# Patient Record
Sex: MALE | Race: WHITE | NOT HISPANIC OR LATINO | Employment: OTHER | URBAN - METROPOLITAN AREA
[De-identification: names, ages, dates, MRNs, and addresses within clinical notes are randomized per-mention and may not be internally consistent; named-entity substitution may affect disease eponyms.]

---

## 2020-04-17 ENCOUNTER — ANESTHESIA (OUTPATIENT)
Dept: SURGERY | Facility: MEDICAL CENTER | Age: 75
DRG: 026 | End: 2020-04-17
Payer: MEDICARE

## 2020-04-17 ENCOUNTER — HOSPITAL ENCOUNTER (OUTPATIENT)
Dept: RADIOLOGY | Facility: MEDICAL CENTER | Age: 75
End: 2020-04-17
Payer: MEDICARE

## 2020-04-17 ENCOUNTER — HOSPITAL ENCOUNTER (INPATIENT)
Facility: MEDICAL CENTER | Age: 75
LOS: 8 days | DRG: 026 | End: 2020-04-25
Attending: EMERGENCY MEDICINE | Admitting: SURGERY
Payer: MEDICARE

## 2020-04-17 ENCOUNTER — ANESTHESIA EVENT (OUTPATIENT)
Dept: SURGERY | Facility: MEDICAL CENTER | Age: 75
DRG: 026 | End: 2020-04-17
Payer: MEDICARE

## 2020-04-17 ENCOUNTER — APPOINTMENT (OUTPATIENT)
Dept: RADIOLOGY | Facility: MEDICAL CENTER | Age: 75
DRG: 026 | End: 2020-04-17
Attending: EMERGENCY MEDICINE
Payer: MEDICARE

## 2020-04-17 DIAGNOSIS — Z11.9 SCREENING EXAMINATION FOR INFECTIOUS DISEASE: ICD-10-CM

## 2020-04-17 DIAGNOSIS — R27.0 ATAXIA: ICD-10-CM

## 2020-04-17 DIAGNOSIS — R33.9 URINARY RETENTION: ICD-10-CM

## 2020-04-17 DIAGNOSIS — S06.5XAA SUBDURAL HEMATOMA (HCC): ICD-10-CM

## 2020-04-17 DIAGNOSIS — R56.1 SEIZURES, POST-TRAUMATIC (HCC): ICD-10-CM

## 2020-04-17 DIAGNOSIS — I63.9 CEREBELLAR STROKE (HCC): ICD-10-CM

## 2020-04-17 DIAGNOSIS — R13.12 OROPHARYNGEAL DYSPHAGIA: ICD-10-CM

## 2020-04-17 DIAGNOSIS — D69.1 PLATELET DYSFUNCTION DUE TO ASPIRIN (HCC): ICD-10-CM

## 2020-04-17 DIAGNOSIS — Z78.9 IMPAIRED MOBILITY AND ADLS: ICD-10-CM

## 2020-04-17 DIAGNOSIS — Z74.09 IMPAIRED MOBILITY AND ADLS: ICD-10-CM

## 2020-04-17 DIAGNOSIS — E78.00 HYPERCHOLESTEROLEMIA: ICD-10-CM

## 2020-04-17 DIAGNOSIS — H54.8 LEGALLY BLIND: ICD-10-CM

## 2020-04-17 DIAGNOSIS — T14.90XA TRAUMA: ICD-10-CM

## 2020-04-17 DIAGNOSIS — I10 ESSENTIAL HYPERTENSION: ICD-10-CM

## 2020-04-17 DIAGNOSIS — T39.015A PLATELET DYSFUNCTION DUE TO ASPIRIN (HCC): ICD-10-CM

## 2020-04-17 DIAGNOSIS — I62.01 ACUTE ON CHRONIC INTRACRANIAL SUBDURAL HEMATOMA (HCC): ICD-10-CM

## 2020-04-17 DIAGNOSIS — I10 HYPERTENSION, UNSPECIFIED TYPE: ICD-10-CM

## 2020-04-17 DIAGNOSIS — S06.5X9A TRAUMATIC SUBDURAL HEMATOMA WITH LOSS OF CONSCIOUSNESS, INITIAL ENCOUNTER (HCC): ICD-10-CM

## 2020-04-17 DIAGNOSIS — Z53.09 CONTRAINDICATION TO DEEP VEIN THROMBOSIS (DVT) PROPHYLAXIS: ICD-10-CM

## 2020-04-17 DIAGNOSIS — I62.03 ACUTE ON CHRONIC INTRACRANIAL SUBDURAL HEMATOMA (HCC): ICD-10-CM

## 2020-04-17 DIAGNOSIS — I69.393 ATAXIA DUE TO RECENT CEREBROVASCULAR ACCIDENT (CVA): ICD-10-CM

## 2020-04-17 LAB
CFT BLD TEG: 5.2 MIN (ref 5–10)
CLOT ANGLE BLD TEG: 62.2 DEGREES (ref 53–72)
CLOT LYSIS 30M P MA LENFR BLD TEG: 0 % (ref 0–8)
CT.EXTRINSIC BLD ROTEM: 2.1 MIN (ref 1–3)
EKG IMPRESSION: NORMAL
MCF BLD TEG: 62.7 MM (ref 50–70)
PA AA BLD-ACNC: 53.4 %
PA ADP BLD-ACNC: 6.9 %
TEG ALGORITHM TGALG: NORMAL

## 2020-04-17 PROCEDURE — 160048 HCHG OR STATISTICAL LEVEL 1-5: Performed by: NEUROLOGICAL SURGERY

## 2020-04-17 PROCEDURE — 96374 THER/PROPH/DIAG INJ IV PUSH: CPT

## 2020-04-17 PROCEDURE — 500889 HCHG PACK, NEURO: Performed by: NEUROLOGICAL SURGERY

## 2020-04-17 PROCEDURE — 700101 HCHG RX REV CODE 250: Performed by: ANESTHESIOLOGY

## 2020-04-17 PROCEDURE — A6222 GAUZE <=16 IN NO W/SAL W/O B: HCPCS | Performed by: NEUROLOGICAL SURGERY

## 2020-04-17 PROCEDURE — 36415 COLL VENOUS BLD VENIPUNCTURE: CPT

## 2020-04-17 PROCEDURE — 00D10ZZ EXTRACTION OF CEREBRAL MENINGES, OPEN APPROACH: ICD-10-PCS | Performed by: NEUROLOGICAL SURGERY

## 2020-04-17 PROCEDURE — 501445 HCHG STAPLER, SKIN DISP: Performed by: NEUROLOGICAL SURGERY

## 2020-04-17 PROCEDURE — 700105 HCHG RX REV CODE 258: Performed by: NURSE PRACTITIONER

## 2020-04-17 PROCEDURE — A6404 STERILE GAUZE > 48 SQ IN: HCPCS | Performed by: NEUROLOGICAL SURGERY

## 2020-04-17 PROCEDURE — 160009 HCHG ANES TIME/MIN: Performed by: NEUROLOGICAL SURGERY

## 2020-04-17 PROCEDURE — 500075 HCHG BLADE, CLIPPER NEURO: Performed by: NEUROLOGICAL SURGERY

## 2020-04-17 PROCEDURE — 00U20KZ SUPPLEMENT DURA MATER WITH NONAUTOLOGOUS TISSUE SUBSTITUTE, OPEN APPROACH: ICD-10-PCS | Performed by: NEUROLOGICAL SURGERY

## 2020-04-17 PROCEDURE — 700111 HCHG RX REV CODE 636 W/ 250 OVERRIDE (IP): Performed by: NEUROLOGICAL SURGERY

## 2020-04-17 PROCEDURE — 500389 HCHG DRAIN, RESERVOIR SUCT JP 100CC: Performed by: NEUROLOGICAL SURGERY

## 2020-04-17 PROCEDURE — 700111 HCHG RX REV CODE 636 W/ 250 OVERRIDE (IP): Performed by: ANESTHESIOLOGY

## 2020-04-17 PROCEDURE — 85347 COAGULATION TIME ACTIVATED: CPT

## 2020-04-17 PROCEDURE — 770022 HCHG ROOM/CARE - ICU (200)

## 2020-04-17 PROCEDURE — 94760 N-INVAS EAR/PLS OXIMETRY 1: CPT

## 2020-04-17 PROCEDURE — 99291 CRITICAL CARE FIRST HOUR: CPT

## 2020-04-17 PROCEDURE — 160041 HCHG SURGERY MINUTES - EA ADDL 1 MIN LEVEL 4: Performed by: NEUROLOGICAL SURGERY

## 2020-04-17 PROCEDURE — 85384 FIBRINOGEN ACTIVITY: CPT

## 2020-04-17 PROCEDURE — 500331 HCHG COTTONOID, SURG PATTIE: Performed by: NEUROLOGICAL SURGERY

## 2020-04-17 PROCEDURE — C1713 ANCHOR/SCREW BN/BN,TIS/BN: HCPCS | Performed by: NEUROLOGICAL SURGERY

## 2020-04-17 PROCEDURE — 160029 HCHG SURGERY MINUTES - 1ST 30 MINS LEVEL 4: Performed by: NEUROLOGICAL SURGERY

## 2020-04-17 PROCEDURE — 160036 HCHG PACU - EA ADDL 30 MINS PHASE I: Performed by: NEUROLOGICAL SURGERY

## 2020-04-17 PROCEDURE — 700101 HCHG RX REV CODE 250: Performed by: NEUROLOGICAL SURGERY

## 2020-04-17 PROCEDURE — 700105 HCHG RX REV CODE 258: Performed by: ANESTHESIOLOGY

## 2020-04-17 PROCEDURE — 501838 HCHG SUTURE GENERAL: Performed by: NEUROLOGICAL SURGERY

## 2020-04-17 PROCEDURE — 500363 HCHG DISSECT TOOL, ANSPACH S: Performed by: NEUROLOGICAL SURGERY

## 2020-04-17 PROCEDURE — 700111 HCHG RX REV CODE 636 W/ 250 OVERRIDE (IP): Performed by: EMERGENCY MEDICINE

## 2020-04-17 PROCEDURE — 160002 HCHG RECOVERY MINUTES (STAT): Performed by: NEUROLOGICAL SURGERY

## 2020-04-17 PROCEDURE — 110454 HCHG SHELL REV 250: Performed by: NEUROLOGICAL SURGERY

## 2020-04-17 PROCEDURE — 85576 BLOOD PLATELET AGGREGATION: CPT

## 2020-04-17 PROCEDURE — 93005 ELECTROCARDIOGRAM TRACING: CPT | Performed by: EMERGENCY MEDICINE

## 2020-04-17 PROCEDURE — 160035 HCHG PACU - 1ST 60 MINS PHASE I: Performed by: NEUROLOGICAL SURGERY

## 2020-04-17 PROCEDURE — 00940ZZ DRAINAGE OF INTRACRANIAL SUBDURAL SPACE, OPEN APPROACH: ICD-10-PCS | Performed by: NEUROLOGICAL SURGERY

## 2020-04-17 DEVICE — SCREW STRYK NC 1.5X5MM (6NCX40=240) (80EA/PK) CONSIGNED QTY 240 PRE-LOAD: Type: IMPLANTABLE DEVICE | Status: FUNCTIONAL

## 2020-04-17 DEVICE — GRAFT DURAMATRIX ONLAY PLUS 3IN X 3IN OR 7.5CM X 7.5CM: Type: IMPLANTABLE DEVICE | Status: FUNCTIONAL

## 2020-04-17 DEVICE — PLATE SHUNT LARGE GAP 14MM WITH TAB (6NCX2=12): Type: IMPLANTABLE DEVICE | Status: FUNCTIONAL

## 2020-04-17 DEVICE — PLATE NC BURR HOLE COVER 10MM (6NCX6=36): Type: IMPLANTABLE DEVICE | Status: FUNCTIONAL

## 2020-04-17 RX ORDER — MIDAZOLAM HYDROCHLORIDE 1 MG/ML
INJECTION INTRAMUSCULAR; INTRAVENOUS
Status: COMPLETED
Start: 2020-04-17 | End: 2020-04-18

## 2020-04-17 RX ORDER — CEFAZOLIN SODIUM 2 G/100ML
2 INJECTION, SOLUTION INTRAVENOUS EVERY 8 HOURS
Status: COMPLETED | OUTPATIENT
Start: 2020-04-17 | End: 2020-04-18

## 2020-04-17 RX ORDER — OXYCODONE HCL 5 MG/5 ML
5 SOLUTION, ORAL ORAL
Status: DISCONTINUED | OUTPATIENT
Start: 2020-04-17 | End: 2020-04-18 | Stop reason: HOSPADM

## 2020-04-17 RX ORDER — HYDRALAZINE HYDROCHLORIDE 20 MG/ML
10 INJECTION INTRAMUSCULAR; INTRAVENOUS ONCE
Status: COMPLETED | OUTPATIENT
Start: 2020-04-17 | End: 2020-04-17

## 2020-04-17 RX ORDER — HYDROMORPHONE HYDROCHLORIDE 1 MG/ML
0.4 INJECTION, SOLUTION INTRAMUSCULAR; INTRAVENOUS; SUBCUTANEOUS
Status: DISCONTINUED | OUTPATIENT
Start: 2020-04-17 | End: 2020-04-18 | Stop reason: HOSPADM

## 2020-04-17 RX ORDER — POLYETHYLENE GLYCOL 3350 17 G/17G
1 POWDER, FOR SOLUTION ORAL 2 TIMES DAILY
Status: DISCONTINUED | OUTPATIENT
Start: 2020-04-17 | End: 2020-04-21

## 2020-04-17 RX ORDER — OXYCODONE HYDROCHLORIDE 5 MG/1
5 TABLET ORAL
Status: DISCONTINUED | OUTPATIENT
Start: 2020-04-17 | End: 2020-04-21

## 2020-04-17 RX ORDER — SODIUM CHLORIDE 9 MG/ML
INJECTION, SOLUTION INTRAVENOUS CONTINUOUS
Status: DISCONTINUED | OUTPATIENT
Start: 2020-04-17 | End: 2020-04-19

## 2020-04-17 RX ORDER — DOCUSATE SODIUM 100 MG/1
100 CAPSULE, LIQUID FILLED ORAL 2 TIMES DAILY
Status: DISCONTINUED | OUTPATIENT
Start: 2020-04-17 | End: 2020-04-21

## 2020-04-17 RX ORDER — AMOXICILLIN 250 MG
1 CAPSULE ORAL NIGHTLY
Status: DISCONTINUED | OUTPATIENT
Start: 2020-04-17 | End: 2020-04-21

## 2020-04-17 RX ORDER — AMOXICILLIN 250 MG
1 CAPSULE ORAL
Status: DISCONTINUED | OUTPATIENT
Start: 2020-04-17 | End: 2020-04-21

## 2020-04-17 RX ORDER — BUPIVACAINE HYDROCHLORIDE AND EPINEPHRINE 5; 5 MG/ML; UG/ML
INJECTION, SOLUTION EPIDURAL; INTRACAUDAL; PERINEURAL
Status: DISCONTINUED | OUTPATIENT
Start: 2020-04-17 | End: 2020-04-17 | Stop reason: HOSPADM

## 2020-04-17 RX ORDER — PHENYLEPHRINE HYDROCHLORIDE 10 MG/ML
INJECTION, SOLUTION INTRAMUSCULAR; INTRAVENOUS; SUBCUTANEOUS PRN
Status: DISCONTINUED | OUTPATIENT
Start: 2020-04-17 | End: 2020-04-17 | Stop reason: SURG

## 2020-04-17 RX ORDER — HYDROMORPHONE HYDROCHLORIDE 1 MG/ML
0.2 INJECTION, SOLUTION INTRAMUSCULAR; INTRAVENOUS; SUBCUTANEOUS
Status: DISCONTINUED | OUTPATIENT
Start: 2020-04-17 | End: 2020-04-18 | Stop reason: HOSPADM

## 2020-04-17 RX ORDER — OXYCODONE HCL 5 MG/5 ML
10 SOLUTION, ORAL ORAL
Status: DISCONTINUED | OUTPATIENT
Start: 2020-04-17 | End: 2020-04-18 | Stop reason: HOSPADM

## 2020-04-17 RX ORDER — HALOPERIDOL 5 MG/ML
1 INJECTION INTRAMUSCULAR
Status: DISCONTINUED | OUTPATIENT
Start: 2020-04-17 | End: 2020-04-18 | Stop reason: HOSPADM

## 2020-04-17 RX ORDER — HYDRALAZINE HYDROCHLORIDE 20 MG/ML
20 INJECTION INTRAMUSCULAR; INTRAVENOUS EVERY 6 HOURS PRN
Status: DISCONTINUED | OUTPATIENT
Start: 2020-04-17 | End: 2020-04-25

## 2020-04-17 RX ORDER — AMLODIPINE BESYLATE 5 MG/1
5 TABLET ORAL DAILY
Status: ON HOLD | COMMUNITY
End: 2020-06-19

## 2020-04-17 RX ORDER — LABETALOL HYDROCHLORIDE 5 MG/ML
10 INJECTION, SOLUTION INTRAVENOUS EVERY 4 HOURS PRN
Status: DISCONTINUED | OUTPATIENT
Start: 2020-04-17 | End: 2020-04-24

## 2020-04-17 RX ORDER — MORPHINE SULFATE 4 MG/ML
1-4 INJECTION, SOLUTION INTRAMUSCULAR; INTRAVENOUS
Status: DISCONTINUED | OUTPATIENT
Start: 2020-04-17 | End: 2020-04-25

## 2020-04-17 RX ORDER — SODIUM CHLORIDE 9 MG/ML
INJECTION, SOLUTION INTRAVENOUS CONTINUOUS
Status: DISCONTINUED | OUTPATIENT
Start: 2020-04-17 | End: 2020-04-18 | Stop reason: HOSPADM

## 2020-04-17 RX ORDER — MIDAZOLAM HYDROCHLORIDE 1 MG/ML
1 INJECTION INTRAMUSCULAR; INTRAVENOUS
Status: COMPLETED | OUTPATIENT
Start: 2020-04-17 | End: 2020-04-18

## 2020-04-17 RX ORDER — METOPROLOL TARTRATE 1 MG/ML
1 INJECTION, SOLUTION INTRAVENOUS
Status: DISCONTINUED | OUTPATIENT
Start: 2020-04-17 | End: 2020-04-18 | Stop reason: HOSPADM

## 2020-04-17 RX ORDER — BISACODYL 10 MG
10 SUPPOSITORY, RECTAL RECTAL
Status: DISCONTINUED | OUTPATIENT
Start: 2020-04-17 | End: 2020-04-25 | Stop reason: HOSPADM

## 2020-04-17 RX ORDER — DEXAMETHASONE SODIUM PHOSPHATE 4 MG/ML
INJECTION, SOLUTION INTRA-ARTICULAR; INTRALESIONAL; INTRAMUSCULAR; INTRAVENOUS; SOFT TISSUE PRN
Status: DISCONTINUED | OUTPATIENT
Start: 2020-04-17 | End: 2020-04-17 | Stop reason: SURG

## 2020-04-17 RX ORDER — HYDROMORPHONE HYDROCHLORIDE 1 MG/ML
0.1 INJECTION, SOLUTION INTRAMUSCULAR; INTRAVENOUS; SUBCUTANEOUS
Status: DISCONTINUED | OUTPATIENT
Start: 2020-04-17 | End: 2020-04-18 | Stop reason: HOSPADM

## 2020-04-17 RX ORDER — OXYCODONE HYDROCHLORIDE 10 MG/1
10 TABLET ORAL
Status: DISCONTINUED | OUTPATIENT
Start: 2020-04-17 | End: 2020-04-21

## 2020-04-17 RX ORDER — ENEMA 19; 7 G/133ML; G/133ML
1 ENEMA RECTAL
Status: DISCONTINUED | OUTPATIENT
Start: 2020-04-17 | End: 2020-04-25 | Stop reason: HOSPADM

## 2020-04-17 RX ORDER — BACITRACIN 50000 [IU]/1
INJECTION, POWDER, FOR SOLUTION INTRAMUSCULAR
Status: DISCONTINUED | OUTPATIENT
Start: 2020-04-17 | End: 2020-04-17 | Stop reason: HOSPADM

## 2020-04-17 RX ORDER — LABETALOL HYDROCHLORIDE 5 MG/ML
5 INJECTION, SOLUTION INTRAVENOUS
Status: DISCONTINUED | OUTPATIENT
Start: 2020-04-17 | End: 2020-04-18 | Stop reason: HOSPADM

## 2020-04-17 RX ORDER — ONDANSETRON 2 MG/ML
4 INJECTION INTRAMUSCULAR; INTRAVENOUS
Status: DISCONTINUED | OUTPATIENT
Start: 2020-04-17 | End: 2020-04-18 | Stop reason: HOSPADM

## 2020-04-17 RX ORDER — ROCURONIUM BROMIDE 10 MG/ML
INJECTION, SOLUTION INTRAVENOUS PRN
Status: DISCONTINUED | OUTPATIENT
Start: 2020-04-17 | End: 2020-04-17 | Stop reason: SURG

## 2020-04-17 RX ORDER — HYDRALAZINE HYDROCHLORIDE 20 MG/ML
5 INJECTION INTRAMUSCULAR; INTRAVENOUS
Status: DISCONTINUED | OUTPATIENT
Start: 2020-04-17 | End: 2020-04-18 | Stop reason: HOSPADM

## 2020-04-17 RX ORDER — DIPHENHYDRAMINE HYDROCHLORIDE 50 MG/ML
12.5 INJECTION INTRAMUSCULAR; INTRAVENOUS
Status: DISCONTINUED | OUTPATIENT
Start: 2020-04-17 | End: 2020-04-18 | Stop reason: HOSPADM

## 2020-04-17 RX ORDER — MEPERIDINE HYDROCHLORIDE 25 MG/ML
12.5 INJECTION INTRAMUSCULAR; INTRAVENOUS; SUBCUTANEOUS
Status: DISCONTINUED | OUTPATIENT
Start: 2020-04-17 | End: 2020-04-18 | Stop reason: HOSPADM

## 2020-04-17 RX ORDER — ONDANSETRON 2 MG/ML
4 INJECTION INTRAMUSCULAR; INTRAVENOUS EVERY 4 HOURS PRN
Status: DISCONTINUED | OUTPATIENT
Start: 2020-04-17 | End: 2020-04-25 | Stop reason: HOSPADM

## 2020-04-17 RX ORDER — ONDANSETRON 2 MG/ML
INJECTION INTRAMUSCULAR; INTRAVENOUS PRN
Status: DISCONTINUED | OUTPATIENT
Start: 2020-04-17 | End: 2020-04-17 | Stop reason: SURG

## 2020-04-17 RX ORDER — CEFAZOLIN SODIUM 1 G/3ML
INJECTION, POWDER, FOR SOLUTION INTRAMUSCULAR; INTRAVENOUS PRN
Status: DISCONTINUED | OUTPATIENT
Start: 2020-04-17 | End: 2020-04-17 | Stop reason: SURG

## 2020-04-17 RX ADMIN — SUGAMMADEX 100 MG: 100 INJECTION, SOLUTION INTRAVENOUS at 22:21

## 2020-04-17 RX ADMIN — ROCURONIUM BROMIDE 50 MG: 10 INJECTION, SOLUTION INTRAVENOUS at 20:44

## 2020-04-17 RX ADMIN — FENTANYL CITRATE 50 MCG: 50 INJECTION, SOLUTION INTRAMUSCULAR; INTRAVENOUS at 21:08

## 2020-04-17 RX ADMIN — CEFAZOLIN 2 G: 330 INJECTION, POWDER, FOR SOLUTION INTRAMUSCULAR; INTRAVENOUS at 20:57

## 2020-04-17 RX ADMIN — MEPERIDINE HYDROCHLORIDE 12.5 MG: 25 INJECTION INTRAMUSCULAR; INTRAVENOUS; SUBCUTANEOUS at 23:10

## 2020-04-17 RX ADMIN — PROPOFOL 200 MG: 10 INJECTION, EMULSION INTRAVENOUS at 20:44

## 2020-04-17 RX ADMIN — FENTANYL CITRATE 25 MCG: 50 INJECTION, SOLUTION INTRAMUSCULAR; INTRAVENOUS at 22:25

## 2020-04-17 RX ADMIN — SODIUM CHLORIDE: 9 INJECTION, SOLUTION INTRAVENOUS at 23:31

## 2020-04-17 RX ADMIN — PHENYLEPHRINE HYDROCHLORIDE 100 MCG: 10 INJECTION INTRAVENOUS at 21:28

## 2020-04-17 RX ADMIN — CEFAZOLIN SODIUM 2 G: 2 INJECTION, SOLUTION INTRAVENOUS at 23:29

## 2020-04-17 RX ADMIN — HYDRALAZINE HYDROCHLORIDE 10 MG: 20 INJECTION INTRAMUSCULAR; INTRAVENOUS at 19:54

## 2020-04-17 RX ADMIN — LIDOCAINE HYDROCHLORIDE 100 MG: 20 INJECTION, SOLUTION INTRAVENOUS at 20:44

## 2020-04-17 RX ADMIN — PHENYLEPHRINE HYDROCHLORIDE 100 MCG: 10 INJECTION INTRAVENOUS at 21:58

## 2020-04-17 RX ADMIN — PHENYLEPHRINE HYDROCHLORIDE 50 MCG: 10 INJECTION INTRAVENOUS at 20:49

## 2020-04-17 RX ADMIN — SODIUM CHLORIDE: 9 INJECTION, SOLUTION INTRAVENOUS at 20:39

## 2020-04-17 RX ADMIN — ONDANSETRON 4 MG: 2 INJECTION INTRAMUSCULAR; INTRAVENOUS at 21:53

## 2020-04-17 RX ADMIN — MEPERIDINE HYDROCHLORIDE 12.5 MG: 25 INJECTION INTRAMUSCULAR; INTRAVENOUS; SUBCUTANEOUS at 23:04

## 2020-04-17 RX ADMIN — FENTANYL CITRATE 50 MCG: 50 INJECTION, SOLUTION INTRAMUSCULAR; INTRAVENOUS at 20:41

## 2020-04-17 RX ADMIN — PHENYLEPHRINE HYDROCHLORIDE 50 MCG: 10 INJECTION INTRAVENOUS at 21:24

## 2020-04-17 RX ADMIN — DEXAMETHASONE SODIUM PHOSPHATE 4 MG: 4 INJECTION, SOLUTION INTRA-ARTICULAR; INTRALESIONAL; INTRAMUSCULAR; INTRAVENOUS; SOFT TISSUE at 20:53

## 2020-04-17 RX ADMIN — MIDAZOLAM HYDROCHLORIDE 1 MG: 1 INJECTION, SOLUTION INTRAMUSCULAR; INTRAVENOUS at 23:59

## 2020-04-17 ASSESSMENT — FIBROSIS 4 INDEX: FIB4 SCORE: 2.83

## 2020-04-17 ASSESSMENT — PAIN SCALES - GENERAL: PAIN_LEVEL: 0

## 2020-04-18 ENCOUNTER — APPOINTMENT (OUTPATIENT)
Dept: RADIOLOGY | Facility: MEDICAL CENTER | Age: 75
DRG: 026 | End: 2020-04-18
Attending: NURSE PRACTITIONER
Payer: MEDICARE

## 2020-04-18 PROBLEM — E78.00 HYPERCHOLESTEROLEMIA: Status: ACTIVE | Noted: 2020-04-18

## 2020-04-18 LAB
ALBUMIN SERPL BCP-MCNC: 3.5 G/DL (ref 3.2–4.9)
ALBUMIN/GLOB SERPL: 1.3 G/DL
ALP SERPL-CCNC: 55 U/L (ref 30–99)
ALT SERPL-CCNC: 14 U/L (ref 2–50)
ANION GAP SERPL CALC-SCNC: 11 MMOL/L (ref 7–16)
AST SERPL-CCNC: 10 U/L (ref 12–45)
BASOPHILS # BLD AUTO: 0.1 % (ref 0–1.8)
BASOPHILS # BLD: 0.01 K/UL (ref 0–0.12)
BILIRUB SERPL-MCNC: 0.6 MG/DL (ref 0.1–1.5)
BUN SERPL-MCNC: 15 MG/DL (ref 8–22)
CALCIUM SERPL-MCNC: 8.4 MG/DL (ref 8.5–10.5)
CHLORIDE SERPL-SCNC: 105 MMOL/L (ref 96–112)
CO2 SERPL-SCNC: 21 MMOL/L (ref 20–33)
CREAT SERPL-MCNC: 0.82 MG/DL (ref 0.5–1.4)
EKG IMPRESSION: NORMAL
EOSINOPHIL # BLD AUTO: 0 K/UL (ref 0–0.51)
EOSINOPHIL NFR BLD: 0 % (ref 0–6.9)
ERYTHROCYTE [DISTWIDTH] IN BLOOD BY AUTOMATED COUNT: 39.4 FL (ref 35.9–50)
GLOBULIN SER CALC-MCNC: 2.7 G/DL (ref 1.9–3.5)
GLUCOSE SERPL-MCNC: 155 MG/DL (ref 65–99)
HCT VFR BLD AUTO: 39.4 % (ref 42–52)
HGB BLD-MCNC: 13.8 G/DL (ref 14–18)
IMM GRANULOCYTES # BLD AUTO: 0.01 K/UL (ref 0–0.11)
IMM GRANULOCYTES NFR BLD AUTO: 0.1 % (ref 0–0.9)
LYMPHOCYTES # BLD AUTO: 0.33 K/UL (ref 1–4.8)
LYMPHOCYTES NFR BLD: 4.9 % (ref 22–41)
MCH RBC QN AUTO: 30.1 PG (ref 27–33)
MCHC RBC AUTO-ENTMCNC: 35 G/DL (ref 33.7–35.3)
MCV RBC AUTO: 85.8 FL (ref 81.4–97.8)
MONOCYTES # BLD AUTO: 0.26 K/UL (ref 0–0.85)
MONOCYTES NFR BLD AUTO: 3.9 % (ref 0–13.4)
NEUTROPHILS # BLD AUTO: 6.1 K/UL (ref 1.82–7.42)
NEUTROPHILS NFR BLD: 91 % (ref 44–72)
NRBC # BLD AUTO: 0 K/UL
NRBC BLD-RTO: 0 /100 WBC
PLATELET # BLD AUTO: 164 K/UL (ref 164–446)
PMV BLD AUTO: 9.7 FL (ref 9–12.9)
POTASSIUM SERPL-SCNC: 4.1 MMOL/L (ref 3.6–5.5)
PROT SERPL-MCNC: 6.2 G/DL (ref 6–8.2)
RBC # BLD AUTO: 4.59 M/UL (ref 4.7–6.1)
SODIUM SERPL-SCNC: 137 MMOL/L (ref 135–145)
WBC # BLD AUTO: 6.7 K/UL (ref 4.8–10.8)

## 2020-04-18 PROCEDURE — 700111 HCHG RX REV CODE 636 W/ 250 OVERRIDE (IP): Performed by: NEUROLOGICAL SURGERY

## 2020-04-18 PROCEDURE — A9270 NON-COVERED ITEM OR SERVICE: HCPCS | Performed by: NURSE PRACTITIONER

## 2020-04-18 PROCEDURE — 700102 HCHG RX REV CODE 250 W/ 637 OVERRIDE(OP): Performed by: SURGERY

## 2020-04-18 PROCEDURE — 700102 HCHG RX REV CODE 250 W/ 637 OVERRIDE(OP): Performed by: NURSE PRACTITIONER

## 2020-04-18 PROCEDURE — 700111 HCHG RX REV CODE 636 W/ 250 OVERRIDE (IP): Performed by: NURSE PRACTITIONER

## 2020-04-18 PROCEDURE — 80053 COMPREHEN METABOLIC PANEL: CPT

## 2020-04-18 PROCEDURE — 71045 X-RAY EXAM CHEST 1 VIEW: CPT

## 2020-04-18 PROCEDURE — 70450 CT HEAD/BRAIN W/O DYE: CPT

## 2020-04-18 PROCEDURE — 700112 HCHG RX REV CODE 229: Performed by: NURSE PRACTITIONER

## 2020-04-18 PROCEDURE — 93005 ELECTROCARDIOGRAM TRACING: CPT | Performed by: SURGERY

## 2020-04-18 PROCEDURE — 97162 PT EVAL MOD COMPLEX 30 MIN: CPT

## 2020-04-18 PROCEDURE — 770022 HCHG ROOM/CARE - ICU (200)

## 2020-04-18 PROCEDURE — 700105 HCHG RX REV CODE 258: Performed by: NURSE PRACTITIONER

## 2020-04-18 PROCEDURE — 93010 ELECTROCARDIOGRAM REPORT: CPT | Performed by: INTERNAL MEDICINE

## 2020-04-18 PROCEDURE — 99233 SBSQ HOSP IP/OBS HIGH 50: CPT | Performed by: SURGERY

## 2020-04-18 PROCEDURE — 85025 COMPLETE CBC W/AUTO DIFF WBC: CPT

## 2020-04-18 PROCEDURE — 700111 HCHG RX REV CODE 636 W/ 250 OVERRIDE (IP)

## 2020-04-18 PROCEDURE — A9270 NON-COVERED ITEM OR SERVICE: HCPCS | Performed by: SURGERY

## 2020-04-18 RX ORDER — AMLODIPINE BESYLATE 10 MG/1
5 TABLET ORAL DAILY
Status: DISCONTINUED | OUTPATIENT
Start: 2020-04-18 | End: 2020-04-21

## 2020-04-18 RX ORDER — ROSUVASTATIN CALCIUM 10 MG/1
10 TABLET, COATED ORAL
Status: DISCONTINUED | OUTPATIENT
Start: 2020-04-18 | End: 2020-04-21

## 2020-04-18 RX ORDER — ACETAMINOPHEN 325 MG/1
650 TABLET ORAL EVERY 6 HOURS PRN
Status: DISCONTINUED | OUTPATIENT
Start: 2020-04-18 | End: 2020-04-21

## 2020-04-18 RX ADMIN — DOCUSATE SODIUM 100 MG: 100 CAPSULE, LIQUID FILLED ORAL at 05:54

## 2020-04-18 RX ADMIN — POLYETHYLENE GLYCOL 3350 1 PACKET: 17 POWDER, FOR SOLUTION ORAL at 18:16

## 2020-04-18 RX ADMIN — CEFAZOLIN SODIUM 2 G: 2 INJECTION, SOLUTION INTRAVENOUS at 09:41

## 2020-04-18 RX ADMIN — MORPHINE SULFATE 2 MG: 4 INJECTION INTRAVENOUS at 05:00

## 2020-04-18 RX ADMIN — ROSUVASTATIN CALCIUM 10 MG: 10 TABLET, FILM COATED ORAL at 22:11

## 2020-04-18 RX ADMIN — SODIUM CHLORIDE: 9 INJECTION, SOLUTION INTRAVENOUS at 16:00

## 2020-04-18 RX ADMIN — SENNOSIDES AND DOCUSATE SODIUM 1 TABLET: 8.6; 5 TABLET ORAL at 22:11

## 2020-04-18 RX ADMIN — DOCUSATE SODIUM 100 MG: 100 CAPSULE, LIQUID FILLED ORAL at 18:16

## 2020-04-18 RX ADMIN — LEVETIRACETAM 500 MG: 100 INJECTION, SOLUTION INTRAVENOUS at 18:17

## 2020-04-18 RX ADMIN — MIDAZOLAM HYDROCHLORIDE 1 MG: 1 INJECTION, SOLUTION INTRAMUSCULAR; INTRAVENOUS at 00:09

## 2020-04-18 RX ADMIN — MORPHINE SULFATE 2 MG: 4 INJECTION INTRAVENOUS at 04:00

## 2020-04-18 RX ADMIN — AMLODIPINE BESYLATE 5 MG: 10 TABLET ORAL at 15:14

## 2020-04-18 RX ADMIN — LEVETIRACETAM 500 MG: 100 INJECTION, SOLUTION INTRAVENOUS at 05:48

## 2020-04-18 ASSESSMENT — FIBROSIS 4 INDEX: FIB4 SCORE: 2.92

## 2020-04-18 ASSESSMENT — GAIT ASSESSMENTS
DEVIATION: ATAXIC;BRADYKINETIC;SHUFFLED GAIT
ASSISTIVE DEVICE: HAND HELD ASSIST
GAIT LEVEL OF ASSIST: MINIMAL ASSIST
DISTANCE (FEET): 5

## 2020-04-18 ASSESSMENT — ENCOUNTER SYMPTOMS
RESPIRATORY NEGATIVE: 1
HEADACHES: 1
DIZZINESS: 0
HEMATOLOGIC/LYMPHATIC NEGATIVE: 1
ENDOCRINE NEGATIVE: 1
ALLERGIC/IMMUNOLOGIC NEGATIVE: 1
EYES NEGATIVE: 1
CONSTITUTIONAL NEGATIVE: 1
NUMBNESS: 0
PSYCHIATRIC NEGATIVE: 1
WEAKNESS: 0
CARDIOVASCULAR NEGATIVE: 1
GASTROINTESTINAL NEGATIVE: 1
MUSCULOSKELETAL NEGATIVE: 1
SPEECH DIFFICULTY: 0

## 2020-04-18 ASSESSMENT — COGNITIVE AND FUNCTIONAL STATUS - GENERAL
CLIMB 3 TO 5 STEPS WITH RAILING: A LOT
MOVING FROM LYING ON BACK TO SITTING ON SIDE OF FLAT BED: UNABLE
SUGGESTED CMS G CODE MODIFIER MOBILITY: CL
MOVING TO AND FROM BED TO CHAIR: A LITTLE
MOBILITY SCORE: 14
STANDING UP FROM CHAIR USING ARMS: A LITTLE
WALKING IN HOSPITAL ROOM: A LOT
TURNING FROM BACK TO SIDE WHILE IN FLAT BAD: A LITTLE

## 2020-04-18 ASSESSMENT — LIFESTYLE VARIABLES: EVER_SMOKED: UNABLE TO EVALUATE AT THIS TIME - NEEDS ASSESSMENT PRIOR TO DISCHARGE

## 2020-04-18 ASSESSMENT — COPD QUESTIONNAIRES
DO YOU EVER COUGH UP ANY MUCUS OR PHLEGM?: NO/ONLY WITH OCCASIONAL COLDS OR INFECTIONS
DURING THE PAST 4 WEEKS HOW MUCH DID YOU FEEL SHORT OF BREATH: NONE/LITTLE OF THE TIME
HAVE YOU SMOKED AT LEAST 100 CIGARETTES IN YOUR ENTIRE LIFE: NO/DON'T KNOW
COPD SCREENING SCORE: 2

## 2020-04-18 NOTE — ASSESSMENT & PLAN NOTE
Fall in bathtub 8 days prior to admission.  T-5000 Activation transfer from [facility].  James Felipe MD. Trauma Surgery.

## 2020-04-18 NOTE — ANESTHESIA PROCEDURE NOTES
Airway  Date/Time: 4/17/2020 8:45 PM  Performed by: Jeff Gonzalez M.D.  Authorized by: Jeff Gonzalez M.D.     Location:  OR  Urgency:  Elective  Indications for Airway Management:  Anesthesia      Spontaneous Ventilation: absent    Sedation Level:  Deep  Preoxygenated: Yes    Patient Position:  Sniffing  Final Airway Type:  Endotracheal airway  Final Endotracheal Airway:  ETT  Cuffed: Yes    Technique Used for Successful ETT Placement:  Video laryngoscopy  Insertion Site:  Oral  Blade Type:  Heladio  Laryngoscope Blade/Videolaryngoscope Blade Size:  3  ETT Size (mm):  8.0  Measured from:  Lips  ETT to Lips (cm):  22  Placement Verified by: auscultation and capnometry    Cormack-Lehane Classification:  Grade I - full view of glottis  Number of Attempts at Approach:  1

## 2020-04-18 NOTE — OR NURSING
Pt AOx3 disoriented to time but able to reorient. Neurologically intact.  PERRLA. Pt has no complaints of pain. Pt has staples and, xeroform, telfa, medapore tape to head with some shadowing from OR, no new drainage on dressing. ALONDRA drain in place on head with 185 ml of serosanguinous output.  Pt tachypnic after sx, updateDr Carlos. Dr Chaudhry to help at bedside. Pt states that he was anxious and they he could not slow his breathing. Dr Chaudhry ordered 1 mg versed x2; pt does take benzodiazepine at home for anxiety. Pt states that he feels better and breathing has slowed sats in the high 90s to 100. Dr Norris to beside to assess pt.

## 2020-04-18 NOTE — ASSESSMENT & PLAN NOTE
Systemic anticoagulation contraindicated secondary to elevated bleeding risk and until subdural drain removed.  RAP score 6  4/19 Trauma surveillance venous duplex with no evidence of deep venous thrombosis bilaterally down to the popliteal veins.

## 2020-04-18 NOTE — ANESTHESIA POSTPROCEDURE EVALUATION
Patient: Rafael Mccoy    Procedure Summary     Date:  04/17/20 Room / Location:  [facility] 05 / SURGERY [facility]    Anesthesia Start:  2039 Anesthesia Stop:  2243    Procedure:  CRANIOTOMY - SUBDURAL (Right Head) Diagnosis:  (RIGHT SIDE SUBDURAL HEMATOMA)    Surgeon:  Anthony Mendiola M.D. Responsible Provider:  Jeff Gonzalez M.D.    Anesthesia Type:  general ASA Status:  3 - Emergent          Final Anesthesia Type: general  Last vitals  BP   Blood Pressure : 138/97, NIBP: (!) 177/72, Arterial BP: 147/58    Temp   36.1 °C (97 °F)    Pulse   Pulse: 82   Resp   15    SpO2   99 %      Anesthesia Post Evaluation    Patient location during evaluation: PACU  Patient participation: complete - patient participated  Level of consciousness: awake and alert  Pain score: 0    Airway patency: patent  Anesthetic complications: no  Cardiovascular status: hemodynamically stable  Respiratory status: acceptable  Hydration status: euvolemic    PONV: none           Nurse Pain Score: 0 (NPRS)

## 2020-04-18 NOTE — ED NOTES
Unable to obtain med rec at this time. Pt unavailable for interview. No pharmacy on record.     Med rec complete per patients pharmacy. Unable to verify last doses or allergies. No ABX in last 14 days per pharmacy.

## 2020-04-18 NOTE — ED PROVIDER NOTES
"CHIEF COMPLAINT  Chief Complaint   Patient presents with   • Fall   • T-5000       HPI  Rafael Mccoy is a 74 y.o. male who presents to the emergency department by ambulance from outside hospital for acute on chronic subdural hematoma, now with midline shift and neurologic findings.  Patient states he had a mechanical slip and fall 10 days ago where he struck the back of his head on the side of his bathtub.  Denies loss of consciousness or ever developing a cephalohematoma.  States he was ambulatory thereafter.  Does state that he has had some \"balance issues\" since this fall, and persistent and slightly more noticeable this week, prompting evaluation for the first time today.  Denies headache.  Legally blind.  Otherwise denies focal weakness or change in behavior.  Patient takes aspirin daily but denies other anticoagulation.    REVIEW OF SYSTEMS  See HPI for further details. All other systems are negative.     PAST MEDICAL HISTORY   has a past medical history of Blind, Hypertension, and Stroke (HCC).    SOCIAL HISTORY  Social History     Tobacco Use   • Smoking status: Never Smoker   • Smokeless tobacco: Never Used   Substance and Sexual Activity   • Alcohol use: No   • Drug use: No   • Sexual activity: Not on file       SURGICAL HISTORY   has a past surgical history that includes other orthopedic surgery; rotator cuff repair (Right); and tonsillectomy and adenoidectomy.    CURRENT MEDICATIONS  Home Medications     Reviewed by Zoltan Shabazz (Pharmacy Tech) on 04/17/20 at 2009  Med List Status: Complete   Medication Last Dose Status   amLODIPine (NORVASC) 5 MG Tab UNK Active   aspirin EC 81 MG EC tablet UNK Active   Diclofenac Sodium 1 % Gel UNK Active   metoprolol (LOPRESSOR) 25 MG Tab UNK Active   mirtazapine (REMERON) 45 MG tablet UNK Active   rosuvastatin (CRESTOR) 10 MG Tab UNK Active                ALLERGIES  No Known Allergies    PHYSICAL EXAM  VITAL SIGNS: /70   Pulse 91   Temp 36.8 °C " "(98.2 °F) (Temporal)   Resp 18   Ht 1.753 m (5' 9\")   Wt 78 kg (172 lb)   SpO2 99%   BMI 25.40 kg/m²   Pulse ox interpretation: I interpret this pulse ox as normal.  Constitutional: Alert in no apparent distress.  HENT: Normocephalic, atraumatic, no cephalohematoma. Bilateral external ears normal, Nose normal. Moist mucous membranes.  No oral trauma  Eyes: Pupils are equal and reactive, 5-3 bilaterally.  Conjunctiva normal.   Neck: No midline tenderness to palpation.  No step-offs.  Full range of motion without pain or resistance.  Cardiovascular: Regular rate and rhythm, no murmurs. Distal pulses intact.   Thorax & Lungs: Normal breath sounds.  No wheezing/rales/ronchi. No increased work of breathing  Skin: Warm, Dry, No erythema, No rash.   Musculoskeletal: Good range of motion in all major joints  Neurologic: Alert and oriented x4.  Speech is clear and cohesive.  5 out of 5 strength in 4 extremities with proximal distal muscle groups.  Straight leg raise intact bilaterally.  Patient has truncal ataxia with pronator drift testing, although no drift.  Truncal ataxia with finger-to-nose testing although no ataxia of the finger/upper extremities.  Psychiatric: Affect normal, Judgment normal, Mood normal.       DIAGNOSTIC STUDIES / PROCEDURES  See results from outside facility    COURSE & MEDICAL DECISION MAKING  Nursing notes and vital signs were reviewed. (See chart for details)  The patients records were reviewed, history was obtained from the patient;     Medical record review: CT head with right-sided subdural hematoma with both acute and chronic components.  Midline shift to left of approximately 4 to 5 mm with mass-effect on the right lateral ventricular system.    Patient transferred to this facility for higher level of care, neurosurgical evaluation and consultation after CT findings for acute on chronic subdural hematoma with midline shift.  Patient on my exam has truncal ataxia, otherwise " neurologically intact and conversive.  Labs available side facility.    Dr. Mendiola is aware of the patient prior to arrival.  He has added the tag study.  Request chest x-ray, EKG, urine.  Will plan the OR.    Dr. Felipe is aware of the patient agreeable to admission following OR.    Patient is slightly hypertensive, systolic 150s to 171, single low-dose hydralazine given in the emergency department.    FINAL IMPRESSION  (S06.5X9A) Traumatic subdural hematoma with loss of consciousness, initial encounter (HCC)  (I62.01,  I62.03) Acute on chronic intracranial subdural hematoma (HCC)  (R27.0) Ataxia  (I10) Hypertension, unspecified type      Electronically signed by: Shelly Willis D.O., 4/17/2020 10:29 PM      This dictation was created using voice recognition software. The accuracy of the dictation is limited to the abilities of the software. I expect there may be some errors of grammar and possibly content. The nursing notes were reviewed and certain aspects of this information were incorporated into this note.

## 2020-04-18 NOTE — OP REPORT
DATE OF SERVICE:  04/17/2020    PREOPERATIVE DIAGNOSIS:  Large right-sided acute on chronic subdural hematoma.    POSTOPERATIVE DIAGNOSIS:  Large right-sided acute on chronic subdural   hematoma.    PROCEDURES:  1.  Right-sided craniotomy greater than 5 cm for evacuation of subdural   hematoma.  2.  Use of modifier 22 for extensive difficulty with stripping and removing   membranes.    SURGEON:  Anthony Mendiola MD    ASSISTANT:  None.    ANESTHESIA:  General.    COMPLICATIONS:  None.    ESTIMATED BLOOD LOSS:  100 mL.    DESCRIPTION OF PROCEDURE:  The patient was brought to the operating room,   identified in the usual fashion.  General endotracheal anesthesia was induced   by the anesthesia team.  The patient was then placed supine on the operating   table.  All pressure points were meticulously padded.  Head was turned toward   the left exposing the right frontotemporal area.  Bump was placed under his   right shoulder.  We then used surgical clippers to clip the patient's hair.    Midline was marked as well as a linear incision perpendicular to midline   starting just above the ear.  The patient was then prepped and draped in usual   sterile fashion.  Local anesthesia was infiltrated in subcutaneous tissue.  A   10 blade was used to incise the skin.  Dissection was carried down to bone   using Bovie electrocautery.  We then undermined temporalis fascia and muscle   with Bovie electrocautery.  We then turned a standard craniotomy flap using 4   ama holes.  Penfield 1 and 3 were used to separate the dura from the   overlying bone.  We then turned a standard craniotomy flap.  Flap was placed   in antibiotic irrigation for further use.  Copious amounts of antibiotic   irrigation were used to wash out the wound.  FloSeal with gentle tamponade was   used for hemostasis where the dura met the cut edge of the bone.  We then   opened the dura in a cruciate fashion.  Dural leaflets were tacked back with   4-0 Nurolon.   Immediately underneath was a thick membrane.  As soon as we got   through, we were able to remove chronic subdural hematoma fluid under   pressure.  We then meticulously went around circumferentially and washed out   the subdural space using copious amounts of antibiotic irrigation and then   used bipolar electrocautery to cauterize all the membranes that we were able   to see.  We had meticulous hemostasis, which was achieved with bipolar   electrocautery.  Patties were used to protect the underlying brain.  The   membranes were not stuck at all to the brain in any location.  This was quite   straightforward in terms of  the membranes in the brain, but very   difficult in terms of removing the superficial portion of the membranes   attached to the dura, which was very thick and complex and vascular.  At the   end, we could clearly see that we had meticulous hemostasis throughout.  We   then left a 7 flat ALONDRA in the subdural space, brought out through a separate   stab incision and secured to the skin using nylon suture.  We then   reapproximated the dura.  We put Duragen over the dural defect.  We then   placed the bone flap back with titanium plates and screws and then temporalis   fascia was closed with 0 Vicryl.  Galea was closed with 0 Vicryl inverted.    Skin was closed with staples.  There were no complications.  The patient   tolerated the procedure well.       ____________________________________     RHONDA ARECHIGA MD    CPD / NTS    DD:  04/17/2020 22:31:21  DT:  04/17/2020 23:10:26    D#:  [id number]  Job#:  [id number]

## 2020-04-18 NOTE — ASSESSMENT & PLAN NOTE
Chronic condition treated with metoprolol and amlodipine.  4/18 Resumed amlodipine.    Currently holding metoprolol.

## 2020-04-18 NOTE — PROGRESS NOTES
Dr. Short at bedside to assess patient.  Change neuro checks to q2 hours.    [Normocephalic] : normocephalic [Moves All Extremities x 4] : moves all extremities x4 [Negative Ortalani/Wolf] : negative Ortalani/Wolf [NL] : warm [de-identified] : There is mild limitation noted to extension of infant's neck to the left side. Patient tends to hold head tilted towards the right side. There is no swelling noted there is mild thickness noted to the muscle posteriorly. There are no lesions present nor any erythema. There is no history of any recent trauma. [de-identified] : Mild limitation noted with extension to the neck to the left shoulder. Patient appears uncomfortable with this motion.

## 2020-04-18 NOTE — ASSESSMENT & PLAN NOTE
4/17 COVID-19 Screening completed.  No fever, pulmonary symptoms, contact with infected individual, and travel to/from a high risk region.

## 2020-04-18 NOTE — ANESTHESIA TIME REPORT
Anesthesia Start and Stop Event Times     Date Time Event    4/17/2020 2026 Ready for Procedure     2039 Anesthesia Start     2243 Anesthesia Stop        Responsible Staff  04/17/20    Name Role Begin End    Jeff Gonzalez M.D. Anesth 2039 2243        Preop Diagnosis (Free Text):  Pre-op Diagnosis     RIGHT SIDE SUBDURAL HEMATOMA        Preop Diagnosis (Codes):    Post op Diagnosis  Subdural hematoma (HCC)      Premium Reason  A. 3PM - 7AM    Comments:

## 2020-04-18 NOTE — THERAPY
"Physical Therapy Evaluation completed.   Bed Mobility:  Supine to Sit: Supervised  Transfers: Sit to Stand: Minimal Assist  Gait: Level Of Assist: Minimal Assist with hand held assist       Plan of Care: Will benefit from Physical Therapy 5 times per week  Discharge Recommendations: Equipment: Will Continue to Assess for Equipment Needs. Post-acute therapy Recommend home health transitional care for continued physical therapy services.     Mr. Mccoy is a 75 y/o male who presents to acute secondary to subdural hematoma s/p craniotomy and evacuation. He presents with lower extremity strength that is equal bilaterally and WFL. No new sensation deficits. Mild dynamic balanc eimpairments and gross motor coordination deficits that negatively impact his ability to perform gait, transfers, and stairs at his prior level of function. Gait distance was limited by lines but pt did have significant lateral sway when taking steps along EOB. Was easily distracted by lines, anticipate mobility will improve as he has less lines. Anticipate with increased out of bed time with nursing staff and additional acute PT sessions pt will achieve a mobility level adeqaute for home health management. Acute PT to continue to follow while in house.     See \"Rehab Therapy-Acute\" Patient Summary Report for complete documentation.     "

## 2020-04-18 NOTE — PROGRESS NOTES
Neurosurgery Progress Note    Subjective:  Patient states he is feeling improved since yesterday is less confused and doing better    Exam:  He is alert and oriented x3 is able to express appropriate no signs of dysarthria for  HEENT dressings clean dry and intact subdural drain in place  Cranial nerves grossly intact pupils are equal round react light  No pronator drift bilateral upper extremities are symmetric strength    Subdural drain output over 300 in the last 12 hours    BP  Min: 126/57  Max: 177/72  Pulse  Av.1  Min: 61  Max: 109  Resp  Av  Min: 13  Max: 52  Temp  Av.4 °C (97.6 °F)  Min: 36.1 °C (97 °F)  Max: 36.8 °C (98.2 °F)  Monitored Temp 2  Av.4 °C (97.6 °F)  Min: 36.3 °C (97.3 °F)  Max: 36.6 °C (97.9 °F)  SpO2  Av.4 %  Min: 95 %  Max: 100 %    No data recorded    Recent Labs     20  16120  0640   WBC 5.2 6.7   RBC 5.69 4.59*   HEMOGLOBIN 16.6 13.8*   HEMATOCRIT 49.5 39.4*   MCV 87.0 85.8   MCH 29.2 30.1   MCHC 33.5 35.0   RDW 13.1 39.4   PLATELETCT 169 164   MPV 10.0 9.7     Recent Labs     20  1615 20  0640   SODIUM 140 137   POTASSIUM 3.8 4.1   CHLORIDE 104 105   CO2 26 21   GLUCOSE 93 155*   BUN 21* 15   CREATININE 1.1 0.82   CALCIUM 10.0 8.4*     Recent Labs     20  1615   APTT 27.3   INR 0.95     Recent Labs     20  1855   REACTMIN 5.2   CLOTKINET 2.1   CLOTANGL 62.2   MAXCLOTS 62.7   ZIW60IDS 0.0   PRCINADP 6.9   PRCINAA 53.4       Intake/Output       20 0700 - 20 0659 20 07 - 20 0659      8004-3723 9426-1250 Total 6208-2752 0369-5684 Total       Intake    I.V.  --  1528.8 1528.8  --  -- --    Volume (mL) (NS infusion) -- 1528.8 1528.8 -- -- --    IV Piggyback  --  100 100  --  -- --    Volume (mL) (levETIRAcetam (KEPPRA) 500 mg in  mL IVPB) -- 100 100 -- -- --    Total Intake -- 1628.8 1628.8 -- -- --       Output    Urine  --  1925 1925  --  -- --    Urine -- 550 550 -- -- --    Output (mL) (Urethral  Catheter Temperature probe 16 Fr.) -- 1375 1375 -- -- --    Drains  --  345 345  --  -- --    Output (mL) (Closed/Suction Drain 1 Right Other (Comment) Jonathan Niño 7 Fr.) -- 345 345 -- -- --    Blood  --  100 100  --  -- --    Est. Blood Loss -- 100 100 -- -- --    Total Output -- 2370 2370 -- -- --       Net I/O     -- -741.2 -741.2 -- -- --            Intake/Output Summary (Last 24 hours) at 4/18/2020 0859  Last data filed at 4/18/2020 0600  Gross per 24 hour   Intake 1628.83 ml   Output 2370 ml   Net -741.17 ml            • Respiratory Therapy Consult   Continuous RT   • Pharmacy Consult Request  1 Each PHARMACY TO DOSE   • docusate sodium  100 mg BID   • senna-docusate  1 Tab Nightly   • senna-docusate  1 Tab Q24HRS PRN   • polyethylene glycol/lytes  1 Packet BID   • magnesium hydroxide  30 mL DAILY   • bisacodyl  10 mg Q24HRS PRN   • fleet  1 Each Once PRN   • NS   Continuous   • oxyCODONE immediate-release  5 mg Q3HRS PRN   • oxyCODONE immediate release  10 mg Q3HRS PRN   • morphine injection  1-4 mg Q3HRS PRN   • levETIRAcetam (KEPPRA) IV  500 mg BID   • labetalol  10 mg Q4HRS PRN   • hydrALAZINE  20 mg Q6HRS PRN   • ceFAZolin  2 g Q8HR   • ondansetron  4 mg Q4HRS PRN   • niCARdipine infusion  0-15 mg/hr Continuous   • Pharmacy Consult Request  1 Each PHARMACY TO DOSE       Assessment and Plan:  Hospital day #1  POD #1  Prophylactic anticoagulation: no         Start date/time: tbd    Continue subdural drain in place output was high 300  Keppra 500 twice daily  Every 2 an hour neurochecks are okay starting this morning  CT stable at 445 last night shows decreased midline shift no need for repeat head CT at this time unless patient has a decline in mental status  Hold DVT prophylaxis until drain is removed  Up out of bed ad wagner.    Rush Whalen

## 2020-04-18 NOTE — PROGRESS NOTES
"Trauma Progress Note     Briefly, this is a 74 y.o. male with a acute on chronic subdural hematoma following a GLF.    /62   Pulse 88   Temp 36.1 °C (97 °F) (Temporal)   Resp (!) 34   Ht 1.753 m (5' 9\")   Wt 78 kg (172 lb)   SpO2 99%   BMI 25.40 kg/m²       Intake/Output Summary (Last 24 hours) at 4/18/2020 0639  Last data filed at 4/18/2020 0400  Gross per 24 hour   Intake 1362.83 ml   Output 1945 ml   Net -582.17 ml       Lab Results   Component Value Date/Time    WBC 5.2 04/17/2020 04:15 PM    RBC 5.69 04/17/2020 04:15 PM    HEMOGLOBIN 16.6 04/17/2020 04:15 PM    HEMATOCRIT 49.5 04/17/2020 04:15 PM    MCV 87.0 04/17/2020 04:15 PM    MCH 29.2 04/17/2020 04:15 PM    MCHC 33.5 04/17/2020 04:15 PM    MPV 10.0 04/17/2020 04:15 PM        Lab Results   Component Value Date/Time    SODIUM 140 04/17/2020 04:15 PM    POTASSIUM 3.8 04/17/2020 04:15 PM    CHLORIDE 104 04/17/2020 04:15 PM    CO2 26 04/17/2020 04:15 PM    GLUCOSE 93 04/17/2020 04:15 PM    BUN 21 (H) 04/17/2020 04:15 PM    CREATININE 1.1 04/17/2020 04:15 PM        Lab Results   Component Value Date/Time    PROTHROMBTM 10.3 04/17/2020 04:15 PM    INR 0.95 04/17/2020 04:15 PM        Recent Labs     04/17/20  1855   REACTMIN 5.2   CLOTKINET 2.1   CLOTANGL 62.2   MAXCLOTS 62.7   [id number] 0.0   PRCINADP 6.9   PRCINAA 53.4       Assessment:  Status post craniotomy  AOx4, GCS 15, THOMAS, PEERL  Adequate pain control  BP maintained >160 without PRN medications  Repeat head CT and CXR pending    Additional Plans:  Continue current plan of care  PT/OT/SLP      "

## 2020-04-18 NOTE — PROGRESS NOTES
Full note dictated    OR now right craniotomy for SDH  All questions answered    Got plt for asa  teg pending  Normal coags and plt

## 2020-04-18 NOTE — H&P
TRAUMA HISTORY AND PHYSICAL    CHIEF COMPLAINT:  Subdural hematoma     HISTORY OF PRESENT ILLNESS: The patient is a 74 year old  Male who fell in the bathtub several days ago.  He presented to [facility] with progressive ataxia.  CT there showed a large subdural hematoma with midline shift.  He received Keppra and platelets at [place].   He was subsequently transferred to [facility] for definitive care.            The patient was triaged as a non activation in accordance with established pre hospital protocols.  Upon arrival,   primary and secondary surveys with required adjuncts were performed. GCS remained 15.        PAST MEDICAL HISTORY:  has a past medical history of Blind, Hypertension, and Stroke (HCC).     PAST SURGICAL HISTORY:  has a past surgical history that includes other orthopedic surgery; rotator cuff repair (Right); and tonsillectomy and adenoidectomy.     ALLERGIES: No Known Allergies     CURRENT MEDICATIONS:   Home Medications     Reviewed by Zoltan Shabazz (Pharmacy Tech) on 04/17/20 at 1917  Med List Status: <None>   Medication Last Dose Status   aspirin EC 81 MG EC tablet  Active   atorvastatin (LIPITOR) 20 MG Tab  Active   Cyanocobalamin (B-12) 1000 MCG Cap  Active   Glucosamine-Chondroit-Vit C-Mn (GLUCOSAMINE CHONDR 1500 COMPLX) Cap  Active   ibuprofen (MOTRIN) 200 MG Tab  Active   metoprolol (LOPRESSOR) 25 MG Tab  Active   mirtazapine (REMERON) 45 MG tablet  Active   naproxen (ANAPROX) 220 MG tablet  Active   rosuvastatin (CRESTOR) 10 MG Tab  Active                FAMILY HISTORY:   Family History   Problem Relation Age of Onset   • Other Mother         eye problems   • Cancer Neg Hx    • Heart Disease Neg Hx    • Stroke Neg Hx         SOCIAL HISTORY:   Social History     Tobacco Use   • Smoking status: Never Smoker   • Smokeless tobacco: Never Used   Substance and Sexual Activity   • Alcohol use: No   • Drug use: No   • Sexual activity: Not on file       REVIEW OF SYSTEMS:  "Comprehensive review of systems is negative with the   exception of the aforementioned HPI, PMH, and PSH elements in accordance with CMS guidelines.     PHYSICAL EXAMINATION:     GENERAL: No distress  VITAL SIGNS: BP (!) 165/74   Pulse 70   Temp 36.4 °C (97.5 °F)   Resp 20   Ht 1.753 m (5' 9\")   Wt 78 kg (172 lb)   SpO2 97%   HEAD AND NECK: Pupils:  Equal and Reactive  Dentition: Occlusion is adequate  Facial:  Symmetrical.    NECK: No JVD. Trachea midline. Cervical tenderness is  absent   CHEST: Breath sounds are equal. No sternal tenderness.  No  lateral rib tenderness.  CARDIOVASCULAR: Regular rhythm  ABDOMEN: Soft, no tenderness guarding or peritoneal findings.   PELVIS: Stable.  EXTREMITIES: Examination of the upper and lower extremities : No gross long bone or joint deformity.    BACK: No midline stepoffs.  No Tenderness  NEUROLOGIC: Medina Coma Score is 15  .  No gross motor or sensory deficit. Ataxic sitting up    LABORATORY VALUES:   Recent Labs     04/17/20  1615   WBC 5.2   RBC 5.69   HEMOGLOBIN 16.6   HEMATOCRIT 49.5   MCV 87.0   MCH 29.2   MCHC 33.5   RDW 13.1   PLATELETCT 169   MPV 10.0     Recent Labs     04/17/20  1615   SODIUM 140   POTASSIUM 3.8   CHLORIDE 104   CO2 26   GLUCOSE 93   BUN 21*   CREATININE 1.1   CALCIUM 10.0     Recent Labs     04/17/20  1615   ASTSGOT 33   ALTSGPT 26   TBILIRUBIN 0.8   ALKPHOSPHAT 61   INR 0.95     Recent Labs     04/17/20  1615   APTT 27.3   INR 0.95        IMAGING:   OUTSIDE IMAGES-DX CHEST   Final Result      OUTSIDE IMAGES-CT HEAD   Final Result      DX-CHEST-PORTABLE (1 VIEW)    (Results Pending)   DX-CHEST-PORTABLE (1 VIEW)    (Results Pending)   CT-HEAD W/O    (Results Pending)       IMPRESSION AND PLAN:  Subdural hematoma (HCC)  Right subdural hematoma with both acute and chronic components. Midline shift to the left approximately 4-5mm with mass effect on the right lateral ventricle.  4/17 OR for craniotomy  Post traumatic pharmacologic seizure " prophylaxis for 1 week.  Speech Language Pathology cognitive evaluation.  Anthony Mendiola MD. Neurosurgeon. [facility].    Platelet dysfunction due to aspirin (HCC)  Takes daily ASA  TEG with platelet mapping pending  Transfuse 1u platelets if noted to have significant AA inhibition    Trauma  Fall in bathtub 8 days prior to admission.  T-5000 Activation transfer from [facility].  James Felipe MD. Trauma Surgery.    Screening examination for infectious disease  4/17 COVID-19 Screening completed.  No fever, pulmonary symptoms, contact with infected individual, and travel to/from a high risk region.    Hypertension  Chronic condition treated with Metoprolol and Crestor.  Definitive medication reconciliation pending.    Contraindication to deep vein thrombosis (DVT) prophylaxis  Systemic anticoagulation contraindicated secondary to elevated bleeding risk.  Consider surveillance venous duplex scanning if unable to initiate chemical DVT prophylaxis within 48 hrs of admission.    Critical Care 35 minutes .  Intracranial hemorrhage requiring surgery.  Admit to SICU.  Review of imaging , bedside , review of imaging and consultation with other physicians.      ____________________________________   James Felipe MD, FACS      DD: 4/17/2020   DT: 7:59 PM

## 2020-04-18 NOTE — ANESTHESIA PROCEDURE NOTES
Arterial Line  Performed by: Jeff Gonzalez M.D.  Authorized by: Jeff Gonzalez M.D.     Start Time:  4/17/2020 8:50 PM  End Time:  4/17/2020 8:52 PM  Localization: surface landmarks    Patient Location:  OR  Indication: continuous blood pressure monitoring        Catheter Size:  20 G  Seldinger Technique?: Yes    Laterality:  Left  Site:  Radial artery  Line Secured:  Antimicrobial disc, tape and transparent dressing  Events: patient tolerated procedure well with no complications

## 2020-04-18 NOTE — ANESTHESIA PREPROCEDURE EVALUATION
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)   (+) Blindness, legal   (+) Platelet dysfunction due to aspirin (HCC)   (+) Subdural hematoma (HCC)   (+) Trauma       Physical Exam    Airway   Mallampati: II  TM distance: >3 FB  Neck ROM: full       Cardiovascular - normal exam  Rhythm: regular  Rate: normal  (-) murmur     Dental - normal exam         Pulmonary - normal exam  Breath sounds clear to auscultation     Abdominal    Neurological - normal exam                 Anesthesia Plan    ASA 3- EMERGENT   ASA physical status 3 criteria: CVA or TIA - history (> 3 months)ASA physical status emergent criteria: neurologic compromise requiring immediate intervention    Plan - general       Airway plan will be ETT        Induction: intravenous    Postoperative Plan: Postoperative administration of opioids is intended.    Pertinent diagnostic labs and testing reviewed    Informed Consent:    Anesthetic plan and risks discussed with patient.    Use of blood products discussed with: patient whom consented to blood products.

## 2020-04-18 NOTE — ED TRIAGE NOTES
Pt arrives to ED a a trauma transfer from [place], s/p fall on 4/8. Has been having worsening ataxia ever since. At [facility] was found to have SDH with 5mm shift. Pt does take 81 mg ASA daily, did receive platelets at [facility].

## 2020-04-18 NOTE — PROGRESS NOTES
Trauma / Surgical Daily Progress Note    Date of Service  4/18/2020    Chief Complaint  74 y.o. male admitted 4/17/2020 with Trauma    Interval Events  Right-sided craniotomy for acute on chronic subdural hematoma.  Platelet transfusion for chronic aspirin administration.  Maintenance antihypertensives and statin resumed.    Review of Systems  Review of Systems   Constitutional: Negative.    HENT: Negative.    Eyes: Negative.    Respiratory: Negative.    Cardiovascular: Negative.    Gastrointestinal: Negative.    Endocrine: Negative.    Genitourinary: Negative.    Musculoskeletal: Negative.    Skin: Negative.    Allergic/Immunologic: Negative.    Neurological: Positive for headaches. Negative for dizziness, speech difficulty, weakness and numbness.   Hematological: Negative.    Psychiatric/Behavioral: Negative.         Vital Signs for last 24 hours  Temp:  [36.1 °C (97 °F)-36.8 °C (98.2 °F)] 36.1 °C (97 °F)  Pulse:  [] 90  Resp:  [13-52] 24  BP: (124-177)/(56-97) 128/61  SpO2:  [92 %-100 %] 93 %    Hemodynamic parameters for last 24 hours       Respiratory Data     Respiration: (!) 24, Pulse Oximetry: 93 %     Work Of Breathing / Effort: Mild  RUL Breath Sounds: Clear, RML Breath Sounds: Clear, RLL Breath Sounds: Clear, CARLENE Breath Sounds: Clear, LLL Breath Sounds: Clear    Physical Exam  Physical Exam  Vitals signs and nursing note reviewed.   Constitutional:       Appearance: Normal appearance. He is not ill-appearing.      Interventions: Nasal cannula in place.   HENT:      Head: Normocephalic.      Comments: Craniotomy dressing is clean and dry.  Moderate subdural drain output.     Nose: Nose normal.      Mouth/Throat:      Mouth: Mucous membranes are moist.      Pharynx: Oropharynx is clear.   Eyes:      Extraocular Movements: Extraocular movements intact.      Conjunctiva/sclera: Conjunctivae normal.      Pupils: Pupils are equal, round, and reactive to light.   Neck:      Musculoskeletal: Normal range of  motion and neck supple. No muscular tenderness.   Cardiovascular:      Rate and Rhythm: Normal rate and regular rhythm.      Pulses: Normal pulses.   Pulmonary:      Effort: Pulmonary effort is normal. No respiratory distress.      Breath sounds: Normal breath sounds. No wheezing.   Chest:      Chest wall: No tenderness.   Abdominal:      General: There is no distension.      Palpations: Abdomen is soft.      Tenderness: There is no abdominal tenderness. There is no guarding.   Musculoskeletal: Normal range of motion.         General: No swelling, tenderness or deformity.   Skin:     General: Skin is warm and dry.      Capillary Refill: Capillary refill takes less than 2 seconds.   Neurological:      General: No focal deficit present.      Mental Status: He is alert and oriented to person, place, and time.      Cranial Nerves: No cranial nerve deficit.      Sensory: No sensory deficit.      Motor: No weakness.      Coordination: Coordination normal.      Gait: Gait normal.      Deep Tendon Reflexes: Reflexes normal.   Psychiatric:         Mood and Affect: Mood normal.         Behavior: Behavior normal. Behavior is cooperative.         Thought Content: Thought content normal.         Judgment: Judgment normal.         Laboratory  Recent Results (from the past 24 hour(s))   Comp Metabolic Panel    Collection Time: 04/17/20  4:15 PM   Result Value Ref Range    Sodium 140 137 - 145 mmol/L    Potassium 3.8 3.5 - 5.1 mmol/L    Chloride 104 98 - 107 mmol/L    Co2 26 22 - 30 mmol/L    Anion Gap 10 4 - 12 mmol/L    Glucose 93 70 - 100 mg/dL    Bun 21 (H) 9 - 20 mg/dL    Creatinine 1.1 0.7 - 1.3 mg/dL    Calcium 10.0 8.7 - 10.5 mg/dL    AST(SGOT) 33 15 - 46 U/L    ALT(SGPT) 26 13 - 69 U/L    Alkaline Phosphatase 61 38 - 126 U/L    Total Bilirubin 0.8 0.2 - 1.3 mg/dL    Albumin 5.1 (H) 3.5 - 5.0 g/dL    Total Protein 8.5 (H) 6.3 - 8.2 g/dL    A-G Ratio 1.5    CBC WITH DIFFERENTIAL    Collection Time: 04/17/20  4:15 PM   Result  Value Ref Range    WBC 5.2 4.8 - 10.8 K/uL    RBC 5.69 4.70 - 6.10 M/uL    Hemoglobin 16.6 14.0 - 18.0 g/dL    Hematocrit 49.5 42.0 - 52.0 %    MCV 87.0 80.0 - 94.0 fL    MCH 29.2 28.7 - 33.1 pg    MCHC 33.5 33.0 - 37.0 g/dL    RDW 13.1 11.5 - 14.5 %    Platelet Count 169 130 - 400 K/uL    MPV 10.0 7.4 - 10.4 fL    Neutrophils Automated 64.3 39.0 - 70.0 %    Lymphocytes Automated 21.8 21.0 - 50.0 %    Monocytes Automated 9.8 1.7 - 10.0 %    Eosinophils Automated 3.3 0.0 - 5.0 %    Basophils Automated 0.8 0.0 - 3.0 %    Abs Neutrophils Automated 3.3 1.8 - 7.7 K/uL    Abs Lymph Automated 1.1 (L) 1.2 - 4.8 K/uL    Monos (Absolute) 0.5 0.2 - 0.9 K/uL   LIPASE    Collection Time: 04/17/20  4:15 PM   Result Value Ref Range    Lipase 59 23 - 300 U/L   Prothrombin Time    Collection Time: 04/17/20  4:15 PM   Result Value Ref Range    PT 10.3 9.3 - 12.4 sec    INR 0.95    APTT    Collection Time: 04/17/20  4:15 PM   Result Value Ref Range    APTT 27.3 21.8 - 33.8 sec   ESTIMATED GFR    Collection Time: 04/17/20  4:15 PM   Result Value Ref Range    GFR If African American >60 >60 mL/min/1.73 m 2    GFR If Non African American >60 >60 mL/min/1.73 m 2   BLOOD BANK ISSUE PLATELET PHERESIS    Collection Time: 04/17/20  4:58 PM   Result Value Ref Range    Component P =[id number]    PLATELET MAPPING WITH BASIC TEG    Collection Time: 04/17/20  6:55 PM   Result Value Ref Range    Reaction Time Initial-R 5.2 5.0 - 10.0 min    Clot Kinetics-K 2.1 1.0 - 3.0 min    Clot Angle-Angle 62.2 53.0 - 72.0 degrees    Maximum Clot Strength-MA 62.7 50.0 - 70.0 mm    Lysis 30 minutes-LY30 0.0 0.0 - 8.0 %    % Inhibition ADP 6.9 %    % Inhibition AA 53.4 %    TEG Algorithm Link Algorithm    EKG (NOW)    Collection Time: 04/17/20  7:51 PM   Result Value Ref Range    Report       [facility] Emergency Dept[facility]    Test Date:  2020-04-17  Pt Name:    STUART HAYS                 Department: ER  MRN:        [id number]                       Room:       [facility]  Gender:     Male                         Technician: [id number]  :        1945                   Requested By:DIRK DENNEY  Order #:    [id number]                    Reading MD:    Measurements  Intervals                                Axis  Rate:       64                           P:          63  IL:         176                          QRS:        -32  QRSD:       100                          T:          68  QT:         452  QTc:        467    Interpretive Statements  SINUS RHYTHM  PROBABLE LEFT ATRIAL ABNORMALITY  LEFT AXIS DEVIATION  No previous ECG available for comparison     CBC with Differential: Tomorrow AM    Collection Time: 20  6:40 AM   Result Value Ref Range    WBC 6.7 4.8 - 10.8 K/uL    RBC 4.59 (L) 4.70 - 6.10 M/uL    Hemoglobin 13.8 (L) 14.0 - 18.0 g/dL    Hematocrit 39.4 (L) 42.0 - 52.0 %    MCV 85.8 81.4 - 97.8 fL    MCH 30.1 27.0 - 33.0 pg    MCHC 35.0 33.7 - 35.3 g/dL    RDW 39.4 35.9 - 50.0 fL    Platelet Count 164 164 - 446 K/uL    MPV 9.7 9.0 - 12.9 fL    Neutrophils-Polys 91.00 (H) 44.00 - 72.00 %    Lymphocytes 4.90 (L) 22.00 - 41.00 %    Monocytes 3.90 0.00 - 13.40 %    Eosinophils 0.00 0.00 - 6.90 %    Basophils 0.10 0.00 - 1.80 %    Immature Granulocytes 0.10 0.00 - 0.90 %    Nucleated RBC 0.00 /100 WBC    Neutrophils (Absolute) 6.10 1.82 - 7.42 K/uL    Lymphs (Absolute) 0.33 (L) 1.00 - 4.80 K/uL    Monos (Absolute) 0.26 0.00 - 0.85 K/uL    Eos (Absolute) 0.00 0.00 - 0.51 K/uL    Baso (Absolute) 0.01 0.00 - 0.12 K/uL    Immature Granulocytes (abs) 0.01 0.00 - 0.11 K/uL    NRBC (Absolute) 0.00 K/uL   Comp Metabolic Panel (CMP): Tomorrow AM    Collection Time: 20  6:40 AM   Result Value Ref Range    Sodium 137 135 - 145 mmol/L    Potassium 4.1 3.6 - 5.5 mmol/L    Chloride 105 96 - 112 mmol/L    Co2 21 20 - 33 mmol/L    Anion Gap 11.0 7.0 - 16.0    Glucose 155 (H) 65 - 99 mg/dL    Bun 15 8 - 22 mg/dL    Creatinine 0.82 0.50 - 1.40 mg/dL    Calcium  8.4 (L) 8.5 - 10.5 mg/dL    AST(SGOT) 10 (L) 12 - 45 U/L    ALT(SGPT) 14 2 - 50 U/L    Alkaline Phosphatase 55 30 - 99 U/L    Total Bilirubin 0.6 0.1 - 1.5 mg/dL    Albumin 3.5 3.2 - 4.9 g/dL    Total Protein 6.2 6.0 - 8.2 g/dL    Globulin 2.7 1.9 - 3.5 g/dL    A-G Ratio 1.3 g/dL   ESTIMATED GFR    Collection Time: 04/18/20  6:40 AM   Result Value Ref Range    GFR If African American >60 >60 mL/min/1.73 m 2    GFR If Non African American >60 >60 mL/min/1.73 m 2       Fluids    Intake/Output Summary (Last 24 hours) at 4/18/2020 1228  Last data filed at 4/18/2020 1200  Gross per 24 hour   Intake 2160.83 ml   Output 2775 ml   Net -614.17 ml       Core Measures & Quality Metrics  Labs reviewed, Medications reviewed and Radiology images reviewed  Graham catheter: No Graham      DVT Prophylaxis: Contraindicated - High bleeding risk  DVT prophylaxis - mechanical: SCDs  Ulcer prophylaxis: Not indicated        FADI Score  ETOH Screening    Assessment/Plan  Subdural hematoma (HCC)- (present on admission)  Assessment & Plan  Large right-sided acute on chronic subdural hematoma.  4/17 Right-sided craniotomy for evacuation of subdural hematoma.  Post traumatic pharmacologic seizure prophylaxis.  Speech Language Pathology cognitive evaluation.  Anthony Mendiola MD. Neurosurgeon. [facility].    Hypercholesterolemia  Assessment & Plan  Chronic condition treated with rosuvastatin.  4/18 Resumed maintenance medication.    Contraindication to deep vein thrombosis (DVT) prophylaxis- (present on admission)  Assessment & Plan  Systemic anticoagulation contraindicated secondary to elevated bleeding risk and until subdural drain removed.  Consider surveillance venous duplex scanning if unable to initiate chemical DVT prophylaxis within 48 hrs of admission.    Platelet dysfunction due to aspirin (HCC)- (present on admission)  Assessment & Plan  Takes daily aspirin.  Transfused 1 unit of platelets at the referring  facility.  Admission TEG with platelet mapping demonstrated, 53.4% AA inhibition.  No additional platelet transfusion indicated.    Essential hypertension- (present on admission)  Assessment & Plan  Chronic condition treated with metoprolol and amlodipine.  4/18 Resumed maintenance medication.    Screening examination for infectious disease- (present on admission)  Assessment & Plan  4/17 COVID-19 Screening completed.  No fever, pulmonary symptoms, contact with infected individual, and travel to/from a high risk region.    Trauma- (present on admission)  Assessment & Plan  Fall in bathtub 8 days prior to admission.  T-5000 Activation transfer from [facility].  James Felipe MD. Trauma Surgery.      Discussed patient condition with RN, RT, Pharmacy and neurosurgery.  CRITICAL CARE TIME EXCLUDING PROCEDURES: 35 minutes

## 2020-04-18 NOTE — ASSESSMENT & PLAN NOTE
Chronic condition treated with Metoprolol and Crestor.  Holding maintenance medication during acute traumatic illness.

## 2020-04-18 NOTE — ASSESSMENT & PLAN NOTE
Takes daily aspirin.  Transfused 1 unit of platelets at the referring facility.  Admission TEG with platelet mapping demonstrated, 53.4% AA inhibition.  No additional platelet transfusion indicated.

## 2020-04-18 NOTE — CARE PLAN
Problem: Craniotomy surgery  Goal: Post op care of the craniotomy patient  Outcome: PROGRESSING AS EXPECTED   Surgical dressing in place, CDI.  ALONDRA drain in place.      Problem: Neuro Status  Goal: Monitor neuro status and rapid identification of neuro changes  Outcome: PROGRESSING AS EXPECTED   Q 2 hour neuro checks.  Patient A/O x4.  No deficits noted.

## 2020-04-18 NOTE — ASSESSMENT & PLAN NOTE
Large right-sided acute on chronic subdural hematoma.  4/17 Right-sided craniotomy for evacuation of subdural hematoma.  4/18 Follow up CT head with interval decrease in size of right frontoparietal subdural hematoma. reactive hemorrhage still remains and indicates some acute hemorrhage.  Slight decrease in mass effect.  4/19 Follow up CT head with slightly smaller right subdural hematoma. No new hemorrhage  4/20 Subdural drain removed.  4/21 Follow up CT head with unchanged right hemispheric subdural hematoma  Speech Language Pathology cognitive evaluation.  Anthony Mendiola MD. Neurosurgeon. [facility].

## 2020-04-18 NOTE — PROGRESS NOTES
nsgy postop check      A&O x3, GCS 15  PERRL, EOMI  Face symm, tongue midline  THOMAS with FS  C/d/i, drain somewhat bloody

## 2020-04-18 NOTE — ED NOTES
Pt updated on poc and admit plans. PIV remains CDI and patent.   Room checked and all pt belongings transported with pt. Pt requested to have wrist watch, ring, and cards locked up, registration at bedside to collect belongings. Pt to Pre-OP 9 now with transport via gurney.

## 2020-04-18 NOTE — CONSULTS
DATE OF SERVICE:  04/17/2020    NEUROSURGICAL CONSULTATION    HISTORY OF PRESENT ILLNESS:  The patient is a very pleasant 74-year-old   retired male who was having worsening clumsiness and difficulty with walking   for the past day or two.  By report, he fell in the bathtub a little more than   a month ago and he hit his head at this time.  He has been on 81 mg of   aspirin and he has had progressive difficulty with walking.  He has had no   headaches; no nausea or vomiting; no weakness, numbness or tingling except for   some balance difficulties.  Shyam diagnosed him with a right-sided subdural   and he was given platelets and Keppra and sent to [facility] for further care.    PAST MEDICAL HISTORY:  Hypertension and legally blind.    PAST SURGICAL HISTORY:  Right-sided clavicle surgery, ankle surgery as well.    MEDICATIONS:  As an outpatient listed are ibuprofen, naproxen, Crestor,   aspirin, atorvastatin, B12, glucosamine, Lopressor, Remeron.    ALLERGIES:  None.    PHYSICAL EXAMINATION:  GENERAL:  A and O x3.  GCS of 15.  HEENT:  Pupils equal, round, reactive to light.  Extraocular muscles intact.    Tongue midline.  Face symmetric.  NEUROLOGIC:  Motor is 5/5 strength in all muscle groups in the upper and lower   extremities.  Sensory grossly intact to light touch.  He does have a slight   left-sided upper extremity drift.    LABORATORY VALUES:  CBC within normal limits.  Basic metabolic panel within   normal limits except for BUN of 21.  INR 0.95, PTT 27.3.  TEG is pending.    Urinalysis not done.    IMAGING:  CT scan of the head, noncontrast, shows a large 2.5 cm right-sided   acute on chronic subdural, primarily chronic with about 7-8 mm right to left   midline shift.  There is a significant amount of atrophy on the left side.  He   may have early trapping of the left-sided ventricular system.    PLAN:  The patient has been consented verbally for right-sided craniotomy for   subdural hematoma evacuation.  We  specifically discussed the risks and   benefits of surgery including bleeding, infection, death, seizure, left-sided   weakness that could be temporary or permanent, possible need for more surgery,   failure to improve.  He understands the risks and agrees to proceed.  He has   already gotten platelets for the ibuprofen and for the aspirin.  A TEG is   pending to confirm that he has gotten enough blood products.  I have discussed   with the OR.  We will take him up very shortly.       ____________________________________     RHONDA ARECHIGA MD    CPD / NTS    DD:  04/17/2020 19:47:11  DT:  04/17/2020 20:05:03    D#:  [id number]  Job#:  [id number]

## 2020-04-18 NOTE — PROGRESS NOTES
"TRAUMA TERTIARY SURVEY     Mental status adequate for full examination?: Yes    Spine cleared (radiologically and/or clinically): Yes    PHYSICAL EXAMINATION:  Vitals: /66   Pulse 85   Temp 36.1 °C (97 °F) (Temporal)   Resp 15   Ht 1.753 m (5' 9\")   Wt 81.1 kg (178 lb 12.7 oz)   SpO2 98%   BMI 26.40 kg/m²   Constitutional:     General Appearance: appears stated age, is in no apparent distress.  HEENT:    Drain from craniotomy in place- dressing over surgical site clean. The pupils are equal, round, and reactive to light bilaterally. The extraocular muscles are intact bilaterally.. The nares and oropharynx are clear. The midface and jaw are stable. No malocclusion is evident.  Neck:    No posterior midline cervical-spine tenderness, no evidence of intoxication, normal level of alertness ([place] Coma Scale 15), no focal neurologic deficit, and no painful distracting injuries.  Respiratory:   Inspection: Unlabored respirations, no intercostal retractions, paradoxical motion, or accessory muscle use.   Palpation:  The chest is nontender. The clavicles are non deformed bilaterally..   Auscultation: normal, clear to auscultation.  Cardiovascular:   Auscultation: normal.   Peripheral Pulses: Normal.   Abdomen:   Abdomen is soft, nontender, without organomegaly or masses.  Genitourinary:   (MALE): normal male external genitalia.  Musculoskeletal:   The pelvis is stable.  No significant angulation, deformity, or soft tissue injury involving the upper and lower extremities. Normal range of motion.   Back:   The thoracolumbar spine was examined. Examination is remarkable for no significant tenderness, swelling, or deformity in the thoracolumbar region.  Skin:   The skin is warm and dry.  Neurologic:    [place] Coma Scale (GCS) 15 E4V5M6. Neurologic examination revealed alert and oriented X 3.  Psychiatric:   The patient does not appear depressed or anxious.    IMAGING:  DX-CHEST-PORTABLE (1 VIEW)   Final Result "      New mild left basilar atelectasis      CT-HEAD W/O   Final Result      1.  Interval right frontoparietal craniotomy with drain placement      2.  Interval decrease in size of right frontoparietal subdural hematoma. Considerable reactive hemorrhage are however still remains and there is some increased density relative to comparison which indicates some acute hemorrhage. Attention in follow-up    is recommended.      3.  Slight decrease in mass effect with 3 mm leftward midline shift and moderate right sulcus effacement as before      4.  No evidence of cortical infarction      5.  Stable atrophy with moderate white matter disease      OUTSIDE IMAGES-DX CHEST   Final Result      OUTSIDE IMAGES-CT HEAD   Final Result        All current laboratory studies/radiology exams reviewed: Yes    Completed Consultations:  Neurosurgical     Pending Consultations:  none    Newly Identified Diagnoses and Injuries:  None    TOTAL RAP SCORE:  RAP Score Total: 6      ETOH Screening     Assessment complete date: 4/18/2020

## 2020-04-18 NOTE — PROGRESS NOTES
RN called Dr. King to discuss frequent 1-2 second pauses in heart rhythm.  EKG ordered.   MD reviewed EKG.  Plan to hold Metoprolol at this time.

## 2020-04-19 ENCOUNTER — APPOINTMENT (OUTPATIENT)
Dept: RADIOLOGY | Facility: MEDICAL CENTER | Age: 75
DRG: 026 | End: 2020-04-19
Attending: SURGERY
Payer: MEDICARE

## 2020-04-19 ENCOUNTER — APPOINTMENT (OUTPATIENT)
Dept: RADIOLOGY | Facility: MEDICAL CENTER | Age: 75
DRG: 026 | End: 2020-04-19
Attending: NURSE PRACTITIONER
Payer: MEDICARE

## 2020-04-19 ENCOUNTER — APPOINTMENT (OUTPATIENT)
Dept: RADIOLOGY | Facility: MEDICAL CENTER | Age: 75
DRG: 026 | End: 2020-04-19
Attending: NEUROLOGICAL SURGERY
Payer: MEDICARE

## 2020-04-19 PROCEDURE — 770022 HCHG ROOM/CARE - ICU (200)

## 2020-04-19 PROCEDURE — 71045 X-RAY EXAM CHEST 1 VIEW: CPT

## 2020-04-19 PROCEDURE — 93970 EXTREMITY STUDY: CPT

## 2020-04-19 PROCEDURE — 97116 GAIT TRAINING THERAPY: CPT

## 2020-04-19 PROCEDURE — 700112 HCHG RX REV CODE 229: Performed by: NURSE PRACTITIONER

## 2020-04-19 PROCEDURE — 99233 SBSQ HOSP IP/OBS HIGH 50: CPT | Performed by: SURGERY

## 2020-04-19 PROCEDURE — A9270 NON-COVERED ITEM OR SERVICE: HCPCS | Performed by: NURSE PRACTITIONER

## 2020-04-19 PROCEDURE — 70450 CT HEAD/BRAIN W/O DYE: CPT

## 2020-04-19 PROCEDURE — A9270 NON-COVERED ITEM OR SERVICE: HCPCS | Performed by: SURGERY

## 2020-04-19 PROCEDURE — 700102 HCHG RX REV CODE 250 W/ 637 OVERRIDE(OP): Performed by: NURSE PRACTITIONER

## 2020-04-19 PROCEDURE — 97166 OT EVAL MOD COMPLEX 45 MIN: CPT

## 2020-04-19 PROCEDURE — 700102 HCHG RX REV CODE 250 W/ 637 OVERRIDE(OP): Performed by: SURGERY

## 2020-04-19 PROCEDURE — A9270 NON-COVERED ITEM OR SERVICE: HCPCS | Performed by: NEUROLOGICAL SURGERY

## 2020-04-19 PROCEDURE — 700105 HCHG RX REV CODE 258: Performed by: NURSE PRACTITIONER

## 2020-04-19 PROCEDURE — 700111 HCHG RX REV CODE 636 W/ 250 OVERRIDE (IP): Performed by: NURSE PRACTITIONER

## 2020-04-19 PROCEDURE — 51798 US URINE CAPACITY MEASURE: CPT

## 2020-04-19 PROCEDURE — 700102 HCHG RX REV CODE 250 W/ 637 OVERRIDE(OP): Performed by: NEUROLOGICAL SURGERY

## 2020-04-19 RX ORDER — LEVETIRACETAM 500 MG/1
500 TABLET ORAL 2 TIMES DAILY
Status: DISCONTINUED | OUTPATIENT
Start: 2020-04-19 | End: 2020-04-19

## 2020-04-19 RX ORDER — LEVETIRACETAM 500 MG/1
500 TABLET ORAL ONCE
Status: COMPLETED | OUTPATIENT
Start: 2020-04-19 | End: 2020-04-19

## 2020-04-19 RX ORDER — LEVETIRACETAM 500 MG/1
1000 TABLET ORAL 2 TIMES DAILY
Status: DISCONTINUED | OUTPATIENT
Start: 2020-04-20 | End: 2020-04-20

## 2020-04-19 RX ADMIN — ACETAMINOPHEN 650 MG: 325 TABLET, FILM COATED ORAL at 00:01

## 2020-04-19 RX ADMIN — ACETAMINOPHEN 650 MG: 325 TABLET, FILM COATED ORAL at 23:32

## 2020-04-19 RX ADMIN — ACETAMINOPHEN 650 MG: 325 TABLET, FILM COATED ORAL at 17:19

## 2020-04-19 RX ADMIN — DOCUSATE SODIUM 100 MG: 100 CAPSULE, LIQUID FILLED ORAL at 05:51

## 2020-04-19 RX ADMIN — LEVETIRACETAM 500 MG: 500 TABLET ORAL at 23:32

## 2020-04-19 RX ADMIN — ROSUVASTATIN CALCIUM 10 MG: 10 TABLET, FILM COATED ORAL at 20:41

## 2020-04-19 RX ADMIN — AMLODIPINE BESYLATE 5 MG: 10 TABLET ORAL at 05:51

## 2020-04-19 RX ADMIN — DOCUSATE SODIUM 100 MG: 100 CAPSULE, LIQUID FILLED ORAL at 17:19

## 2020-04-19 RX ADMIN — HYDRALAZINE HYDROCHLORIDE 20 MG: 20 INJECTION INTRAMUSCULAR; INTRAVENOUS at 20:41

## 2020-04-19 RX ADMIN — LEVETIRACETAM 500 MG: 100 INJECTION, SOLUTION INTRAVENOUS at 05:50

## 2020-04-19 RX ADMIN — LEVETIRACETAM 500 MG: 500 TABLET ORAL at 17:19

## 2020-04-19 RX ADMIN — POLYETHYLENE GLYCOL 3350 1 PACKET: 17 POWDER, FOR SOLUTION ORAL at 17:19

## 2020-04-19 RX ADMIN — SENNOSIDES AND DOCUSATE SODIUM 1 TABLET: 8.6; 5 TABLET ORAL at 20:40

## 2020-04-19 ASSESSMENT — ACTIVITIES OF DAILY LIVING (ADL): TOILETING: INDEPENDENT

## 2020-04-19 ASSESSMENT — FIBROSIS 4 INDEX
FIB4 SCORE: 1.21
FIB4 SCORE: 1.21

## 2020-04-19 ASSESSMENT — COGNITIVE AND FUNCTIONAL STATUS - GENERAL
MOVING FROM LYING ON BACK TO SITTING ON SIDE OF FLAT BED: A LOT
TOILETING: A LITTLE
TURNING FROM BACK TO SIDE WHILE IN FLAT BAD: A LITTLE
WALKING IN HOSPITAL ROOM: A LITTLE
DAILY ACTIVITIY SCORE: 22
HELP NEEDED FOR BATHING: A LITTLE
MOBILITY SCORE: 17
MOVING TO AND FROM BED TO CHAIR: A LITTLE
STANDING UP FROM CHAIR USING ARMS: A LITTLE
SUGGESTED CMS G CODE MODIFIER DAILY ACTIVITY: CJ
CLIMB 3 TO 5 STEPS WITH RAILING: A LITTLE
SUGGESTED CMS G CODE MODIFIER MOBILITY: CK

## 2020-04-19 ASSESSMENT — ENCOUNTER SYMPTOMS
HEMATOLOGIC/LYMPHATIC NEGATIVE: 1
CARDIOVASCULAR NEGATIVE: 1
HEADACHES: 1
GASTROINTESTINAL NEGATIVE: 1
SPEECH DIFFICULTY: 0
ENDOCRINE NEGATIVE: 1
DIZZINESS: 0
CONSTITUTIONAL NEGATIVE: 1
WEAKNESS: 0
PSYCHIATRIC NEGATIVE: 1
MUSCULOSKELETAL NEGATIVE: 1
ALLERGIC/IMMUNOLOGIC NEGATIVE: 1
EYES NEGATIVE: 1
NUMBNESS: 0
RESPIRATORY NEGATIVE: 1

## 2020-04-19 ASSESSMENT — GAIT ASSESSMENTS
DISTANCE (FEET): 500
GAIT LEVEL OF ASSIST: SUPERVISED
ASSISTIVE DEVICE: OTHER (COMMENTS)

## 2020-04-19 NOTE — THERAPY
"Physical Therapy Treatment completed.   Bed Mobility:  Supine to Sit: NT, up in chair pre/post  Transfers: Sit to Stand: Minimal Assist  Gait: Level Of Assist: Supervised with holding shoulder to guide       Plan of Care: Will benefit from Physical Therapy 4 times per week  Discharge Recommendations: Equipment: Will Continue to Assess for Equipment Needs. See below.     See \"Rehab Therapy-Acute\" Patient Summary Report for complete documentation.     Pt seen for PT treatment session. Pt received and returned to bedside chair. Pt demonstrates improved dynamic balance today and was able to ambulate around the unit with SPV. Pt holding onto another therapist shoulder to guide around unit d/t blindness. This therapist guarding patient for safety, no overt LOB. Pt reports he feels back to his baseline and does not have difficulty navigating his apartment because \"I have lived there more than 10 years and know my way around.\" Will contoue to work with pt in acute setting, anticipate can return home with home health therapy to assist in return to PLOF; however will continue to work with pt in acute setting and will need to assess stair negotiation. Will follow.   "

## 2020-04-19 NOTE — CARE PLAN
Problem: Skin Integrity  Goal: Risk for impaired skin integrity will decrease  Outcome: PROGRESSING AS EXPECTED   Turn as needed.  Patient frequently repositions self.     Problem: Neuro Status  Goal: Monitor neuro status and rapid identification of neuro changes  Outcome: PROGRESSING AS EXPECTED  Note: Neuro checks q 2 hours.  Patient resting at this time.

## 2020-04-19 NOTE — PROGRESS NOTES
Neurosurgery Progress Note    Subjective:  Doing well, no events    Exam:    A&O x3, GCS 15  PERRL, EOMI  Face symm, tongue midline  THOMAS with FS, no drift  C/d/i    sd drain 95cc over 24h      BP  Min: 112/35  Max: 150/67  Pulse  Av.9  Min: 71  Max: 105  Resp  Av.9  Min: 3  Max: 58  Temp  Av.2 °C (99 °F)  Min: 36.6 °C (97.8 °F)  Max: 37.6 °C (99.7 °F)  Monitored Temp 2  Av.2 °C (98.9 °F)  Min: 36.7 °C (98.1 °F)  Max: 37.9 °C (100.2 °F)  SpO2  Av.5 %  Min: 85 %  Max: 99 %    No data recorded    Recent Labs     20  1615 20  0640   WBC 5.2 6.7   RBC 5.69 4.59*   HEMOGLOBIN 16.6 13.8*   HEMATOCRIT 49.5 39.4*   MCV 87.0 85.8   MCH 29.2 30.1   MCHC 33.5 35.0   RDW 13.1 39.4   PLATELETCT 169 164   MPV 10.0 9.7     Recent Labs     20  1615 20  0640   SODIUM 140 137   POTASSIUM 3.8 4.1   CHLORIDE 104 105   CO2 26 21   GLUCOSE 93 155*   BUN 21* 15   CREATININE 1.1 0.82   CALCIUM 10.0 8.4*     Recent Labs     20  1615   APTT 27.3   INR 0.95     Recent Labs     20  1855   REACTMIN 5.2   CLOTKINET 2.1   CLOTANGL 62.2   MAXCLOTS 62.7   GOG32EBE 0.0   PRCINADP 6.9   PRCINAA 53.4       Intake/Output       20 07 - 20 0659 20 - 20 0659       Total  Total       Intake    P.O.  240  -- 240  --  -- --    P.O. 240 -- 240 -- -- --    I.V.  996  996   166  -- 166    Volume (mL) (NS infusion)  166 -- 166    IV Piggyback  320  143.3 463.3  --  -- --    Volume (mL) (levETIRAcetam (KEPPRA) 500 mg in  mL IVPB) 120 143.3 263.3 -- -- --    Volume (mL) (ceFAZolin in dextrose (ANCEF) IVPB premix 2 g) 200 -- 200 -- -- --    Total Intake 1556 1139.3 2695.3 166 -- 166       Output    Urine  5  1350   --  -- --    Output (mL) (Urethral Catheter Temperature probe 16 Fr.) 705 1350  -- -- --    Drains  35  60 95  --  -- --    Output (mL) (Closed/Suction Drain 1 Right Other (Comment) Jonathan Niño  7 Fr.) 35 60 95 -- -- --    Total Output 740 1410 2150 -- -- --       Net I/O     816 -270.7 545.3 166 -- 166            Intake/Output Summary (Last 24 hours) at 4/19/2020 0933  Last data filed at 4/19/2020 0800  Gross per 24 hour   Intake 2695.33 ml   Output 2015 ml   Net 680.33 ml            • amLODIPine  5 mg DAILY   • metoprolol  25 mg BID   • rosuvastatin  10 mg QHS   • acetaminophen  650 mg Q6HRS PRN   • Respiratory Therapy Consult   Continuous RT   • docusate sodium  100 mg BID   • senna-docusate  1 Tab Nightly   • senna-docusate  1 Tab Q24HRS PRN   • polyethylene glycol/lytes  1 Packet BID   • magnesium hydroxide  30 mL DAILY   • bisacodyl  10 mg Q24HRS PRN   • fleet  1 Each Once PRN   • NS   Continuous   • oxyCODONE immediate-release  5 mg Q3HRS PRN   • oxyCODONE immediate release  10 mg Q3HRS PRN   • morphine injection  1-4 mg Q3HRS PRN   • levETIRAcetam (KEPPRA) IV  500 mg BID   • labetalol  10 mg Q4HRS PRN   • hydrALAZINE  20 mg Q6HRS PRN   • ondansetron  4 mg Q4HRS PRN   • niCARdipine infusion  0-15 mg/hr Continuous   • Pharmacy Consult Request  1 Each PHARMACY TO DOSE       Assessment and Plan:  Hospital day #2  POD #2 right crani for sdh  Prophylactic anticoagulation: no         Start date/time: tbd  Continue subdural drain  Keppra 500 bid  q2h neuro checks  CT stable yest  Oob, pt,ot  D/c a line and olivarez

## 2020-04-19 NOTE — PROGRESS NOTES
Trauma / Surgical Daily Progress Note    Date of Service  4/19/2020    Chief Complaint  74 y.o. male admitted 4/17/2020 with Trauma    Interval Events  Mobilizing about the ICU.  Subdural drain output precludes removal.  Requires ICU admission and monitoring.  Graham catheter and arterial line removed.  Physical therapy and Occupational Therapy evaluations requested.    Review of Systems  Review of Systems   Constitutional: Negative.    HENT: Negative.    Eyes: Negative.    Respiratory: Negative.    Cardiovascular: Negative.    Gastrointestinal: Negative.    Endocrine: Negative.    Genitourinary: Negative.    Musculoskeletal: Negative.    Skin: Negative.    Allergic/Immunologic: Negative.    Neurological: Positive for headaches. Negative for dizziness, speech difficulty, weakness and numbness.   Hematological: Negative.    Psychiatric/Behavioral: Negative.         Vital Signs for last 24 hours  Temp:  [36.6 °C (97.8 °F)-37.6 °C (99.7 °F)] 36.6 °C (97.8 °F)  Pulse:  [] 91  Resp:  [3-58] 25  BP: (112-150)/(35-78) 143/63  SpO2:  [85 %-99 %] 93 %    Hemodynamic parameters for last 24 hours       Respiratory Data     Respiration: (!) 25, Pulse Oximetry: 93 %        RUL Breath Sounds: Clear, RML Breath Sounds: Clear, RLL Breath Sounds: Diminished, CARLENE Breath Sounds: Clear, LLL Breath Sounds: Diminished    Physical Exam  Physical Exam  Vitals signs and nursing note reviewed.   Constitutional:       Appearance: Normal appearance. He is not ill-appearing.      Interventions: Nasal cannula in place.   HENT:      Head: Normocephalic.      Comments: Craniotomy dressing is clean and dry.  Moderate subdural drain output.     Nose: Nose normal.      Mouth/Throat:      Mouth: Mucous membranes are moist.      Pharynx: Oropharynx is clear.   Eyes:      Extraocular Movements: Extraocular movements intact.      Conjunctiva/sclera: Conjunctivae normal.      Pupils: Pupils are equal, round, and reactive to light.   Neck:       Musculoskeletal: Normal range of motion and neck supple. No muscular tenderness.   Cardiovascular:      Rate and Rhythm: Normal rate and regular rhythm.      Pulses: Normal pulses.   Pulmonary:      Effort: Pulmonary effort is normal. No respiratory distress.      Breath sounds: Normal breath sounds. No wheezing.   Chest:      Chest wall: No tenderness.   Abdominal:      General: There is no distension.      Palpations: Abdomen is soft.      Tenderness: There is no abdominal tenderness. There is no guarding.   Musculoskeletal: Normal range of motion.         General: No swelling, tenderness or deformity.   Skin:     General: Skin is warm and dry.      Capillary Refill: Capillary refill takes less than 2 seconds.   Neurological:      General: No focal deficit present.      Mental Status: He is alert and oriented to person, place, and time.      Cranial Nerves: No cranial nerve deficit.      Sensory: No sensory deficit.      Motor: No weakness.      Coordination: Coordination normal.      Gait: Gait normal.      Deep Tendon Reflexes: Reflexes normal.   Psychiatric:         Mood and Affect: Mood normal.         Behavior: Behavior normal. Behavior is cooperative.         Thought Content: Thought content normal.         Judgment: Judgment normal.         Laboratory  Recent Results (from the past 24 hour(s))   EKG    Collection Time: 20  3:42 PM   Result Value Ref Range    Report       [facility]    Test Date:  2020  Pt Name:    STUART HAYS                 Department: 19  MRN:        [id number]                      Room:       S115  Gender:     Male                         Technician: ONI  :        1945                   Requested By:AGUSTINA ORTIZ  Order #:    [id number]                    Reading MD: Varun Mcghee MD    Measurements  Intervals                                Axis  Rate:       74                           P:          56  WI:         168                          QRS:         -24  QRSD:       102                          T:          38  QT:         444  QTc:        493    Interpretive Statements  SINUS RHYTHM PAUSES AND JUNCTIONAL ESCAPE  BORDERLINE LEFT AXIS DEVIATION  BORDERLINE PROLONGED QT INTERVAL  Compared to ECG 04/17/2020 19:51:01  PAUSES WITH JUNCTIONAL ESCAPE  Electronically Signed On 4- 18:38:38 PDT by Varun Mcghee MD         Fluids    Intake/Output Summary (Last 24 hours) at 4/19/2020 1108  Last data filed at 4/19/2020 1000  Gross per 24 hour   Intake 2495.33 ml   Output 2560 ml   Net -64.67 ml       Core Measures & Quality Metrics  Core Measures & Quality Metrics  FADI Score  ETOH Screening    Assessment/Plan  Subdural hematoma (HCC)- (present on admission)  Assessment & Plan  Large right-sided acute on chronic subdural hematoma.  4/17 Right-sided craniotomy for evacuation of subdural hematoma.  Post traumatic pharmacologic seizure prophylaxis.  Speech Language Pathology cognitive evaluation.  Anthony Mendiola MD. Neurosurgeon. [facility].    Hypercholesterolemia  Assessment & Plan  Chronic condition treated with rosuvastatin.  4/18 Resumed maintenance medication.    Contraindication to deep vein thrombosis (DVT) prophylaxis- (present on admission)  Assessment & Plan  Systemic anticoagulation contraindicated secondary to elevated bleeding risk and until subdural drain removed.  4/19 Trauma surveillance venous duplex ordered.    Platelet dysfunction due to aspirin (HCC)- (present on admission)  Assessment & Plan  Takes daily aspirin.  Transfused 1 unit of platelets at the referring facility.  Admission TEG with platelet mapping demonstrated, 53.4% AA inhibition.  No additional platelet transfusion indicated.    Essential hypertension- (present on admission)  Assessment & Plan  Chronic condition treated with metoprolol and amlodipine.  4/18 Resumed amlodipine.   Intermittent sinus arrhythmia.  Currently holding metoprolol.    Screening examination for  infectious disease- (present on admission)  Assessment & Plan  4/17 COVID-19 Screening completed.  No fever, pulmonary symptoms, contact with infected individual, and travel to/from a high risk region.    Trauma- (present on admission)  Assessment & Plan  Fall in bathtub 8 days prior to admission.  T-5000 Activation transfer from [facility].  James Felipe MD. Trauma Surgery.        Discussed patient condition with RN, RT, Pharmacy and neurosurgery.  CRITICAL CARE TIME EXCLUDING PROCEDURES: 35 minutes

## 2020-04-19 NOTE — PROGRESS NOTES
Patient restless.  's, 's.  Bladder scan ordered.  438cc resulted.  Straight cath completed with 400 cc clear yellow urine returned.     Neuro exam completed.  Patient fidgety.  When asked to squeeze my hands, patient slow to respond to command.  L.  (3/5) strength weaker than R.  (5/5).  Change from prior exam.  PERRLA.  chana LE equal in strength.   RN called Dr. Mendiola and notified of neuro exam and drainage from surgical dressing.  Order given to get follow up head CT in am.

## 2020-04-19 NOTE — CARE PLAN
Problem: Skin Integrity  Goal: Risk for impaired skin integrity will decrease  Outcome: PROGRESSING AS EXPECTED  Note: Problem: Skin Integrity  Goal: Skin Integrity is maintained or improved  Intervention: Lawrence Skin Risk Assessment  Lawrence assessed q shift-18, repositioned q 2hrs  Intervention: TURN EVERY 2 HOURS WHILE ON BEDREST  Pillows for positioning, mattress pressure 22, SCDs in place     Problem: Neuro Status  Goal: Monitor neuro status and rapid identification of neuro changes  Outcome: PROGRESSING AS EXPECTED  Note: Patient with periods of confusion- reoriented; Patient confusion less on 0400 & 0600 assessments

## 2020-04-19 NOTE — PROGRESS NOTES
Patient stood and transferred from bed to chair.    Full bed bath and CHG bath given.  Washed and braided patient's hair.    2 RN skin check with Renee RN:  -bruise to L. Upper arm  -R. Crani site covered with island dressing, clean dry and intact (old shadowing/drainage noted on under layer of surgical dressing)  -ALONDRA drain in place, unable to assess insertion site (under surgical dressing).  -arterial line in place.  Skin intact.  -No areas of concern noted.

## 2020-04-19 NOTE — PROGRESS NOTES
2 RN skin check with Renee RN:  -bruise to L. Upper arm  -R. Crani site covered with island dressing.  Surgical site re-enforced with 4x4's and covered with a stockinette.   -ALONDRA drain in place, unable to assess insertion site (under surgical dressing).  -No areas of concern noted.

## 2020-04-20 ENCOUNTER — APPOINTMENT (OUTPATIENT)
Dept: RADIOLOGY | Facility: MEDICAL CENTER | Age: 75
DRG: 026 | End: 2020-04-20
Attending: NURSE PRACTITIONER
Payer: MEDICARE

## 2020-04-20 PROBLEM — R33.9 URINARY RETENTION: Status: ACTIVE | Noted: 2020-04-20

## 2020-04-20 LAB
ANION GAP SERPL CALC-SCNC: 14 MMOL/L (ref 7–16)
APPEARANCE UR: CLEAR
BACTERIA #/AREA URNS HPF: NEGATIVE /HPF
BASOPHILS # BLD AUTO: 0.4 % (ref 0–1.8)
BASOPHILS # BLD: 0.03 K/UL (ref 0–0.12)
BILIRUB UR QL STRIP.AUTO: NEGATIVE
BUN SERPL-MCNC: 14 MG/DL (ref 8–22)
CALCIUM SERPL-MCNC: 9.1 MG/DL (ref 8.5–10.5)
CHLORIDE SERPL-SCNC: 100 MMOL/L (ref 96–112)
CO2 SERPL-SCNC: 21 MMOL/L (ref 20–33)
COLOR UR: ABNORMAL
CREAT SERPL-MCNC: 0.88 MG/DL (ref 0.5–1.4)
EOSINOPHIL # BLD AUTO: 0.1 K/UL (ref 0–0.51)
EOSINOPHIL NFR BLD: 1.4 % (ref 0–6.9)
EPI CELLS #/AREA URNS HPF: NEGATIVE /HPF
ERYTHROCYTE [DISTWIDTH] IN BLOOD BY AUTOMATED COUNT: 40.2 FL (ref 35.9–50)
GLUCOSE SERPL-MCNC: 109 MG/DL (ref 65–99)
GLUCOSE UR STRIP.AUTO-MCNC: NEGATIVE MG/DL
HCT VFR BLD AUTO: 41.4 % (ref 42–52)
HGB BLD-MCNC: 14 G/DL (ref 14–18)
HYALINE CASTS #/AREA URNS LPF: ABNORMAL /LPF
IMM GRANULOCYTES # BLD AUTO: 0.02 K/UL (ref 0–0.11)
IMM GRANULOCYTES NFR BLD AUTO: 0.3 % (ref 0–0.9)
KETONES UR STRIP.AUTO-MCNC: 40 MG/DL
LEUKOCYTE ESTERASE UR QL STRIP.AUTO: ABNORMAL
LYMPHOCYTES # BLD AUTO: 1.18 K/UL (ref 1–4.8)
LYMPHOCYTES NFR BLD: 16.4 % (ref 22–41)
MAGNESIUM SERPL-MCNC: 1.7 MG/DL (ref 1.5–2.5)
MCH RBC QN AUTO: 29.1 PG (ref 27–33)
MCHC RBC AUTO-ENTMCNC: 33.8 G/DL (ref 33.7–35.3)
MCV RBC AUTO: 86.1 FL (ref 81.4–97.8)
MICRO URNS: ABNORMAL
MONOCYTES # BLD AUTO: 0.93 K/UL (ref 0–0.85)
MONOCYTES NFR BLD AUTO: 12.9 % (ref 0–13.4)
NEUTROPHILS # BLD AUTO: 4.95 K/UL (ref 1.82–7.42)
NEUTROPHILS NFR BLD: 68.6 % (ref 44–72)
NITRITE UR QL STRIP.AUTO: NEGATIVE
NRBC # BLD AUTO: 0 K/UL
NRBC BLD-RTO: 0 /100 WBC
PH UR STRIP.AUTO: 5.5 [PH] (ref 5–8)
PHOSPHATE SERPL-MCNC: 3.1 MG/DL (ref 2.5–4.5)
PLATELET # BLD AUTO: 172 K/UL (ref 164–446)
PMV BLD AUTO: 9.6 FL (ref 9–12.9)
POTASSIUM SERPL-SCNC: 3.9 MMOL/L (ref 3.6–5.5)
PROT UR QL STRIP: 30 MG/DL
RBC # BLD AUTO: 4.81 M/UL (ref 4.7–6.1)
RBC # URNS HPF: ABNORMAL /HPF
RBC UR QL AUTO: ABNORMAL
SODIUM SERPL-SCNC: 135 MMOL/L (ref 135–145)
SP GR UR STRIP.AUTO: 1.02
UROBILINOGEN UR STRIP.AUTO-MCNC: 1 MG/DL
WBC # BLD AUTO: 7.2 K/UL (ref 4.8–10.8)
WBC #/AREA URNS HPF: ABNORMAL /HPF

## 2020-04-20 PROCEDURE — 80048 BASIC METABOLIC PNL TOTAL CA: CPT

## 2020-04-20 PROCEDURE — 700105 HCHG RX REV CODE 258: Performed by: SURGERY

## 2020-04-20 PROCEDURE — A9270 NON-COVERED ITEM OR SERVICE: HCPCS | Performed by: NURSE PRACTITIONER

## 2020-04-20 PROCEDURE — 84100 ASSAY OF PHOSPHORUS: CPT

## 2020-04-20 PROCEDURE — 81001 URINALYSIS AUTO W/SCOPE: CPT

## 2020-04-20 PROCEDURE — 770022 HCHG ROOM/CARE - ICU (200)

## 2020-04-20 PROCEDURE — 700112 HCHG RX REV CODE 229: Performed by: NURSE PRACTITIONER

## 2020-04-20 PROCEDURE — 700102 HCHG RX REV CODE 250 W/ 637 OVERRIDE(OP): Performed by: NURSE PRACTITIONER

## 2020-04-20 PROCEDURE — A9270 NON-COVERED ITEM OR SERVICE: HCPCS | Performed by: SURGERY

## 2020-04-20 PROCEDURE — 700111 HCHG RX REV CODE 636 W/ 250 OVERRIDE (IP): Performed by: NEUROLOGICAL SURGERY

## 2020-04-20 PROCEDURE — A9270 NON-COVERED ITEM OR SERVICE: HCPCS | Performed by: NEUROLOGICAL SURGERY

## 2020-04-20 PROCEDURE — 83735 ASSAY OF MAGNESIUM: CPT

## 2020-04-20 PROCEDURE — 97530 THERAPEUTIC ACTIVITIES: CPT

## 2020-04-20 PROCEDURE — 99233 SBSQ HOSP IP/OBS HIGH 50: CPT | Performed by: SURGERY

## 2020-04-20 PROCEDURE — 700102 HCHG RX REV CODE 250 W/ 637 OVERRIDE(OP): Performed by: SURGERY

## 2020-04-20 PROCEDURE — 51798 US URINE CAPACITY MEASURE: CPT

## 2020-04-20 PROCEDURE — 85025 COMPLETE CBC W/AUTO DIFF WBC: CPT

## 2020-04-20 PROCEDURE — 71045 X-RAY EXAM CHEST 1 VIEW: CPT

## 2020-04-20 PROCEDURE — 97116 GAIT TRAINING THERAPY: CPT

## 2020-04-20 PROCEDURE — 700102 HCHG RX REV CODE 250 W/ 637 OVERRIDE(OP): Performed by: NEUROLOGICAL SURGERY

## 2020-04-20 RX ORDER — SODIUM CHLORIDE 9 MG/ML
INJECTION, SOLUTION INTRAVENOUS CONTINUOUS
Status: DISCONTINUED | OUTPATIENT
Start: 2020-04-20 | End: 2020-04-24

## 2020-04-20 RX ORDER — DEXAMETHASONE SODIUM PHOSPHATE 4 MG/ML
4 INJECTION, SOLUTION INTRA-ARTICULAR; INTRALESIONAL; INTRAMUSCULAR; INTRAVENOUS; SOFT TISSUE 3 TIMES DAILY
Status: DISCONTINUED | OUTPATIENT
Start: 2020-04-20 | End: 2020-04-23

## 2020-04-20 RX ADMIN — DOCUSATE SODIUM 100 MG: 100 CAPSULE, LIQUID FILLED ORAL at 06:07

## 2020-04-20 RX ADMIN — SODIUM CHLORIDE: 9 INJECTION, SOLUTION INTRAVENOUS at 20:28

## 2020-04-20 RX ADMIN — ACETAMINOPHEN 650 MG: 325 TABLET, FILM COATED ORAL at 06:07

## 2020-04-20 RX ADMIN — LEVETIRACETAM 1000 MG: 500 TABLET ORAL at 17:22

## 2020-04-20 RX ADMIN — AMLODIPINE BESYLATE 5 MG: 10 TABLET ORAL at 06:07

## 2020-04-20 RX ADMIN — DOCUSATE SODIUM 100 MG: 100 CAPSULE, LIQUID FILLED ORAL at 17:22

## 2020-04-20 RX ADMIN — LEVETIRACETAM 1000 MG: 500 TABLET ORAL at 06:07

## 2020-04-20 RX ADMIN — POLYETHYLENE GLYCOL 3350 1 PACKET: 17 POWDER, FOR SOLUTION ORAL at 17:22

## 2020-04-20 RX ADMIN — DEXAMETHASONE SODIUM PHOSPHATE 4 MG: 4 INJECTION, SOLUTION INTRA-ARTICULAR; INTRALESIONAL; INTRAMUSCULAR; INTRAVENOUS; SOFT TISSUE at 10:29

## 2020-04-20 RX ADMIN — DEXAMETHASONE SODIUM PHOSPHATE 4 MG: 4 INJECTION, SOLUTION INTRA-ARTICULAR; INTRALESIONAL; INTRAMUSCULAR; INTRAVENOUS; SOFT TISSUE at 17:22

## 2020-04-20 RX ADMIN — MAGNESIUM HYDROXIDE 30 ML: 400 SUSPENSION ORAL at 06:08

## 2020-04-20 RX ADMIN — POLYETHYLENE GLYCOL 3350 1 PACKET: 17 POWDER, FOR SOLUTION ORAL at 06:08

## 2020-04-20 RX ADMIN — ACETAMINOPHEN 650 MG: 325 TABLET, FILM COATED ORAL at 17:23

## 2020-04-20 ASSESSMENT — ENCOUNTER SYMPTOMS
ENDOCRINE NEGATIVE: 1
PSYCHIATRIC NEGATIVE: 1
SPEECH DIFFICULTY: 0
HEMATOLOGIC/LYMPHATIC NEGATIVE: 1
ALLERGIC/IMMUNOLOGIC NEGATIVE: 1
HEADACHES: 1
WEAKNESS: 0
EYES NEGATIVE: 1
MUSCULOSKELETAL NEGATIVE: 1
CONSTITUTIONAL NEGATIVE: 1
NUMBNESS: 0
DIZZINESS: 0
RESPIRATORY NEGATIVE: 1
CARDIOVASCULAR NEGATIVE: 1
GASTROINTESTINAL NEGATIVE: 1

## 2020-04-20 ASSESSMENT — PATIENT HEALTH QUESTIONNAIRE - PHQ9
SUM OF ALL RESPONSES TO PHQ9 QUESTIONS 1 AND 2: 0
2. FEELING DOWN, DEPRESSED, IRRITABLE, OR HOPELESS: NOT AT ALL
1. LITTLE INTEREST OR PLEASURE IN DOING THINGS: NOT AT ALL

## 2020-04-20 ASSESSMENT — COGNITIVE AND FUNCTIONAL STATUS - GENERAL
MOVING FROM LYING ON BACK TO SITTING ON SIDE OF FLAT BED: UNABLE
TURNING FROM BACK TO SIDE WHILE IN FLAT BAD: UNABLE
WALKING IN HOSPITAL ROOM: A LOT
CLIMB 3 TO 5 STEPS WITH RAILING: TOTAL
SUGGESTED CMS G CODE MODIFIER MOBILITY: CM
MOBILITY SCORE: 8
STANDING UP FROM CHAIR USING ARMS: A LOT
MOVING TO AND FROM BED TO CHAIR: UNABLE

## 2020-04-20 ASSESSMENT — GAIT ASSESSMENTS
GAIT LEVEL OF ASSIST: MODERATE ASSIST
DISTANCE (FEET): 5
ASSISTIVE DEVICE: HAND HELD ASSIST
DEVIATION: ATAXIC

## 2020-04-20 ASSESSMENT — FIBROSIS 4 INDEX: FIB4 SCORE: 1.21

## 2020-04-20 NOTE — PROGRESS NOTES
Neurosurgery Progress Note    Subjective:  Worse left ue and le strength compared to yest    Exam:    A&O x3  PERRL, EOMI  Face symm, tongue midline  Right 5/5, left 3/5  C/d/i    sd drain 18cc last shift      BP  Min: 129/61  Max: 165/79  Pulse  Av.9  Min: 78  Max: 124  Resp  Av.1  Min: 16  Max: 47  Temp  Av.5 °C (99.5 °F)  Min: 36.4 °C (97.6 °F)  Max: 38.3 °C (100.9 °F)  Monitored Temp 2  Av.3 °C (101 °F)  Min: 38.1 °C (100.6 °F)  Max: 38.5 °C (101.3 °F)  SpO2  Av.6 %  Min: 91 %  Max: 97 %    No data recorded    Recent Labs     20  1615 20  0640 20  0425   WBC 5.2 6.7 7.2   RBC 5.69 4.59* 4.81   HEMOGLOBIN 16.6 13.8* 14.0   HEMATOCRIT 49.5 39.4* 41.4*   MCV 87.0 85.8 86.1   MCH 29.2 30.1 29.1   MCHC 33.5 35.0 33.8   RDW 13.1 39.4 40.2   PLATELETCT 169 164 172   MPV 10.0 9.7 9.6     Recent Labs     20  1615 20  0640 20  0425   SODIUM 140 137 135   POTASSIUM 3.8 4.1 3.9   CHLORIDE 104 105 100   CO2 26 21 21   GLUCOSE 93 155* 109*   BUN 21* 15 14   CREATININE 1.1 0.82 0.88   CALCIUM 10.0 8.4* 9.1     Recent Labs     20  1615   APTT 27.3   INR 0.95     Recent Labs     20  1855   REACTMIN 5.2   CLOTKINET 2.1   CLOTANGL 62.2   MAXCLOTS 62.7   [id number] 0.0   PRCINADP 6.9   PRCINAA 53.4       Intake/Output       20 0700 - 20 0659 20 07 - 20 0659      7566-9891 1209-2295 Total 7732-6476 9131-4195 Total       Intake    P.O.  540  420 960  --  -- --    P.O. 540 420 960 -- -- --    I.V.  352.8  -- 352.8  --  -- --    Volume (mL) (NS infusion) 352.8 -- 352.8 -- -- --    Total Intake 892.8 420 1312.8 -- -- --       Output    Urine  1130  700 1830  --  -- --    Straight Catheter 400 -- 400 -- -- --    Urine Void (mL) 50 -- 50 -- -- --    Output (mL) ([REMOVED] Urethral Catheter Temperature probe 16 Fr.) 680 -- 680 -- -- --    Output (mL) (Urethral Catheter Temperature probe 16 Fr.) -- 700 700 -- -- --    Drains  35  18 53  --  --  --    Output (mL) (Closed/Suction Drain 1 Right Other (Comment) Jonathan Niño 7 Fr.) 35 18 53 -- -- --    Total Output 1625 535 3890 -- -- --       Net I/O     -272.3 -298 -570.3 -- -- --            Intake/Output Summary (Last 24 hours) at 4/20/2020 0859  Last data filed at 4/20/2020 0600  Gross per 24 hour   Intake 1086.75 ml   Output 1543 ml   Net -456.25 ml       $ Bladder Scan Results (mL): 437    • levETIRAcetam  1,000 mg BID   • amLODIPine  5 mg DAILY   • rosuvastatin  10 mg QHS   • acetaminophen  650 mg Q6HRS PRN   • Respiratory Therapy Consult   Continuous RT   • docusate sodium  100 mg BID   • senna-docusate  1 Tab Nightly   • senna-docusate  1 Tab Q24HRS PRN   • polyethylene glycol/lytes  1 Packet BID   • magnesium hydroxide  30 mL DAILY   • bisacodyl  10 mg Q24HRS PRN   • fleet  1 Each Once PRN   • oxyCODONE immediate-release  5 mg Q3HRS PRN   • oxyCODONE immediate release  10 mg Q3HRS PRN   • morphine injection  1-4 mg Q3HRS PRN   • labetalol  10 mg Q4HRS PRN   • hydrALAZINE  20 mg Q6HRS PRN   • ondansetron  4 mg Q4HRS PRN   • Pharmacy Consult Request  1 Each PHARMACY TO DOSE       Assessment and Plan:  Hospital day #3, POD #3 right crani for sdh  Prophylactic anticoagulation: no         Start date/time: tbd  D/c subdural drain  Increased keppra overnight  to 1000bid  q2h neuro checks  CT sl improved early this am  Oob, pt,ot  Start decadron 4 tid

## 2020-04-20 NOTE — PROGRESS NOTES
Decreased level of consciousness after getting up to zane, Patient lethargic following commands right side 5/5 strength, left arm 2/5, Left leg 3/5. Dr. Mendiola notified.

## 2020-04-20 NOTE — CARE PLAN
Problem: Respiratory:  Goal: Respiratory status will improve  Outcome: PROGRESSING SLOWER THAN EXPECTED  Note: Patient placed on 2 liters oxymask while sleeping     Problem: Communication  Goal: The ability to communicate needs accurately and effectively will improve  Outcome: PROGRESSING AS EXPECTED  Note: Patient slow to communicate needs     Problem: Safety  Goal: Will remain free from injury  Outcome: PROGRESSING AS EXPECTED  Note: Bed alarm on     Problem: Neuro Status  Goal: Monitor neuro status and rapid identification of neuro changes  Outcome: PROGRESSING SLOWER THAN EXPECTED  Note: Patient began to neglect left side and could no feel left side, discussed with MD, CT completed, Keppra increased.      Problem: Urinary Elimination:  Goal: Ability to reestablish a normal urinary elimination pattern will improve  Outcome: PROGRESSING SLOWER THAN EXPECTED  Note: Bladder scan completed patient retaining over 400 olivarez reinserted.

## 2020-04-20 NOTE — PROGRESS NOTES
2 RN skin check with Martine RN:  -bruise to L. Upper arm  -R. Crani site covered with island dressing.  Surgical site re-enforced with 4x4's and covered with a stockinette.   -ALONDRA drain in place, unable to assess insertion site (under surgical dressing).  No other skin issue noted on  assessment.

## 2020-04-20 NOTE — THERAPY
"Physical Therapy Treatment completed.   Bed Mobility:  Supine to Sit: Moderate Assist  Transfers: Sit to Stand: Moderate Assist  Gait: Level Of Assist: Moderate Assist       Plan of Care: Will benefit from Physical Therapy 5 times per week  Discharge Recommendations: Equipment: Will Continue to Assess for Equipment Needs. Post-acute therapy Discharge to a transitional care facility for continued skilled therapy services.    Pt appears to be less mobile than yesterday's notes indicated. He required mod A to exit bed, especially for L LE management. Once upright, pt not attending to L-side of body and was only able to localize forceful pinch to skin to L UE and LE; unable to localize fine touch. During standing and gait initiation, pt required mod A and was largely ataxic to L LE. During transfer, pt became less responsive ultimately requiring max A to complete task. BP may have been culprit as he sustained a drop from 130/78mmHg to 98/58mmHg with return to 132/67mmHg once LE elevated in recliner. PT will follow while pt remains here in house to address ataxia and poor activity tolerance. Recommend placement.      See \"Rehab Therapy-Acute\" Patient Summary Report for complete documentation.       "

## 2020-04-20 NOTE — PROGRESS NOTES
Patient tachycardic, SBP>160 treated with hydralazine, Tmax 101.2 treated with ice and tylenol, Bladder scanned >400 olivarez inserted. Neuro exam showed increased left sided neglect on upper and lower with numbness, loss of  stregthen in MISSY ARCHER. Dr Mendiola notified, CT completed. Received orders to increase Keppra to 1000 mg twice a day with a 500 mg dose given at 2332. Drainage from surgical dressing reinforced.

## 2020-04-20 NOTE — PROGRESS NOTES
12 hour Chart Check  Neuro check completed with Lynette CARCAMO and Mikki Amos RN.  Patient is delayed in responding on his L. UE.   weaker on L. UE than R. UE.    Patient is impulsive and wants to stand up to void.   Assisted in standing, patient unable to void. Chair alarm in place.

## 2020-04-20 NOTE — THERAPY
"Occupational Therapy Evaluation completed.   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 Post-acute therapy Recommend home health for continued occupational therapy services.     See \"Rehab Therapy-Acute\" Patient Summary Report for complete documentation.    Pt is 75yo male admitted following GLF several days/weeks ago, sustained subdural hematoma, now s/p right sided craniotomy for evacuation. Pt presents to OT [facility] pleasant and cooperative, demonstrated seated ADLs with SPV, ADL transfers with Neeta for safety 2/2 baseline blindness. LUE slightly weaker than RUE, impaired attention to/motor control of LUE throughout functional activities. Pt reported he is successful living alone because he is familiar with navigating his home environment, employs compensatory strategies, and has friends from [employer] who assist him with IADLs including grocery shopping. Pt repeated that he has lived in his home for over 10 years multiple times, unclear if mild cog/memory impairment or simply very emphatic about this fact. Pt likely close to baseline, would benefit from additional acute OT to ensure safety/independence with functional toilet and tub transfers as was unable to assess at al in ICU setting. Anticipate pt will progress while admitted to facilitate safe DC home with home health OT. Acute OT to continue to assess.   "

## 2020-04-21 ENCOUNTER — APPOINTMENT (OUTPATIENT)
Dept: RADIOLOGY | Facility: MEDICAL CENTER | Age: 75
DRG: 026 | End: 2020-04-21
Attending: NEUROLOGICAL SURGERY
Payer: MEDICARE

## 2020-04-21 ENCOUNTER — APPOINTMENT (OUTPATIENT)
Dept: RADIOLOGY | Facility: MEDICAL CENTER | Age: 75
DRG: 026 | End: 2020-04-21
Attending: SURGERY
Payer: MEDICARE

## 2020-04-21 ENCOUNTER — APPOINTMENT (OUTPATIENT)
Dept: RADIOLOGY | Facility: MEDICAL CENTER | Age: 75
DRG: 026 | End: 2020-04-21
Attending: NURSE PRACTITIONER
Payer: MEDICARE

## 2020-04-21 PROBLEM — R13.12 OROPHARYNGEAL DYSPHAGIA: Status: ACTIVE | Noted: 2020-04-21

## 2020-04-21 PROBLEM — R56.1 SEIZURES, POST-TRAUMATIC (HCC): Status: ACTIVE | Noted: 2020-04-21

## 2020-04-21 LAB
ALBUMIN SERPL BCP-MCNC: 3.7 G/DL (ref 3.2–4.9)
ALBUMIN/GLOB SERPL: 1.1 G/DL
ALP SERPL-CCNC: 61 U/L (ref 30–99)
ALT SERPL-CCNC: 16 U/L (ref 2–50)
ANION GAP SERPL CALC-SCNC: 14 MMOL/L (ref 7–16)
AST SERPL-CCNC: 20 U/L (ref 12–45)
BASOPHILS # BLD AUTO: 0 % (ref 0–1.8)
BASOPHILS # BLD: 0 K/UL (ref 0–0.12)
BILIRUB SERPL-MCNC: 1 MG/DL (ref 0.1–1.5)
BUN SERPL-MCNC: 28 MG/DL (ref 8–22)
CALCIUM SERPL-MCNC: 9.3 MG/DL (ref 8.5–10.5)
CHLORIDE SERPL-SCNC: 100 MMOL/L (ref 96–112)
CO2 SERPL-SCNC: 20 MMOL/L (ref 20–33)
CREAT SERPL-MCNC: 0.95 MG/DL (ref 0.5–1.4)
EOSINOPHIL # BLD AUTO: 0 K/UL (ref 0–0.51)
EOSINOPHIL NFR BLD: 0 % (ref 0–6.9)
ERYTHROCYTE [DISTWIDTH] IN BLOOD BY AUTOMATED COUNT: 39.8 FL (ref 35.9–50)
GLOBULIN SER CALC-MCNC: 3.5 G/DL (ref 1.9–3.5)
GLUCOSE BLD-MCNC: 126 MG/DL (ref 65–99)
GLUCOSE SERPL-MCNC: 126 MG/DL (ref 65–99)
HCT VFR BLD AUTO: 42.7 % (ref 42–52)
HGB BLD-MCNC: 14.4 G/DL (ref 14–18)
IMM GRANULOCYTES # BLD AUTO: 0.01 K/UL (ref 0–0.11)
IMM GRANULOCYTES NFR BLD AUTO: 0.1 % (ref 0–0.9)
LYMPHOCYTES # BLD AUTO: 0.86 K/UL (ref 1–4.8)
LYMPHOCYTES NFR BLD: 11.4 % (ref 22–41)
MCH RBC QN AUTO: 29.1 PG (ref 27–33)
MCHC RBC AUTO-ENTMCNC: 33.7 G/DL (ref 33.7–35.3)
MCV RBC AUTO: 86.3 FL (ref 81.4–97.8)
MONOCYTES # BLD AUTO: 0.56 K/UL (ref 0–0.85)
MONOCYTES NFR BLD AUTO: 7.4 % (ref 0–13.4)
NEUTROPHILS # BLD AUTO: 6.14 K/UL (ref 1.82–7.42)
NEUTROPHILS NFR BLD: 81.1 % (ref 44–72)
NRBC # BLD AUTO: 0 K/UL
NRBC BLD-RTO: 0 /100 WBC
PLATELET # BLD AUTO: 208 K/UL (ref 164–446)
PMV BLD AUTO: 9.7 FL (ref 9–12.9)
POTASSIUM SERPL-SCNC: 4.4 MMOL/L (ref 3.6–5.5)
PROT SERPL-MCNC: 7.2 G/DL (ref 6–8.2)
RBC # BLD AUTO: 4.95 M/UL (ref 4.7–6.1)
SODIUM SERPL-SCNC: 134 MMOL/L (ref 135–145)
WBC # BLD AUTO: 7.6 K/UL (ref 4.8–10.8)

## 2020-04-21 PROCEDURE — 97530 THERAPEUTIC ACTIVITIES: CPT

## 2020-04-21 PROCEDURE — 700102 HCHG RX REV CODE 250 W/ 637 OVERRIDE(OP): Performed by: SURGERY

## 2020-04-21 PROCEDURE — 700111 HCHG RX REV CODE 636 W/ 250 OVERRIDE (IP): Performed by: NEUROLOGICAL SURGERY

## 2020-04-21 PROCEDURE — 82962 GLUCOSE BLOOD TEST: CPT

## 2020-04-21 PROCEDURE — 95816 EEG AWAKE AND DROWSY: CPT | Performed by: PSYCHIATRY & NEUROLOGY

## 2020-04-21 PROCEDURE — 85025 COMPLETE CBC W/AUTO DIFF WBC: CPT

## 2020-04-21 PROCEDURE — 80053 COMPREHEN METABOLIC PANEL: CPT

## 2020-04-21 PROCEDURE — 700111 HCHG RX REV CODE 636 W/ 250 OVERRIDE (IP): Performed by: SURGERY

## 2020-04-21 PROCEDURE — 70450 CT HEAD/BRAIN W/O DYE: CPT

## 2020-04-21 PROCEDURE — 95816 EEG AWAKE AND DROWSY: CPT | Mod: 26 | Performed by: PSYCHIATRY & NEUROLOGY

## 2020-04-21 PROCEDURE — A9270 NON-COVERED ITEM OR SERVICE: HCPCS | Performed by: SURGERY

## 2020-04-21 PROCEDURE — 71045 X-RAY EXAM CHEST 1 VIEW: CPT

## 2020-04-21 PROCEDURE — 99233 SBSQ HOSP IP/OBS HIGH 50: CPT | Performed by: SURGERY

## 2020-04-21 PROCEDURE — 770022 HCHG ROOM/CARE - ICU (200)

## 2020-04-21 PROCEDURE — 700111 HCHG RX REV CODE 636 W/ 250 OVERRIDE (IP): Performed by: INTERNAL MEDICINE

## 2020-04-21 PROCEDURE — 99223 1ST HOSP IP/OBS HIGH 75: CPT | Performed by: PSYCHIATRY & NEUROLOGY

## 2020-04-21 PROCEDURE — 700111 HCHG RX REV CODE 636 W/ 250 OVERRIDE (IP): Performed by: PSYCHIATRY & NEUROLOGY

## 2020-04-21 PROCEDURE — C9254 INJECTION, LACOSAMIDE: HCPCS | Performed by: PSYCHIATRY & NEUROLOGY

## 2020-04-21 PROCEDURE — 700105 HCHG RX REV CODE 258: Performed by: PSYCHIATRY & NEUROLOGY

## 2020-04-21 PROCEDURE — 4A10X4Z MONITORING OF CENTRAL NERVOUS ELECTRICAL ACTIVITY, EXTERNAL APPROACH: ICD-10-PCS | Performed by: PSYCHIATRY & NEUROLOGY

## 2020-04-21 PROCEDURE — 700112 HCHG RX REV CODE 229: Performed by: SURGERY

## 2020-04-21 PROCEDURE — 92610 EVALUATE SWALLOWING FUNCTION: CPT

## 2020-04-21 PROCEDURE — 700105 HCHG RX REV CODE 258: Performed by: INTERNAL MEDICINE

## 2020-04-21 RX ORDER — LORAZEPAM 2 MG/ML
INJECTION INTRAMUSCULAR
Status: ACTIVE
Start: 2020-04-21 | End: 2020-04-22

## 2020-04-21 RX ORDER — LORAZEPAM 2 MG/ML
1 INJECTION INTRAMUSCULAR ONCE
Status: COMPLETED | OUTPATIENT
Start: 2020-04-21 | End: 2020-04-21

## 2020-04-21 RX ORDER — POLYETHYLENE GLYCOL 3350 17 G/17G
1 POWDER, FOR SOLUTION ORAL 2 TIMES DAILY
Status: DISCONTINUED | OUTPATIENT
Start: 2020-04-21 | End: 2020-04-25

## 2020-04-21 RX ORDER — ROSUVASTATIN CALCIUM 5 MG/1
10 TABLET, COATED ORAL
Status: DISCONTINUED | OUTPATIENT
Start: 2020-04-21 | End: 2020-04-25

## 2020-04-21 RX ORDER — AMOXICILLIN 250 MG
1 CAPSULE ORAL NIGHTLY
Status: DISCONTINUED | OUTPATIENT
Start: 2020-04-21 | End: 2020-04-24

## 2020-04-21 RX ORDER — OXYCODONE HYDROCHLORIDE 5 MG/1
5 TABLET ORAL
Status: DISCONTINUED | OUTPATIENT
Start: 2020-04-21 | End: 2020-04-25

## 2020-04-21 RX ORDER — DOCUSATE SODIUM 50 MG/5ML
100 LIQUID ORAL 2 TIMES DAILY
Status: DISCONTINUED | OUTPATIENT
Start: 2020-04-21 | End: 2020-04-25

## 2020-04-21 RX ORDER — OXYCODONE HYDROCHLORIDE 10 MG/1
10 TABLET ORAL
Status: DISCONTINUED | OUTPATIENT
Start: 2020-04-21 | End: 2020-04-25

## 2020-04-21 RX ORDER — ACETAMINOPHEN 325 MG/1
650 TABLET ORAL EVERY 6 HOURS PRN
Status: DISCONTINUED | OUTPATIENT
Start: 2020-04-21 | End: 2020-04-25

## 2020-04-21 RX ORDER — AMLODIPINE BESYLATE 5 MG/1
5 TABLET ORAL DAILY
Status: DISCONTINUED | OUTPATIENT
Start: 2020-04-22 | End: 2020-04-25

## 2020-04-21 RX ORDER — AMOXICILLIN 250 MG
1 CAPSULE ORAL
Status: DISCONTINUED | OUTPATIENT
Start: 2020-04-21 | End: 2020-04-25

## 2020-04-21 RX ADMIN — POLYETHYLENE GLYCOL 3350 1 PACKET: 17 POWDER, FOR SOLUTION ORAL at 17:53

## 2020-04-21 RX ADMIN — SENNOSIDES AND DOCUSATE SODIUM 1 TABLET: 8.6; 5 TABLET ORAL at 21:30

## 2020-04-21 RX ADMIN — DEXAMETHASONE SODIUM PHOSPHATE 4 MG: 4 INJECTION, SOLUTION INTRA-ARTICULAR; INTRALESIONAL; INTRAMUSCULAR; INTRAVENOUS; SOFT TISSUE at 13:19

## 2020-04-21 RX ADMIN — DOCUSATE SODIUM 100 MG: 50 LIQUID ORAL at 17:53

## 2020-04-21 RX ADMIN — ACETAMINOPHEN 650 MG: 325 TABLET, FILM COATED ORAL at 17:53

## 2020-04-21 RX ADMIN — LEVETIRACETAM 1000 MG: 100 INJECTION, SOLUTION, CONCENTRATE INTRAVENOUS at 05:26

## 2020-04-21 RX ADMIN — ROSUVASTATIN CALCIUM 10 MG: 10 TABLET, FILM COATED ORAL at 21:30

## 2020-04-21 RX ADMIN — SODIUM CHLORIDE 200 MG: 9 INJECTION, SOLUTION INTRAVENOUS at 17:54

## 2020-04-21 RX ADMIN — LORAZEPAM 1 MG: 2 INJECTION INTRAMUSCULAR; INTRAVENOUS at 14:00

## 2020-04-21 RX ADMIN — DEXAMETHASONE SODIUM PHOSPHATE 4 MG: 4 INJECTION, SOLUTION INTRA-ARTICULAR; INTRALESIONAL; INTRAMUSCULAR; INTRAVENOUS; SOFT TISSUE at 17:53

## 2020-04-21 RX ADMIN — LEVETIRACETAM 1000 MG: 100 INJECTION, SOLUTION, CONCENTRATE INTRAVENOUS at 18:53

## 2020-04-21 RX ADMIN — DEXAMETHASONE SODIUM PHOSPHATE 4 MG: 4 INJECTION, SOLUTION INTRA-ARTICULAR; INTRALESIONAL; INTRAMUSCULAR; INTRAVENOUS; SOFT TISSUE at 05:27

## 2020-04-21 ASSESSMENT — COGNITIVE AND FUNCTIONAL STATUS - GENERAL
DAILY ACTIVITIY SCORE: 20
MOBILITY SCORE: 12
DRESSING REGULAR LOWER BODY CLOTHING: A LITTLE
HELP NEEDED FOR BATHING: A LITTLE
STANDING UP FROM CHAIR USING ARMS: A LITTLE
SUGGESTED CMS G CODE MODIFIER MOBILITY: CL
MOVING TO AND FROM BED TO CHAIR: UNABLE
SUGGESTED CMS G CODE MODIFIER DAILY ACTIVITY: CJ
TURNING FROM BACK TO SIDE WHILE IN FLAT BAD: UNABLE
DRESSING REGULAR UPPER BODY CLOTHING: A LITTLE
MOVING FROM LYING ON BACK TO SITTING ON SIDE OF FLAT BED: A LOT
TOILETING: A LITTLE
WALKING IN HOSPITAL ROOM: A LITTLE
CLIMB 3 TO 5 STEPS WITH RAILING: A LOT

## 2020-04-21 ASSESSMENT — ENCOUNTER SYMPTOMS
ALLERGIC/IMMUNOLOGIC NEGATIVE: 1
NUMBNESS: 0
CARDIOVASCULAR NEGATIVE: 1
CONSTITUTIONAL NEGATIVE: 1
PSYCHIATRIC NEGATIVE: 1
ENDOCRINE NEGATIVE: 1
HEADACHES: 1
WEAKNESS: 0
GASTROINTESTINAL NEGATIVE: 1
DIZZINESS: 0
SPEECH DIFFICULTY: 0
MUSCULOSKELETAL NEGATIVE: 1
EYES NEGATIVE: 1
HEMATOLOGIC/LYMPHATIC NEGATIVE: 1
RESPIRATORY NEGATIVE: 1

## 2020-04-21 ASSESSMENT — GAIT ASSESSMENTS
DISTANCE (FEET): 240
ASSISTIVE DEVICE: HAND HELD ASSIST
GAIT LEVEL OF ASSIST: MINIMAL ASSIST

## 2020-04-21 ASSESSMENT — FIBROSIS 4 INDEX: FIB4 SCORE: 1.15

## 2020-04-21 NOTE — DISCHARGE PLANNING
"Care Transition Team Assessment      This LSW met with pt at bedside and obtained the following information used in this assessment. Per RN, pt is AOx4 just having difficulty with word finding. Pt was AOx4 for LSW as well. Pt verified accuracy of facesheet. Pt reports he has an AD on file with the [facility]. After reviewing chart PC RN Sherri was only able to obtain a POLST which has been uploaded. Marital status legally  and pt reports having 3 adult children. Lexie \"Sobia\" who lives in [place] P:[phone], Cleo who lives in [facility] P:[phone] and a son, Benny who lives local however pt has no contact information with him as they haven't \"spoken in years.\" Pt stated his daughters are aware of his hospitalization and he's spoken with them. Due to pt being forgetful, LSW did a NOK search and confirmed names & phone numbers pt provided are correct.     Pt lives in a single level house alone. Pt states he's lived in that house for ~10 years. Prior to current hospitalization, pt was completely independent in ADLS/IADLS. Pt stated he doesn't drive and has local friends \"help me if I need anything. Around the house or to the store.\" Both contacts on face sheet are neighbors. Pt reports he has a \"walking pole\" at home. No other DME. Pt denies any hx of substance use and denies any hx of mh. [facility]     LSW is unable to determine if pt is service connected at this time due to [facility] ([phone]) being closed at this time due to the pandemic. LSW did call and LM. LSW to follow up when they reopen.     This LSW to continue to follow for any continued needs.     Care Transition Team Assessment    Information Source  Orientation : Oriented x 4  Information Given By: Patient  Who is responsible for making decisions for patient? : Patient    Readmission Evaluation  Is this a readmission?: No    Elopement Risk  Legal Hold: " No  Ambulatory or Self Mobile in Wheelchair: No-Not an Elopement Risk  Elopement Risk: Not at Risk for Elopement    Interdisciplinary Discharge Planning  Primary Care Physician: Rosemary Somers  Lives with - Patient's Self Care Capacity: Alone and Able to Care For Self  Support Systems: Friends / Neighbors  Housing / Facility: [address] [facility]  Prior Services: Home-Independent    Discharge Preparedness  What is your plan after discharge?: Uncertain - pending medical team collaboration, Skilled nursing facility, Other (comment)(LTC)  What are your discharge supports?: Child, Other (comment)(Neighbors)  Prior Functional Level: Ambulatory, Independent with Activities of Daily Living, Needs Assist with Medication Management, Uses Cane    Functional Assesment  Prior Functional Level: Ambulatory, Independent with Activities of Daily Living, Needs Assist with Medication Management, Uses Cane    Finances  Financial Barriers to Discharge: No  Prescription Coverage: Yes(Medicare & [facility])    Vision / Hearing Impairment  Right Eye Vision: Blind  Left Eye Vision: Blind    Advance Directive  Advance Directive?: POLST & Advanced Directive (Pt states he has an AD [facility] the [facility])    Domestic Abuse  Have you ever been the victim of abuse or violence?: No    Psychological Assessment  History of Substance Abuse: None  History of Psychiatric Problems: No  Non-compliant with Treatment: No  Newly Diagnosed Illness: No    Discharge Risks or Barriers  Discharge risks or barriers?: Lives alone, no community support, Complex medical needs  Patient risk factors: Complex medical needs, Lack of outside supports, Vulnerable adult    Anticipated Discharge Information  Anticipated discharge disposition: Assisted living, Discharge needs currently unknown, SNF

## 2020-04-21 NOTE — PROGRESS NOTES
Cortrak Placement    Tube Team verified patient name and medical record number prior to tube placement.  Cortrak tube (43 inches, 10 Kosovan) placed at  cm in right nare.  Per Cortrak picture, tube appears to be in the small bowel.  Nursing Instructions: Awaiting KUB to confirm placement before use for medications or feeding. Once placement confirmed, flush tube with 30 ml of water, and then remove and save stylet, in patient medication drawer.

## 2020-04-21 NOTE — THERAPY
"  Speech Language Therapy Clinical Swallow Evaluation completed.  Functional Status: Pt sitting up in a ros chair. Moderate amount of drooling to the left lateral sulcus. Moderate amount of thin and clear secretions on pt's chest. Pt with moderate dysarthria with slow rate of speech and imprecise articulation. Pt with intermittent crying, that is easily redirected, with possible lability. Some difficulty following simple directives for oral motor assessment with possible oral motor apraxia. Pt with left side facial weakness. Pt assessed with single ice chips and 1/3 tsp amounts of NTL juice and applesauce. Boluses placed on pt's stronger right side of oral cavity. Pt denies decreased sensation on the left. Pt triggering swallows in approx 2-5 seconds. He does follow directives for a 2nd swallow. No coughing post swallow with slow intake. Of concern, is the amount of drooling and pt's variable neuro status. Following the eval, when SLP returned to pt's room, per nurs pt s/p seizure. SLP collaborated with nurs regarding current recommendations for NPO and consideration for NG tube. Pt at the level for prefeeding tx if medical status is stable.   Recommendations - Diet:  NPO and NG.                          Strategies: prefeeding with SLP 1-1 and dble swallow                          Medication Administration:  NPO and NG  Plan of Care: Will benefit from Speech Therapy 5 times per week  Post-Acute Therapy: Discharge to a transitional care facility for continued skilled therapy services. Thanks, Cipriano     See \"Rehab Therapy-Acute\" Patient Summary Report for complete documentation.   "

## 2020-04-21 NOTE — PROGRESS NOTES
Neurosurgery Progress Note    Subjective:  Worsening neuro exam in the noc- increased left sided weakness and confusion, head CT done at 0200 -stable  Denies HA or nausea    Exam:  Alert, oriented x 3  EOM's intact to right, difficulty performing otherwise  Will not smile to check sym, tongue deviated to left  Right UE and LE 5/5, left arm raise briefly off bed, no   Left leg 5/5  C/d/i          BP  Min: 99/54  Max: 147/65  Pulse  Av.1  Min: 75  Max: 116  Resp  Av.6  Min: 15  Max: 39  Monitored Temp 2  Av.2 °C (100.8 °F)  Min: 37.7 °C (99.9 °F)  Max: 38.6 °C (101.5 °F)  SpO2  Av.6 %  Min: 93 %  Max: 96 %    No data recorded    Recent Labs     20  04220  0349   WBC 7.2 7.6   RBC 4.81 4.95   HEMOGLOBIN 14.0 14.4   HEMATOCRIT 41.4* 42.7   MCV 86.1 86.3   MCH 29.1 29.1   MCHC 33.8 33.7   RDW 40.2 39.8   PLATELETCT 172 208   MPV 9.6 9.7     Recent Labs     20  0425 20  0349   SODIUM 135 134*   POTASSIUM 3.9 4.4   CHLORIDE 100 100   CO2 21 20   GLUCOSE 109* 126*   BUN 14 28*   CREATININE 0.88 0.95   CALCIUM 9.1 9.3               Intake/Output       20 - 20 0659 20 - 20 0659       Total  Total       Intake    P.O.  120  0 120  --  -- --    P.O. 120 0 120 -- -- --    I.V.  --  0 0  576.7  -- 576.7    Volume (mL) (NS infusion) -- 0 0 576.7 -- 576.7    IV Piggyback  --  100 100  --  -- --    Volume (mL) (levETIRAcetam (KEPPRA) 1,000 mg in  mL IVPB) -- 100 100 -- -- --    Total Intake 120 100 220 576.7 -- 576.7       Output    Urine  505  480 985  --  -- --    Output (mL) (Urethral Catheter Temperature probe 16 Fr.) 505 480 985 -- -- --    Drains  0  -- 0  --  -- --    Output (mL) ([REMOVED] Closed/Suction Drain 1 Right Other (Comment) Jonathan Niño 7 Fr.) 0 -- 0 -- -- --    Stool  --  -- --  --  -- --    Number of Times Stooled -- 1 x 1 x -- -- --    Total Output 505 480 985 -- -- --       Net I/O      -385 -380 -765 576.7 -- 576.7            Intake/Output Summary (Last 24 hours) at 4/21/2020 0820  Last data filed at 4/21/2020 0800  Gross per 24 hour   Intake 796.67 ml   Output 885 ml   Net -88.33 ml            • dexamethasone  4 mg TID   • NS   Continuous   • levETIRAcetam (KEPPRA) IV  1,000 mg Q12HRS   • amLODIPine  5 mg DAILY   • rosuvastatin  10 mg QHS   • acetaminophen  650 mg Q6HRS PRN   • Respiratory Therapy Consult   Continuous RT   • docusate sodium  100 mg BID   • senna-docusate  1 Tab Nightly   • senna-docusate  1 Tab Q24HRS PRN   • polyethylene glycol/lytes  1 Packet BID   • magnesium hydroxide  30 mL DAILY   • bisacodyl  10 mg Q24HRS PRN   • fleet  1 Each Once PRN   • oxyCODONE immediate-release  5 mg Q3HRS PRN   • oxyCODONE immediate release  10 mg Q3HRS PRN   • morphine injection  1-4 mg Q3HRS PRN   • labetalol  10 mg Q4HRS PRN   • hydrALAZINE  20 mg Q6HRS PRN   • ondansetron  4 mg Q4HRS PRN   • Pharmacy Consult Request  1 Each PHARMACY TO DOSE       Assessment and Plan:  Hospital day #4, POD #4 right crani for sdh  Prophylactic anticoagulation: no         Start date/time: tbd    Neuro changes as above- waxing and waning  keppra 1000bid-- increased 2 nocs ago  q2h neuro checks  CT stable  Will order stat EEG  Oob, pt,ot   decadron 4 tid    ATTENDING ADDENDUM:  Patient seen independently and agree with above note

## 2020-04-21 NOTE — THERAPY
"Occupational Therapy Treatment completed with focus on ADLs and ADL transfers.  Functional Status:Currently min/mod A with ADLs and ADL transfers. Pt currently limited by decreased functional mobility, activity tolerance, AROM, strength, balance, and pain which are affecting pt's ability to complete ADLs/IADLs at baseline. Pt would benefit from OT services in the acute care setting to maximize functional recovery.   Plan of Care: Will benefit from Occupational Therapy 4 times per week  Discharge Recommendations: Recommend post-acute placement for additional occupational therapy services prior to discharge home.  See \"Rehab Therapy-Acute\" Patient Summary Report for complete documentation.   "

## 2020-04-21 NOTE — PALLIATIVE CARE
Palliative Care follow-up  Consult received and EMR reviewed noting [facility]. Request to the [facility] for any [facility]/POLST documents. Formal consult to follow.    1145: Reply from the [facility] with a copy of the patient's POLST which notes to attempt CPR if he is pulseless and not breathing. He desires IV antibiotics as needed and only short-term feeding tube. Scanned into EMR (hover cursor over pt's code status to see all linked documents).      Plan: formal consult to follow    Thank you for allowing Palliative Care to support this patient and family. Contact [phone] for additional assistance, change in patient status, or with any questions/concerns.

## 2020-04-21 NOTE — PROGRESS NOTES
Seizure like activity noted from 7231-1402, rigidity of extremities, leftward gaze preference with head turned towards left shoulder, rhythmic movement of jaw, pt unresponsive to verbal and physical stimuli. Pt responsive with resolution of activity. Dr Contreras notified of above, Dr. Mendiola paged.

## 2020-04-21 NOTE — PROGRESS NOTES
Two RN Skin check with this RN and Ayla RN.     Devices in Place: EKG Leads, BP Cuff, Pulse Oximetry device, Nasal Cannula, SCD Stockings    Skin aberrances: Some scattered bruising to BUE. Right craniotomy site is as documented. EPI. ALONDRA site clean and with sutures.     Areas of concern: Intermittent left neglect concerning for protection of extremity. Poor ability to reposition.     Interventions: Pressure redistribution as policy and protocol dictate.

## 2020-04-21 NOTE — CONSULTS
Hospital Neurology Consult:    Referring Physician: James Felipe M.D.    Reason for consultation: Seizure    HPI: Rafael Mccoy is a 74 y.o. male with history of HTN, blindness, and hx of stroke presenting to the hospital for fall/SDH and consulted for seizure. Patient admitted on 4/17/20 after being transferred for SDH. He had a fall in the bathtub and several days later presented w/ progressive ataxia, and large SDH w/ midline shift. He was transferred here for further care and craniotomy was performed w/ SDH evacuation. Subsequently he had some episodes of AMS and an episode of possible seizure. EEG was obtained which was abnormal but no seizure activity was captured. Subsequently to EEG the patient had a seizure which was seen by nursing w/ head deviation to the R w/ rhythmic jaw movements and stiffness of the bilateral upper extremities. Repeat CT Head was stable. Unable to obtain ROS due to severe aphasia.    ROS:     Unable to obtain due to AMS/aphasia.     Past Medical History:    has a past medical history of Blind, Hypertension, and Stroke (HCC).    FHx:  family history includes Other in his mother.    SHx:   reports that he has never smoked. He has never used smokeless tobacco. He reports that he does not drink alcohol or use drugs.    Allergies:  No Known Allergies    Medications:    Current Facility-Administered Medications:   •  LORAZEPAM 2 MG/ML INJ SOLN, , , ,   •  Pharmacy Consult: Enteral tube insertion - review meds/change route/product selection, 1 Each, Other, PHARMACY TO DOSE, Rodolfo Contreras M.D.  •  [START ON 4/22/2020] amLODIPine (NORVASC) tablet 5 mg, 5 mg, Enteral Tube, DAILY, James Felipe M.D.  •  docusate sodium 100mg/10mL (COLACE) solution 100 mg, 100 mg, Enteral Tube, BID, James Felipe M.D.  •  [START ON 4/22/2020] magnesium hydroxide (MILK OF MAGNESIA) suspension 30 mL, 30 mL, Enteral Tube, DAILY, James Felipe M.D.  •  polyethylene glycol/lytes (MIRALAX) PACKET 1  Packet, 1 Packet, Enteral Tube, BID, James Felipe M.D.  •  rosuvastatin (CRESTOR) tablet 10 mg, 10 mg, Enteral Tube, QHS, James Felipe M.D.  •  senna-docusate (PERICOLACE or SENOKOT S) 8.6-50 MG per tablet 1 Tab, 1 Tab, Enteral Tube, Nightly, James Felipe M.D.  •  acetaminophen (TYLENOL) tablet 650 mg, 650 mg, Enteral Tube, Q6HRS PRN, James Felipe M.D.  •  oxyCODONE immediate release (ROXICODONE) tablet 10 mg, 10 mg, Enteral Tube, Q3HRS PRN, James Felipe M.D.  •  oxyCODONE immediate-release (ROXICODONE) tablet 5 mg, 5 mg, Enteral Tube, Q3HRS PRN, James Felipe M.D.  •  senna-docusate (PERICOLACE or SENOKOT S) 8.6-50 MG per tablet 1 Tab, 1 Tab, Enteral Tube, Q24HRS PRN, James Felipe M.D.  •  dexamethasone (DECADRON) injection 4 mg, 4 mg, Intravenous, TID, Anthony Mendiola M.D., 4 mg at 04/21/20 1319  •  NS infusion, , Intravenous, Continuous, Rodolfo Contreras M.D., Last Rate: 50 mL/hr at 04/20/20 2028  •  levETIRAcetam (KEPPRA) 1,000 mg in  mL IVPB, 1,000 mg, Intravenous, Q12HRS, Rodolfo Contreras M.D., Stopped at 04/21/20 0541  •  Respiratory Therapy Consult, , Nebulization, Continuous RT, Madhavi Graham, A.P.R.N.  •  bisacodyl (DULCOLAX) suppository 10 mg, 10 mg, Rectal, Q24HRS PRN, Madhavi Vaughney, A.P.R.N.  •  fleet enema 133 mL, 1 Each, Rectal, Once PRN, Madhavi Vaughney, A.P.R.N.  •  morphine (pf) 4 MG/ML injection 1-4 mg, 1-4 mg, Intravenous, Q3HRS PRN, Madhavi Vaughney, A.P.R.N., 2 mg at 04/18/20 0500  •  labetalol (NORMODYNE/TRANDATE) injection 10 mg, 10 mg, Intravenous, Q4HRS PRN, Madhavi Vaughney, A.P.R.N.  •  hydrALAZINE (APRESOLINE) injection 20 mg, 20 mg, Intravenous, Q6HRS PRN, Madhavi Vaughney, A.P.R.N., 20 mg at 04/19/20 2041  •  ondansetron (ZOFRAN) syringe/vial injection 4 mg, 4 mg, Intravenous, Q4HRS PRN, Anthony Mendiola M.D.  •  Pharmacy Consult Request ...Pain Management Review 1 Each, 1 Each, Other, PHARMACY TO DOSE, Anthony Mendiola M.D.    Vitals:   Vitals:    04/21/20 0900 04/21/20  1000 04/21/20 1100 04/21/20 1200   BP: 123/58 143/64 152/60 126/65   Pulse: 69 71 99 (!) 110   Resp: 14 (!) 26 (!) 37 (!) 24   Temp:       TempSrc:       SpO2: 96% 95% 94% 94%   Weight:       Height:           Labs:  Lab Results   Component Value Date/Time    PROTHROMBTM 10.3 04/17/2020 04:15 PM    INR 0.95 04/17/2020 04:15 PM      Lab Results   Component Value Date/Time    WBC 7.6 04/21/2020 03:49 AM    RBC 4.95 04/21/2020 03:49 AM    HEMOGLOBIN 14.4 04/21/2020 03:49 AM    HEMATOCRIT 42.7 04/21/2020 03:49 AM    MCV 86.3 04/21/2020 03:49 AM    MCH 29.1 04/21/2020 03:49 AM    MCHC 33.7 04/21/2020 03:49 AM    MPV 9.7 04/21/2020 03:49 AM    NEUTSPOLYS 81.10 (H) 04/21/2020 03:49 AM    LYMPHOCYTES 11.40 (L) 04/21/2020 03:49 AM    MONOCYTES 7.40 04/21/2020 03:49 AM    EOSINOPHILS 0.00 04/21/2020 03:49 AM    BASOPHILS 0.00 04/21/2020 03:49 AM      Lab Results   Component Value Date/Time    SODIUM 134 (L) 04/21/2020 03:49 AM    POTASSIUM 4.4 04/21/2020 03:49 AM    CHLORIDE 100 04/21/2020 03:49 AM    CO2 20 04/21/2020 03:49 AM    GLUCOSE 126 (H) 04/21/2020 03:49 AM    BUN 28 (H) 04/21/2020 03:49 AM    CREATININE 0.95 04/21/2020 03:49 AM      Lab Results   Component Value Date/Time    CHOLSTRLTOT 150 05/17/2016 05:20 AM    LDL 91 05/17/2016 05:20 AM    HDL 36 (L) 05/17/2016 05:20 AM    TRIGLYCERIDE 129 05/17/2016 05:20 AM       Lab Results   Component Value Date/Time    ALKPHOSPHAT 61 04/21/2020 03:49 AM    ASTSGOT 20 04/21/2020 03:49 AM    ALTSGPT 16 04/21/2020 03:49 AM    TBILIRUBIN 1.0 04/21/2020 03:49 AM      Imaging/Testing:  CT Head on 4/21/20 personally reviewed w/ evidence of R SDH w/ some pneumocephalus.     CT Head on 4/19/20 reviewed in chart.     Physical Exam:     General: 73 y/o male somnolent lying in bed.   Cardio: Normal S1/S2. No peripheral edema.   Pulm: CTAX2. No respiratory distress.   Skin: Warm, dry, no rashes or lesions   Psychiatric: Appropriate affect. No active psychosis.  HEENT: craniotomy incision  on R frontoparietal region, normal sclera and conjunctiva, moist oral mucosa. No lid lag.  Abdomen: Soft, non tender. No masses or hepatosplenomegaly.    Neurologic:  Mental Status: Somnolent, but arousable. Able to follow commands. Speech is near unintelligible due to dysarthria. Unable to assess language functions. No neglect.   Cranial Nerves: Pupils equally round. EOMi. Face symmetric.   Motor: Normal muscle tone/bulk. Moves the R extremities to command, wiggles L toes, does not move LUE.   Reflexes: Deferred  Coordination: Unable to assess  Sensation: Diminished to nox stim on the L.   Gait/Station: Deferred    Assessment/Plan:    Rafael Mccoy is a 74 y.o. male with history of HTN, blindness, and hx of stroke presenting to the hospital for fall/SDH and consulted for seizure. EEG did not capture seizure activity but was abnormal with slowing and sharp discharges on the R which coincides w/ craniotomy. In this regard it is clear the patient has a substrate for seizures, and would not be surprising if further seizures have occurred. At this time will add second seizure medication. If further seizure activity occurs then will consider prolonged video EEG monitoring.    Plan:  -Continue Keppra 1g BID.  -Initiate Vimpat 200mg IV load, 100mg BID maintenance.  -Will follow.  -Plan discussed with consulting physician and patient's nurse.       Manjit Christy M.D., Diplomat of the American Board of Psychiatry and Neurology  Diplomat of [facility]   Assistant Clinical Professor, [facility] Neurology Consultant

## 2020-04-21 NOTE — THERAPY
"Physical Therapy Treatment completed.   Bed Mobility:  Supine to Sit: Moderate Assist  Transfers: Sit to Stand: Minimal Assist  Gait: Level Of Assist: Minimal Assist with HHA       Plan of Care: Will benefit from Physical Therapy 5 times per week  Discharge Recommendations: Equipment: Will Continue to Assess for Equipment Needs. Post-acute therapy: Intensive Multidisciplinary Skilled Therapy    Pt with improved gait pattern today and was able to complete x240' with min A, HHA for stability and wayfinding. BP stable throughout ranging from 150-160/60's mmHg. Pt does demonstrate some delay with L LE, but he was certainly not as ataxic as yesterday. L UE was noted to be hypertonic to flexors which was not present yesterday. While here, PT will follow to address ataxia and instability. Recommend placement.      See \"Rehab Therapy-Acute\" Patient Summary Report for complete documentation.       "

## 2020-04-21 NOTE — PROCEDURES
ROUTINE ELECTROENCEPHALOGRAM REPORT      Referring provider: RYLAND Stroud    DOS: 4/21/2020 (total recording of 25  minutes)    INDICATION:  Rafael Mccoy 74 y.o. male presenting with spells, falls, altered mental status, right SDH.     CURRENT ANTIEPILEPTIC REGIMEN: Levetiracetam.     TECHNIQUE: 30 channel routine electroencephalogram (EEG) was performed in accordance with the international 10-20 system. The study was reviewed in bipolar and referential montages. The recording examined the patient during wakeful and drowsy state(s).     DESCRIPTION OF THE RECORD:  During the wakefulness, the background showed a symmetrical 5-6 Hz theta activity posteriorly with amplitude of 70 mV.  There was reactivity to eye closure/opening.  A normal anterior-posterior gradient was noted with faster beta frequencies seen anteriorly.  During drowsiness, theta/delta frequencies were seen.    ACTIVATION PROCEDURES:   Intermittent Photic stimulation was performed in a stepwise fashion from 1 to 30 Hz, but failed to produce any background changes.     ICTAL AND/OR INTERICTAL FINDINGS:   There is slowing in the right frontotemporal region. There are frequent frontal sharps, most pronounced on the right. There are frequent runs of triphasics and frontal sharps exhibiting a brief rhythmic pattern, however no clear seizures captured during the study.     EKG: sampling of the EKG recording demonstrated sinus rhythm.       INTERPRETATION:  This is an abnormal routine EEG recording in the awake and drowsy state(s). There is slowing in the right frontotemporal region. There are frequent frontal sharps and triphasic waves, appearing most pronounced on the right. There are frequent runs of triphasics and frontal sharps exhibiting a brief generalized rhythmic pattern. The findings significantly increase risk for seizures and suggest underlying cortical irritability and structural abnormality. However no clear seizures captured during the  study.  Clinical and radiological correlation is recommended.    Michele An MD   Epilepsy and Neurodiagnostics.   Clinical  of Neurology [facility].   Diplomate in Neurology, Epilepsy, and Electrodiagnostic Medicine.   Office: [phone]  Fax: [phone]

## 2020-04-21 NOTE — CARE PLAN
Problem: Respiratory:  Goal: Respiratory status will improve  Outcome: PROGRESSING AS EXPECTED  Note: No supplemental oxygen at this time     Problem: Communication  Goal: The ability to communicate needs accurately and effectively will improve  Outcome: PROGRESSING SLOWER THAN EXPECTED  Note: Poor levels of alertness, but with prompted, can convey wants and needs      Problem: Skin Integrity  Goal: Risk for impaired skin integrity will decrease  Outcome: PROGRESSING SLOWER THAN EXPECTED  Note: Lawrence as charted. Poor ability to turn self demonstrated so far

## 2020-04-21 NOTE — PROGRESS NOTES
Trauma / Surgical Daily Progress Note    Date of Service  4/20/2020    Chief Complaint  74 y.o. male admitted 4/17/2020 with Trauma    Interval Events    Developed urinary retention last night and Graham catheter placed  Subdural drain removed by neurosurgery  Remains every 2 hours neurochecks per neurosurgery  Decadron initiated by neurosurgery - follow glucose    Review of Systems  Review of Systems   Constitutional: Negative.    HENT: Negative.    Eyes: Negative.    Respiratory: Negative.    Cardiovascular: Negative.    Gastrointestinal: Negative.    Endocrine: Negative.    Genitourinary: Negative.    Musculoskeletal: Negative.    Skin: Negative.    Allergic/Immunologic: Negative.    Neurological: Positive for headaches. Negative for dizziness, speech difficulty, weakness and numbness.   Hematological: Negative.    Psychiatric/Behavioral: Negative.         Vital Signs for last 24 hours  Temp:  [37.7 °C (99.9 °F)-38.3 °C (100.9 °F)] 37.7 °C (99.9 °F)  Pulse:  [] 84  Resp:  [16-47] 17  BP: (117-165)/(58-90) 117/58  SpO2:  [91 %-97 %] 94 %    Hemodynamic parameters for last 24 hours       Respiratory Data     Respiration: 17, Pulse Oximetry: 94 %     Work Of Breathing / Effort: Mild  RUL Breath Sounds: Clear, RML Breath Sounds: Clear, RLL Breath Sounds: Diminished, CARLENE Breath Sounds: Clear, LLL Breath Sounds: Diminished    Physical Exam  Physical Exam  Vitals signs and nursing note reviewed.   Constitutional:       Appearance: Normal appearance. He is not ill-appearing.      Interventions: Nasal cannula in place.   HENT:      Head: Normocephalic.      Comments: Craniotomy dressing is clean and dry.     Nose: Nose normal.      Mouth/Throat:      Mouth: Mucous membranes are moist.      Pharynx: Oropharynx is clear.   Eyes:      General: No scleral icterus.     Extraocular Movements: Extraocular movements intact.      Conjunctiva/sclera: Conjunctivae normal.      Pupils: Pupils are equal, round, and reactive to  light.   Neck:      Musculoskeletal: Normal range of motion and neck supple. No muscular tenderness.   Cardiovascular:      Rate and Rhythm: Normal rate and regular rhythm.      Pulses: Normal pulses.   Pulmonary:      Effort: Pulmonary effort is normal. No respiratory distress.      Breath sounds: Normal breath sounds. No wheezing.   Chest:      Chest wall: No tenderness.   Abdominal:      General: There is no distension.      Palpations: Abdomen is soft.      Tenderness: There is no abdominal tenderness. There is no guarding.   Musculoskeletal: Normal range of motion.         General: No swelling, tenderness or deformity.   Skin:     General: Skin is warm and dry.      Capillary Refill: Capillary refill takes less than 2 seconds.   Neurological:      General: No focal deficit present.      Mental Status: He is alert and oriented to person, place, and time.      Cranial Nerves: No cranial nerve deficit.      Sensory: No sensory deficit.      Motor: No weakness.      Coordination: Coordination normal.      Gait: Gait normal.      Deep Tendon Reflexes: Reflexes normal.   Psychiatric:         Mood and Affect: Mood normal.         Behavior: Behavior normal. Behavior is cooperative.         Thought Content: Thought content normal.         Judgment: Judgment normal.         Laboratory  Recent Results (from the past 24 hour(s))   Basic Metabolic Panel    Collection Time: 04/20/20  4:25 AM   Result Value Ref Range    Sodium 135 135 - 145 mmol/L    Potassium 3.9 3.6 - 5.5 mmol/L    Chloride 100 96 - 112 mmol/L    Co2 21 20 - 33 mmol/L    Glucose 109 (H) 65 - 99 mg/dL    Bun 14 8 - 22 mg/dL    Creatinine 0.88 0.50 - 1.40 mg/dL    Calcium 9.1 8.5 - 10.5 mg/dL    Anion Gap 14.0 7.0 - 16.0   CBC WITH DIFFERENTIAL    Collection Time: 04/20/20  4:25 AM   Result Value Ref Range    WBC 7.2 4.8 - 10.8 K/uL    RBC 4.81 4.70 - 6.10 M/uL    Hemoglobin 14.0 14.0 - 18.0 g/dL    Hematocrit 41.4 (L) 42.0 - 52.0 %    MCV 86.1 81.4 - 97.8 fL     MCH 29.1 27.0 - 33.0 pg    MCHC 33.8 33.7 - 35.3 g/dL    RDW 40.2 35.9 - 50.0 fL    Platelet Count 172 164 - 446 K/uL    MPV 9.6 9.0 - 12.9 fL    Neutrophils-Polys 68.60 44.00 - 72.00 %    Lymphocytes 16.40 (L) 22.00 - 41.00 %    Monocytes 12.90 0.00 - 13.40 %    Eosinophils 1.40 0.00 - 6.90 %    Basophils 0.40 0.00 - 1.80 %    Immature Granulocytes 0.30 0.00 - 0.90 %    Nucleated RBC 0.00 /100 WBC    Neutrophils (Absolute) 4.95 1.82 - 7.42 K/uL    Lymphs (Absolute) 1.18 1.00 - 4.80 K/uL    Monos (Absolute) 0.93 (H) 0.00 - 0.85 K/uL    Eos (Absolute) 0.10 0.00 - 0.51 K/uL    Baso (Absolute) 0.03 0.00 - 0.12 K/uL    Immature Granulocytes (abs) 0.02 0.00 - 0.11 K/uL    NRBC (Absolute) 0.00 K/uL   MAGNESIUM    Collection Time: 04/20/20  4:25 AM   Result Value Ref Range    Magnesium 1.7 1.5 - 2.5 mg/dL   PHOSPHORUS    Collection Time: 04/20/20  4:25 AM   Result Value Ref Range    Phosphorus 3.1 2.5 - 4.5 mg/dL   ESTIMATED GFR    Collection Time: 04/20/20  4:25 AM   Result Value Ref Range    GFR If African American >60 >60 mL/min/1.73 m 2    GFR If Non African American >60 >60 mL/min/1.73 m 2   URINALYSIS    Collection Time: 04/20/20 10:30 AM   Result Value Ref Range    Color DK Yellow     Character Clear     Specific Gravity 1.024 <1.035    Ph 5.5 5.0 - 8.0    Glucose Negative Negative mg/dL    Ketones 40 (A) Negative mg/dL    Protein 30 (A) Negative mg/dL    Bilirubin Negative Negative    Urobilinogen, Urine 1.0 Negative    Nitrite Negative Negative    Leukocyte Esterase Trace (A) Negative    Occult Blood Moderate (A) Negative    Micro Urine Req Microscopic    URINE MICROSCOPIC (W/UA)    Collection Time: 04/20/20 10:30 AM   Result Value Ref Range    WBC 5-10 (A) /hpf    RBC  (A) /hpf    Bacteria Negative None /hpf    Epithelial Cells Negative /hpf    Hyaline Cast 6-10 (A) /lpf       Fluids    Intake/Output Summary (Last 24 hours) at 4/20/2020 1813  Last data filed at 4/20/2020 1400  Gross per 24 hour   Intake  540 ml   Output 1048 ml   Net -508 ml       Core Measures & Quality Metrics  Labs reviewed, Medications reviewed and Radiology images reviewed  Graham catheter: Urinary Tract Retention or Urinary Tract Obstruction      DVT Prophylaxis: Contraindicated - High bleeding risk  DVT prophylaxis - mechanical: SCDs  Ulcer prophylaxis: Not indicated        FADI Score  ETOH Screening    Assessment/Plan  Subdural hematoma (HCC)- (present on admission)  Assessment & Plan  Large right-sided acute on chronic subdural hematoma.  4/17 Right-sided craniotomy for evacuation of subdural hematoma.  4/20 subdural drain removed  Prophylactic Keppra x 7 days  Speech Language Pathology cognitive evaluation.  Anthony Mendiola MD. Neurosurgeon. [facility].    Urinary retention  Assessment & Plan  4/19 BLadder scan with > 400 cc and unable to void  Graham to gravity.    Hypercholesterolemia- (present on admission)  Assessment & Plan  Chronic condition treated with rosuvastatin.  4/18 Resumed maintenance medication.    Contraindication to deep vein thrombosis (DVT) prophylaxis- (present on admission)  Assessment & Plan  Systemic anticoagulation contraindicated secondary to elevated bleeding risk and until subdural drain removed.  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  Transfused 1 unit of platelets at the referring facility.  Admission TEG with platelet mapping demonstrated, 53.4% AA inhibition.  No additional platelet transfusion indicated.    Essential hypertension- (present on admission)  Assessment & Plan  Chronic condition treated with metoprolol and amlodipine.  4/18 Resumed amlodipine.   Intermittent sinus arrhythmia.  Currently holding metoprolol.    Screening examination for infectious disease- (present on admission)  Assessment & Plan  4/17 COVID-19 Screening completed.  No fever, pulmonary symptoms,  contact with infected individual, and travel to/from a high risk region.    Trauma- (present on admission)  Assessment & Plan  Fall in bathtub 8 days prior to admission.  T-5000 Activation transfer from [facility].  James Felipe MD. Trauma Surgery.    Discussed patient condition with RN, RT, Pharmacy and Patient.  CRITICAL CARE TIME EXCLUDING PROCEDURES: 37  minutes

## 2020-04-22 ENCOUNTER — APPOINTMENT (OUTPATIENT)
Dept: RADIOLOGY | Facility: MEDICAL CENTER | Age: 75
DRG: 026 | End: 2020-04-22
Attending: NURSE PRACTITIONER
Payer: MEDICARE

## 2020-04-22 DIAGNOSIS — R56.9 SEIZURE (HCC): Primary | ICD-10-CM

## 2020-04-22 LAB
ALBUMIN SERPL BCP-MCNC: 3.3 G/DL (ref 3.2–4.9)
ALBUMIN/GLOB SERPL: 1.3 G/DL
ALP SERPL-CCNC: 51 U/L (ref 30–99)
ALT SERPL-CCNC: 13 U/L (ref 2–50)
ANION GAP SERPL CALC-SCNC: 11 MMOL/L (ref 7–16)
AST SERPL-CCNC: 12 U/L (ref 12–45)
BASOPHILS # BLD AUTO: 0.1 % (ref 0–1.8)
BASOPHILS # BLD: 0.01 K/UL (ref 0–0.12)
BILIRUB SERPL-MCNC: 0.6 MG/DL (ref 0.1–1.5)
BUN SERPL-MCNC: 32 MG/DL (ref 8–22)
CALCIUM SERPL-MCNC: 8.6 MG/DL (ref 8.5–10.5)
CHLORIDE SERPL-SCNC: 104 MMOL/L (ref 96–112)
CO2 SERPL-SCNC: 21 MMOL/L (ref 20–33)
CREAT SERPL-MCNC: 0.92 MG/DL (ref 0.5–1.4)
CRP SERPL HS-MCNC: 1.62 MG/DL (ref 0–0.75)
EOSINOPHIL # BLD AUTO: 0 K/UL (ref 0–0.51)
EOSINOPHIL NFR BLD: 0 % (ref 0–6.9)
ERYTHROCYTE [DISTWIDTH] IN BLOOD BY AUTOMATED COUNT: 38.8 FL (ref 35.9–50)
GLOBULIN SER CALC-MCNC: 2.6 G/DL (ref 1.9–3.5)
GLUCOSE SERPL-MCNC: 159 MG/DL (ref 65–99)
HCT VFR BLD AUTO: 36.9 % (ref 42–52)
HGB BLD-MCNC: 12.8 G/DL (ref 14–18)
IMM GRANULOCYTES # BLD AUTO: 0.03 K/UL (ref 0–0.11)
IMM GRANULOCYTES NFR BLD AUTO: 0.4 % (ref 0–0.9)
LYMPHOCYTES # BLD AUTO: 0.95 K/UL (ref 1–4.8)
LYMPHOCYTES NFR BLD: 13 % (ref 22–41)
MCH RBC QN AUTO: 29.6 PG (ref 27–33)
MCHC RBC AUTO-ENTMCNC: 34.7 G/DL (ref 33.7–35.3)
MCV RBC AUTO: 85.4 FL (ref 81.4–97.8)
MONOCYTES # BLD AUTO: 0.56 K/UL (ref 0–0.85)
MONOCYTES NFR BLD AUTO: 7.7 % (ref 0–13.4)
NEUTROPHILS # BLD AUTO: 5.77 K/UL (ref 1.82–7.42)
NEUTROPHILS NFR BLD: 78.8 % (ref 44–72)
NRBC # BLD AUTO: 0 K/UL
NRBC BLD-RTO: 0 /100 WBC
PLATELET # BLD AUTO: 189 K/UL (ref 164–446)
PMV BLD AUTO: 9.6 FL (ref 9–12.9)
POTASSIUM SERPL-SCNC: 4.4 MMOL/L (ref 3.6–5.5)
PREALB SERPL-MCNC: 13.2 MG/DL (ref 18–38)
PROT SERPL-MCNC: 5.9 G/DL (ref 6–8.2)
RBC # BLD AUTO: 4.32 M/UL (ref 4.7–6.1)
SODIUM SERPL-SCNC: 136 MMOL/L (ref 135–145)
WBC # BLD AUTO: 7.3 K/UL (ref 4.8–10.8)

## 2020-04-22 PROCEDURE — 700111 HCHG RX REV CODE 636 W/ 250 OVERRIDE (IP): Performed by: INTERNAL MEDICINE

## 2020-04-22 PROCEDURE — 99231 SBSQ HOSP IP/OBS SF/LOW 25: CPT | Performed by: PSYCHIATRY & NEUROLOGY

## 2020-04-22 PROCEDURE — 84134 ASSAY OF PREALBUMIN: CPT

## 2020-04-22 PROCEDURE — A9270 NON-COVERED ITEM OR SERVICE: HCPCS | Performed by: SURGERY

## 2020-04-22 PROCEDURE — 700111 HCHG RX REV CODE 636 W/ 250 OVERRIDE (IP): Performed by: NEUROLOGICAL SURGERY

## 2020-04-22 PROCEDURE — 700111 HCHG RX REV CODE 636 W/ 250 OVERRIDE (IP): Performed by: PSYCHIATRY & NEUROLOGY

## 2020-04-22 PROCEDURE — 770022 HCHG ROOM/CARE - ICU (200)

## 2020-04-22 PROCEDURE — 700105 HCHG RX REV CODE 258: Performed by: PSYCHIATRY & NEUROLOGY

## 2020-04-22 PROCEDURE — 700105 HCHG RX REV CODE 258: Performed by: INTERNAL MEDICINE

## 2020-04-22 PROCEDURE — 86140 C-REACTIVE PROTEIN: CPT

## 2020-04-22 PROCEDURE — 700102 HCHG RX REV CODE 250 W/ 637 OVERRIDE(OP): Performed by: SURGERY

## 2020-04-22 PROCEDURE — C9254 INJECTION, LACOSAMIDE: HCPCS | Performed by: PSYCHIATRY & NEUROLOGY

## 2020-04-22 PROCEDURE — 99223 1ST HOSP IP/OBS HIGH 75: CPT | Performed by: PHYSICAL MEDICINE & REHABILITATION

## 2020-04-22 PROCEDURE — 71045 X-RAY EXAM CHEST 1 VIEW: CPT

## 2020-04-22 PROCEDURE — 85025 COMPLETE CBC W/AUTO DIFF WBC: CPT

## 2020-04-22 PROCEDURE — 700112 HCHG RX REV CODE 229: Performed by: SURGERY

## 2020-04-22 PROCEDURE — 99233 SBSQ HOSP IP/OBS HIGH 50: CPT | Performed by: SURGERY

## 2020-04-22 PROCEDURE — 80053 COMPREHEN METABOLIC PANEL: CPT

## 2020-04-22 RX ADMIN — LEVETIRACETAM 1000 MG: 100 INJECTION, SOLUTION, CONCENTRATE INTRAVENOUS at 05:52

## 2020-04-22 RX ADMIN — DOCUSATE SODIUM 100 MG: 50 LIQUID ORAL at 05:52

## 2020-04-22 RX ADMIN — LEVETIRACETAM 1000 MG: 100 INJECTION, SOLUTION, CONCENTRATE INTRAVENOUS at 17:17

## 2020-04-22 RX ADMIN — AMLODIPINE BESYLATE 5 MG: 10 TABLET ORAL at 05:53

## 2020-04-22 RX ADMIN — ROSUVASTATIN CALCIUM 10 MG: 10 TABLET, FILM COATED ORAL at 21:51

## 2020-04-22 RX ADMIN — DEXAMETHASONE SODIUM PHOSPHATE 4 MG: 4 INJECTION, SOLUTION INTRA-ARTICULAR; INTRALESIONAL; INTRAMUSCULAR; INTRAVENOUS; SOFT TISSUE at 17:44

## 2020-04-22 RX ADMIN — DEXAMETHASONE SODIUM PHOSPHATE 4 MG: 4 INJECTION, SOLUTION INTRA-ARTICULAR; INTRALESIONAL; INTRAMUSCULAR; INTRAVENOUS; SOFT TISSUE at 05:57

## 2020-04-22 RX ADMIN — SODIUM CHLORIDE 100 MG: 9 INJECTION, SOLUTION INTRAVENOUS at 04:50

## 2020-04-22 RX ADMIN — POLYETHYLENE GLYCOL 3350 1 PACKET: 17 POWDER, FOR SOLUTION ORAL at 05:52

## 2020-04-22 RX ADMIN — DEXAMETHASONE SODIUM PHOSPHATE 4 MG: 4 INJECTION, SOLUTION INTRA-ARTICULAR; INTRALESIONAL; INTRAMUSCULAR; INTRAVENOUS; SOFT TISSUE at 11:18

## 2020-04-22 RX ADMIN — SODIUM CHLORIDE 100 MG: 9 INJECTION, SOLUTION INTRAVENOUS at 17:55

## 2020-04-22 RX ADMIN — MAGNESIUM HYDROXIDE 30 ML: 400 SUSPENSION ORAL at 05:52

## 2020-04-22 ASSESSMENT — PAIN SCALES - WONG BAKER
WONGBAKER_NUMERICALRESPONSE: DOESN'T HURT AT ALL

## 2020-04-22 ASSESSMENT — ENCOUNTER SYMPTOMS
NUMBNESS: 0
MUSCULOSKELETAL NEGATIVE: 1
RESPIRATORY NEGATIVE: 1
ALLERGIC/IMMUNOLOGIC NEGATIVE: 1
DIZZINESS: 0
HEMATOLOGIC/LYMPHATIC NEGATIVE: 1
ENDOCRINE NEGATIVE: 1
EYES NEGATIVE: 1
WEAKNESS: 0
PSYCHIATRIC NEGATIVE: 1
CARDIOVASCULAR NEGATIVE: 1
CONSTITUTIONAL NEGATIVE: 1
HEADACHES: 1
SPEECH DIFFICULTY: 0
GASTROINTESTINAL NEGATIVE: 1

## 2020-04-22 ASSESSMENT — FIBROSIS 4 INDEX: FIB4 SCORE: 1.3

## 2020-04-22 NOTE — PROGRESS NOTES
Trauma / Surgical Daily Progress Note    Date of Service  4/21/2020    Chief Complaint  74 y.o. male admitted 4/17/2020 with Trauma - GLF    Interval Events    Confusion and facial asymmetry noted this morning -repeat head CT stable and symptoms resolved.  EEG showed no evidence of seizure activity  Patient had seizures x2 while out of bed to a chair this afternoon with similar symptoms as this morning  Neurology consult obtained  Add Vimpat IV in addition to the Keppra  If continued seizures will require EEG tomorrow  Swallow eval completed with significant evidence of a dysphasia -recommend n.p.o. and core track placement.    Review of Systems  Review of Systems   Constitutional: Negative.    HENT: Negative.    Eyes: Negative.    Respiratory: Negative.    Cardiovascular: Negative.    Gastrointestinal: Negative.    Endocrine: Negative.    Genitourinary: Negative.    Musculoskeletal: Negative.    Skin: Negative.    Allergic/Immunologic: Negative.    Neurological: Positive for headaches. Negative for dizziness, speech difficulty, weakness and numbness.   Hematological: Negative.    Psychiatric/Behavioral: Negative.         Vital Signs for last 24 hours  Pulse:  [] 63  Resp:  [14-37] 32  BP: ()/(54-75) 135/65  SpO2:  [93 %-99 %] 95 %    Hemodynamic parameters for last 24 hours       Respiratory Data     Respiration: (!) 32, Pulse Oximetry: 95 %     Work Of Breathing / Effort: Mild  RUL Breath Sounds: Diminished, RML Breath Sounds: Diminished, RLL Breath Sounds: Diminished, CARLENE Breath Sounds: Diminished, LLL Breath Sounds: Diminished    Physical Exam  Physical Exam  Vitals signs and nursing note reviewed.   Constitutional:       Appearance: Normal appearance. He is not ill-appearing.      Interventions: Nasal cannula in place.   HENT:      Head: Normocephalic.      Comments: Craniotomy dressing is clean and dry.     Nose: Nose normal.      Mouth/Throat:      Mouth: Mucous membranes are moist.      Pharynx:  Oropharynx is clear.   Eyes:      General: No scleral icterus.     Extraocular Movements: Extraocular movements intact.      Conjunctiva/sclera: Conjunctivae normal.      Pupils: Pupils are equal, round, and reactive to light.   Neck:      Musculoskeletal: Normal range of motion and neck supple. No muscular tenderness.   Cardiovascular:      Rate and Rhythm: Normal rate and regular rhythm.      Pulses: Normal pulses.   Pulmonary:      Effort: Pulmonary effort is normal. No respiratory distress.      Breath sounds: Normal breath sounds. No wheezing.   Chest:      Chest wall: No tenderness.   Abdominal:      General: There is no distension.      Palpations: Abdomen is soft.      Tenderness: There is no abdominal tenderness. There is no guarding.   Musculoskeletal: Normal range of motion.         General: No swelling, tenderness or deformity.   Skin:     General: Skin is warm and dry.      Capillary Refill: Capillary refill takes less than 2 seconds.   Neurological:      General: No focal deficit present.      Mental Status: He is alert and oriented to person, place, and time.      Cranial Nerves: No cranial nerve deficit.      Sensory: No sensory deficit.      Motor: No weakness.      Coordination: Coordination normal.      Gait: Gait normal.      Deep Tendon Reflexes: Reflexes normal.   Psychiatric:         Mood and Affect: Mood normal.         Behavior: Behavior normal. Behavior is cooperative.         Thought Content: Thought content normal.         Judgment: Judgment normal.         Laboratory  Recent Results (from the past 24 hour(s))   ACCU-CHEK GLUCOSE    Collection Time: 04/21/20  2:36 AM   Result Value Ref Range    Glucose - Accu-Ck 126 (H) 65 - 99 mg/dL   CBC WITH DIFFERENTIAL    Collection Time: 04/21/20  3:49 AM   Result Value Ref Range    WBC 7.6 4.8 - 10.8 K/uL    RBC 4.95 4.70 - 6.10 M/uL    Hemoglobin 14.4 14.0 - 18.0 g/dL    Hematocrit 42.7 42.0 - 52.0 %    MCV 86.3 81.4 - 97.8 fL    MCH 29.1 27.0 -  33.0 pg    MCHC 33.7 33.7 - 35.3 g/dL    RDW 39.8 35.9 - 50.0 fL    Platelet Count 208 164 - 446 K/uL    MPV 9.7 9.0 - 12.9 fL    Neutrophils-Polys 81.10 (H) 44.00 - 72.00 %    Lymphocytes 11.40 (L) 22.00 - 41.00 %    Monocytes 7.40 0.00 - 13.40 %    Eosinophils 0.00 0.00 - 6.90 %    Basophils 0.00 0.00 - 1.80 %    Immature Granulocytes 0.10 0.00 - 0.90 %    Nucleated RBC 0.00 /100 WBC    Neutrophils (Absolute) 6.14 1.82 - 7.42 K/uL    Lymphs (Absolute) 0.86 (L) 1.00 - 4.80 K/uL    Monos (Absolute) 0.56 0.00 - 0.85 K/uL    Eos (Absolute) 0.00 0.00 - 0.51 K/uL    Baso (Absolute) 0.00 0.00 - 0.12 K/uL    Immature Granulocytes (abs) 0.01 0.00 - 0.11 K/uL    NRBC (Absolute) 0.00 K/uL   Comp Metabolic Panel    Collection Time: 04/21/20  3:49 AM   Result Value Ref Range    Sodium 134 (L) 135 - 145 mmol/L    Potassium 4.4 3.6 - 5.5 mmol/L    Chloride 100 96 - 112 mmol/L    Co2 20 20 - 33 mmol/L    Anion Gap 14.0 7.0 - 16.0    Glucose 126 (H) 65 - 99 mg/dL    Bun 28 (H) 8 - 22 mg/dL    Creatinine 0.95 0.50 - 1.40 mg/dL    Calcium 9.3 8.5 - 10.5 mg/dL    AST(SGOT) 20 12 - 45 U/L    ALT(SGPT) 16 2 - 50 U/L    Alkaline Phosphatase 61 30 - 99 U/L    Total Bilirubin 1.0 0.1 - 1.5 mg/dL    Albumin 3.7 3.2 - 4.9 g/dL    Total Protein 7.2 6.0 - 8.2 g/dL    Globulin 3.5 1.9 - 3.5 g/dL    A-G Ratio 1.1 g/dL   ESTIMATED GFR    Collection Time: 04/21/20  3:49 AM   Result Value Ref Range    GFR If African American >60 >60 mL/min/1.73 m 2    GFR If Non African American >60 >60 mL/min/1.73 m 2       Fluids    Intake/Output Summary (Last 24 hours) at 4/21/2020 1740  Last data filed at 4/21/2020 1600  Gross per 24 hour   Intake 1076.67 ml   Output 1080 ml   Net -3.33 ml       Core Measures & Quality Metrics  Labs reviewed, Medications reviewed and Radiology images reviewed  Graham catheter: Urinary Tract Retention or Urinary Tract Obstruction      DVT Prophylaxis: Contraindicated - High bleeding risk  DVT prophylaxis - mechanical:  SCDs  Ulcer prophylaxis: Not indicated        FADI Score  ETOH Screening    Assessment/Plan  Oropharyngeal dysphagia  Assessment & Plan  4/21 Failed swallow evaluation with SLP  NPO  Place cortrack tube    Seizures, post-traumatic (HCC)  Assessment & Plan  Fall with SDH 8 days prior to admission  On Keppra 1000 mg q 12 h  4/21 Seizure while OOB to chair x 2 with post icthal  EEG x 25 min  Add Vimapt  Neurology consult - Manjit Christy MD    Subdural hematoma (HCC)- (present on admission)  Assessment & Plan  Large right-sided acute on chronic subdural hematoma.  4/17 Right-sided craniotomy for evacuation of subdural hematoma.  4/20 subdural drain removed  4/21 Head CT stable  Speech Language Pathology cognitive evaluation.  Anthony Mendiola MD. Neurosurgeon. [facility].    Urinary retention  Assessment & Plan  4/19 BLadder scan with > 400 cc and unable to void  Graham to gravity.    Hypercholesterolemia- (present on admission)  Assessment & Plan  Chronic condition treated with rosuvastatin.  4/18 Resumed maintenance medication.    Contraindication to deep vein thrombosis (DVT) prophylaxis- (present on admission)  Assessment & Plan  Systemic anticoagulation contraindicated secondary to elevated bleeding risk and until subdural drain removed.  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  Transfused 1 unit of platelets at the referring facility.  Admission TEG with platelet mapping demonstrated, 53.4% AA inhibition.  No additional platelet transfusion indicated.    Essential hypertension- (present on admission)  Assessment & Plan  Chronic condition treated with metoprolol and amlodipine.  4/18 Resumed amlodipine.   Intermittent sinus arrhythmia.  Currently holding metoprolol.    Screening examination for infectious disease- (present on admission)  Assessment & Plan  4/17 COVID-19 Screening  completed.  No fever, pulmonary symptoms, contact with infected individual, and travel to/from a high risk region.    Trauma- (present on admission)  Assessment & Plan  Fall in bathtub 8 days prior to admission.  T-5000 Activation transfer from [facility].  James Felipe MD. Trauma Surgery.        Discussed patient condition with RN, RT, Pharmacy, Patient and neurology and neurosurgery.  CRITICAL CARE TIME EXCLUDING PROCEDURES: 39  minutes

## 2020-04-22 NOTE — PROGRESS NOTES
Pt assessment findings and plan of care discussed with Dr. Contreras in interdisciplinary rounds.

## 2020-04-22 NOTE — DIETARY
"Nutrition Support Assessment     Nutrition services:   Day 5 of admit.  75 yo mald with admitting diagnosis: GLF    Current problem list:  1. Dysphagia  2. Seizures  3. SDH  4. Platelet dysfunction  5. History of HTN    Assessment:  Estimated Nutritional Needs: based on: height 5'9\", weight 81.1 kg, IBW 72.576 kg, BMI 26.4    Calculation/Equation MSJ x 1.2 = 1851 kcals  Calories/day: 1850 - 2050 kcals (23 - 25 kcals/kg)  Protein/day: 122 - 162 g (1.5 - 2.0 g/kg)    Evaluation:   1. Failed SLP evaluation yesterday  2. cortrak placed for enteral access - confirmation in distal stomach/pylorus  3. Craniotomy for SDH on 4/17  4. Increased nutritional needs with SDH and surgery to be met with TF at goal.     Malnutrition Risk: na    Recommendations/Plan:  1. Start and advance TF per protocol  2. Impact 1.5 at full goal 50 m/hr will provide 1800 kcals, 113 g protein, 926 ml H20/day  3. Po diet when safe/appropriate and pt can adequately consume.     "

## 2020-04-22 NOTE — DISCHARGE PLANNING
Current documentation reveals a potential for acute inpatient rehabilitation, pending D/C resources/support.  Please consider a PMR referral to assist with discharge planning.

## 2020-04-22 NOTE — PROGRESS NOTES
Neurosurgery Progress Note    Subjective:  Pt awakens to voice  Speech sl slurred but able to ans all questions adequately  EEG abnormal yest - slowing rt F/T region, no specific sz captured      Exam:  Alert, oriented x 3  Perrl 4 mm  Tongue ML  Right UE and LE 5/5, left UE  4, bi 4, delt 4, tri 5 - improved  Left leg 5/5  C/d/i staples /suture           BP  Min: 98/55  Max: 152/60  Pulse  Av.3  Min: 58  Max: 124  Resp  Av  Min: 10  Max: 37  Monitored Temp 2  Av.4 °C (99.3 °F)  Min: 36.5 °C (97.7 °F)  Max: 38.3 °C (100.9 °F)  SpO2  Av.6 %  Min: 93 %  Max: 99 %    No data recorded    Recent Labs     20  0425 20  0349 20  0325   WBC 7.2 7.6 7.3   RBC 4.81 4.95 4.32*   HEMOGLOBIN 14.0 14.4 12.8*   HEMATOCRIT 41.4* 42.7 36.9*   MCV 86.1 86.3 85.4   MCH 29.1 29.1 29.6   MCHC 33.8 33.7 34.7   RDW 40.2 39.8 38.8   PLATELETCT 172 208 189   MPV 9.6 9.7 9.6     Recent Labs     20  0425 20  0349 20  0325   SODIUM 135 134* 136   POTASSIUM 3.9 4.4 4.4   CHLORIDE 100 100 104   CO2 21 20 21   GLUCOSE 109* 126* 159*   BUN 14 28* 32*   CREATININE 0.88 0.95 0.92   CALCIUM 9.1 9.3 8.6               Intake/Output       20 - 20 0659 20 - 20 0659      [id number] Total 4129-8623 3813-5959 Total       Intake    I.V.  1076.7  600 1676.7  --  -- --    Volume (mL) (NS infusion) 1076.7 600 1676.7 -- -- --    Other  120  240 360  --  -- --    Medications (PO/Enteral Liquids) 120 240 360 -- -- --    NG/GT  0  300 300  --  -- --    Intake (mL) (Enteral Tube 20 Cortrak - Gastric 10 Fr. Right nare) 0 300 300 -- -- --    IV Piggyback  220  383.3 603.3  --  -- --    Volume (mL) (lacosamide (VIMPAT) 200 mg in  mL ivpb) 110 120 230 -- -- --    Volume (mL) (lacosamide (VIMPAT) 100 mg in  mL ivpb) -- 110 110 -- -- --    Volume (mL) (levETIRAcetam (KEPPRA) 1,000 mg in  mL IVPB) 110 153.3 263.3 -- -- --    Total Intake 1416.7  1523.3 2940 -- -- --       Output    Urine  650  600 1250  --  -- --    Output (mL) (Urethral Catheter Temperature probe 16 Fr.)  -- -- --    Total Output  -- -- --       Net I/O     766.7 923.3 1690 -- -- --            Intake/Output Summary (Last 24 hours) at 4/22/2020 0813  Last data filed at 4/22/2020 0600  Gross per 24 hour   Intake 2363.33 ml   Output 1110 ml   Net 1253.33 ml            • Pharmacy  1 Each PHARMACY TO DOSE   • amLODIPine  5 mg DAILY   • docusate sodium 100mg/10mL  100 mg BID   • magnesium hydroxide  30 mL DAILY   • polyethylene glycol/lytes  1 Packet BID   • rosuvastatin  10 mg QHS   • senna-docusate  1 Tab Nightly   • acetaminophen  650 mg Q6HRS PRN   • oxyCODONE immediate release  10 mg Q3HRS PRN   • oxyCODONE immediate-release  5 mg Q3HRS PRN   • senna-docusate  1 Tab Q24HRS PRN   • lacosamide (VIMPAT) ivpb  100 mg Q12HRS   • dexamethasone  4 mg TID   • NS   Continuous   • levETIRAcetam (KEPPRA) IV  1,000 mg Q12HRS   • Respiratory Therapy Consult   Continuous RT   • bisacodyl  10 mg Q24HRS PRN   • fleet  1 Each Once PRN   • morphine injection  1-4 mg Q3HRS PRN   • labetalol  10 mg Q4HRS PRN   • hydrALAZINE  20 mg Q6HRS PRN   • ondansetron  4 mg Q4HRS PRN   • Pharmacy Consult Request  1 Each PHARMACY TO DOSE       Assessment and Plan:  Hospital day #5, POD #5 right crani for sdh  Prophylactic anticoagulation: no         Start date/time: tbd  Neuro exam improved   keppra 1000bid/vimpat  q2h neuro checks  CT stable 4/21  Appreciate neurology's input  Oob, pt,ot   decadron 4 tid    ATTENDING ADDENDUM:  Patient seen independently and agree with above note

## 2020-04-22 NOTE — CARE PLAN
Problem: Respiratory:  Goal: Respiratory status will improve  Outcome: PROGRESSING AS EXPECTED     Problem: Communication  Goal: The ability to communicate needs accurately and effectively will improve  Outcome: PROGRESSING SLOWER THAN EXPECTED     Problem: Infection  Goal: Will remain free from infection  Outcome: PROGRESSING AS EXPECTED

## 2020-04-22 NOTE — DISCHARGE PLANNING
Pt was discussed in morning rounds. PMR consult placed to assist with dc planning.     LSW spoke with PC RN, Muriel, about consult. Muriel to contact [facility] for a visit per pt's request. Per Muriel, pt is agreeable for acute rehab or SNF placement for short term rehab. See PC note for further details of conversation.     LSW will continue to assist with social/dc needs.

## 2020-04-22 NOTE — CARE PLAN
Problem: Safety  Goal: Will remain free from injury  Outcome: MET     PT IN BED WHICH IS LOCKED AND IN LOW POSITION. APPROPRIATE SIDE RAILS UP. CALL LIGHT WITHIN REACH. PT IS CLEAN AND DRY AT THIS TIME, PAIN MEDS OFFERED AND PT DECLINED. PERSONAL BELONGINGS WITHIN REACH AND NON SKID SOCKS ON. NO ACUTE NEEDS. WILL CONTINUE TO MONITOR FOR SAFETY NEEDS AND PAIN.      Problem: Urinary Elimination:  Goal: Ability to reestablish a normal urinary elimination pattern will improve  Outcome: MET    PT HAS MORGAN CATHETER WITH ADEQUATE OUTPUT. MORGAN CARE WITH WARM WATER AND SOAP. NO S/S OF DISTRESS NOTED. MORGAN DRAINAGE BAG BELOW THE LEVEL OF THE BLADDER AND NOT ON THE FLOOR. WILL CONTINUE TO MONITOR FOR NEEDS.

## 2020-04-22 NOTE — DISCHARGE PLANNING
PC RN informed LSW that pt requested assistance with locating his son, Benny Mccoy. LSW was able to located Benny & lindaian his contact information. LSW attempted to met with pt and assist him with calling his son however pt was resting.     Per notes, due to pt having limited dc support [facility] is recommending SNF placement.     Plan: Help pt call son & discuss dc planning

## 2020-04-22 NOTE — DISCHARGE PLANNING
[facility] Transitional Care Coordination   Referral from:  RYLAND Andrea  Facesheet indicates: Medicare / [employer]  Potential Rehab Diagnosis: Chronic subdural hematoma; fell 10 days ago and struck back of his head on side of bathtub; legally blind.    S/p right sided craniotomy on 4/17/2020.     Chart review indicates patient does have on going medical management and does have therapy needs to possibly meet inpatient rehab facility criteria with the goal of returning to community.    D/C support: Pt lives in [place] alone; will confirm support from his friend, Tamanna      Physiatry consultation Forwarded per protocol.      Thank you for the referral.

## 2020-04-22 NOTE — PROGRESS NOTES
Hospital Neurology Progress Note:     Interval History: No acute events overnight. No new complaints today. Still somnolent.     Objective:   Vitals:    04/22/20 0600 04/22/20 0700 04/22/20 0800 04/22/20 0900   BP: 138/61 115/59 (!) 99/57 109/58   Pulse: 67 60 (!) 57 (!) 56   Resp: 16 14 14 15   Temp:       TempSrc: Bladder  Bladder    SpO2: 94% 93% 94% 93%   Weight:       Height:           Labs:     Lab Results   Component Value Date/Time    PROTHROMBTM 10.3 04/17/2020 04:15 PM    INR 0.95 04/17/2020 04:15 PM      Lab Results   Component Value Date/Time    WBC 7.3 04/22/2020 03:25 AM    RBC 4.32 (L) 04/22/2020 03:25 AM    HEMOGLOBIN 12.8 (L) 04/22/2020 03:25 AM    HEMATOCRIT 36.9 (L) 04/22/2020 03:25 AM    MCV 85.4 04/22/2020 03:25 AM    MCH 29.6 04/22/2020 03:25 AM    MCHC 34.7 04/22/2020 03:25 AM    MPV 9.6 04/22/2020 03:25 AM    NEUTSPOLYS 78.80 (H) 04/22/2020 03:25 AM    LYMPHOCYTES 13.00 (L) 04/22/2020 03:25 AM    MONOCYTES 7.70 04/22/2020 03:25 AM    EOSINOPHILS 0.00 04/22/2020 03:25 AM    BASOPHILS 0.10 04/22/2020 03:25 AM      Lab Results   Component Value Date/Time    SODIUM 136 04/22/2020 03:25 AM    POTASSIUM 4.4 04/22/2020 03:25 AM    CHLORIDE 104 04/22/2020 03:25 AM    CO2 21 04/22/2020 03:25 AM    GLUCOSE 159 (H) 04/22/2020 03:25 AM    BUN 32 (H) 04/22/2020 03:25 AM    CREATININE 0.92 04/22/2020 03:25 AM      Lab Results   Component Value Date/Time    CHOLSTRLTOT 150 05/17/2016 05:20 AM    LDL 91 05/17/2016 05:20 AM    HDL 36 (L) 05/17/2016 05:20 AM    TRIGLYCERIDE 129 05/17/2016 05:20 AM       Lab Results   Component Value Date/Time    ALKPHOSPHAT 51 04/22/2020 03:25 AM    ASTSGOT 12 04/22/2020 03:25 AM    ALTSGPT 13 04/22/2020 03:25 AM    TBILIRUBIN 0.6 04/22/2020 03:25 AM        Imaging/Testing:   No new neuroimaging to review    EEG report on 4/21/20 reviewed in chart.     Physical Exam:      General: 73 y/o male somnolent lying in bed.   Cardio: Normal S1/S2. No peripheral edema.   Pulm: CTAX2.  No respiratory distress.   Skin: Warm, dry, no rashes or lesions   Psychiatric: Appropriate affect. No active psychosis.  HEENT: craniotomy incision on R frontoparietal region, normal sclera and conjunctiva, moist oral mucosa. No lid lag.  Abdomen: Soft, non tender. No masses or hepatosplenomegaly.     Neurologic:  Mental Status: Somnolent, but arousable. Able to follow commands.Dysarthria improved from 4/21/20/ Language functions appear intact. No neglect.   Cranial Nerves: Pupils equally round. EOMi. Face symmetric.   Motor: Normal muscle tone/bulk. Moves all 4 extremities symmetrically.   Reflexes: Deferred  Coordination: Finger-nose w/o ataxia  Sensation: Diminished to nox stim on the L.   Gait/Station: Deferred    Assessment/Plan:    Rafael Mccoy is a 74 y.o. male with history of HTN, blindness, and hx of stroke presenting to the hospital for fall/SDH and consulted for seizure. EEG did not capture seizure activity but was abnormal with slowing and sharp discharges on the R which coincides w/ craniotomy. In this regard it is clear the patient has a substrate for seizures, and would not be surprising if further seizures have occurred. Patient's L sided weakness is improved, as is his dysarthria and mental status. It appears this may have been an effect of Ativan given the prior day, as well as a Keegan's paralysis which appears to be resolving.    Plan:  -Continue Keppra 1g BID.  -Continue Vimpat 100mg BID.  -If further seizure like activity occurs, obtain 24 hour video monitored EEG and call Neurology again.  -Call neurology with further questions/concerns.  -Referral for epilepsy clinic placed.  -Plan discussed with consulting physician and patient's nurse.     Manjit Christy M.D., Diplomat of the American Board of Psychiatry and Neurology  Diplomat of [facility]   Assistant Clinical Professor, [facility] Neurology Consultant

## 2020-04-22 NOTE — ASSESSMENT & PLAN NOTE
Cortrak / Tube feeds  4/21 Failed swallow evaluation with SLP / NPO.  4/23 Oral diet: MM5/MT2 with 1:1 assist.  4/24 Cont MM5/MT2 texture with assist and super as needed.  Speech language pathology following

## 2020-04-22 NOTE — DISCHARGE PLANNING
Acute Rehab Hospital/ Transitional Care Coordination  Tc to Tamanna Parish @ [phone] and Tc to Arley Mijares @ [phone] not available.   Left detailed messages with both re: dc disposition/assistance @ time of dc.     Plan:  Confirm if there support @ home.

## 2020-04-22 NOTE — ASSESSMENT & PLAN NOTE
Seizure prophylaxis on admission.   4/21 Seizure. Keppra increased to 1000 mg BID.  - EEG with cortical irritability and structural abnormality. However no clear seizures captured.   - Neurology consult: Initiate Vimpat 200mg IV load, 100mg BID maintenance.  Continue Keppra 1000 mg BID and Vimpat.  Neurology consult - Manjit Christy MD.

## 2020-04-22 NOTE — DISCHARGE PLANNING
Acute Rehab Hospital/ Transitional Care Coordination  DX:  Subdural hematoma; s/p R craniotomy; legally blind  Tc back from  Arley Mijares @ [phone] who is the  @ pt's [employer].    He confirms pt lives alone in an apartment in [place].   1st floor apartment w/ 3 steps to enter.  +tub / shower combo.   Pt has been indep w/ gait, adl's, and iadl's.   Walks to the grocery store to purchase groceries.   Friends from the [employer] take pt to his appointments.   Arley pays his bills/ writes checks as needed.   PT has 2 children; one in [place] and one in [place]; no contact.     Religious members are able to stop by if pt calls and needs something, but no one will be able to stay with him at time of dc.   Since pt has no in home support, it is my recommendation pt transition into a SNF before hopefully returning home.     Arley reports if pt needs SNF, he would probably prefer a facility in [place] due to proximity to [place].       Thank you for referral.

## 2020-04-22 NOTE — PALLIATIVE CARE
Spiritual Care Note        Patient's Name: Rafael Mccoy   MRN: [id number]    YOB: 1945   Age and Gender: 74 y.o. male   Unit: SICU   Room (and Bed): [place] [address]   Ethnicity or Nationality: white   Primary Language: English   Hoahaoism/Spiritual Preference: other fidelina/world Buddhism   Place of Residence: [place], [place]   Medical Diagnosis/Procedure: large right-sided acute on chronic subdural hematoma  right-sided craniotomy for evacuation of subdural  hematoma   Code Status: Full Code    Date of Admission: 4/17/2020   Length of Stay: 7 days        Spiritual Screen Results:  Is your spiritual health or inner well-being important to you as you cope with your medical condition?: Yes  Would you like to receive a visit from our Spiritual Care team or your own Baptist or spiritual leader?: No      Visited With: Patient    Nature of the Visit: Initial, On shift    General Visit: Yes    Referral From/Origin of Request: Epic nursing    Observations/Symptoms: Patient sitting up in chair, alert, pleasant, very soft voice.    Interaction/Conversation: Patient talked about the significant changes he has experienced and his struggle to cope.  He indicated that he would like prayer.    Assessment: Need--Seeking Spiritual Assistance and Support    Hoahaoism Needs: Prayer    Interventions: compassionate presence, reflective listening, emotional support, cognitive reframing, prayer    Outcomes: Coping, Emotional Release, Progress with Trust, Spiritual Comfort, Value/Dignity/Respect    Total time: 20 minutes    Plan: continue to visit      Spiritual Care Provider Information:  Chaplain RICHMOND Strickland  [phone]   [email or web address]

## 2020-04-22 NOTE — CONSULTS
Reason for PC Consult: Advance Care Planning    Consulted by: Dr Contreras    Assessment:  General: 75 yo male admitted on 4/17/2020 as Trauma pt. PMH: Blind, HTN, Stroke.  Pt had a GLF at home where he fell back wards and his the back of the skull on the edge of the bathtub, approx 4/8. Pt began to have more ataxic gait and presented to [facility]. Shown to have Acute on chronic subdural hematoma with midline shift, transferred to [facility]. Pt  To go to OR for right craniotomy.     Dyspnea: No- pt denies, resp rate 22, 93% on room air.   Last BM: 04/21/20-    Pain: No- currently denies  Depression: Mood appropriate for situation-    Dementia: No;       Spiritual:  Is Confucianist or spirituality important for coping with this illness? Yes-    Has a  or spiritual provider visit been requested? No    Palliative Performance Scale: 50%    Advance Directive: None on file.    DPOA: No, Cleo [phone] daughter  POLST: Yes    Code Status: Full-      Outcome:  Spoke with Sr Contreras for update on pt.  Met with the pt at the bedside, introduced self and the role of Palliative Care. Spoke about pts admission, surgery, and resulting seizures and failed SLP. Spoke about complications from bleed and the ramifications on this. Discussed possible need for new anti seizure medications long term. Spoke about TFs and SLP therapies, discussed recovery vs inability to regain swallow function.  Spoke about PEG vs diet for pleasure if he was unable to regain SLP function.   These are complications that he has never considered before and feels unsure what his thoughts about the situation are.     Spoke about pts family support. Pt states that at this time he does not wish to call his daughters, Sobia or Cleo, as one lives in [facility] and the other in [place] and he does not wish to upset them.   Pt stated that he has a son who lives here in [place], but he hasn't had any contact with him for some time and feels unsure  "what his thoughts would be.      Spoke about the pts Spiritual beliefs and support system. Pt stated that Arley Mijares, one of hs emergency contacts, is his  and he would like to talk to him but stated \"I know he is really busy\". Asked if he would like for me to call Arley and provide and update and the contact number for the unit so that he could call when able, Rafael stated that he would like that very much. David then became tearful and spoke further about his son and the \"bad feelings\" between them. Pt stated that he has not seem him for several years and they had a \"bad time then trying to connect\". Pt son is named Malcolm but the pt does not know his contact number or where in [place] he lives, just that he bought a house here. Asked if he would like for the Unit  to see if they could do a search for him, pt stated that he would.  Asked the pt if he would like to see the [facility]  for some support, pt felt that he would.     Sat and spoke about his home life, pt is independent and has lived in his current apartment for approx 210 years and knows it \"inside and out\". [place] and how much he loves to swim.   Asked the pt if he would be interested in going to a Rehab center in order to try to regain his SLP if able, and to regain some strength as able. Pt tearful, stating that he would like to try to be able to return to home and continue living there as he enjoys the peacefulness.   Let pt know I will speak with the [facility]  and have them see him today. Pt felt he would enjoy that. Let David know that I will check back on him when I return to work on Sunday and if he needed anything to have the BS RN call the Palliative Care department.     Updated: Dr Contreras, BS RN, Unit SW Pam.     Plan: TBD as pt progresses, possible referal to rehab.     Thank you for allowing Palliative Care to participate in this patient's care. Please feel free to " call [phone] with any questions or concerns.

## 2020-04-22 NOTE — CONSULTS
"    Physical Medicine and Rehabilitation Consultation         Initial Consult      Date of Consultation: 4/22/2020  Consulting provider: ISA Pa   Reason for consultation: assess for acute inpatient rehab appropriateness  LOS: 5 Day(s)      Chief complaint: Subdural hematoma    HPI: The patient is a 74 y.o. right hand dominant male with a past medical history of left pontine stroke 2016, hypertension, central vision blindness;  who presented on 4/17/2020  6:48 PM as a transfer from outside hospital.  Patient fell in the bathtub several days prior to admission, and presented to [facility] with progressive ataxia, CT there showed large subdural hematoma with midline shift, he received Keppra and platelets and was transferred to [facility] for definitive care.  Patient was seen by Dr. Mendiola of neurosurgery and was taken to the OR for right-sided craniotomy for evacuation of subdural hematoma.  Patient had a subsequent EEG which showed slowing in the right frontotemporal region, and multiple signs suggestive of underlying cortical instability, and structural abnormality increasing the risk for seizures.  Patient is currently in the ICU being treated for seizures with Keppra 1 g twice daily, Vimpat 100 mg twice daily.     The patient currently reports Headaches, slurred speech, left facial droop. He reports that he lives alone and is \"very \" from his ex-wife. He has a son in [place] who he doesn't see much. We discussed the need for supervision on discharge.     CT head 4/21/2020  1.  Unchanged right hemispheric subdural hematoma  2.  Associated mass effect with unchanged right to left shift measuring 7 mm   3.  Interval removal of septal drainage catheter      ROS:  Pertinent positives are listed in HPI, all other systems reviewed and are negative      Social Hx:  Pre-morbidly, this patient lived in a single level home with Four steps to enter, alone and able to care for self. "   Employment: retired from [employer]   Tobacco: denies   Alcohol: denies   Drugs: denies    Current level of function:  The patient was evaluated by acute care Physical Therapy, Occupational Therapy and Speech Language Pathology; currently requiring moderate assistance for mobility and moderate assistance for ADLs, also with ongoing cognitive, speech and swallowing deficits.    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;  Service: Neurosurgery   • OTHER ORTHOPEDIC SURGERY      R foot fracture   • ROTATOR CUFF REPAIR Right    • TONSILLECTOMY AND ADENOIDECTOMY         FHX:  Family History   Problem Relation Age of Onset   • Other Mother         eye problems   • Cancer Neg Hx    • Heart Disease Neg Hx    • Stroke Neg Hx        Medications:  Current Facility-Administered Medications   Medication Dose   • Pharmacy Consult: Enteral tube insertion - review meds/change route/product selection  1 Each   • amLODIPine (NORVASC) tablet 5 mg  5 mg   • docusate sodium 100mg/10mL (COLACE) solution 100 mg  100 mg   • magnesium hydroxide (MILK OF MAGNESIA) suspension 30 mL  30 mL   • polyethylene glycol/lytes (MIRALAX) PACKET 1 Packet  1 Packet   • rosuvastatin (CRESTOR) tablet 10 mg  10 mg   • senna-docusate (PERICOLACE or SENOKOT S) 8.6-50 MG per tablet 1 Tab  1 Tab   • acetaminophen (TYLENOL) tablet 650 mg  650 mg   • oxyCODONE immediate release (ROXICODONE) tablet 10 mg  10 mg   • oxyCODONE immediate-release (ROXICODONE) tablet 5 mg  5 mg   • senna-docusate (PERICOLACE or SENOKOT S) 8.6-50 MG per tablet 1 Tab  1 Tab   • lacosamide (VIMPAT) 100 mg in  mL ivpb  100 mg   • dexamethasone  "(DECADRON) injection 4 mg  4 mg   • NS infusion     • levETIRAcetam (KEPPRA) 1,000 mg in  mL IVPB  1,000 mg   • Respiratory Therapy Consult     • bisacodyl (DULCOLAX) suppository 10 mg  10 mg   • fleet enema 133 mL  1 Each   • morphine (pf) 4 MG/ML injection 1-4 mg  1-4 mg   • labetalol (NORMODYNE/TRANDATE) injection 10 mg  10 mg   • hydrALAZINE (APRESOLINE) injection 20 mg  20 mg   • ondansetron (ZOFRAN) syringe/vial injection 4 mg  4 mg   • Pharmacy Consult Request ...Pain Management Review 1 Each  1 Each       Allergies:  No Known Allergies    Physical Exam:  Vitals: /57   Pulse 84   Temp 37.7 °C (99.9 °F) (Temporal)   Resp (!) 45   Ht 1.753 m (5' 9\")   Wt 78.4 kg (172 lb 13.5 oz)   SpO2 94%   Gen: NAD  Head: NC/AT  Eyes/ Nose/ Mouth: PERRLA, moist mucous membranes  Cardio: RRR, no mumurs  Pulm: CTAB, with normal respiratory effort  Abd: Soft NTND, active bowel sounds,   Ext: No peripheral edema. No calf tenderness. No clubbing/cyanosis.     Mental status: answers questions appropriately follows commands  Speech: fluent, no aphasia or dysarthria    CRANIAL NERVES:  2,3: visual acuity grossly intact, PERRL  3,4,6: EOMI bilaterally, no nystagmus or diplopia  5: sensation intact to light touch bilaterally and symmetric  7: Left facial droop  8: hearing grossly intact  9,10: symmetric palate elevation  11: SCM/Trapezius strength 5/5 bilaterally  12: tongue protrudes midline    Motor:      Upper Extremity  Myotome R L   Shoulder flexion C5 5 5   Elbow flexion C5 5 4/5   Wrist extension C6 5 5   Elbow extension C7 5 4/5   Finger flexion C8 5 4/5   Finger abduction T1 5 5     Lower Extremity Myotome R L   Hip flexion L2 5 4/5   Knee extension L3 5 4/5   Ankle dorsiflexion L4 5 4/5   Toe extension L5 5 5   Ankle plantarflexion S1 5 5       Sensory:   intact to light touch through out    DTRs: 2+ in bilateral biceps, triceps, brachioradialis, 2+ in bilateral patellar and achilles tendons  No clonus at " bilateral ankles  Negative babinski b/l  Negative Correia b/l     Tone: no spasticity noted, no cogwheeling noted    Labs: Reviewed and significant for   Recent Labs     04/20/20  0425 04/21/20  0349 04/22/20  0325   RBC 4.81 4.95 4.32*   HEMOGLOBIN 14.0 14.4 12.8*   HEMATOCRIT 41.4* 42.7 36.9*   PLATELETCT 172 208 189     Recent Labs     04/20/20  0425 04/21/20  0349 04/22/20  0325   SODIUM 135 134* 136   POTASSIUM 3.9 4.4 4.4   CHLORIDE 100 100 104   CO2 21 20 21   GLUCOSE 109* 126* 159*   BUN 14 28* 32*   CREATININE 0.88 0.95 0.92   CALCIUM 9.1 9.3 8.6     Recent Results (from the past 24 hour(s))   Prealbumin    Collection Time: 04/22/20  3:25 AM   Result Value Ref Range    Pre-Albumin 13.2 (L) 18.0 - 38.0 mg/dL   CBC WITH DIFFERENTIAL    Collection Time: 04/22/20  3:25 AM   Result Value Ref Range    WBC 7.3 4.8 - 10.8 K/uL    RBC 4.32 (L) 4.70 - 6.10 M/uL    Hemoglobin 12.8 (L) 14.0 - 18.0 g/dL    Hematocrit 36.9 (L) 42.0 - 52.0 %    MCV 85.4 81.4 - 97.8 fL    MCH 29.6 27.0 - 33.0 pg    MCHC 34.7 33.7 - 35.3 g/dL    RDW 38.8 35.9 - 50.0 fL    Platelet Count 189 164 - 446 K/uL    MPV 9.6 9.0 - 12.9 fL    Neutrophils-Polys 78.80 (H) 44.00 - 72.00 %    Lymphocytes 13.00 (L) 22.00 - 41.00 %    Monocytes 7.70 0.00 - 13.40 %    Eosinophils 0.00 0.00 - 6.90 %    Basophils 0.10 0.00 - 1.80 %    Immature Granulocytes 0.40 0.00 - 0.90 %    Nucleated RBC 0.00 /100 WBC    Neutrophils (Absolute) 5.77 1.82 - 7.42 K/uL    Lymphs (Absolute) 0.95 (L) 1.00 - 4.80 K/uL    Monos (Absolute) 0.56 0.00 - 0.85 K/uL    Eos (Absolute) 0.00 0.00 - 0.51 K/uL    Baso (Absolute) 0.01 0.00 - 0.12 K/uL    Immature Granulocytes (abs) 0.03 0.00 - 0.11 K/uL    NRBC (Absolute) 0.00 K/uL   Comp Metabolic Panel    Collection Time: 04/22/20  3:25 AM   Result Value Ref Range    Sodium 136 135 - 145 mmol/L    Potassium 4.4 3.6 - 5.5 mmol/L    Chloride 104 96 - 112 mmol/L    Co2 21 20 - 33 mmol/L    Anion Gap 11.0 7.0 - 16.0    Glucose 159 (H) 65 - 99  mg/dL    Bun 32 (H) 8 - 22 mg/dL    Creatinine 0.92 0.50 - 1.40 mg/dL    Calcium 8.6 8.5 - 10.5 mg/dL    AST(SGOT) 12 12 - 45 U/L    ALT(SGPT) 13 2 - 50 U/L    Alkaline Phosphatase 51 30 - 99 U/L    Total Bilirubin 0.6 0.1 - 1.5 mg/dL    Albumin 3.3 3.2 - 4.9 g/dL    Total Protein 5.9 (L) 6.0 - 8.2 g/dL    Globulin 2.6 1.9 - 3.5 g/dL    A-G Ratio 1.3 g/dL   CRP QUANTITIVE (NON-CARDIAC)    Collection Time: 04/22/20  3:25 AM   Result Value Ref Range    Stat C-Reactive Protein 1.62 (H) 0.00 - 0.75 mg/dL   ESTIMATED GFR    Collection Time: 04/22/20  3:25 AM   Result Value Ref Range    GFR If African American >60 >60 mL/min/1.73 m 2    GFR If Non African American >60 >60 mL/min/1.73 m 2       Imaging:   Ct-head W/o    Result Date: 4/21/2020 4/21/2020 2:54 AM HISTORY/REASON FOR EXAM:  Altered mental status. TECHNIQUE/EXAM DESCRIPTION AND NUMBER OF VIEWS: CT of the head without contrast. The study was performed on a helical multidetector CT scanner. Contiguous 5 mm axial sections were obtained from the skull base through the vertex. Up to date radiation dose reduction adjustments have been utilized to meet ALARA standards for radiation dose reduction. COMPARISON:  CT head 4/19/2020 FINDINGS: CALVARIA:  There is a right frontal craniotomy. BRAIN: There are white matter changes and volume loss. There is mass effect upon the right cerebral hemisphere. Subdural catheter has been removed. Right convexity subdural hematoma appears unchanged in size with maximum thickness of approximately 15 mm. There is mass effect with right to left shift of 7 mm, unchanged. Subdural hemorrhage layers over the tentorium and extends into the interhemispheric fissure. VENTRICULAR SYSTEM: Ventricular system is normal in size and position. There is no hemorrhage or evidence for acute infarct. There are no extra-axial fluid collection. Sinuses:  The paranasal sinuses are clear. The mastoid air cells and middle ear are clear. The orbital contents  are normal in appearance.     1.  Unchanged right hemispheric subdural hematoma 2.  Associated mass effect with unchanged right to left shift measuring 7 mm 3.  Interval removal of septal drainage catheter     Ct-head W/o    Result Date: 4/19/2020 4/19/2020 10:34 PM HISTORY/REASON FOR EXAM:  Cerebral hemorrhage suspected Left-sided weakness. Postcraniotomy TECHNIQUE/EXAM DESCRIPTION AND NUMBER OF VIEWS: CT of the head without contrast. The study was performed on a helical multidetector CT scanner. Contiguous 2.5 mm axial sections were obtained from the skull base through the vertex. Up to date radiation dose reduction adjustments have been utilized to meet ALARA standards for radiation dose reduction. COMPARISON:  4/18/2020 FINDINGS: Postsurgical change from right frontal parietal craniotomy and right subdural drain. Redemonstration of right subdural hematoma with blood and gas. The hematoma is slightly smaller, measuring 11 mm, previously 15 mm. Small pneumocephalus, slightly less than prior. Unchanged mild mass effect on the right cerebral hemisphere with minimal right to left midline shift. Gray-white matter differentiation is grossly preserved. There is hypoattenuation of the periventricular and subcortical white matter, in keeping with chronic microangiopathic ischemic changes. No depressed or widely  calvarial fracture is seen. The visualized paranasal sinuses and temporal bone structures are aerated. ___________________________________     Slightly smaller right subdural hematoma. Unchanged mild mass effect on the right cerebral hemisphere with minimal right to left midline shift. No new hemorrhage.    Ct-head W/o    Result Date: 4/18/2020 4/18/2020 4:24 AM HISTORY/REASON FOR EXAM:  follow up, post-craniotomy. TECHNIQUE/EXAM DESCRIPTION AND NUMBER OF VIEWS:    CT of the head without contrast. Contiguous 5 mm axial sections were obtained from the skull base through the vertex. Up to date radiation  dose reduction adjustments have been utilized to meet ALARA standards for radiation dose reduction. COMPARISON: Yesterday from [facility]. FINDINGS: There has been an interval right frontoparietal craniotomy with extra-axial drain in place. Right frontoparietal subdural hematoma has diminished in size now measuring 17 from 23 mm. Mass effect has mildly diminished with leftward midline shift now measuring 3 from 4 mm. There is still effacement of sulci on the right. The previously demonstrated subdural collection was intermediate in density. The new collection is high density which could indicate some acute hemorrhage. This is noted best adjacent to the frontal lobe where there is also some pneumocephalus as expected. Minimal subdural hemorrhage is seen in the right occipital and parafalcine regions posteriorly and this is slightly increased There is cerebral volume loss. The ventricular system is stable in size with the right lateral ventricular effacement. There are stable moderate nonspecific white matter hypodensities. Visualized paranasal sinuses are clear.     1.  Interval right frontoparietal craniotomy with drain placement 2.  Interval decrease in size of right frontoparietal subdural hematoma. Considerable reactive hemorrhage are however still remains and there is some increased density relative to comparison which indicates some acute hemorrhage. Attention in follow-up is recommended. 3.  Slight decrease in mass effect with 3 mm leftward midline shift and moderate right sulcus effacement as before 4.  No evidence of cortical infarction 5.  Stable atrophy with moderate white matter disease    Ct-head W/o    Result Date: 4/17/2020  HISTORY/REASON FOR EXAM:  c; delayed head injury, imbalance, looking fro SDH. delayed head injury, imbalance, looking fro SDH TECHNIQUE/EXAM DESCRIPTION: CT scan of the brain without contrast. 4/17/2020 3:47 PM. CT scan of the brain was carried out without contrast in the  normal manner. Both automated exposure control and Automated adjustment of tube current based on patient size are utilized. Total DLP: 913 mGy*cm COMPARISON: 5/16/2016 FINDINGS: Large mixed attenuation extra-axial fluid collection compatible with an acute on chronic subdural hemorrhage. More acute component is present at the anterior aspect of the fluid collection Layering blood products at the posterior aspect of the fluid collection Maximal thickness is approximately 2.2 cm. Midline shift of approximately 4 to 5 mm. Mild effacement of the right lateral ventricle Diffuse lucencies within the periventricular and subcortical white matter There is no fracture or other acute bony abnormality seen. Visualized paranasal sinuses: Clear.     Right-sided subdural hematoma with both acute and chronic components Midline shift to left of approximately 4 to 5 mm with mass effect on the right lateral ventricular system Findings conveyed to Dr. Trent at approximately 1605 DLP Reporting Thresholds for Incorrect/Repeated Exams - DLP in mGy*cm Head/Neck:  0-year-old 3840, 1-year-old 5880, 5-year-old 8770, 10-year-old 07167 and adult 37342 Head:  0-year-old 4540, 1-year-old 7460, 5-year-old 46621, 10-year-old 23954 and adult 95985 Neck:  0-year-old 2940, 1-year-old 4160, 5-year-old 4550, 10-year-old 6320 and adult 8470 Chest:  0-year-old 550, 1-year-old 830, 5-year-old 1200, 10-year-old 3840 and adult 3570 Abd/pelvis:  0-year-old 440, 1-year-old 720, 5-year-old 1080, 10-year-old 3330 and adult 3330 Trunk(C/A/P):  0-year-old 490, 1-year-old 770, 5-year-old 1140, 10-year-old 3570 and adult 3330    Dx-chest-limited (1 View)    Result Date: 4/17/2020  HISTORY/REASON FOR EXAM:  Trauma patient, fall, primary survey. Trauma patient, fall, primary survey TECHNIQUE/EXAM DESCRIPTION AND NUMBER OF VIEWS: Chest x-ray (one view), 4/17/2020 5:09 PM. COMPARISON: None. FINDINGS: The lungs are clear.  The cardiomediastinal silhouette is midline and  unremarkable.  The costophrenic angles are sharp. Postsurgical changes of the right humeral head No displaced rib fractures. No pneumothorax.     No acute cardiopulmonary disease. Adalid Snell MD 4/17/2020 5:35 PM     Dx-chest-portable (1 View)    Result Date: 4/22/2020 4/22/2020 4:43 AM HISTORY/REASON FOR EXAM:  FALL; T-5000. TECHNIQUE/EXAM DESCRIPTION AND NUMBER OF VIEWS: Single portable view of the chest. COMPARISON: 4/21/2020 FINDINGS: The cardiomediastinal silhouette is stably enlarged. There has been interval removal of the enteric feeding tube.     Stable enlargement of the cardiomediastinal silhouette, mild diffuse interstitial prominence and bilateral basilar atelectasis and/or consolidation.    Dx-chest-portable (1 View)    Result Date: 4/21/2020 4/21/2020 4:34 AM HISTORY/REASON FOR EXAM:  FALL; T-5000 TECHNIQUE/EXAM DESCRIPTION AND NUMBER OF VIEWS: Single portable view of the chest. COMPARISON: 4/20/2020 FINDINGS: Low lung volume. Diffuse interstitial prominence. Patchy opacity in the lung bases. No pleural effusion. No pneumothorax. Stable cardiopericardial silhouette.     1. Low lung volume with hypoventilatory change and bibasilar atelectasis.     Dx-chest-portable (1 View)    Result Date: 4/20/2020 4/20/2020 4:24 AM HISTORY/REASON FOR EXAM:  FALL; T-5000 TECHNIQUE/EXAM DESCRIPTION AND NUMBER OF VIEWS: Single portable view of the chest. COMPARISON: 4/19/2020 FINDINGS: Patchy opacity in the left lung base. No pleural effusion. No pneumothorax. Stable cardiopericardial silhouette.     1. No significant interval change.    Dx-chest-portable (1 View)    Result Date: 4/19/2020 4/19/2020 5:14 AM HISTORY/REASON FOR EXAM:  FALL; T-5000 TECHNIQUE/EXAM DESCRIPTION AND NUMBER OF VIEWS: Single portable view of the chest. COMPARISON: 4/18/2020 FINDINGS: Patchy opacity in the left lung base. No pleural effusion. No pneumothorax. Stable cardiopericardial silhouette.     1. No significant interval  change.    Dx-chest-portable (1 View)    Result Date: 2020 5:28 AM HISTORY/REASON FOR EXAM: FALL; T-5000. TECHNIQUE/EXAM DESCRIPTION AND NUMBER OF VIEWS: Single AP view of the chest. COMPARISON: Yesterday from an outside facility FINDINGS: Lungs: New mild linear left basilar opacity most likely representing subsegmental atelectasis. Pleura:  No pleural space process is seen. Heart and mediastinum: There is stable cardiac silhouette enlargement.     New mild left basilar atelectasis    Us-trauma Vein Screen Lower Bilat Extremity    Result Date: 2020   Vascular Laboratory  CONCLUSIONS  No acute thrombosis is identified.  STUART HAYS  Exam Date:     2020 12:15  Room #:     Inpatient  Priority:     Routine  Ht (in):     69      Wt (lb):     175  Ordering Physician:        AGUSTINA ORTIZ  Referring Physician:       DIANA Palacio  Sonographer:               Pankaj Rapp RDCS, VARGHESET  Study Type:                Limited Bilateral  Technical Quality:         Adequate  Age:    74    Gender:     M  MRN:    [id number]  :    1945      BSA:    1.95  Indications:     Pain in Limb  CPT Codes:       39966  ICD Codes:       729.5  History:         Trauma  Limitations:  PROCEDURES:  Bilateral lower extremity venous duplex imaging.  The following venous structures were evaluated: common femoral, profunda  femoral,  femoral, popliteal l veins.  Serial compression, augmentation maneuvers,  color and spectral Doppler  flow evaluations were performed.  FINDINGS:  Bilateral lower extremities -.  No evidence of   deep venous thrombosis bilaterally down to the popliteal  veins only according to the screening protocol..  Jagjit Allen MD  (Electronically Signed)  Final Date:      2020                   13:32    Dx-abdomen For Tube Placement    Result Date: 2020 4:32 PM HISTORY/REASON FOR EXAM:  Coretrack tube placement. TECHNIQUE/EXAM DESCRIPTION AND  NUMBER OF VIEWS:  1 view(s) of the abdomen. COMPARISON:  None. FINDINGS: Enteric tube has been placed. The tip projects over the distal stomach/pylorus. Mildly increased small bowel and colonic gas noted.     Feeding tube tip at the distal stomach/pylorus.        ASSESSMENT:  Patient is a 74 y.o. male admitted with right SDH now s/p evacuation with left sided weakness and facial droop    Rehabilitation: Impaired ADLs and mobility  Patient is a good candidate for inpatient rehab based on needs for PT, OT, and speech therapy.  Patient has a questionable discharge situation which will need to be further investigated. Patient is blind and will need some support on DC to make sure he can get back into his routine.     Barriers to transfer include: Insurance authorization, TCCs to verify disposition, medical clearance and bed availability     [facility] Code / Diagnosis to Support: 02.22 Traumatic, Closed Injury      Subdural hematoma status post evacuation by Dr. Mendiola on 4/17/2020  -Continue PT OT while in-house  -Patient will need 24-hour supervision on discharge  -Suspect patient will benefit from IPR services  -Decadron 4 mg 3 times daily IV    Seizures  -Vimpat 100 mg twice daily IV  -Keppra 1000 mg twice daily IV    Pain  -Tylenol 650 mg every 6 hours as needed  -Roxicodone 5 to 10 mg every 3 hours as needed  -Morphine 1 to 4 mg injection every 3 hours IV    Hypertension  -Amlodipine 5 mg daily  -Hydralazine 20 mg injection PRN  -Labetalol 10 mg injection PRN    Hypercholesterolemia  -Rosuvastatin 10 mg nightly    Bowel program  - Senokot 1 tab nightly  - MiraLAX 1 packet twice daily PRN  - Milk of magnesia 30mL daily if no bowel movement in greater than 24 hours  - Dulcolax suppository 10 mg if patient goes more than 48 hours without a bowel movement  - Fleet enema if patient goes more than 72 hours without a bowel movement    DVT Prophylaxis:   -SCDs      Discussed with pt, summarized hospitalization and care, options  for next step of care  Labs reviewed   Imaging personally reviewed    Discussed with team about recommendations     Thank you for allowing us to participate in the care of this patient.     Patient was seen for 110 minutes on unit/floor of which > 50% of time was spent on counseling and coordination of care regarding the above, including prognosis, risk reduction, benefits of treatment, and options for next stage of care.      Bradley Valerio, DO   Physical Medicine and Rehabilitation

## 2020-04-23 ENCOUNTER — APPOINTMENT (OUTPATIENT)
Dept: RADIOLOGY | Facility: MEDICAL CENTER | Age: 75
DRG: 026 | End: 2020-04-23
Attending: NURSE PRACTITIONER
Payer: MEDICARE

## 2020-04-23 PROBLEM — Z78.9 IMPAIRED MOBILITY AND ADLS: Status: ACTIVE | Noted: 2020-04-23

## 2020-04-23 PROBLEM — Z74.09 IMPAIRED MOBILITY AND ADLS: Status: ACTIVE | Noted: 2020-04-23

## 2020-04-23 LAB
ALBUMIN SERPL BCP-MCNC: 3.4 G/DL (ref 3.2–4.9)
ALBUMIN/GLOB SERPL: 1.1 G/DL
ALP SERPL-CCNC: 57 U/L (ref 30–99)
ALT SERPL-CCNC: 21 U/L (ref 2–50)
ANION GAP SERPL CALC-SCNC: 10 MMOL/L (ref 7–16)
AST SERPL-CCNC: 19 U/L (ref 12–45)
BASOPHILS # BLD AUTO: 0.2 % (ref 0–1.8)
BASOPHILS # BLD: 0.02 K/UL (ref 0–0.12)
BILIRUB SERPL-MCNC: 0.4 MG/DL (ref 0.1–1.5)
BUN SERPL-MCNC: 37 MG/DL (ref 8–22)
CALCIUM SERPL-MCNC: 9 MG/DL (ref 8.5–10.5)
CHLORIDE SERPL-SCNC: 104 MMOL/L (ref 96–112)
CO2 SERPL-SCNC: 24 MMOL/L (ref 20–33)
CREAT SERPL-MCNC: 0.9 MG/DL (ref 0.5–1.4)
EOSINOPHIL # BLD AUTO: 0 K/UL (ref 0–0.51)
EOSINOPHIL NFR BLD: 0 % (ref 0–6.9)
ERYTHROCYTE [DISTWIDTH] IN BLOOD BY AUTOMATED COUNT: 40 FL (ref 35.9–50)
GLOBULIN SER CALC-MCNC: 3 G/DL (ref 1.9–3.5)
GLUCOSE SERPL-MCNC: 147 MG/DL (ref 65–99)
HCT VFR BLD AUTO: 40.5 % (ref 42–52)
HGB BLD-MCNC: 13.9 G/DL (ref 14–18)
IMM GRANULOCYTES # BLD AUTO: 0.09 K/UL (ref 0–0.11)
IMM GRANULOCYTES NFR BLD AUTO: 0.8 % (ref 0–0.9)
LYMPHOCYTES # BLD AUTO: 1 K/UL (ref 1–4.8)
LYMPHOCYTES NFR BLD: 8.8 % (ref 22–41)
MAGNESIUM SERPL-MCNC: 2.4 MG/DL (ref 1.5–2.5)
MCH RBC QN AUTO: 29.7 PG (ref 27–33)
MCHC RBC AUTO-ENTMCNC: 34.3 G/DL (ref 33.7–35.3)
MCV RBC AUTO: 86.5 FL (ref 81.4–97.8)
MONOCYTES # BLD AUTO: 1.21 K/UL (ref 0–0.85)
MONOCYTES NFR BLD AUTO: 10.7 % (ref 0–13.4)
NEUTROPHILS # BLD AUTO: 9.02 K/UL (ref 1.82–7.42)
NEUTROPHILS NFR BLD: 79.5 % (ref 44–72)
NRBC # BLD AUTO: 0 K/UL
NRBC BLD-RTO: 0 /100 WBC
PHOSPHATE SERPL-MCNC: 2.4 MG/DL (ref 2.5–4.5)
PLATELET # BLD AUTO: 252 K/UL (ref 164–446)
PMV BLD AUTO: 9.8 FL (ref 9–12.9)
POTASSIUM SERPL-SCNC: 3.7 MMOL/L (ref 3.6–5.5)
PROT SERPL-MCNC: 6.4 G/DL (ref 6–8.2)
RBC # BLD AUTO: 4.68 M/UL (ref 4.7–6.1)
SODIUM SERPL-SCNC: 138 MMOL/L (ref 135–145)
WBC # BLD AUTO: 11.3 K/UL (ref 4.8–10.8)

## 2020-04-23 PROCEDURE — 80053 COMPREHEN METABOLIC PANEL: CPT

## 2020-04-23 PROCEDURE — 700111 HCHG RX REV CODE 636 W/ 250 OVERRIDE (IP): Performed by: INTERNAL MEDICINE

## 2020-04-23 PROCEDURE — 770022 HCHG ROOM/CARE - ICU (200)

## 2020-04-23 PROCEDURE — 84100 ASSAY OF PHOSPHORUS: CPT

## 2020-04-23 PROCEDURE — 51798 US URINE CAPACITY MEASURE: CPT

## 2020-04-23 PROCEDURE — 700111 HCHG RX REV CODE 636 W/ 250 OVERRIDE (IP): Performed by: NEUROLOGICAL SURGERY

## 2020-04-23 PROCEDURE — 97535 SELF CARE MNGMENT TRAINING: CPT

## 2020-04-23 PROCEDURE — C9254 INJECTION, LACOSAMIDE: HCPCS | Performed by: PSYCHIATRY & NEUROLOGY

## 2020-04-23 PROCEDURE — 97112 NEUROMUSCULAR REEDUCATION: CPT

## 2020-04-23 PROCEDURE — 700105 HCHG RX REV CODE 258: Performed by: INTERNAL MEDICINE

## 2020-04-23 PROCEDURE — 700102 HCHG RX REV CODE 250 W/ 637 OVERRIDE(OP): Performed by: SURGERY

## 2020-04-23 PROCEDURE — 700111 HCHG RX REV CODE 636 W/ 250 OVERRIDE (IP): Performed by: PSYCHIATRY & NEUROLOGY

## 2020-04-23 PROCEDURE — 85025 COMPLETE CBC W/AUTO DIFF WBC: CPT

## 2020-04-23 PROCEDURE — 700105 HCHG RX REV CODE 258: Performed by: PSYCHIATRY & NEUROLOGY

## 2020-04-23 PROCEDURE — A9270 NON-COVERED ITEM OR SERVICE: HCPCS | Performed by: SURGERY

## 2020-04-23 PROCEDURE — 99233 SBSQ HOSP IP/OBS HIGH 50: CPT | Performed by: SURGERY

## 2020-04-23 PROCEDURE — 71045 X-RAY EXAM CHEST 1 VIEW: CPT

## 2020-04-23 PROCEDURE — 83735 ASSAY OF MAGNESIUM: CPT

## 2020-04-23 PROCEDURE — 92526 ORAL FUNCTION THERAPY: CPT

## 2020-04-23 RX ORDER — DEXAMETHASONE SODIUM PHOSPHATE 4 MG/ML
4 INJECTION, SOLUTION INTRA-ARTICULAR; INTRALESIONAL; INTRAMUSCULAR; INTRAVENOUS; SOFT TISSUE EVERY 12 HOURS
Status: DISCONTINUED | OUTPATIENT
Start: 2020-04-24 | End: 2020-04-25

## 2020-04-23 RX ADMIN — AMLODIPINE BESYLATE 5 MG: 10 TABLET ORAL at 05:00

## 2020-04-23 RX ADMIN — SODIUM CHLORIDE 100 MG: 9 INJECTION, SOLUTION INTRAVENOUS at 18:06

## 2020-04-23 RX ADMIN — SODIUM CHLORIDE 100 MG: 9 INJECTION, SOLUTION INTRAVENOUS at 06:05

## 2020-04-23 RX ADMIN — DIBASIC SODIUM PHOSPHATE, MONOBASIC POTASSIUM PHOSPHATE AND MONOBASIC SODIUM PHOSPHATE 2 TABLET: 852; 155; 130 TABLET ORAL at 12:02

## 2020-04-23 RX ADMIN — ROSUVASTATIN CALCIUM 10 MG: 10 TABLET, FILM COATED ORAL at 21:15

## 2020-04-23 RX ADMIN — DEXAMETHASONE SODIUM PHOSPHATE 4 MG: 4 INJECTION, SOLUTION INTRA-ARTICULAR; INTRALESIONAL; INTRAMUSCULAR; INTRAVENOUS; SOFT TISSUE at 05:00

## 2020-04-23 RX ADMIN — DIBASIC SODIUM PHOSPHATE, MONOBASIC POTASSIUM PHOSPHATE AND MONOBASIC SODIUM PHOSPHATE 2 TABLET: 852; 155; 130 TABLET ORAL at 18:06

## 2020-04-23 RX ADMIN — SENNOSIDES AND DOCUSATE SODIUM 1 TABLET: 8.6; 5 TABLET ORAL at 21:15

## 2020-04-23 RX ADMIN — LEVETIRACETAM 1000 MG: 100 INJECTION, SOLUTION, CONCENTRATE INTRAVENOUS at 17:36

## 2020-04-23 RX ADMIN — LEVETIRACETAM 1000 MG: 100 INJECTION, SOLUTION, CONCENTRATE INTRAVENOUS at 05:00

## 2020-04-23 RX ADMIN — DEXAMETHASONE SODIUM PHOSPHATE 4 MG: 4 INJECTION, SOLUTION INTRA-ARTICULAR; INTRALESIONAL; INTRAMUSCULAR; INTRAVENOUS; SOFT TISSUE at 12:02

## 2020-04-23 ASSESSMENT — COGNITIVE AND FUNCTIONAL STATUS - GENERAL
DAILY ACTIVITIY SCORE: 16
DRESSING REGULAR UPPER BODY CLOTHING: A LITTLE
HELP NEEDED FOR BATHING: A LOT
EATING MEALS: A LITTLE
PERSONAL GROOMING: A LITTLE
DRESSING REGULAR LOWER BODY CLOTHING: A LOT
SUGGESTED CMS G CODE MODIFIER DAILY ACTIVITY: CK
TOILETING: A LITTLE

## 2020-04-23 ASSESSMENT — ENCOUNTER SYMPTOMS
CONSTITUTIONAL NEGATIVE: 1
DIZZINESS: 0
GASTROINTESTINAL NEGATIVE: 1
HEMATOLOGIC/LYMPHATIC NEGATIVE: 1
PSYCHIATRIC NEGATIVE: 1
HEADACHES: 1
MUSCULOSKELETAL NEGATIVE: 1
ENDOCRINE NEGATIVE: 1
RESPIRATORY NEGATIVE: 1
EYES NEGATIVE: 1
WEAKNESS: 0
SPEECH DIFFICULTY: 0
NUMBNESS: 0
ALLERGIC/IMMUNOLOGIC NEGATIVE: 1
CARDIOVASCULAR NEGATIVE: 1

## 2020-04-23 ASSESSMENT — PATIENT HEALTH QUESTIONNAIRE - PHQ9
1. LITTLE INTEREST OR PLEASURE IN DOING THINGS: NOT AT ALL
SUM OF ALL RESPONSES TO PHQ9 QUESTIONS 1 AND 2: 0
2. FEELING DOWN, DEPRESSED, IRRITABLE, OR HOPELESS: NOT AT ALL

## 2020-04-23 ASSESSMENT — FIBROSIS 4 INDEX: FIB4 SCORE: 1.22

## 2020-04-23 NOTE — CARE PLAN
Problem: Mobility  Goal: Risk for activity intolerance will decrease  Outcome: PROGRESSING AS EXPECTED  Intervention: Assess and monitor signs of activity intolerance  Note: Tolerates up to chair

## 2020-04-23 NOTE — CARE PLAN
Problem: Mobility  Goal: Risk for activity intolerance will decrease  Outcome: MET   PT AMBULATED IN HALLWAY. REQUIRES FREQUENT REDIRECTION AND OFTEN IMPULSIVE. PT VOCALIZED BEING TIRED AND NOT GRASPING THE WALKER WITH LEFT HAND. MUCH PHYSICAL AND VERBAL ENCOURAGEMENT FROM NURSE AND CNA. SITTING UP IN CHAIR NOW WITH CALL LIGHT WITHIN REACH. WILL CONTINUE TO MONITOR FOR SAFETY AND NEEDS.

## 2020-04-23 NOTE — PROGRESS NOTES
Neurosurgery Progress Note    Subjective:  Pt ambulating in hallways, per RN does not follow commands well with ambulating  Sitting up in chair  No further sz activity noted  Per RN more impulsive today         Exam:  Alert, oriented x 3  Perrl 4 mm, hx of blindness  Tongue ML  Right UE and LE 5/5, Left  4/5, bi/tri/det 4+  C/d/i staples /suture           BP  Min: 109/58  Max: 158/66  Pulse  Av  Min: 56  Max: 99  Resp  Av.5  Min: 15  Max: 37  Monitored Temp 2  Av.1 °C (98.8 °F)  Min: 36.6 °C (97.9 °F)  Max: 37.7 °C (99.9 °F)  SpO2  Av.5 %  Min: 91 %  Max: 95 %    No data recorded    Recent Labs     20  03420  0325 20  0455   WBC 7.6 7.3 11.3*   RBC 4.95 4.32* 4.68*   HEMOGLOBIN 14.4 12.8* 13.9*   HEMATOCRIT 42.7 36.9* 40.5*   MCV 86.3 85.4 86.5   MCH 29.1 29.6 29.7   MCHC 33.7 34.7 34.3   RDW 39.8 38.8 40.0   PLATELETCT 208 189 252   MPV 9.7 9.6 9.8     Recent Labs     20  03420  0325 20  0455   SODIUM 134* 136 138   POTASSIUM 4.4 4.4 3.7   CHLORIDE 100 104 104   CO2 20 21 24   GLUCOSE 126* 159* 147*   BUN 28* 32* 37*   CREATININE 0.95 0.92 0.90   CALCIUM 9.3 8.6 9.0               Intake/Output       20 07 - 20 0659 20 07 - 20 0659      [id number] Total  Total       Intake    I.V.  540  360 900  --  -- --    Volume (mL) (NS infusion) 540 360 900 -- -- --    NG/GT  450  500 950  --  -- --    Intake (mL) ([REMOVED] Enteral Tube 20 Cortrak - Gastric 10 Fr. Right nare) 450 500 950 -- -- --    IV Piggyback  200  -- 200  100  -- 100    Volume (mL) (lacosamide (VIMPAT) 100 mg in  mL ivpb) 100 -- 100 100 -- 100    Volume (mL) (levETIRAcetam (KEPPRA) 1,000 mg in  mL IVPB) 100 -- 100 -- -- --    Enteral  90  90 180  --  -- --    Free Water / Tube Flush 90 90 180 -- -- --    Total Intake 2788 503 8801 100 -- 100       Output    Urine  700  1025 1725  200  -- 200    Output (mL) (Urethral  Catheter Temperature probe 16 Fr.) 700 1025 1725 200 -- 200    Drains  0  -- 0  --  -- --    Residual Amount (ml) (Discarded) 0 -- 0 -- -- --    Residual Amount (mL) (Discarded) ([REMOVED] Enteral Tube 04/21/20 Cortrak - Gastric 10 Fr. Right nare) 0 -- 0 -- -- --    Stool  --  -- --  --  -- --    Number of Times Stooled -- 2 x 2 x -- -- --    Total Output 700 1025 1725 200 -- 200       Net I/O     580 -75 505 -100 -- -100            Intake/Output Summary (Last 24 hours) at 4/23/2020 0833  Last data filed at 4/23/2020 0800  Gross per 24 hour   Intake 2150 ml   Output 1775 ml   Net 375 ml            • Pharmacy  1 Each PHARMACY TO DOSE   • amLODIPine  5 mg DAILY   • docusate sodium 100mg/10mL  100 mg BID   • magnesium hydroxide  30 mL DAILY   • polyethylene glycol/lytes  1 Packet BID   • rosuvastatin  10 mg QHS   • senna-docusate  1 Tab Nightly   • acetaminophen  650 mg Q6HRS PRN   • oxyCODONE immediate release  10 mg Q3HRS PRN   • oxyCODONE immediate-release  5 mg Q3HRS PRN   • senna-docusate  1 Tab Q24HRS PRN   • lacosamide (VIMPAT) ivpb  100 mg Q12HRS   • dexamethasone  4 mg TID   • NS   Continuous   • levETIRAcetam (KEPPRA) IV  1,000 mg Q12HRS   • Respiratory Therapy Consult   Continuous RT   • bisacodyl  10 mg Q24HRS PRN   • fleet  1 Each Once PRN   • morphine injection  1-4 mg Q3HRS PRN   • labetalol  10 mg Q4HRS PRN   • hydrALAZINE  20 mg Q6HRS PRN   • ondansetron  4 mg Q4HRS PRN   • Pharmacy Consult Request  1 Each PHARMACY TO DOSE       Assessment and Plan:  Hospital day #6, POD #6 right crani for sdh  Prophylactic anticoagulation: no         Start date/time: tbd  Neuro exam improved   keppra 1000bid/vimpat  q2h neuro checks  CT stable 4/21  Appreciate neurology's input  Oob, pt,ot   decadron 4 tid     ATTENDING ADDENDUM:  Patient seen independently and agree with above note  Dropped dex to 4 bid

## 2020-04-23 NOTE — PROGRESS NOTES
Trauma / Surgical Daily Progress Note    Date of Service  4/22/2020    Chief Complaint  74 y.o. male admitted 4/17/2020 with Trauma    Interval Events    No further seizure activity noted  Patient awake alert and cooperative  Physiatry consult  Palliative care consult  Bridled cortrak tube in place and tube feeds at goal    Review of Systems  Review of Systems   Constitutional: Negative.    HENT: Negative.    Eyes: Negative.    Respiratory: Negative.    Cardiovascular: Negative.    Gastrointestinal: Negative.    Endocrine: Negative.    Genitourinary: Negative.    Musculoskeletal: Negative.    Skin: Negative.    Allergic/Immunologic: Negative.    Neurological: Positive for headaches (mild headache). Negative for dizziness, speech difficulty, weakness and numbness.   Hematological: Negative.    Psychiatric/Behavioral: Negative.         Vital Signs for last 24 hours  Pulse:  [56-94] 68  Resp:  [10-31] 17  BP: ()/(51-80) 150/60  SpO2:  [93 %-98 %] 94 %    Hemodynamic parameters for last 24 hours       Respiratory Data     Respiration: 17, Pulse Oximetry: 94 %     Work Of Breathing / Effort: Mild  RUL Breath Sounds: Clear, RML Breath Sounds: Clear;Diminished, RLL Breath Sounds: Diminished, CARLENE Breath Sounds: Clear, LLL Breath Sounds: Diminished    Physical Exam  Physical Exam  Vitals signs and nursing note reviewed.   Constitutional:       Appearance: Normal appearance. He is not ill-appearing.      Interventions: Nasal cannula in place.   HENT:      Head: Normocephalic.      Comments: Right craniotomy incision is clean and dry.     Nose: Nose normal.      Mouth/Throat:      Mouth: Mucous membranes are moist.      Pharynx: Oropharynx is clear.   Eyes:      General: No scleral icterus.     Extraocular Movements: Extraocular movements intact.      Conjunctiva/sclera: Conjunctivae normal.      Pupils: Pupils are equal, round, and reactive to light.   Neck:      Musculoskeletal: Normal range of motion and neck supple.  No muscular tenderness.   Cardiovascular:      Rate and Rhythm: Normal rate and regular rhythm.      Pulses: Normal pulses.   Pulmonary:      Effort: Pulmonary effort is normal. No respiratory distress.      Breath sounds: Normal breath sounds. No wheezing.   Chest:      Chest wall: No tenderness.   Abdominal:      General: There is no distension.      Palpations: Abdomen is soft.      Tenderness: There is no abdominal tenderness. There is no guarding.   Musculoskeletal: Normal range of motion.         General: No swelling, tenderness or deformity.   Skin:     General: Skin is warm and dry.      Capillary Refill: Capillary refill takes less than 2 seconds.   Neurological:      General: No focal deficit present.      Mental Status: He is alert and oriented to person, place, and time.      Cranial Nerves: No cranial nerve deficit.      Sensory: No sensory deficit.      Motor: No weakness.      Coordination: Coordination normal.      Gait: Gait normal.      Deep Tendon Reflexes: Reflexes normal.   Psychiatric:         Mood and Affect: Mood normal.         Behavior: Behavior normal. Behavior is cooperative.         Thought Content: Thought content normal.         Judgment: Judgment normal.         Laboratory  Recent Results (from the past 24 hour(s))   Prealbumin    Collection Time: 04/22/20  3:25 AM   Result Value Ref Range    Pre-Albumin 13.2 (L) 18.0 - 38.0 mg/dL   CBC WITH DIFFERENTIAL    Collection Time: 04/22/20  3:25 AM   Result Value Ref Range    WBC 7.3 4.8 - 10.8 K/uL    RBC 4.32 (L) 4.70 - 6.10 M/uL    Hemoglobin 12.8 (L) 14.0 - 18.0 g/dL    Hematocrit 36.9 (L) 42.0 - 52.0 %    MCV 85.4 81.4 - 97.8 fL    MCH 29.6 27.0 - 33.0 pg    MCHC 34.7 33.7 - 35.3 g/dL    RDW 38.8 35.9 - 50.0 fL    Platelet Count 189 164 - 446 K/uL    MPV 9.6 9.0 - 12.9 fL    Neutrophils-Polys 78.80 (H) 44.00 - 72.00 %    Lymphocytes 13.00 (L) 22.00 - 41.00 %    Monocytes 7.70 0.00 - 13.40 %    Eosinophils 0.00 0.00 - 6.90 %    Basophils  0.10 0.00 - 1.80 %    Immature Granulocytes 0.40 0.00 - 0.90 %    Nucleated RBC 0.00 /100 WBC    Neutrophils (Absolute) 5.77 1.82 - 7.42 K/uL    Lymphs (Absolute) 0.95 (L) 1.00 - 4.80 K/uL    Monos (Absolute) 0.56 0.00 - 0.85 K/uL    Eos (Absolute) 0.00 0.00 - 0.51 K/uL    Baso (Absolute) 0.01 0.00 - 0.12 K/uL    Immature Granulocytes (abs) 0.03 0.00 - 0.11 K/uL    NRBC (Absolute) 0.00 K/uL   Comp Metabolic Panel    Collection Time: 04/22/20  3:25 AM   Result Value Ref Range    Sodium 136 135 - 145 mmol/L    Potassium 4.4 3.6 - 5.5 mmol/L    Chloride 104 96 - 112 mmol/L    Co2 21 20 - 33 mmol/L    Anion Gap 11.0 7.0 - 16.0    Glucose 159 (H) 65 - 99 mg/dL    Bun 32 (H) 8 - 22 mg/dL    Creatinine 0.92 0.50 - 1.40 mg/dL    Calcium 8.6 8.5 - 10.5 mg/dL    AST(SGOT) 12 12 - 45 U/L    ALT(SGPT) 13 2 - 50 U/L    Alkaline Phosphatase 51 30 - 99 U/L    Total Bilirubin 0.6 0.1 - 1.5 mg/dL    Albumin 3.3 3.2 - 4.9 g/dL    Total Protein 5.9 (L) 6.0 - 8.2 g/dL    Globulin 2.6 1.9 - 3.5 g/dL    A-G Ratio 1.3 g/dL   CRP QUANTITIVE (NON-CARDIAC)    Collection Time: 04/22/20  3:25 AM   Result Value Ref Range    Stat C-Reactive Protein 1.62 (H) 0.00 - 0.75 mg/dL   ESTIMATED GFR    Collection Time: 04/22/20  3:25 AM   Result Value Ref Range    GFR If African American >60 >60 mL/min/1.73 m 2    GFR If Non African American >60 >60 mL/min/1.73 m 2       Fluids    Intake/Output Summary (Last 24 hours) at 4/22/2020 1847  Last data filed at 4/22/2020 1800  Gross per 24 hour   Intake 2813.33 ml   Output 1300 ml   Net 1513.33 ml       Core Measures & Quality Metrics  Labs reviewed, Medications reviewed and Radiology images reviewed  Graham catheter: Urinary Tract Retention or Urinary Tract Obstruction      DVT Prophylaxis: Contraindicated - High bleeding risk  DVT prophylaxis - mechanical: SCDs  Ulcer prophylaxis: Not indicated        FADI Score  ETOH Screening    Assessment/Plan  Seizures, post-traumatic (HCC)  Assessment & Plan  Fall with  SDH - 8 days prior to admission  On Keppra 1000 mg q 12 h  4/21 Seizure while OOB to chair x 2 with post icthal  EEG x 25 min  Add Vimapt  4/22 no seizure activity  Neurology consult - Manjit Christy MD    Subdural hematoma (HCC)- (present on admission)  Assessment & Plan  Large right-sided acute on chronic subdural hematoma.  4/17 Right-sided craniotomy for evacuation of subdural hematoma.  4/20 subdural drain removed  4/21 Head CT stable  PT / OT evaluations  Speech Language Pathology cognitive evaluation.  4/22 Physiatry consult  Anthony Mendiola MD. Neurosurgeon. [facility].    Oropharyngeal dysphagia  Assessment & Plan  4/21 Failed swallow evaluation with SLP / NPO  Place cortrack tube  4/22 tube feeds at goal    Urinary retention  Assessment & Plan  4/19 BLadder scan with > 400 cc and unable to void  Graham to gravity.    Contraindication to deep vein thrombosis (DVT) prophylaxis- (present on admission)  Assessment & Plan  Systemic anticoagulation contraindicated secondary to elevated bleeding risk and until subdural drain removed.  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  Transfused 1 unit of platelets at the referring facility.  Admission TEG with platelet mapping demonstrated, 53.4% AA inhibition.  No additional platelet transfusion indicated.    Essential hypertension- (present on admission)  Assessment & Plan  Chronic condition treated with metoprolol and amlodipine.  4/18 Resumed amlodipine.   Intermittent sinus arrhythmia.  Currently holding metoprolol.    Hypercholesterolemia- (present on admission)  Assessment & Plan  Chronic condition treated with rosuvastatin.  4/18 Resumed maintenance medication.    Screening examination for infectious disease- (present on admission)  Assessment & Plan  4/17 COVID-19 Screening completed.  No fever, pulmonary symptoms, contact with  infected individual, and travel to/from a high risk region.    Trauma- (present on admission)  Assessment & Plan  Fall in bathtub 8 days prior to admission.  T-5000 Activation transfer from [facility].  James Felipe MD. Trauma Surgery.      Discussed patient condition with RN, RT, Therapies, Pharmacy, , Patient and physiatry and trauma surgery.  CRITICAL CARE TIME EXCLUDING PROCEDURES: 37  minutes

## 2020-04-23 NOTE — THERAPY
"Speech Language Therapy dysphagia treatment completed.   Functional Status:  Pt post R crani, with baseline blindness, AAO x 3, seen for a swallow evaluation and tolerated sm amounts of ice, 2 oz mildly thick liquid via spoon and cup, puree (thick/thin), mixed solid, transitional solid and dry solid without s/s of aspiration, with minimal oral holding of dry solid on mid tongue blade.  Thins tolerated via tsp, but pt with s/s of penetration/aspiration post swallow of cup sip of thins.  Modified diet rec.  Cognitive linguistic evaluation pending.   Recommendations: MM5/MT2 with 1:1 assist initially and until self-feeding established.    Plan of Care: Will benefit from Speech Therapy 5 times per week  Post-Acute Therapy: Recommend inpatient transitional care services for continued speech therapy services.        See \"Rehab Therapy-Acute\" Patient Summary Report for complete documentation.     "

## 2020-04-23 NOTE — CARE PLAN
Problem: Safety  Goal: Will remain free from falls  4/23/2020 0841 by Mara Nicolas R.N.  Outcome: PROGRESSING AS EXPECTED  4/23/2020 0840 by Mara Nicolas R.N.  Reactivated   PT WITH CALL LIGHT AT SIDE AND USES CALL LIGHT APPROPRIATELY. NON SKID SOCKS ON. PERSONAL BELONGINGS WITHIN REACH. WILL CONTINUE TO MONITOR FOR SAFETY AND NEEDS. NO ACUTE NEEDS AT THIS TIME. PT IS CLEAN AND DRY WITH TEETH BRUSHED.

## 2020-04-23 NOTE — PROGRESS NOTES
2 RN skin check complete with CARLOS ALBERTO Stroud.  Devices in place cortrak, cardiac monitor, BP cuff, pulse ox, olivarez, SCDs.   Skin assessed under the following devices all of the above.   Preventative measures in place including turn q2h, reposition devices, pillows to elevate arms and heels.  Following areas of concern:   · Surgical incision on head     Wound consult placedYES/NO: N\A    Wound reported YES/NO: yes and N\A  Appropriate LDAs opened YES/NO: yes

## 2020-04-23 NOTE — THERAPY
"Occupational Therapy Treatment completed with focus on ADLs, ADL transfers, patient education and upper extremity function.  Functional Status:  Supine > EOB CGA, transfers with FWW min A, LB dressing mod A, seated grooming min A  Plan of Care: Will benefit from Occupational Therapy 4 times per week  Discharge Recommendations:  Equipment Will Continue to Assess for Equipment Needs. Post-acute therapy: Recommend post-acute placement for additional occupational therapy services prior to discharge home.    See \"Rehab Therapy-Acute\" Patient Summary Report for complete documentation.     Pt seen for OT tx to include: bed mobility, transfer training, LB dressing, grooming. Pt very pleasant and cooperative; speech clear and comprehensible but word-finding difficulty. Oriented to all but day of month. Pt moving LUE in all planes with slight weakness but significant apraxia. Unable to achieve functional pinch during LB dressing. Difficulty maintaining grasp on FWW handle during transfers. Engaged pt in facilitatory activities to include PNF patterns and target reaching (body parts of pt). Pt demos sequencing deficits, repeatedly taking steps forward when instructed to step posteriorly or to the R. Pt is limited by: cognitive deficits (primarily motor planning), LUE apraxia, balance impairment, baseline visual impairment. Will continue to benefit from acute OT. Will require post-acute transitional care.   "

## 2020-04-24 ENCOUNTER — APPOINTMENT (OUTPATIENT)
Dept: RADIOLOGY | Facility: MEDICAL CENTER | Age: 75
DRG: 026 | End: 2020-04-24
Attending: NURSE PRACTITIONER
Payer: MEDICARE

## 2020-04-24 LAB
ALBUMIN SERPL BCP-MCNC: 3.4 G/DL (ref 3.2–4.9)
ALBUMIN/GLOB SERPL: 1.2 G/DL
ALP SERPL-CCNC: 55 U/L (ref 30–99)
ALT SERPL-CCNC: 33 U/L (ref 2–50)
ANION GAP SERPL CALC-SCNC: 12 MMOL/L (ref 7–16)
AST SERPL-CCNC: 24 U/L (ref 12–45)
BASOPHILS # BLD AUTO: 0.2 % (ref 0–1.8)
BASOPHILS # BLD: 0.02 K/UL (ref 0–0.12)
BILIRUB SERPL-MCNC: 0.6 MG/DL (ref 0.1–1.5)
BUN SERPL-MCNC: 27 MG/DL (ref 8–22)
CALCIUM SERPL-MCNC: 8.4 MG/DL (ref 8.5–10.5)
CHLORIDE SERPL-SCNC: 106 MMOL/L (ref 96–112)
CO2 SERPL-SCNC: 22 MMOL/L (ref 20–33)
CREAT SERPL-MCNC: 0.77 MG/DL (ref 0.5–1.4)
EOSINOPHIL # BLD AUTO: 0.06 K/UL (ref 0–0.51)
EOSINOPHIL NFR BLD: 0.7 % (ref 0–6.9)
ERYTHROCYTE [DISTWIDTH] IN BLOOD BY AUTOMATED COUNT: 40.6 FL (ref 35.9–50)
GLOBULIN SER CALC-MCNC: 2.8 G/DL (ref 1.9–3.5)
GLUCOSE SERPL-MCNC: 102 MG/DL (ref 65–99)
HCT VFR BLD AUTO: 39.8 % (ref 42–52)
HGB BLD-MCNC: 13.8 G/DL (ref 14–18)
IMM GRANULOCYTES # BLD AUTO: 0.1 K/UL (ref 0–0.11)
IMM GRANULOCYTES NFR BLD AUTO: 1.1 % (ref 0–0.9)
LYMPHOCYTES # BLD AUTO: 1.85 K/UL (ref 1–4.8)
LYMPHOCYTES NFR BLD: 21.2 % (ref 22–41)
MCH RBC QN AUTO: 29.8 PG (ref 27–33)
MCHC RBC AUTO-ENTMCNC: 34.7 G/DL (ref 33.7–35.3)
MCV RBC AUTO: 86 FL (ref 81.4–97.8)
MONOCYTES # BLD AUTO: 0.91 K/UL (ref 0–0.85)
MONOCYTES NFR BLD AUTO: 10.4 % (ref 0–13.4)
NEUTROPHILS # BLD AUTO: 5.78 K/UL (ref 1.82–7.42)
NEUTROPHILS NFR BLD: 66.4 % (ref 44–72)
NRBC # BLD AUTO: 0 K/UL
NRBC BLD-RTO: 0 /100 WBC
PLATELET # BLD AUTO: 237 K/UL (ref 164–446)
PMV BLD AUTO: 9.6 FL (ref 9–12.9)
POTASSIUM SERPL-SCNC: 3.3 MMOL/L (ref 3.6–5.5)
PROT SERPL-MCNC: 6.2 G/DL (ref 6–8.2)
RBC # BLD AUTO: 4.63 M/UL (ref 4.7–6.1)
SODIUM SERPL-SCNC: 140 MMOL/L (ref 135–145)
WBC # BLD AUTO: 8.7 K/UL (ref 4.8–10.8)

## 2020-04-24 PROCEDURE — 85025 COMPLETE CBC W/AUTO DIFF WBC: CPT

## 2020-04-24 PROCEDURE — C9254 INJECTION, LACOSAMIDE: HCPCS | Performed by: PSYCHIATRY & NEUROLOGY

## 2020-04-24 PROCEDURE — 700105 HCHG RX REV CODE 258: Performed by: PSYCHIATRY & NEUROLOGY

## 2020-04-24 PROCEDURE — 700111 HCHG RX REV CODE 636 W/ 250 OVERRIDE (IP): Performed by: INTERNAL MEDICINE

## 2020-04-24 PROCEDURE — 700111 HCHG RX REV CODE 636 W/ 250 OVERRIDE (IP): Performed by: PSYCHIATRY & NEUROLOGY

## 2020-04-24 PROCEDURE — 700102 HCHG RX REV CODE 250 W/ 637 OVERRIDE(OP): Performed by: SURGERY

## 2020-04-24 PROCEDURE — 99232 SBSQ HOSP IP/OBS MODERATE 35: CPT | Performed by: SURGERY

## 2020-04-24 PROCEDURE — A9270 NON-COVERED ITEM OR SERVICE: HCPCS | Performed by: SURGERY

## 2020-04-24 PROCEDURE — 71045 X-RAY EXAM CHEST 1 VIEW: CPT

## 2020-04-24 PROCEDURE — 700112 HCHG RX REV CODE 229: Performed by: SURGERY

## 2020-04-24 PROCEDURE — 92526 ORAL FUNCTION THERAPY: CPT

## 2020-04-24 PROCEDURE — 700111 HCHG RX REV CODE 636 W/ 250 OVERRIDE (IP): Performed by: NEUROLOGICAL SURGERY

## 2020-04-24 PROCEDURE — 80053 COMPREHEN METABOLIC PANEL: CPT

## 2020-04-24 PROCEDURE — 97112 NEUROMUSCULAR REEDUCATION: CPT

## 2020-04-24 PROCEDURE — 97110 THERAPEUTIC EXERCISES: CPT

## 2020-04-24 PROCEDURE — 700105 HCHG RX REV CODE 258: Performed by: INTERNAL MEDICINE

## 2020-04-24 PROCEDURE — 770006 HCHG ROOM/CARE - MED/SURG/GYN SEMI*

## 2020-04-24 RX ORDER — POTASSIUM CHLORIDE 20 MEQ/1
40 TABLET, EXTENDED RELEASE ORAL ONCE
Status: COMPLETED | OUTPATIENT
Start: 2020-04-24 | End: 2020-04-24

## 2020-04-24 RX ADMIN — DEXAMETHASONE SODIUM PHOSPHATE 4 MG: 4 INJECTION, SOLUTION INTRA-ARTICULAR; INTRALESIONAL; INTRAMUSCULAR; INTRAVENOUS; SOFT TISSUE at 06:28

## 2020-04-24 RX ADMIN — LEVETIRACETAM 1000 MG: 100 INJECTION, SOLUTION, CONCENTRATE INTRAVENOUS at 06:18

## 2020-04-24 RX ADMIN — POLYETHYLENE GLYCOL 3350 1 PACKET: 17 POWDER, FOR SOLUTION ORAL at 18:33

## 2020-04-24 RX ADMIN — ROSUVASTATIN CALCIUM 10 MG: 10 TABLET, FILM COATED ORAL at 20:19

## 2020-04-24 RX ADMIN — SODIUM CHLORIDE 100 MG: 9 INJECTION, SOLUTION INTRAVENOUS at 06:38

## 2020-04-24 RX ADMIN — POTASSIUM CHLORIDE 40 MEQ: 1500 TABLET, EXTENDED RELEASE ORAL at 11:20

## 2020-04-24 RX ADMIN — DEXAMETHASONE SODIUM PHOSPHATE 4 MG: 4 INJECTION, SOLUTION INTRA-ARTICULAR; INTRALESIONAL; INTRAMUSCULAR; INTRAVENOUS; SOFT TISSUE at 18:46

## 2020-04-24 RX ADMIN — SODIUM CHLORIDE 100 MG: 9 INJECTION, SOLUTION INTRAVENOUS at 21:37

## 2020-04-24 RX ADMIN — AMLODIPINE BESYLATE 5 MG: 10 TABLET ORAL at 06:28

## 2020-04-24 RX ADMIN — SENNOSIDES AND DOCUSATE SODIUM 1 TABLET: 8.6; 5 TABLET ORAL at 20:19

## 2020-04-24 RX ADMIN — DOCUSATE SODIUM 100 MG: 50 LIQUID ORAL at 18:33

## 2020-04-24 RX ADMIN — LEVETIRACETAM 1000 MG: 100 INJECTION, SOLUTION, CONCENTRATE INTRAVENOUS at 18:33

## 2020-04-24 ASSESSMENT — LIFESTYLE VARIABLES
EVER HAD A DRINK FIRST THING IN THE MORNING TO STEADY YOUR NERVES TO GET RID OF A HANGOVER: NO
ALCOHOL_USE: NO
HAVE YOU EVER FELT YOU SHOULD CUT DOWN ON YOUR DRINKING: NO
TOTAL SCORE: 0
EVER FELT BAD OR GUILTY ABOUT YOUR DRINKING: NO
CONSUMPTION TOTAL: NEGATIVE
HOW MANY TIMES IN THE PAST YEAR HAVE YOU HAD 5 OR MORE DRINKS IN A DAY: 0
AVERAGE NUMBER OF DAYS PER WEEK YOU HAVE A DRINK CONTAINING ALCOHOL: 0
TOTAL SCORE: 0
TOTAL SCORE: 0
ON A TYPICAL DAY WHEN YOU DRINK ALCOHOL HOW MANY DRINKS DO YOU HAVE: 0
HAVE PEOPLE ANNOYED YOU BY CRITICIZING YOUR DRINKING: NO
EVER_SMOKED: NEVER

## 2020-04-24 ASSESSMENT — COGNITIVE AND FUNCTIONAL STATUS - GENERAL
STANDING UP FROM CHAIR USING ARMS: A LITTLE
WALKING IN HOSPITAL ROOM: A LITTLE
CLIMB 3 TO 5 STEPS WITH RAILING: A LOT
MOBILITY SCORE: 14
MOVING TO AND FROM BED TO CHAIR: UNABLE
SUGGESTED CMS G CODE MODIFIER MOBILITY: CL
MOVING FROM LYING ON BACK TO SITTING ON SIDE OF FLAT BED: UNABLE

## 2020-04-24 ASSESSMENT — GAIT ASSESSMENTS
GAIT LEVEL OF ASSIST: MINIMAL ASSIST
DEVIATION: ATAXIC;DECREASED BASE OF SUPPORT
ASSISTIVE DEVICE: FRONT WHEEL WALKER
DISTANCE (FEET): 200

## 2020-04-24 ASSESSMENT — ENCOUNTER SYMPTOMS: FEVER: 0

## 2020-04-24 NOTE — DISCHARGE PLANNING
"Anticipated Discharge Disposition: Placement    Action: LSW followed up with pt's request about contacting his son. At this time, pt is not ready to reach out to son. LSW will assist pt if he requests. LSW then discussed dc planning with pt. LSW explained acute rehab v SNF placement for therapy. [facility]. LSW informed pt about the potential barriers to [facility] (limited support at home) and pt stated, \"well then, I'll get more support.\" Pt is very determined to return back home and start his rehab journey.     LSW briefly discussed case with Dr. Valerio who is going to review pt's situation with his team.     Barriers to Discharge: Medical clearance    Plan: Follow up with team re placement for pt. (IRF v SNF)     Addendum: Per Dr. Baca, pt is cleared for rehab   "

## 2020-04-24 NOTE — PROGRESS NOTES
Neurosurgery Progress Note    Subjective:  No events    Exam:  Alert, oriented x 3  Perrl 4 mm, hx of blindness  Tongue ML  Right UE and LE 5/5, Left  4+/5, bi/tri/det 4+  C/d/i staples /suture           BP  Min: 103/52  Max: 158/63  Pulse  Av.1  Min: 58  Max: 104  Resp  Av.5  Min: 13  Max: 70  Temp  Av.8 °C (98.2 °F)  Min: 36.7 °C (98 °F)  Max: 36.9 °C (98.4 °F)  Monitored Temp 2  Av.4 °C (95.8 °F)  Min: 33.2 °C (91.8 °F)  Max: 37.1 °C (98.8 °F)  SpO2  Av.7 %  Min: 92 %  Max: 98 %    No data recorded    Recent Labs     20  0325 20  0455 20  0530   WBC 7.3 11.3* 8.7   RBC 4.32* 4.68* 4.63*   HEMOGLOBIN 12.8* 13.9* 13.8*   HEMATOCRIT 36.9* 40.5* 39.8*   MCV 85.4 86.5 86.0   MCH 29.6 29.7 29.8   MCHC 34.7 34.3 34.7   RDW 38.8 40.0 40.6   PLATELETCT 189 252 237   MPV 9.6 9.8 9.6     Recent Labs     20  0325 20  0455 20  0530   SODIUM 136 138 140   POTASSIUM 4.4 3.7 3.3*   CHLORIDE 104 104 106   CO2  24 22   GLUCOSE 159* 147* 102*   BUN 32* 37* 27*   CREATININE 0.92 0.90 0.77   CALCIUM 8.6 9.0 8.4*               Intake/Output       20 - 2059 20 - 20 0659       Total  Total       Intake    I.V.  330  360 690  60  -- 60    Volume (mL) (NS infusion) 330 360 690 60 -- 60    Other  240  -- 240  --  -- --    Medications (PO/Enteral Liquids) 240 -- 240 -- -- --    IV Piggyback  300  -- 300  220  -- 220    Volume (mL) (lacosamide (VIMPAT) 100 mg in  mL ivpb) 200 -- 200 220 -- 220    Volume (mL) (levETIRAcetam (KEPPRA) 1,000 mg in  mL IVPB) 100 -- 100 -- -- --    Total Intake  280 -- 280       Output    Urine  800  250 1050  --  -- --    Number of Times Voided -- 1 x 1 x -- -- --    Number of Times Incontinent of Urine -- 2 x 2 x -- -- --    Urine Void (mL) -- 250 250 -- -- --    Output (mL) ([REMOVED] Urethral Catheter Temperature probe 16 Fr.) 800 -- 800 -- -- --     Total Output  -- -- --       Net I/O     70 110 180 280 -- 280            Intake/Output Summary (Last 24 hours) at 4/24/2020 1147  Last data filed at 4/24/2020 0900  Gross per 24 hour   Intake 1320 ml   Output 750 ml   Net 570 ml       $ Bladder Scan Results (mL): 405    • dexamethasone  4 mg Q12HRS   • Pharmacy  1 Each PHARMACY TO DOSE   • amLODIPine  5 mg DAILY   • docusate sodium 100mg/10mL  100 mg BID   • magnesium hydroxide  30 mL DAILY   • polyethylene glycol/lytes  1 Packet BID   • rosuvastatin  10 mg QHS   • senna-docusate  1 Tab Nightly   • acetaminophen  650 mg Q6HRS PRN   • oxyCODONE immediate release  10 mg Q3HRS PRN   • oxyCODONE immediate-release  5 mg Q3HRS PRN   • senna-docusate  1 Tab Q24HRS PRN   • lacosamide (VIMPAT) ivpb  100 mg Q12HRS   • NS   Continuous   • levETIRAcetam (KEPPRA) IV  1,000 mg Q12HRS   • Respiratory Therapy Consult   Continuous RT   • bisacodyl  10 mg Q24HRS PRN   • fleet  1 Each Once PRN   • morphine injection  1-4 mg Q3HRS PRN   • labetalol  10 mg Q4HRS PRN   • hydrALAZINE  20 mg Q6HRS PRN   • ondansetron  4 mg Q4HRS PRN   • Pharmacy Consult Request  1 Each PHARMACY TO DOSE       Assessment and Plan:  Hospital day #7, POD #7 right crani for sdh  Prophylactic anticoagulation: no         Start date/time: tbd  Neuro exam improved   keppra 1000bid/vimpat  q4h neuro checks  CT stable 4/21  Appreciate neurology's input  Oob, pt,ot   decadron 4 bid continue until office f/u in 1 week for new ct and staple removal  Ok to transfer to floor and to rehab

## 2020-04-24 NOTE — PROGRESS NOTES
Trauma / Surgical Daily Progress Note    Date of Service  4/24/2020    Chief Complaint  74 y.o. male admitted 4/17/2020 with Trauma  Fall in bathtub 8 days prior to admission  SDH  Interval Events  Medically cleared for transfer to Neuroscience  Neurosurgery cleared pt for q4 hour vitals  Hospital day #7 POD #7 right crani    4/21 Neurosurgery consult completed  [id number] BID/vimpat  Q 4hours neuro cks  Therapies.         Review of Systems  Review of Systems   Constitutional: Negative for fever.        Vital Signs  Temp:  [36.7 °C (98 °F)-36.9 °C (98.4 °F)] 36.8 °C (98.2 °F)  Pulse:  [] 76  Resp:  [13-70] 41  BP: (103-158)/(52-91) 120/65  SpO2:  [92 %-98 %] 94 %    Physical Exam  Physical Exam  Vitals signs and nursing note reviewed.   Constitutional:       Appearance: Normal appearance. He is not ill-appearing.      Interventions: Nasal cannula in place.   HENT:      Head: Normocephalic.      Comments: Right craniotomy incision is clean and dry.     Nose: Nose normal.      Mouth/Throat:      Mouth: Mucous membranes are moist.      Pharynx: Oropharynx is clear.   Eyes:      Extraocular Movements: Extraocular movements intact.      Conjunctiva/sclera: Conjunctivae normal.      Pupils: Pupils are equal, round, and reactive to light.   Neck:      Musculoskeletal: Normal range of motion and neck supple. No muscular tenderness.   Cardiovascular:      Rate and Rhythm: Normal rate and regular rhythm.      Pulses: Normal pulses.   Pulmonary:      Effort: Pulmonary effort is normal. No respiratory distress.      Breath sounds: Normal breath sounds. No wheezing.   Chest:      Chest wall: No tenderness.   Abdominal:      General: There is no distension.      Palpations: Abdomen is soft.      Tenderness: There is no guarding.   Genitourinary:     Comments: Graham to gravity.  Musculoskeletal: Normal range of motion.         General: No swelling, tenderness or deformity.   Skin:     General: Skin is warm and dry.       Capillary Refill: Capillary refill takes less than 2 seconds.   Neurological:      General: No focal deficit present.      Mental Status: He is alert and oriented to person, place, and time.      Cranial Nerves: No cranial nerve deficit.      Sensory: No sensory deficit.      Motor: Weakness (LUE) present.      Coordination: Coordination abnormal (LUE).      Gait: Gait normal.      Deep Tendon Reflexes: Reflexes normal.   Psychiatric:         Mood and Affect: Mood normal.         Behavior: Behavior normal. Behavior is cooperative.         Thought Content: Thought content normal.         Judgment: Judgment normal.         Laboratory  Recent Results (from the past 24 hour(s))   CBC WITH DIFFERENTIAL    Collection Time: 04/24/20  5:30 AM   Result Value Ref Range    WBC 8.7 4.8 - 10.8 K/uL    RBC 4.63 (L) 4.70 - 6.10 M/uL    Hemoglobin 13.8 (L) 14.0 - 18.0 g/dL    Hematocrit 39.8 (L) 42.0 - 52.0 %    MCV 86.0 81.4 - 97.8 fL    MCH 29.8 27.0 - 33.0 pg    MCHC 34.7 33.7 - 35.3 g/dL    RDW 40.6 35.9 - 50.0 fL    Platelet Count 237 164 - 446 K/uL    MPV 9.6 9.0 - 12.9 fL    Neutrophils-Polys 66.40 44.00 - 72.00 %    Lymphocytes 21.20 (L) 22.00 - 41.00 %    Monocytes 10.40 0.00 - 13.40 %    Eosinophils 0.70 0.00 - 6.90 %    Basophils 0.20 0.00 - 1.80 %    Immature Granulocytes 1.10 (H) 0.00 - 0.90 %    Nucleated RBC 0.00 /100 WBC    Neutrophils (Absolute) 5.78 1.82 - 7.42 K/uL    Lymphs (Absolute) 1.85 1.00 - 4.80 K/uL    Monos (Absolute) 0.91 (H) 0.00 - 0.85 K/uL    Eos (Absolute) 0.06 0.00 - 0.51 K/uL    Baso (Absolute) 0.02 0.00 - 0.12 K/uL    Immature Granulocytes (abs) 0.10 0.00 - 0.11 K/uL    NRBC (Absolute) 0.00 K/uL   Comp Metabolic Panel    Collection Time: 04/24/20  5:30 AM   Result Value Ref Range    Sodium 140 135 - 145 mmol/L    Potassium 3.3 (L) 3.6 - 5.5 mmol/L    Chloride 106 96 - 112 mmol/L    Co2 22 20 - 33 mmol/L    Anion Gap 12.0 7.0 - 16.0    Glucose 102 (H) 65 - 99 mg/dL    Bun 27 (H) 8 - 22 mg/dL     Creatinine 0.77 0.50 - 1.40 mg/dL    Calcium 8.4 (L) 8.5 - 10.5 mg/dL    AST(SGOT) 24 12 - 45 U/L    ALT(SGPT) 33 2 - 50 U/L    Alkaline Phosphatase 55 30 - 99 U/L    Total Bilirubin 0.6 0.1 - 1.5 mg/dL    Albumin 3.4 3.2 - 4.9 g/dL    Total Protein 6.2 6.0 - 8.2 g/dL    Globulin 2.8 1.9 - 3.5 g/dL    A-G Ratio 1.2 g/dL   ESTIMATED GFR    Collection Time: 04/24/20  5:30 AM   Result Value Ref Range    GFR If African American >60 >60 mL/min/1.73 m 2    GFR If Non African American >60 >60 mL/min/1.73 m 2       Fluids    Intake/Output Summary (Last 24 hours) at 4/24/2020 1220  Last data filed at 4/24/2020 0900  Gross per 24 hour   Intake 1260 ml   Output 600 ml   Net 660 ml       Core Measures & Quality Metrics  Labs reviewed, Medications reviewed and Radiology images reviewed  Graham catheter: Urinary Tract Retention or Urinary Tract Obstruction      DVT Prophylaxis: Contraindicated - High bleeding risk  DVT prophylaxis - mechanical: SCDs  Ulcer prophylaxis: Not indicated    Assessed for rehab: Patient returned to prior level of function, rehabilitation not indicated at this time    RAP Score Total: 6    ETOH Screening  CAGE Score: 0  Assessment complete date: 4/18/2020        Assessment/Plan  Impaired mobility and ADLs- (present on admission)  Assessment & Plan  Patient is a good candidate for inpatient rehab based on needs for PT, OT, and speech therapy.     Patient is blind and will need some support on DC to make sure he can get back into his routine    Oropharyngeal dysphagia  Assessment & Plan  4/21 Failed swallow evaluation with SLP / NPO  Place cortrack tube  4/22 tube feeds at goal.    Seizures, post-traumatic (HCC)  Assessment & Plan  Fall with SDH - 8 days prior to admission  On Keppra 1000 mg q 12 h  4/21 Seizure while OOB to chair x 2 with post icthal  EEG x 25 min  Add Vimapt  4/23 no seizure activity > 48 hr  Neurology consult - Manjit Christy MD    Subdural hematoma (HCC)- (present on  admission)  Assessment & Plan  Large right-sided acute on chronic subdural hematoma.  4/17 Right-sided craniotomy for evacuation of subdural hematoma.  4/20 subdural drain removed  4/21 Head CT stable  PT / OT evaluations  Speech Language Pathology cognitive evaluation.  4/22 Physiatry consult.  Anthony Mendiola MD. Neurosurgeon. [facility].    Urinary retention  Assessment & Plan  4/19 BLadder scan with > 400 cc and unable to void  Graham to gravity.    Contraindication to deep vein thrombosis (DVT) prophylaxis- (present on admission)  Assessment & Plan  Systemic anticoagulation contraindicated secondary to elevated bleeding risk and until subdural drain removed.  4/19 Trauma surveillance venous duplex - no DVT    Essential hypertension- (present on admission)  Assessment & Plan  Chronic condition treated with metoprolol and amlodipine.  4/18 Resumed amlodipine.   Intermittent sinus arrhythmia.  Currently holding metoprolol.    Hypercholesterolemia- (present on admission)  Assessment & Plan  Chronic condition treated with rosuvastatin.  4/18 Resumed maintenance medication.    Screening examination for infectious disease- (present on admission)  Assessment & Plan  4/17 COVID-19 Screening completed.  No fever, pulmonary symptoms, contact with infected individual, and travel to/from a high risk region.    Trauma- (present on admission)  Assessment & Plan  Fall in bathtub 8 days prior to admission.  T-5000 Activation transfer from [facility].  James Felipe MD. Trauma Surgery.    Platelet dysfunction due to aspirin (HCC)- (present on admission)  Assessment & Plan  Takes daily aspirin.  Transfused 1 unit of platelets at the referring facility.  Admission TEG with platelet mapping demonstrated, 53.4% AA inhibition.  No additional platelet transfusion indicated.        Discussed patient condition with RN, Patient and trauma surgery. Dr. Phuong SOLIS saw and evaluated the patient and discussed his  management with the trauma APRN, Britton James. I reviewed the APRNs note and agree with the documented findings and plan of care. On exam he is neurologically intact and appropriate for floor transfer.    Kirill Baca MD

## 2020-04-24 NOTE — PROGRESS NOTES
Trauma / Surgical Daily Progress Note    Date of Service  4/23/2020    Chief Complaint  74 y.o. male admitted 4/17/2020 with Trauma - [facility]    Interval Events    Patient awake alert and cooperative  No further seizure activity -no seizures in greater than 48 hours  Cortrack feeding tube remains in place with tube feeds at goal  Patient motivated and working with therapies    Review of Systems  Review of Systems   Constitutional: Negative.    HENT: Negative.    Eyes: Negative.    Respiratory: Negative.    Cardiovascular: Negative.    Gastrointestinal: Negative.    Endocrine: Negative.    Genitourinary: Negative.    Musculoskeletal: Negative.    Skin: Negative.    Allergic/Immunologic: Negative.    Neurological: Positive for headaches (mild headache). Negative for dizziness, speech difficulty, weakness and numbness.   Hematological: Negative.    Psychiatric/Behavioral: Negative.         Vital Signs for last 24 hours  Pulse:  [62-99] 64  Resp:  [14-70] 14  BP: (115-156)/(55-91) 145/67  SpO2:  [91 %-98 %] 97 %    Hemodynamic parameters for last 24 hours       Respiratory Data     Respiration: 14, Pulse Oximetry: 97 %        RUL Breath Sounds: Clear, RML Breath Sounds: Clear, RLL Breath Sounds: Diminished, CARLENE Breath Sounds: Clear, LLL Breath Sounds: Diminished    Physical Exam  Physical Exam  Vitals signs and nursing note reviewed.   Constitutional:       Appearance: Normal appearance. He is not ill-appearing.      Interventions: Nasal cannula in place.   HENT:      Head: Normocephalic.      Comments: Right craniotomy incision is clean and dry.     Nose: Nose normal.      Mouth/Throat:      Mouth: Mucous membranes are moist.      Pharynx: Oropharynx is clear.   Eyes:      General: No scleral icterus.     Extraocular Movements: Extraocular movements intact.      Conjunctiva/sclera: Conjunctivae normal.      Pupils: Pupils are equal, round, and reactive to light.   Neck:      Musculoskeletal: Normal range of motion and neck  supple. No muscular tenderness.   Cardiovascular:      Rate and Rhythm: Normal rate and regular rhythm.      Pulses: Normal pulses.   Pulmonary:      Effort: Pulmonary effort is normal. No respiratory distress.      Breath sounds: Normal breath sounds. No wheezing.   Chest:      Chest wall: No tenderness.   Abdominal:      General: There is no distension.      Palpations: Abdomen is soft.      Tenderness: There is no abdominal tenderness. There is no guarding.   Genitourinary:     Comments: Graham to gravity.  Musculoskeletal: Normal range of motion.         General: No swelling, tenderness or deformity.   Skin:     General: Skin is warm and dry.      Capillary Refill: Capillary refill takes less than 2 seconds.   Neurological:      General: No focal deficit present.      Mental Status: He is alert and oriented to person, place, and time.      Cranial Nerves: No cranial nerve deficit.      Sensory: No sensory deficit.      Motor: Weakness (LUE) present.      Coordination: Coordination abnormal (LUE).      Gait: Gait normal.      Deep Tendon Reflexes: Reflexes normal.   Psychiatric:         Mood and Affect: Mood normal.         Behavior: Behavior normal. Behavior is cooperative.         Thought Content: Thought content normal.         Judgment: Judgment normal.         Laboratory  Recent Results (from the past 24 hour(s))   CBC WITH DIFFERENTIAL    Collection Time: 04/23/20  4:55 AM   Result Value Ref Range    WBC 11.3 (H) 4.8 - 10.8 K/uL    RBC 4.68 (L) 4.70 - 6.10 M/uL    Hemoglobin 13.9 (L) 14.0 - 18.0 g/dL    Hematocrit 40.5 (L) 42.0 - 52.0 %    MCV 86.5 81.4 - 97.8 fL    MCH 29.7 27.0 - 33.0 pg    MCHC 34.3 33.7 - 35.3 g/dL    RDW 40.0 35.9 - 50.0 fL    Platelet Count 252 164 - 446 K/uL    MPV 9.8 9.0 - 12.9 fL    Neutrophils-Polys 79.50 (H) 44.00 - 72.00 %    Lymphocytes 8.80 (L) 22.00 - 41.00 %    Monocytes 10.70 0.00 - 13.40 %    Eosinophils 0.00 0.00 - 6.90 %    Basophils 0.20 0.00 - 1.80 %    Immature  Granulocytes 0.80 0.00 - 0.90 %    Nucleated RBC 0.00 /100 WBC    Neutrophils (Absolute) 9.02 (H) 1.82 - 7.42 K/uL    Lymphs (Absolute) 1.00 1.00 - 4.80 K/uL    Monos (Absolute) 1.21 (H) 0.00 - 0.85 K/uL    Eos (Absolute) 0.00 0.00 - 0.51 K/uL    Baso (Absolute) 0.02 0.00 - 0.12 K/uL    Immature Granulocytes (abs) 0.09 0.00 - 0.11 K/uL    NRBC (Absolute) 0.00 K/uL   Comp Metabolic Panel    Collection Time: 04/23/20  4:55 AM   Result Value Ref Range    Sodium 138 135 - 145 mmol/L    Potassium 3.7 3.6 - 5.5 mmol/L    Chloride 104 96 - 112 mmol/L    Co2 24 20 - 33 mmol/L    Anion Gap 10.0 7.0 - 16.0    Glucose 147 (H) 65 - 99 mg/dL    Bun 37 (H) 8 - 22 mg/dL    Creatinine 0.90 0.50 - 1.40 mg/dL    Calcium 9.0 8.5 - 10.5 mg/dL    AST(SGOT) 19 12 - 45 U/L    ALT(SGPT) 21 2 - 50 U/L    Alkaline Phosphatase 57 30 - 99 U/L    Total Bilirubin 0.4 0.1 - 1.5 mg/dL    Albumin 3.4 3.2 - 4.9 g/dL    Total Protein 6.4 6.0 - 8.2 g/dL    Globulin 3.0 1.9 - 3.5 g/dL    A-G Ratio 1.1 g/dL   MAGNESIUM    Collection Time: 04/23/20  4:55 AM   Result Value Ref Range    Magnesium 2.4 1.5 - 2.5 mg/dL   PHOSPHORUS    Collection Time: 04/23/20  4:55 AM   Result Value Ref Range    Phosphorus 2.4 (L) 2.5 - 4.5 mg/dL   ESTIMATED GFR    Collection Time: 04/23/20  4:55 AM   Result Value Ref Range    GFR If African American >60 >60 mL/min/1.73 m 2    GFR If Non African American >60 >60 mL/min/1.73 m 2       Fluids    Intake/Output Summary (Last 24 hours) at 4/23/2020 1840  Last data filed at 4/23/2020 1806  Gross per 24 hour   Intake 1920 ml   Output 1825 ml   Net 95 ml       Core Measures & Quality Metrics  Labs reviewed, Medications reviewed and Radiology images reviewed  Graham catheter: Urinary Tract Retention or Urinary Tract Obstruction      DVT Prophylaxis: Contraindicated - High bleeding risk  DVT prophylaxis - mechanical: SCDs  Ulcer prophylaxis: Not indicated        FADI Score  ETOH Screening    Assessment/Plan  Impaired mobility and ADLs-  (present on admission)  Assessment & Plan  Patient is a good candidate for inpatient rehab based on needs for PT, OT, and speech therapy.     Patient is blind and will need some support on DC to make sure he can get back into his routine.     Oropharyngeal dysphagia  Assessment & Plan  4/21 Failed swallow evaluation with SLP / NPO  Place cortrack tube  4/22 tube feeds at goal    Seizures, post-traumatic (HCC)  Assessment & Plan  Fall with SDH - 8 days prior to admission  On Keppra 1000 mg q 12 h  4/21 Seizure while OOB to chair x 2 with post icthal  EEG x 25 min  Add Vimapt  4/23 no seizure activity > 48 hr  Neurology consult - Manjit Christy MD    Subdural hematoma (HCC)- (present on admission)  Assessment & Plan  Large right-sided acute on chronic subdural hematoma.  4/17 Right-sided craniotomy for evacuation of subdural hematoma.  4/20 subdural drain removed  4/21 Head CT stable  PT / OT evaluations  Speech Language Pathology cognitive evaluation.  4/22 Physiatry consult  Anthony Mendiola MD. Neurosurgeon. [facility].    Urinary retention  Assessment & Plan  4/19 BLadder scan with > 400 cc and unable to void  Graham to gravity.    Contraindication to deep vein thrombosis (DVT) prophylaxis- (present on admission)  Assessment & Plan  Systemic anticoagulation contraindicated secondary to elevated bleeding risk and until subdural drain removed.  4/19 Trauma surveillance venous duplex - no DVT    Essential hypertension- (present on admission)  Assessment & Plan  Chronic condition treated with metoprolol and amlodipine.  4/18 Resumed amlodipine.   Intermittent sinus arrhythmia.  Currently holding metoprolol.    Hypercholesterolemia- (present on admission)  Assessment & Plan  Chronic condition treated with rosuvastatin.  4/18 Resumed maintenance medication.    Screening examination for infectious disease- (present on admission)  Assessment & Plan  4/17 COVID-19 Screening completed.  No fever, pulmonary  symptoms, contact with infected individual, and travel to/from a high risk region.    Trauma- (present on admission)  Assessment & Plan  Fall in bathtub 8 days prior to admission.  T-5000 Activation transfer from [facility].  James Felipe MD. Trauma Surgery.    Platelet dysfunction due to aspirin (HCC)- (present on admission)  Assessment & Plan  Takes daily aspirin.  Transfused 1 unit of platelets at the referring facility.  Admission TEG with platelet mapping demonstrated, 53.4% AA inhibition.  No additional platelet transfusion indicated.      Discussed patient condition with RN, RT, Therapies, Pharmacy, Patient and neurosurgery.  CRITICAL CARE TIME EXCLUDING PROCEDURES: 36  minutes

## 2020-04-24 NOTE — CARE PLAN
Problem: Mobility:  Goal: Mobility will improve  Outcome: PROGRESSING AS EXPECTED  PT/OT involved with patient, working towards goal of rehab at discharge, social work following closely for discharge planning/assistance at home.     Problem: Self-Care:  Goal: Verbalization of feelings and concerns over difficulty with self-care will improve  Outcome: PROGRESSING AS EXPECTED  Social work working with patient to address pts concerns of having assistance at home after discharge.

## 2020-04-24 NOTE — THERAPY
"Physical Therapy Treatment completed.   Bed Mobility:  Supine to Sit: (NT, in chair pre/post)  Transfers: Sit to Stand: Minimal Assist(with FWW x 5 reps with progression to SBA )  Gait: Level Of Assist: Minimal Assist with Front-Wheel Walker x200ft, cues for attention to task and left hand use throughout      Plan of Care: Will benefit from Physical Therapy 5 times per week  Discharge Recommendations: Equipment: Will Continue to Assess for Equipment Needs    Pt with improving upright abilities; able to perform 2 step commands with focus of using left UE functionally during walk; highly distracteable causing imbalance with turns and does not allows attend to cues when upright; very pleasant and motivated; excellent rehab candidate, pt reports he has a friend that is a former home health aide that he will reach out to ASAP to see if increased discharge support can be obtained; otherwise, recommend SNF prior to returning home. Will follow.     See \"Rehab Therapy-Acute\" Patient Summary Report for complete documentation.       "

## 2020-04-24 NOTE — THERAPY
"Speech Language Therapy dysphagia treatment completed.   Functional Status:  Pt sitting up in ros chair with tx completed during bfast meal. Pt requires moderate verbal cues to focus attention on his meal. Probable verbosity noted with right brain cognitive deficits-cog eval to be completed. Pt triggering 1-2 swallows per approx 1 tsp bolus. Slight lingual residue. Pt with two episodes of muffled sounding voice. Pt educ regarding throat clearing and dry swallow with improved vocal quality noted. SLP collaborated with nurs regarding pt's concerns for his discharge options. Cont MM5/MT2 texture with assist and super as needed.  Recommendations: see above.   Plan of Care: Will benefit from Speech Therapy 5 times per week  Post-Acute Therapy: Discharge to a transitional care facility for continued skilled therapy services.Thanks, Cipriano    See \"Rehab Therapy-Acute\" Patient Summary Report for complete documentation.     "

## 2020-04-24 NOTE — ASSESSMENT & PLAN NOTE
4/22 Physiatry consult completed. Patient is a good candidate for inpatient rehab.  Bradley Valerio DO. Physical Medicine and Rehabilitation.

## 2020-04-24 NOTE — CARE PLAN
Problem: Nutritional:  Goal: Nutrition support tolerated and meeting greater than 85% of estimated needs  Outcome: MET     Tube feedings was at goal and now discontinued. Diet started.

## 2020-04-24 NOTE — DIETARY
Nutrition services: pt seen by SLP yesterday. Tube feeding discontinued and minced and moist mildly thick liquid diet started.  Pt to be seen daily by nutrition representative for meal preferences.

## 2020-04-25 ENCOUNTER — HOSPITAL ENCOUNTER (INPATIENT)
Facility: REHABILITATION | Age: 75
LOS: 16 days | DRG: 949 | End: 2020-05-11
Attending: PHYSICAL MEDICINE & REHABILITATION | Admitting: PHYSICAL MEDICINE & REHABILITATION
Payer: MEDICARE

## 2020-04-25 VITALS
SYSTOLIC BLOOD PRESSURE: 137 MMHG | BODY MASS INDEX: 25.24 KG/M2 | TEMPERATURE: 97.5 F | WEIGHT: 170.42 LBS | RESPIRATION RATE: 19 BRPM | HEIGHT: 69 IN | OXYGEN SATURATION: 98 % | DIASTOLIC BLOOD PRESSURE: 66 MMHG | HEART RATE: 80 BPM

## 2020-04-25 DIAGNOSIS — I62.03 ACUTE ON CHRONIC INTRACRANIAL SUBDURAL HEMATOMA (HCC): ICD-10-CM

## 2020-04-25 DIAGNOSIS — Z78.9 IMPAIRED MOBILITY AND ADLS: ICD-10-CM

## 2020-04-25 DIAGNOSIS — S01.90XD SUBDURAL HEMORRHAGE FOLLOWING INJURY, WITH OPEN INTRACRANIAL WOUND, WITH NO LOSS OF CONSCIOUSNESS, SUBSEQUENT ENCOUNTER: ICD-10-CM

## 2020-04-25 DIAGNOSIS — T39.015A PLATELET DYSFUNCTION DUE TO ASPIRIN (HCC): ICD-10-CM

## 2020-04-25 DIAGNOSIS — S06.5XAA SUBDURAL HEMATOMA (HCC): ICD-10-CM

## 2020-04-25 DIAGNOSIS — R13.12 OROPHARYNGEAL DYSPHAGIA: ICD-10-CM

## 2020-04-25 DIAGNOSIS — Z11.9 SCREENING EXAMINATION FOR INFECTIOUS DISEASE: ICD-10-CM

## 2020-04-25 DIAGNOSIS — S06.5X9A TRAUMATIC SUBDURAL HEMATOMA WITH LOSS OF CONSCIOUSNESS, INITIAL ENCOUNTER (HCC): ICD-10-CM

## 2020-04-25 DIAGNOSIS — I69.393 ATAXIA DUE TO RECENT CEREBROVASCULAR ACCIDENT (CVA): ICD-10-CM

## 2020-04-25 DIAGNOSIS — I10 ESSENTIAL HYPERTENSION: ICD-10-CM

## 2020-04-25 DIAGNOSIS — I62.01 ACUTE ON CHRONIC INTRACRANIAL SUBDURAL HEMATOMA (HCC): ICD-10-CM

## 2020-04-25 DIAGNOSIS — T14.90XA TRAUMA: ICD-10-CM

## 2020-04-25 DIAGNOSIS — R27.0 ATAXIA: ICD-10-CM

## 2020-04-25 DIAGNOSIS — R56.1 SEIZURES, POST-TRAUMATIC (HCC): ICD-10-CM

## 2020-04-25 DIAGNOSIS — S06.5X0D SUBDURAL HEMORRHAGE FOLLOWING INJURY, WITH OPEN INTRACRANIAL WOUND, WITH NO LOSS OF CONSCIOUSNESS, SUBSEQUENT ENCOUNTER: ICD-10-CM

## 2020-04-25 DIAGNOSIS — Z53.09 CONTRAINDICATION TO DEEP VEIN THROMBOSIS (DVT) PROPHYLAXIS: ICD-10-CM

## 2020-04-25 DIAGNOSIS — I63.9 CEREBELLAR STROKE (HCC): ICD-10-CM

## 2020-04-25 DIAGNOSIS — Z74.09 IMPAIRED MOBILITY AND ADLS: ICD-10-CM

## 2020-04-25 DIAGNOSIS — I10 HYPERTENSION, UNSPECIFIED TYPE: ICD-10-CM

## 2020-04-25 DIAGNOSIS — H54.8 LEGALLY BLIND: ICD-10-CM

## 2020-04-25 DIAGNOSIS — D69.1 PLATELET DYSFUNCTION DUE TO ASPIRIN (HCC): ICD-10-CM

## 2020-04-25 DIAGNOSIS — R33.9 URINARY RETENTION: ICD-10-CM

## 2020-04-25 DIAGNOSIS — E78.00 HYPERCHOLESTEROLEMIA: ICD-10-CM

## 2020-04-25 LAB
ALBUMIN SERPL BCP-MCNC: 3.6 G/DL (ref 3.2–4.9)
ALBUMIN/GLOB SERPL: 1.4 G/DL
ALP SERPL-CCNC: 58 U/L (ref 30–99)
ALT SERPL-CCNC: 36 U/L (ref 2–50)
ANION GAP SERPL CALC-SCNC: 12 MMOL/L (ref 7–16)
AST SERPL-CCNC: 24 U/L (ref 12–45)
BASOPHILS # BLD AUTO: 0.2 % (ref 0–1.8)
BASOPHILS # BLD: 0.02 K/UL (ref 0–0.12)
BILIRUB SERPL-MCNC: 0.8 MG/DL (ref 0.1–1.5)
BUN SERPL-MCNC: 26 MG/DL (ref 8–22)
CALCIUM SERPL-MCNC: 9.1 MG/DL (ref 8.5–10.5)
CHLORIDE SERPL-SCNC: 101 MMOL/L (ref 96–112)
CO2 SERPL-SCNC: 23 MMOL/L (ref 20–33)
CREAT SERPL-MCNC: 0.77 MG/DL (ref 0.5–1.4)
EKG IMPRESSION: NORMAL
EOSINOPHIL # BLD AUTO: 0.02 K/UL (ref 0–0.51)
EOSINOPHIL NFR BLD: 0.2 % (ref 0–6.9)
ERYTHROCYTE [DISTWIDTH] IN BLOOD BY AUTOMATED COUNT: 39.8 FL (ref 35.9–50)
GLOBULIN SER CALC-MCNC: 2.6 G/DL (ref 1.9–3.5)
GLUCOSE SERPL-MCNC: 123 MG/DL (ref 65–99)
HCT VFR BLD AUTO: 42.2 % (ref 42–52)
HGB BLD-MCNC: 14.3 G/DL (ref 14–18)
IMM GRANULOCYTES # BLD AUTO: 0.2 K/UL (ref 0–0.11)
IMM GRANULOCYTES NFR BLD AUTO: 2.2 % (ref 0–0.9)
LYMPHOCYTES # BLD AUTO: 1.41 K/UL (ref 1–4.8)
LYMPHOCYTES NFR BLD: 15.3 % (ref 22–41)
MCH RBC QN AUTO: 29.6 PG (ref 27–33)
MCHC RBC AUTO-ENTMCNC: 33.9 G/DL (ref 33.7–35.3)
MCV RBC AUTO: 87.4 FL (ref 81.4–97.8)
MONOCYTES # BLD AUTO: 0.78 K/UL (ref 0–0.85)
MONOCYTES NFR BLD AUTO: 8.5 % (ref 0–13.4)
NEUTROPHILS # BLD AUTO: 6.79 K/UL (ref 1.82–7.42)
NEUTROPHILS NFR BLD: 73.6 % (ref 44–72)
NRBC # BLD AUTO: 0 K/UL
NRBC BLD-RTO: 0 /100 WBC
PLATELET # BLD AUTO: 243 K/UL (ref 164–446)
PMV BLD AUTO: 9.7 FL (ref 9–12.9)
POTASSIUM SERPL-SCNC: 4.2 MMOL/L (ref 3.6–5.5)
PROT SERPL-MCNC: 6.2 G/DL (ref 6–8.2)
RBC # BLD AUTO: 4.83 M/UL (ref 4.7–6.1)
SODIUM SERPL-SCNC: 136 MMOL/L (ref 135–145)
TROPONIN T SERPL-MCNC: 10 NG/L (ref 6–19)
WBC # BLD AUTO: 9.2 K/UL (ref 4.8–10.8)

## 2020-04-25 PROCEDURE — 93005 ELECTROCARDIOGRAM TRACING: CPT | Performed by: NURSE PRACTITIONER

## 2020-04-25 PROCEDURE — 80053 COMPREHEN METABOLIC PANEL: CPT

## 2020-04-25 PROCEDURE — 700111 HCHG RX REV CODE 636 W/ 250 OVERRIDE (IP): Performed by: NEUROLOGICAL SURGERY

## 2020-04-25 PROCEDURE — 94760 N-INVAS EAR/PLS OXIMETRY 1: CPT

## 2020-04-25 PROCEDURE — 93010 ELECTROCARDIOGRAM REPORT: CPT | Performed by: INTERNAL MEDICINE

## 2020-04-25 PROCEDURE — C9254 INJECTION, LACOSAMIDE: HCPCS | Performed by: PSYCHIATRY & NEUROLOGY

## 2020-04-25 PROCEDURE — A9270 NON-COVERED ITEM OR SERVICE: HCPCS | Performed by: SURGERY

## 2020-04-25 PROCEDURE — 700105 HCHG RX REV CODE 258: Performed by: INTERNAL MEDICINE

## 2020-04-25 PROCEDURE — 84484 ASSAY OF TROPONIN QUANT: CPT

## 2020-04-25 PROCEDURE — 700102 HCHG RX REV CODE 250 W/ 637 OVERRIDE(OP): Performed by: PHYSICAL MEDICINE & REHABILITATION

## 2020-04-25 PROCEDURE — 99223 1ST HOSP IP/OBS HIGH 75: CPT | Mod: AI | Performed by: PHYSICAL MEDICINE & REHABILITATION

## 2020-04-25 PROCEDURE — 700111 HCHG RX REV CODE 636 W/ 250 OVERRIDE (IP): Performed by: NURSE PRACTITIONER

## 2020-04-25 PROCEDURE — 700111 HCHG RX REV CODE 636 W/ 250 OVERRIDE (IP): Performed by: INTERNAL MEDICINE

## 2020-04-25 PROCEDURE — 85025 COMPLETE CBC W/AUTO DIFF WBC: CPT

## 2020-04-25 PROCEDURE — 700105 HCHG RX REV CODE 258: Performed by: PSYCHIATRY & NEUROLOGY

## 2020-04-25 PROCEDURE — 36415 COLL VENOUS BLD VENIPUNCTURE: CPT

## 2020-04-25 PROCEDURE — A9270 NON-COVERED ITEM OR SERVICE: HCPCS | Performed by: PHYSICAL MEDICINE & REHABILITATION

## 2020-04-25 PROCEDURE — 770010 HCHG ROOM/CARE - REHAB SEMI PRIVAT*

## 2020-04-25 PROCEDURE — 700102 HCHG RX REV CODE 250 W/ 637 OVERRIDE(OP): Performed by: SURGERY

## 2020-04-25 PROCEDURE — 700111 HCHG RX REV CODE 636 W/ 250 OVERRIDE (IP): Performed by: PSYCHIATRY & NEUROLOGY

## 2020-04-25 RX ORDER — ROSUVASTATIN CALCIUM 10 MG/1
10 TABLET, COATED ORAL
Status: DISCONTINUED | OUTPATIENT
Start: 2020-04-25 | End: 2020-05-11 | Stop reason: HOSPADM

## 2020-04-25 RX ORDER — LEVETIRACETAM 500 MG/1
1000 TABLET ORAL 2 TIMES DAILY
Status: DISCONTINUED | OUTPATIENT
Start: 2020-04-25 | End: 2020-05-11 | Stop reason: HOSPADM

## 2020-04-25 RX ORDER — LACOSAMIDE 100 MG/1
100 TABLET ORAL 2 TIMES DAILY
Status: DISCONTINUED | OUTPATIENT
Start: 2020-04-25 | End: 2020-05-11 | Stop reason: HOSPADM

## 2020-04-25 RX ORDER — ALUMINA, MAGNESIA, AND SIMETHICONE 2400; 2400; 240 MG/30ML; MG/30ML; MG/30ML
20 SUSPENSION ORAL
Status: DISCONTINUED | OUTPATIENT
Start: 2020-04-25 | End: 2020-05-11 | Stop reason: HOSPADM

## 2020-04-25 RX ORDER — LEVETIRACETAM 500 MG/1
1000 TABLET ORAL 2 TIMES DAILY
Status: DISCONTINUED | OUTPATIENT
Start: 2020-04-25 | End: 2020-04-25 | Stop reason: HOSPADM

## 2020-04-25 RX ORDER — BISACODYL 10 MG
10 SUPPOSITORY, RECTAL RECTAL
Status: DISCONTINUED | OUTPATIENT
Start: 2020-04-25 | End: 2020-05-01

## 2020-04-25 RX ORDER — OXYCODONE HYDROCHLORIDE 5 MG/1
5 TABLET ORAL
Status: DISCONTINUED | OUTPATIENT
Start: 2020-04-25 | End: 2020-04-25 | Stop reason: HOSPADM

## 2020-04-25 RX ORDER — ROSUVASTATIN CALCIUM 5 MG/1
10 TABLET, COATED ORAL
Status: CANCELLED | OUTPATIENT
Start: 2020-04-25

## 2020-04-25 RX ORDER — ROSUVASTATIN CALCIUM 5 MG/1
10 TABLET, COATED ORAL
Status: DISCONTINUED | OUTPATIENT
Start: 2020-04-25 | End: 2020-04-25 | Stop reason: HOSPADM

## 2020-04-25 RX ORDER — DEXAMETHASONE 4 MG/1
4 TABLET ORAL 2 TIMES DAILY
Status: CANCELLED | OUTPATIENT
Start: 2020-04-25

## 2020-04-25 RX ORDER — AMLODIPINE BESYLATE 5 MG/1
5 TABLET ORAL DAILY
Status: DISCONTINUED | OUTPATIENT
Start: 2020-04-26 | End: 2020-04-25 | Stop reason: HOSPADM

## 2020-04-25 RX ORDER — BISACODYL 10 MG
10 SUPPOSITORY, RECTAL RECTAL
Refills: 0 | Status: ON HOLD
Start: 2020-04-25 | End: 2020-05-11

## 2020-04-25 RX ORDER — ONDANSETRON 2 MG/ML
4 INJECTION INTRAMUSCULAR; INTRAVENOUS EVERY 4 HOURS PRN
Status: ON HOLD
Start: 2020-04-25 | End: 2020-05-11

## 2020-04-25 RX ORDER — DOCUSATE SODIUM 50 MG/5ML
100 LIQUID ORAL 2 TIMES DAILY
Status: ON HOLD
Start: 2020-04-25 | End: 2020-05-11

## 2020-04-25 RX ORDER — DOCUSATE SODIUM 50 MG/5ML
100 LIQUID ORAL 2 TIMES DAILY
Status: DISCONTINUED | OUTPATIENT
Start: 2020-04-25 | End: 2020-04-25 | Stop reason: HOSPADM

## 2020-04-25 RX ORDER — TRAMADOL HYDROCHLORIDE 50 MG/1
50 TABLET ORAL EVERY 4 HOURS PRN
Status: DISCONTINUED | OUTPATIENT
Start: 2020-04-25 | End: 2020-05-11 | Stop reason: HOSPADM

## 2020-04-25 RX ORDER — ACETAMINOPHEN 325 MG/1
650 TABLET ORAL EVERY 4 HOURS PRN
Status: DISCONTINUED | OUTPATIENT
Start: 2020-04-25 | End: 2020-05-11 | Stop reason: HOSPADM

## 2020-04-25 RX ORDER — HYDRALAZINE HYDROCHLORIDE 25 MG/1
25 TABLET, FILM COATED ORAL EVERY 8 HOURS PRN
Status: DISCONTINUED | OUTPATIENT
Start: 2020-04-25 | End: 2020-05-11 | Stop reason: HOSPADM

## 2020-04-25 RX ORDER — DEXAMETHASONE 4 MG/1
4 TABLET ORAL 2 TIMES DAILY
Status: DISCONTINUED | OUTPATIENT
Start: 2020-04-25 | End: 2020-04-25 | Stop reason: HOSPADM

## 2020-04-25 RX ORDER — LACOSAMIDE 100 MG/1
100 TABLET ORAL 2 TIMES DAILY
Status: ON HOLD
Start: 2020-04-25 | End: 2020-05-11

## 2020-04-25 RX ORDER — OXYCODONE HYDROCHLORIDE 5 MG/1
5 TABLET ORAL
Status: DISCONTINUED | OUTPATIENT
Start: 2020-04-25 | End: 2020-05-11 | Stop reason: HOSPADM

## 2020-04-25 RX ORDER — ACETAMINOPHEN 325 MG/1
650 TABLET ORAL EVERY 6 HOURS PRN
Qty: 30 TAB | Refills: 0
Start: 2020-04-25

## 2020-04-25 RX ORDER — ACETAMINOPHEN 325 MG/1
650 TABLET ORAL EVERY 6 HOURS PRN
Status: DISCONTINUED | OUTPATIENT
Start: 2020-04-25 | End: 2020-04-25 | Stop reason: HOSPADM

## 2020-04-25 RX ORDER — LEVETIRACETAM 1000 MG/1
1000 TABLET ORAL 2 TIMES DAILY
Start: 2020-04-25 | End: 2021-01-12

## 2020-04-25 RX ORDER — POLYETHYLENE GLYCOL 3350 17 G/17G
1 POWDER, FOR SOLUTION ORAL 2 TIMES DAILY
Status: DISCONTINUED | OUTPATIENT
Start: 2020-04-25 | End: 2020-04-25 | Stop reason: HOSPADM

## 2020-04-25 RX ORDER — AMLODIPINE BESYLATE 5 MG/1
5 TABLET ORAL DAILY
Status: DISCONTINUED | OUTPATIENT
Start: 2020-04-26 | End: 2020-05-11 | Stop reason: HOSPADM

## 2020-04-25 RX ORDER — ONDANSETRON 4 MG/1
4 TABLET, ORALLY DISINTEGRATING ORAL 4 TIMES DAILY PRN
Status: DISCONTINUED | OUTPATIENT
Start: 2020-04-25 | End: 2020-05-11 | Stop reason: HOSPADM

## 2020-04-25 RX ORDER — DEXAMETHASONE 4 MG/1
4 TABLET ORAL 2 TIMES DAILY
Qty: 10 TAB | Refills: 0 | Status: ON HOLD
Start: 2020-04-25 | End: 2020-05-11

## 2020-04-25 RX ORDER — MORPHINE SULFATE 4 MG/ML
1-2 INJECTION, SOLUTION INTRAMUSCULAR; INTRAVENOUS
Status: DISCONTINUED | OUTPATIENT
Start: 2020-04-25 | End: 2020-04-25 | Stop reason: HOSPADM

## 2020-04-25 RX ORDER — ONDANSETRON 2 MG/ML
4 INJECTION INTRAMUSCULAR; INTRAVENOUS 4 TIMES DAILY PRN
Status: DISCONTINUED | OUTPATIENT
Start: 2020-04-25 | End: 2020-05-11 | Stop reason: HOSPADM

## 2020-04-25 RX ORDER — TRAZODONE HYDROCHLORIDE 50 MG/1
50 TABLET ORAL
Status: DISCONTINUED | OUTPATIENT
Start: 2020-04-25 | End: 2020-05-11 | Stop reason: HOSPADM

## 2020-04-25 RX ORDER — LACOSAMIDE 100 MG/1
100 TABLET ORAL 2 TIMES DAILY
Status: CANCELLED | OUTPATIENT
Start: 2020-04-25

## 2020-04-25 RX ORDER — POLYETHYLENE GLYCOL 3350 17 G/17G
1 POWDER, FOR SOLUTION ORAL
Status: DISCONTINUED | OUTPATIENT
Start: 2020-04-25 | End: 2020-05-01

## 2020-04-25 RX ORDER — ECHINACEA PURPUREA EXTRACT 125 MG
2 TABLET ORAL PRN
Status: DISCONTINUED | OUTPATIENT
Start: 2020-04-25 | End: 2020-05-11 | Stop reason: HOSPADM

## 2020-04-25 RX ORDER — LACOSAMIDE 100 MG/1
100 TABLET ORAL 2 TIMES DAILY
Status: DISCONTINUED | OUTPATIENT
Start: 2020-04-25 | End: 2020-04-25 | Stop reason: HOSPADM

## 2020-04-25 RX ORDER — LEVETIRACETAM 500 MG/1
1000 TABLET ORAL 2 TIMES DAILY
Status: CANCELLED | OUTPATIENT
Start: 2020-04-25

## 2020-04-25 RX ORDER — OXYCODONE HYDROCHLORIDE 10 MG/1
10 TABLET ORAL
Status: DISCONTINUED | OUTPATIENT
Start: 2020-04-25 | End: 2020-05-11 | Stop reason: HOSPADM

## 2020-04-25 RX ORDER — POLYVINYL ALCOHOL 14 MG/ML
1 SOLUTION/ DROPS OPHTHALMIC PRN
Status: DISCONTINUED | OUTPATIENT
Start: 2020-04-25 | End: 2020-05-11 | Stop reason: HOSPADM

## 2020-04-25 RX ORDER — AMLODIPINE BESYLATE 5 MG/1
5 TABLET ORAL DAILY
Status: CANCELLED | OUTPATIENT
Start: 2020-04-26

## 2020-04-25 RX ORDER — AMOXICILLIN 250 MG
2 CAPSULE ORAL 2 TIMES DAILY
Status: DISCONTINUED | OUTPATIENT
Start: 2020-04-25 | End: 2020-05-01

## 2020-04-25 RX ORDER — DEXAMETHASONE 4 MG/1
4 TABLET ORAL 2 TIMES DAILY
Status: DISCONTINUED | OUTPATIENT
Start: 2020-04-25 | End: 2020-05-06

## 2020-04-25 RX ORDER — METOPROLOL SUCCINATE 25 MG/1
TABLET, EXTENDED RELEASE ORAL 3 TIMES DAILY
Status: ON HOLD | COMMUNITY
Start: 2020-03-18 | End: 2020-05-11

## 2020-04-25 RX ORDER — AMOXICILLIN 250 MG
1 CAPSULE ORAL
Status: DISCONTINUED | OUTPATIENT
Start: 2020-04-25 | End: 2020-04-25 | Stop reason: HOSPADM

## 2020-04-25 RX ADMIN — ROSUVASTATIN CALCIUM 10 MG: 10 TABLET, FILM COATED ORAL at 20:27

## 2020-04-25 RX ADMIN — DOCUSATE SODIUM 50 MG AND SENNOSIDES 8.6 MG 2 TABLET: 8.6; 5 TABLET, FILM COATED ORAL at 20:27

## 2020-04-25 RX ADMIN — LACOSAMIDE 100 MG: 100 TABLET, FILM COATED ORAL at 20:27

## 2020-04-25 RX ADMIN — LEVETIRACETAM 1000 MG: 500 TABLET ORAL at 20:27

## 2020-04-25 RX ADMIN — LEVETIRACETAM 1000 MG: 100 INJECTION, SOLUTION, CONCENTRATE INTRAVENOUS at 05:34

## 2020-04-25 RX ADMIN — DEXAMETHASONE SODIUM PHOSPHATE 4 MG: 4 INJECTION, SOLUTION INTRA-ARTICULAR; INTRALESIONAL; INTRAMUSCULAR; INTRAVENOUS; SOFT TISSUE at 05:34

## 2020-04-25 RX ADMIN — ALUMINUM HYDROXIDE, MAGNESIUM HYDROXIDE, AND DIMETHICONE 20 ML: 400; 400; 40 SUSPENSION ORAL at 20:45

## 2020-04-25 RX ADMIN — SODIUM CHLORIDE 100 MG: 9 INJECTION, SOLUTION INTRAVENOUS at 06:05

## 2020-04-25 RX ADMIN — AMLODIPINE BESYLATE 5 MG: 10 TABLET ORAL at 05:32

## 2020-04-25 RX ADMIN — MORPHINE SULFATE 2 MG: 4 INJECTION INTRAVENOUS at 07:00

## 2020-04-25 RX ADMIN — MORPHINE SULFATE 1 MG: 4 INJECTION INTRAVENOUS at 06:35

## 2020-04-25 RX ADMIN — DEXAMETHASONE 4 MG: 4 TABLET ORAL at 20:27

## 2020-04-25 ASSESSMENT — PATIENT HEALTH QUESTIONNAIRE - PHQ9
2. FEELING DOWN, DEPRESSED, IRRITABLE, OR HOPELESS: NOT AT ALL
SUM OF ALL RESPONSES TO PHQ9 QUESTIONS 1 AND 2: 0
2. FEELING DOWN, DEPRESSED, IRRITABLE, OR HOPELESS: NOT AT ALL
1. LITTLE INTEREST OR PLEASURE IN DOING THINGS: NOT AT ALL
SUM OF ALL RESPONSES TO PHQ9 QUESTIONS 1 AND 2: 0
1. LITTLE INTEREST OR PLEASURE IN DOING THINGS: NOT AT ALL

## 2020-04-25 ASSESSMENT — LIFESTYLE VARIABLES
HOW MANY TIMES IN THE PAST YEAR HAVE YOU HAD 5 OR MORE DRINKS IN A DAY: 0
ALCOHOL_USE: NO
TOTAL SCORE: 0
HAVE PEOPLE ANNOYED YOU BY CRITICIZING YOUR DRINKING: NO
CONSUMPTION TOTAL: NEGATIVE
HAVE YOU EVER FELT YOU SHOULD CUT DOWN ON YOUR DRINKING: NO
EVER FELT BAD OR GUILTY ABOUT YOUR DRINKING: NO
TOTAL SCORE: 0
AVERAGE NUMBER OF DAYS PER WEEK YOU HAVE A DRINK CONTAINING ALCOHOL: 0
EVER HAD A DRINK FIRST THING IN THE MORNING TO STEADY YOUR NERVES TO GET RID OF A HANGOVER: NO
TOTAL SCORE: 0
EVER_SMOKED: NEVER
ON A TYPICAL DAY WHEN YOU DRINK ALCOHOL HOW MANY DRINKS DO YOU HAVE: 0

## 2020-04-25 ASSESSMENT — ENCOUNTER SYMPTOMS
SHORTNESS OF BREATH: 0
CONSTIPATION: 0
CHILLS: 0
NAUSEA: 0
BACK PAIN: 0
ABDOMINAL PAIN: 0
HEADACHES: 0
VOMITING: 0
FEVER: 0
NECK PAIN: 0

## 2020-04-25 ASSESSMENT — COPD QUESTIONNAIRES
DURING THE PAST 4 WEEKS HOW MUCH DID YOU FEEL SHORT OF BREATH: NONE/LITTLE OF THE TIME
HAVE YOU SMOKED AT LEAST 100 CIGARETTES IN YOUR ENTIRE LIFE: NO/DON'T KNOW
DO YOU EVER COUGH UP ANY MUCUS OR PHLEGM?: NO/ONLY WITH OCCASIONAL COLDS OR INFECTIONS
COPD SCREENING SCORE: 2

## 2020-04-25 ASSESSMENT — FIBROSIS 4 INDEX: FIB4 SCORE: 1.22

## 2020-04-25 NOTE — PROGRESS NOTES
Pt AAOx4, THOMAS. Pt is mostly blind at baseline, no central vision and can only see shapes and shadows in peripheral vision. TANESHA. Mild left side weakness noted. Denies n/t, pain. Right sided crani site CDI, staples and sutures in place. Incontinent of urine at times, condom catheter placed. Seizure precautions in place. Call light in reach, bed alarm on.

## 2020-04-25 NOTE — H&P
REHABILITATION HISTORY AND PHYSICAL/POST ADMISSION PHYSICAL EVALUATION    Date of Admission: 4/25/2020  Rafael Mccoy  [id number]/01    [facility] Code / Diagnosis to Support: 02.22 Traumatic, Closed Injury  Etiologic Diagnosis: Subdural hematoma (HCC)    CC: Decreased mobility, weakness    HPI:  Adapted from Dr. Valerio's PM&R Consult:  The patient is a 74 y.o. right hand dominant male with a past medical history of left pontine stroke 2016, hypertension, central vision blindness;  who presented on 4/17/2020  6:48 PM as a transfer from outside hospital.  Patient fell in the bathtub several days prior to admission, and presented to [facility] with progressive ataxia, CT there showed large subdural hematoma with midline shift, he received Keppra and platelets and was transferred to [facility] for definitive care.  Patient was seen by Dr. Mendiola of neurosurgery and was taken to the OR for right-sided craniotomy for evacuation of subdural hematoma.  Patient had a subsequent EEG which showed slowing in the right frontotemporal region, and multiple signs suggestive of underlying cortical instability, and structural abnormality increasing the risk for seizures.  Patient was being treated for seizures with Keppra 1 g twice daily, Vimpat 100 mg twice daily. He has since been transitioned to PO AEDs. Patient had follow-up CT head which were stable. He initially was NPO but has since been upgraded to dysphagia diet. Patient had chest pain but troponin and EKG were normal. Patient had a olivarez which has been removed and replaced with condom catheter.      Patient tolerated transfer over. He reports he is doing well. He reports is legally blind, can make out shapes and silhouettes but not faces.  He reports he has many friends who will help him when he gets home. He reports no pain but irritation from previous olivarez. Reports he has been voiding. Denies NVD. Denies SOB, currently on oxygen. He reports he was not on  oxygen prior to this hospitalization.     REVIEW OF SYSTEMS:     Comprehensive 14 point ROS was reviewed and all were negative except as noted elsewhere in this document.     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, s/p bilateral laser surgeries   • Hypertension    • Stroke (HCC)        PSH:  Past Surgical History:   Procedure Laterality Date   • CRANIOTOMY Right 4/17/2020    Procedure: CRANIOTOMY - SUBDURAL;  Surgeon: Anthony Mendiola M.D.;  Location: SURGERY [facility];  Service: Neurosurgery   • OTHER ORTHOPEDIC SURGERY      R foot fracture   • ROTATOR CUFF REPAIR Right    • TONSILLECTOMY AND ADENOIDECTOMY         FAMILY HISTORY:  Family History   Problem Relation Age of Onset   • Other Mother         eye problems   • Cancer Neg Hx    • Heart Disease Neg Hx    • Stroke Neg Hx          MEDICATIONS:  Current Facility-Administered Medications   Medication Dose   • Respiratory Therapy Consult     • Pharmacy Consult Request ...Pain Management Review 1 Each  1 Each   • oxyCODONE immediate-release (ROXICODONE) tablet 5 mg  5 mg   • oxyCODONE immediate release (ROXICODONE) tablet 10 mg  10 mg   • tramadol (ULTRAM) 50 MG tablet 50 mg  50 mg   • hydrALAZINE (APRESOLINE) tablet 25 mg  25 mg   • acetaminophen (TYLENOL) tablet 650 mg  650 mg   • senna-docusate (PERICOLACE or SENOKOT S) 8.6-50 MG per tablet 2 Tab  2 Tab    And   • polyethylene glycol/lytes (MIRALAX) PACKET 1 Packet  1 Packet    And   • magnesium hydroxide (MILK OF MAGNESIA) suspension 30 mL  30 mL    And   • bisacodyl (DULCOLAX) suppository 10 mg  10 mg   • artificial tears ophthalmic solution 1 Drop  1 Drop   • benzocaine-menthol (CEPACOL) lozenge 1 Lozenge  1 Lozenge   • mag hydrox-al hydrox-simeth (MAALOX PLUS ES or MYLANTA DS) suspension 20 mL  20 mL   • ondansetron (ZOFRAN ODT) dispertab 4 mg  4 mg    Or   • ondansetron (ZOFRAN) syringe/vial injection 4 mg  4 mg   •  traZODone (DESYREL) tablet 50 mg  50 mg   • sodium chloride (OCEAN) 0.65 % nasal spray 2 Spray  2 Spray   • [START ON 4/26/2020] amLODIPine (NORVASC) tablet 5 mg  5 mg   • dexamethasone (DECADRON) tablet 4 mg  4 mg   • lacosamide (VIMPAT) tablet 100 mg  100 mg   • levETIRAcetam (KEPPRA) tablet 1,000 mg  1,000 mg   • rosuvastatin (CRESTOR) tablet 10 mg  10 mg       ALLERGIES:  Patient has no known allergies.    PSYCHOSOCIAL HISTORY:  Housing / Facility: 1 Story House  Steps Into Home: 5  Steps In Home: 12(down to laundry area)  Lives with - Patient's Self Care Capacity: Alone and Able to Care For Self  Equipment Owned: None     Prior Level of Function / Living Situation:   Physical Therapy: Prior Services: Home-Independent  Housing / Facility: 1 Story House  Steps Into Home: 5  Steps In Home: 12(down to laundry area)  Rail: Both Rail (Steps into Home)  Bathroom Set up: Bathtub / Shower Combination  Equipment Owned: None  Lives with - Patient's Self Care Capacity: Alone and Able to Care For Self  Bed Mobility: Independent  Transfer Status: Independent  Ambulation: Independent  Distance Ambulation (Feet): (community ambulator)  Assistive Devices Used: (white cane due to blindness)  Stairs: Independent  Current Level of Function:   Level Of Assist: Minimal Assist  Assistive Device: Front Wheel Walker  Distance (Feet): 200  Deviation: Ataxic, Decreased Base Of Support  # of Stairs Climbed: 0  Weight Bearing Status: FWB  Skilled Intervention: Sequencing, Postural Facilitation, Tactile Cuing, Verbal Cuing  Comments: poor turning abiltiies, very distracted in moving environment   Supine to Sit: (NT, in chair pre/post)  Sit to Supine: (up in chair)  Scooting: Supervised(seated)  Skilled Intervention: Tactile Cuing, Sequencing, Verbal Cuing  Comments: assist mostly for L LE  Sit to Stand: Minimal Assist(with FWW x 5 reps with progression to SBA )  Bed, Chair, Wheelchair Transfer: Minimal Assist  Toilet Transfers: Unable to  Participate  Tub / Shower Transfers: Unable to Participate  Transfer Method: Stand Pivot(FWW)  Skilled Intervention: Compensatory Strategies, Postural Facilitation, Tactile Cuing, Verbal Cuing, Sequencing  Comments: focus on sequencing of UE placement with use of FWW in prep for standing;   Sitting in Chair: > 5 mins pre/post  Sitting Edge of Bed: Nt   Standing: 10 mins   Occupational Therapy:   Self Feeding: Independent  Grooming / Hygiene: Independent  Bathing: Independent  Dressing: Independent  Toileting: Independent  Medication Management: Independent  Laundry: Independent  Kitchen Mobility: Independent  Finances: Independent  Home Management: Independent  Shopping: Requires Assist  Prior Level Of Mobility: Independent With Device in Community, Independent Without Device in Home(low vision stick is device in community)  Driving / Transportation: Relatives / Others Provide Transportation  Prior Services: Home-Independent  Housing / Facility: [address]  Occupation (Pre-Hospital Vocational): Retired Due To Age  Current Level of Function:   Eating: Not Tested  Bathing: Not Tested  Upper Body Dressing: Minimal Assist  Lower Body Dressing: Moderate Assist(sock management )  Toileting: Not Tested(Graham, no BM )  Skilled Intervention: Facilitation, Compensatory Strategies, Tactile Cuing, Verbal Cuing  Comments: Significant difficulty coordinating L hand during dressing task   Speech Language Pathology:   Assessment : Pt admitted following a fall approx 1 week earlier. Per EMR, pt fell in a BR. Pt with progressive ataxia and transferred to [facility]. No prior SLP per EMR.Pt with worsening neuro s/s with EEG this AM. 4/21 CT demonstrating unchanged SDH with mass effect. Unchanged right to left shift at 7mm. 4/17 right crani. Chest Low lung volumes with hypoventilation and bibasilar atelectasis.   Problem List: Dysphagia, Dysarthia  Diet / Liquid Recommendation: Minced & Moist (5) - (Dysphagia II), Mildly Thick (2) -  "(Nectar Thick)  Comments: Pt sitting up in ros chair with tx completed during bfast meal. Pt requires moderate verbal cues to focus attention on his meal. Probable verbosity noted with right brain cognitive deficits-cog eval to be completed. Pt triggering 1-2 swallows per approx 1 tsp bolus. Slight lingual residue. Pt with two episodes of muffled sounding voice. Pt educ regarding throat clearing and dry swallow with improved vocal quality noted. SLP collaborated with nurs regarding pt's concerns for his discharge options. Cont MM5/MT2 texture with assist and super as needed.  Rehabilitation Prognosis/Potential: Good  Estimated Length of Stay: 14 days    CURRENT LEVEL OF FUNCTION:   Same as level of function prior to admission to [facility]    PHYSICAL EXAM:     VITAL SIGNS:   height is 1.753 m (5' 9\") and weight is 75.5 kg (166 lb 7.2 oz). His oral temperature is 36.8 °C (98.2 °F). His blood pressure is 144/70 and his pulse is 85. His respiration is 20.     GENERAL: No apparent distress  HEENT: EOMI and PERRL; right sided incision with staples intact; 2 sutures midline  CARDIAC: Regular rate and rhythm, normal S1, S2   LUNGS: Clear to auscultation   ABDOMINAL: bowel sounds present, soft and nontender    EXTREMITIES: no contractures, spasticity, or edema.  No calf tenderness bilaterally  NEURO:  Mental status:  A&Ox4 (person, place, date, situation) answers questions appropriately  Speech: fluent, no aphasia or dysarthria  CRANIAL NERVES: CN 2-12 intact except poor vision bilaterally  Motor:  5/5 RUE 4+/5 LUE  5/5 RLE 4+/5 LLE except 4-/5 L HF  Sensory: intact to light touch through out  DTRs: 2+ in bilateral patellar tendons    RADIOLOGY:  CT head 4/17/20  IMPRESSION:     Right-sided subdural hematoma with both acute and chronic components     Midline shift to left of approximately 4 to 5 mm with mass effect on the right lateral ventricular system           CT Head 4/21/20  IMPRESSION:     1.  " Unchanged right hemispheric subdural hematoma     2.  Associated mass effect with unchanged right to left shift measuring 7 mm     3.  Interval removal of septal drainage catheter        LABS:  Recent Labs     04/23/20 0455 04/24/20  0530 04/25/20  0339   SODIUM 138 140 136   POTASSIUM 3.7 3.3* 4.2   CHLORIDE 104 106 101   CO2 24 22 23   GLUCOSE 147* 102* 123*   BUN 37* 27* 26*   CREATININE 0.90 0.77 0.77   CALCIUM 9.0 8.4* 9.1     Recent Labs     04/23/20 0455 04/24/20  0530 04/25/20  0339   WBC 11.3* 8.7 9.2   RBC 4.68* 4.63* 4.83   HEMOGLOBIN 13.9* 13.8* 14.3   HEMATOCRIT 40.5* 39.8* 42.2   MCV 86.5 86.0 87.4   MCH 29.7 29.8 29.6   MCHC 34.3 34.7 33.9   RDW 40.0 40.6 39.8   PLATELETCT 252 237 243   MPV 9.8 9.6 9.7             PRIMARY REHAB DIAGNOSIS:    This patient is a 74 y.o. male admitted for acute inpatient rehabilitation with Subdural hematoma (HCC).    IMPAIRMENTS:   Cognitive  ADLs/IADLs  Mobility  Swallow    SECONDARY DIAGNOSIS/MEDICAL CO-MORBIDITIES AFFECTING FUNCTION:  Seizure Disorder  HLD  HTN  Hyperglycemia  Elevated BUN    RELEVANT CHANGES SINCE PREADMISSION EVALUATION:    Status unchanged    The patient's rehabilitation potential is Good  The patient's medical prognosis is good    PLAN:   Discussion and Recommendations:   1. The patient requires an acute inpatient rehabilitation program with a coordinated program of care at an intensity and frequency not available at a lower level of care. This recommendation is substantiated by the patient's medical physicians who recommend that the patient's intervention and assessment of medical issues needs to be done at an acute level of care for patient's safety and maximum outcome.   2. A coordinated program of care will be supplied by an interdisciplinary team of physical therapy, occupational therapy, rehab physician, rehab nursing, and, if needed, speech therapy and rehab psychology. Rehab team presents a patient-specific rehabilitation and education  program concentrating on prevention of future problems related to accessibility, mobility, skin, bowel, bladder, sexuality, and psychosocial and medical/surgical problems.   3. Need for Rehabilitation Physician: The rehab physician will be evaluating the patient on a multi-weekly basis to help coordinate the program of care. The rehab physician communicates between medical physicians, therapists, and nurses to maximize the patient's potential outcome. Specific areas in which the rehab physician will be providing daily assessment include the following:   A. Assessing the patient's heart rate and blood pressure response (vitals monitoring) to activity and making adjustments in medications or conservative measures as needed.   B. The rehab physician will be assessing the frequency at which the program can be increased to allow the patient to reach optimal functional outcome.   C. The rehab physician will also provide assessments in daily skin care, especially in light of patient's impairments in mobility.   D. The rehab physician will provide special expertise in understanding how to work with functional impairment and recommend appropriate interventions, compensatory techniques, and education that will facilitate the patient's outcome.   4. Rehab R.N.   The rehab RN will be working with patient to carry over in room mobility and activities of daily living when the patient is not in 3 hours of skilled therapy. Rehab nursing will be working in conjunction with rehab physician to address all the medical issues above and continue to assess laboratory work and discuss abnormalities with the treating physicians, assess vitals, and response to activity, and discuss and report abnormalities with the rehab physician. Rehab RN will also continue daily skin care, supervise bladder/bowel program, instruct in medication administration, and ensure patient safety.   5. Rehab Therapy: Therapies to treat at intensity and frequency of  (may change after completion of evaluation by all therapeutic disciplines):       PT:  Physical therapy to address mobility, transfer, gait training and evaluation for adaptive equipment needs 1 hour/day at least 5 days/week for the duration of the ELOS (see below)       OT:  Occupational therapy to address ADLs, self-care, home management training, functional mobility/transfers and assistive device evaluation, and community re-integration 1  hour/day at least 5 days/week for the duration of the ELOS (see below).        ST/Dysphagia:  Speech therapy to address speech, language, and cognitive deficits as well as swallowing difficulties with retraining/dysphagia management and community re-integration with comprehension, expression, cognitive training 1  hour/day at least 5 days/week for the duration of the ELOS (see below).     Medical management / Rehabilitation Issues/ Adverse Potential as part of rehabilitation plan     REHABILITATION ISSUES/ADVERSE POTENTIAL:  1. TBI (Traumatic Brain Injury): Fall with SDH s/p R sided evacuation with Dr. Mendiola on 4/17/20.  Patient demonstrates functional deficits in cognition, behavior, strength, balance, coordination, and ADL's. The patient requires therapy to correct these deficits prior to discharge. Patient is admitted to [facility] for comprehensive rehabilitation therapy, including physical, occupational and speech therapy.     Rehabilitation nursing monitors bowel and bladder control, educates on medication administration, co-morbidities and monitors patient safety.    2.  Neurostimulants: None at this time but continue to assess daily for need to initiate should status change.    3.  DVT prophylaxis:  Patient is high risk for bleed upon transfer. Encourage OOB. Monitor daily for signs and symptoms of DVT including but not limited to swelling and pain to prevent the development of DVT that may interfere with therapies.    4.  GI prophylaxis:  On  prilosec to prevent gastritis/dyspepsia which may interfere with therapies.    5.  Pain: No issues with pain currently / Controlled with APAP/Tramadol    6.  Nutrition/Dysphagia: Dietician monitors nutrient intake, recommend supplements prn and provide nutrition education to pt/family to promote optimal nutrition for wound healing/recovery.     7.  Bladder/bowel:  Start bowel and bladder program as described below, to prevent constipation, urinary retention (which may lead to UTI), and urinary incontinence (which will impact upon pt's functional independence).   - Post void bladder scans, I&O cath for PVRs >400  - up to commode after meal     8.  Skin/dermal ulcer prophylaxis: Monitor for new skin conditions with q.2 h. turns as required to prevent the development of skin breakdown.     9.  Cognition/Behavior:  Psychologist Dr. Negrete provides adjustment counseling to illness and psychosocial barriers that may be potential barriers to rehabilitation.     10. Respiratory therapy: RT performs O2 management prn, breathing retraining, pulmonary hygiene and bronchospasm management prn to optimize participation in therapies.     MEDICAL CO-MORBIDITIES/ADVERSE POTENTIAL AFFECTING FUNCTION:  TBI (Traumatic Brain Injury): Fall with SDH s/p R sided evacuation with Dr. Mendiola on 4/17/20. Patient started on Steroids. Had post-operative seizure like activity, EEG with cortical irritation  -PT and OT for mobility and ADLs  -SLP for cognition  -NSG follow-up. Per most recent NSG recommendations continue steroids for a week. Recommend CT head in 7 days  -On Decadron 4 mg BID    Dysphagia - On dysphagia diet on transfer. SLP for swallow evaluation    Seizure disorder - Patient with previous CVA with seizures. Increased AEDs at [facility] to Keppra 1000 mg BID and Vimpat 100 mg BID  -Follow-up Neurology seizure clinic    HTN - Patient on 5 mg Amlodipine. Previously on metoprolol as well. Continue to monitor    Legally blind - High risk  for falls.     HLD - Patient on Rosuvastatin 10 mg on transfer.    DVT Ppx - Patient high risk for bleed. Ambulating > 100 feet.     I personally performed a complete drug regimen review and no potential clinically significant medication issues were identified.     Pt was seen today for 75 min, and entire time spent in face-to-face contact was >50% in counseling and coordination of care as detailed in A/P above.        GOALS/EXPECTED LEVEL OF FUNCTION BASED ON CURRENT MEDICAL AND FUNCTIONAL STATUS (may change based on patient's medical status and rate of impairment recovery):  Transfers:   Modified Independent  Mobility/Gait:   Modified Independent  ADL's:   Modified Independent  Cognition:  ind  Swallowing:  Regular with thins    DISPOSITION: Discharge to pre-morbid independent living setting with the supportive care of patient's community resources.    ELOS: 10-17 days

## 2020-04-25 NOTE — DISCHARGE PLANNING
Acute Rehab Hospital/ Transitional Care Coordination  PT is medically cleared for transfer.  [facility] is able to accept pt today.   Dr. Saeed Sadler is accepting MD.  Transport time set up for 1200pm w/ 2 liters of 02.  Liz SALDIVAR / YRIS informed of above.    Tc to pts friend Arley Mijares ;  informing of transfer to acute rehab/location where pt will be transferred.  He is aware pt 's are not able to have visitors.      Plan:  Dc to IR[facility] @ 12 noon today.

## 2020-04-25 NOTE — PROGRESS NOTES
Trauma / Surgical Daily Progress Note    Date of Service  4/25/2020    Chief Complaint  74 y.o. male admitted 4/17/2020 with Trauma    Interval Events    Transferred to [facility]   GCS 15   Right chest pressure    - EKG and troponin negative  - PT/OT/Speech   - Disposition: Physiatry following.  Medically cleared for post acute services.  - Counseled     Review of Systems  Review of Systems   Constitutional: Negative for chills and fever.   Eyes:        Legally blind   Respiratory: Negative for shortness of breath.    Cardiovascular: Positive for chest pain (Right chest pressure).   Gastrointestinal: Negative for abdominal pain, constipation (4/23 (+) BM), nausea and vomiting.   Genitourinary: Negative for dysuria.   Musculoskeletal: Negative for back pain, joint pain and neck pain.   Neurological: Negative for headaches.        Vital Signs  Temp:  [36.2 °C (97.1 °F)-37.1 °C (98.8 °F)] 36.4 °C (97.5 °F)  Pulse:  [] 80  Resp:  [16-57] 19  BP: (125-168)/(60-82) 137/66  SpO2:  [92 %-98 %] 98 %    Physical Exam  Physical Exam  Vitals signs and nursing note reviewed.   Constitutional:       Appearance: He is not ill-appearing, toxic-appearing or diaphoretic.   HENT:      Head:      Comments: Right craniotomy incision approximated      Nose: Nose normal.      Mouth/Throat:      Mouth: Mucous membranes are moist.      Pharynx: Oropharynx is clear.   Eyes:      Pupils: Pupils are equal, round, and reactive to light.   Neck:      Musculoskeletal: Neck supple. No muscular tenderness.   Cardiovascular:      Rate and Rhythm: Normal rate and regular rhythm.      Pulses: Normal pulses.   Pulmonary:      Effort: Pulmonary effort is normal. No respiratory distress.      Breath sounds: Normal breath sounds.   Chest:      Chest wall: No tenderness.   Abdominal:      General: There is no distension.      Palpations: Abdomen is soft.      Tenderness: There is no abdominal tenderness. There is no guarding.   Skin:     General: Skin is  warm and dry.      Capillary Refill: Capillary refill takes 2 to 3 seconds.   Neurological:      Mental Status: He is alert.      GCS: GCS eye subscore is 4. GCS verbal subscore is 5. GCS motor subscore is 6.      Cranial Nerves: No cranial nerve deficit.      Sensory: No sensory deficit.      Motor: Weakness (left upper extremity ) present.      Coordination: Coordination abnormal (LUE).      Gait: Gait normal.      Deep Tendon Reflexes: Reflexes normal.   Psychiatric:         Behavior: Behavior is cooperative.         Laboratory  Recent Results (from the past 24 hour(s))   CBC WITH DIFFERENTIAL    Collection Time: 04/25/20  3:39 AM   Result Value Ref Range    WBC 9.2 4.8 - 10.8 K/uL    RBC 4.83 4.70 - 6.10 M/uL    Hemoglobin 14.3 14.0 - 18.0 g/dL    Hematocrit 42.2 42.0 - 52.0 %    MCV 87.4 81.4 - 97.8 fL    MCH 29.6 27.0 - 33.0 pg    MCHC 33.9 33.7 - 35.3 g/dL    RDW 39.8 35.9 - 50.0 fL    Platelet Count 243 164 - 446 K/uL    MPV 9.7 9.0 - 12.9 fL    Neutrophils-Polys 73.60 (H) 44.00 - 72.00 %    Lymphocytes 15.30 (L) 22.00 - 41.00 %    Monocytes 8.50 0.00 - 13.40 %    Eosinophils 0.20 0.00 - 6.90 %    Basophils 0.20 0.00 - 1.80 %    Immature Granulocytes 2.20 (H) 0.00 - 0.90 %    Nucleated RBC 0.00 /100 WBC    Neutrophils (Absolute) 6.79 1.82 - 7.42 K/uL    Lymphs (Absolute) 1.41 1.00 - 4.80 K/uL    Monos (Absolute) 0.78 0.00 - 0.85 K/uL    Eos (Absolute) 0.02 0.00 - 0.51 K/uL    Baso (Absolute) 0.02 0.00 - 0.12 K/uL    Immature Granulocytes (abs) 0.20 (H) 0.00 - 0.11 K/uL    NRBC (Absolute) 0.00 K/uL   Comp Metabolic Panel    Collection Time: 04/25/20  3:39 AM   Result Value Ref Range    Sodium 136 135 - 145 mmol/L    Potassium 4.2 3.6 - 5.5 mmol/L    Chloride 101 96 - 112 mmol/L    Co2 23 20 - 33 mmol/L    Anion Gap 12.0 7.0 - 16.0    Glucose 123 (H) 65 - 99 mg/dL    Bun 26 (H) 8 - 22 mg/dL    Creatinine 0.77 0.50 - 1.40 mg/dL    Calcium 9.1 8.5 - 10.5 mg/dL    AST(SGOT) 24 12 - 45 U/L    ALT(SGPT) 36 2 - 50  U/L    Alkaline Phosphatase 58 30 - 99 U/L    Total Bilirubin 0.8 0.1 - 1.5 mg/dL    Albumin 3.6 3.2 - 4.9 g/dL    Total Protein 6.2 6.0 - 8.2 g/dL    Globulin 2.6 1.9 - 3.5 g/dL    A-G Ratio 1.4 g/dL   ESTIMATED GFR    Collection Time: 20  3:39 AM   Result Value Ref Range    GFR If African American >60 >60 mL/min/1.73 m 2    GFR If Non African American >60 >60 mL/min/1.73 m 2   EKG    Collection Time: 20  6:49 AM   Result Value Ref Range    Report       [facility]    Test Date:  2020  Pt Name:    STUART HAYS                 Department: [facility]  MRN:        [id number]                      Room:       [facility]  Gender:     Male                         Technician: PHILOMENA  :        1945                   Requested By:MARY BETH GARCIA  Order #:    [id number]                    Reading MD:    Measurements  Intervals                                Axis  Rate:       88                           P:          48  WV:         144                          QRS:        -38  QRSD:       106                          T:          80  QT:         384  QTc:        465    Interpretive Statements  SINUS RHYTHM  LEFT ATRIAL ABNORMALITY  BORDERLINE IVCD WITH LAD  LATE PRECORDIAL R/S TRANSITION  Compared to ECG 2020 15:42:25  Atrial abnormality now present     TROPONIN    Collection Time: 20  7:20 AM   Result Value Ref Range    Troponin T 10 6 - 19 ng/L       Fluids    Intake/Output Summary (Last 24 hours) at 2020 0905  Last data filed at 2020 0705  Gross per 24 hour   Intake 884.5 ml   Output 800 ml   Net 84.5 ml       Core Measures & Quality Metrics  Labs reviewed, Medications reviewed and Radiology images reviewed  Graham catheter: No Graham      DVT Prophylaxis: Contraindicated - High bleeding risk  DVT prophylaxis - mechanical: SCDs  Ulcer prophylaxis: Not indicated    Assessed for rehab: Patient was assess for and/or received rehabilitation services during this hospitalization    RAP Score Total:  6    ETOH Screening  CAGE Score: 0  Assessment complete date: 4/18/2020        Assessment/Plan  Impaired mobility and ADLs- (present on admission)  Assessment & Plan  4/22 Physiatry consult completed. Patient is a good candidate for inpatient rehab.  Bradley Valerio DO. Physical Medicine and Rehabilitation.     Seizures, post-traumatic (HCC)  Assessment & Plan  Seizure prophylaxis on admission.   4/21 Seizure. Keppra increased to 1000 mg BID.  - EEG with cortical irritability and structural abnormality. However no clear seizures captured.   - Neurology consult: Initiate Vimpat 200mg IV load, 100mg BID maintenance.  Continue Keppra 1000 mg BID and Vimpat.  Neurology consult - Manjit Christy MD.    Subdural hematoma (HCC)- (present on admission)  Assessment & Plan  Large right-sided acute on chronic subdural hematoma.  4/17 Right-sided craniotomy for evacuation of subdural hematoma.  4/18 Follow up CT head with interval decrease in size of right frontoparietal subdural hematoma. reactive hemorrhage still remains and indicates some acute hemorrhage.  Slight decrease in mass effect.  4/19 Follow up CT head with slightly smaller right subdural hematoma. No new hemorrhage  4/20 Subdural drain removed.  4/21 Follow up CT head with unchanged right hemispheric subdural hematoma  Speech Language Pathology cognitive evaluation.  Anthony Mendiola MD. Neurosurgeon. [facility].    Oropharyngeal dysphagia  Assessment & Plan  Cortrak / Tube feeds  4/21 Failed swallow evaluation with SLP / NPO.  4/23 Oral diet: MM5/MT2 with 1:1 assist.  4/24 Cont MM5/MT2 texture with assist and super as needed.  Speech language pathology following    Contraindication to deep vein thrombosis (DVT) prophylaxis- (present on admission)  Assessment & Plan  Systemic anticoagulation contraindicated secondary to elevated bleeding risk and until subdural drain removed.  RAP score 6  4/19 Trauma surveillance venous duplex with no evidence of  deep venous thrombosis bilaterally down to the popliteal veins.     Essential hypertension- (present on admission)  Assessment & Plan  Chronic condition treated with metoprolol and amlodipine.  4/18 Resumed amlodipine.    Currently holding metoprolol.    Hypercholesterolemia- (present on admission)  Assessment & Plan  Chronic condition treated with rosuvastatin.  4/18 Resumed maintenance medication.     Screening examination for infectious disease- (present on admission)  Assessment & Plan  4/17 COVID-19 Screening completed.  No fever, pulmonary symptoms, contact with infected individual, and travel to/from a high risk region.    Trauma- (present on admission)  Assessment & Plan  Fall in bathtub 8 days prior to admission.  T-5000 Activation transfer from [facility].  James Felpie MD. Trauma Surgery.       Discussed patient condition with Patient and trauma surgery, Dr. James Felipe.

## 2020-04-25 NOTE — DISCHARGE SUMMARY
Trauma Discharge Summary    DATE OF ADMISSION: 4/17/2020    DATE OF DISCHARGE: 4/25/2020    LENGTH OF STAY: 8 days    ATTENDING PHYSICIAN: James Felipe M.D.    CONSULTING PHYSICIAN:   1. Anthony Mendiola M.D. Neurosurgery.  2. Manjit Christy M.D. Neurosurgery.  3. Bradley Valerio D.O. Physical Medicine and Rehabilitation  4. Muriel Castellanos RN, Palliative Care    DISCHARGE DIAGNOSIS:  1. Trauma, fall in bath-tub.  2. Subdural hematoma.   3. Seizure, post-traumatic.  4. Impaired mobility and ADLs.   5. Oropharyngeal dysphagia.  6. History of essential hypertension.  7. Urinary retention.   8. Platelet dysfunction due to aspirin.  9. History of hypercholesterolemia.   10. Legally blind.  11. Screening examination for infectious disease.  12. Contraindication to deep vein thrombosis prophylaxis    PROCEDURES:  1. Procedure completed by Dr.Christopher Mendiola, neurosurgery on 4/17/2020.  - Right-sided craniotomy greater than 5 cm for evacuation of subdural hematoma.    HISTORY OF PRESENT ILLNESS: The patient is a 74 y.o. male who sustained a mechanical ground level fall in the bath-tub several days prior to admission. He presented initially to [facility] with progressive ataxia.  CT there showed a large subdural hematoma with midline shift. He received Keppra and was transfused platelets ( daily aspirin use).  He was subsequently transferred to [facility] in [facility] for definitive trauma care. He was triaged as trauma but not activated.     HOSPITAL COURSE: On arrival, the patient received expeditious primary and secondary surveys with appropriate adjuncts and underwent extensive radiographic and laboratory studies. He was admitted to the critical care team under the direction and supervision of Dr. James Felipe. He sustained the listed injuries and incurred the listed diagnosis during his stay.    He was transferred from the emergency department to the Trauma Intensive Care Unit  for ongoing evaluation, treatment and completion of a tertiary exam. Dr. Anthony Mendiola, Neurosurgery, was consulted for a large right-sided acute on chronic subdural hematoma. A TEG with platelet mapping  on admission demonstrated 53.4% AA inhibition. He proceeded to the operating theater on the day of admission and underwent a right-sided craniotomy with subdural evacuation.  The patient was followed with serial neurological examinations, seizure prophylaxis (Keppra), and radiologic evaluations.  The subdural was drained was removed on 4/20/2020 and the patients most recent CT  head on 4/21/2020 demonstrated an unchanged right hemispheric subdural hematoma when compared to his previous scan on 4/19/2020.    Seizure activity was noted on 4/21/2020 despite being on seizure prophylaxis. Dr. Manjit Christy, neurology was consulted. EEG testing showed cortical irritability and structural abnormality with no clear seizures captured. Vimpat was initiated and his Keppra was increased to 1000 mg BID.        Speech Language Pathology was consulted for oropharyngeal dysphagia. A cortrak was placed and tube feeds were initiated. On 4/23/2020 the patient passed a swallow evaluation and was started on a oral diet.  Current dietary recommendations are MM5/MT2 texture with assist.    On 4/25/2020 the patient developed right sided chest pressure.  His troponin T was 10 and his EKG showed a sinus rhythm with a left atrial abnormality.    A olivarez was placed on 4/19/2020 for urinary retention.  It has subsequently been discontinued and the patient is voiding.    Pharmacological deep vein thrombosis prophylaxis was contraindicated due the patients traumatic brain injury.  Trauma surveillance duplex screening on 4/19/2020 showed no evidence of deep venous thrombosis bilaterally down to the popliteal veins.     COVID-19 Screening was completed on 4/17/2020. There was no fever, pulmonary symptoms, contact with infected individual, and  travel to/from a high risk region.    Past medical history was significant for essential hypertension, hypercholesteremia, and legally blind.  Currently, he on amlodipine and his metopropolol is being held. His rosuvastatin was been resumed.    He was medically stable for transfer to the [facility] on 4/24/2020. On the day of discharge the patient was a javy coma of 15 with left upper extremity weakness and abnormal coordination.  His cranial incision was well approximated with staples and there was no overt signs and symptoms of infection. He was afebrile, nontoxic in appearance and had a WBC of 9.2.  He was tolerating his MM5/MT2 texture with assist and his last bowel movement was on 4/23/2020.  DVT prophylaxis was contraindicated due to intracranial hemorrhage    DISCHARGE PHYSICAL EXAM: See epic physical exam dated 4/25/2020    DISCHARGE MEDICATIONS:  I reviewed the patients controlled substance history and obtained a controlled substance use informed consent (if applicable) provided by [facility] and the patient has been prescribed.       Medication List      START taking these medications      Instructions   acetaminophen 325 MG Tabs  Commonly known as:  TYLENOL   Take 2 Tabs by mouth every 6 hours as needed.  Dose:  650 mg     bisacodyl 10 MG Supp  Commonly known as:  DULCOLAX   Insert 1 Suppository in rectum every 24 hours as needed (if magnesium hydroxide ineffective).  Dose:  10 mg     dexamethasone 4 MG Tabs  Commonly known as:  DECADRON   Take 1 Tab by mouth 2 Times a Day.  Dose:  4 mg     docusate sodium 100mg/10mL 150 MG/15ML Liqd  Commonly known as:  COLACE   Take 10 mL by mouth 2 Times a Day.  Dose:  100 mg     lacosamide 100 MG Tabs tablet  Commonly known as:  VIMPAT   Take 1 Tab by mouth 2 Times a Day for 7 days.  Dose:  100 mg     levetiracetam 1000 MG tablet  Commonly known as:  KEPPRA   Take 1 Tab by mouth 2 Times a Day.  Dose:  1,000 mg     ondansetron 4 MG/2ML  Soln injection  Commonly known as:  ZOFRAN   2 mL by Intravenous route every four hours as needed for Nausea (Nausea).  Dose:  4 mg        CONTINUE taking these medications      Instructions   amLODIPine 5 MG Tabs  Commonly known as:  NORVASC   Take 5 mg by mouth every day.  Dose:  5 mg     rosuvastatin 10 MG Tabs  Commonly known as:  CRESTOR   Take 10 mg by mouth every bedtime.  Dose:  10 mg        STOP taking these medications    aspirin 81 MG EC tablet     Diclofenac Sodium 1 % Gel     metoprolol 25 MG Tabs  Commonly known as:  LOPRESSOR     mirtazapine 45 MG tablet  Commonly known as:  REMERON            DISPOSITION: The patient will be discharged to [facility] in stable condition on 4/25/2020. Dr. Anthony Mendiola, neurosurgery is recommending continuation of decadron 4 bid until office follow up in 1 week for new CT and staple removal.  He will need to follow up with Dr. Manjit Christy, neurology within one weeks time, and as needed or if symptoms worsen.  He will need to follow up with his primary care provider  Dr. Rosemary Somers within one weeks time upon discharge from post acute services.    The patient has extensively counseled and all questions have been answered. Special attention was paid to neurological decompensation, and to seek immediate medical attention if these develop. The patient demonstrates understanding and gives verbal compliance with discharge instructions.    TIME SPENT ON DISCHARGE: 55 minutes    ISA Orta MD, FACS

## 2020-04-25 NOTE — PROGRESS NOTES
Report given to Sirena at [facility]. All questions answered at this time.  Transport here, belongings given to patient from security.  Waiting on discharge order.

## 2020-04-25 NOTE — PROGRESS NOTES
1810: Report called to RN on [facility], all questions answered.   1815: Pt removed from tele and transferred to wheelchair, all belongings gathered and taken with patient to [facility]-2

## 2020-04-25 NOTE — CARE PLAN
Problem: Safety  Goal: Will remain free from falls  Outcome: PROGRESSING AS EXPECTED   Bed alarm on, fall precautions in place  Problem: Self-Care:  Goal: Verbalization of feelings and concerns over difficulty with self-care will improve  Outcome: PROGRESSING AS EXPECTED   Therapeutic listening provided for patient's concerns on completing ADLs

## 2020-04-25 NOTE — DISCHARGE INSTRUCTIONS
Discharge Instructions    Discharged to other by medical transportation with escort. Discharged via wheelchair, hospital escort: Yes.  Special equipment needed: Not Applicable    Be sure to schedule a follow-up appointment with your primary care doctor or any specialists as instructed.     Discharge Plan:   Diet Plan: Discussed  Activity Level: Discussed  Confirmed Follow up Appointment: Patient to Call and Schedule Appointment  Confirmed Symptoms Management: Discussed  Influenza Vaccine Indication: Not indicated: Previously immunized this influenza season and > 8 years of age    I understand that a diet low in cholesterol, fat, and sodium is recommended for good health. Unless I have been given specific instructions below for another diet, I accept this instruction as my diet prescription.   Other diet:     Special Instructions: None    · Is patient discharged on Warfarin / Coumadin?   No     Depression / Suicide Risk    As you are discharged from this [facility] facility, it is important to learn how to keep safe from harming yourself.    Recognize the warning signs:  · Abrupt changes in personality, positive or negative- including increase in energy   · Giving away possessions  · Change in eating patterns- significant weight changes-  positive or negative  · Change in sleeping patterns- unable to sleep or sleeping all the time   · Unwillingness or inability to communicate  · Depression  · Unusual sadness, discouragement and loneliness  · Talk of wanting to die  · Neglect of personal appearance   · Rebelliousness- reckless behavior  · Withdrawal from people/activities they love  · Confusion- inability to concentrate     If you or a loved one observes any of these behaviors or has concerns about self-harm, here's what you can do:  · Talk about it- your feelings and reasons for harming yourself  · Remove any means that you might use to hurt yourself (examples: pills, rope, extension cords, firearm)  · Get  professional help from the community (Mental Health, Substance Abuse, psychological counseling)  · Do not be alone:Call your Safe Contact- someone whom you trust who will be there for you.  · Call your local CRISIS HOTLINE 469-8152 or 911-297-9266  · Call your local Children's Mobile Crisis Response Team [place] (085) 444-0943 or www.[employer]  · Call the toll free National Suicide Prevention Hotlines   · National Suicide Prevention Lifeline 946-387-IQJZ (6404)  · National Hope Line Network 800-SUICIDE (716-3557)

## 2020-04-25 NOTE — PROGRESS NOTES
Pt began complaining of chest pain at 0630. Pt describes pain as 8/10, non radiating pressure. VSS, slightly hypertensive. On call trauma APRN notified. Received order for stat EKG, and troponins. 1mg Morphine given at 0635 without much relief. 2mg morphine given at 0700.  Vitals:    04/25/20 0700   BP: 151/71   Pulse: 91   Resp: 20   Temp: 36.6 °C (97.9 °F)   SpO2: 96%

## 2020-04-25 NOTE — FLOWSHEET NOTE
04/25/20 1550   Events/Summary/Plan   Events/Summary/Plan Received from [facility]   Skin Inspection Respiratory Device Intact   Vital Signs   Pulse (!) 57   Respiration 14   Pulse Oximetry 96 %   $ Pulse Oximetry (Spot Check) Yes   Respiratory Assessment   Level of Consciousness Alert   Breath Sounds   RUL Breath Sounds Clear  (RT BS slightly coarse compared to left)   RML Breath Sounds Clear   RLL Breath Sounds Clear   CARLENE Breath Sounds Clear   LLL Breath Sounds Clear   Secretions   Cough Dry;Non Productive   Oxygen   O2 (LPM) 2   O2 Delivery Device Nasal Cannula   Room Air Challenge Pass   $ 6 Minute Walk   (93%RA, will recheck in 30 min)   Non-Invasive Ventilation IVA Group   Nocturnal CPAP or BIPAP   (none)   Smoking History   Have you ever smoked Never

## 2020-04-25 NOTE — CARE PLAN
Problem: Safety  Goal: Will remain free from falls  Outcome: PROGRESSING AS EXPECTED     Problem: Mobility:  Goal: Capacity to carry out activities will improve  Outcome: PROGRESSING AS EXPECTED  Goal: Mobility will improve  Outcome: PROGRESSING AS EXPECTED  Goal: Range of joint motion will improve  Outcome: PROGRESSING AS EXPECTED

## 2020-04-25 NOTE — PROGRESS NOTES
Patient discharged with transport with all belongings. PIV remain in place per Sirena RN, rehab nurse.

## 2020-04-25 NOTE — FLOWSHEET NOTE
04/25/20 1550   Patient History   Pulmonary Diagnosis none   Home O2 No   Nocturnal CPAP No   Home Treatments/Frequency No   COPD Risk Screening   Do you have a history of COPD? No   COPD Population Screener   During the past 4 weeks, how much did you feel short of breath? 0   Do you ever cough up any mucus or phlegm? 0   In the past 12 months, you do less than you used to because of your breathing problems 0   Have you smoked at least 100 cigarettes in your entire life? 0   How old are you? 2   COPD Screening Score 2

## 2020-04-26 LAB
25(OH)D3 SERPL-MCNC: 34 NG/ML (ref 30–100)
ALBUMIN SERPL BCP-MCNC: 3.3 G/DL (ref 3.2–4.9)
ALBUMIN/GLOB SERPL: 1.4 G/DL
ALP SERPL-CCNC: 55 U/L (ref 30–99)
ALT SERPL-CCNC: 34 U/L (ref 2–50)
ANION GAP SERPL CALC-SCNC: 11 MMOL/L (ref 7–16)
AST SERPL-CCNC: 21 U/L (ref 12–45)
BASOPHILS # BLD AUTO: 0.2 % (ref 0–1.8)
BASOPHILS # BLD: 0.02 K/UL (ref 0–0.12)
BILIRUB SERPL-MCNC: 0.7 MG/DL (ref 0.1–1.5)
BUN SERPL-MCNC: 28 MG/DL (ref 8–22)
CALCIUM SERPL-MCNC: 8.6 MG/DL (ref 8.5–10.5)
CHLORIDE SERPL-SCNC: 98 MMOL/L (ref 96–112)
CO2 SERPL-SCNC: 25 MMOL/L (ref 20–33)
CREAT SERPL-MCNC: 0.91 MG/DL (ref 0.5–1.4)
EOSINOPHIL # BLD AUTO: 0.03 K/UL (ref 0–0.51)
EOSINOPHIL NFR BLD: 0.3 % (ref 0–6.9)
ERYTHROCYTE [DISTWIDTH] IN BLOOD BY AUTOMATED COUNT: 39.9 FL (ref 35.9–50)
EST. AVERAGE GLUCOSE BLD GHB EST-MCNC: 117 MG/DL
GLOBULIN SER CALC-MCNC: 2.4 G/DL (ref 1.9–3.5)
GLUCOSE SERPL-MCNC: 116 MG/DL (ref 65–99)
HBA1C MFR BLD: 5.7 % (ref 0–5.6)
HCT VFR BLD AUTO: 37.9 % (ref 42–52)
HGB BLD-MCNC: 13.1 G/DL (ref 14–18)
IMM GRANULOCYTES # BLD AUTO: 0.14 K/UL (ref 0–0.11)
IMM GRANULOCYTES NFR BLD AUTO: 1.5 % (ref 0–0.9)
LYMPHOCYTES # BLD AUTO: 1.09 K/UL (ref 1–4.8)
LYMPHOCYTES NFR BLD: 11.5 % (ref 22–41)
MCH RBC QN AUTO: 30.3 PG (ref 27–33)
MCHC RBC AUTO-ENTMCNC: 34.6 G/DL (ref 33.7–35.3)
MCV RBC AUTO: 87.7 FL (ref 81.4–97.8)
MONOCYTES # BLD AUTO: 0.76 K/UL (ref 0–0.85)
MONOCYTES NFR BLD AUTO: 8 % (ref 0–13.4)
NEUTROPHILS # BLD AUTO: 7.46 K/UL (ref 1.82–7.42)
NEUTROPHILS NFR BLD: 78.5 % (ref 44–72)
NRBC # BLD AUTO: 0 K/UL
NRBC BLD-RTO: 0 /100 WBC
PLATELET # BLD AUTO: 228 K/UL (ref 164–446)
PMV BLD AUTO: 9.9 FL (ref 9–12.9)
POTASSIUM SERPL-SCNC: 4 MMOL/L (ref 3.6–5.5)
PROT SERPL-MCNC: 5.7 G/DL (ref 6–8.2)
RBC # BLD AUTO: 4.32 M/UL (ref 4.7–6.1)
SODIUM SERPL-SCNC: 134 MMOL/L (ref 135–145)
TSH SERPL DL<=0.005 MIU/L-ACNC: 1.09 UIU/ML (ref 0.38–5.33)
WBC # BLD AUTO: 9.5 K/UL (ref 4.8–10.8)

## 2020-04-26 PROCEDURE — 84443 ASSAY THYROID STIM HORMONE: CPT

## 2020-04-26 PROCEDURE — 83036 HEMOGLOBIN GLYCOSYLATED A1C: CPT

## 2020-04-26 PROCEDURE — 92523 SPEECH SOUND LANG COMPREHEN: CPT | Performed by: SPEECH-LANGUAGE PATHOLOGIST

## 2020-04-26 PROCEDURE — 97162 PT EVAL MOD COMPLEX 30 MIN: CPT

## 2020-04-26 PROCEDURE — 97166 OT EVAL MOD COMPLEX 45 MIN: CPT

## 2020-04-26 PROCEDURE — 770010 HCHG ROOM/CARE - REHAB SEMI PRIVAT*

## 2020-04-26 PROCEDURE — 80053 COMPREHEN METABOLIC PANEL: CPT

## 2020-04-26 PROCEDURE — 82306 VITAMIN D 25 HYDROXY: CPT

## 2020-04-26 PROCEDURE — 85025 COMPLETE CBC W/AUTO DIFF WBC: CPT

## 2020-04-26 PROCEDURE — A9270 NON-COVERED ITEM OR SERVICE: HCPCS | Performed by: PHYSICAL MEDICINE & REHABILITATION

## 2020-04-26 PROCEDURE — 94760 N-INVAS EAR/PLS OXIMETRY 1: CPT

## 2020-04-26 PROCEDURE — 700102 HCHG RX REV CODE 250 W/ 637 OVERRIDE(OP): Performed by: PHYSICAL MEDICINE & REHABILITATION

## 2020-04-26 PROCEDURE — 36415 COLL VENOUS BLD VENIPUNCTURE: CPT

## 2020-04-26 PROCEDURE — 97535 SELF CARE MNGMENT TRAINING: CPT

## 2020-04-26 PROCEDURE — 92610 EVALUATE SWALLOWING FUNCTION: CPT | Performed by: SPEECH-LANGUAGE PATHOLOGIST

## 2020-04-26 PROCEDURE — 97530 THERAPEUTIC ACTIVITIES: CPT

## 2020-04-26 PROCEDURE — 99233 SBSQ HOSP IP/OBS HIGH 50: CPT | Performed by: PHYSICAL MEDICINE & REHABILITATION

## 2020-04-26 RX ADMIN — AMLODIPINE BESYLATE 5 MG: 5 TABLET ORAL at 09:46

## 2020-04-26 RX ADMIN — DOCUSATE SODIUM 50 MG AND SENNOSIDES 8.6 MG 2 TABLET: 8.6; 5 TABLET, FILM COATED ORAL at 09:46

## 2020-04-26 RX ADMIN — DEXAMETHASONE 4 MG: 4 TABLET ORAL at 09:46

## 2020-04-26 RX ADMIN — LEVETIRACETAM 1000 MG: 500 TABLET ORAL at 22:34

## 2020-04-26 RX ADMIN — DEXAMETHASONE 4 MG: 4 TABLET ORAL at 22:34

## 2020-04-26 RX ADMIN — LACOSAMIDE 100 MG: 100 TABLET, FILM COATED ORAL at 22:34

## 2020-04-26 RX ADMIN — LACOSAMIDE 100 MG: 100 TABLET, FILM COATED ORAL at 09:46

## 2020-04-26 RX ADMIN — LEVETIRACETAM 1000 MG: 500 TABLET ORAL at 09:46

## 2020-04-26 RX ADMIN — DOCUSATE SODIUM 50 MG AND SENNOSIDES 8.6 MG 2 TABLET: 8.6; 5 TABLET, FILM COATED ORAL at 22:34

## 2020-04-26 RX ADMIN — ROSUVASTATIN CALCIUM 10 MG: 10 TABLET, FILM COATED ORAL at 22:35

## 2020-04-26 ASSESSMENT — MONTREAL COGNITIVE ASSESSMENT (MOCA)
7. [VIGILENCE] TAP WHEN HEARING DESIGNATED LETTER: 0
8. SERIAL SUBTRACTION OF 7S: 1
VISUOSPATIAL/EXECUTIVE SUBSCORE: 0
LEVEL OF SEVERITY: MODERATE COGNITIVE IMPAIRMENT
WHAT IS THE TOTAL SCORE (OUT OF 30): 10
6. READ LIST OF DIGITS [FORWARD/BACKWARD]: 1
ORIENTATION SUBSCORE: 5
12. MEMORY INDEX SCORE: 1
4. NAME EACH OF THE THREE ANIMALS SHOWN: 0
9. REPEAT EACH SENTENCE: 2
WHAT LEVEL OF EDUCATION WAS ATTAINED: HIGH SCHOOL EDUCATION
11. FOR EACH PAIR OF WORDS, WHAT CATEGORY DO THEY BELONG TO (OUT OF 2): 0
10. [FLUENCY] NAME WORDS STARTING WITH DESIGNATED LETTER: 0

## 2020-04-26 ASSESSMENT — BRIEF INTERVIEW FOR MENTAL STATUS (BIMS)
INITIAL REPETITION OF BED BLUE SOCK - FIRST ATTEMPT: 3
WHAT DAY OF THE WEEK IS IT: CORRECT
BIMS SUMMARY SCORE: 11
WHAT YEAR IS IT: CORRECT
ASKED TO RECALL BLUE: YES, NO CUE REQUIRED
ASKED TO RECALL BED: NO, COULD NOT RECALL
WHAT MONTH IS IT: ACCURATE WITHIN 5 DAYS
ASKED TO RECALL SOCK: NO, COULD NOT RECALL

## 2020-04-26 ASSESSMENT — ACTIVITIES OF DAILY LIVING (ADL): TOILETING: INDEPENDENT

## 2020-04-26 NOTE — FLOWSHEET NOTE
04/25/20 1645   Vital Signs   Pulse 62   Respiration 14   Pulse Oximetry 90 %   $ Pulse Oximetry (Spot Check) Yes   Oxygen   O2 (LPM)   (2LPM NOCS and naps)

## 2020-04-26 NOTE — THERAPY
"Physical Therapy   Initial Evaluation     Patient Name: Rafael Mccoy  Age:  74 y.o., Sex:  male  Medical Record #: [id number]  Today's Date: 4/26/2020     Subjective    Patient motivated for therapy     Objective       04/26/20 1301   Prior Living Situation   Prior Services Home-Independent   Housing / Facility 1 Story House   Steps Into Home 5   Steps In Home 12  (to access laundry)   Rail Both Rail (Steps in Home);Both Rail (Steps into Home)   Equipment Owned Single Point Cane  (describes as modified ski pole with pointed tip)   Lives with - Patient's Self Care Capacity Alone and Able to Care For Self   Comments describes local friends can \"stop by\"; 3 adult children- son lives in [place], not in contact with patient; 2 dtrs live out of area ([place], [place])   Prior Level of Functional Mobility   Bed Mobility Independent   Transfer Status Independent   Ambulation Independent   Assistive Devices Used Single Point Cane  (describes as ski pole used for vision)   Stairs Independent  (used handrails and strategy of counting 2° vision)   IRF-TANIKA:  Prior Functioning: Everyday Activities   Indoor Mobility (Ambulation) Independent   Stairs Independent   Prior Device Use None of the given options   Pain 0 - 10 Group   Therapist Pain Assessment Prior to Activity;During Activity;Post Activity;0   Strength Lower Body   Lower Body Strength  WDL   Comments right slightly stronger than left   Coordination Lower Body    Coordination Lower Body  X   Other Left Impaired   IRF-TANIKA:  Roll Left and Right   Assistance Needed Supervision   CARE Score 4   Discharge Goal:  Assistance Needed Independent   Discharge Goal:  Score 6   IRF-TANIKA:  Sit to Lying   Assistance Needed Supervision;Verbal cues   CARE Score 4   Discharge Goal:  Assistance Needed Independent   Discharge Goal:  Score 6   IRF-TANIKA:  Lying to Sitting on Side of Bed   Assistance Needed Supervision;Verbal cues   CARE Score 4   Discharge Goal:  Assistance Needed " Independent   Discharge Goal:  Score 6   IRF-TANIKA:  Sit to Stand   Assistance Needed Incidental touching   CARE Score 4   Discharge Goal:  Assistance Needed Adaptive equipment   Discahrge Goal:  Score 6   IRF-TANIKA:  Chair/Bed-to-Chair Transfer   Assistance Needed Incidental touching;Verbal cues   CARE Score 4   Discharge Goal:  Assistance Needed Adaptive equipment   Discharge Goal:  Score 6   IRF-TANIKA:  Car Transfer   Reason if not Attempted Safety concerns   CARE Score 88   Discharge Goal:  Assistance Needed Supervision;Adaptive equipment   Discharge Goal:  Score 4   IRF TANIKA:  Walking   Does the Patient Walk? Yes   IRF TANIKA:  Walk 10 Feet   Assistance Needed Incidental touching;Adaptive equipment;Verbal cues   CARE Score 4   Discharge Goal:  Assistance Needed Adaptive equipment   Discharge Goal:  Score 6   IRF-TANIKA:  Walk 50 Feet with Two Turns   Reason if not Attempted Safety concerns   CARE Score 88   Discharge Goal:  Assistance Needed Adaptive equipment   Discharge Goal:  Score 6   IRF-TANIKA:  Walk 150 Feet   Reason if not Attempted Safety concerns   CARE Score 88   Discharge Goal:  Assistance Needed Adaptive equipment   Discharge Goal:  Score 6   IRF TANIKA:  Walking 10 Feet on Uneven Surfaces   Reason if not Attempted Safety concerns   CARE Score 88   Discharge Goal:  Assistance Needed Adaptive equipment;Supervision   Discharge Goal:  Score 4   IRF TANIKA:  1 Step (Curb)   Reason if not Attempted Safety concerns   CARE Score 88   Discharge Goal:  Assistance Needed Adaptive equipment   Discharge Goal:  Score 6   IRF-TANIKA:  4 Steps   Reason if not Attempted Safety concerns   CARE Score 88   Discharge Goal:  Assistance Needed Adaptive equipment   Discharge Goal:  Score 6   IRF TANIKA:  12 Steps   Reason if not Attempted Safety concerns   CARE Score 88   Discharge Goal:  Assistance Needed Adaptive equipment   Discharge Goal:  Score 6   IRF TANIKA:  Picking Up Object   Reason if not Attempted Safety concerns   CARE Score 88    Discharge Goal:  Assistance Needed Adaptive equipment;Supervision   Discharge Goal:  Score 4   IRF-TANIKA:  Wheel 50 Feet with Two Turns   Indicate the Type of Wheelchair or Scooter Used Manual   Assistance Needed Physical assistance   Physical Assistance Level 25%-49%   CARE Score 3   Discharge Goal:  Assistance Needed Verbal cues   Discharge Goal:  Score 4   IRF-TANIKA:  Wheel 150 Feet   Indicate the Type of Wheelchair or Scooter Used Manual   Reason if not Attempted Safety concerns   CARE Score 88   Discharge Goal:  Assistance Needed Verbal cues   Discharge Goal:  Score 4   Problem List    Problems Impaired Ambulation;Impaired Coordination;Decreased Activity Tolerance;Other (Comments)  (loss of central vision affecting balance/coordination (L>R))   Precautions   Precautions Fall Risk;Swallow Precautions ( See Comments)   Comments Absence of central vision, L apraxia, Slightly Thick Liquid   Current Discharge Plan   Current Discharge Plan Return to Prior Living Situation   Interdisciplinary Plan of Care Collaboration   IDT Collaboration with  Occupational Therapist   Patient Position at End of Therapy Seated;Edge of Bed;Call Light within Reach   Collaboration Comments baseline eval scores   Benefit   Therapy Benefit Patient Would Benefit from Inpatient Rehabilitation Physical Therapy to Maximize Functional [place] with ADLs, IADLs and Mobility.   PT Total Time Spent   PT Individual Total Time Spent (Mins) 60   PT Charge Group   Charges Yes   PT Therapeutic Activities 1   PT Evaluation PT Evaluation Mod       FIM Bed/Chair/Wheelchair Transfers Score: 4 - Minimal Assistance  Bed/Chair/Wheelchair Transfers Description:  (CGA SPT)    FIM Walking Score:  1 - Total Assistance  Walking Description:  (40ft FWW with w/c follow; cues to keep FWW closer)    FIM Wheelchair Score:  1 - Total Assistance  Wheelchair Description:  (25ft using bilateral UE mod assist for steering, encouragement to complete the task)    FIM Stairs  Score:  0 - Not tested,unsafe activity  Stairs Description:       Assessment  Patient is 74 y.o. male with a diagnosis of SDH.  Additional factors influencing patient status / progress (ie: cognitive factors, co-morbidities, social support, etc): lives alone, history of central vision loss (reportedly more severe after recent fall).      Plan  Recommend Physical Therapy 30-60 minutes per day 5-7 days per week for 14 days for the following treatments:  PT Gait Training, PT Self Care/Home Eval, PT Therapeutic Exercises, PT Neuro Re-Ed/Balance, PT Therapeutic Activity and PT Evaluation.    Goals:  Long term and short term goals have been discussed with patient and they are in agreement.    Physical Therapy Problems     Problem: Balance     Dates: Start: 04/26/20       Description:     Goal: STG-Within one week, patient will maintain dynamic standing     Dates: Start: 04/26/20       Description: 1) Individualized goal:  To progress through dynamic standing there-ex program with SBA  2) Interventions:  PT Gait Training, PT Self Care/Home Eval, PT Therapeutic Exercises, PT Neuro Re-Ed/Balance, PT Therapeutic Activity and PT Evaluation                   Problem: Mobility     Dates: Start: 04/26/20       Description:     Goal: STG-Within one week, patient will propel wheelchair household distances     Dates: Start: 04/26/20       Description: 1) Individualized goal:  100ft bilateral LE with SPV  2) Interventions: PT Gait Training, PT Self Care/Home Eval, PT Therapeutic Exercises, PT Neuro Re-Ed/Balance, PT Therapeutic Activity and PT Evaluation               Goal: STG-Within one week, patient will ambulate community distances     Dates: Start: 04/26/20       Description: 1) Individualized goal:  200ft FWW SBA  2) Interventions: PT Gait Training, PT Self Care/Home Eval, PT Therapeutic Exercises, PT Neuro Re-Ed/Balance, PT Therapeutic Activity and PT Evaluation                     Problem: Mobility Transfers     Dates: Start:  04/26/20       Description:     Goal: STG-Within one week, patient will transfer bed to chair     Dates: Start: 04/26/20       Description: 1) Individualized goal:  Mod I SPT w/c to bed/toilet surfaces  2) Interventions: PT Gait Training, PT Self Care/Home Eval, PT Therapeutic Exercises, PT Neuro Re-Ed/Balance, PT Therapeutic Activity and PT Evaluation                     Problem: PT-Long Term Goals     Dates: Start: 04/26/20       Description:     Goal: LTG-By discharge, patient will maintain balance     Dates: Start: 04/26/20       Description: 1) Individualized goal:  To perform [place] Balance Assessment with LOW FALL RISK  2) Interventions: PT Gait Training, PT Self Care/Home Eval, PT Therapeutic Exercises, PT Neuro Re-Ed/Balance, PT Therapeutic Activity and PT Evaluation               Goal: LTG-By discharge, patient will ambulate     Dates: Start: 04/26/20       Description: 1) Individualized goal:  200ft x 2 with SPC mod I level surfaces, SPV for uneven terrain  2) Interventions: PT Gait Training, PT Self Care/Home Eval, PT Therapeutic Exercises, PT Neuro Re-Ed/Balance, PT Therapeutic Activity and PT Evaluation               Goal: LTG-By discharge, patient will transfer one surface to another     Dates: Start: 04/26/20       Description: 1) Individualized goal:  Mod I SPT to all surfaces safely and consistently  2) Interventions: PT Gait Training, PT Self Care/Home Eval, PT Therapeutic Exercises, PT Neuro Re-Ed/Balance, PT Therapeutic Activity and PT Evaluation               Goal: LTG-By discharge, patient will perform home exercise program     Dates: Start: 04/26/20       Description: 1) Individualized goal:  Standing LE strengthening program to be done in safe, independent environment 3x/daily  2) Interventions:PT Gait Training, PT Self Care/Home Eval, PT Therapeutic Exercises, PT Neuro Re-Ed/Balance, PT Therapeutic Activity and PT Evaluation                 Goal: LTG-By discharge, patient will ambulate  up/down flight of stairs     Dates: Start: 04/26/20       Description: 1) Individualized goal:  12 stairs bilateral rails mod I (required in home environment to access laundry; uses strategy of counting and using rails for balance)  2) Interventions:PT Gait Training, PT Self Care/Home Eval, PT Therapeutic Exercises, PT Neuro Re-Ed/Balance, PT Therapeutic Activity and PT Evaluation

## 2020-04-26 NOTE — CARE PLAN
Problem: Safety  Goal: Will remain free from falls  Intervention: Implement fall precautions  Flowsheets (Taken 4/26/2020 0438)  Bed Alarm: Yes - Alarm On  Environmental Precautions:   Treaded Slipper Socks on Patient   Personal Belongings, Wastebasket, Call Bell etc. in Easy Reach   Bed in Low Position  Note: Safety measures enforced, pt with hx of Seizures , side rails padded,needs  anticipated and attended. Pt free from fall and injury.     Problem: Infection  Goal: Will remain free from infection  Intervention: Assess signs and symptoms of infection  Note: Afebrile, v/s stable head incision line with staples intact well approximated. Open to air.

## 2020-04-26 NOTE — THERAPY
"Occupational Therapy   Initial Evaluation     Patient Name: Rafael Mccoy  Age:  74 y.o., Sex:  male  Medical Record #: [id number]  Today's Date: 4/26/2020     Subjective    \"I can't see how my kids would feel comfortable with this kind of thing.\"  (re: assisted bathing/dressing pursuits)     Objective       04/26/20 0701   Prior Living Situation   Prior Services Home-Independent   Housing / Facility 1 Story Apartment / Condo   Steps Into Home 5   Bathroom Set up Bathtub / Shower Combination   Equipment Owned Single Point Cane   Lives with - Patient's Self Care Capacity Alone and Able to Care For Self   Prior Level of ADL Function   Self Feeding Independent   Grooming / Hygiene Independent   Bathing Independent   Dressing Independent   Toileting Independent   Prior Level of IADL Function   Medication Management Independent   Laundry Independent  (28 steps to basement unit)   Kitchen Mobility Independent   Finances Independent   Home Management Independent   Shopping Independent   Prior Level Of Mobility Independent With Device in Community   Driving / Transportation Relatives / Others Provide Transportation;Utilizes Public Transportation   Occupation (Pre-Hospital Vocational) Retired Due To Age   IRF-TANIKA:  Prior Functioning: Everyday Activities   Self Care Independent   Indoor Mobility (Ambulation) Independent   Stairs Independent   Functional Cognition Independent   Prior Device Use None of the given options  (SPC)   Pain   Intervention Declines   Cognition    Cognition / Consciousness X   Speech/ Communication Delayed Responses   Level of Consciousness Alert   IRF-TANIKA:  Cognitive Pattern Assessment   Cognitive Pattern Assessment Used BIMS   IRF-TANIKA:  Brief Interview for Mental Status (BIMS)   Repetition of Three Words (First Attempt) 3   Temporal Orientation: Able to Report the Correct Year Correct   Temporal Orientation: Able to Report the Correct Month Accurate within 5 days   Temporal Orientation: Able to " "Report the Correct Day of the Week Correct   Able to Recall \"Sock\" No, could not recall   Able to Recall \"Blue\" Yes, no cue required   Able to Recall \"Bed\" No, could not recall   BIMS Summary Score 11   Vision Screen   Vision Not tested  (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   Coordination Upper Body   Coordination X   Fine Motor Coordination L manual apraxia with three-jaw micheal grasp pattern utilized for small item stabilization.    Gross Motor Coordination Mild apraxia   IRF-TANIKA:  Eating   Assistance Needed \"Incidental touching   [place] Physical Assistance Level Less than 25%   CARE Score 3   Discharge Goal:  Assistance Needed Independent   Discharge Goal:  Score 6   IRF-TANIKA:  Oral Hygiene   Assistance Needed Set-up / clean-up   CARE Score 5   Discharge Goal:  Assistance Needed Independent   Discharge Goal:  Score 6   IRF-TANIKA:  Shower/Bathe Self   Assistance Needed Physical assistance   Physical Assistance Level Less than 25%   CARE Score 3   Discharge Goal:  Assistance Needed Independent;Adaptive equipment   Discharge Goal Score 6   IRF-TANIKA:  Upper Body Dressing   Assistance Needed Physical assistance   Physical Assistance Level Less than 25%   CARE Score 3   Discharge Goal:  Assistance Needed Independent   Dischage Goal:  Score 6   IRF-TANIKA:  Lower Body Dressing   Assistance Needed Physical assistance   Physical Assistance Level 75% or more   CARE Score 2   Discharge Goal:  Assistance Needed Independent   Discharge Goal:  Score 6   IRF " TANIKA:  Putting On/Taking Off Footwear   Assistance Needed Physical assistance   Physical Assistance Level Total assistance   CARE Score 1   Discharge Goal:  Assistance Needed Independent   Discharge Goal:  Score 6   IRF-TANIKA:  Toileting Hygiene   Assistance Needed Incidental touching   Physical Assistance Level No physical assistance or only touching/steadying assist   CARE Score 4   Discharge Goal:  Assistance Needed Independent   Discharge Goal:  Score 6   IRF-TANIKA:  Toilet Transfer   Assistance Needed Physical assistance   Physical Assistance Level Less than 25%   CARE Score 3   Discharge Goal:  Assistance Needed Independent   Discahrge Goal:  Score 6   IRF-TANIKA:  Hearing, Speech, and Vision   Expression of Ideas and Wants 3   Understanding Verbal and Non-Verbal Content 3   Problem List   Problem List Decreased Active Daily Living Skills;Decreased Homemaking Skills;Decreased Functional Mobility;Impaired Vision;Impaired Postural Control / Balance   Precautions   Precautions Swallow Precautions ( See Comments);Fall Risk;Other (See Comments)   Comments Absence of central vision, L apraxia, Slightly Thick Liquid   Current Discharge Plan   Current Discharge Plan Return to Prior Living Situation   Benefit    Therapy Benefit Patient Would Benefit from Inpatient Rehab Occupational Therapy to Maximize [place] with ADLs, IADLs and Functional Mobility.   Interdisciplinary Plan of Care Collaboration   IDT Collaboration with  Speech Therapist;Physical Therapist   Patient Position at End of Therapy Seated   Collaboration Comments Transfer of care to SLP   Equipment Needs   Assistive Device / DME Tub Transfer Bench   OT Total Time Spent   OT Individual Total Time Spent (Mins) 60   OT Charge Group   Charges Yes   OT Self Care / ADL 1   OT Evaluation OT Evaluation Mod       FIM Eating Score:     Eating Description:       FIM Grooming Score:  3 - Moderate Assistance  Grooming Description:  (Increased assist with hair care due to  matted areas)    FIM Bathing Score:  3 - Moderate Assistance  Bathing Description:  Grab bar, Hand held shower, Increased time, Supervision for safety, Verbal cueing(CGA during standing pursuits; Assist with shower head management)    FIM Upper Body Dressin - Minimal Assistance  Upper Body Dressing Description:  Supervision for safety, Verbal cueing    FIM Lower Body Dressing Score:  2 - Max Assistance  Lower Body Dressing Description:  Supervision for safety, Increased time, Verbal cueing    FIM Toileting Body Dressin - Max Assistance  Toileting Description:  Increased time, Supervision for safety, Grab bar, Verbal cueing    FIM Bed/Chair/Wheelchair Transfers Score:    Bed/Chair/Wheelchair Transfers Description:       FIM Toilet Transfer Score:     Toilet Transfer Description:       FIM Tub/Shower Transfers Score:     Tub/Shower Transfers Description:       FIM Comprehension Score:     Comprehension Description:       FIM Expression Score:     Expression Description:       FIM Social Interaction Score:     Social Interaction Description:       FIM Problem Solving Score:     Problem Solving Description:       Assessment  Patient is 74 y.o. male with a diagnosis of subdural hematoma.  Additional factors influencing patient status / progress (ie: cognitive factors, co-morbidities, social support, etc): pre-existing central vision deficits, independent prior level of function, family support in the region if needed ([place]), and strong motivation to return to prior level of independence.      Plan  Recommend Occupational Therapy  minutes per day 5-7 days per week for 10-14 days for the following treatments:  OT Self Care/ADL, OT Community Reintegration, OT Manual Ther Technique, OT Neuro Re-Ed/Balance, OT Therapeutic Activity, OT Evaluation and OT Therapeutic Exercise.    Goals:  Long term and short term goals have been discussed with patient and they are in agreement.    Occupational Therapy  Goals (Active)      There are no active problems.

## 2020-04-26 NOTE — PROGRESS NOTES
"Rehab Progress Note     Encounter Date: 4/26/2020    CC: SDH, decreased mobility, weakness    Interval Events (Subjective)  Patient sitting up in bed. He reports therapy evaluations wore him out this morning. Reviewed admission labs including mild anemia, mild hyponatremia, and elevated BUN. Encouraged increased fluid intake. He reports it is difficult on thickened liquids. He has questions about anemia, discussed that it is very mild. Denies NVD. Denies SOB.     Objective:  VITAL SIGNS: /62   Pulse (!) 54   Temp 36.8 °C (98.2 °F) (Oral)   Resp 18   Ht 1.753 m (5' 9\")   Wt 75.5 kg (166 lb 7.2 oz)   SpO2 93%   BMI 24.58 kg/m²   Gen: NAD  Psych: Mood and affect appropriate  CV: RRR, no edema  Resp: CTAB, no upper airway sounds  Abd: NTND  Neuro: AOx4, minimal vision, following simple commands, poor memory of conversation from yesterday    Recent Results (from the past 72 hour(s))   CBC WITH DIFFERENTIAL    Collection Time: 04/24/20  5:30 AM   Result Value Ref Range    WBC 8.7 4.8 - 10.8 K/uL    RBC 4.63 (L) 4.70 - 6.10 M/uL    Hemoglobin 13.8 (L) 14.0 - 18.0 g/dL    Hematocrit 39.8 (L) 42.0 - 52.0 %    MCV 86.0 81.4 - 97.8 fL    MCH 29.8 27.0 - 33.0 pg    MCHC 34.7 33.7 - 35.3 g/dL    RDW 40.6 35.9 - 50.0 fL    Platelet Count 237 164 - 446 K/uL    MPV 9.6 9.0 - 12.9 fL    Neutrophils-Polys 66.40 44.00 - 72.00 %    Lymphocytes 21.20 (L) 22.00 - 41.00 %    Monocytes 10.40 0.00 - 13.40 %    Eosinophils 0.70 0.00 - 6.90 %    Basophils 0.20 0.00 - 1.80 %    Immature Granulocytes 1.10 (H) 0.00 - 0.90 %    Nucleated RBC 0.00 /100 WBC    Neutrophils (Absolute) 5.78 1.82 - 7.42 K/uL    Lymphs (Absolute) 1.85 1.00 - 4.80 K/uL    Monos (Absolute) 0.91 (H) 0.00 - 0.85 K/uL    Eos (Absolute) 0.06 0.00 - 0.51 K/uL    Baso (Absolute) 0.02 0.00 - 0.12 K/uL    Immature Granulocytes (abs) 0.10 0.00 - 0.11 K/uL    NRBC (Absolute) 0.00 K/uL   Comp Metabolic Panel    Collection Time: 04/24/20  5:30 AM   Result Value Ref Range "    Sodium 140 135 - 145 mmol/L    Potassium 3.3 (L) 3.6 - 5.5 mmol/L    Chloride 106 96 - 112 mmol/L    Co2 22 20 - 33 mmol/L    Anion Gap 12.0 7.0 - 16.0    Glucose 102 (H) 65 - 99 mg/dL    Bun 27 (H) 8 - 22 mg/dL    Creatinine 0.77 0.50 - 1.40 mg/dL    Calcium 8.4 (L) 8.5 - 10.5 mg/dL    AST(SGOT) 24 12 - 45 U/L    ALT(SGPT) 33 2 - 50 U/L    Alkaline Phosphatase 55 30 - 99 U/L    Total Bilirubin 0.6 0.1 - 1.5 mg/dL    Albumin 3.4 3.2 - 4.9 g/dL    Total Protein 6.2 6.0 - 8.2 g/dL    Globulin 2.8 1.9 - 3.5 g/dL    A-G Ratio 1.2 g/dL   ESTIMATED GFR    Collection Time: 04/24/20  5:30 AM   Result Value Ref Range    GFR If African American >60 >60 mL/min/1.73 m 2    GFR If Non African American >60 >60 mL/min/1.73 m 2   CBC WITH DIFFERENTIAL    Collection Time: 04/25/20  3:39 AM   Result Value Ref Range    WBC 9.2 4.8 - 10.8 K/uL    RBC 4.83 4.70 - 6.10 M/uL    Hemoglobin 14.3 14.0 - 18.0 g/dL    Hematocrit 42.2 42.0 - 52.0 %    MCV 87.4 81.4 - 97.8 fL    MCH 29.6 27.0 - 33.0 pg    MCHC 33.9 33.7 - 35.3 g/dL    RDW 39.8 35.9 - 50.0 fL    Platelet Count 243 164 - 446 K/uL    MPV 9.7 9.0 - 12.9 fL    Neutrophils-Polys 73.60 (H) 44.00 - 72.00 %    Lymphocytes 15.30 (L) 22.00 - 41.00 %    Monocytes 8.50 0.00 - 13.40 %    Eosinophils 0.20 0.00 - 6.90 %    Basophils 0.20 0.00 - 1.80 %    Immature Granulocytes 2.20 (H) 0.00 - 0.90 %    Nucleated RBC 0.00 /100 WBC    Neutrophils (Absolute) 6.79 1.82 - 7.42 K/uL    Lymphs (Absolute) 1.41 1.00 - 4.80 K/uL    Monos (Absolute) 0.78 0.00 - 0.85 K/uL    Eos (Absolute) 0.02 0.00 - 0.51 K/uL    Baso (Absolute) 0.02 0.00 - 0.12 K/uL    Immature Granulocytes (abs) 0.20 (H) 0.00 - 0.11 K/uL    NRBC (Absolute) 0.00 K/uL   Comp Metabolic Panel    Collection Time: 04/25/20  3:39 AM   Result Value Ref Range    Sodium 136 135 - 145 mmol/L    Potassium 4.2 3.6 - 5.5 mmol/L    Chloride 101 96 - 112 mmol/L    Co2 23 20 - 33 mmol/L    Anion Gap 12.0 7.0 - 16.0    Glucose 123 (H) 65 - 99 mg/dL     Bun 26 (H) 8 - 22 mg/dL    Creatinine 0.77 0.50 - 1.40 mg/dL    Calcium 9.1 8.5 - 10.5 mg/dL    AST(SGOT) 24 12 - 45 U/L    ALT(SGPT) 36 2 - 50 U/L    Alkaline Phosphatase 58 30 - 99 U/L    Total Bilirubin 0.8 0.1 - 1.5 mg/dL    Albumin 3.6 3.2 - 4.9 g/dL    Total Protein 6.2 6.0 - 8.2 g/dL    Globulin 2.6 1.9 - 3.5 g/dL    A-G Ratio 1.4 g/dL   ESTIMATED GFR    Collection Time: 20  3:39 AM   Result Value Ref Range    GFR If African American >60 >60 mL/min/1.73 m 2    GFR If Non African American >60 >60 mL/min/1.73 m 2   EKG    Collection Time: 20  6:49 AM   Result Value Ref Range    Report       [facility]    Test Date:  2020  Pt Name:    STUART HAYS                 Department: [facility]  MRN:        [id number]                      Room:       [facility]  Gender:     Male                         Technician: PHILOMENA  :        1945                   Requested By:MARY BETH GARCIA  Order #:    [id number]                    Reading MD: Anthony Ervin MD    Measurements  Intervals                                Axis  Rate:       88                           P:          48  MI:         144                          QRS:        -38  QRSD:       106                          T:          80  QT:         384  QTc:        465    Interpretive Statements  SINUS RHYTHM  LEFT ATRIAL ABNORMALITY  BORDERLINE IVCD WITH LAD  LATE PRECORDIAL R/S TRANSITION  Compared to ECG 2020 15:42:25  Atrial abnormality now present  Electronically Signed On 2020 12:02:05 PDT by Anthony Ervin MD     TROPONIN    Collection Time: 20  7:20 AM   Result Value Ref Range    Troponin T 10 6 - 19 ng/L   CBC with Differential    Collection Time: 20  6:04 AM   Result Value Ref Range    WBC 9.5 4.8 - 10.8 K/uL    RBC 4.32 (L) 4.70 - 6.10 M/uL    Hemoglobin 13.1 (L) 14.0 - 18.0 g/dL    Hematocrit 37.9 (L) 42.0 - 52.0 %    MCV 87.7 81.4 - 97.8 fL    MCH 30.3 27.0 - 33.0 pg    MCHC 34.6 33.7 - 35.3 g/dL    RDW 39.9 35.9 -  50.0 fL    Platelet Count 228 164 - 446 K/uL    MPV 9.9 9.0 - 12.9 fL    Neutrophils-Polys 78.50 (H) 44.00 - 72.00 %    Lymphocytes 11.50 (L) 22.00 - 41.00 %    Monocytes 8.00 0.00 - 13.40 %    Eosinophils 0.30 0.00 - 6.90 %    Basophils 0.20 0.00 - 1.80 %    Immature Granulocytes 1.50 (H) 0.00 - 0.90 %    Nucleated RBC 0.00 /100 WBC    Neutrophils (Absolute) 7.46 (H) 1.82 - 7.42 K/uL    Lymphs (Absolute) 1.09 1.00 - 4.80 K/uL    Monos (Absolute) 0.76 0.00 - 0.85 K/uL    Eos (Absolute) 0.03 0.00 - 0.51 K/uL    Baso (Absolute) 0.02 0.00 - 0.12 K/uL    Immature Granulocytes (abs) 0.14 (H) 0.00 - 0.11 K/uL    NRBC (Absolute) 0.00 K/uL   Comp Metabolic Panel (CMP)    Collection Time: 04/26/20  6:04 AM   Result Value Ref Range    Sodium 134 (L) 135 - 145 mmol/L    Potassium 4.0 3.6 - 5.5 mmol/L    Chloride 98 96 - 112 mmol/L    Co2 25 20 - 33 mmol/L    Anion Gap 11.0 7.0 - 16.0    Glucose 116 (H) 65 - 99 mg/dL    Bun 28 (H) 8 - 22 mg/dL    Creatinine 0.91 0.50 - 1.40 mg/dL    Calcium 8.6 8.5 - 10.5 mg/dL    AST(SGOT) 21 12 - 45 U/L    ALT(SGPT) 34 2 - 50 U/L    Alkaline Phosphatase 55 30 - 99 U/L    Total Bilirubin 0.7 0.1 - 1.5 mg/dL    Albumin 3.3 3.2 - 4.9 g/dL    Total Protein 5.7 (L) 6.0 - 8.2 g/dL    Globulin 2.4 1.9 - 3.5 g/dL    A-G Ratio 1.4 g/dL   HEMOGLOBIN A1C    Collection Time: 04/26/20  6:04 AM   Result Value Ref Range    Glycohemoglobin 5.7 (H) 0.0 - 5.6 %    Est Avg Glucose 117 mg/dL   TSH with Reflex to FT4    Collection Time: 04/26/20  6:04 AM   Result Value Ref Range    TSH 1.090 0.380 - 5.330 uIU/mL   Vitamin D, 25-hydroxy (blood)    Collection Time: 04/26/20  6:04 AM   Result Value Ref Range    25-Hydroxy   Vitamin D 25 34 30 - 100 ng/mL   ESTIMATED GFR    Collection Time: 04/26/20  6:04 AM   Result Value Ref Range    GFR If African American >60 >60 mL/min/1.73 m 2    GFR If Non African American >60 >60 mL/min/1.73 m 2       Current Facility-Administered Medications   Medication Frequency   •  Respiratory Therapy Consult Continuous RT   • oxyCODONE immediate-release (ROXICODONE) tablet 5 mg Q3HRS PRN   • oxyCODONE immediate release (ROXICODONE) tablet 10 mg Q3HRS PRN   • tramadol (ULTRAM) 50 MG tablet 50 mg Q4HRS PRN   • hydrALAZINE (APRESOLINE) tablet 25 mg Q8HRS PRN   • acetaminophen (TYLENOL) tablet 650 mg Q4HRS PRN   • senna-docusate (PERICOLACE or SENOKOT S) 8.6-50 MG per tablet 2 Tab BID    And   • polyethylene glycol/lytes (MIRALAX) PACKET 1 Packet QDAY PRN    And   • magnesium hydroxide (MILK OF MAGNESIA) suspension 30 mL QDAY PRN    And   • bisacodyl (DULCOLAX) suppository 10 mg QDAY PRN   • artificial tears ophthalmic solution 1 Drop PRN   • benzocaine-menthol (CEPACOL) lozenge 1 Lozenge Q2HRS PRN   • mag hydrox-al hydrox-simeth (MAALOX PLUS ES or MYLANTA DS) suspension 20 mL Q2HRS PRN   • ondansetron (ZOFRAN ODT) dispertab 4 mg 4X/DAY PRN    Or   • ondansetron (ZOFRAN) syringe/vial injection 4 mg 4X/DAY PRN   • traZODone (DESYREL) tablet 50 mg QHS PRN   • sodium chloride (OCEAN) 0.65 % nasal spray 2 Spray PRN   • amLODIPine (NORVASC) tablet 5 mg DAILY   • dexamethasone (DECADRON) tablet 4 mg BID   • lacosamide (VIMPAT) tablet 100 mg BID   • levETIRAcetam (KEPPRA) tablet 1,000 mg BID   • rosuvastatin (CRESTOR) tablet 10 mg QHS       Orders Placed This Encounter   Procedures   • Diet Order Regular     Standing Status:   Standing     Number of Occurrences:   1     Order Specific Question:   Diet:     Answer:   Regular [1]     Order Specific Question:   Texture Modifier     Answer:   Level 6 - Soft & Bite Sized (Dysphagia 3)     Order Specific Question:   Liquid level     Answer:   Level 2 - Mildly Thick       Assessment:  Active Hospital Problems    Diagnosis   • *Subdural hematoma (HCC)   • Impaired mobility and ADLs   • Seizures, post-traumatic (HCC)   • Oropharyngeal dysphagia   • Urinary retention   • Essential hypertension   • Hypercholesterolemia   • Legally blind       Medical Decision  Making and Plan:  TBI (Traumatic Brain Injury): Fall with SDH s/p R sided evacuation with Dr. Mendiola on 4/17/20. Patient started on Steroids. Had post-operative seizure like activity, EEG with cortical irritation  -PT and OT for mobility and ADLs  -SLP for cognition  -NSG follow-up. Per most recent NSG recommendations continue steroids for a week. Recommend CT head in 7 days  -On Decadron 4 mg BID, will continue for a week     Dysphagia - On dysphagia diet on transfer. SLP for swallow evaluation - continue dysphagia 3 with NTL     Seizure disorder - Patient with previous CVA with seizures. Increased AEDs at [facility] to Keppra 1000 mg BID and Vimpat 100 mg BID  -Follow-up Neurology seizure clinic     HTN - Patient on 5 mg Amlodipine. Previously on metoprolol as well. Continue to monitor, SBP elevated on admission     Legally blind - High risk for falls.      Anemia - 13.1 on admission, continue to monitor    Hyponatremia - 134 on admission, will monitor    Azotemia - Patient on NTL diet on admission with poor fluid intake. Discussed increasing PO intake, will monitor and may need IVF    HLD - Patient on Rosuvastatin 10 mg on transfer.     DVT Ppx - Patient high risk for bleed. Ambulating > 100 feet.     Total time:  35 minutes.  I spent greater than 50% of the time for patient care and coordination on this date, including unit/floor time, and face-to-face time with the patient as per assessment and plan above.  Discussion includes admission labs, anemia, hyponatremia, dehydration and recommendations for diet with NTL.     Lyn Sadler M.D.

## 2020-04-26 NOTE — THERAPY
Speech Language Pathology   Initial Assessment     Patient Name: Rafael Mccoy  AGE:  74 y.o., SEX:  male  Medical Record #: [id number]  Today's Date: 4/26/2020     Subjective    Pt was seen for swallow eval during breakfast. SLP completed cognitive linguistic eval immediately following breakfast. Pt alert and engaged.      Objective       04/26/20 0801   Prior Level Of Function   Communication Within Functional Limits   Swallow Within Functional Limits   Dentition Intact   Hearing Within Functional Limits for Evaluation   Patient's Primary Language English   Receptive Language / Auditory Comprehension   Receptive Language / Auditory Comprehension WDL   Expressive Language   Expressive Language (WDL) WDL   Reading Comprehension    Reading Comprehension (WDL) X   Barriers to Reading Vision / Visual Processing   Written Language Expression   Written Language Expression (WDL) X   Confounding Factors Visual / Perceptual Deficit   Cognition   Cognitive-Linguistic (WDL) X   Moderate Attention Moderate (3)   Orientation  Supervision (5)   Complex Information Processing Moderate (3)   Verbal Short Term Memory 10 Minutes   Insight into Deficits Minimal (4)   Auditory Math Moderate (3)   Social / Pragmatic Communication   Social / Pragmatic Communication WDL   Labial Function   Labial Function (WDL) WDL   Lingual Function   Lingual Function (WDL) WDL   Swallowing   Swallowing (WDL) X   Pureed (4) Within Functional Limits   Thin Liquid Minimal   Serial Swallows Thin / Eads (Straw) Within Functional Limits   Masticated Foods Minimal   Dysphagia Strategies / Recommendations   Strategies / Interventions Recommended (Yes / No) Yes   Compensatory Strategies Direct Supervision During Meals;Assistance Needed for Meal Tray Set-up;Head of Bed 45 Degrees After Meals;Multiple Swallows   Diet / Liquid Recommendation Mildly Thick (2) - (Nectar Thick);Soft & Bite-Sized (6) - (Dysphagia III)   Medication Administration  Float Whole with  Puree   Therapy Interventions Dysphagia Therapy By Speech Language Pathologist;Therapeutic Dining For Meals;MBSS Evaluation   Outcome Measures   Outcome Measures Utilized MoCA   MoCA ([place] Cognitive Assessment)   Visuospatial/Executive 0   Naming 0   Attention - Digits 1   Attention - Letters 0   Attention - Serial 7 Subtraction 1   Language - Repeat 2   Language - Fluency 0   Language - Abstraction 0   Delayed Recall 1   Orientation 5   Level of Education 0   Total 10   Level of Severity Moderate cognitive impairment   Interdisciplinary Plan of Care Collaboration   IDT Collaboration with  Nursing   Patient Position at End of Therapy Seated;Call Light within Reach;Tray Table within Reach   Collaboration Comments re: swallow eval results   Speech Language Pathologist Assigned   Assigned SLP / Pager # [id number]/MP/[id number] [phone] cog/swallow   SLP Total Time Spent   SLP Individual Total Time Spent (Mins) 90   SLP Charge Group   Charges Yes   SLP Speech Language Evaluation Speech Sound Language Comprehension   SLP Oral Pharyngeal Evaluation Oral Pharyngeal Evaluation       FIM Eating Score:  3 - Moderate Assistance  Eating Description:       FIM Comprehension Score:  5 - Stand-by Prompting/Supervision or Set-up  Comprehension Description:       FIM Expression Score:  5 - Stand-by Prompting/Supervision or Set-up  Expression Description:       FIM Social Interaction Score:  7 - Independent  Social Interaction Description:       FIM Problem Solving Score:  2 - Max Assistance  Problem Solving Description:       FIM Memory Score:  3 - Moderate Assistance  Memory Description:       Assessment    Patient is 74 y.o. male with a diagnosis of SDH s/p GLF several days prior to hospitalization. Pt underwent right-sided craniotomy on 4/17/20 for evacuation of SDH. 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      Dysphagia: Pt presents with mild oral dysphagia as characterized by generalized weakness resulting in prolonged mastication with solids. Pt presents with mild suspected pharyngeal dysphagia as zane by wet vocal quality noted with thin liquids via cup sips and straw in isolation. Pt also reports globus sensation at the level of the UES. Pt requires liquids be given in a cup with a lid and a straw 2* vision loss.  Pt was observed to self-feed 100% of breakfast of current diet texture (minced and moist) as well as trials of soft texture X6 without overt s/sx of asp when alternating with nectar thick liquids via straw. Pt was also observed during lunch to have s/sx of asp with rice. SLP phoned kitchen to add note to pt's order for \"no rice\". ST recs soft and bite sized texture (no rice) and swallow precautions as follows: Meals in t-dine, Seated upright for all PO, Remain upright for 30 minutes after meals, meds floated in puree.    Cognitive Linguistic: Pt OX4 and demonstrating good insight into deficits. Pt presents with intact receptive skills to follow commands and answer questions. Pt also presents with intact expressive skills to produce a cohesive narrative to describe hospital course. However, pt presents with moderate deficits in the areas of attention and memory. SLP administered the MoCA-blind. Pt scored 10/22, indicating a moderate cognitive impairment. Pt scored 2/6 for the attention portion of the MoCA and 1/5 for delayed recall.        Plan  Recommend Speech Therapy  minutes per day 5-6 days per week for 4 weeks for the following treatments:  SLP Speech Language Treatment, SLP Swallowing Dysfunction Treatment, SLP Video Swallow Evaluation, SLP Self Care / ADL Training , SLP Cognitive Skill Development and SLP Group Treatment.    Goals:  Long term and short term goals have been discussed with patient and they are in agreement.    Speech Therapy Problems     Problem: " Memory STGs     Dates: Start: 04/26/20       Description:     Goal: STG-Within one week, patient will     Dates: Start: 04/26/20       Description: 1) Individualized goal: will complete functional memory and attention tasks with at least 80% accuracy and minimal cues  2) Interventions:  SLP Speech Language Treatment, SLP Self Care / ADL Training , SLP Cognitive Skill Development and SLP Group Treatment                   Problem: Speech/Swallowing LTGs     Dates: Start: 04/26/20       Description:     Goal: LTG-By discharge, patient will safely swallow     Dates: Start: 04/26/20       Description: 1) Individualized goal:  Least restrictive diet without overt s/sx of asp for 100% of PO intake.   2) Interventions:  SLP Swallowing Dysfunction Treatment and SLP Oral Pharyngeal Evaluation, SLP Video Swallow Evaluation             Goal: LTG-By discharge, patient will     Dates: Start: 04/26/20       Description: 1) Individualized goal: improve cognitive function to return to PLOF, as indicated by completing functional cognitive tasks with at least 80% accuracy and minimal cues  2) Interventions:  SLP Speech Language Treatment, SLP Self Care / ADL Training , SLP Cognitive Skill Development and SLP Group Treatment                   Problem: Swallowing STGs     Dates: Start: 04/26/20       Description:     Goal: STG-Within one week, patient will safely swallow     Dates: Start: 04/26/20       Description: 1) Individualized goal: soft and bite sized texture with nectar thick liquids without over s/sx of asp for 100% of PO intake  2) Interventions:  SLP Swallowing Dysfunction Treatment and SLP Oral Pharyngeal Evaluation, SLP Video Swallow Evaluation

## 2020-04-26 NOTE — CARE PLAN
Problem: Discharge Barriers/Planning  Goal: Patient's continuum of care needs will be met  Outcome: PROGRESSING AS EXPECTED  Note: Patient admitted to facility at 1310 via wheelchair; accompanied by hospital transport.  Patient assisted to room and positioned in bed for comfort and safety; call light within reach.  Patient assisted with stowing belongings and oriented to room and facility.  Admission assessment performed and documented in computer.  Admission paperwork completed; signed copies placed in chart.  Will continue to monitor.      Problem: Skin Integrity  Goal: Risk for impaired skin integrity will decrease  Outcome: PROGRESSING AS EXPECTED  Note: 2 RN skin check done with admitting RN and CARLOS ALBERTO Javier. Face photo and skin photos documented in media. Appropriate LDAs opened.   Pt with Lawrence score of 18, RN wound protocol not indicated, orders current. Will continue to monitor.

## 2020-04-26 NOTE — FLOWSHEET NOTE
04/26/20 1417   Events/Summary/Plan   Events/Summary/Plan 02 spot check   Vital Signs   Pulse 61   Respiration 18   Pulse Oximetry 95 %   $ Pulse Oximetry (Spot Check) Yes   Respiratory Assessment   Level of Consciousness Alert   Oxygen   O2 Delivery Device Room air w/o2 available

## 2020-04-27 ENCOUNTER — APPOINTMENT (OUTPATIENT)
Dept: PAIN MANAGEMENT | Facility: REHABILITATION | Age: 75
DRG: 949 | End: 2020-04-27
Attending: PHYSICAL MEDICINE & REHABILITATION
Payer: MEDICARE

## 2020-04-27 ENCOUNTER — APPOINTMENT (OUTPATIENT)
Dept: RADIOLOGY | Facility: REHABILITATION | Age: 75
DRG: 949 | End: 2020-04-27
Attending: PHYSICAL MEDICINE & REHABILITATION
Payer: MEDICARE

## 2020-04-27 PROCEDURE — A9270 NON-COVERED ITEM OR SERVICE: HCPCS | Performed by: PHYSICAL MEDICINE & REHABILITATION

## 2020-04-27 PROCEDURE — 700102 HCHG RX REV CODE 250 W/ 637 OVERRIDE(OP): Performed by: PHYSICAL MEDICINE & REHABILITATION

## 2020-04-27 PROCEDURE — 770010 HCHG ROOM/CARE - REHAB SEMI PRIVAT*

## 2020-04-27 PROCEDURE — 97112 NEUROMUSCULAR REEDUCATION: CPT | Mod: CQ

## 2020-04-27 PROCEDURE — 74230 X-RAY XM SWLNG FUNCJ C+: CPT

## 2020-04-27 PROCEDURE — 92526 ORAL FUNCTION THERAPY: CPT | Performed by: SPEECH-LANGUAGE PATHOLOGIST

## 2020-04-27 PROCEDURE — 97535 SELF CARE MNGMENT TRAINING: CPT

## 2020-04-27 PROCEDURE — 97116 GAIT TRAINING THERAPY: CPT | Mod: CQ

## 2020-04-27 PROCEDURE — 97530 THERAPEUTIC ACTIVITIES: CPT

## 2020-04-27 PROCEDURE — 99232 SBSQ HOSP IP/OBS MODERATE 35: CPT | Performed by: PHYSICAL MEDICINE & REHABILITATION

## 2020-04-27 PROCEDURE — 92611 MOTION FLUOROSCOPY/SWALLOW: CPT | Performed by: SPEECH-LANGUAGE PATHOLOGIST

## 2020-04-27 PROCEDURE — 92523 SPEECH SOUND LANG COMPREHEN: CPT | Performed by: SPEECH-LANGUAGE PATHOLOGIST

## 2020-04-27 PROCEDURE — 94760 N-INVAS EAR/PLS OXIMETRY 1: CPT

## 2020-04-27 PROCEDURE — 97530 THERAPEUTIC ACTIVITIES: CPT | Mod: CQ

## 2020-04-27 RX ORDER — GABAPENTIN 300 MG/1
300 CAPSULE ORAL 3 TIMES DAILY
Status: DISCONTINUED | OUTPATIENT
Start: 2020-04-27 | End: 2020-04-29

## 2020-04-27 RX ADMIN — DOCUSATE SODIUM 50 MG AND SENNOSIDES 8.6 MG 2 TABLET: 8.6; 5 TABLET, FILM COATED ORAL at 08:45

## 2020-04-27 RX ADMIN — ROSUVASTATIN CALCIUM 10 MG: 10 TABLET, FILM COATED ORAL at 21:39

## 2020-04-27 RX ADMIN — AMLODIPINE BESYLATE 5 MG: 5 TABLET ORAL at 08:45

## 2020-04-27 RX ADMIN — LACOSAMIDE 100 MG: 100 TABLET, FILM COATED ORAL at 08:45

## 2020-04-27 RX ADMIN — LACOSAMIDE 100 MG: 100 TABLET, FILM COATED ORAL at 21:39

## 2020-04-27 RX ADMIN — DOCUSATE SODIUM 50 MG AND SENNOSIDES 8.6 MG 2 TABLET: 8.6; 5 TABLET, FILM COATED ORAL at 21:39

## 2020-04-27 RX ADMIN — GABAPENTIN 300 MG: 300 CAPSULE ORAL at 17:04

## 2020-04-27 RX ADMIN — LEVETIRACETAM 1000 MG: 500 TABLET ORAL at 08:45

## 2020-04-27 RX ADMIN — DEXAMETHASONE 4 MG: 4 TABLET ORAL at 08:45

## 2020-04-27 RX ADMIN — DEXAMETHASONE 4 MG: 4 TABLET ORAL at 21:39

## 2020-04-27 RX ADMIN — ACETAMINOPHEN 650 MG: 325 TABLET, FILM COATED ORAL at 21:38

## 2020-04-27 RX ADMIN — LEVETIRACETAM 1000 MG: 500 TABLET ORAL at 21:38

## 2020-04-27 RX ADMIN — GABAPENTIN 300 MG: 300 CAPSULE ORAL at 21:38

## 2020-04-27 ASSESSMENT — PATIENT HEALTH QUESTIONNAIRE - PHQ9
1. LITTLE INTEREST OR PLEASURE IN DOING THINGS: NOT AT ALL
1. LITTLE INTEREST OR PLEASURE IN DOING THINGS: NOT AT ALL
2. FEELING DOWN, DEPRESSED, IRRITABLE, OR HOPELESS: NOT AT ALL
SUM OF ALL RESPONSES TO PHQ9 QUESTIONS 1 AND 2: 0
SUM OF ALL RESPONSES TO PHQ9 QUESTIONS 1 AND 2: 0
2. FEELING DOWN, DEPRESSED, IRRITABLE, OR HOPELESS: NOT AT ALL

## 2020-04-27 NOTE — PROGRESS NOTES
"Rehab Progress Note     Encounter Date: 4/27/2020    CC: SDH, decreased mobility, weakness    Interval Events (Subjective)  Patient sitting up in therapy gym. He reports he is doing well. Denies pain. Reports he is tolerating being on room air. Denies SOB when walking. MBSS was completed this morning. Discussed with SLP who upgraded to dysphagia 3 with thins. Denies NVD.     Objective:  VITAL SIGNS: /71   Pulse 77   Temp 36.3 °C (97.4 °F) (Oral)   Resp 18   Ht 1.753 m (5' 9\")   Wt 75.5 kg (166 lb 7.2 oz)   SpO2 96%   BMI 24.58 kg/m²   Gen: NAD  Psych: Mood and affect appropriate  CV: RRR, no edema  Resp: CTAB, no upper airway sounds  Abd: NTND  Neuro: AOx4, bilateral blindness, walking with FWW    Recent Results (from the past 72 hour(s))   CBC WITH DIFFERENTIAL    Collection Time: 04/25/20  3:39 AM   Result Value Ref Range    WBC 9.2 4.8 - 10.8 K/uL    RBC 4.83 4.70 - 6.10 M/uL    Hemoglobin 14.3 14.0 - 18.0 g/dL    Hematocrit 42.2 42.0 - 52.0 %    MCV 87.4 81.4 - 97.8 fL    MCH 29.6 27.0 - 33.0 pg    MCHC 33.9 33.7 - 35.3 g/dL    RDW 39.8 35.9 - 50.0 fL    Platelet Count 243 164 - 446 K/uL    MPV 9.7 9.0 - 12.9 fL    Neutrophils-Polys 73.60 (H) 44.00 - 72.00 %    Lymphocytes 15.30 (L) 22.00 - 41.00 %    Monocytes 8.50 0.00 - 13.40 %    Eosinophils 0.20 0.00 - 6.90 %    Basophils 0.20 0.00 - 1.80 %    Immature Granulocytes 2.20 (H) 0.00 - 0.90 %    Nucleated RBC 0.00 /100 WBC    Neutrophils (Absolute) 6.79 1.82 - 7.42 K/uL    Lymphs (Absolute) 1.41 1.00 - 4.80 K/uL    Monos (Absolute) 0.78 0.00 - 0.85 K/uL    Eos (Absolute) 0.02 0.00 - 0.51 K/uL    Baso (Absolute) 0.02 0.00 - 0.12 K/uL    Immature Granulocytes (abs) 0.20 (H) 0.00 - 0.11 K/uL    NRBC (Absolute) 0.00 K/uL   Comp Metabolic Panel    Collection Time: 04/25/20  3:39 AM   Result Value Ref Range    Sodium 136 135 - 145 mmol/L    Potassium 4.2 3.6 - 5.5 mmol/L    Chloride 101 96 - 112 mmol/L    Co2 23 20 - 33 mmol/L    Anion Gap 12.0 7.0 - 16.0 "    Glucose 123 (H) 65 - 99 mg/dL    Bun 26 (H) 8 - 22 mg/dL    Creatinine 0.77 0.50 - 1.40 mg/dL    Calcium 9.1 8.5 - 10.5 mg/dL    AST(SGOT) 24 12 - 45 U/L    ALT(SGPT) 36 2 - 50 U/L    Alkaline Phosphatase 58 30 - 99 U/L    Total Bilirubin 0.8 0.1 - 1.5 mg/dL    Albumin 3.6 3.2 - 4.9 g/dL    Total Protein 6.2 6.0 - 8.2 g/dL    Globulin 2.6 1.9 - 3.5 g/dL    A-G Ratio 1.4 g/dL   ESTIMATED GFR    Collection Time: 20  3:39 AM   Result Value Ref Range    GFR If African American >60 >60 mL/min/1.73 m 2    GFR If Non African American >60 >60 mL/min/1.73 m 2   EKG    Collection Time: 20  6:49 AM   Result Value Ref Range    Report       [facility]    Test Date:  2020  Pt Name:    STUART HAYS                 Department: [facility]  MRN:        [id number]                      Room:       [facility]  Gender:     Male                         Technician: PHILOMENA  :        1945                   Requested By:MARY BETH GARCIA  Order #:    [id number]                    Reading MD: Anthony Ervin MD    Measurements  Intervals                                Axis  Rate:       88                           P:          48  MN:         144                          QRS:        -38  QRSD:       106                          T:          80  QT:         384  QTc:        465    Interpretive Statements  SINUS RHYTHM  LEFT ATRIAL ABNORMALITY  BORDERLINE IVCD WITH LAD  LATE PRECORDIAL R/S TRANSITION  Compared to ECG 2020 15:42:25  Atrial abnormality now present  Electronically Signed On 2020 12:02:05 PDT by Anthony Ervin MD     TROPONIN    Collection Time: 20  7:20 AM   Result Value Ref Range    Troponin T 10 6 - 19 ng/L   CBC with Differential    Collection Time: 20  6:04 AM   Result Value Ref Range    WBC 9.5 4.8 - 10.8 K/uL    RBC 4.32 (L) 4.70 - 6.10 M/uL    Hemoglobin 13.1 (L) 14.0 - 18.0 g/dL    Hematocrit 37.9 (L) 42.0 - 52.0 %    MCV 87.7 81.4 - 97.8 fL    MCH 30.3 27.0 - 33.0 pg    MCHC 34.6  33.7 - 35.3 g/dL    RDW 39.9 35.9 - 50.0 fL    Platelet Count 228 164 - 446 K/uL    MPV 9.9 9.0 - 12.9 fL    Neutrophils-Polys 78.50 (H) 44.00 - 72.00 %    Lymphocytes 11.50 (L) 22.00 - 41.00 %    Monocytes 8.00 0.00 - 13.40 %    Eosinophils 0.30 0.00 - 6.90 %    Basophils 0.20 0.00 - 1.80 %    Immature Granulocytes 1.50 (H) 0.00 - 0.90 %    Nucleated RBC 0.00 /100 WBC    Neutrophils (Absolute) 7.46 (H) 1.82 - 7.42 K/uL    Lymphs (Absolute) 1.09 1.00 - 4.80 K/uL    Monos (Absolute) 0.76 0.00 - 0.85 K/uL    Eos (Absolute) 0.03 0.00 - 0.51 K/uL    Baso (Absolute) 0.02 0.00 - 0.12 K/uL    Immature Granulocytes (abs) 0.14 (H) 0.00 - 0.11 K/uL    NRBC (Absolute) 0.00 K/uL   Comp Metabolic Panel (CMP)    Collection Time: 04/26/20  6:04 AM   Result Value Ref Range    Sodium 134 (L) 135 - 145 mmol/L    Potassium 4.0 3.6 - 5.5 mmol/L    Chloride 98 96 - 112 mmol/L    Co2 25 20 - 33 mmol/L    Anion Gap 11.0 7.0 - 16.0    Glucose 116 (H) 65 - 99 mg/dL    Bun 28 (H) 8 - 22 mg/dL    Creatinine 0.91 0.50 - 1.40 mg/dL    Calcium 8.6 8.5 - 10.5 mg/dL    AST(SGOT) 21 12 - 45 U/L    ALT(SGPT) 34 2 - 50 U/L    Alkaline Phosphatase 55 30 - 99 U/L    Total Bilirubin 0.7 0.1 - 1.5 mg/dL    Albumin 3.3 3.2 - 4.9 g/dL    Total Protein 5.7 (L) 6.0 - 8.2 g/dL    Globulin 2.4 1.9 - 3.5 g/dL    A-G Ratio 1.4 g/dL   HEMOGLOBIN A1C    Collection Time: 04/26/20  6:04 AM   Result Value Ref Range    Glycohemoglobin 5.7 (H) 0.0 - 5.6 %    Est Avg Glucose 117 mg/dL   TSH with Reflex to FT4    Collection Time: 04/26/20  6:04 AM   Result Value Ref Range    TSH 1.090 0.380 - 5.330 uIU/mL   Vitamin D, 25-hydroxy (blood)    Collection Time: 04/26/20  6:04 AM   Result Value Ref Range    25-Hydroxy   Vitamin D 25 34 30 - 100 ng/mL   ESTIMATED GFR    Collection Time: 04/26/20  6:04 AM   Result Value Ref Range    GFR If African American >60 >60 mL/min/1.73 m 2    GFR If Non African American >60 >60 mL/min/1.73 m 2       Current Facility-Administered  Medications   Medication Frequency   • Respiratory Therapy Consult Continuous RT   • oxyCODONE immediate-release (ROXICODONE) tablet 5 mg Q3HRS PRN   • oxyCODONE immediate release (ROXICODONE) tablet 10 mg Q3HRS PRN   • tramadol (ULTRAM) 50 MG tablet 50 mg Q4HRS PRN   • hydrALAZINE (APRESOLINE) tablet 25 mg Q8HRS PRN   • acetaminophen (TYLENOL) tablet 650 mg Q4HRS PRN   • senna-docusate (PERICOLACE or SENOKOT S) 8.6-50 MG per tablet 2 Tab BID    And   • polyethylene glycol/lytes (MIRALAX) PACKET 1 Packet QDAY PRN    And   • magnesium hydroxide (MILK OF MAGNESIA) suspension 30 mL QDAY PRN    And   • bisacodyl (DULCOLAX) suppository 10 mg QDAY PRN   • artificial tears ophthalmic solution 1 Drop PRN   • benzocaine-menthol (CEPACOL) lozenge 1 Lozenge Q2HRS PRN   • mag hydrox-al hydrox-simeth (MAALOX PLUS ES or MYLANTA DS) suspension 20 mL Q2HRS PRN   • ondansetron (ZOFRAN ODT) dispertab 4 mg 4X/DAY PRN    Or   • ondansetron (ZOFRAN) syringe/vial injection 4 mg 4X/DAY PRN   • traZODone (DESYREL) tablet 50 mg QHS PRN   • sodium chloride (OCEAN) 0.65 % nasal spray 2 Spray PRN   • amLODIPine (NORVASC) tablet 5 mg DAILY   • dexamethasone (DECADRON) tablet 4 mg BID   • lacosamide (VIMPAT) tablet 100 mg BID   • levETIRAcetam (KEPPRA) tablet 1,000 mg BID   • rosuvastatin (CRESTOR) tablet 10 mg QHS       Orders Placed This Encounter   Procedures   • Diet Order Regular     Standing Status:   Standing     Number of Occurrences:   1     Order Specific Question:   Diet:     Answer:   Regular [1]     Order Specific Question:   Texture Modifier     Answer:   Level 6 - Soft & Bite Sized (Dysphagia 3)     Order Specific Question:   Liquid level     Answer:   Level 0 - Thin       Assessment:  Active Hospital Problems    Diagnosis   • *Subdural hematoma (HCC)   • Impaired mobility and ADLs   • Seizures, post-traumatic (HCC)   • Oropharyngeal dysphagia   • Urinary retention   • Essential hypertension   • Hypercholesterolemia   • Legally  blind       Medical Decision Making and Plan:  TBI (Traumatic Brain Injury): Fall with SDH s/p R sided evacuation with Dr. Mendiola on 4/17/20. Patient started on Steroids. Had post-operative seizure like activity, EEG with cortical irritation  -PT and OT for mobility and ADLs  -SLP for cognition  -NSG follow-up. Per most recent NSG recommendations continue steroids for a week. Recommend CT head in 7 days (~5/1)  -On Decadron 4 mg BID, will continue for a week     Dysphagia - On dysphagia diet on transfer. SLP for swallow evaluation - continue dysphagia 3 with NTL. MBSS 4/27/20 - upgraded to dysphagia with thins     Seizure disorder - Patient with previous CVA with seizures. Increased [facility]ED[facility] at [facility] to Keppra 1000 mg BID and Vimpat 100 mg BID  -Follow-up Neurology seizure clinic     HTN - Patient on 5 mg Amlodipine. Previously on metoprolol as well. Continue to monitor, SBP elevated on admission.      Legally blind - High risk for falls.      Anemia - 13.1 on admission, continue to monitor    Hyponatremia - 134 on admission, will monitor    Azotemia - Patient on NTL diet on admission with poor fluid intake. Discussed increasing PO intake, now on thin liquids so may improve    HLD - Patient on Rosuvastatin 10 mg on transfer.     DVT Ppx - Patient high risk for bleed. Ambulating > 100 feet.     Total time:  26 minutes.  I spent greater than 50% of the time for patient care, counseling, and coordination on this date, including unit/floor time, and face-to-face time with the patient as per interval events and assessment and plan above. Topics discussed included improving mobility, improved swallow, discussed PO thin liquids, and he will increase his intake. Discussed about unable to follow-up with NSG until after discharge per hospital protocol. Will get CT later this week and notify NSG     Lyn Sadler M.D.

## 2020-04-27 NOTE — DISCHARGE PLANNING
CASE MANAGEMENT INITIAL ASSESSMENT    Admit Date:  4/25/2020     I spoke with patients daughters Yas and Cleo to discuss role of case management / discharge planning / team conference.     Patient is a  74 y.o. male transferred from [facility] S/P SDH/ right-sided craniotomy and risk for seizures.  Patient on Keppra and Vimpat.      Patient fell in his bathtub and went to [facility] on 4/17/2020 and then transferred to [facility] for surgery (per Dr. Sadler H&P).      CM LM on patients VM to call CM.  CM also tried patients room and will continue to reach out to patient.  According to patients daughters, patient lives alone and uses a cane to navigate when he is walking as he is blind.  Daughters stated they have spoken with him a couple of times today and he sounds confused and didn't sound to be communicating very well.  Daughters do not know patients physical address but will try to get it.  They expressed concern about patient going home.  They stated he doesn't have many folks around him to help but is connected with his [facility].  CM offered support and will update them after IDT tomorrow.      Diagnosis: 02.22 Traumatic Closed Injury  SDH (subdural hematoma) (HCC)    Co-morbidities:   Patient Active Problem List    Diagnosis Date Noted   • Impaired mobility and ADLs 04/23/2020     Priority: High   • Seizures, post-traumatic (HCC) 04/21/2020     Priority: High   • Subdural hematoma (HCC) 04/17/2020     Priority: High   • Oropharyngeal dysphagia 04/21/2020     Priority: Medium   • Urinary retention 04/20/2020     Priority: Medium   • Contraindication to deep vein thrombosis (DVT) prophylaxis 04/17/2020     Priority: Medium   • Essential hypertension 05/17/2016     Priority: Medium   • Hypercholesterolemia 04/18/2020     Priority: Low   • Trauma 04/17/2020     Priority: Low   • Screening examination for infectious disease 04/17/2020     Priority: Low   • Legally blind 05/17/2016     Priority: Low   •  "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) 05/17/2016   • Insomnia 05/17/2016   • Cerebellar stroke (HCC) 05/16/2016     Prior Living Situation:  Housing / Facility: 1 Story Apartment / Condo  Lives with - Patient's Self Care Capacity: Alone and Able to Care For Self    Prior Level of Function:  Medication Management: Independent  Finances: Independent  Home Management: Independent  Shopping: Independent  Prior Level Of Mobility: Independent With Device in Community  Driving / Transportation: Relatives / Others Provide Transportation, Utilizes Public Transportation    Support Systems:  Primary : Daughter Yas [phone], Daughter Cleo: [phone]  Advance Directives: No(unknown)  Power of  (Name & Phone): DtrGeremias Sorenson Anderson: [phone] \"thinks she is POA    Previous Services Utilized:   Equipment Owned: Single Point Cane  Prior Services: Home-Independent(Dtr Yas stated pt has a friend help him pay bills, shop. )    Other Information:  Occupation (Pre-Hospital Vocational): Retired Due To Age     Primary Payor Source: Medicare A, Medicare B  Secondary Payor Source: Other (Comments)([place])  Primary Care Practitioner : Rosemary Somers: [phone]    Patient / Family Goal:  Patient / Family Goal: Gain mobility, gain independence, make sure all needed services in place(Per daughters)    Additional Case Management Questions:  Have you ever received case management services for yourself or a family member? No    Do you feel you have and an understanding of what services  provide? Yes    Do you have any additional questions regarding case management?  No        CASE MANAGEMENT PLAN OF CARE   Individualized Goals:   1. Gain enough independence to go home alone  2. Gain strength  3. Gain mobility    Barriers:   1. Lives alone  2. Blind  [place]    Plan:  1. Continue to follow patient through hospitalization and provide discharge planning in " collaboration with patient, family, physicians and ancillary services.     2. Utilize community resources to ensure a safe discharge.

## 2020-04-27 NOTE — CARE PLAN
Problem: Communication  Goal: The ability to communicate needs accurately and effectively will improve  Outcome: PROGRESSING AS EXPECTED  Note: Patient uses call light consistently and appropriately this shift.  Waits for assistance when needed and does not attempt self transfer.  Able to verbalize needs.  Will continue to monitor.      Problem: Safety  Goal: Will remain free from injury  Outcome: PROGRESSING AS EXPECTED  Note: Patient demonstrates good safety technique this shift.  Asks for assistance when needed and does not attempt self transfer.  Able to verbalize needs.  Will continue to monitor.

## 2020-04-27 NOTE — FLOWSHEET NOTE
04/27/20 0955   Events/Summary/Plan   Events/Summary/Plan 02 spot check   Vital Signs   Pulse 90   Respiration 18   Pulse Oximetry 96 %   $ Pulse Oximetry (Spot Check) Yes   Respiratory Assessment   Level of Consciousness Alert   Oxygen   O2 Delivery Device Room air w/o2 available

## 2020-04-27 NOTE — PROGRESS NOTES
Received bedside shift report from Sirena FRAGOSO RN regarding patient and assumed care. Patient awake, calm and stable, currently positioned in bed for comfort and safety; call light within reach. Denies pain or discomfort at this time. Will continue to monitor.

## 2020-04-27 NOTE — THERAPY
Speech Language Pathology  Daily Treatment     Patient Name: Rafael Mccoy  Age:  74 y.o., Sex:  male  Medical Record #: [id number]  Today's Date: 4/27/2020     Subjective    Pt was eating breakfast in t-dine when SLP arrived. Pt completed breakfast and participated with cog tx. Pt remembered the SLP from the previous day and was engaged with tx.      Objective       04/27/20 0901   Cognition   Orientation  Supervision (5)   Functional Memory Activities Within Functional Limits (6-7)   Insight into Deficits Minimal (4)   Interdisciplinary Plan of Care Collaboration   IDT Collaboration with  Nursing   Patient Position at End of Therapy Other (Comments)  (SLP assisted pt to MBSS)   Collaboration Comments MBSS   Charge Group   SLP Speech Language Evaluation Speech Sound Language Comprehension   SLP Swallowing Dysfunction Treatment Swallowing Dysfunction Treatment       Assessment    Pt was observed to self-feed current diet of soft and bite sized solids with nectar thick liquids via straw without overt s/sx of asp to consume the remainder of his breakfast. SLP discussed the MBSS scheduled for this AM. Pt asked appropriate questions regarding the procedure.     SLP and pt discussed current POT. Pt recalled PT/OT from this morning and yesterday. He recalled specific tasks with each therapy discipline as well as how he performed.     Plan    Complete MBSS

## 2020-04-27 NOTE — PROGRESS NOTES
0715: Bedside report received, assumed care for this patient.  Patient is A&O x 4.  VSS.  Communication board updated, call light and belongings are within reach.  Bed is in low position. Patient appears in no distress, and has no complaints of SOB and 0/10 of pain, but throat discomfort when he eats which is baseline.  Will be medicated per MAR.  Plan of care discussed and agreed upon with patient.

## 2020-04-27 NOTE — THERAPY
Physical Therapy   Daily Treatment     Patient Name: Rafael Mccoy  Age:  74 y.o., Sex:  male  Medical Record #: [id number]  Today's Date: 4/27/2020     Precautions  Precautions: Fall Risk, Swallow Precautions ( See Comments)  Comments: legally blind, L apraxia, thins ok    Subjective    Pt resting in bed, he was agreeable to PT. Pt reporting that he was taking off of supplemental 02     Objective       04/27/20 1031   Precautions   Precautions Fall Risk;Swallow Precautions ( See Comments)   Comments legally blind, L apraxia, thins ok   Bed Mobility    Supine to Sit Stand by Assist   Sit to Supine Stand by Assist   Sit to Stand Minimal Assist   Neuro-Muscular Treatments   Neuro-Muscular Treatments Verbal Cuing;Sequencing;Tactile Cuing;Postural Facilitation;Postural Changes   Comments STS transfer training with emphasis on hand placement and sequencing (R hand on walker, L hand pushing) x 10 reps with fair/poor carryover. required vc for hand placement with each repitition as reps increased    Interdisciplinary Plan of Care Collaboration   IDT Collaboration with  Occupational Therapist   Patient Position at End of Therapy Seated;Self Releasing Lap Belt Applied  (escorted pt to t-dine)   Collaboration Comments CLOF   PT Total Time Spent   PT Individual Total Time Spent (Mins) 60   PT Charge Group   PT Gait Training 2   PT Neuromuscular Re-Education / Balance 1   PT Therapeutic Activities 1   Supervising Physical Therapist Parvin Bryant     Hr 96   SP02 96% on RA    FIM Bed/Chair/Wheelchair Transfers Score: 4 - Minimal Assistance  Bed/Chair/Wheelchair Transfers Description:  Supervision for safety, Verbal cueing(bed mobility SBA, reach pivot transfer CGA with set up/vc)    FIM Walking Score:  2 - Max Assistance  Walking Description:  Walker, Extra time, Safety concerns, Requires incidental assist, Supervision for safety, Verbal cueing(110' x 2 FWW Ryan for balance and safety, vc for pathfinding)    *additional gait  training 60' x 1 in // CG/SBA with B > unilateral UE support as well as gait no AD x 50'  modA 2/2 R anterolateral lean    FIM Toilet Transfer Score:  4 - Minimal Assistance  Toilet Transfer Description:  Grab bar, Increased time, Supervision for safety, Verbal cueing, Set-up of equipment    FIM Toiletin - Max Assistance  Toileting Description:  Verbal cueing, Supervision for safety, Set-up of equipment, Increased time, Grab bar      Assessment    Pt tolerated treatment session well with emphasis on functional mobility with appropriate AD. Due to significant anterior lean pt highly benefits from B UE support to amb. Pt demonstrated fair carry over with STS sequencing, which seemed to decrease with fatigue    Plan    Static/dynamic staniding balance, gait training with ongoing assessment for appropriate AD,

## 2020-04-27 NOTE — CARE PLAN
Problem: Communication  Goal: The ability to communicate needs accurately and effectively will improve  Note: Encouraged to make needs known to staff and to use call light for staff assist.      Problem: Safety  Goal: Will remain free from falls  Note: Call light kept within reach and encouraged to use for assistance and with toileting needs. Encouraged to call staff and to wait for staff before transfers.

## 2020-04-27 NOTE — REHAB-CM IDT TEAM NOTE
Case Management    DC Planning  DC destination/dispostion:  Home with supportive friends and family    Referrals: TBD     DC Needs: TBD    Barriers to discharge: Functional and mobility deficits       Strengths: Supportive friends to help him after DC home    Section completed by:  Kayce Weaver

## 2020-04-27 NOTE — THERAPY
Occupational Therapy  Daily Treatment     Patient Name: Rafael Mccoy  Age:  74 y.o., Sex:  male  Medical Record #: [id number]  Today's Date: 2020     Precautions  Precautions: (P) Fall Risk, Swallow Precautions ( See Comments)  Comments: (P) legally blind, L apraxia, Slightly Thick Liquid         Subjective    Pt agreeable to OT     Objective       20 0701   Precautions   Precautions Fall Risk;Swallow Precautions ( See Comments)   Comments legally blind, L apraxia, Slightly Thick Liquid   Sitting Upper Body Exercises   Sitting Upper Body Exercises Yes   Upper Extremity Bike Level 3 Resistance  (motomed 10 min for bilateral integration and LUE input)   Standing Upper Body Exercises   Comments standing at high table for 20 minutes, increased time for laundry folding task, difficulty sustaining grasp with L hand, difficulty dividing attention   Balance   Comments left lateral lean in sitting and standing, unable to self correct or sustain with therapist correction, utilized leaning fwd into table for stability during laundry folding task with therapist assist for maintaining midline   OT Total Time Spent   OT Individual Total Time Spent (Mins) 60   OT Charge Group   Charges Yes   OT Self Care / ADL 1   OT Therapy Activity 3       FIM Upper Body Dressin - Standby Prompting/Supervision or Set-up  Upper Body Dressing Description:  Verbal cueing(verbal cues for clothing orientation and for LUE management , requires increased time)      Assessment    Pt tolerated session well with focus on progressing functional use of LUE and maintaining midline in standing. He continues with slight left lean in both sitting and standing requiring facilitation to come to midline and to sustain, he continues with impaired coordination in LUE though is able to use functionally given additional time and verbal cues, sensation/strength/proprioception all appear slightly decreased relative to RUE but not absent.      Plan    LUE neuro re-ed, FM/GM coordination, sitting/standing balance to correct left lean, ADLs, IADLs

## 2020-04-27 NOTE — REHAB-PHARMACY IDT TEAM NOTE
Pharmacy   Pharmacy  Antibiotics/Antifungals/Antivirals:  Medication:      Active Orders (From admission, onward)    None        Route:        NA  Stop Date:  NA  Reason:      NA  Antihypertensives/Cardiac:  Medication:    Orders (72h ago, onward)     Start     Ordered    04/26/20 0900  amLODIPine (NORVASC) tablet 5 mg  DAILY      04/25/20 1338    04/25/20 2100  rosuvastatin (CRESTOR) tablet 10 mg  EVERY BEDTIME      04/25/20 1338    04/25/20 1338  hydrALAZINE (APRESOLINE) tablet 25 mg  EVERY 8 HOURS PRN      04/25/20 1338              Patient Vitals for the past 24 hrs:   BP Pulse   04/27/20 1400 131/71 77   04/27/20 0955 -- 90   04/27/20 0704 121/79 76   04/26/20 1835 120/69 77     Anticoagulation:  Medication: None                                     Other key medications: A review of the medication list reveals no issues at this time. Patient is currently on antihypertensive(s). Recommend home blood pressure monitoring/recording if antihypertensive(s) regimen(s) continue.    Section completed by: Ferdinand Bal [facility]

## 2020-04-27 NOTE — THERAPY
Speech Language Pathology  Video Swallow     Patient Name:  Rafael Mccoy  AGE:  74 y.o., SEX:  male  Medical Record #:  [id number]  Today's Date: 4/27/2020     Objective       04/27/20 0931   History / Background Information   Diagnosis   (SDH)   Dysphagia Symptoms Warranting Video Swallow   (wet vocal quality with thin liquids at BSE)   Dentition Intact   Procedure   Patient Seated in    (wheelchair)   Seated at (Degrees)   (90)   Views Completed Lateral   Fluoroscopy Time   (2:59)   Consistencies / Presentation Method   Consistencies / Presentation Method Tested   Thin (0) Teaspoon;Straw   Pureed (4) Teaspoon   Soft & Bite-Sized (6) - (Dysphagia III)   (bite size)   Regular (7)   (bite size)   Barium Tablet   (single tablet with thin)   Oral Phase   Oral Phase X   Pureed (4) Impaired Anterior / Posterior Bolus Movement;Delayed Oral Transit;Oral Residue After the Swallow   Soft & Bite-Sized (6) - (Dysphagia III) Impaired Bolus Formation;Impaired Anterior / Posterior Bolus Movement;Delayed Oral Transit;Oral Residue After the Swallow   Regular (7) Impaired Bolus Formation;Impaired Anterior / Posterior Bolus Movement;Oral Residue After the Swallow   Additional Comments Repeat swallow and thin liquid was effective to clear residue   Pharyngeal Phase   Pharyngeal Phase X   Thin (0) Residue In Valleculae;Penetration During Swallow   Pureed (4) Reduced Tongue Base Retraction    Soft & Bite-Sized (6) - (Dysphagia III) Delayed Onset of Swallow;Reduced Tongue Base Retraction;Residue In Valleculae   Regular (7) Delayed Onset of Swallow;Residue In Valleculae   Compensatory Strategies Attempted   Compensatory Strategies Attempted Yes   Multiple Swallows   (Effective to clear residue)   Liquid Wash After Swallow   (Effective to clear residue)   Alternate Liquids and Solids   (Effective to clear residue)   Additional Comments Thin via straw 2* pt with vision loss and improved oral control with liquids via straw   Impression    Dysphagia Present Yes   Oral - Pharyngeal Moderate Impairment   Additional Comments Pt presents with moderate oropharyngeal dysphagia as zane by: reduced lingual and posterior lingual strength/coordination resulting in decreased bolus formation and increased JESUS of up to 30+ seconds for soft and regular solids, reduced BOT retraction resulting in min-mod BOT and vallecular residue (cleared with repeat swallows and thin liquid wash), delayed pharyngeal response of up to 6 seconds-triggering at the pyriforms for soft and regular solids, reduced hyolaryngeal elevation excursion resulting in transient penetration with thin via straw. Aspiration was not noted during this study.         Prognosis   Prognosis for Improvement Good   Positive Indicators for Improvement   (Pt ability to follow commands for safe swallow strategies)   Recommendations   Diet / Liquid Recommendation Thin (0);Soft & Bite-Sized (6) - (Dysphagia III)   Medication Administration  Whole with Liquid Wash   Strategies / Precautions Supervision Required;Small Bites;Multiple Swallows;Other (See Comments)  (straws for all thin liquids)   Interventions Dysphagia Therapy by SLP;Therapeutic Dining Program for Meals   SLP Contact Information (Name / Extension) Pau Johnson M.S. CCC-SLP - [phone]   Interdisciplinary Plan of Care Collaboration   IDT Collaboration with  Nursing   Patient Position at End of Therapy Seated;Call Light within Reach;Tray Table within Reach   Collaboration Comments re: MBSS results   SLP Total Time Spent   SLP Individual Total Time Spent (Mins) 30   Charge Group   SLP Video Swallow / FEES Videofluoroscopic Evaluation       Assessment    Pt was seen for modified barium swallow study to r/o aspiration and determine least restrictive diet. Pt with delayed pharyngeal response with solids and transient penetration with thin liquids. Aspiration was not noted this study.     Plan    ST recs:  -Modified diet (Soft and Bite size - 6) d/t  increased JESUS and pharyngeal delay with solids  -Thin liquids via straw  -Dysphagia tx to target diet tolerance, potential diet upgrade and oropharyngeal strengthening

## 2020-04-28 PROCEDURE — 97530 THERAPEUTIC ACTIVITIES: CPT

## 2020-04-28 PROCEDURE — 97110 THERAPEUTIC EXERCISES: CPT

## 2020-04-28 PROCEDURE — 700102 HCHG RX REV CODE 250 W/ 637 OVERRIDE(OP): Performed by: PHYSICAL MEDICINE & REHABILITATION

## 2020-04-28 PROCEDURE — 97116 GAIT TRAINING THERAPY: CPT

## 2020-04-28 PROCEDURE — 92526 ORAL FUNCTION THERAPY: CPT

## 2020-04-28 PROCEDURE — A9270 NON-COVERED ITEM OR SERVICE: HCPCS | Performed by: PHYSICAL MEDICINE & REHABILITATION

## 2020-04-28 PROCEDURE — 770010 HCHG ROOM/CARE - REHAB SEMI PRIVAT*

## 2020-04-28 PROCEDURE — 99233 SBSQ HOSP IP/OBS HIGH 50: CPT | Performed by: PHYSICAL MEDICINE & REHABILITATION

## 2020-04-28 PROCEDURE — 97535 SELF CARE MNGMENT TRAINING: CPT

## 2020-04-28 RX ADMIN — AMLODIPINE BESYLATE 5 MG: 5 TABLET ORAL at 07:41

## 2020-04-28 RX ADMIN — DEXAMETHASONE 4 MG: 4 TABLET ORAL at 21:27

## 2020-04-28 RX ADMIN — LEVETIRACETAM 1000 MG: 500 TABLET ORAL at 07:41

## 2020-04-28 RX ADMIN — ROSUVASTATIN CALCIUM 10 MG: 10 TABLET, FILM COATED ORAL at 21:27

## 2020-04-28 RX ADMIN — LACOSAMIDE 100 MG: 100 TABLET, FILM COATED ORAL at 07:41

## 2020-04-28 RX ADMIN — DEXAMETHASONE 4 MG: 4 TABLET ORAL at 07:42

## 2020-04-28 RX ADMIN — GABAPENTIN 300 MG: 300 CAPSULE ORAL at 21:27

## 2020-04-28 RX ADMIN — GABAPENTIN 300 MG: 300 CAPSULE ORAL at 07:42

## 2020-04-28 RX ADMIN — LEVETIRACETAM 1000 MG: 500 TABLET ORAL at 21:27

## 2020-04-28 RX ADMIN — LACOSAMIDE 100 MG: 100 TABLET, FILM COATED ORAL at 21:27

## 2020-04-28 RX ADMIN — DOCUSATE SODIUM 50 MG AND SENNOSIDES 8.6 MG 2 TABLET: 8.6; 5 TABLET, FILM COATED ORAL at 07:42

## 2020-04-28 RX ADMIN — GABAPENTIN 300 MG: 300 CAPSULE ORAL at 14:06

## 2020-04-28 ASSESSMENT — 10 METER WALK TEST (10METWT)
TRIAL 3: TIME TO WALK 10 METERS: 16
TRIAL 2: TIME TO WALK 10 METERS: 16
TRIAL 1: TIME TO WALK 10 METERS: 28
AVERAGE VELOCITY - METERS PER SECOND: .3
AVERAGE TIME - SECONDS: 20

## 2020-04-28 NOTE — REHAB-OT IDT TEAM NOTE
Occupational Therapy   Activities of Daily Living  Eating Initial:  3 - Moderate Assistance  Eating Current:  3 - Moderate Assistance   Eating Description:     Grooming Initial:  3 - Moderate Assistance  Grooming Current:  5 - Standby Prompting/Supervision or Set-up   Grooming Description:  (setup at sink to comb hair and wash hands, verbal cues and increased time to locate items due to visual deficits)  Bathing Initial:  3 - Moderate Assistance  Bathing Current:  3 - Moderate Assistance   Bathing Description:  Grab bar, Hand held shower, Increased time, Supervision for safety, Verbal cueing(CGA during standing pursuits; Assist with shower head management)  Upper Body Dressing Initial:  4 - Minimal Assistance  Upper Body Dressing Current:  5 - Standby Prompting/Supervision or Set-up   Upper Body Dressing Description:  Set-up of equipment  Lower Body Dressing Initial:  2 - Max Assistance  Lower Body Dressing Current:  3 - Moderate Assistance   Lower Body Dressing Description:  3 - Moderate Assistance  Toileting Initial:  2 - Max Assistance  Toileting Current:  4 - Minimal Assistance   Toileting Description:  Grab bar, Increased time, Supervision for safety, Verbal cueing(steadying assist in standing, verbal cues, increased time)  Toilet Transfer Initial:  4 - Minimal Assistance  Toilet Transfer Current:  4 - Minimal Assistance   Toilet Transfer Description:  4 - Minimal Assistance  Tub / Shower Transfer Initial:  4 - Minimal Assistance  Tub / Shower Transfer Current:  4 - Minimal Assistance   Tub / Shower Transfer Description:  (in/out of tub dry run - in using squat pivot to tub transfer bench, out via stepping method using grab bars)  IADL:  Not yet addressed   Family Training/Education:  Not yet addressed, has daughter in [place] and son in [place] that he is not in contact with  DME/DC Recommendations:  Tub transfer bench, grab bar installation in shower and likely next to toilet     Strengths:  Making steady progress  towards goals, Motivated for self care and independence, Pleasant and cooperative and Willingly participates in therapeutic activities  Barriers:  Decreased endurance, Poor balance and Other: impaired vision (decline from baseline), impaired sensation and coordination in LUE/LLE     # of short term goals set= 0    # of short term goals met= 3     Occupational Therapy Goals     Problem: Dressing     Dates: Start: 04/26/20       Description:     Goal: STG-Within one week, patient will dress LB     Dates: Start: 04/26/20   Expected End: 05/03/20       Description: 1) Individualized Goal:  At a Min A level with AE as needed.   2) Interventions:  OT Self Care/ADL, OT Community Reintegration, OT Neuro Re-Ed/Balance, OT Therapeutic Activity, OT Evaluation and OT Therapeutic Exercise                   Problem: Functional Transfers     Dates: Start: 04/26/20       Description:     Goal: STG-Within one week, patient will transfer to tub/shower     Dates: Start: 04/26/20   Expected End: 05/03/20       Description: 1) Individualized Goal:  At a Min A level utilizing a tub shower bench.   2) Interventions:  OT Self Care/ADL, OT Neuro Re-Ed/Balance, OT Therapeutic Activity, OT Evaluation and OT Therapeutic Exercise                   Problem: IADL's     Dates: Start: 04/26/20       Description:     Goal: STG-Within one week, patient will utilize washer and dryer     Dates: Start: 04/26/20   Expected End: 05/03/20       Description: 1) Individualized Goal:  At a CGA level with fading multimodal cues for initial orientation.   2) Interventions:  OT Self Care/ADL, OT Community Reintegration, OT Neuro Re-Ed/Balance, OT Therapeutic Activity, OT Evaluation and OT Therapeutic Exercise                   Problem: OT Long Term Goals     Dates: Start: 04/26/20       Description:     Goal: LTG-By discharge, patient will complete basic self care tasks     Dates: Start: 04/26/20   Expected End: 05/07/20       Description: 1) Individualized  Goal:  At a SUP to Mod I level with AE as needed.   2) Interventions:  OT Neuro Re-Ed/Balance, OT Therapeutic Activity, OT Evaluation and OT Therapeutic Exercise             Goal: LTG-By discharge, patient will perform bathroom transfers     Dates: Start: 04/26/20   Expected End: 05/07/20       Description: 1) Individualized Goal:  At a SUP to Mod I level.   2) Interventions:  OT Self Care/ADL, OT Community Reintegration, OT Neuro Re-Ed/Balance, OT Therapeutic Activity, OT Evaluation and OT Therapeutic Exercise             Goal: LTG-By discharge, patient will complete basic home management     Dates: Start: 04/26/20   Expected End: 05/07/20       Description: 1) Individualized Goal:  At a SUP to Mod I level.   2) Interventions:  OT Self Care/ADL, OT Community Reintegration, OT Neuro Re-Ed/Balance, OT Therapeutic Activity, OT Evaluation and OT Therapeutic Exercise                         Section completed by:  Mary Sorto MS,OTR/L

## 2020-04-28 NOTE — THERAPY
Physical Therapy   Daily Treatment     Patient Name: Rafael Mccoy  Age:  74 y.o., Sex:  male  Medical Record #: [id number]  Today's Date: 4/28/2020     Precautions  Precautions: Fall Risk, Swallow Precautions ( See Comments)  Comments: legally blind, L apraxia, thins ok    Subjective    Pt resting in bed, agreeable to extra therapy session.      Objective       04/28/20 1101   Precautions   Precautions Fall Risk;Swallow Precautions ( See Comments)   Comments legally blind, L apraxia, thins ok   Sitting Lower Body Exercises   Nustep Resistance Level 5  (12 min, 620 steps)   Interdisciplinary Plan of Care Collaboration   Patient Position at End of Therapy Seated  (in T-dine for lunch)   PT Total Time Spent   PT Individual Total Time Spent (Mins) 30   PT Charge Group   PT Therapeutic Exercise 1   PT Therapeutic Activities 1       FIM Bed/Chair/Wheelchair Transfers Score: 4 - Minimal Assistance  Bed/Chair/Wheelchair Transfers Description:  Increased time, Set-up of equipment(bed > WC, CGA squat pivot without AD, spv for bed mobility)      Assessment    Pt motivated and fully participatory, good endurance and fair coordination demonstrated with nustep.  Plan    Gait - determine appropriate AD, consider balance assessment, vector no AD, variable gait, forced use L side, lite gait for increased gait speed

## 2020-04-28 NOTE — CARE PLAN
Problem: Dressing  Goal: STG-Within one week, patient will dress LB  Description: 1) Individualized Goal:  At a Min A level with AE as needed.   2) Interventions:  OT Self Care/ADL, OT Community Reintegration, OT Neuro Re-Ed/Balance, OT Therapeutic Activity, OT Evaluation and OT Therapeutic Exercise     Outcome: PROGRESSING AS EXPECTED     Problem: Functional Transfers  Goal: STG-Within one week, patient will transfer to tub/shower  Description: 1) Individualized Goal:  At a Min A level utilizing a tub shower bench.   2) Interventions:  OT Self Care/ADL, OT Neuro Re-Ed/Balance, OT Therapeutic Activity, OT Evaluation and OT Therapeutic Exercise     Outcome: PROGRESSING AS EXPECTED     Problem: IADL's  Goal: STG-Within one week, patient will utilize washer and dryer  Description: 1) Individualized Goal:  At a CGA level with fading multimodal cues for initial orientation.   2) Interventions:  OT Self Care/ADL, OT Community Reintegration, OT Neuro Re-Ed/Balance, OT Therapeutic Activity, OT Evaluation and OT Therapeutic Exercise     Outcome: PROGRESSING AS EXPECTED

## 2020-04-28 NOTE — REHAB-COLLABORATIVE ONGOING IDT TEAM NOTE
Weekly Interdisciplinary Team Conference Note    Weekly Interdisciplinary Team Conference # 1  Date:  4/28/2020    Clinicians present and reporting at team conference include the following:   MD: SHANT Sadler MD    RN:  Anay Jauregui RN   PT:   Parvin Bryant PT, DPT  OT:  Mary Sorto MS, OTR/L    ST:  Kelle Samson MS, CCC-SLP  CM:  Kayce Mayer MS, LSW  REC:  None  RT:  None  RPh:  Adilia Caruso RPh  Other:   None  All reporting clinicians have a working knowledge of this patient's plan of care.    Targeted DC Date:  5/11/2020     Medical    Patient Active Problem List    Diagnosis Date Noted   • Impaired mobility and ADLs 04/23/2020     Priority: High   • Seizures, post-traumatic (HCC) 04/21/2020     Priority: High   • Subdural hematoma (HCC) 04/17/2020     Priority: High   • Oropharyngeal dysphagia 04/21/2020     Priority: Medium   • Urinary retention 04/20/2020     Priority: Medium   • Contraindication to deep vein thrombosis (DVT) prophylaxis 04/17/2020     Priority: Medium   • Essential hypertension 05/17/2016     Priority: Medium   • Hypercholesterolemia 04/18/2020     Priority: Low   • Trauma 04/17/2020     Priority: Low   • Screening examination for infectious disease 04/17/2020     Priority: Low   • Legally blind 05/17/2016     Priority: Low   • 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 **        Nursing  Diet/Nutrition:  Regular, Dysphagia III-Mechanical Soft and Thin Liquids  Medication Administration:  meds floated with apple sauce per pt request  % consumed at meals in last 24 hours:  Consumed  % of meals per documentation.  Meal/Snack Consumption for the past 24 hrs:   Oral Nutrition   04/27/20 1900 Dinner;Between % Consumed   04/27/20 1300 Lunch;Between % Consumed   04/27/20 0900 Breakfast;Between % Consumed     Snack schedule:   None  Supplement:  N/A  Appetite:  Good  Fluid Intake/Output in past 24 hours: In: 800 [P.O.:800]  Out: 250   Admit Weight:  Weight: 75.5 kg (166 lb 7.2 oz)  Weight Last 7 Days   [75.5 kg (166 lb 7.2 oz)] 75.5 kg (166 lb 7.2 oz) (04/25 1310)    Pain Issues:    Location:  --  --         Severity:  Denies   Scheduled pain medications:  gabapentin (NEURONTIN)      PRN pain medications used in last 24 hours:  acetaminophen (TYLENOL)    Non Pharmacologic Interventions:  distraction, relaxation, repositioned and rest  Effectiveness of pain management plan:  good=patient states acceptable comfort after interventions    Bowel:    Bowel Assist Initial Score:  1 - Total Assistance  Bowel Assist Current Score:  1 - Total Assistance  Bowl Accidents in last 7 days:  2  Last bowel movement: 04/27/20  Stool Description: Small, Soft, Incontinence     Usual bowel pattern:  daily  Scheduled bowel medications:  senna-docusate (PERICOLACE or SENOKOT S)   PRN bowel medications used in last 24 hours:  None  Nursing Interventions:  Increased time, Scheduled medication, Positioning on commode/toilet, Brief, Verbal cueing, Set-up  Effectiveness of bowel program:   good=regular, predictable, controlled emptying of bowel  Bladder:    Bladder Assist Initial Score:  1 - Total Assistance  Bladder Assist Current Score:  1 - Total Assistance  Bladder Accidents in last 7 days:  3  PVR range for past 24-48 hours:  []   Intermittent Catheterization:  n/a  Medications affecting bladder:  None    Time void schedule/voiding pattern Pt incontinent , no residual  Interventions:  Increased time, Verbal cueing, Emptying of device, Time void staff initiated, Brief, Time void patient initiated  Effectiveness of bladder training:  Poor= > 1 incontinent emptying of bladder          Wound:         Patient Lines/Drains/Airways Status    Active Current Wounds     Name: Placement date: Placement time: Site: Days:    Wound 04/17/20 Incision Head Right   04/17/20   2245   Head  10                   Interventions: Incision line well approximated , no staples, 1 suture in place, EPI  Effectiveness of intervention:  wound is improving     Sleep/wake cycle:   Average hours slept:  Sleeps 4-6 hours without waking  Sleep medication usage:  None    Patient/Family Training/Education:  Aspiration Precautions, Bladder Management/Training, Bowel Management/Training, Diet/Nutrition, Fall Prevention, General Self Care, Medication Management, Pain Management, Respiratory Hygiene, Safe Transfers, Safety, Skin Care and Wound Care  Discharge Supply Recommendations:  Blood Pressure Monitor  Strengths: Alert and oriented, Willingly participates in therapeutic activities, Able to follow instructions and Pleasant and cooperative   Barriers:   Aspiration risk, Bladder incontinence, Bowel incontinence, Fatigue, Generalized weakness, Hypertension and Limited mobility       Nursing Problems     Problem: Bowel/Gastric:     Description:     Goal: Normal bowel function is maintained or improved     Description:           Goal: Will not experience complications related to bowel motility     Description:                 Problem: Communication     Description:     Goal: The ability to communicate needs accurately and effectively will improve     Description:                 Problem: Discharge Barriers/Planning     Description:     Goal: Patient's continuum of care needs will be met     Description:                 Problem: Infection     Description:     Goal: Will remain free from infection     Description:                 Problem: Knowledge Deficit     Description:     Goal: Knowledge of disease process/condition, treatment plan, diagnostic tests, and medications will improve     Description:           Goal: Knowledge of the prescribed therapeutic regimen will improve     Description:                 Problem: Pain Management     Description:     Goal: Pain level will decrease to patient's  comfort goal     Description:                 Problem: Respiratory:     Description:     Goal: Respiratory status will improve     Description:                 Problem: Safety     Description:     Goal: Will remain free from injury     Description:           Goal: Will remain free from falls     Description:                 Problem: Urinary Elimination:     Description:     Goal: Ability to reestablish a normal urinary elimination pattern will improve     Description:                 Problem: Venous Thromboembolism (VTW)/Deep Vein Thrombosis (DVT) Prevention:     Description:     Goal: Patient will participate in Venous Thrombosis (VTE)/Deep Vein Thrombosis (DVT)Prevention Measures     Description:                        Long Term Goals:   At discharge patient will be able to function safely at home and in the community with support.    Section completed by:  Hailey Figueroa R.N.           Mobility  Bed mobility:   SBA  Bed /Chair/Wheelchair Transfer Initial:  4 - Minimal Assistance  Bed /Chair/Wheelchair Transfer Current:  4 - Minimal Assistance   Bed/Chair/Wheelchair Transfer Description:  Adaptive equipment, Increased time, Supervision for safety, Verbal cueing, Set-up of equipment, Initial preparation for task  Walk Initial:  1 - Total Assistance  Walk Current:  4 - Minimal Assistance   Walk Description:  Walker, Extra time, Safety concerns, Verbal cueing, Requires incidental assist(160 ft, FWW with 3 turns, cues for FWW management and attention to L side - consistent running into wall on L, also needed significant cues for direction dt visual deficits)  Wheelchair Initial:  1 - Total Assistance  Wheelchair Current:  1 - Total Assistance   Wheelchair Description:  (25ft using bilateral UE mod assist for steering, encouragement to complete the task)  Stairs Initial:  0 - Not tested,unsafe activity  Stairs Current: 0 - Not tested,unsafe activity   Stairs Description:    Patient/Family Training/Education:   Ongoing with patient  DME/DC Recommendations:  TBD  Strengths:  Independent PLOF, Making steady progress towards goals, Motivated for self care and independence, Pleasant and cooperative and Willingly participates in therapeutic activities  Barriers:   Decreased endurance, Generalized weakness, Poor balance and Other: impaired L hemibody, blindness  # of short term goals set= 3  # of short term goals met=0  Physical Therapy Problems     Problem: Balance     Dates: Start: 04/26/20       Description:     Goal: STG-Within one week, patient will maintain dynamic standing     Dates: Start: 04/26/20       Description: 1) Individualized goal:  To progress through dynamic standing there-ex program with SBA  2) Interventions:  PT Gait Training, PT Self Care/Home Eval, PT Therapeutic Exercises, PT Neuro Re-Ed/Balance, PT Therapeutic Activity and PT Evaluation                   Problem: Mobility     Dates: Start: 04/26/20       Description:     Goal: STG-Within one week, patient will propel wheelchair household distances     Dates: Start: 04/26/20       Description: 1) Individualized goal:  100ft bilateral LE with SPV  2) Interventions: PT Gait Training, PT Self Care/Home Eval, PT Therapeutic Exercises, PT Neuro Re-Ed/Balance, PT Therapeutic Activity and PT Evaluation               Goal: STG-Within one week, patient will ambulate community distances     Dates: Start: 04/26/20       Description: 1) Individualized goal:  200ft FWW SBA  2) Interventions: PT Gait Training, PT Self Care/Home Eval, PT Therapeutic Exercises, PT Neuro Re-Ed/Balance, PT Therapeutic Activity and PT Evaluation                     Problem: Mobility Transfers     Dates: Start: 04/26/20       Description:     Goal: STG-Within one week, patient will transfer bed to chair     Dates: Start: 04/26/20       Description: 1) Individualized goal:  Mod I SPT w/c to bed/toilet surfaces  2) Interventions: PT Gait Training, PT Self Care/Home Eval, PT Therapeutic  Exercises, PT Neuro Re-Ed/Balance, PT Therapeutic Activity and PT Evaluation                     Problem: PT-Long Term Goals     Dates: Start: 04/26/20       Description:     Goal: LTG-By discharge, patient will maintain balance     Dates: Start: 04/26/20       Description: 1) Individualized goal:  To perform [place] Balance Assessment with LOW FALL RISK  2) Interventions: PT Gait Training, PT Self Care/Home Eval, PT Therapeutic Exercises, PT Neuro Re-Ed/Balance, PT Therapeutic Activity and PT Evaluation               Goal: LTG-By discharge, patient will ambulate     Dates: Start: 04/26/20       Description: 1) Individualized goal:  200ft x 2 with SPC mod I level surfaces, SPV for uneven terrain  2) Interventions: PT Gait Training, PT Self Care/Home Eval, PT Therapeutic Exercises, PT Neuro Re-Ed/Balance, PT Therapeutic Activity and PT Evaluation               Goal: LTG-By discharge, patient will transfer one surface to another     Dates: Start: 04/26/20       Description: 1) Individualized goal:  Mod I SPT to all surfaces safely and consistently  2) Interventions: PT Gait Training, PT Self Care/Home Eval, PT Therapeutic Exercises, PT Neuro Re-Ed/Balance, PT Therapeutic Activity and PT Evaluation               Goal: LTG-By discharge, patient will perform home exercise program     Dates: Start: 04/26/20       Description: 1) Individualized goal:  Standing LE strengthening program to be done in safe, independent environment 3x/daily  2) Interventions:PT Gait Training, PT Self Care/Home Eval, PT Therapeutic Exercises, PT Neuro Re-Ed/Balance, PT Therapeutic Activity and PT Evaluation                 Goal: LTG-By discharge, patient will ambulate up/down flight of stairs     Dates: Start: 04/26/20       Description: 1) Individualized goal:  12 stairs bilateral rails mod I (required in home environment to access laundry; uses strategy of counting and using rails for balance)  2) Interventions:PT Gait Training, PT Self  Care/Home Eval, PT Therapeutic Exercises, PT Neuro Re-Ed/Balance, PT Therapeutic Activity and PT Evaluation                               Section completed by:  Parvin Bryant, PT    Activities of Daily Living  Eating Initial:  3 - Moderate Assistance  Eating Current:  3 - Moderate Assistance   Eating Description:     Grooming Initial:  3 - Moderate Assistance  Grooming Current:  5 - Standby Prompting/Supervision or Set-up   Grooming Description:  (setup at sink to comb hair and wash hands, verbal cues and increased time to locate items due to visual deficits)  Bathing Initial:  3 - Moderate Assistance  Bathing Current:  3 - Moderate Assistance   Bathing Description:  Grab bar, Hand held shower, Increased time, Supervision for safety, Verbal cueing(CGA during standing pursuits; Assist with shower head management)  Upper Body Dressing Initial:  4 - Minimal Assistance  Upper Body Dressing Current:  5 - Standby Prompting/Supervision or Set-up   Upper Body Dressing Description:  Set-up of equipment  Lower Body Dressing Initial:  2 - Max Assistance  Lower Body Dressing Current:  3 - Moderate Assistance   Lower Body Dressing Description:  3 - Moderate Assistance  Toileting Initial:  2 - Max Assistance  Toileting Current:  4 - Minimal Assistance   Toileting Description:  Grab bar, Increased time, Supervision for safety, Verbal cueing(steadying assist in standing, verbal cues, increased time)  Toilet Transfer Initial:  4 - Minimal Assistance  Toilet Transfer Current:  4 - Minimal Assistance   Toilet Transfer Description:  4 - Minimal Assistance  Tub / Shower Transfer Initial:  4 - Minimal Assistance  Tub / Shower Transfer Current:  4 - Minimal Assistance   Tub / Shower Transfer Description:  (in/out of tub dry run - in using squat pivot to tub transfer bench, out via stepping method using grab bars)  IADL:  Not yet addressed   Family Training/Education:  Not yet addressed, has daughter in [place] and son in [place] that he is not in  contact with  DME/DC Recommendations:  Tub transfer bench, grab bar installation in shower and likely next to toilet     Strengths:  Making steady progress towards goals, Motivated for self care and independence, Pleasant and cooperative and Willingly participates in therapeutic activities  Barriers:  Decreased endurance, Poor balance and Other: impaired vision (decline from baseline), impaired sensation and coordination in LUE/LLE     # of short term goals set= 0    # of short term goals met= 3     Occupational Therapy Goals     Problem: Dressing     Dates: Start: 04/26/20       Description:     Goal: STG-Within one week, patient will dress LB     Dates: Start: 04/26/20   Expected End: 05/03/20       Description: 1) Individualized Goal:  At a Min A level with AE as needed.   2) Interventions:  OT Self Care/ADL, OT Community Reintegration, OT Neuro Re-Ed/Balance, OT Therapeutic Activity, OT Evaluation and OT Therapeutic Exercise                   Problem: Functional Transfers     Dates: Start: 04/26/20       Description:     Goal: STG-Within one week, patient will transfer to tub/shower     Dates: Start: 04/26/20   Expected End: 05/03/20       Description: 1) Individualized Goal:  At a Min A level utilizing a tub shower bench.   2) Interventions:  OT Self Care/ADL, OT Neuro Re-Ed/Balance, OT Therapeutic Activity, OT Evaluation and OT Therapeutic Exercise                   Problem: IADL's     Dates: Start: 04/26/20       Description:     Goal: STG-Within one week, patient will utilize washer and dryer     Dates: Start: 04/26/20   Expected End: 05/03/20       Description: 1) Individualized Goal:  At a CGA level with fading multimodal cues for initial orientation.   2) Interventions:  OT Self Care/ADL, OT Community Reintegration, OT Neuro Re-Ed/Balance, OT Therapeutic Activity, OT Evaluation and OT Therapeutic Exercise                   Problem: OT Long Term Goals     Dates: Start: 04/26/20       Description:     Goal:  LTG-By discharge, patient will complete basic self care tasks     Dates: Start: 04/26/20   Expected End: 05/07/20       Description: 1) Individualized Goal:  At a SUP to Mod I level with AE as needed.   2) Interventions:  OT Neuro Re-Ed/Balance, OT Therapeutic Activity, OT Evaluation and OT Therapeutic Exercise             Goal: LTG-By discharge, patient will perform bathroom transfers     Dates: Start: 04/26/20   Expected End: 05/07/20       Description: 1) Individualized Goal:  At a SUP to Mod I level.   2) Interventions:  OT Self Care/ADL, OT Community Reintegration, OT Neuro Re-Ed/Balance, OT Therapeutic Activity, OT Evaluation and OT Therapeutic Exercise             Goal: LTG-By discharge, patient will complete basic home management     Dates: Start: 04/26/20   Expected End: 05/07/20       Description: 1) Individualized Goal:  At a SUP to Mod I level.   2) Interventions:  OT Self Care/ADL, OT Community Reintegration, OT Neuro Re-Ed/Balance, OT Therapeutic Activity, OT Evaluation and OT Therapeutic Exercise                         Section completed by:  Mary Sorto MS,OTR/L    Cognitive Linquistic Functions  Comprehension Initial:  5 - Stand-by Prompting/Supervision or Set-up  Comprehension Current:  5 - Stand-by Prompting/Supervision or Set-up   Comprehension Description:     Expression Initial:  5 - Stand-by Prompting/Supervision or Set-up  Expression Current:  5 - Stand-by Prompting/Supervision or Set-up   Expression Description:     Social Interaction Initial:  7 - Independent  Social Interaction Current:  7 - Independent   Social Interaction Description:     Problem Solving Initial:  2 - Max Assistance  Problem Solving Current:  4 - Minimal Assistance   Problem Solving Description:     Memory Initial:  3 - Moderate Assistance  Memory Current:  3 - Moderate Assistance   Memory Description:     Executive Functioning / Organization Initial:     Executive Functioning / Organization Current:      Executive  Functioning Desciption:  To be assessed  Swallowing  Swallowing Status:  Regular diet, thin liquids, likely d/c from [facility] tomorrow.   Orders Placed This Encounter   Procedures   • Diet Order Regular     Standing Status:   Standing     Number of Occurrences:   1     Order Specific Question:   Diet:     Answer:   Regular [1]     Behavior Modification Plan  Keep instructions simple/concrete, Set clear goals, Redirect to task/topic and Reinforce participation in desired tasks  Assistive Technology  Low/Impaired vision equipment and Low tech: Calendar, planner, schedule, alarms/timers, pill organizer, post-it notes, lists  Family Training/Education:  Not yet completed  DC Recommendations:   HH vs. OP speech therapy  Strengths:  Able to follow instructions, Good insight into deficits/needs, Making steady progress towards goals, Motivated for self care and independence, Pleasant and cooperative and Willingly participates in therapeutic activities  Barriers:  Other: vision deficits, memory, attention  # of short term goals set=2  # of short term goals met=1  Speech Therapy Problems     Problem: Memory STGs     Dates: Start: 04/26/20       Description:     Goal: STG-Within one week, patient will     Dates: Start: 04/26/20       Description: 1) Individualized goal: will complete functional memory and attention tasks with at least 80% accuracy and minimal cues  2) Interventions:  SLP Speech Language Treatment, SLP Self Care / ADL Training , SLP Cognitive Skill Development and SLP Group Treatment                   Problem: Problem Solving STGs     Dates: Start: 04/28/20       Description:     Goal: STG-Within one week, patient will     Dates: Start: 04/28/20       Description: 1) Individualized goal:  Complete alternating attention tasks with 80% accuracy and min verbal cues.   2) Interventions:  SLP Speech Language Treatment, SLP Self Care / ADL Training , SLP Cognitive Skill Development and SLP Group Treatment                    Problem: Speech/Swallowing LTGs     Dates: Start: 04/26/20       Description:     Goal: LTG-By discharge, patient will safely swallow     Dates: Start: 04/26/20       Description: 1) Individualized goal:  Least restrictive diet without overt s/sx of asp for 100% of PO intake.   2) Interventions:  SLP Swallowing Dysfunction Treatment and SLP Oral Pharyngeal Evaluation, SLP Video Swallow Evaluation             Goal: LTG-By discharge, patient will     Dates: Start: 04/26/20       Description: 1) Individualized goal: improve cognitive function to return to PLOF, as indicated by completing functional cognitive tasks with at least 80% accuracy and minimal cues  2) Interventions:  SLP Speech Language Treatment, SLP Self Care / ADL Training , SLP Cognitive Skill Development and SLP Group Treatment                   Problem: Swallowing STGs     Dates: Start: 04/28/20       Description:     Goal: STG-Within one week, patient will safely swallow     Dates: Start: 04/28/20       Description: 1) Individualized goal:  Regular diet without overt s/sx of aspiration   2) Interventions:  SLP Swallowing Dysfunction Treatment, SLP Oral Pharyngeal Evaluation, SLP Video Swallow Evaluation, SLP Self Care / ADL Training , SLP Cognitive Skill Development and SLP Group Treatment                          Section completed by:  Kelle Samson MS,CCC-SLP          REHAB-Pharmacy IDT Team Note by Ferdinand Bal RPH at 4/27/2020  3:49 PM  Version 1 of 1    Author:  Ferdinand Bal RPH Service:  -- Author Type:  Pharmacist    Filed:  4/27/2020  3:51 PM Date of Service:  4/27/2020  3:49 PM Status:  Signed    :  Ferdinand Bal RPH (Pharmacist)         Pharmacy   Pharmacy  Antibiotics/Antifungals/Antivirals:  Medication:      Active Orders (From admission, onward)    None        Route:        NA  Stop Date:  NA  Reason:      NA  Antihypertensives/Cardiac:  Medication:    Orders (72h ago, onward)     Start     Ordered     04/26/20 0900  amLODIPine (NORVASC) tablet 5 mg  DAILY      04/25/20 1338    04/25/20 2100  rosuvastatin (CRESTOR) tablet 10 mg  EVERY BEDTIME      04/25/20 1338    04/25/20 1338  hydrALAZINE (APRESOLINE) tablet 25 mg  EVERY 8 HOURS PRN      04/25/20 1338              Patient Vitals for the past 24 hrs:   BP Pulse   04/27/20 1400 131/71 77   04/27/20 0955 -- 90   04/27/20 0704 121/79 76   04/26/20 1835 120/69 77     Anticoagulation:  Medication: None                                     Other key medications: A review of the medication list reveals no issues at this time. Patient is currently on antihypertensive(s). Recommend home blood pressure monitoring/recording if antihypertensive(s) regimen(s) continue.    Section completed by: Ferdinand Bal [facility][[email or web address]]     Attribution Key     [email or web address].1 - Ferdinand Bal [facility] on 4/27/2020  3:49 PM                  DC Planning  DC destination/dispostion:  Home with supportive friends and family    Referrals: TBD     DC Needs: TBD    Barriers to discharge: Functional and mobility deficits       Strengths: Supportive friends to help him after DC home    Section completed by:  Kayce Weaver      Physician Summary  SHANT Sadler MD  participated and led team conference discussion.

## 2020-04-28 NOTE — DISCHARGE PLANNING
CM spoke with patients daughter Cleo to update on IDT and target DC date of 5/11/2020.  Provided time to answer questions.  CM available as needs arise.

## 2020-04-28 NOTE — REHAB-NURSING IDT TEAM NOTE
Nursing   Nursing  Diet/Nutrition:  Regular, Dysphagia III-Mechanical Soft and Thin Liquids  Medication Administration:  meds floated with apple sauce per pt request  % consumed at meals in last 24 hours:  Consumed  % of meals per documentation.  Meal/Snack Consumption for the past 24 hrs:   Oral Nutrition   04/27/20 1900 Dinner;Between % Consumed   04/27/20 1300 Lunch;Between % Consumed   04/27/20 0900 Breakfast;Between % Consumed     Snack schedule:  None  Supplement:  N/A  Appetite:  Good  Fluid Intake/Output in past 24 hours: In: 800 [P.O.:800]  Out: 250   Admit Weight:  Weight: 75.5 kg (166 lb 7.2 oz)  Weight Last 7 Days   [75.5 kg (166 lb 7.2 oz)] 75.5 kg (166 lb 7.2 oz) (04/25 1310)    Pain Issues:    Location:  --  --         Severity:  Denies   Scheduled pain medications:  gabapentin (NEURONTIN)      PRN pain medications used in last 24 hours:  acetaminophen (TYLENOL)    Non Pharmacologic Interventions:  distraction, relaxation, repositioned and rest  Effectiveness of pain management plan:  good=patient states acceptable comfort after interventions    Bowel:    Bowel Assist Initial Score:  1 - Total Assistance  Bowel Assist Current Score:  1 - Total Assistance  Bowl Accidents in last 7 days:  2  Last bowel movement: 04/27/20  Stool Description: Small, Soft, Incontinence     Usual bowel pattern:  daily  Scheduled bowel medications:  senna-docusate (PERICOLACE or SENOKOT S)   PRN bowel medications used in last 24 hours:  None  Nursing Interventions:  Increased time, Scheduled medication, Positioning on commode/toilet, Brief, Verbal cueing, Set-up  Effectiveness of bowel program:   good=regular, predictable, controlled emptying of bowel  Bladder:    Bladder Assist Initial Score:  1 - Total Assistance  Bladder Assist Current Score:  1 - Total Assistance  Bladder Accidents in last 7 days:  3  PVR range for past 24-48 hours:  []   Intermittent Catheterization:  n/a  Medications  affecting bladder:  None    Time void schedule/voiding pattern Pt incontinent , no residual  Interventions:  Increased time, Verbal cueing, Emptying of device, Time void staff initiated, Brief, Time void patient initiated  Effectiveness of bladder training:  Poor= > 1 incontinent emptying of bladder          Wound:         Patient Lines/Drains/Airways Status    Active Current Wounds     Name: Placement date: Placement time: Site: Days:    Wound 04/17/20 Incision Head Right  04/17/20   2245   Head  10                   Interventions: Incision line well approximated , no staples, 1 suture in place, EPI  Effectiveness of intervention:  wound is improving     Sleep/wake cycle:   Average hours slept:  Sleeps 4-6 hours without waking  Sleep medication usage:  None    Patient/Family Training/Education:  Aspiration Precautions, Bladder Management/Training, Bowel Management/Training, Diet/Nutrition, Fall Prevention, General Self Care, Medication Management, Pain Management, Respiratory Hygiene, Safe Transfers, Safety, Skin Care and Wound Care  Discharge Supply Recommendations:  Blood Pressure Monitor  Strengths: Alert and oriented, Willingly participates in therapeutic activities, Able to follow instructions and Pleasant and cooperative   Barriers:   Aspiration risk, Bladder incontinence, Bowel incontinence, Fatigue, Generalized weakness, Hypertension and Limited mobility       Nursing Problems     Problem: Bowel/Gastric:     Description:     Goal: Normal bowel function is maintained or improved     Description:           Goal: Will not experience complications related to bowel motility     Description:                 Problem: Communication     Description:     Goal: The ability to communicate needs accurately and effectively will improve     Description:                 Problem: Discharge Barriers/Planning     Description:     Goal: Patient's continuum of care needs will be met     Description:                 Problem:  Infection     Description:     Goal: Will remain free from infection     Description:                 Problem: Knowledge Deficit     Description:     Goal: Knowledge of disease process/condition, treatment plan, diagnostic tests, and medications will improve     Description:           Goal: Knowledge of the prescribed therapeutic regimen will improve     Description:                 Problem: Pain Management     Description:     Goal: Pain level will decrease to patient's comfort goal     Description:                 Problem: Respiratory:     Description:     Goal: Respiratory status will improve     Description:                 Problem: Safety     Description:     Goal: Will remain free from injury     Description:           Goal: Will remain free from falls     Description:                 Problem: Urinary Elimination:     Description:     Goal: Ability to reestablish a normal urinary elimination pattern will improve     Description:                 Problem: Venous Thromboembolism (VTW)/Deep Vein Thrombosis (DVT) Prevention:     Description:     Goal: Patient will participate in Venous Thrombosis (VTE)/Deep Vein Thrombosis (DVT)Prevention Measures     Description:                        Long Term Goals:   At discharge patient will be able to function safely at home and in the community with support.    Section completed by:  Hailey Figueroa R.N.

## 2020-04-28 NOTE — CARE PLAN
Problem: Safety  Goal: Will remain free from falls  Intervention: Implement fall precautions  Flowsheets (Taken 4/28/2020 0355)  Bed Alarm: Yes - Alarm On  Environmental Precautions:   Treaded Slipper Socks on Patient   Personal Belongings, Wastebasket, Call Bell etc. in Easy Reach   Bed in Low Position  Note: Safety measures enforced, pt legally blind bed at lowest level, call light in reach. Needs anticipated and attended/. Aspiration precautions observed. Hourly rounding done.     Problem: Infection  Goal: Will remain free from infection  Intervention: Assess signs and symptoms of infection  Note: Head incision line well approximated , stales removed yesterday, one suture was left in place. No s/s of infection noted. Denies headache. Afebrile, V/s stable.     Problem: Bowel/Gastric:  Goal: Will not experience complications related to bowel motility  Intervention: Assess baseline bowel pattern  Note: Pt. Incontinent of bowel and stool. Diapered,kept dry and clean.

## 2020-04-28 NOTE — THERAPY
Speech Language Pathology  Daily Treatment     Patient Name: Rafael Mccoy  Age:  74 y.o., Sex:  male  Medical Record #: [id number]  Today's Date: 4/28/2020     Subjective    Patient arrived on time to ST with assistance.      Objective       04/28/20 0802   Dysphagia    Diet / Liquid Recommendation Regular (7);Thin (0)   Nutritional Liquid Intake Rating Scale Non thickened beverages   Nutritional Food Intake Rating Scale Total oral diet with no restrictions   Nursing Communication Swallow Precaution Sign Posted at Head of Bed   Speech Language Pathologist Assigned   Assigned SLP / Pager # CL/NL [phone] tdine   SLP Total Time Spent   SLP Individual Total Time Spent (Mins) 60   Charge Group   SLP Swallowing Dysfunction Treatment Swallowing Dysfunction Treatment         Assessment    Patient was assessed with current diet textures as well as trials of regular textures. No overt s/sx of aspiration were noted. Patient required set-up for meal.     Plan    Assess tolerance of reg textures during meal with possible d/c from t-dine as appropriate.

## 2020-04-28 NOTE — THERAPY
Physical Therapy   Daily Treatment     Patient Name: Rafael Mccoy  Age:  74 y.o., Sex:  male  Medical Record #: [id number]  Today's Date: 4/28/2020     Precautions  Precautions: Fall Risk, Swallow Precautions ( See Comments)  Comments: legally blind, L apraxia, thins ok    Subjective    Pt reports he has had visual deficits for a long time     Objective       04/28/20 0901   Standing Lower Body Exercises   Standing Lower Body Exercises Yes   Hamstring Curl 2 sets of 15;Bilateral    Hip Extension 2 sets of 15;Bilateral    Hip Abduction 2 sets of 15;Bilateral   Marching 2 sets of 15   Heel Rise 2 sets of 15;Bilateral   Toe Rise 2 sets of 15;Bilateral   Mini Squat 2 sets of 15;Partial   Other Exercises VCs adn demo for form adn tehcnique, HEP issued for use at home post DC   Bed Mobility    Sit to Supine Supervised   Sit to Stand Contact Guard Assist   Scooting Supervised   Rolling Supervised   Lower Extremity Outcome Measures   Lower Extremity Outcome Measures Utilized 10 Meter Walk Test   10 Meter Walk Test   Normal - Trial 1 28 seconds   Normal - Trial 2 16 seconds   Normal - Trial 3 16 seconds   Normal Average Time 20 seconds   Normal Average Velocity (m/s) 0.3   Interdisciplinary Plan of Care Collaboration   Patient Position at End of Therapy In Bed;Call Light within Reach;Tray Table within Reach   PT Total Time Spent   PT Individual Total Time Spent (Mins) 60   PT Charge Group   PT Gait Training 2   PT Therapeutic Exercise 1   PT Therapeutic Activities 1       FIM Walking Score:  4 - Minimal Assistance  Walking Description:  Walker, Extra time, Safety concerns, Verbal cueing, Requires incidental assist(160 ft, FWW with 3 turns, min A for steadying, cues for FWW management and attention to L side - consistent running into wall on L, also needed significant cues for direction dt visual deficits)      Assessment    Pt performs exercises well with cues and demo and slow gait speed and high fall risk via 10MWT. Pt  cont to require assist and cues for gait with FWW for safety and steadying.    Plan    Gait - determine appropriate AD, consider balance assessment, vector no AD, variable gait, forced use L side, lite gait for increased gait speed

## 2020-04-28 NOTE — REHAB-PT IDT TEAM NOTE
Physical Therapy   Mobility  Bed mobility:   SBA  Bed /Chair/Wheelchair Transfer Initial:  4 - Minimal Assistance  Bed /Chair/Wheelchair Transfer Current:  4 - Minimal Assistance   Bed/Chair/Wheelchair Transfer Description:  Adaptive equipment, Increased time, Supervision for safety, Verbal cueing, Set-up of equipment, Initial preparation for task  Walk Initial:  1 - Total Assistance  Walk Current:  4 - Minimal Assistance   Walk Description:  Walker, Extra time, Safety concerns, Verbal cueing, Requires incidental assist(160 ft, FWW with 3 turns, cues for FWW management and attention to L side - consistent running into wall on L, also needed significant cues for direction dt visual deficits)  Wheelchair Initial:  1 - Total Assistance  Wheelchair Current:  1 - Total Assistance   Wheelchair Description:  (25ft using bilateral UE mod assist for steering, encouragement to complete the task)  Stairs Initial:  0 - Not tested,unsafe activity  Stairs Current: 0 - Not tested,unsafe activity   Stairs Description:    Patient/Family Training/Education:  Ongoing with patient  DME/DC Recommendations:  TBD  Strengths:  Independent PLOF, Making steady progress towards goals, Motivated for self care and independence, Pleasant and cooperative and Willingly participates in therapeutic activities  Barriers:   Decreased endurance, Generalized weakness, Poor balance and Other: impaired L hemibody, blindness  # of short term goals set= 3  # of short term goals met=0  Physical Therapy Problems     Problem: Balance     Dates: Start: 04/26/20       Description:     Goal: STG-Within one week, patient will maintain dynamic standing     Dates: Start: 04/26/20       Description: 1) Individualized goal:  To progress through dynamic standing there-ex program with SBA  2) Interventions:  PT Gait Training, PT Self Care/Home Eval, PT Therapeutic Exercises, PT Neuro Re-Ed/Balance, PT Therapeutic Activity and PT Evaluation                   Problem:  Mobility     Dates: Start: 04/26/20       Description:     Goal: STG-Within one week, patient will propel wheelchair household distances     Dates: Start: 04/26/20       Description: 1) Individualized goal:  100ft bilateral LE with SPV  2) Interventions: PT Gait Training, PT Self Care/Home Eval, PT Therapeutic Exercises, PT Neuro Re-Ed/Balance, PT Therapeutic Activity and PT Evaluation               Goal: STG-Within one week, patient will ambulate community distances     Dates: Start: 04/26/20       Description: 1) Individualized goal:  200ft FWW SBA  2) Interventions: PT Gait Training, PT Self Care/Home Eval, PT Therapeutic Exercises, PT Neuro Re-Ed/Balance, PT Therapeutic Activity and PT Evaluation                     Problem: Mobility Transfers     Dates: Start: 04/26/20       Description:     Goal: STG-Within one week, patient will transfer bed to chair     Dates: Start: 04/26/20       Description: 1) Individualized goal:  Mod I SPT w/c to bed/toilet surfaces  2) Interventions: PT Gait Training, PT Self Care/Home Eval, PT Therapeutic Exercises, PT Neuro Re-Ed/Balance, PT Therapeutic Activity and PT Evaluation                     Problem: PT-Long Term Goals     Dates: Start: 04/26/20       Description:     Goal: LTG-By discharge, patient will maintain balance     Dates: Start: 04/26/20       Description: 1) Individualized goal:  To perform [place] Balance Assessment with LOW FALL RISK  2) Interventions: PT Gait Training, PT Self Care/Home Eval, PT Therapeutic Exercises, PT Neuro Re-Ed/Balance, PT Therapeutic Activity and PT Evaluation               Goal: LTG-By discharge, patient will ambulate     Dates: Start: 04/26/20       Description: 1) Individualized goal:  200ft x 2 with SPC mod I level surfaces, SPV for uneven terrain  2) Interventions: PT Gait Training, PT Self Care/Home Eval, PT Therapeutic Exercises, PT Neuro Re-Ed/Balance, PT Therapeutic Activity and PT Evaluation               Goal: LTG-By discharge,  patient will transfer one surface to another     Dates: Start: 04/26/20       Description: 1) Individualized goal:  Mod I SPT to all surfaces safely and consistently  2) Interventions: PT Gait Training, PT Self Care/Home Eval, PT Therapeutic Exercises, PT Neuro Re-Ed/Balance, PT Therapeutic Activity and PT Evaluation               Goal: LTG-By discharge, patient will perform home exercise program     Dates: Start: 04/26/20       Description: 1) Individualized goal:  Standing LE strengthening program to be done in safe, independent environment 3x/daily  2) Interventions:PT Gait Training, PT Self Care/Home Eval, PT Therapeutic Exercises, PT Neuro Re-Ed/Balance, PT Therapeutic Activity and PT Evaluation                 Goal: LTG-By discharge, patient will ambulate up/down flight of stairs     Dates: Start: 04/26/20       Description: 1) Individualized goal:  12 stairs bilateral rails mod I (required in home environment to access laundry; uses strategy of counting and using rails for balance)  2) Interventions:PT Gait Training, PT Self Care/Home Eval, PT Therapeutic Exercises, PT Neuro Re-Ed/Balance, PT Therapeutic Activity and PT Evaluation                               Section completed by:  Parvin Bryant, PT

## 2020-04-28 NOTE — THERAPY
Occupational Therapy  Daily Treatment     Patient Name: Rafael Mccoy  Age:  74 y.o., Sex:  male  Medical Record #: [id number]  Today's Date: 2020     Precautions  Precautions: (P) Fall Risk, Swallow Precautions ( See Comments)  Comments: (P) legally blind, L apraxia, thins ok         Subjective    Pt received supine in bed, agreeable to OT     Objective       20 0701   Precautions   Precautions Fall Risk;Swallow Precautions ( See Comments)   Comments legally blind, L apraxia, thins ok   OT Total Time Spent   OT Individual Total Time Spent (Mins) 60   OT Charge Group   OT Self Care / ADL 4       FIM Grooming Score:  5 - Standby Prompting/Supervision or Set-up  Grooming Description:  (setup at sink to comb hair and wash hands, verbal cues and increased time to locate items due to visual deficits)    FIM Upper Body Dressin - Standby Prompting/Supervision or Set-up  Upper Body Dressing Description:  Set-up of equipment    FIM Lower Body Dressing Score:  3 - Moderate Assistance  Lower Body Dressing Description:  (hand over hand assist for threading pull-up, min A for threading RLE of pants, tactile/verbal cues to don socks in xleg position, CGA in standing)    FIM Toiletin - Minimal Assistance  Toileting Description:  Grab bar, Increased time, Supervision for safety, Verbal cueing(steadying assist in standing, verbal cues, increased time)    FIM Toilet Transfer Score:  4 - Minimal Assistance  Toilet Transfer Description:  (stand pivot via grab bar)    FIM Tub/Shower Transfers Score:  4 - Minimal Assistance  Tub/Shower Transfers Description:  (in/out of tub dry run - in using squat pivot to tub transfer bench, out via stepping method using grab bars)      Assessment    Pt tolerated session well, making steady progress with ADL routine, requires most assistance with lower body dressing due to visual deficits and impaired left hand sensation and coordination. Continues to require min assist for  mobility and standing components though less notable left lean this session relative to yesterday. Discussed bathroom equipment options for patient to increase safety, pt trialed both step in/out of tub using grab bars and tub transfer bench, therapist and patient agree that tub transfer bench is safest option at this point with plan to also have grab bars installed. Pt remains motivated and participatory.     Plan    LUE neuro re-ed, FM/GM coordination, sitting/standing balance to correct left lean, ADLs, IADLs, management of visual deficits

## 2020-04-28 NOTE — CARE PLAN
Problem: Balance  Goal: STG-Within one week, patient will maintain dynamic standing  Description: 1) Individualized goal:  To progress through dynamic standing there-ex program with SBA  2) Interventions:  PT Gait Training, PT Self Care/Home Eval, PT Therapeutic Exercises, PT Neuro Re-Ed/Balance, PT Therapeutic Activity and PT Evaluation     Outcome: NOT MET     Problem: Mobility  Goal: STG-Within one week, patient will propel wheelchair household distances  Description: 1) Individualized goal:  100ft bilateral LE with SPV  2) Interventions: PT Gait Training, PT Self Care/Home Eval, PT Therapeutic Exercises, PT Neuro Re-Ed/Balance, PT Therapeutic Activity and PT Evaluation       Outcome: NOT MET  Goal: STG-Within one week, patient will ambulate community distances  Description: 1) Individualized goal:  200ft FWW SBA  2) Interventions: PT Gait Training, PT Self Care/Home Eval, PT Therapeutic Exercises, PT Neuro Re-Ed/Balance, PT Therapeutic Activity and PT Evaluation       Outcome: NOT MET

## 2020-04-28 NOTE — REHAB-SLP IDT TEAM NOTE
Speech Therapy   Cognitive Linquistic Functions  Comprehension Initial:  5 - Stand-by Prompting/Supervision or Set-up  Comprehension Current:  5 - Stand-by Prompting/Supervision or Set-up   Comprehension Description:     Expression Initial:  5 - Stand-by Prompting/Supervision or Set-up  Expression Current:  5 - Stand-by Prompting/Supervision or Set-up   Expression Description:     Social Interaction Initial:  7 - Independent  Social Interaction Current:  7 - Independent   Social Interaction Description:     Problem Solving Initial:  2 - Max Assistance  Problem Solving Current:  4 - Minimal Assistance   Problem Solving Description:     Memory Initial:  3 - Moderate Assistance  Memory Current:  3 - Moderate Assistance   Memory Description:     Executive Functioning / Organization Initial:     Executive Functioning / Organization Current:      Executive Functioning Desciption:  To be assessed  Swallowing  Swallowing Status:  Regular diet, thin liquids, likely d/c from tdine tomorrow.   Orders Placed This Encounter   Procedures   • Diet Order Regular     Standing Status:   Standing     Number of Occurrences:   1     Order Specific Question:   Diet:     Answer:   Regular [1]     Behavior Modification Plan  Keep instructions simple/concrete, Set clear goals, Redirect to task/topic and Reinforce participation in desired tasks  Assistive Technology  Low/Impaired vision equipment and Low tech: Calendar, planner, schedule, alarms/timers, pill organizer, post-it notes, lists  Family Training/Education:  Not yet completed  DC Recommendations:   HH vs. OP speech therapy  Strengths:  Able to follow instructions, Good insight into deficits/needs, Making steady progress towards goals, Motivated for self care and independence, Pleasant and cooperative and Willingly participates in therapeutic activities  Barriers:  Other: vision deficits, memory, attention  # of short term goals set=2  # of short term goals met=1  Speech Therapy  Problems     Problem: Memory STGs     Dates: Start: 04/26/20       Description:     Goal: STG-Within one week, patient will     Dates: Start: 04/26/20       Description: 1) Individualized goal: will complete functional memory and attention tasks with at least 80% accuracy and minimal cues  2) Interventions:  SLP Speech Language Treatment, SLP Self Care / ADL Training , SLP Cognitive Skill Development and SLP Group Treatment                   Problem: Problem Solving STGs     Dates: Start: 04/28/20       Description:     Goal: STG-Within one week, patient will     Dates: Start: 04/28/20       Description: 1) Individualized goal:  Complete alternating attention tasks with 80% accuracy and min verbal cues.   2) Interventions:  SLP Speech Language Treatment, SLP Self Care / ADL Training , SLP Cognitive Skill Development and SLP Group Treatment                   Problem: Speech/Swallowing LTGs     Dates: Start: 04/26/20       Description:     Goal: LTG-By discharge, patient will safely swallow     Dates: Start: 04/26/20       Description: 1) Individualized goal:  Least restrictive diet without overt s/sx of asp for 100% of PO intake.   2) Interventions:  SLP Swallowing Dysfunction Treatment and SLP Oral Pharyngeal Evaluation, SLP Video Swallow Evaluation             Goal: LTG-By discharge, patient will     Dates: Start: 04/26/20       Description: 1) Individualized goal: improve cognitive function to return to PLOF, as indicated by completing functional cognitive tasks with at least 80% accuracy and minimal cues  2) Interventions:  SLP Speech Language Treatment, SLP Self Care / ADL Training , SLP Cognitive Skill Development and SLP Group Treatment                   Problem: Swallowing STGs     Dates: Start: 04/28/20       Description:     Goal: STG-Within one week, patient will safely swallow     Dates: Start: 04/28/20       Description: 1) Individualized goal:  Regular diet without overt s/sx of aspiration   2)  Interventions:  SLP Swallowing Dysfunction Treatment, SLP Oral Pharyngeal Evaluation, SLP Video Swallow Evaluation, SLP Self Care / ADL Training , SLP Cognitive Skill Development and SLP Group Treatment                          Section completed by:  Kelle Samson MS,CCC-SLP

## 2020-04-28 NOTE — PROGRESS NOTES
"Rehab Progress Note     Encounter Date: 4/28/2020    CC: SDH, decreased mobility, weakness    Interval Events (Subjective)  Patient sitting up in room. He reports he is doing well besides hiccups. Given dose of Gabapentin with improvement. Staples were removed except one suture still midline. Discussed with staff and will remove. Otherwise he reports therapy is going well. Denies pain. Denies NVD. Sleeping well at night. Discussed will have IDT later today. He is looking forward to finding out if he can go home soon.     IDT Team Meeting 4/28/2020    ILyn M.D., was present and led the interdisciplinary team conference on 4/28/2020.  I led the IDT conference and agree with the IDT conference documentation and plan of care as noted below.     RN:  Diet Dysphagia   % Meal     Pain    Sleep Good   Bowel Continent   Bladder Continent    In's & Out's      PT:  Bed Mobility Ryan   Transfers    Mobility Ryan -maxA   Stairs    Decreased balance and attention    OT:  Eating modA   Grooming    Bathing modA   UB Dressing    LB Dressing modA   Toileting yRan   Shower & Transfer    Coordination deficits on left  Left lean in sitting and standing  Motivated     SLP:  Ryan for problem solving  modA for memory  Upgrade to regular diet today, T dine   Very motivated     CM:  Continues to work on disposition and DME needs.      DC/Disposition:  5/11/20    Objective:  VITAL SIGNS: /78   Pulse 78   Temp 36.4 °C (97.6 °F) (Oral)   Resp 18   Ht 1.753 m (5' 9\")   Wt 75.5 kg (166 lb 7.2 oz)   SpO2 95%   BMI 24.58 kg/m²   Gen: NAD  Psych: Mood and affect appropriate  CV: RRR, no edema  Resp: CTAB, no upper airway sounds  Abd: NTND  Skin: R incision without erythema, well healing, suture midline    Recent Results (from the past 72 hour(s))   CBC with Differential    Collection Time: 04/26/20  6:04 AM   Result Value Ref Range    WBC 9.5 4.8 - 10.8 K/uL    RBC 4.32 (L) 4.70 - 6.10 M/uL    Hemoglobin 13.1 (L) " 14.0 - 18.0 g/dL    Hematocrit 37.9 (L) 42.0 - 52.0 %    MCV 87.7 81.4 - 97.8 fL    MCH 30.3 27.0 - 33.0 pg    MCHC 34.6 33.7 - 35.3 g/dL    RDW 39.9 35.9 - 50.0 fL    Platelet Count 228 164 - 446 K/uL    MPV 9.9 9.0 - 12.9 fL    Neutrophils-Polys 78.50 (H) 44.00 - 72.00 %    Lymphocytes 11.50 (L) 22.00 - 41.00 %    Monocytes 8.00 0.00 - 13.40 %    Eosinophils 0.30 0.00 - 6.90 %    Basophils 0.20 0.00 - 1.80 %    Immature Granulocytes 1.50 (H) 0.00 - 0.90 %    Nucleated RBC 0.00 /100 WBC    Neutrophils (Absolute) 7.46 (H) 1.82 - 7.42 K/uL    Lymphs (Absolute) 1.09 1.00 - 4.80 K/uL    Monos (Absolute) 0.76 0.00 - 0.85 K/uL    Eos (Absolute) 0.03 0.00 - 0.51 K/uL    Baso (Absolute) 0.02 0.00 - 0.12 K/uL    Immature Granulocytes (abs) 0.14 (H) 0.00 - 0.11 K/uL    NRBC (Absolute) 0.00 K/uL   Comp Metabolic Panel (CMP)    Collection Time: 04/26/20  6:04 AM   Result Value Ref Range    Sodium 134 (L) 135 - 145 mmol/L    Potassium 4.0 3.6 - 5.5 mmol/L    Chloride 98 96 - 112 mmol/L    Co2 25 20 - 33 mmol/L    Anion Gap 11.0 7.0 - 16.0    Glucose 116 (H) 65 - 99 mg/dL    Bun 28 (H) 8 - 22 mg/dL    Creatinine 0.91 0.50 - 1.40 mg/dL    Calcium 8.6 8.5 - 10.5 mg/dL    AST(SGOT) 21 12 - 45 U/L    ALT(SGPT) 34 2 - 50 U/L    Alkaline Phosphatase 55 30 - 99 U/L    Total Bilirubin 0.7 0.1 - 1.5 mg/dL    Albumin 3.3 3.2 - 4.9 g/dL    Total Protein 5.7 (L) 6.0 - 8.2 g/dL    Globulin 2.4 1.9 - 3.5 g/dL    A-G Ratio 1.4 g/dL   HEMOGLOBIN A1C    Collection Time: 04/26/20  6:04 AM   Result Value Ref Range    Glycohemoglobin 5.7 (H) 0.0 - 5.6 %    Est Avg Glucose 117 mg/dL   TSH with Reflex to FT4    Collection Time: 04/26/20  6:04 AM   Result Value Ref Range    TSH 1.090 0.380 - 5.330 uIU/mL   Vitamin D, 25-hydroxy (blood)    Collection Time: 04/26/20  6:04 AM   Result Value Ref Range    25-Hydroxy   Vitamin D 25 34 30 - 100 ng/mL   ESTIMATED GFR    Collection Time: 04/26/20  6:04 AM   Result Value Ref Range    GFR If  >60  >60 mL/min/1.73 m 2    GFR If Non African American >60 >60 mL/min/1.73 m 2       Current Facility-Administered Medications   Medication Frequency   • gabapentin (NEURONTIN) capsule 300 mg TID   • Respiratory Therapy Consult Continuous RT   • oxyCODONE immediate-release (ROXICODONE) tablet 5 mg Q3HRS PRN   • oxyCODONE immediate release (ROXICODONE) tablet 10 mg Q3HRS PRN   • tramadol (ULTRAM) 50 MG tablet 50 mg Q4HRS PRN   • hydrALAZINE (APRESOLINE) tablet 25 mg Q8HRS PRN   • acetaminophen (TYLENOL) tablet 650 mg Q4HRS PRN   • senna-docusate (PERICOLACE or SENOKOT S) 8.6-50 MG per tablet 2 Tab BID    And   • polyethylene glycol/lytes (MIRALAX) PACKET 1 Packet QDAY PRN    And   • magnesium hydroxide (MILK OF MAGNESIA) suspension 30 mL QDAY PRN    And   • bisacodyl (DULCOLAX) suppository 10 mg QDAY PRN   • artificial tears ophthalmic solution 1 Drop PRN   • benzocaine-menthol (CEPACOL) lozenge 1 Lozenge Q2HRS PRN   • mag hydrox-al hydrox-simeth (MAALOX PLUS ES or MYLANTA DS) suspension 20 mL Q2HRS PRN   • ondansetron (ZOFRAN ODT) dispertab 4 mg 4X/DAY PRN    Or   • ondansetron (ZOFRAN) syringe/vial injection 4 mg 4X/DAY PRN   • traZODone (DESYREL) tablet 50 mg QHS PRN   • sodium chloride (OCEAN) 0.65 % nasal spray 2 Spray PRN   • amLODIPine (NORVASC) tablet 5 mg DAILY   • dexamethasone (DECADRON) tablet 4 mg BID   • lacosamide (VIMPAT) tablet 100 mg BID   • levETIRAcetam (KEPPRA) tablet 1,000 mg BID   • rosuvastatin (CRESTOR) tablet 10 mg QHS       Orders Placed This Encounter   Procedures   • Diet Order Regular     Standing Status:   Standing     Number of Occurrences:   1     Order Specific Question:   Diet:     Answer:   Regular [1]       Assessment:  Active Hospital Problems    Diagnosis   • *Subdural hematoma (HCC)   • Impaired mobility and ADLs   • Seizures, post-traumatic (HCC)   • Oropharyngeal dysphagia   • Urinary retention   • Essential hypertension   • Hypercholesterolemia   • Legally blind       Medical  Decision Making and Plan:  TBI (Traumatic Brain Injury): Fall with SDH s/p R sided evacuation with Dr. Mendiola on 4/17/20. Patient started on Steroids. Had post-operative seizure like activity, EEG with cortical irritation  -PT and OT for mobility and ADLs  -SLP for cognition  -NSG follow-up. Per most recent NSG recommendations continue steroids for a week. Recommend CT head in 7 days (~5/1)  -On Decadron 4 mg BID, will continue for a week     Dysphagia - On dysphagia diet on transfer. SLP for swallow evaluation - continue dysphagia 3 with NTL. MBSS 4/27/20 - upgraded to dysphagia with thins     Seizure disorder - Patient with previous CVA with seizures. Increased AEDs at [facility] to Keppra 1000 mg BID and Vimpat 100 mg BID  -Follow-up Neurology seizure clinic     HTN - Patient on 5 mg Amlodipine. Previously on metoprolol as well. Continue to monitor, SBP elevated on admission.      Legally blind - High risk for falls.      Anemia - 13.1 on admission, continue to monitor    Hyponatremia - 134 on admission, will monitor    Hiccups - Started on Gabapentin 300 mg TID, improved    Azotemia - Patient on NTL diet on admission with poor fluid intake. Discussed increasing PO intake, now on thin liquids so may improve    HLD - Patient on Rosuvastatin 10 mg on transfer.     DVT Ppx - Patient high risk for bleed. Ambulating > 100 feet.     Total time:  37 minutes.  I spent greater than 50% of the time for patient care, counseling, and coordination on this date, including unit/floor time, and face-to-face time with the patient as per interval events and assessment and plan above. Topics discussed included hiccups, improved with Gabapentin, will schedule, discharge planning, and advancing diet. Patient was discussed separately in IDT today; please see details above.    Lyn Sadler M.D.

## 2020-04-29 PROCEDURE — 92526 ORAL FUNCTION THERAPY: CPT

## 2020-04-29 PROCEDURE — 97530 THERAPEUTIC ACTIVITIES: CPT | Mod: CQ

## 2020-04-29 PROCEDURE — 97116 GAIT TRAINING THERAPY: CPT | Mod: CQ

## 2020-04-29 PROCEDURE — 97130 THER IVNTJ EA ADDL 15 MIN: CPT

## 2020-04-29 PROCEDURE — 770010 HCHG ROOM/CARE - REHAB SEMI PRIVAT*

## 2020-04-29 PROCEDURE — 97129 THER IVNTJ 1ST 15 MIN: CPT

## 2020-04-29 PROCEDURE — A9270 NON-COVERED ITEM OR SERVICE: HCPCS | Performed by: PHYSICAL MEDICINE & REHABILITATION

## 2020-04-29 PROCEDURE — 99232 SBSQ HOSP IP/OBS MODERATE 35: CPT | Performed by: PHYSICAL MEDICINE & REHABILITATION

## 2020-04-29 PROCEDURE — 97112 NEUROMUSCULAR REEDUCATION: CPT | Mod: CQ

## 2020-04-29 PROCEDURE — 700102 HCHG RX REV CODE 250 W/ 637 OVERRIDE(OP): Performed by: PHYSICAL MEDICINE & REHABILITATION

## 2020-04-29 PROCEDURE — 97110 THERAPEUTIC EXERCISES: CPT

## 2020-04-29 RX ORDER — GABAPENTIN 400 MG/1
400 CAPSULE ORAL 3 TIMES DAILY
Status: DISCONTINUED | OUTPATIENT
Start: 2020-04-29 | End: 2020-05-01

## 2020-04-29 RX ADMIN — LACOSAMIDE 100 MG: 100 TABLET, FILM COATED ORAL at 09:43

## 2020-04-29 RX ADMIN — LEVETIRACETAM 1000 MG: 500 TABLET ORAL at 09:43

## 2020-04-29 RX ADMIN — LEVETIRACETAM 1000 MG: 500 TABLET ORAL at 21:33

## 2020-04-29 RX ADMIN — DEXAMETHASONE 4 MG: 4 TABLET ORAL at 09:43

## 2020-04-29 RX ADMIN — GABAPENTIN 400 MG: 400 CAPSULE ORAL at 21:33

## 2020-04-29 RX ADMIN — GABAPENTIN 300 MG: 300 CAPSULE ORAL at 14:11

## 2020-04-29 RX ADMIN — AMLODIPINE BESYLATE 5 MG: 5 TABLET ORAL at 09:43

## 2020-04-29 RX ADMIN — DEXAMETHASONE 4 MG: 4 TABLET ORAL at 21:33

## 2020-04-29 RX ADMIN — ROSUVASTATIN CALCIUM 10 MG: 10 TABLET, FILM COATED ORAL at 21:33

## 2020-04-29 RX ADMIN — DOCUSATE SODIUM 50 MG AND SENNOSIDES 8.6 MG 2 TABLET: 8.6; 5 TABLET, FILM COATED ORAL at 21:33

## 2020-04-29 RX ADMIN — LACOSAMIDE 100 MG: 100 TABLET, FILM COATED ORAL at 21:33

## 2020-04-29 RX ADMIN — METOPROLOL TARTRATE 25 MG: 25 TABLET, FILM COATED ORAL at 17:32

## 2020-04-29 RX ADMIN — METOPROLOL TARTRATE 25 MG: 25 TABLET, FILM COATED ORAL at 15:15

## 2020-04-29 RX ADMIN — GABAPENTIN 300 MG: 300 CAPSULE ORAL at 09:43

## 2020-04-29 ASSESSMENT — PATIENT HEALTH QUESTIONNAIRE - PHQ9
2. FEELING DOWN, DEPRESSED, IRRITABLE, OR HOPELESS: NOT AT ALL
1. LITTLE INTEREST OR PLEASURE IN DOING THINGS: NOT AT ALL
SUM OF ALL RESPONSES TO PHQ9 QUESTIONS 1 AND 2: 0

## 2020-04-29 NOTE — THERAPY
Occupational Therapy  Daily Treatment     Patient Name: Rafael Mccoy  Age:  74 y.o., Sex:  male  Medical Record #: [id number]  Today's Date: 4/29/2020     Precautions  Precautions: (P) Fall Risk, Swallow Precautions ( See Comments)  Comments: (P) legally blind, L apraxia, thins ok         Subjective    Pt agreeable to OT     Objective       04/29/20 1001   Precautions   Precautions Fall Risk;Swallow Precautions ( See Comments)   Comments legally blind, L apraxia, thins ok   Sitting Upper Body Exercises   Sitting Upper Body Exercises Yes   Chest Press 2 sets of 15  (35# on equilizer, cues for full ROM in LUE)   Shoulder Press 2 sets of 15  (8# handweights, OT facilitation for LUE positioning)   Bilateral Row 2 sets of 15  (35# on equilizer)   Bicep Curls 2 sets of 15  (8# handweights)   Tricep Press 2 sets of 15  (35# on rickshaw fwd facing)   Upper Extremity Bike Level 5 Resistance  (15 min for bilateral integration and LUE input)   OT Total Time Spent   OT Individual Total Time Spent (Mins) 60   OT Charge Group   Charges Yes   OT Therapeutic Exercise  4     Assessment    Pt tolerated session well with focus on progressive strength and coordination of LUE, able to sustain grasp on weights appropriately, demos decreased proprioception and attention to LUE necessitating verbal cues and requiring OT facilitation when using freeweights. Discussed expectations for progress in rehab and strategies to manage safe transition home and reintegration into community activities, pt with fair insight into impact of current deficits on function upon returning home but receptive to education.     Plan    LUE neuro re-ed, FM/GM coordination, sitting/standing balance to correct left lean, ADLs, IADLs, management of visual deficits

## 2020-04-29 NOTE — PROGRESS NOTES
"Rehab Progress Note     Encounter Date: 4/29/2020    CC: SDH, decreased mobility, weakness    Interval Events (Subjective)  Patient sitting up in room. He reports he is doing well. Denies NVD. Denies SOB. Currently without hiccups, he reports they are occasionally coming back. Discussed will increase dose and monitor.     IDT Team Meeting 4/28/2020  DC/Disposition:  5/11/20    Objective:  VITAL SIGNS: /68   Pulse 64   Temp 36.6 °C (97.8 °F) (Oral)   Resp 16   Ht 1.753 m (5' 9\")   Wt 75.5 kg (166 lb 7.2 oz)   SpO2 94%   BMI 24.58 kg/m²   Gen: NAD  Psych: Mood and affect appropriate  CV: RRR, no edema  Resp: CTAB, no upper airway sounds  Abd: NTND  Skin: R incision without erythema, well healing, suture midline  Unchanged from 4/28/20    No results found for this or any previous visit (from the past 72 hour(s)).    Current Facility-Administered Medications   Medication Frequency   • gabapentin (NEURONTIN) capsule 300 mg TID   • Respiratory Therapy Consult Continuous RT   • oxyCODONE immediate-release (ROXICODONE) tablet 5 mg Q3HRS PRN   • oxyCODONE immediate release (ROXICODONE) tablet 10 mg Q3HRS PRN   • tramadol (ULTRAM) 50 MG tablet 50 mg Q4HRS PRN   • hydrALAZINE (APRESOLINE) tablet 25 mg Q8HRS PRN   • acetaminophen (TYLENOL) tablet 650 mg Q4HRS PRN   • senna-docusate (PERICOLACE or SENOKOT S) 8.6-50 MG per tablet 2 Tab BID    And   • polyethylene glycol/lytes (MIRALAX) PACKET 1 Packet QDAY PRN    And   • magnesium hydroxide (MILK OF MAGNESIA) suspension 30 mL QDAY PRN    And   • bisacodyl (DULCOLAX) suppository 10 mg QDAY PRN   • artificial tears ophthalmic solution 1 Drop PRN   • benzocaine-menthol (CEPACOL) lozenge 1 Lozenge Q2HRS PRN   • mag hydrox-al hydrox-simeth (MAALOX PLUS ES or MYLANTA DS) suspension 20 mL Q2HRS PRN   • ondansetron (ZOFRAN ODT) dispertab 4 mg 4X/DAY PRN    Or   • ondansetron (ZOFRAN) syringe/vial injection 4 mg 4X/DAY PRN   • traZODone (DESYREL) tablet 50 mg QHS PRN   • " sodium chloride (OCEAN) 0.65 % nasal spray 2 Spray PRN   • amLODIPine (NORVASC) tablet 5 mg DAILY   • dexamethasone (DECADRON) tablet 4 mg BID   • lacosamide (VIMPAT) tablet 100 mg BID   • levETIRAcetam (KEPPRA) tablet 1,000 mg BID   • rosuvastatin (CRESTOR) tablet 10 mg QHS       Orders Placed This Encounter   Procedures   • Diet Order Regular     Standing Status:   Standing     Number of Occurrences:   1     Order Specific Question:   Diet:     Answer:   Regular [1]       Assessment:  Active Hospital Problems    Diagnosis   • *Subdural hematoma (HCC)   • Impaired mobility and ADLs   • Seizures, post-traumatic (HCC)   • Oropharyngeal dysphagia   • Urinary retention   • Essential hypertension   • Hypercholesterolemia   • Legally blind       Medical Decision Making and Plan:  TBI (Traumatic Brain Injury): Fall with SDH s/p R sided evacuation with Dr. Mendiola on 4/17/20. Patient started on Steroids. Had post-operative seizure like activity, EEG with cortical irritation  -PT and OT for mobility and ADLs  -SLP for cognition  -NSG follow-up. Per most recent NSG recommendations continue steroids for a week. Recommend CT head - scheduled 5/4/20  -On Decadron 4 mg BID, will continue for a week     Dysphagia - On dysphagia diet on transfer. SLP for swallow evaluation - continue dysphagia 3 with NTL. MBSS 4/27/20 - upgraded to dysphagia with thins     Seizure disorder - Patient with previous CVA with seizures. Increased AEDs at [facility] to Keppra 1000 mg BID and Vimpat 100 mg BID  -Follow-up Neurology seizure clinic     HTN - Patient on 5 mg Amlodipine. Previously on metoprolol as well. Continue to monitor, SBP elevated restart Metoprolol 25 mg BID     Legally blind - High risk for falls.      Anemia - 13.1 on admission, continue to monitor    Hyponatremia - 134 on admission, will monitor    Hiccups - Started on Gabapentin 300 mg TID -> 400 mg TID    Azotemia - Patient on NTL diet on admission with poor fluid intake. Discussed  increasing PO intake, now on thin liquids so may improve    HLD - Patient on Rosuvastatin 10 mg on transfer.     DVT Ppx - Patient high risk for bleed. Ambulating > 100 feet.     Total time:  27 minutes.  I spent greater than 50% of the time for patient care, counseling, and coordination on this date, including unit/floor time, and face-to-face time with the patient as per interval events and assessment and plan above. Topics discussed included ongoing elevated SBP, add metoprolol 25 mg BID, and improving hiccups will increase as occasional hiccups.     Lyn Sadler M.D.

## 2020-04-29 NOTE — THERAPY
Physical Therapy   Daily Treatment     Patient Name: Rafael Mccoy  Age:  74 y.o., Sex:  male  Medical Record #: [id number]  Today's Date: 4/29/2020     Precautions  Precautions: Fall Risk, Swallow Precautions ( See Comments)  Comments: legally blind, L apraxia, thins ok    Subjective    Pt greeted in the dining room finishing breakfast. We discussed his home set up and other details regarding his living situation. Per Pt he has 4-5 stairs to enter home and 20 stairs to get down to his laundry area.      Objective       04/29/20 0831   Precautions   Precautions Fall Risk;Swallow Precautions ( See Comments);Other (See Comments)   Comments legally blind, L apraxia   Bed Mobility    Sit to Stand Stand by Assist  (wc <> FWW)   Neuro-Muscular Treatments   Neuro-Muscular Treatments Postural Changes;Compensatory Strategies;Verbal Cuing   Comments a/p weight shifitng no UE support normal/narrow EVELYN 1' each-vc for maintenence of balance via ankle strategy. close SB/CGA   Interdisciplinary Plan of Care Collaboration   IDT Collaboration with  Physical Therapist;Occupational Therapist   Patient Position at End of Therapy Seated;Self Releasing Lap Belt Applied;Tray Table within Reach;Call Light within Reach   Collaboration Comments CLOF/barriers    PT Total Time Spent   PT Individual Total Time Spent (Mins) 60   PT Charge Group   PT Gait Training 1   PT Neuromuscular Re-Education / Balance 2   PT Therapeutic Activities 1   Supervising Physical Therapist Parvin Bryant       FIM Walking Score:  5 - Standby Prompting/Supervision or Set-up  Walking Description:  Extra time, Safety concerns, Verbal cueing, Requires incidental assist, Supervision for safety(325' and 220' FWW close SBA/incidental steadying assist-vc for L attention, proximity to walker and posture)     FIM Stairs Score:  4 - Minimal Assistance  Stairs Description:  Extra time, Safety concerns, Verbal cueing, Ascends/descends 12 to 14 steps, Supervision for safety, Hand  rails(6 x 1 and 12 x 1 short rise stairs, B HR vc for sequencing step to pattern CGA)    FIM Toilet Transfer Score:  5 - Standby Prompting/Supervision or Set-up  Toilet Transfer Description:  Grab bar, Supervision for safety, Increased time, Verbal cueing(SBA vc for use of GB-standing during toileting)    FIM Toiletin - Minimal Assistance  Toileting Description:  Grab bar, Increased time, Supervision for safety, Verbal cueing    Assessment    Pt tolerated session well with focus on gait training with a FWW. The patient progressed within session to SBA from CGA/Ryan with a FWW previous session. The patient presents with balance deficits, impaired vision, decreased proprioceptive awareness, and L side coordination deficits    Plan    Gait with FWW consider balance assessment, vector no AD, variable gait, forced use L side, lite gait for increased gait speed

## 2020-04-29 NOTE — THERAPY
"Speech Language Pathology  Daily Treatment     Patient Name: Rafael Mccoy  Age:  74 y.o., Sex:  male  Medical Record #: [id number]  Today's Date: 4/29/2020     Subjective    Patient was in room, was willing to participate in ST.      Objective       04/29/20 1302   Cognition   Moderate Attention Moderate (3)   Insight into Deficits Minimal (4)   Medication Management  To Be Assessed   SLP Total Time Spent   SLP Individual Total Time Spent (Mins) 30   Charge Group   SLP Cognitive Skill Development First 15 Minutes 1   SLP Cognitive Skill Development Additional 15 Minutes 1         Assessment    Verbal problem solving related to medication management at home. Patient uses \"\" to ID medications and instructions and takes directly from bottle d/t visual deficits and inability to use pill sorter. Patient was able to answer problem solving questions related to medications with min verbal cues.  Plan    Continue to address functional problem solving, attention, and memory    "

## 2020-04-29 NOTE — THERAPY
Speech Language Pathology  Daily Treatment     Patient Name: Rafael Mccoy  Age:  74 y.o., Sex:  male  Medical Record #: [id number]  Today's Date: 4/29/2020     Subjective    Patient arrived on time to ST with assistance.      Objective       04/29/20 0802   Dysphagia    Diet / Liquid Recommendation Regular (7);Thin (0)   Nutritional Liquid Intake Rating Scale Non thickened beverages   Nutritional Food Intake Rating Scale Total oral diet with no restrictions   Nursing Communication Swallow Precaution Sign Posted at Head of Bed   Speech Language Pathologist Assigned   Assigned SLP / Pager # [phone]    SLP Total Time Spent   SLP Individual Total Time Spent (Mins) 30   Charge Group   SLP Swallowing Dysfunction Treatment Swallowing Dysfunction Treatment         Assessment    Patient was assessed with regular diet textures. No overt s/sx of aspiration were noted. Demonstrated appropriate use of swallow strategies. Benefits from set-up d/t visual deficits.     Plan    D/c tdine.

## 2020-04-30 PROCEDURE — 92507 TX SP LANG VOICE COMM INDIV: CPT

## 2020-04-30 PROCEDURE — 97530 THERAPEUTIC ACTIVITIES: CPT

## 2020-04-30 PROCEDURE — 700102 HCHG RX REV CODE 250 W/ 637 OVERRIDE(OP): Performed by: PHYSICAL MEDICINE & REHABILITATION

## 2020-04-30 PROCEDURE — 97116 GAIT TRAINING THERAPY: CPT

## 2020-04-30 PROCEDURE — A9270 NON-COVERED ITEM OR SERVICE: HCPCS | Performed by: PHYSICAL MEDICINE & REHABILITATION

## 2020-04-30 PROCEDURE — 97110 THERAPEUTIC EXERCISES: CPT

## 2020-04-30 PROCEDURE — 97535 SELF CARE MNGMENT TRAINING: CPT

## 2020-04-30 PROCEDURE — 770010 HCHG ROOM/CARE - REHAB SEMI PRIVAT*

## 2020-04-30 PROCEDURE — 99232 SBSQ HOSP IP/OBS MODERATE 35: CPT | Performed by: PHYSICAL MEDICINE & REHABILITATION

## 2020-04-30 RX ADMIN — LACOSAMIDE 100 MG: 100 TABLET, FILM COATED ORAL at 20:31

## 2020-04-30 RX ADMIN — DEXAMETHASONE 4 MG: 4 TABLET ORAL at 08:27

## 2020-04-30 RX ADMIN — LEVETIRACETAM 1000 MG: 500 TABLET ORAL at 08:27

## 2020-04-30 RX ADMIN — GABAPENTIN 400 MG: 400 CAPSULE ORAL at 08:27

## 2020-04-30 RX ADMIN — LACOSAMIDE 100 MG: 100 TABLET, FILM COATED ORAL at 08:28

## 2020-04-30 RX ADMIN — ROSUVASTATIN CALCIUM 10 MG: 10 TABLET, FILM COATED ORAL at 20:31

## 2020-04-30 RX ADMIN — DEXAMETHASONE 4 MG: 4 TABLET ORAL at 20:31

## 2020-04-30 RX ADMIN — GABAPENTIN 400 MG: 400 CAPSULE ORAL at 14:01

## 2020-04-30 RX ADMIN — AMLODIPINE BESYLATE 5 MG: 5 TABLET ORAL at 08:27

## 2020-04-30 RX ADMIN — LEVETIRACETAM 1000 MG: 500 TABLET ORAL at 20:29

## 2020-04-30 RX ADMIN — GABAPENTIN 400 MG: 400 CAPSULE ORAL at 20:31

## 2020-04-30 RX ADMIN — DOCUSATE SODIUM 50 MG AND SENNOSIDES 8.6 MG 2 TABLET: 8.6; 5 TABLET, FILM COATED ORAL at 08:27

## 2020-04-30 RX ADMIN — METOPROLOL TARTRATE 25 MG: 25 TABLET, FILM COATED ORAL at 17:13

## 2020-04-30 RX ADMIN — METOPROLOL TARTRATE 25 MG: 25 TABLET, FILM COATED ORAL at 05:08

## 2020-04-30 ASSESSMENT — PATIENT HEALTH QUESTIONNAIRE - PHQ9
1. LITTLE INTEREST OR PLEASURE IN DOING THINGS: NOT AT ALL
2. FEELING DOWN, DEPRESSED, IRRITABLE, OR HOPELESS: NOT AT ALL
SUM OF ALL RESPONSES TO PHQ9 QUESTIONS 1 AND 2: 0

## 2020-04-30 NOTE — THERAPY
Occupational Therapy  Daily Treatment     Patient Name: Rafael Mccoy  Age:  74 y.o., Sex:  male  Medical Record #: [id number]  Today's Date: 2020     Precautions  Precautions: (P) Fall Risk, Swallow Precautions ( See Comments)  Comments: (P) legally blind, L apraxia, thins ok    Safety   ADL Safety : (P) Impaired, Requires Supervision for Safety, Requires Physical Assist for Safety, Impaired Insight into Safety, Requires Cueing for Safety, Impulsive  Bathroom Safety: (P) Impaired, Impulsive, Requires Supervision for Safety, Requires Physical Assist for Safety, Impaired Insight into Safety, Requires Cuing for Safety    Subjective    Patient lying in bed awake.  Stated he had bladder accident and wants to talk to Dr. Sadler about his lack of a catheter during the day.  Agreeable to shower due to bladder accident.     Objective       20 1031   Precautions   Precautions Fall Risk;Swallow Precautions ( See Comments)   Comments legally blind, L apraxia, thins ok   Safety    ADL Safety  Impaired;Requires Supervision for Safety;Requires Physical Assist for Safety;Impaired Insight into Safety;Requires Cueing for Safety;Impulsive   Bathroom Safety Impaired;Impulsive;Requires Supervision for Safety;Requires Physical Assist for Safety;Impaired Insight into Safety;Requires Cuing for Safety   Cognition    Cognition / Consciousness X   Safety Awareness Impaired;Impulsive   Bed Mobility    Supine to Sit Stand by Assist   Scooting Stand by Assist   Interdisciplinary Plan of Care Collaboration   Patient Position at End of Therapy Seated;Self Releasing Lap Belt Applied;Call Light within Reach;Tray Table within Reach;Chair Alarm On   OT Total Time Spent   OT Individual Total Time Spent (Mins) 60   OT Charge Group   OT Self Care / ADL 4       FIM Bathing Score:  4 - Minimal Assistance  Bathing Description:       FIM Upper Body Dressin - Standby Prompting/Supervision or Set-up  Upper Body Dressing Description:   Supervision for safety, Verbal cueing, Set-up of equipment(pt initially donned shirt backwards; setup/sba with cues to don/doff pullover)    FIM Lower Body Dressing Score:  4 - Minimal Assistance  Lower Body Dressing Description:  Increased time, Supervision for safety, Verbal cueing, Set-up of equipment, Initial preparation for task(assist w/ 3/15 tasks to don/doff pants, socks, don pullups)    FIM Bladder Management Score: 1 - Total Assistance  Bladder Description:  Supervision, Verbal cueing, Brief    FIM Tub/Shower Transfers Score:  3 - Moderate Assistance  Tub/Shower Transfers Description:  Grab bar, Increased time, Supervision for safety, Adaptive equipment, Verbal cueing, Set-up of equipment(SBA SPT w/c to bench with cues and gb, mod A bench to w/c with gb and cues due to impulsivity, decreased vision (almost missed the chair))      Assessment    Patient would benefit from a time void schedule to manage bladder incontinence during the day.  Patient limited with adls due to impaired vision, impaired safety awareness and impaired left hand function.      Plan    LUE neuro re-ed, FM/GM coordination, sitting/standing balance to correct left lean, ADLs, IADLs, management of visual deficits

## 2020-04-30 NOTE — PROGRESS NOTES
"Rehab Progress Note     Encounter Date: 4/30/2020    CC: SDH, decreased mobility, weakness    Interval Events (Subjective)  Patient sitting up in cafeteria. He reports therapy is going well. HR stable around 60 after restarting metoprolol, SBP still into high 130s. Denies pain. Denies NVD. Hiccups have improved.    IDT Team Meeting 4/28/2020  DC/Disposition:  5/11/20    Objective:  VITAL SIGNS: /60   Pulse 65   Temp 37.1 °C (98.7 °F) (Temporal)   Resp 20   Ht 1.753 m (5' 9\")   Wt 75.5 kg (166 lb 7.2 oz)   SpO2 93%   BMI 24.58 kg/m²   Gen: NAD  Psych: Mood and affect appropriate  CV: RRR, no edema  Resp: CTAB, no upper airway sounds  Abd: NTND  Neuro: AOx4, poor vision bilaterally, 5/5 BUE    No results found for this or any previous visit (from the past 72 hour(s)).    Current Facility-Administered Medications   Medication Frequency   • metoprolol (LOPRESSOR) tablet 25 mg TWICE DAILY   • gabapentin (NEURONTIN) capsule 400 mg TID   • Respiratory Therapy Consult Continuous RT   • oxyCODONE immediate-release (ROXICODONE) tablet 5 mg Q3HRS PRN   • oxyCODONE immediate release (ROXICODONE) tablet 10 mg Q3HRS PRN   • tramadol (ULTRAM) 50 MG tablet 50 mg Q4HRS PRN   • hydrALAZINE (APRESOLINE) tablet 25 mg Q8HRS PRN   • acetaminophen (TYLENOL) tablet 650 mg Q4HRS PRN   • senna-docusate (PERICOLACE or SENOKOT S) 8.6-50 MG per tablet 2 Tab BID    And   • polyethylene glycol/lytes (MIRALAX) PACKET 1 Packet QDAY PRN    And   • magnesium hydroxide (MILK OF MAGNESIA) suspension 30 mL QDAY PRN    And   • bisacodyl (DULCOLAX) suppository 10 mg QDAY PRN   • artificial tears ophthalmic solution 1 Drop PRN   • benzocaine-menthol (CEPACOL) lozenge 1 Lozenge Q2HRS PRN   • mag hydrox-al hydrox-simeth (MAALOX PLUS ES or MYLANTA DS) suspension 20 mL Q2HRS PRN   • ondansetron (ZOFRAN ODT) dispertab 4 mg 4X/DAY PRN    Or   • ondansetron (ZOFRAN) syringe/vial injection 4 mg 4X/DAY PRN   • traZODone (DESYREL) tablet 50 mg QHS PRN "   • sodium chloride (OCEAN) 0.65 % nasal spray 2 Spray PRN   • amLODIPine (NORVASC) tablet 5 mg DAILY   • dexamethasone (DECADRON) tablet 4 mg BID   • lacosamide (VIMPAT) tablet 100 mg BID   • levETIRAcetam (KEPPRA) tablet 1,000 mg BID   • rosuvastatin (CRESTOR) tablet 10 mg QHS       Orders Placed This Encounter   Procedures   • Diet Order Regular     Standing Status:   Standing     Number of Occurrences:   1     Order Specific Question:   Diet:     Answer:   Regular [1]       Assessment:  Active Hospital Problems    Diagnosis   • *Subdural hematoma (HCC)   • Impaired mobility and ADLs   • Seizures, post-traumatic (HCC)   • Oropharyngeal dysphagia   • Urinary retention   • Essential hypertension   • Hypercholesterolemia   • Legally blind       Medical Decision Making and Plan:  TBI (Traumatic Brain Injury): Fall with SDH s/p R sided evacuation with Dr. Mendiola on 4/17/20. Patient started on Steroids. Had post-operative seizure like activity, EEG with cortical irritation  -PT and OT for mobility and ADLs  -SLP for cognition  -NSG follow-up. Per most recent NSG recommendations continue steroids for a week. Recommend CT head - scheduled 5/4/20  -On Decadron 4 mg BID, will continue for a week     Dysphagia - On dysphagia diet on transfer. SLP for swallow evaluation - continue dysphagia 3 with NTL. MBSS 4/27/20 - upgraded to dysphagia with thins. Upgraded to regular/thins. Resolved     Seizure disorder - Patient with previous CVA with seizures. Increased AEDs at [facility] to Keppra 1000 mg BID and Vimpat 100 mg BID  -Follow-up Neurology seizure clinic     HTN - Patient on 5 mg Amlodipine. Previously on metoprolol as well. Continue to monitor, SBP elevated restart Metoprolol 25 mg BID     Legally blind - High risk for falls.      Anemia - 13.1 on admission, continue to monitor    Hyponatremia - 134 on admission, will monitor    Hiccups - Started on Gabapentin 300 mg TID -> 400 mg TID. Improved would like to slowly  wean    Azotemia - Patient on NTL diet on admission with poor fluid intake. Improved with fluids    HLD - Patient on Rosuvastatin 10 mg on transfer.     DVT Ppx - Patient high risk for bleed. Ambulating > 100 feet.     Total time:  25 minutes.  I spent greater than 50% of the time for patient care, counseling, and coordination on this date, including unit/floor time, and face-to-face time with the patient as per interval events and assessment and plan above. Topics discussed included improving hiccups, will titrate off of Gabapentin, improved swallow, and stable SBP.     Lyn Sadler M.D.

## 2020-04-30 NOTE — THERAPY
"Physical Therapy   Daily Treatment     Patient Name: Rafael Mccoy  Age:  74 y.o., Sex:  male  Medical Record #: [id number]  Today's Date: 4/30/2020     Precautions  Precautions: Fall Risk, Swallow Precautions ( See Comments)  Comments: legally blind, L apraxia, thins ok    Subjective    Pt seated in T-dine finishing breakfast, agreeable to PT.      Objective       04/30/20 0831   Precautions   Precautions Fall Risk;Swallow Precautions ( See Comments)   Comments legally blind, L apraxia, thins ok   Interdisciplinary Plan of Care Collaboration   Patient Position at End of Therapy Seated;Call Light within Reach;Tray Table within Reach   PT Total Time Spent   PT Individual Total Time Spent (Mins) 60   PT Charge Group   PT Gait Training 2   PT Therapeutic Activities 2     Pt education/ discussion of CLOF, current vision compared to baseline, L incoordination, PLOF and DC plan.     Gait training completed in theScore system with 10-15 lbs unweighted and 5\" fall distance set, 1x180 ft without AD, 1x180 ft and 1x480 ft with walking stick in RUE, SBA throughout with only mild intermittent LOB and independent recovery.    Assessment    Pt motivated for gait training this session. Improved stability and confidence with walking stick compared to no AD.     Plan    Consider balance assessment, vector no AD, variable gait, forced use L side, lite gait for increased gait speed, gait training with single walking stick.        "

## 2020-04-30 NOTE — THERAPY
Speech Language Pathology  Daily Treatment     Patient Name: Rafael Mccoy  Age:  74 y.o., Sex:  male  Medical Record #: [id number]  Today's Date: 4/30/2020     Subjective    Patient was in room at time of ST. Was willing to participate.      Objective       04/30/20 0932   Cognition   Moderate Attention Moderate (3)   Voice   Modulating Loudness Moderate (3)   Laryngeal Relaxation Moderate (3)   Respiration Training Minimal (4)   Elimination of Vocal Abuse / Misuse Behaviors Minimal (4)   Counselling / Education  Minimal (4)   SLP Total Time Spent   SLP Individual Total Time Spent (Mins) 60   Charge Group   SLP Treatment - Individual Speech Language Treatment - Individual         Assessment    Patient reports concerns regarding changes in vocal quality including hoarse voice and decreased loudness. Patient became tearful during conversation. Discussed strategies for vocal relaxation and hygiene. Provided demonstration. Patient required mod-max cues to demonstrate.     Plan    Continue to provide voice techniques. Address attention and functional problem solving.

## 2020-04-30 NOTE — CARE PLAN
Problem: Communication  Goal: The ability to communicate needs accurately and effectively will improve  Outcome: PROGRESSING AS EXPECTED  Patient is legally blind/forgetful, reminded to use call light for assist with needs.     Problem: Safety  Goal: Will remain free from injury  Outcome: PROGRESSING AS EXPECTED  Patient unsteady, assisted with transfers to prevent falls/injury.

## 2020-04-30 NOTE — THERAPY
"Occupational Therapy  Daily Treatment     Patient Name: Rafael Mccoy  Age:  74 y.o., Sex:  male  Medical Record #: [id number]  Today's Date: 4/30/2020     Precautions  Precautions: Fall Risk, Swallow Precautions ( See Comments)  Comments: legally blind, L apraxia, thins ok    Safety   ADL Safety : Impaired, Requires Supervision for Safety, Requires Physical Assist for Safety, Impaired Insight into Safety, Requires Cueing for Safety, Impulsive  Bathroom Safety: Impaired, Impulsive, Requires Supervision for Safety, Requires Physical Assist for Safety, Impaired Insight into Safety, Requires Cuing for Safety    Subjective    \"I want to try that leg press exercise\"     Objective       04/30/20 1301   Precautions   Precautions Fall Risk;Swallow Precautions ( See Comments)   Comments legally blind, L apraxia, thins ok   Sitting Upper Body Exercises   Upper Extremity Bike Level 5 Resistance  (MotoMed x10 min for bilateral integration and strength)   Supine Lower Body Exercise   Other Exercises Shuttle exercise for BLE strengthening, 3x20 with BLEs with 8 bands, 3x30 each single leg with 6 bands. Verbal and tactile cues for pacing and sequencing to improve quality of movement.    Interdisciplinary Plan of Care Collaboration   Patient Position at End of Therapy In Bed;Call Light within Reach;Tray Table within Reach;Phone within Reach;Bed Alarm On   OT Total Time Spent   OT Individual Total Time Spent (Mins) 30   OT Charge Group   OT Therapeutic Exercise  2     W/c<>shuttle with CGA-SBA, cues for safety.    Assessment    Pt tolerated session well with focus on UB/LB strength and endurance. Pt required verbal/tactile cues/facilitation for pacing and sequencing to improve quality of movement during exercises. Pt motivated and participatory throughout session.    Plan    LUE neuro re-ed, FM/GM coordination, sitting/standing balance to correct left lean, ADLs, IADLs, management of visual deficits    "

## 2020-05-01 PROCEDURE — 97130 THER IVNTJ EA ADDL 15 MIN: CPT

## 2020-05-01 PROCEDURE — 700102 HCHG RX REV CODE 250 W/ 637 OVERRIDE(OP): Performed by: PHYSICAL MEDICINE & REHABILITATION

## 2020-05-01 PROCEDURE — 97112 NEUROMUSCULAR REEDUCATION: CPT | Mod: CQ

## 2020-05-01 PROCEDURE — A9270 NON-COVERED ITEM OR SERVICE: HCPCS | Performed by: PHYSICAL MEDICINE & REHABILITATION

## 2020-05-01 PROCEDURE — 99232 SBSQ HOSP IP/OBS MODERATE 35: CPT | Performed by: PHYSICAL MEDICINE & REHABILITATION

## 2020-05-01 PROCEDURE — 97129 THER IVNTJ 1ST 15 MIN: CPT

## 2020-05-01 PROCEDURE — 97530 THERAPEUTIC ACTIVITIES: CPT

## 2020-05-01 PROCEDURE — 770010 HCHG ROOM/CARE - REHAB SEMI PRIVAT*

## 2020-05-01 PROCEDURE — 97116 GAIT TRAINING THERAPY: CPT | Mod: CQ

## 2020-05-01 RX ORDER — BISACODYL 10 MG
10 SUPPOSITORY, RECTAL RECTAL
Status: DISCONTINUED | OUTPATIENT
Start: 2020-05-01 | End: 2020-05-11 | Stop reason: HOSPADM

## 2020-05-01 RX ORDER — AMOXICILLIN 250 MG
2 CAPSULE ORAL 2 TIMES DAILY PRN
Status: DISCONTINUED | OUTPATIENT
Start: 2020-05-01 | End: 2020-05-11 | Stop reason: HOSPADM

## 2020-05-01 RX ORDER — POLYETHYLENE GLYCOL 3350 17 G/17G
1 POWDER, FOR SOLUTION ORAL
Status: DISCONTINUED | OUTPATIENT
Start: 2020-05-01 | End: 2020-05-11 | Stop reason: HOSPADM

## 2020-05-01 RX ORDER — GABAPENTIN 300 MG/1
600 CAPSULE ORAL 3 TIMES DAILY
Status: DISCONTINUED | OUTPATIENT
Start: 2020-05-01 | End: 2020-05-08

## 2020-05-01 RX ADMIN — METOPROLOL TARTRATE 25 MG: 25 TABLET, FILM COATED ORAL at 17:41

## 2020-05-01 RX ADMIN — GABAPENTIN 600 MG: 300 CAPSULE ORAL at 21:33

## 2020-05-01 RX ADMIN — LEVETIRACETAM 1000 MG: 500 TABLET ORAL at 08:49

## 2020-05-01 RX ADMIN — LACOSAMIDE 100 MG: 100 TABLET, FILM COATED ORAL at 08:49

## 2020-05-01 RX ADMIN — LACOSAMIDE 100 MG: 100 TABLET, FILM COATED ORAL at 21:31

## 2020-05-01 RX ADMIN — DEXAMETHASONE 4 MG: 4 TABLET ORAL at 08:49

## 2020-05-01 RX ADMIN — DEXAMETHASONE 4 MG: 4 TABLET ORAL at 21:33

## 2020-05-01 RX ADMIN — AMLODIPINE BESYLATE 5 MG: 5 TABLET ORAL at 08:49

## 2020-05-01 RX ADMIN — ROSUVASTATIN CALCIUM 10 MG: 10 TABLET, FILM COATED ORAL at 21:33

## 2020-05-01 RX ADMIN — LEVETIRACETAM 1000 MG: 500 TABLET ORAL at 21:32

## 2020-05-01 RX ADMIN — GABAPENTIN 400 MG: 400 CAPSULE ORAL at 08:49

## 2020-05-01 RX ADMIN — GABAPENTIN 400 MG: 400 CAPSULE ORAL at 14:44

## 2020-05-01 ASSESSMENT — BALANCE ASSESSMENTS
STANDING UNSUPPORTED ONE FOOT IN FRONT: 2
STANDING ON ONE LEG: 1
STANDING UNSUPPORTED WITH EYES CLOSED: 3
REACHING FORWARD WITH OUTSTRETCHED ARM WHILE STANDING: 2
SITTING TO STANDING: 3
LOOK OVER LEFT AND RIGHT SHOULDERS WHILE STANDING: 0
TURN 360 DEGREES: 0
SITTING UNSUPPORTED: 4
PICK UP OBJECT FROM THE FLOOR FROM A STANDING POSITION: 3
TRANSFERS: 1
STANDING TO SITTING: 3
PLACE ALTERNATE FOOT ON STEP OR STOOL WHILE STANDING UNSUPPORTED: 1
STANDING UNSUPPORTED: 4
STANDING UNSUPPORTED WITH FEET TOGETHER: 3
LONG VERSION TOTAL SCORE (MAX 56): 30
LONG VERSION TOTAL SCORE (MAX 56): 30

## 2020-05-01 NOTE — THERAPY
"Occupational Therapy  Daily Treatment     Patient Name: Rafael Mccoy  Age:  74 y.o., Sex:  male  Medical Record #: [id number]  Today's Date: 5/1/2020     Precautions  Precautions: (P) Fall Risk, Swallow Precautions ( See Comments)  Comments: (P) legally blind, L apraxia, thins ok    Safety   ADL Safety : Impaired, Requires Supervision for Safety, Requires Physical Assist for Safety, Impaired Insight into Safety, Requires Cueing for Safety, Impulsive  Bathroom Safety: Impaired, Impulsive, Requires Supervision for Safety, Requires Physical Assist for Safety, Impaired Insight into Safety, Requires Cuing for Safety    Subjective    \"My core feels loose\"     Objective       05/01/20 1301   Precautions   Precautions Fall Risk;Swallow Precautions ( See Comments)   Comments legally blind, L apraxia, thins ok   Sitting Upper Body Exercises   Upper Extremity Bike Level 5 Resistance  (motomed 15 min for bilateral integration and strength)   Comments seated core therex: twists with 6# weighted ball - required min A for form, modified sit-ups from wedge placed behind him to upright 2x10 reps with min cues for form, lateral crunches seated edge of mat elbow to mat 2x10 each side, scapular retraction 1x10 with 5 second holds and cues to engage core and find midline between each repetition   Fine Motor / Dexterity    Fine Motor / Dexterity Yes   Fine Motor / Dexterity Interventions object identification using bilateral touch, pt able to identify comb given mod cues, spoon given no cues, fork given max cues   OT Total Time Spent   OT Individual Total Time Spent (Mins) 60   OT Charge Group   OT Therapy Activity 4     Assessment    Pt tolerated session well, continues with left lateral lean in unsupported sitting, able to self-correct and sustain for brief periods but leans when distracted, tolerated core activation therex well. Difficulty identifying objects using bilateral touch, reports L hand sensation decreased from baseline. "     Plan    LUE neuro re-ed, FM/GM coordination, sitting/standing balance to correct left lean, ADLs, IADLs, management of visual deficits

## 2020-05-01 NOTE — PROGRESS NOTES
"Rehab Progress Note     Encounter Date: 5/1/2020    CC: SDH, decreased mobility, weakness    Interval Events (Subjective)  Patient sitting up in room. He reports he is doing well. He has had some issues with eating. Discussed with SLP and they will keep him in the dining room although no dysphagia at this time. He also is having looser BMs and would prefer for bowel medications to be PRN. Denies NVD. Denies SOB. Hiccups only happening occasionally, he reports ongoing though.    IDT Team Meeting 4/28/2020  DC/Disposition:  5/11/20    Objective:  VITAL SIGNS: /69   Pulse 70   Temp 36.4 °C (97.6 °F) (Oral)   Resp 18   Ht 1.753 m (5' 9\")   Wt 75.5 kg (166 lb 7.2 oz)   SpO2 96%   BMI 24.58 kg/m²   Gen: NAD  Psych: Mood and affect appropriate  CV: RRR, no edema  Resp: CTAB, no upper airway sounds  Abd: NTND  Neuro: AOx4, poor vision bilaterally, 5/5 BUE  Unchanged from 4/30/20    No results found for this or any previous visit (from the past 72 hour(s)).    Current Facility-Administered Medications   Medication Frequency   • metoprolol (LOPRESSOR) tablet 25 mg TWICE DAILY   • gabapentin (NEURONTIN) capsule 400 mg TID   • Respiratory Therapy Consult Continuous RT   • oxyCODONE immediate-release (ROXICODONE) tablet 5 mg Q3HRS PRN   • oxyCODONE immediate release (ROXICODONE) tablet 10 mg Q3HRS PRN   • tramadol (ULTRAM) 50 MG tablet 50 mg Q4HRS PRN   • hydrALAZINE (APRESOLINE) tablet 25 mg Q8HRS PRN   • acetaminophen (TYLENOL) tablet 650 mg Q4HRS PRN   • senna-docusate (PERICOLACE or SENOKOT S) 8.6-50 MG per tablet 2 Tab BID    And   • polyethylene glycol/lytes (MIRALAX) PACKET 1 Packet QDAY PRN    And   • magnesium hydroxide (MILK OF MAGNESIA) suspension 30 mL QDAY PRN    And   • bisacodyl (DULCOLAX) suppository 10 mg QDAY PRN   • artificial tears ophthalmic solution 1 Drop PRN   • benzocaine-menthol (CEPACOL) lozenge 1 Lozenge Q2HRS PRN   • mag hydrox-al hydrox-simeth (MAALOX PLUS ES or MYLANTA DS) suspension " 20 mL Q2HRS PRN   • ondansetron (ZOFRAN ODT) dispertab 4 mg 4X/DAY PRN    Or   • ondansetron (ZOFRAN) syringe/vial injection 4 mg 4X/DAY PRN   • traZODone (DESYREL) tablet 50 mg QHS PRN   • sodium chloride (OCEAN) 0.65 % nasal spray 2 Spray PRN   • amLODIPine (NORVASC) tablet 5 mg DAILY   • dexamethasone (DECADRON) tablet 4 mg BID   • lacosamide (VIMPAT) tablet 100 mg BID   • levETIRAcetam (KEPPRA) tablet 1,000 mg BID   • rosuvastatin (CRESTOR) tablet 10 mg QHS       Orders Placed This Encounter   Procedures   • Diet Order Regular     Standing Status:   Standing     Number of Occurrences:   1     Order Specific Question:   Diet:     Answer:   Regular [1]       Assessment:  Active Hospital Problems    Diagnosis   • *Subdural hematoma (HCC)   • Impaired mobility and ADLs   • Seizures, post-traumatic (HCC)   • Oropharyngeal dysphagia   • Urinary retention   • Essential hypertension   • Hypercholesterolemia   • Legally blind       Medical Decision Making and Plan:  TBI (Traumatic Brain Injury): Fall with SDH s/p R sided evacuation with Dr. Mendiola on 4/17/20. Patient started on Steroids. Had post-operative seizure like activity, EEG with cortical irritation  -PT and OT for mobility and ADLs  -SLP for cognition  -NSG follow-up. Per most recent NSG recommendations continue steroids for a week. Recommend CT head - scheduled 5/4/20  -On Decadron 4 mg BID, will continue for a week     Dysphagia - On dysphagia diet on transfer. SLP for swallow evaluation - continue dysphagia 3 with NTL. MBSS 4/27/20 - upgraded to dysphagia with thins. Upgraded to regular/thins. Resolved     Seizure disorder - Patient with previous CVA with seizures. Increased [facility]EDs at [facility] to Keppra 1000 mg BID and Vimpat 100 mg BID  -Follow-up Neurology seizure clinic     HTN - Patient on 5 mg Amlodipine. Previously on metoprolol as well. Continue to monitor, SBP elevated restart Metoprolol 25 mg BID     Legally blind - High risk for falls.      Anemia - 13.1  on admission, continue to monitor    Hyponatremia - 134 on admission, will monitor    Hiccups - Started on Gabapentin 300 mg TID -> 400 mg TID. Improved but occasional increase to 600    Azotemia - Patient on NTL diet on admission with poor fluid intake. Improved with fluids    HLD - Patient on Rosuvastatin 10 mg on transfer.     GI Ppx - Patient with loose stools on senna-docusate. Change to PRN    DVT Ppx - Patient high risk for bleed. Ambulating > 100 feet.     Total time:  26 minutes.  I spent greater than 50% of the time for patient care, counseling, and coordination on this date, including unit/floor time, and face-to-face time with the patient as per interval events and assessment and plan above. Topics discussed included difficulty eating with vision loss, recommend T dine, loose BMs and discontinue bowel medications.     Lyn Sadler M.D.

## 2020-05-01 NOTE — CARE PLAN
Problem: Communication  Goal: The ability to communicate needs accurately and effectively will improve  Outcome: PROGRESSING AS EXPECTED     Problem: Safety  Goal: Will remain free from injury  Outcome: PROGRESSING AS EXPECTED  Intervention: Educate patient and significant other/support system about adaptive mobility strategies and safe transfers  Note: Forgetful, impulsive at times. Reinforce using call light for assist. Bed in low position, call light within reach, bed alarm activated.     Problem: Bowel/Gastric:  Goal: Normal bowel function is maintained or improved  Outcome: PROGRESSING AS EXPECTED  Intervention: Educate patient and significant other/support system about diet, fluid intake, medications and activity to promote bowel function  Note: Refused scheduled bowel med at . BM noted this shift. No s/s of distress.     Problem: Urinary Elimination:  Goal: Ability to reestablish a normal urinary elimination pattern will improve  Intervention: Assist patient to sit on commode or toilet for voiding  Note: Condom cath in place at  r/t incontinence.     Problem: Respiratory:  Goal: Respiratory status will improve  Outcome: PROGRESSING AS EXPECTED     Problem: Skin Integrity  Goal: Risk for impaired skin integrity will decrease  Outcome: PROGRESSING AS EXPECTED

## 2020-05-01 NOTE — THERAPY
Speech Language Pathology  Daily Treatment     Patient Name: Rafael Mccoy  Age:  74 y.o., Sex:  male  Medical Record #: [id number]  Today's Date: 5/1/2020     Subjective    Pt in bed, c/o fatigue today, hiccups persisting, however, pt reports they are not as consistent as before.      Objective     05/01/20 1034   Cognition   Functional Memory Activities Within Functional Limits (6-7)   Functional Problem Solving Minimal (4)   Verbal Sequencing (Simple) Minimal (4)   Interdisciplinary Plan of Care Collaboration   IDT Collaboration with  Physician   Patient Position at End of Therapy In Bed;Call Light within Reach   Collaboration Comments physician discussing CLOF with pt during ST   SLP Total Time Spent   SLP Individual Total Time Spent (Mins) 60   Charge Group   SLP Cognitive Skill Development First 15 Minutes 1   SLP Cognitive Skill Development Additional 15 Minutes 3     Assessment    Verbal sequencing from bed to chair transfers - pt able to recall use of call light prior to transferring, cues to check brakes locked prior to transfer as well as positioning before sitting in chair (knees touching cushion, reach for arm rests). Fxl problem solving as it pertains to d/c - pt requires MIN to MOD A to identify challenges given CLOF for ambulation, shopping, laundry (pt reports he has 20 stairs to basement laundry in apartment complex).     Plan    Continue fxl problem solving, verbal sequencing.

## 2020-05-01 NOTE — THERAPY
Speech Language Pathology  Daily Treatment     Patient Name: Rafael Mccoy  Age:  74 y.o., Sex:  male  Medical Record #: [id number]  Today's Date: 5/1/2020     Subjective    Pt requesting to eat meals in therapeutic dining due to needing set up A secondary to visual impairments. Physician aware and ok for T-dine orders. Pt will not be seen by SLP during meals as he is no longer being followed for dysphagia intervention.

## 2020-05-01 NOTE — THERAPY
Physical Therapy   Daily Treatment     Patient Name: Rafael Mccoy  Age:  74 y.o., Sex:  male  Medical Record #: [id number]  Today's Date: 5/1/2020     Precautions  Precautions: Fall Risk, Swallow Precautions ( See Comments)  Comments: legally blind, L apraxia, thins ok    Subjective    Pt finishing breakfast, requires extra time to complete this am     Objective       05/01/20 0831   Neuro-Muscular Treatments   Neuro-Muscular Treatments Verbal Cuing;Tactile Cuing;Sequencing;Postural Facilitation   Comments completed VILLA balancew assessment   Villa Balance Scale   Sitting Unsupported (Score 0-4) 4   Change Of Positon: Sitting To Standing (Score 0-4) 3   Change Of Positon: Standing To Sitting (Score 0-4) 3   Transfers (Score 0-4) 1   Standing Unsupported (Score 0-4) 4   Standing With Eyes Closed (Score 0-4) 3   Standing With Feet Together (Score 0-4) 3   Tandem Standing (Score 0-4) 2   Standing On One Leg (Score 0-4) 1   Turning Trunk (Feet Fixed) (Score 0-4) 0   Retrieving Objects From Floor (Score 0-4) 3   Turning 360 Degrees (Score 0-4) 0   Stool Stepping (Score 0-4) 1   Reaching Forward While Standing (Score 0-4) 2   Villa Balance Total Score (0-56) 30   Interdisciplinary Plan of Care Collaboration   IDT Collaboration with  Nursing;Therapy Tech;Occupational Therapist   Patient Position at End of Therapy Seated;Self Releasing Lap Belt Applied;Chair Alarm On;Call Light within Reach  (with CNA )   Collaboration Comments RN: meds, Tech: assumed care from tech after breakfast, OT: results of balance test   Therapy Missed   Missed Therapy (Minutes) 30  (m/u this pm at 1500)   Reason For Missed Therapy Medical - Patient with Nursing;Non-Medical - Other (Please Comment)  (extra time for breakfast x 15' and meds with RN x 15')   PT Total Time Spent   PT Individual Total Time Spent (Mins) 30   PT Charge Group   PT Gait Training 1   PT Neuromuscular Re-Education / Balance 1   Supervising Physical Therapist Parvin Bryant        FIM Walking Score:  5 - Standby Prompting/Supervision or Set-up  Walking Description:  Walker, Extra time, Requires incidental assist, Supervision for safety, Verbal cueing, Safety concerns(250' x 1 and 150' x 1 SBA vc for pathfinding and proximity to walker)    Assessment    Pt completed fernandez balance assessment with score of 30/56-indicative of a high fall risk. Pt has most difficulty with SLS, turning and ultimately required assistance to prevent fall during a stand step transfer. Pt demonstrates mildly improving proprioception and balance, specifically with UE support.    Plan    vector no AD, variable gait, forced use L side, lite gait for increased gait speed, gait training with single walking stick

## 2020-05-01 NOTE — PROGRESS NOTES
Received patient during shift change, report rec'd from day shift RN. Resting in bed, VS stable on room air. Per report continent of B&B w/episodes of incontinence. Contact guard assist for transfers. A&O x 3, able to make needs known. Bed in low position, call light within reach.

## 2020-05-01 NOTE — THERAPY
"Physical Therapy   Daily Treatment     Patient Name: Rafael Mccoy  Age:  74 y.o., Sex:  male  Medical Record #: [id number]  Today's Date: 5/1/2020     Precautions  Precautions: (P) Fall Risk, Swallow Precautions ( See Comments)  Comments: (P) legally blind, L apraxia, thins ok    Subjective    Pt resting in bed upon arrival. Pt reports \"I think this thing is all wet, I need a new one\" - pt referring to urine soiled brief     \"the seem to reprimand me when I got to the bathroom on my own\" with the wc    Objective       05/01/20 1501   Precautions   Precautions Fall Risk;Swallow Precautions ( See Comments)   Comments legally blind, L apraxia, thins ok   Cognition    Safety Awareness Impaired   New Learning Impaired   Attention Impaired   Sequencing Impaired   Neuro-Muscular Treatments   Neuro-Muscular Treatments Compensatory Strategies;Postural Facilitation;Verbal Cuing  (a/p weight shifitng without UE support and SBA/CGA)   Comments postural facilitation for inc ws to L with gait training   Interdisciplinary Plan of Care Collaboration   IDT Collaboration with  Occupational Therapist   Patient Position at End of Therapy Seated;Self Releasing Lap Belt Applied;Call Light within Reach;Tray Table within Reach   Collaboration Comments CLOF   PT Total Time Spent   PT Individual Total Time Spent (Mins) 30   PT Charge Group   PT Gait Training 1   PT Neuromuscular Re-Education / Balance 1   Supervising Physical Therapist Parvin Bryant       FIM Walking Score:  4 - Minimal Assistance  Walking Description:  Assist device/equipment, Extra time, Safety concerns, Verbal cueing, Supervision for safety, Requires incidental assist(200' x 1 Ryan with unilateral walking pole in R hand)    *additional gait training with ' x 2 close SBA/incidental touching, vc/manual cues for proximity to walker, upright posture, and wayfinding.    Reinforced use of call light for going to the BR due to current balance impairments, pt returns " verbal comprehension. Discussion with patient about attempting to void bladder throughout the day to prevent accidents.    Assessment    The patient amb with a unilateral walking stick in R UE with Ryan for balance and safe negotiation of the environment. Difficulty with obstacle negotiation 2/2 impaired vision. Pt has fair insight into deficits    Plan    vector no AD, variable gait, forced use L side, lite gait for increased gait speed, cont gait training with single walking stick

## 2020-05-02 PROCEDURE — 770010 HCHG ROOM/CARE - REHAB SEMI PRIVAT*

## 2020-05-02 PROCEDURE — A9270 NON-COVERED ITEM OR SERVICE: HCPCS | Performed by: PHYSICAL MEDICINE & REHABILITATION

## 2020-05-02 PROCEDURE — 700102 HCHG RX REV CODE 250 W/ 637 OVERRIDE(OP): Performed by: PHYSICAL MEDICINE & REHABILITATION

## 2020-05-02 RX ADMIN — GABAPENTIN 600 MG: 300 CAPSULE ORAL at 15:38

## 2020-05-02 RX ADMIN — AMLODIPINE BESYLATE 5 MG: 5 TABLET ORAL at 09:10

## 2020-05-02 RX ADMIN — METOPROLOL TARTRATE 25 MG: 25 TABLET, FILM COATED ORAL at 18:55

## 2020-05-02 RX ADMIN — ROSUVASTATIN CALCIUM 10 MG: 10 TABLET, FILM COATED ORAL at 20:56

## 2020-05-02 RX ADMIN — DEXAMETHASONE 4 MG: 4 TABLET ORAL at 09:10

## 2020-05-02 RX ADMIN — LEVETIRACETAM 1000 MG: 500 TABLET ORAL at 09:10

## 2020-05-02 RX ADMIN — GABAPENTIN 600 MG: 300 CAPSULE ORAL at 20:56

## 2020-05-02 RX ADMIN — GABAPENTIN 600 MG: 300 CAPSULE ORAL at 09:10

## 2020-05-02 RX ADMIN — DEXAMETHASONE 4 MG: 4 TABLET ORAL at 20:56

## 2020-05-02 RX ADMIN — LACOSAMIDE 100 MG: 100 TABLET, FILM COATED ORAL at 09:10

## 2020-05-02 RX ADMIN — LACOSAMIDE 100 MG: 100 TABLET, FILM COATED ORAL at 20:56

## 2020-05-02 RX ADMIN — LEVETIRACETAM 1000 MG: 500 TABLET ORAL at 20:56

## 2020-05-02 NOTE — CARE PLAN
Problem: Communication  Goal: The ability to communicate needs accurately and effectively will improve  Outcome: PROGRESSING AS EXPECTED     Problem: Safety  Goal: Will remain free from injury  Outcome: PROGRESSING AS EXPECTED     Problem: Urinary Elimination:  Goal: Ability to reestablish a normal urinary elimination pattern will improve  Outcome: PROGRESSING SLOWER THAN EXPECTED  Intervention: Assist patient to sit on commode or toilet for voiding  Note: Incontinent, wears condom cath at hs.     Problem: Respiratory:  Goal: Respiratory status will improve  Outcome: PROGRESSING AS EXPECTED     Problem: Skin Integrity  Goal: Risk for impaired skin integrity will decrease  Outcome: PROGRESSING AS EXPECTED     Problem: Pain Management  Goal: Pain level will decrease to patient's comfort goal  Outcome: PROGRESSING AS EXPECTED

## 2020-05-02 NOTE — CARE PLAN
Problem: Safety  Goal: Will remain free from falls  Outcome: PROGRESSING AS EXPECTED  Patient was re educated on using call light and verbalized he understands how to call for help.       Problem: Skin Integrity  Goal: Risk for impaired skin integrity will decrease  Outcome: PROGRESSING AS EXPECTED  Patient's head incision is healing well and free of any signs of infection.

## 2020-05-02 NOTE — PROGRESS NOTES
Received patient during shift change, report rec'd from day shift RN. Resting in bed, VS stable on room air. Per report continent of Bowel, incontinent of bladder, condom cath at hs. Contact guard assist for transfers. A&O x 3, able to make needs known. Bed in low position, call light within reach.

## 2020-05-03 PROCEDURE — A9270 NON-COVERED ITEM OR SERVICE: HCPCS | Performed by: PHYSICAL MEDICINE & REHABILITATION

## 2020-05-03 PROCEDURE — 770010 HCHG ROOM/CARE - REHAB SEMI PRIVAT*

## 2020-05-03 PROCEDURE — 700102 HCHG RX REV CODE 250 W/ 637 OVERRIDE(OP): Performed by: PHYSICAL MEDICINE & REHABILITATION

## 2020-05-03 RX ADMIN — ROSUVASTATIN CALCIUM 10 MG: 10 TABLET, FILM COATED ORAL at 20:32

## 2020-05-03 RX ADMIN — GABAPENTIN 600 MG: 300 CAPSULE ORAL at 14:55

## 2020-05-03 RX ADMIN — GABAPENTIN 600 MG: 300 CAPSULE ORAL at 20:33

## 2020-05-03 RX ADMIN — STANDARDIZED SENNA CONCENTRATE AND DOCUSATE SODIUM 2 TABLET: 8.6; 5 TABLET, FILM COATED ORAL at 04:59

## 2020-05-03 RX ADMIN — DEXAMETHASONE 4 MG: 4 TABLET ORAL at 08:38

## 2020-05-03 RX ADMIN — METOPROLOL TARTRATE 25 MG: 25 TABLET, FILM COATED ORAL at 18:43

## 2020-05-03 RX ADMIN — DEXAMETHASONE 4 MG: 4 TABLET ORAL at 20:32

## 2020-05-03 RX ADMIN — GABAPENTIN 600 MG: 300 CAPSULE ORAL at 08:38

## 2020-05-03 RX ADMIN — LEVETIRACETAM 1000 MG: 500 TABLET ORAL at 20:32

## 2020-05-03 RX ADMIN — AMLODIPINE BESYLATE 5 MG: 5 TABLET ORAL at 08:48

## 2020-05-03 RX ADMIN — LACOSAMIDE 100 MG: 100 TABLET, FILM COATED ORAL at 20:32

## 2020-05-03 RX ADMIN — METOPROLOL TARTRATE 25 MG: 25 TABLET, FILM COATED ORAL at 05:13

## 2020-05-03 RX ADMIN — LEVETIRACETAM 1000 MG: 500 TABLET ORAL at 08:38

## 2020-05-03 RX ADMIN — LACOSAMIDE 100 MG: 100 TABLET, FILM COATED ORAL at 08:38

## 2020-05-03 ASSESSMENT — FIBROSIS 4 INDEX: FIB4 SCORE: 1.17

## 2020-05-03 NOTE — CARE PLAN
Problem: Urinary Elimination:  Goal: Ability to reestablish a normal urinary elimination pattern will improve  Outcome: PROGRESSING SLOWER THAN EXPECTED    Pt wants to wear condom cath 24/7 not just at night. Will time void every 3 hours. Bladder scan prn after voiding. Pt is agreeable but would really rather have the condom cath on all the time

## 2020-05-03 NOTE — CARE PLAN
Problem: Safety  Goal: Will remain free from falls  Intervention: Implement fall precautions  Flowsheets (Taken 5/3/2020 0053)  Bed Alarm: Yes - Alarm On  Chair/Bed Strip Alarm: Yes - Alarm On  Note: Pt legally blind , on seizure precautions. With padded side rails , call light within easy reach, hourly rounding done. Needs anticipated and attended. Pt free from fall and injury.      Problem: Infection  Goal: Will remain free from infection  Intervention: Assess signs and symptoms of infection  Note: Incision line on the head dry and intact well approximated. No s/s of infection noted. Afebrile,Vital signs stable.      Problem: Urinary Elimination:  Goal: Ability to reestablish a normal urinary elimination pattern will improve  Intervention: Evaluate need to continue indwelling urinary catheter  Note: Pt incontinent of urine, kept dry and clean. Condom cath placed at night time.

## 2020-05-04 ENCOUNTER — HOSPITAL ENCOUNTER (OUTPATIENT)
Dept: RADIOLOGY | Facility: MEDICAL CENTER | Age: 75
End: 2020-05-04
Attending: CLINICAL NURSE SPECIALIST
Payer: COMMERCIAL

## 2020-05-04 PROBLEM — R14.0 ABDOMINAL DISTENSION: Status: ACTIVE | Noted: 2020-05-04

## 2020-05-04 PROBLEM — D72.829 LEUKOCYTOSIS: Status: ACTIVE | Noted: 2020-05-04

## 2020-05-04 PROBLEM — E87.1 HYPONATREMIA: Status: ACTIVE | Noted: 2020-05-04

## 2020-05-04 LAB
ANION GAP SERPL CALC-SCNC: 11 MMOL/L (ref 7–16)
BASOPHILS # BLD AUTO: 0.2 % (ref 0–1.8)
BASOPHILS # BLD: 0.06 K/UL (ref 0–0.12)
BUN SERPL-MCNC: 25 MG/DL (ref 8–22)
CALCIUM SERPL-MCNC: 9 MG/DL (ref 8.5–10.5)
CHLORIDE SERPL-SCNC: 92 MMOL/L (ref 96–112)
CO2 SERPL-SCNC: 26 MMOL/L (ref 20–33)
COVID ORDER STATUS COVID19: NORMAL
CREAT SERPL-MCNC: 0.95 MG/DL (ref 0.5–1.4)
EOSINOPHIL # BLD AUTO: 0 K/UL (ref 0–0.51)
EOSINOPHIL NFR BLD: 0 % (ref 0–6.9)
ERYTHROCYTE [DISTWIDTH] IN BLOOD BY AUTOMATED COUNT: 43.4 FL (ref 35.9–50)
FLUAV RNA SPEC QL NAA+PROBE: NEGATIVE
FLUBV RNA SPEC QL NAA+PROBE: NEGATIVE
GLUCOSE BLD-MCNC: 123 MG/DL (ref 65–99)
GLUCOSE SERPL-MCNC: 162 MG/DL (ref 65–99)
HCT VFR BLD AUTO: 41.7 % (ref 42–52)
HGB BLD-MCNC: 14.3 G/DL (ref 14–18)
IMM GRANULOCYTES # BLD AUTO: 0.69 K/UL (ref 0–0.11)
IMM GRANULOCYTES NFR BLD AUTO: 2.1 % (ref 0–0.9)
LYMPHOCYTES # BLD AUTO: 0.35 K/UL (ref 1–4.8)
LYMPHOCYTES NFR BLD: 1.1 % (ref 22–41)
MCH RBC QN AUTO: 30.4 PG (ref 27–33)
MCHC RBC AUTO-ENTMCNC: 34.3 G/DL (ref 33.7–35.3)
MCV RBC AUTO: 88.7 FL (ref 81.4–97.8)
MONOCYTES # BLD AUTO: 1.91 K/UL (ref 0–0.85)
MONOCYTES NFR BLD AUTO: 5.9 % (ref 0–13.4)
NEUTROPHILS # BLD AUTO: 29.24 K/UL (ref 1.82–7.42)
NEUTROPHILS NFR BLD: 90.7 % (ref 44–72)
NRBC # BLD AUTO: 0 K/UL
NRBC BLD-RTO: 0 /100 WBC
PLATELET # BLD AUTO: 161 K/UL (ref 164–446)
PMV BLD AUTO: 10.5 FL (ref 9–12.9)
POTASSIUM SERPL-SCNC: 4.1 MMOL/L (ref 3.6–5.5)
RBC # BLD AUTO: 4.7 M/UL (ref 4.7–6.1)
SARS-COV-2 RNA RESP QL NAA+PROBE: NOTDETECTED
SODIUM SERPL-SCNC: 129 MMOL/L (ref 135–145)
SPECIMEN SOURCE: NORMAL
WBC # BLD AUTO: 32.3 K/UL (ref 4.8–10.8)

## 2020-05-04 PROCEDURE — 97530 THERAPEUTIC ACTIVITIES: CPT | Mod: CQ

## 2020-05-04 PROCEDURE — 770010 HCHG ROOM/CARE - REHAB SEMI PRIVAT*

## 2020-05-04 PROCEDURE — U0004 COV-19 TEST NON-CDC HGH THRU: HCPCS

## 2020-05-04 PROCEDURE — 80048 BASIC METABOLIC PNL TOTAL CA: CPT

## 2020-05-04 PROCEDURE — 82962 GLUCOSE BLOOD TEST: CPT

## 2020-05-04 PROCEDURE — 97535 SELF CARE MNGMENT TRAINING: CPT

## 2020-05-04 PROCEDURE — 87186 SC STD MICRODIL/AGAR DIL: CPT

## 2020-05-04 PROCEDURE — 87077 CULTURE AEROBIC IDENTIFY: CPT

## 2020-05-04 PROCEDURE — 700105 HCHG RX REV CODE 258: Performed by: PHYSICAL MEDICINE & REHABILITATION

## 2020-05-04 PROCEDURE — 99233 SBSQ HOSP IP/OBS HIGH 50: CPT | Performed by: PHYSICAL MEDICINE & REHABILITATION

## 2020-05-04 PROCEDURE — 97530 THERAPEUTIC ACTIVITIES: CPT

## 2020-05-04 PROCEDURE — 700102 HCHG RX REV CODE 250 W/ 637 OVERRIDE(OP): Performed by: HOSPITALIST

## 2020-05-04 PROCEDURE — 70450 CT HEAD/BRAIN W/O DYE: CPT

## 2020-05-04 PROCEDURE — 87040 BLOOD CULTURE FOR BACTERIA: CPT

## 2020-05-04 PROCEDURE — 97130 THER IVNTJ EA ADDL 15 MIN: CPT | Performed by: SPEECH-LANGUAGE PATHOLOGIST

## 2020-05-04 PROCEDURE — 99223 1ST HOSP IP/OBS HIGH 75: CPT | Performed by: HOSPITALIST

## 2020-05-04 PROCEDURE — 97112 NEUROMUSCULAR REEDUCATION: CPT | Mod: CQ

## 2020-05-04 PROCEDURE — 87086 URINE CULTURE/COLONY COUNT: CPT

## 2020-05-04 PROCEDURE — A9270 NON-COVERED ITEM OR SERVICE: HCPCS | Performed by: HOSPITALIST

## 2020-05-04 PROCEDURE — 700105 HCHG RX REV CODE 258: Performed by: HOSPITALIST

## 2020-05-04 PROCEDURE — 700111 HCHG RX REV CODE 636 W/ 250 OVERRIDE (IP): Performed by: HOSPITALIST

## 2020-05-04 PROCEDURE — 87502 INFLUENZA DNA AMP PROBE: CPT

## 2020-05-04 PROCEDURE — 97129 THER IVNTJ 1ST 15 MIN: CPT | Performed by: SPEECH-LANGUAGE PATHOLOGIST

## 2020-05-04 PROCEDURE — 81001 URINALYSIS AUTO W/SCOPE: CPT

## 2020-05-04 PROCEDURE — 85025 COMPLETE CBC W/AUTO DIFF WBC: CPT

## 2020-05-04 PROCEDURE — A9270 NON-COVERED ITEM OR SERVICE: HCPCS | Performed by: PHYSICAL MEDICINE & REHABILITATION

## 2020-05-04 PROCEDURE — 700102 HCHG RX REV CODE 250 W/ 637 OVERRIDE(OP): Performed by: PHYSICAL MEDICINE & REHABILITATION

## 2020-05-04 RX ORDER — SODIUM CHLORIDE 9 MG/ML
INJECTION, SOLUTION INTRAVENOUS CONTINUOUS
Status: DISCONTINUED | OUTPATIENT
Start: 2020-05-04 | End: 2020-05-06

## 2020-05-04 RX ORDER — LINEZOLID 600 MG/1
600 TABLET, FILM COATED ORAL EVERY 12 HOURS
Status: DISCONTINUED | OUTPATIENT
Start: 2020-05-04 | End: 2020-05-07

## 2020-05-04 RX ADMIN — DEXAMETHASONE 4 MG: 4 TABLET ORAL at 08:39

## 2020-05-04 RX ADMIN — METOPROLOL TARTRATE 25 MG: 25 TABLET, FILM COATED ORAL at 18:28

## 2020-05-04 RX ADMIN — DEXAMETHASONE 4 MG: 4 TABLET ORAL at 20:06

## 2020-05-04 RX ADMIN — LINEZOLID 600 MG: 600 TABLET, FILM COATED ORAL at 20:05

## 2020-05-04 RX ADMIN — GABAPENTIN 600 MG: 300 CAPSULE ORAL at 20:05

## 2020-05-04 RX ADMIN — SODIUM CHLORIDE: 9 INJECTION, SOLUTION INTRAVENOUS at 15:53

## 2020-05-04 RX ADMIN — GABAPENTIN 600 MG: 300 CAPSULE ORAL at 08:38

## 2020-05-04 RX ADMIN — GABAPENTIN 600 MG: 300 CAPSULE ORAL at 15:07

## 2020-05-04 RX ADMIN — AMLODIPINE BESYLATE 5 MG: 5 TABLET ORAL at 08:38

## 2020-05-04 RX ADMIN — LEVETIRACETAM 1000 MG: 500 TABLET ORAL at 20:05

## 2020-05-04 RX ADMIN — PIPERACILLIN AND TAZOBACTAM 3.38 G: 3; .375 INJECTION, POWDER, LYOPHILIZED, FOR SOLUTION INTRAVENOUS; PARENTERAL at 18:20

## 2020-05-04 RX ADMIN — LACOSAMIDE 100 MG: 100 TABLET, FILM COATED ORAL at 20:05

## 2020-05-04 RX ADMIN — LACOSAMIDE 100 MG: 100 TABLET, FILM COATED ORAL at 08:38

## 2020-05-04 RX ADMIN — LEVETIRACETAM 1000 MG: 500 TABLET ORAL at 08:38

## 2020-05-04 RX ADMIN — PIPERACILLIN AND TAZOBACTAM 3.38 G: 3; .375 INJECTION, POWDER, LYOPHILIZED, FOR SOLUTION INTRAVENOUS; PARENTERAL at 23:04

## 2020-05-04 RX ADMIN — ROSUVASTATIN CALCIUM 10 MG: 10 TABLET, FILM COATED ORAL at 20:05

## 2020-05-04 RX ADMIN — METOPROLOL TARTRATE 25 MG: 25 TABLET, FILM COATED ORAL at 05:55

## 2020-05-04 ASSESSMENT — ENCOUNTER SYMPTOMS
SHORTNESS OF BREATH: 0
BRUISES/BLEEDS EASILY: 0
CHILLS: 0
FEVER: 0
COUGH: 0
ABDOMINAL PAIN: 0
MEMORY LOSS: 1
PALPITATIONS: 0
POLYDIPSIA: 0
BLURRED VISION: 1
VOMITING: 0
SEIZURES: 1
NAUSEA: 0

## 2020-05-04 NOTE — THERAPY
"Physical Therapy   Daily Treatment     Patient Name: Rafeal Mccoy  Age:  74 y.o., Sex:  male  Medical Record #: [id number]  Today's Date: 5/4/2020     Precautions  Precautions: (P) Fall Risk, Swallow Precautions ( See Comments)  Comments: (P) L apraxia, legally blind    Subjective    Pt resting in bed, reporting 6/10 LBP from having \" leaned over in his wc looking for my blue pants\"  Pt also inquiring about where his keys and id/cards are  \"I slept like a rock\"    Objective       05/04/20 1001   Precautions   Precautions Fall Risk;Swallow Precautions ( See Comments)   Comments L apraxia, legally blind   Pain   Pain Scales 0 to 10 Scale    Intervention Education;Repositioned   Pain 0 - 10 Group   Location Back   Location Orientation Lower   Description Other (Comments)  (pulled)   Therapist Pain Assessment Nurse Notified;6;4  (decreased from 6 to 4 with change in position)   Cognition    Speech/ Communication Delayed Responses   Level of Consciousness Confused   Safety Awareness Impaired   New Learning Impaired   Attention Impaired   Sequencing Impaired   Comments intermittently following 1 step commands, impaired motor planning/sequencing   Bed Mobility    Supine to Sit Supervised   Sit to Stand Contact Guard Assist   Neuro-Muscular Treatments   Neuro-Muscular Treatments Verbal Cuing;Sequencing;Tactile Cuing   Comments cuing for hand placement/sequencing for transfers, wc and stairs   Interdisciplinary Plan of Care Collaboration   IDT Collaboration with  Nursing;Certified Nursing Assistant   Patient Position at End of Therapy Seated;Other (Comments)  (transfer of care to RN)   Collaboration Comments CLOF, change in status   Therapy Missed   Missed Therapy (Minutes) 30  (medical hold placed)   Reason For Missed Therapy Medical - Other (Please Comment);Medical - Patient on Hold from Therapy  (change in status)   PT Total Time Spent   PT Individual Total Time Spent (Mins) 30   PT Charge Group   PT Neuromuscular " "Re-Education / Balance 1   PT Therapeutic Activities 1   Supervising Physical Therapist Parvin Bryant       FIM Bed/Chair/Wheelchair Transfers Score: 4 - Minimal Assistance  Bed/Chair/Wheelchair Transfers Description:       FIM Walking Score:  4 - Minimal Assistance  Walking Description:       FIM Wheelchair Score:  1 - Total Assistance  Wheelchair Description:  Extra time, Safety concerns, Supervision for safety, Verbal cueing, Requires incidental assist(~ 10feet with Ryan B LE and UE, difficulty with sequencing and incorportation of L UE)    FIM Stairs Score:  1 - Total Assistance  Stairs Description:  Hand rails, Extra time, Safety concerns, Requires incidental assist, Ascends/descends 4 to 11 steps, Verbal cueing(ascended/descended 4 standard height stairs min/modA balance and advancement of L LE with descent of last 2 stairs. poor sequencing)      Assessment    Pt was verbally unresponsive for a short period of time during this am session, was unable to follow 1 step commands and \"froze\" at the bottom of the steps requiring physical (moderate assistance) to advance L LE all they way to the ground. Pt presented with increased confusion today, inquiring about several of his personal belongings. Pt required Ryan for sequencing and demos impaired motor planning, increased L apraxia from last week.    Plan    vector no AD, variable gait, forced use L side, lite gait for increased gait speed, cont gait training with single walking stick    "

## 2020-05-04 NOTE — PROGRESS NOTES
"Rehab Progress Note     Encounter Date: 5/4/2020    CC: SDH, decreased mobility, weakness    Interval Events (Subjective)  Patient sitting up in therapy gym. Rapid response called for decreased cognition. Patient was able to communicate and reports he felt dizzy walking up the stairs. Discussed holding therapy and getting patient back to bed. Most likely orthostatic hypotension. Has scheduled CT head this afternoon. BMP with low sodium level. Will start IVF and recheck AM labs.     IDT Team Meeting 4/28/2020  DC/Disposition:  5/11/20    Objective:  VITAL SIGNS: /62   Pulse 88   Temp 37 °C (98.6 °F) (Oral)   Resp 20   Ht 1.753 m (5' 9\")   Wt 73.4 kg (161 lb 13.1 oz)   SpO2 100%   BMI 23.90 kg/m²   Gen: NAD  Psych: Mood and affect appropriate  CV: RRR, no edema  Resp: CTAB, no upper airway sounds  Abd: NTND  Neuro: AOx3, following simple commands at bed level    Recent Results (from the past 72 hour(s))   ACCU-CHEK GLUCOSE    Collection Time: 05/04/20 10:35 AM   Result Value Ref Range    Glucose - Accu-Ck 123 (H) 65 - 99 mg/dL   Basic Metabolic Panel    Collection Time: 05/04/20 12:50 PM   Result Value Ref Range    Sodium 129 (L) 135 - 145 mmol/L    Potassium 4.1 3.6 - 5.5 mmol/L    Chloride 92 (L) 96 - 112 mmol/L    Co2 26 20 - 33 mmol/L    Glucose 162 (H) 65 - 99 mg/dL    Bun 25 (H) 8 - 22 mg/dL    Creatinine 0.95 0.50 - 1.40 mg/dL    Calcium 9.0 8.5 - 10.5 mg/dL    Anion Gap 11.0 7.0 - 16.0   ESTIMATED GFR    Collection Time: 05/04/20 12:50 PM   Result Value Ref Range    GFR If African American >60 >60 mL/min/1.73 m 2    GFR If Non African American >60 >60 mL/min/1.73 m 2       Current Facility-Administered Medications   Medication Frequency   • NS infusion Continuous   • senna-docusate (PERICOLACE or SENOKOT S) 8.6-50 MG per tablet 2 Tab BID PRN    And   • polyethylene glycol/lytes (MIRALAX) PACKET 1 Packet QDAY PRN    And   • magnesium hydroxide (MILK OF MAGNESIA) suspension 30 mL QDAY PRN    And   • " bisacodyl (DULCOLAX) suppository 10 mg QDAY PRN   • gabapentin (NEURONTIN) capsule 600 mg TID   • metoprolol (LOPRESSOR) tablet 25 mg TWICE DAILY   • Respiratory Therapy Consult Continuous RT   • oxyCODONE immediate-release (ROXICODONE) tablet 5 mg Q3HRS PRN   • oxyCODONE immediate release (ROXICODONE) tablet 10 mg Q3HRS PRN   • tramadol (ULTRAM) 50 MG tablet 50 mg Q4HRS PRN   • hydrALAZINE (APRESOLINE) tablet 25 mg Q8HRS PRN   • acetaminophen (TYLENOL) tablet 650 mg Q4HRS PRN   • artificial tears ophthalmic solution 1 Drop PRN   • benzocaine-menthol (CEPACOL) lozenge 1 Lozenge Q2HRS PRN   • mag hydrox-al hydrox-simeth (MAALOX PLUS ES or MYLANTA DS) suspension 20 mL Q2HRS PRN   • ondansetron (ZOFRAN ODT) dispertab 4 mg 4X/DAY PRN    Or   • ondansetron (ZOFRAN) syringe/vial injection 4 mg 4X/DAY PRN   • traZODone (DESYREL) tablet 50 mg QHS PRN   • sodium chloride (OCEAN) 0.65 % nasal spray 2 Spray PRN   • amLODIPine (NORVASC) tablet 5 mg DAILY   • dexamethasone (DECADRON) tablet 4 mg BID   • lacosamide (VIMPAT) tablet 100 mg BID   • levETIRAcetam (KEPPRA) tablet 1,000 mg BID   • rosuvastatin (CRESTOR) tablet 10 mg QHS       Orders Placed This Encounter   Procedures   • Diet Order Regular     Standing Status:   Standing     Number of Occurrences:   1     Order Specific Question:   Diet:     Answer:   Regular [1]       Assessment:  Active Hospital Problems    Diagnosis   • *Subdural hematoma (HCC)   • Impaired mobility and ADLs   • Seizures, post-traumatic (HCC)   • Oropharyngeal dysphagia   • Urinary retention   • Essential hypertension   • Hypercholesterolemia   • Legally blind       Medical Decision Making and Plan:  TBI (Traumatic Brain Injury): Fall with SDH s/p R sided evacuation with Dr. Mendiola on 4/17/20. Patient started on Steroids. Had post-operative seizure like activity, EEG with cortical irritation  -PT and OT for mobility and ADLs  -SLP for cognition  -NSG follow-up. Per most recent NSG recommendations  continue steroids for a week. Recommend CT head - scheduled 5/4/20 - stable.   -On Decadron 4 mg BID, will continue for a week     Dysphagia - On dysphagia diet on transfer. SLP for swallow evaluation - continue dysphagia 3 with NTL. MBSS 4/27/20 - upgraded to dysphagia with thins. Upgraded to regular/thins. Resolved     Seizure disorder - Patient with previous CVA with seizures. Increased [facility]EDs at [facility] to Keppra 1000 mg BID and Vimpat 100 mg BID  -Follow-up Neurology seizure clinic     Orthostatic Hypotension - Occurred on 5/4/20 when going up stairs. Start IVF recheck    HTN - Patient on 5 mg Amlodipine. Previously on metoprolol as well. Continue to monitor, SBP elevated restart Metoprolol 25 mg BID     Legally blind - High risk for falls.      Anemia - 13.1 on admission, continue to monitor    Hyponatremia - 129 on 5/4/20, now on IVF    Hiccups - Started on Gabapentin 300 mg TID -> 400 mg TID. Improved but occasional increase to 600    Azotemia - Patient on NTL diet on admission with poor fluid intake. Improved with fluids    HLD - Patient on Rosuvastatin 10 mg on transfer.     GI Ppx - Patient with loose stools on senna-docusate. Change to PRN    DVT Ppx - Patient high risk for bleed. Ambulating > 100 feet.     Total time:  37 minutes.  I spent greater than 50% of the time for patient care, counseling, and coordination on this date, including unit/floor time, and face-to-face time with the patient as per interval events and assessment and plan above. Topics discussed included orthostatic hypotension, start IVF, worsening hyponatremia, and CT head stable.     Lyn Sadler M.D.

## 2020-05-04 NOTE — ASSESSMENT & PLAN NOTE
Na: 129 (5/4) --> 129 (5/5) -->128 (5/8) --> 128 (5/10)  ? 2nd to meds  On salt tabs: 1g tid (5/8)  Note: pt doesn't use salt on his food -- ask pt to use salt   Cont to monitor

## 2020-05-04 NOTE — CONSULTS
HOSPITAL MEDICINE CONSULTATION    Requesting Physician:  Dr. Sadler    Reason for Consult:  Leukocytosis, Orthostatic Hypotension, Hyponatremia    History of Present Illness:  The patient is a 74-year-old  male with past medical history significant for hypertension, dyslipidemia, and legal blindness.  He was admitted to [facility] on 4/17/20 as a transfer from [facility] for a higher level of care and subspecialty consultation.  The patient had a fall several days prior, reportedly striking his head against a bathtub.  He presented to [facility] with progressive ataxia and neuroimaging revealed a large right sided subdural hematoma with midline shift.  He underwent emergent craniotomy on 4/17/20 by Dr. Mendiola.  Post-operatively, the patient had breakthrough seizure activity despite being on prophylaxis with Keppra.  Electroencephalogram demonstrated cortical instability and Vimpat was added.  He remains on Decadron.  Due to his ongoing functional debility, the patient was transferred to [facility] on 4/25/20.  Hospital Medicine consultation is requested to assist in the management of this patient's leukocytosis, hypotension, and hyponatremia.  A Rapid Response was called on this patient earlier in the day for low blood pressure.  Per Staff report, he has become increasing more lethargic today.  The Discharge Summary reports that the patient had a negative Covid-19 screen but no documentation of that result is found in Epic.    Review of Systems:  Review of Systems   Constitutional: Positive for malaise/fatigue. Negative for chills and fever.   HENT: Negative.    Eyes: Positive for blurred vision.   Respiratory: Negative for cough and shortness of breath.    Cardiovascular: Negative for chest pain and palpitations.   Gastrointestinal: Negative for abdominal pain, nausea and vomiting.   Musculoskeletal:        Wound pain   Skin: Negative for itching and  rash.   Neurological: Positive for seizures.   Endo/Heme/Allergies: Negative for polydipsia. Does not bruise/bleed easily.   Psychiatric/Behavioral: Positive for memory loss.   All other systems reviewed and are negative.      Allergies:  No Known Allergies    Medications:    Current Facility-Administered Medications:   •  NS infusion, , Intravenous, Continuous, Lyn Sadler M.D., Last Rate: 100 mL/hr at 05/04/20 1553  •  piperacillin-tazobactam (ZOSYN) 3.375 g in  mL IVPB, 3.375 g, Intravenous, Q6HRS, Cleo Camejo M.D.  •  linezolid (ZYVOX) tablet 600 mg, 600 mg, Oral, Q12HRS, Cleo Camejo M.D.  •  senna-docusate (PERICOLACE or SENOKOT S) 8.6-50 MG per tablet 2 Tab, 2 Tab, Oral, BID PRN, 2 Tab at 05/03/20 0459 **AND** polyethylene glycol/lytes (MIRALAX) PACKET 1 Packet, 1 Packet, Oral, QDAY PRN **AND** magnesium hydroxide (MILK OF MAGNESIA) suspension 30 mL, 30 mL, Oral, QDAY PRN **AND** bisacodyl (DULCOLAX) suppository 10 mg, 10 mg, Rectal, QDAY PRN, Lyn Sadler M.D.  •  gabapentin (NEURONTIN) capsule 600 mg, 600 mg, Oral, TID, Lyn Sadler M.D., 600 mg at 05/04/20 1507  •  metoprolol (LOPRESSOR) tablet 25 mg, 25 mg, Oral, TWICE DAILY, Lyn Sadler M.D., 25 mg at 05/04/20 0555  •  Respiratory Therapy Consult, , Nebulization, Continuous RT, Lyn Sadler M.D.  •  oxyCODONE immediate-release (ROXICODONE) tablet 5 mg, 5 mg, Oral, Q3HRS PRN, Lyn Sadler M.D.  •  oxyCODONE immediate release (ROXICODONE) tablet 10 mg, 10 mg, Oral, Q3HRS PRN, Lyn Sadler M.D.  •  tramadol (ULTRAM) 50 MG tablet 50 mg, 50 mg, Oral, Q4HRS PRN, Lyn Sadler M.D.  •  hydrALAZINE (APRESOLINE) tablet 25 mg, 25 mg, Oral, Q8HRS PRN, Lyn Sadler M.D.  •  acetaminophen (TYLENOL) tablet 650 mg, 650 mg, Oral, Q4HRS PRN, Lyn Sadler M.D., 650 mg at 04/27/20 2138  •  artificial tears ophthalmic solution 1 Drop, 1 Drop, Both Eyes,  PRN, Lyn Sadler M.D.  •  benzocaine-menthol (CEPACOL) lozenge 1 Lozenge, 1 Lozenge, Mouth/Throat, Q2HRS PRN, Lyn Sadler M.D.  •  mag hydrox-al hydrox-simeth (MAALOX PLUS ES or MYLANTA DS) suspension 20 mL, 20 mL, Oral, Q2HRS PRN, Lyn Sadler M.D., 20 mL at 04/25/20 2045  •  ondansetron (ZOFRAN ODT) dispertab 4 mg, 4 mg, Oral, 4X/DAY PRN **OR** ondansetron (ZOFRAN) syringe/vial injection 4 mg, 4 mg, Intramuscular, 4X/DAY PRN, Lyn Sadler M.D.  •  traZODone (DESYREL) tablet 50 mg, 50 mg, Oral, QHS PRN, Lyn Sadler M.D.  •  sodium chloride (OCEAN) 0.65 % nasal spray 2 Spray, 2 Spray, Nasal, PRN, Lyn Sadler M.D.  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, s/p bilateral laser surgeries   • Hypertension    • Stroke (HCC)      Past Surgical History:   Procedure Laterality Date   • CRANIOTOMY Right 4/17/2020    Procedure: CRANIOTOMY - SUBDURAL;  Surgeon: Anthony Mendiola M.D.;  Location: SURGERY [facility];  Service: Neurosurgery   • OTHER ORTHOPEDIC SURGERY      R foot fracture   • ROTATOR CUFF REPAIR Right    • TONSILLECTOMY AND ADENOIDECTOMY         Social History:  Social History     Socioeconomic History   • Marital status:      Spouse name: Not on file   • Number  of children: Not on file   • Years of education: Not on file   • Highest education level: Not on file   Occupational History   • Not on file   Social Needs   • Financial resource strain: Not on file   • Food insecurity     Worry: Not on file     Inability: Not on file   • Transportation needs     Medical: Not on file     Non-medical: Not on file   Tobacco Use   • Smoking status: Never Smoker   • Smokeless tobacco: Never Used   Substance and Sexual Activity   • Alcohol use: No   • Drug use: No   • Sexual activity: Not on file   Lifestyle   • Physical activity     Days per week: Not on file     Minutes per session: Not on file   • Stress: Not on file   Relationships   • Social connections     Talks on phone: Not on file     Gets together: Not on file     Attends [employer] service: Not on file     Active member of club or organization: Not on file     Attends meetings of clubs or organizations: Not on file     Relationship status: Not on file   • Intimate partner violence     Fear of current or ex partner: Not on file     Emotionally abused: Not on file     Physically abused: Not on file     Forced sexual activity: Not on file   Other Topics Concern   • Not on file   Social History Narrative    He lives alone, he was previously in the [employer] during the [place] [employer]       Family History  Family History   Problem Relation Age of Onset   • Other Mother         eye problems   • Cancer Neg Hx    • Heart Disease Neg Hx    • Stroke Neg Hx        Physical Examination:   Vitals:    05/04/20 1030 05/04/20 1056 05/04/20 1320 05/04/20 1400   BP: 126/59 134/69 123/58 114/62   Pulse: 82 89 82 88   Resp: (!) 22 18 20 20   Temp: 37.3 °C (99.2 °F) 36.8 °C (98.2 °F) 37.3 °C (99.2 °F) 37 °C (98.6 °F)   TempSrc: Oral Oral Oral Oral   SpO2: 98% 100%     Weight:       Height:           Physical Exam   Constitutional: No distress.   HENT:   Right Ear: External ear normal.   Left Ear: External ear normal.   Right crani incision well healing    Eyes: Conjunctivae and EOM are normal. Right eye exhibits no discharge. Left eye exhibits no discharge.   Neck: Normal range of motion. Neck supple. No tracheal deviation present.   Cardiovascular: Normal rate and regular rhythm.   Pulmonary/Chest: No stridor. No respiratory distress. He has no wheezes.   Decreased BS   Abdominal: Soft. Bowel sounds are normal. He exhibits distension. There is no abdominal tenderness. There is no rebound and no guarding.   Musculoskeletal:         General: No tenderness or edema.   Neurological:   Very lethargic   Skin: Skin is warm and dry. He is not diaphoretic.   Vitals reviewed.      Laboratory Data:  Recent Labs     05/04/20  1500   WBC 32.3*   RBC 4.70   HEMOGLOBIN 14.3   HEMATOCRIT 41.7*   MCV 88.7   MCH 30.4   MCHC 34.3   RDW 43.4   PLATELETCT 161*   MPV 10.5     Recent Labs     05/04/20  1250   SODIUM 129*   POTASSIUM 4.1   CHLORIDE 92*   CO2 26   GLUCOSE 162*   BUN 25*   CREATININE 0.95   CALCIUM 9.0       Imaging:  CT-HEAD W/O   Final Result      1.  Evolving RIGHT hemispheric subdural hematoma, slightly reduced in size from prior exam with improvement of associated mass effect.   2.  Stable RIGHT LEFT midline shift.   3.  No new hemorrhage since prior exam.         DX-ESOPHAGUS - [id number]   Final Result      DX-CHEST-PORTABLE (1 VIEW)    (Results Pending)   [id number] VIEW    (Results Pending)       Impressions/Recommendations:  Subdural hematoma (HCC)  2/2 fall in bathtub  S/P emergent craniotomy on 4/17/20 by Dr. Mendiola  On Decadron taper  F/U CT today shows no acute changes    Seizures, post-traumatic (HCC)  Had breakthrough SZ on prophylactic Keppra  EEG showed cortical instability  Now on Vimpat and Keppra  Will check anticonvulsant drug levels due to lethargy    Legally blind  Chronic history    Hypercholesterolemia  On Crestor    Essential hypertension  On Norvasc and Metoprolol  Observe blood pressure trends    Hyponatremia  Agree w/ NS infusion given  earlier orthostasis and azotemia  Closely follow electrolytes    Abdominal distension  Check KUB  Consider bowel regimen    Leukocytosis  On steroids but has marked WBC of 32k, hypotension, and lethargy  Crani site healing well  CT Head no acute changes  [facility] D/C Summary references negative Covid-19 testing but not found in Epic  Will request pancultures, including Covid-19  Start broad spectrum empiric abx due to concern for impending sepsis    Full Code    Discussed with RN (Kayce), Charge RN (Anay), and Dr. Sadler.  Thank you for the opportunity to assist in this patient's care.  We will continue to follow along with you.

## 2020-05-04 NOTE — ASSESSMENT & PLAN NOTE
Had breakthrough seizure on prophylactic Keppra  EEG showed cortical instability  Now on Vimpat and Keppra

## 2020-05-04 NOTE — THERAPY
"Speech Language Pathology  Daily Treatment     Patient Name: Rafael Mccoy  Age:  74 y.o., Sex:  male  Medical Record #: [id number]  Today's Date: 5/4/2020       Pt was seen at bedside. Pt initially agreed to get into wheelchair and participate with tx. However, pt with decreased KHANG and unable to follow verbal commands to transfer to chair. SLP provided pt with his daily schedule. The schedule indicated that pt had an off-site appt. When asked, pt was unable recall leaving the facility and cont to state \"I didn't go anywhere\". SLP consulted with RN. Pt went for a f/u CT. Once SLP provided this information to pt, he recalled the procedure.  RN also reported that pt required a rapid response in the AM 2* non-responsiveness and labs indicated low sodium. SLP attempted to cont tx to target memory and orientation for the day. Pt answered simple questions but was unable to participate with cognitive tx 2* decreased KHANG.         05/04/20 1501   Cognition   Functional Memory Activities Severe (2)   Interdisciplinary Plan of Care Collaboration   IDT Collaboration with  Nursing;Physician   Patient Position at End of Therapy In Bed;Call Light within Reach   Collaboration Comments re: medical hold, decreased KHANG and current medical status.    Therapy Missed   Missed Therapy (Minutes) 30   Reason For Missed Therapy Medical - Patient on Hold from Therapy;Medical - Patient not Able To Participate   SLP Total Time Spent   SLP Individual Total Time Spent (Mins) 30   Charge Group   SLP Cognitive Skill Development First 15 Minutes 1   SLP Cognitive Skill Development Additional 15 Minutes 1     "

## 2020-05-04 NOTE — CARE PLAN
Problem: Safety  Goal: Will remain free from falls  Intervention: Implement fall precautions  Flowsheets (Taken 5/4/2020 0121)  Bed Alarm: Yes - Alarm On  Environmental Precautions:   Treaded Slipper Socks on Patient   Personal Belongings, Wastebasket, Call Bell etc. in Easy Reach   Bed in Low Position  Note: Educated and reinforced on the the safety measures, verbalized understanding . Needs anticipated and attended. Pt legally blind. Aspiration precautions observed, head of bed elevated during med pass, meds taken one at a time. Pt free from fall and injury.      Problem: Infection  Goal: Will remain free from infection  Intervention: Assess signs and symptoms of infection  Note: Head incision line intact , well approximated , dry and intact , healed, no s/s of redness noted .Afebrile, with ease in breathing, denies sob. Educated on the importance of proper handwashing , verbalized understanding.      Cocaine use disorder, moderate, in sustained remission UTI (urinary tract infection)

## 2020-05-04 NOTE — THERAPY
"Occupational Therapy  Daily Treatment     Patient Name: Rafael Mccoy  Age:  74 y.o., Sex:  male  Medical Record #: [id number]  Today's Date: 5/4/2020     Precautions  Precautions: (P) Fall Risk, Swallow Precautions ( See Comments)  Comments: (P) L apraxia, legally blind    Safety   ADL Safety : Impaired, Requires Supervision for Safety, Requires Physical Assist for Safety, Impaired Insight into Safety, Requires Cueing for Safety, Impulsive  Bathroom Safety: Impaired, Impulsive, Requires Supervision for Safety, Requires Physical Assist for Safety, Impaired Insight into Safety, Requires Cuing for Safety    Subjective    \"It just doesn't feel like Monday. More like a Wednesday\" -pt perseverative throughout session on what day it is, persistently insisting it could not be Monday though did agree that yesterday was Sunday     Objective       05/04/20 1401   Precautions   Precautions Fall Risk;Swallow Precautions ( See Comments)   Comments L apraxia, legally blind   Cognition    Level of Consciousness Confused   Comments pt with increased lethargy this session, confused   Sitting Lower Body Exercises   Sitting Lower Body Exercises Yes   Sit to Stand   (1x5 with mod A, no UE support)   Balance   Comments increased left lateral lean in sitting and standing this session   Therapy Missed   Missed Therapy (Minutes) 15   Reason For Missed Therapy Medical - Patient on Hold from Therapy   OT Total Time Spent   OT Individual Total Time Spent (Mins) 45   OT Charge Group   OT Self Care / ADL 1   OT Therapy Activity 2       FIM Lower Body Dressing Score:  2 - Max Assistance  Lower Body Dressing Description:  (increased left lean this session, lethargic, requiried max A to don shorts and sandals)    FIM Bed/Chair/Wheelchair Transfers Score: 3 - Moderate Assistance  Bed/Chair/Wheelchair Transfers Description:  (mod A supine to sit, max A squat pivote bed <> w/c, min A sit to supine at end of session)      Assessment    Pt tolerated " session poorly, presents with decreased alertness and more significant left lateral lean in sitting and standing from last week. Limited tolerance for activity and increased assist required when up. Discussed with MD, pt returned to bed and placed on hold.     Plan    LUE neuro re-ed, FM/GM coordination, sitting/standing balance to correct left lean, ADLs, IADLs, management of visual deficits

## 2020-05-04 NOTE — ASSESSMENT & PLAN NOTE
Resolved  WBC's: 32 --> 25 (5/5) --> 7.0 (5/8)  (5/4): Tmax: 100.1  (5/8): has been afebrile since 5/4  Crani site healing well  CT head: no acute changes  CXR (5/5): ill-defined opacifications in each lung have increased compared to the prior radiograph;                    this could indicate worsening of pulmonary edema or inflammation  Covid-19 (-)  Flu (-)  UC (-)  BC x 2: shows 1 out of 2 bottles pos for E. Coli  Note: on steroids  Off Zyvox (5/7)  Off Zosyn (5/8)  On Rocephin 2g IV daily (5/9 --> 5/11)  D/W ID: suggested Rocephin to complete a 7 day abx course since pt has remained afebrile and WBC normalized

## 2020-05-04 NOTE — PROGRESS NOTES
Rapid response called during physical therapy session.  Per PT, patient wasn't acting like he usually does, she noticed confusion and suddenly patient stopped following commands. This last for a short period of time.   Dr. Sadler assessed patient.   /59, T 99.2,  HR 82, RR 22, O2 98, FSBS 123.   Patient was transferred to bed, c/o mild fatigue, but he is alert and oriented

## 2020-05-05 ENCOUNTER — APPOINTMENT (OUTPATIENT)
Dept: RADIOLOGY | Facility: REHABILITATION | Age: 75
DRG: 949 | End: 2020-05-05
Attending: HOSPITALIST
Payer: MEDICARE

## 2020-05-05 LAB
ALBUMIN SERPL BCP-MCNC: 2.4 G/DL (ref 3.2–4.9)
ALBUMIN/GLOB SERPL: 0.9 G/DL
ALP SERPL-CCNC: 64 U/L (ref 30–99)
ALT SERPL-CCNC: 30 U/L (ref 2–50)
AMORPH CRY #/AREA URNS HPF: PRESENT /HPF
ANION GAP SERPL CALC-SCNC: 11 MMOL/L (ref 7–16)
APPEARANCE UR: ABNORMAL
AST SERPL-CCNC: 15 U/L (ref 12–45)
BACTERIA #/AREA URNS HPF: NEGATIVE /HPF
BASOPHILS # BLD AUTO: 0.1 % (ref 0–1.8)
BASOPHILS # BLD: 0.02 K/UL (ref 0–0.12)
BILIRUB SERPL-MCNC: 0.6 MG/DL (ref 0.1–1.5)
BILIRUB UR QL STRIP.AUTO: NEGATIVE
BUN SERPL-MCNC: 20 MG/DL (ref 8–22)
CALCIUM SERPL-MCNC: 8.2 MG/DL (ref 8.5–10.5)
CHLORIDE SERPL-SCNC: 97 MMOL/L (ref 96–112)
CO2 SERPL-SCNC: 21 MMOL/L (ref 20–33)
COLOR UR: YELLOW
CREAT SERPL-MCNC: 0.94 MG/DL (ref 0.5–1.4)
EOSINOPHIL # BLD AUTO: 0 K/UL (ref 0–0.51)
EOSINOPHIL NFR BLD: 0 % (ref 0–6.9)
EPI CELLS #/AREA URNS HPF: NEGATIVE /HPF
ERYTHROCYTE [DISTWIDTH] IN BLOOD BY AUTOMATED COUNT: 44.3 FL (ref 35.9–50)
GLOBULIN SER CALC-MCNC: 2.6 G/DL (ref 1.9–3.5)
GLUCOSE SERPL-MCNC: 169 MG/DL (ref 65–99)
GLUCOSE UR STRIP.AUTO-MCNC: 250 MG/DL
HCT VFR BLD AUTO: 34.6 % (ref 42–52)
HGB BLD-MCNC: 11.9 G/DL (ref 14–18)
HYALINE CASTS #/AREA URNS LPF: ABNORMAL /LPF
IMM GRANULOCYTES # BLD AUTO: 0.37 K/UL (ref 0–0.11)
IMM GRANULOCYTES NFR BLD AUTO: 1.5 % (ref 0–0.9)
KETONES UR STRIP.AUTO-MCNC: NEGATIVE MG/DL
LEUKOCYTE ESTERASE UR QL STRIP.AUTO: NEGATIVE
LYMPHOCYTES # BLD AUTO: 0.5 K/UL (ref 1–4.8)
LYMPHOCYTES NFR BLD: 2 % (ref 22–41)
MAGNESIUM SERPL-MCNC: 2.2 MG/DL (ref 1.5–2.5)
MCH RBC QN AUTO: 30.4 PG (ref 27–33)
MCHC RBC AUTO-ENTMCNC: 34.4 G/DL (ref 33.7–35.3)
MCV RBC AUTO: 88.5 FL (ref 81.4–97.8)
MICRO URNS: ABNORMAL
MONOCYTES # BLD AUTO: 1.01 K/UL (ref 0–0.85)
MONOCYTES NFR BLD AUTO: 4 % (ref 0–13.4)
NEUTROPHILS # BLD AUTO: 23.28 K/UL (ref 1.82–7.42)
NEUTROPHILS NFR BLD: 92.4 % (ref 44–72)
NITRITE UR QL STRIP.AUTO: POSITIVE
NRBC # BLD AUTO: 0 K/UL
NRBC BLD-RTO: 0 /100 WBC
PH UR STRIP.AUTO: 5.5 [PH] (ref 5–8)
PHOSPHATE SERPL-MCNC: 3.2 MG/DL (ref 2.5–4.5)
PLATELET # BLD AUTO: 124 K/UL (ref 164–446)
PMV BLD AUTO: 10.7 FL (ref 9–12.9)
POTASSIUM SERPL-SCNC: 4.3 MMOL/L (ref 3.6–5.5)
PROT SERPL-MCNC: 5 G/DL (ref 6–8.2)
PROT UR QL STRIP: NEGATIVE MG/DL
RBC # BLD AUTO: 3.91 M/UL (ref 4.7–6.1)
RBC # URNS HPF: ABNORMAL /HPF
RBC UR QL AUTO: NEGATIVE
SODIUM SERPL-SCNC: 129 MMOL/L (ref 135–145)
SP GR UR STRIP.AUTO: 1.02
URATE CRY #/AREA URNS HPF: POSITIVE /HPF
UROBILINOGEN UR STRIP.AUTO-MCNC: 0.2 MG/DL
WBC # BLD AUTO: 25.2 K/UL (ref 4.8–10.8)
WBC #/AREA URNS HPF: ABNORMAL /HPF

## 2020-05-05 PROCEDURE — 700102 HCHG RX REV CODE 250 W/ 637 OVERRIDE(OP): Performed by: PHYSICAL MEDICINE & REHABILITATION

## 2020-05-05 PROCEDURE — 97110 THERAPEUTIC EXERCISES: CPT | Mod: CQ

## 2020-05-05 PROCEDURE — 700105 HCHG RX REV CODE 258: Performed by: PHYSICAL MEDICINE & REHABILITATION

## 2020-05-05 PROCEDURE — 80235 DRUG ASSAY LACOSAMIDE: CPT

## 2020-05-05 PROCEDURE — 97112 NEUROMUSCULAR REEDUCATION: CPT | Mod: CQ

## 2020-05-05 PROCEDURE — 97116 GAIT TRAINING THERAPY: CPT | Mod: CQ

## 2020-05-05 PROCEDURE — 80177 DRUG SCRN QUAN LEVETIRACETAM: CPT

## 2020-05-05 PROCEDURE — 99232 SBSQ HOSP IP/OBS MODERATE 35: CPT | Performed by: HOSPITALIST

## 2020-05-05 PROCEDURE — 700105 HCHG RX REV CODE 258: Performed by: HOSPITALIST

## 2020-05-05 PROCEDURE — 770010 HCHG ROOM/CARE - REHAB SEMI PRIVAT*

## 2020-05-05 PROCEDURE — A9270 NON-COVERED ITEM OR SERVICE: HCPCS | Performed by: HOSPITALIST

## 2020-05-05 PROCEDURE — 97530 THERAPEUTIC ACTIVITIES: CPT | Mod: CQ

## 2020-05-05 PROCEDURE — 85025 COMPLETE CBC W/AUTO DIFF WBC: CPT

## 2020-05-05 PROCEDURE — 84100 ASSAY OF PHOSPHORUS: CPT

## 2020-05-05 PROCEDURE — 700111 HCHG RX REV CODE 636 W/ 250 OVERRIDE (IP): Performed by: HOSPITALIST

## 2020-05-05 PROCEDURE — 36415 COLL VENOUS BLD VENIPUNCTURE: CPT

## 2020-05-05 PROCEDURE — 80053 COMPREHEN METABOLIC PANEL: CPT

## 2020-05-05 PROCEDURE — 83735 ASSAY OF MAGNESIUM: CPT

## 2020-05-05 PROCEDURE — 74018 RADEX ABDOMEN 1 VIEW: CPT

## 2020-05-05 PROCEDURE — 99233 SBSQ HOSP IP/OBS HIGH 50: CPT | Performed by: PHYSICAL MEDICINE & REHABILITATION

## 2020-05-05 PROCEDURE — A9270 NON-COVERED ITEM OR SERVICE: HCPCS | Performed by: PHYSICAL MEDICINE & REHABILITATION

## 2020-05-05 PROCEDURE — 700102 HCHG RX REV CODE 250 W/ 637 OVERRIDE(OP): Performed by: HOSPITALIST

## 2020-05-05 PROCEDURE — 71045 X-RAY EXAM CHEST 1 VIEW: CPT

## 2020-05-05 RX ADMIN — SODIUM CHLORIDE: 9 INJECTION, SOLUTION INTRAVENOUS at 03:21

## 2020-05-05 RX ADMIN — AMLODIPINE BESYLATE 5 MG: 5 TABLET ORAL at 08:25

## 2020-05-05 RX ADMIN — PIPERACILLIN AND TAZOBACTAM 3.38 G: 3; .375 INJECTION, POWDER, LYOPHILIZED, FOR SOLUTION INTRAVENOUS; PARENTERAL at 17:17

## 2020-05-05 RX ADMIN — PIPERACILLIN AND TAZOBACTAM 3.38 G: 3; .375 INJECTION, POWDER, LYOPHILIZED, FOR SOLUTION INTRAVENOUS; PARENTERAL at 11:53

## 2020-05-05 RX ADMIN — GABAPENTIN 600 MG: 300 CAPSULE ORAL at 08:24

## 2020-05-05 RX ADMIN — SODIUM CHLORIDE: 9 INJECTION, SOLUTION INTRAVENOUS at 15:29

## 2020-05-05 RX ADMIN — LINEZOLID 600 MG: 600 TABLET, FILM COATED ORAL at 19:28

## 2020-05-05 RX ADMIN — ROSUVASTATIN CALCIUM 10 MG: 10 TABLET, FILM COATED ORAL at 19:32

## 2020-05-05 RX ADMIN — LEVETIRACETAM 1000 MG: 500 TABLET ORAL at 08:24

## 2020-05-05 RX ADMIN — GABAPENTIN 600 MG: 300 CAPSULE ORAL at 19:28

## 2020-05-05 RX ADMIN — DEXAMETHASONE 4 MG: 4 TABLET ORAL at 19:28

## 2020-05-05 RX ADMIN — LACOSAMIDE 100 MG: 100 TABLET, FILM COATED ORAL at 08:25

## 2020-05-05 RX ADMIN — LINEZOLID 600 MG: 600 TABLET, FILM COATED ORAL at 08:24

## 2020-05-05 RX ADMIN — METOPROLOL TARTRATE 25 MG: 25 TABLET, FILM COATED ORAL at 17:43

## 2020-05-05 RX ADMIN — LEVETIRACETAM 1000 MG: 500 TABLET ORAL at 19:28

## 2020-05-05 RX ADMIN — GABAPENTIN 600 MG: 300 CAPSULE ORAL at 14:39

## 2020-05-05 RX ADMIN — PIPERACILLIN AND TAZOBACTAM 3.38 G: 3; .375 INJECTION, POWDER, LYOPHILIZED, FOR SOLUTION INTRAVENOUS; PARENTERAL at 05:02

## 2020-05-05 RX ADMIN — LACOSAMIDE 100 MG: 100 TABLET, FILM COATED ORAL at 19:28

## 2020-05-05 RX ADMIN — METOPROLOL TARTRATE 25 MG: 25 TABLET, FILM COATED ORAL at 05:03

## 2020-05-05 RX ADMIN — DEXAMETHASONE 4 MG: 4 TABLET ORAL at 08:25

## 2020-05-05 ASSESSMENT — ENCOUNTER SYMPTOMS
DIARRHEA: 0
ABDOMINAL PAIN: 0
NAUSEA: 0
NERVOUS/ANXIOUS: 0
FEVER: 0
CHILLS: 0
VOMITING: 0
SHORTNESS OF BREATH: 0

## 2020-05-05 NOTE — CARE PLAN
"  Problem: Respiratory:  Goal: Respiratory status will improve  Note:   Chest-DX came back as follows:   \"Ill-defined opacifications in each lung have increased compared to the prior radiograph.  This could indicate worsening of pulmonary edema or inflammation\" CN notified Hospitalist.     Problem: Urinary Elimination:  Goal: Ability to reestablish a normal urinary elimination pattern will improve  Note: Pt. uses condom catheter at night time. Pt. had one bladder incontinence so far.      "

## 2020-05-05 NOTE — PROGRESS NOTES
Received report from night shift nurse and assume patient care. Pt. Is alerted and oriented x 3. Stated he slept good and no complains at this moment. Will medicate patient per MAR. Safety precautions in place. Call light with in reach. Will continue to monitor.

## 2020-05-05 NOTE — PROGRESS NOTES
Change of shift report obtained, patient vitals remained stable, required 2L NC pt destating 89% with spot check while sleeping. Condom cath hs, denies pain. Patient rested comfortably all shift, no issues or concerns. UA sent.

## 2020-05-05 NOTE — PROGRESS NOTES
Critical lab result of + blood cultures, gm (-) rods.  Primary RN notified.  MD (hospitalist Stanley) notified.  Patient currently on IV fluids and abx

## 2020-05-05 NOTE — CARE PLAN
Problem: Dressing  Goal: STG-Within one week, patient will dress LB  Description: 1) Individualized Goal:  At a Min A level with AE as needed.   2) Interventions:  OT Self Care/ADL, OT Community Reintegration, OT Neuro Re-Ed/Balance, OT Therapeutic Activity, OT Evaluation and OT Therapeutic Exercise     Outcome: NOT MET     Problem: Functional Transfers  Goal: STG-Within one week, patient will transfer to tub/shower  Description: 1) Individualized Goal:  At a Min A level utilizing a tub shower bench.   2) Interventions:  OT Self Care/ADL, OT Neuro Re-Ed/Balance, OT Therapeutic Activity, OT Evaluation and OT Therapeutic Exercise     Outcome: NOT MET     Problem: IADL's  Goal: STG-Within one week, patient will utilize washer and dryer  Description: 1) Individualized Goal:  At a CGA level with fading multimodal cues for initial orientation.   2) Interventions:  OT Self Care/ADL, OT Community Reintegration, OT Neuro Re-Ed/Balance, OT Therapeutic Activity, OT Evaluation and OT Therapeutic Exercise     Outcome: NOT MET

## 2020-05-05 NOTE — PROGRESS NOTES
"Rehab Progress Note     Encounter Date: 5/5/2020    CC: SDH, decreased mobility, weakness    Interval Events (Subjective)  Patient sitting up in room. He reports he is feeling better today than yesterday. He understands that we had a concern for infectious process. He denies fever or chills. Denies new cough. Denies dysuria.  Discussed ongoing elevated WBC but improved now on broad spectrum antibiotics.  One blood culture is currently positive. Decision made overnight to rule out Covid-19 which was negative as well as influenza. Discussed would continue antibiotics awaiting on cultures. Discussed would have IDT later today to discuss about discharge planning.     IDT Team Meeting 5/5/2020    I, Lyn Sadler M.D., was present and led the interdisciplinary team conference on 5/5/2020.  I led the IDT conference and agree with the IDT conference documentation and plan of care as noted below.     RN:  Diet Regular   % Meal     Pain    Sleep    Bowel Continent   Bladder InContinent - condom cath   In's & Out's    Ongoing work-up for infection  Feels better today    PT:  Bed Mobility modA   Transfers Was SBA now CGA   Mobility Ryan TBD on assistive device   Stairs      OT:  Eating    Grooming    Bathing Ryan   UB Dressing    LB Dressing maxA   Toileting Ryan   Shower & Transfer modA   Was progressing last week, limited by vision and not in familiar environment    SLP:  Regular diet in T dine for vision  Ryan for problem solving  ModA for memory  Ryan for executive    CM:  Continues to work on disposition and DME needs.      DC/Disposition:  5/11/20    Objective:  VITAL SIGNS: /53   Pulse (!) 58   Temp 36.8 °C (98.2 °F) (Oral)   Resp 18   Ht 1.753 m (5' 9\")   Wt 73.4 kg (161 lb 13.1 oz)   SpO2 96%   BMI 23.90 kg/m²   Gen: NAD  Psych: Mood and affect appropriate  CV: RRR, no edema  Resp: CTAB, no upper airway sounds  Abd: NTND  Neuro: AOx4, following simple commands    Recent Results (from the past " 72 hour(s))   ACCU-CHEK GLUCOSE    Collection Time: 05/04/20 10:35 AM   Result Value Ref Range    Glucose - Accu-Ck 123 (H) 65 - 99 mg/dL   Basic Metabolic Panel    Collection Time: 05/04/20 12:50 PM   Result Value Ref Range    Sodium 129 (L) 135 - 145 mmol/L    Potassium 4.1 3.6 - 5.5 mmol/L    Chloride 92 (L) 96 - 112 mmol/L    Co2 26 20 - 33 mmol/L    Glucose 162 (H) 65 - 99 mg/dL    Bun 25 (H) 8 - 22 mg/dL    Creatinine 0.95 0.50 - 1.40 mg/dL    Calcium 9.0 8.5 - 10.5 mg/dL    Anion Gap 11.0 7.0 - 16.0   ESTIMATED GFR    Collection Time: 05/04/20 12:50 PM   Result Value Ref Range    GFR If African American >60 >60 mL/min/1.73 m 2    GFR If Non African American >60 >60 mL/min/1.73 m 2   BLOOD CULTURE    Collection Time: 05/04/20  1:00 PM   Result Value Ref Range    Significant Indicator NEG     Source BLD     Site PERIPHERAL     Culture Result       No Growth  Note: Blood cultures are incubated for 5 days and  are monitored continuously.Positive blood cultures  are called to the RN and reported as soon as  they are identified.     CBC WITH DIFFERENTIAL    Collection Time: 05/04/20  3:00 PM   Result Value Ref Range    WBC 32.3 (HH) 4.8 - 10.8 K/uL    RBC 4.70 4.70 - 6.10 M/uL    Hemoglobin 14.3 14.0 - 18.0 g/dL    Hematocrit 41.7 (L) 42.0 - 52.0 %    MCV 88.7 81.4 - 97.8 fL    MCH 30.4 27.0 - 33.0 pg    MCHC 34.3 33.7 - 35.3 g/dL    RDW 43.4 35.9 - 50.0 fL    Platelet Count 161 (L) 164 - 446 K/uL    MPV 10.5 9.0 - 12.9 fL    Neutrophils-Polys 90.70 (H) 44.00 - 72.00 %    Lymphocytes 1.10 (L) 22.00 - 41.00 %    Monocytes 5.90 0.00 - 13.40 %    Eosinophils 0.00 0.00 - 6.90 %    Basophils 0.20 0.00 - 1.80 %    Immature Granulocytes 2.10 (H) 0.00 - 0.90 %    Nucleated RBC 0.00 /100 WBC    Neutrophils (Absolute) 29.24 (H) 1.82 - 7.42 K/uL    Lymphs (Absolute) 0.35 (L) 1.00 - 4.80 K/uL    Monos (Absolute) 1.91 (H) 0.00 - 0.85 K/uL    Eos (Absolute) 0.00 0.00 - 0.51 K/uL    Baso (Absolute) 0.06 0.00 - 0.12 K/uL     Immature Granulocytes (abs) 0.69 (H) 0.00 - 0.11 K/uL    NRBC (Absolute) 0.00 K/uL   BLOOD CULTURE    Collection Time: 05/04/20  5:00 PM   Result Value Ref Range    Significant Indicator POS (POS)     Source BLD     Site PERIPHERAL     Culture Result (A)      Growth detected by [employer] instrument. 05/05/2020  07:06  Gram Stain: Gram negative rods.     COVID/SARS CoV-2    Collection Time: 05/04/20  6:00 PM   Result Value Ref Range    COVID Order Status Received    Influenza A/B By PCR (Adult - Flu Only)    Collection Time: 05/04/20  6:00 PM   Result Value Ref Range    Influenza virus A RNA Negative Negative    Influenza virus B, PCR Negative Negative   SARS-CoV-2, PCR (In-House)    Collection Time: 05/04/20  6:00 PM   Result Value Ref Range    SARS-CoV-2 Source NP Swab     SARS-CoV-2 by PCR NotDetected NotDetected   URINALYSIS    Collection Time: 05/04/20  8:41 PM   Result Value Ref Range    Color Yellow     Character Turbid (A)     Specific Gravity 1.025 <1.035    Ph 5.5 5.0 - 8.0    Glucose 250 (A) Negative mg/dL    Ketones Negative Negative mg/dL    Protein Negative Negative mg/dL    Bilirubin Negative Negative    Urobilinogen, Urine 0.2 Negative    Nitrite Positive (A) Negative    Leukocyte Esterase Negative Negative    Occult Blood Negative Negative    Micro Urine Req Microscopic    URINE MICROSCOPIC (W/UA)    Collection Time: 05/04/20  8:41 PM   Result Value Ref Range    WBC 10-20 (A) /hpf    RBC 0-2 (A) /hpf    Bacteria Negative None /hpf    Epithelial Cells Negative /hpf    Amorphous Crystal Present /hpf    Uric Acid Crystal Positive /hpf    Hyaline Cast 0-2 /lpf   CBC WITH DIFFERENTIAL    Collection Time: 05/05/20  6:11 AM   Result Value Ref Range    WBC 25.2 (H) 4.8 - 10.8 K/uL    RBC 3.91 (L) 4.70 - 6.10 M/uL    Hemoglobin 11.9 (L) 14.0 - 18.0 g/dL    Hematocrit 34.6 (L) 42.0 - 52.0 %    MCV 88.5 81.4 - 97.8 fL    MCH 30.4 27.0 - 33.0 pg    MCHC 34.4 33.7 - 35.3 g/dL    RDW 44.3 35.9 - 50.0 fL    Platelet  Count 124 (L) 164 - 446 K/uL    MPV 10.7 9.0 - 12.9 fL    Neutrophils-Polys 92.40 (H) 44.00 - 72.00 %    Lymphocytes 2.00 (L) 22.00 - 41.00 %    Monocytes 4.00 0.00 - 13.40 %    Eosinophils 0.00 0.00 - 6.90 %    Basophils 0.10 0.00 - 1.80 %    Immature Granulocytes 1.50 (H) 0.00 - 0.90 %    Nucleated RBC 0.00 /100 WBC    Neutrophils (Absolute) 23.28 (H) 1.82 - 7.42 K/uL    Lymphs (Absolute) 0.50 (L) 1.00 - 4.80 K/uL    Monos (Absolute) 1.01 (H) 0.00 - 0.85 K/uL    Eos (Absolute) 0.00 0.00 - 0.51 K/uL    Baso (Absolute) 0.02 0.00 - 0.12 K/uL    Immature Granulocytes (abs) 0.37 (H) 0.00 - 0.11 K/uL    NRBC (Absolute) 0.00 K/uL   Comp Metabolic Panel    Collection Time: 05/05/20  6:11 AM   Result Value Ref Range    Sodium 129 (L) 135 - 145 mmol/L    Potassium 4.3 3.6 - 5.5 mmol/L    Chloride 97 96 - 112 mmol/L    Co2 21 20 - 33 mmol/L    Anion Gap 11.0 7.0 - 16.0    Glucose 169 (H) 65 - 99 mg/dL    Bun 20 8 - 22 mg/dL    Creatinine 0.94 0.50 - 1.40 mg/dL    Calcium 8.2 (L) 8.5 - 10.5 mg/dL    AST(SGOT) 15 12 - 45 U/L    ALT(SGPT) 30 2 - 50 U/L    Alkaline Phosphatase 64 30 - 99 U/L    Total Bilirubin 0.6 0.1 - 1.5 mg/dL    Albumin 2.4 (L) 3.2 - 4.9 g/dL    Total Protein 5.0 (L) 6.0 - 8.2 g/dL    Globulin 2.6 1.9 - 3.5 g/dL    A-G Ratio 0.9 g/dL   MAGNESIUM    Collection Time: 05/05/20  6:11 AM   Result Value Ref Range    Magnesium 2.2 1.5 - 2.5 mg/dL   PHOSPHORUS    Collection Time: 05/05/20  6:11 AM   Result Value Ref Range    Phosphorus 3.2 2.5 - 4.5 mg/dL   ESTIMATED GFR    Collection Time: 05/05/20  6:11 AM   Result Value Ref Range    GFR If African American >60 >60 mL/min/1.73 m 2    GFR If Non African American >60 >60 mL/min/1.73 m 2       Current Facility-Administered Medications   Medication Frequency   • NS infusion Continuous   • piperacillin-tazobactam (ZOSYN) 3.375 g in  mL IVPB Q6HRS   • linezolid (ZYVOX) tablet 600 mg Q12HRS   • senna-docusate (PERICOLACE or SENOKOT S) 8.6-50 MG per tablet 2 Tab BID  PRN    And   • polyethylene glycol/lytes (MIRALAX) PACKET 1 Packet QDAY PRN    And   • magnesium hydroxide (MILK OF MAGNESIA) suspension 30 mL QDAY PRN    And   • bisacodyl (DULCOLAX) suppository 10 mg QDAY PRN   • gabapentin (NEURONTIN) capsule 600 mg TID   • metoprolol (LOPRESSOR) tablet 25 mg TWICE DAILY   • Respiratory Therapy Consult Continuous RT   • oxyCODONE immediate-release (ROXICODONE) tablet 5 mg Q3HRS PRN   • oxyCODONE immediate release (ROXICODONE) tablet 10 mg Q3HRS PRN   • tramadol (ULTRAM) 50 MG tablet 50 mg Q4HRS PRN   • hydrALAZINE (APRESOLINE) tablet 25 mg Q8HRS PRN   • acetaminophen (TYLENOL) tablet 650 mg Q4HRS PRN   • artificial tears ophthalmic solution 1 Drop PRN   • benzocaine-menthol (CEPACOL) lozenge 1 Lozenge Q2HRS PRN   • mag hydrox-al hydrox-simeth (MAALOX PLUS ES or MYLANTA DS) suspension 20 mL Q2HRS PRN   • ondansetron (ZOFRAN ODT) dispertab 4 mg 4X/DAY PRN    Or   • ondansetron (ZOFRAN) syringe/vial injection 4 mg 4X/DAY PRN   • traZODone (DESYREL) tablet 50 mg QHS PRN   • sodium chloride (OCEAN) 0.65 % nasal spray 2 Spray PRN   • amLODIPine (NORVASC) tablet 5 mg DAILY   • dexamethasone (DECADRON) tablet 4 mg BID   • lacosamide (VIMPAT) tablet 100 mg BID   • levETIRAcetam (KEPPRA) tablet 1,000 mg BID   • rosuvastatin (CRESTOR) tablet 10 mg QHS       Orders Placed This Encounter   Procedures   • Diet Order Regular     Standing Status:   Standing     Number of Occurrences:   1     Order Specific Question:   Diet:     Answer:   Regular [1]       Assessment:  Active Hospital Problems    Diagnosis   • *Subdural hematoma (HCC)   • Impaired mobility and ADLs   • Seizures, post-traumatic (HCC)   • Oropharyngeal dysphagia   • Urinary retention   • Essential hypertension   • Hypercholesterolemia   • Legally blind       Medical Decision Making and Plan:  TBI (Traumatic Brain Injury): Fall with SDH s/p R sided evacuation with Dr. Mendiola on 4/17/20. Patient started on Steroids. Had  post-operative seizure like activity, EEG with cortical irritation  -PT and OT for mobility and ADLs  -SLP for cognition  -NSG follow-up. Per most recent NSG recommendations continue steroids for a week. Recommend CT head - scheduled 5/4/20 - stable changes.   -On Decadron 4 mg BID per NSG     Dysphagia - On dysphagia diet on transfer. SLP for swallow evaluation - continue dysphagia 3 with NTL. MBSS 4/27/20 - upgraded to dysphagia with thins. Upgraded to regular/thins. Resolved     Seizure disorder - Patient with previous CVA with seizures. Increased AEDs at [facility] to Keppra 1000 mg BID and Vimpat 100 mg BID  -Follow-up Neurology seizure clinic     Orthostatic Hypotension - Occurred on 5/4/20 when going up stairs. Start IVF recheck    Leukocytosis - up to 32 after orthostatic hypotension. On steroids, but much elevated. Consulted hospitalist and check blood cultures. Covid 19 - negative x1. WBC improved to 25 on 5/5/20, blood cultures positive x1 on broad spectrum antibiotics. Patient clinically feels better.     HTN - Patient on 5 mg Amlodipine. Previously on metoprolol as well. Continue to monitor, SBP elevated restart Metoprolol 25 mg BID     Legally blind - High risk for falls.      Anemia - 13.1 on admission, continue to monitor    Hyponatremia - 129 on 5/4/20, now on IVF    Hiccups - Started on Gabapentin 300 mg TID -> 400 mg TID. Improved but occasional increase to 600    Azotemia - Patient on NTL diet on admission with poor fluid intake. Improved with fluids    HLD - Patient on Rosuvastatin 10 mg on transfer.     GI Ppx - Patient with loose stools on senna-docusate. Change to PRN    DVT Ppx - Patient high risk for bleed. Ambulating > 100 feet.     Total time:  36 minutes.  I spent greater than 50% of the time for patient care, counseling, and coordination on this date, including unit/floor time, and face-to-face time with the patient as per interval events and assessment and plan above. Topics discussed  included discharge planning, IV antibiotics, improving WBC, Covid 19 negative, and continue steroids. Patient was discussed separately in IDT today; please see details above.    Lyn Sadler M.D.

## 2020-05-05 NOTE — REHAB-PT IDT TEAM NOTE
Physical Therapy   Mobility  Bed mobility:   Sup  Bed /Chair/Wheelchair Transfer Initial:  4 - Minimal Assistance  Bed /Chair/Wheelchair Transfer Current:  3 - Moderate Assistance   Bed/Chair/Wheelchair Transfer Description:  (mod A supine to sit, max A squat pivote bed <> w/c, min A sit to supine at end of session)  Walk Initial:  1 - Total Assistance  Walk Current:  4 - Minimal Assistance   Walk Description:  Assist device/equipment, Extra time, Safety concerns, Verbal cueing, Supervision for safety, Requires incidental assist(200' x 1 Ryan with unilateral walking pole in R hand)  Wheelchair Initial:  1 - Total Assistance  Wheelchair Current:  1 - Total Assistance   Wheelchair Description:  Extra time, Safety concerns, Supervision for safety, Verbal cueing, Requires incidental assist(~ 10feet with Ryan B LE and UE, difficulty with sequencing and incorportation of L UE)  Stairs Initial:  0 - Not tested,unsafe activity  Stairs Current: 1 - Total Assistance   Stairs Description: Hand rails, Extra time, Safety concerns, Requires incidental assist, Ascends/descends 4 to 11 steps, Verbal cueing(ascended/descended 4 standard height stairs min/modA balance and advancement of L LE with descent of last 2 stairs. poor sequencing)  Patient/Family Training/Education:  Ongoing with patient  DME/DC Recommendations: TBD (most likely FWW)  Strengths:  Able to follow instructions, Independent PLOF, Motivated for self care and independence and Willingly participates in therapeutic activities  Barriers:   Confused, Decreased endurance, Generalized weakness, Home accessibility, Poor activity tolerance, Poor balance, Poor family support and Other: L inattention/apraxia, baseline vision, fluctuating levels of assistance  # of short term goals set= 4  # of short term goals met=0  Physical Therapy Problems     Problem: Balance     Dates: Start: 04/26/20       Description:     Goal: STG-Within one week, patient will maintain dynamic  standing     Dates: Start: 04/26/20       Description: 1) Individualized goal:  To progress through dynamic standing there-ex program with SBA  2) Interventions:  PT Gait Training, PT Self Care/Home Eval, PT Therapeutic Exercises, PT Neuro Re-Ed/Balance, PT Therapeutic Activity and PT Evaluation                   Problem: Mobility     Dates: Start: 04/26/20       Description:     Goal: STG-Within one week, patient will propel wheelchair household distances     Dates: Start: 04/26/20       Description: 1) Individualized goal:  100ft bilateral LE with SPV  2) Interventions: PT Gait Training, PT Self Care/Home Eval, PT Therapeutic Exercises, PT Neuro Re-Ed/Balance, PT Therapeutic Activity and PT Evaluation                Goal: STG-Within one week, patient will ambulate community distances     Dates: Start: 04/26/20       Description: 1) Individualized goal:  200ft FWW SBA  2) Interventions: PT Gait Training, PT Self Care/Home Eval, PT Therapeutic Exercises, PT Neuro Re-Ed/Balance, PT Therapeutic Activity and PT Evaluation                     Problem: Mobility Transfers     Dates: Start: 04/26/20       Description:     Goal: STG-Within one week, patient will transfer bed to chair     Dates: Start: 04/26/20       Description: 1) Individualized goal:  Mod I SPT w/c to bed/toilet surfaces  2) Interventions: PT Gait Training, PT Self Care/Home Eval, PT Therapeutic Exercises, PT Neuro Re-Ed/Balance, PT Therapeutic Activity and PT Evaluation                     Problem: PT-Long Term Goals     Dates: Start: 04/26/20       Description:     Goal: LTG-By discharge, patient will maintain balance     Dates: Start: 04/26/20       Description: 1) Individualized goal:  To perform [place] Balance Assessment with LOW FALL RISK  2) Interventions: PT Gait Training, PT Self Care/Home Eval, PT Therapeutic Exercises, PT Neuro Re-Ed/Balance, PT Therapeutic Activity and PT Evaluation               Goal: LTG-By discharge, patient will ambulate      Dates: Start: 04/26/20       Description: 1) Individualized goal:  200ft x 2 with SPC mod I level surfaces, SPV for uneven terrain  2) Interventions: PT Gait Training, PT Self Care/Home Eval, PT Therapeutic Exercises, PT Neuro Re-Ed/Balance, PT Therapeutic Activity and PT Evaluation               Goal: LTG-By discharge, patient will transfer one surface to another     Dates: Start: 04/26/20       Description: 1) Individualized goal:  Mod I SPT to all surfaces safely and consistently  2) Interventions: PT Gait Training, PT Self Care/Home Eval, PT Therapeutic Exercises, PT Neuro Re-Ed/Balance, PT Therapeutic Activity and PT Evaluation               Goal: LTG-By discharge, patient will perform home exercise program     Dates: Start: 04/26/20       Description: 1) Individualized goal:  Standing LE strengthening program to be done in safe, independent environment 3x/daily  2) Interventions:PT Gait Training, PT Self Care/Home Eval, PT Therapeutic Exercises, PT Neuro Re-Ed/Balance, PT Therapeutic Activity and PT Evaluation                 Goal: LTG-By discharge, patient will ambulate up/down flight of stairs     Dates: Start: 04/26/20       Description: 1) Individualized goal:  12 stairs bilateral rails mod I (required in home environment to access laundry; uses strategy of counting and using rails for balance)  2) Interventions:PT Gait Training, PT Self Care/Home Eval, PT Therapeutic Exercises, PT Neuro Re-Ed/Balance, PT Therapeutic Activity and PT Evaluation                               Section completed by:  Parvin Bryant, PT

## 2020-05-05 NOTE — THERAPY
Missed Therapy     Patient Name: Rafael Mccoy  Age:  74 y.o., Sex:  male  Medical Record #: [id number]  Today's Date: 5/5/2020    Discussed missed therapy with charge nurse, therapy supervisor, and scheduling. Patient on medical hold.

## 2020-05-05 NOTE — THERAPY
Missed Therapy     Patient Name: Rafael Mccoy  Age:  74 y.o., Sex:  male  Medical Record #: [id number]  Today's Date: 5/5/2020    Discussed missed therapy with charge RN       05/05/20 0701   Precautions   Precautions Fall Risk;Swallow Precautions ( See Comments)   Comments L apraxia, legally blind   Therapy Missed   Missed Therapy (Minutes) 60   Reason For Missed Therapy Medical - Patient on Hold from Therapy

## 2020-05-05 NOTE — REHAB-NURSING IDT TEAM NOTE
Nursing   Nursing  Diet/Nutrition:  Regular  Medication Administration:  Whole with Liquid Wash  % consumed at meals in last 24 hours:  Consumed -% of meals per documentation.  Meal/Snack Consumption for the past 24 hrs:   Oral Nutrition   05/04/20 1200 Lunch;Between 50-75% Consumed   05/04/20 0800 Breakfast;Between % Consumed     Snack schedule:  None    Appetite:  Excellent  Fluid Intake/Output in past 24 hours: In: 620 [P.O.:620]  Out: 650   Admit Weight:  Weight: 75.5 kg (166 lb 7.2 oz)  Weight Last 7 Days   [73.4 kg (161 lb 13.1 oz)] 73.4 kg (161 lb 13.1 oz) (05/03 1400)    Pain Issues:    Location:  Back (05/04 1001)  Lower (05/04 1001)         Severity:  Denies   Scheduled pain medications:  gabapentin (NEURONTIN)      PRN pain medications used in last 24 hours:  None   Non Pharmacologic Interventions:  relaxation and repositioned  Effectiveness of pain management plan:  good=patient states acceptable comfort after interventions    Bowel:    Bowel Assist Initial Score:  1 - Total Assistance  Bowel Assist Current Score:  1 - Total Assistance  Bowl Accidents in last 7 days:  2  Last bowel movement: 05/03/20  Stool Description: Large, Brown, Formed     Usual bowel pattern:  every third day  Scheduled bowel medications:  None  PRN bowel medications used in last 24 hours:  docusate sodium (COLACE)  Nursing Interventions:  Increased time, Scheduled medication, Supervision, Verbal cueing, Positioning on commode/toilet, Brief  Effectiveness of bowel program:   fair=sometimes needs prn bowel meds for constipation  Bladder:    Bladder Assist Initial Score:  1 - Total Assistance  Bladder Assist Current Score:  1 - Total Assistance  Bladder Accidents in last 7 days:  4  PVR range for past 24-48 hours:  []   Medications affecting bladder:  None    Time void schedule/voiding pattern:  Time void every 3 hours during the day and every 4 hours at night  Interventions:  Supervision, Verbal cueing, Emptying of  device, External catheter  Effectiveness of bladder training:  Good=regular, predictable, emptying of bladder, patient initiates time voiding    [place]:    Type:     Patient Lines/Drains/Airways Status    Active Catheter     None              Date placed:          Justification:    Diabetes:  Blood Sugar Frequency:  None    Range of BS for last 48 hours:     Recent Labs     05/04/20  1035   POCGLUCOSE 123*     Scheduled diabetic medications:  None    Wound:         Patient Lines/Drains/Airways Status    Active Current Wounds     Name: Placement date: Placement time: Site: Days:    Wound 04/17/20 Incision Head Right  04/17/20   2245   Head  17                   Interventions:    Effectiveness of intervention:  wound is improving        Sleep/wake cycle:   Average hours slept:  Sleeps 4-6 hours without waking  Sleep medication usage:  None    Patient/Family Training/Education:  Bladder Management/Training, Fall Prevention, General Self Care, Medication Management and Pain Management    Strengths: Willingly participates in therapeutic activities, Supportive family and Pleasant and cooperative   Barriers:   Bladder incontinence, Generalized weakness and Limited mobility       Nursing Problems     Problem: Bowel/Gastric:     Description:     Goal: Normal bowel function is maintained or improved     Description:           Goal: Will not experience complications related to bowel motility     Description:                 Problem: Communication     Description:     Goal: The ability to communicate needs accurately and effectively will improve     Description:                 Problem: Discharge Barriers/Planning     Description:     Goal: Patient's continuum of care needs will be met     Description:                 Problem: Infection     Description:     Goal: Will remain free from infection     Description:                 Problem: Knowledge Deficit     Description:     Goal: Knowledge of disease process/condition, treatment  plan, diagnostic tests, and medications will improve     Description:           Goal: Knowledge of the prescribed therapeutic regimen will improve     Description:                 Problem: Pain Management     Description:     Goal: Pain level will decrease to patient's comfort goal     Description:                 Problem: Respiratory:     Description:     Goal: Respiratory status will improve     Description:                 Problem: Safety     Description:     Goal: Will remain free from injury     Description:           Goal: Will remain free from falls     Description:                 Problem: Skin Integrity     Description:     Goal: Risk for impaired skin integrity will decrease     Description:                 Problem: Urinary Elimination:     Description:     Goal: Ability to reestablish a normal urinary elimination pattern will improve     Description:                 Problem: Venous Thromboembolism (VTW)/Deep Vein Thrombosis (DVT) Prevention:     Description:     Goal: Patient will participate in Venous Thrombosis (VTE)/Deep Vein Thrombosis (DVT)Prevention Measures     Description:                        Long Term Goals:   At discharge patient will be able to function safely at home with support.    Section completed by:  Martine Cabrales R.N.

## 2020-05-05 NOTE — CARE PLAN
Problem: Balance  Goal: STG-Within one week, patient will maintain dynamic standing  Description: 1) Individualized goal:  To progress through dynamic standing there-ex program with SBA  2) Interventions:  PT Gait Training, PT Self Care/Home Eval, PT Therapeutic Exercises, PT Neuro Re-Ed/Balance, PT Therapeutic Activity and PT Evaluation     5/5/2020 1155 by Parvin Bryant, PT  Outcome: NOT MET  5/5/2020 1154 by Parvin Bryant, PT  Outcome: NOT MET  5/5/2020 1152 by Parvin Bryant, PT  Outcome: NOT MET     Problem: Mobility  Goal: STG-Within one week, patient will propel wheelchair household distances  Description: 1) Individualized goal:  100ft bilateral LE with SPV  2) Interventions: PT Gait Training, PT Self Care/Home Eval, PT Therapeutic Exercises, PT Neuro Re-Ed/Balance, PT Therapeutic Activity and PT Evaluation        5/5/2020 1155 by Parvin Bryant, PT  Outcome: NOT MET  5/5/2020 1155 by Parvin Bryant, PT  Reactivated  5/5/2020 1152 by Parvin Bryant, PT  Outcome: MET  Goal: STG-Within one week, patient will ambulate community distances  Description: 1) Individualized goal:  200ft FWW SBA  2) Interventions: PT Gait Training, PT Self Care/Home Eval, PT Therapeutic Exercises, PT Neuro Re-Ed/Balance, PT Therapeutic Activity and PT Evaluation       5/5/2020 1155 by Parvin Bryant, PT  Outcome: NOT MET  5/5/2020 1154 by Parvin Bryant, PT  Outcome: NOT MET  5/5/2020 1152 by Parvin Bryant, PT  Outcome: NOT MET     Problem: Mobility Transfers  Goal: STG-Within one week, patient will transfer bed to chair  Description: 1) Individualized goal:  Mod I SPT w/c to bed/toilet surfaces  2) Interventions: PT Gait Training, PT Self Care/Home Eval, PT Therapeutic Exercises, PT Neuro Re-Ed/Balance, PT Therapeutic Activity and PT Evaluation       5/5/2020 1155 by Parvin Bryant, PT  Outcome: NOT MET  5/5/2020 1154 by Parvin Bryant, PT  Outcome: NOT MET  5/5/2020 1152 by Parvin Bryant, PT  Outcome: NOT MET

## 2020-05-05 NOTE — PROGRESS NOTES
[facility] Medicine Daily Progress Note    Date of Service  5/5/2020    Chief Complaint:  Hypertension  Leukocytosis  Hyponatremia    Interval History:  No significant events or changes since last visit    Review of Systems  Review of Systems   Constitutional: Negative for chills and fever.   Eyes:        Legally blind   Respiratory: Negative for shortness of breath.    Cardiovascular: Negative for chest pain.   Gastrointestinal: Negative for abdominal pain, diarrhea, nausea and vomiting.   Psychiatric/Behavioral: The patient is not nervous/anxious.         Physical Exam  Temp:  [36.8 °C (98.2 °F)-37.8 °C (100.1 °F)] 36.8 °C (98.2 °F)  Pulse:  [58-89] 58  Resp:  [18-22] 18  BP: (105-134)/(53-69) 105/53  SpO2:  [89 %-100 %] 96 %    Physical Exam  Vitals signs and nursing note reviewed.   Constitutional:       Appearance: Normal appearance.   HENT:      Head: Atraumatic.   Eyes:      Conjunctiva/sclera: Conjunctivae normal.      Pupils: Pupils are equal, round, and reactive to light.   Neck:      Musculoskeletal: Normal range of motion and neck supple.   Cardiovascular:      Rate and Rhythm: Normal rate and regular rhythm.      Heart sounds: No murmur.   Pulmonary:      Effort: Pulmonary effort is normal.      Breath sounds: No stridor. No wheezing or rales.   Abdominal:      General: There is no distension.      Palpations: Abdomen is soft.      Tenderness: There is no abdominal tenderness.   Musculoskeletal:      Right lower leg: No edema.      Left lower leg: No edema.   Skin:     General: Skin is warm and dry.      Findings: No rash.   Neurological:      Mental Status: He is alert and oriented to person, place, and time.   Psychiatric:         Mood and Affect: Mood normal.         Behavior: Behavior normal.         Fluids    Intake/Output Summary (Last 24 hours) at 5/5/2020 4283  Last data filed at 5/5/2020 0520  Gross per 24 hour   Intake 260 ml   Output 350 ml   Net -90 ml       Laboratory  Recent Labs      05/04/20  1500 05/05/20  0611   WBC 32.3* 25.2*   RBC 4.70 3.91*   HEMOGLOBIN 14.3 11.9*   HEMATOCRIT 41.7* 34.6*   MCV 88.7 88.5   MCH 30.4 30.4   MCHC 34.3 34.4   RDW 43.4 44.3   PLATELETCT 161* 124*   MPV 10.5 10.7     Recent Labs     05/04/20  1250 05/05/20  0611   SODIUM 129* 129*   POTASSIUM 4.1 4.3   CHLORIDE 92* 97   CO2 26 21   GLUCOSE 162* 169*   BUN 25* 20   CREATININE 0.95 0.94   CALCIUM 9.0 8.2*                   Imaging    Assessment/Plan  * Subdural hematoma (HCC)- (present on admission)  Assessment & Plan  2nd to fall in bathtub  S/P craniotomy (4/17)  On Decadron  Repeat CT head: no acute changes    Seizures, 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)  Assessment & Plan  Had breakthrough seizure on prophylactic Keppra  EEG showed cortical instability  Now on Vimpat and Keppra    Essential hypertension- (present on admission)  Assessment & Plan  BP ok  On Norvasc: 5 mg daily  On Lopressor: 25 mg bid  Note: on IVF's  Monitor    Leukocytosis  Assessment & Plan  WBC's: 32 --> 25 (5/5)  Tmax: 100.1  (5/5) afebrile  Crani site healing well  CT head: no acute changes  Covid-19 (-)  Flu (-)  F/U UC  F/U CXR  BC x 2: shows 1 out of 2 bottles pos -- cont to follow  Note: on steroids  On Zyvox & Zosyn empirically (thru 5/11)    Hyponatremia  Assessment & Plan  Na: 129 (5/4) --> 129 (5/5)  On IVF's NS  Cont to monitor    Hypercholesterolemia- (present on admission)  Assessment & Plan  On Crestor    Legally blind- (present on admission)  Assessment & Plan  Has hx history

## 2020-05-05 NOTE — THERAPY
Physical Therapy   Daily Treatment     Patient Name: Rafael Mccoy  Age:  74 y.o., Sex:  male  Medical Record #: [id number]  Today's Date: 5/5/2020     Precautions  Precautions: Fall Risk, Swallow Precautions ( See Comments)  Comments: legally blind, L apraxia    Subjective    The patient was agreeable to additional PT treatment session     Objective     05/05/20 1431   Neuro-Muscular Treatments   Neuro-Muscular Treatments Verbal Cuing;Postural Changes;Sequencing   Comments see note   Interdisciplinary Plan of Care Collaboration   IDT Collaboration with  Nursing   Patient Position at End of Therapy Seated;Self Releasing Lap Belt Applied   Collaboration Comments IV   PT Total Time Spent   PT Individual Total Time Spent (Mins) 60   PT Charge Group   PT Gait Training 2   PT Therapeutic Exercise 1   PT Neuromuscular Re-Education / Balance 1   Supervising Physical Therapist Parvin Tejadae gait 5' at 0.5-0.7mph followed by 3' at 0.7mph with a 3 minute seated rest break between. Pt required consistent vc for increased stride    Reach pivot transfer set up and CGA    Assessment    Pt demonstrated decreased step length bilaterally while amb in the litegait, he was not able follow vc for larger strides. Pt is with overall improved performance today, Limitations/barriers include impaired balance, decreased proprioception, poor postural reactions and impaired vision.    Plan    vector no AD, variable gait, forced use L side, lite gait for increased gait speed, cont gait training with single walking stick

## 2020-05-05 NOTE — REHAB-RESPIRATORY IDT TEAM NOTE
Respiratory Therapy   Respiratory Therapy    Pt required oxygen last night, however he has been on RA all day.  His SATS have been in the mid 90s.  There are no other respiratory issues or barriers at this time.    Section completed by:  Clementine Marin, RRT

## 2020-05-05 NOTE — REHAB-PHARMACY IDT TEAM NOTE
Pharmacy   Pharmacy  Antibiotics/Antifungals/Antivirals:  Medication:      Active Orders (From admission, onward)    Ordered     Ordering Provider       Mon May 4, 2020  4:32 PM    05/04/20 1632  linezolid (ZYVOX) tablet 600 mg  EVERY 12 HOURS     Question:  Indication  Answer:  Infectious source unknown    Cleo Camejo M.D.    05/04/20 1632  piperacillin-tazobactam (ZOSYN) 3.375 g in  mL IVPB  EVERY 6 HOURS      Cleo Camejo M.D.        Route:        PO. IV  Stop Date: both 5/11/20  Reason: leukocytosis,  Antihypertensives/Cardiac:  Medication:    Orders (72h ago, onward)     Start     Ordered    04/29/20 1515  metoprolol (LOPRESSOR) tablet 25 mg  TWICE DAILY      04/29/20 1454    04/26/20 0900  amLODIPine (NORVASC) tablet 5 mg  DAILY      04/25/20 1338    04/25/20 2100  rosuvastatin (CRESTOR) tablet 10 mg  EVERY BEDTIME      04/25/20 1338    04/25/20 1338  hydrALAZINE (APRESOLINE) tablet 25 mg  EVERY 8 HOURS PRN      04/25/20 1338              Patient Vitals for the past 24 hrs:   BP Pulse   05/04/20 1400 114/62 88   05/04/20 1320 123/58 82   05/04/20 1056 134/69 89   05/04/20 1030 126/59 82   05/04/20 0700 108/65 68   05/04/20 0555 113/62 85   05/03/20 1843 128/64 78   05/03/20 1840 135/71 71     Anticoagulation:  Medication: None                                     Other key medications: A review of the medication list reveals no issues at this time. Patient is currently on antihypertensive(s). Recommend home blood pressure monitoring/recording if antihypertensive(s) regimen(s) continue.    Section completed by: Ferdinand Bal [facility]

## 2020-05-05 NOTE — REHAB-CM IDT TEAM NOTE
Case Management    DC Planning  DC destination/dispostion: TBD. Lives alone 1st floor apartment in [place], [place], multiple steps to complex. Legally blind, uses cane. Has friends for support & transportation. Daughters live out of area.    [facility]: TBD[facility] Will not be able to refer to [facility] [address] physician.    DC Needs: DME for patient safety & mobility. Therapy f/u unknown. MD f/u appts. Transportation.    Barriers to discharge: Lives alone. Functional mobility deficits. Cognitive deficits. Legally blind, no technology services previous per daughter. [place],  agency does not accept [facility].    Strengths: Supportive family out of area.     Section completed by:  Daina Denney R.N.

## 2020-05-05 NOTE — CARE PLAN
Problem: Communication  Goal: The ability to communicate needs accurately and effectively will improve  Note: Patient able to communicate needs to staff. Extra time and emotional support provided. Encouraged patient to ask questions or concerns.      Problem: Infection  Goal: Will remain free from infection  Note: Blood cultures were collected as ordered. Awaiting results.

## 2020-05-05 NOTE — REHAB-SLP IDT TEAM NOTE
Speech Therapy   Cognitive Linquistic Functions  Comprehension Initial:  5 - Stand-by Prompting/Supervision or Set-up  Comprehension Current:  5 - Stand-by Prompting/Supervision or Set-up   Comprehension Description:     Expression Initial:  5 - Stand-by Prompting/Supervision or Set-up  Expression Current:  5 - Stand-by Prompting/Supervision or Set-up   Expression Description:     Social Interaction Initial:  7 - Independent  Social Interaction Current:  6 - Modified Independent   Social Interaction Description:     Problem Solving Initial:  2 - Max Assistance  Problem Solving Current:  4 - Minimal Assistance   Problem Solving Description:     Memory Initial:  3 - Moderate Assistance  Memory Current:  3 - Moderate Assistance   Memory Description:     Executive Functioning / Organization Initial:     Executive Functioning / Organization Current:      Executive Functioning Desciption:  Min A, uses  and auto bill pay.   Swallowing  Swallowing Status:  WFL, currently in t-dine for set up, SLP not following for dysphagia.   Orders Placed This Encounter   Procedures   • Diet Order Regular     Standing Status:   Standing     Number of Occurrences:   1     Order Specific Question:   Diet:     Answer:   Regular [1]     Behavior Modification Plan  Keep instructions simple/concrete, Set clear goals, Redirect to task/topic and Reinforce participation in desired tasks  Assistive Technology  Hearing amplification or closed captioning, Low/Impaired vision equipment and Mid-High tech: voice recorder, smart phone functions (calendar, notes, etc), tablet/iPad/Tracy, etc  Family Training/Education:  Not completed  DC Recommendations:    speech therapy  Strengths:  Able to follow instructions, Making steady progress towards goals, Motivated for self care and independence, Pleasant and cooperative and Willingly participates in therapeutic activities  Barriers:  Other: visual deficits, attention, lives alone  # of short  term goals set=3  # of short term goals met=1  Speech Therapy Problems     Problem: Memory STGs     Dates: Start: 04/26/20       Description:     Goal: STG-Within one week, patient will     Dates: Start: 04/26/20       Description: 1) Individualized goal: will complete functional memory and attention tasks with at least 80% accuracy and minimal cues  2) Interventions:  SLP Speech Language Treatment, SLP Self Care / ADL Training , SLP Cognitive Skill Development and SLP Group Treatment                   Problem: Problem Solving STGs     Dates: Start: 04/28/20       Description:     Goal: STG-Within one week, patient will     Dates: Start: 04/28/20       Description: 1) Individualized goal:  Complete alternating attention tasks with 80% accuracy and min verbal cues.   2) Interventions:  SLP Speech Language Treatment, SLP Self Care / ADL Training , SLP Cognitive Skill Development and SLP Group Treatment                   Problem: Speech/Swallowing LTGs     Dates: Start: 04/26/20       Description:     Goal: LTG-By discharge, patient will safely swallow     Dates: Start: 04/26/20       Description: 1) Individualized goal:  Least restrictive diet without overt s/sx of asp for 100% of PO intake.   2) Interventions:  SLP Swallowing Dysfunction Treatment and SLP Oral Pharyngeal Evaluation, SLP Video Swallow Evaluation             Goal: LTG-By discharge, patient will     Dates: Start: 04/26/20       Description: 1) Individualized goal: improve cognitive function to return to PLOF, as indicated by completing functional cognitive tasks with at least 80% accuracy and minimal cues  2) Interventions:  SLP Speech Language Treatment, SLP Self Care / ADL Training , SLP Cognitive Skill Development and SLP Group Treatment                          Section completed by:  Kelle Samson MS,CCC-SLP

## 2020-05-05 NOTE — REHAB-COLLABORATIVE ONGOING IDT TEAM NOTE
Weekly Interdisciplinary Team Conference Note    Weekly Interdisciplinary Team Conference # 2  Date:  5/5/2020    Clinicians present and reporting at team conference include the following:   MD: SHANT Sadler MD    RN:  Shalini Jonas RN    PT:   Ayla Mcghee PTA and Parvin Bryant PT, DPT  OT:  Mary Sorto MS, OTR/L    ST:  Kelle Samson MS, CCC-SLP  CM:  Daina Denney RN [facility]  REC:  Not Applicable  RT:  Not Applicable  RPh:  Adilia Caruso [facility]  All reporting clinicians have a working knowledge of this patient's plan of care.    Targeted DC Date:  5.11.2020     Medical    Patient Active Problem List    Diagnosis Date Noted   • Impaired mobility and ADLs 04/23/2020     Priority: High   • Seizures, post-traumatic (HCC) 04/21/2020     Priority: High   • Subdural hematoma (HCC) 04/17/2020     Priority: High   • Oropharyngeal dysphagia 04/21/2020     Priority: Medium   • Urinary retention 04/20/2020     Priority: Medium   • Contraindication to deep vein thrombosis (DVT) prophylaxis 04/17/2020     Priority: Medium   • Essential hypertension 05/17/2016     Priority: Medium   • Hypercholesterolemia 04/18/2020     Priority: Low   • Trauma 04/17/2020     Priority: Low   • Screening examination for infectious disease 04/17/2020     Priority: Low   • Legally blind 05/17/2016     Priority: Low   • Hyponatremia 05/04/2020   • Abdominal distension 05/04/2020   • Leukocytosis 05/04/2020   • 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) 05/17/2016   • Insomnia 05/17/2016   • Cerebellar stroke (HCC) 05/16/2016     Results     Procedure Component Value Ref Range Date/Time    URINE CULTURE-EXISTING-LESS THAN 48 HOURS [[id number]] Collected:  05/04/20 2000    Order Status:  Completed Lab Status:  In process Updated:  05/05/20 1148    Specimen:  Urine, Clean Catch     Narrative:       Collected By:[id number] AMARI NICHOLE  Indication for culture:->Patient WITHOUT an indwelling Graham  catheter in place with new onset of Dysuria,  "Frequency,  Urgency, and/or Suprapubic pain    BLOOD CULTURE [[id number]] Collected:  05/04/20 1300    Order Status:  Completed Lab Status:  Preliminary result Updated:  05/05/20 0849    Specimen:  Blood from Peripheral      Significant Indicator NEG     Source BLD     Site PERIPHERAL     Culture Result No Growth  Note: Blood cultures are incubated for 5 days and  are monitored continuously.Positive blood cultures  are called to the RN and reported as soon as  they are identified.      Narrative:       Per Hospital Policy: Only change Specimen Src: to \"Line\" if  specified by physician order.  Right    URINE MICROSCOPIC (W/UA) [[id number]]  (Abnormal) Collected:  05/04/20 2041    Order Status:  Completed Lab Status:  Final result Updated:  05/05/20 0805     WBC 10-20 /hpf      Comment: Female  <12 Yr 0-2  >12 Yr 0-5  Male   None          RBC 0-2 /hpf      Comment: Female  >12 Yr 0-2  Male   None          Bacteria Negative None /hpf      Epithelial Cells Negative /hpf      Amorphous Crystal Present /hpf      Uric Acid Crystal Positive /hpf      Hyaline Cast 0-2 /lpf     Narrative:       Droplet, Contact, and Eye [id number] ZAIDCHON NICHOLE    ESTIMATED GFR [[id number]] Collected:  05/05/20 0611    Order Status:  Completed Lab Status:  Final result Updated:  05/05/20 0727     GFR If African American >60 >60 mL/min/1.73 m 2      GFR If Non African American >60 >60 mL/min/1.73 m 2     Narrative:       Droplet, Contact, and Eye Protection    MAGNESIUM [[id number]] Collected:  05/05/20 0611    Order Status:  Completed Lab Status:  Final result Updated:  05/05/20 0727    Specimen:  Blood      Magnesium 2.2 1.5 - 2.5 mg/dL     Narrative:       Droplet, Contact, and Eye Protection    PHOSPHORUS [[id number]] Collected:  05/05/20 0611    Order Status:  Completed Lab Status:  Final result Updated:  05/05/20 0727    Specimen:  Blood      Phosphorus 3.2 2.5 - 4.5 mg/dL     Narrative:       Droplet, Contact, and Eye Protection    " Comp Metabolic Panel [[id number]]  (Abnormal) Collected:  05/05/20 0611    Order Status:  Completed Lab Status:  Final result Updated:  05/05/20 0727    Specimen:  Blood      Sodium 129 135 - 145 mmol/L      Potassium 4.3 3.6 - 5.5 mmol/L      Chloride 97 96 - 112 mmol/L      Co2 21 20 - 33 mmol/L      Anion Gap 11.0 7.0 - 16.0      Glucose 169 65 - 99 mg/dL      Bun 20 8 - 22 mg/dL      Creatinine 0.94 0.50 - 1.40 mg/dL      Calcium 8.2 8.5 - 10.5 mg/dL      AST(SGOT) 15 12 - 45 U/L      ALT(SGPT) 30 2 - 50 U/L      Alkaline Phosphatase 64 30 - 99 U/L      Total Bilirubin 0.6 0.1 - 1.5 mg/dL      Albumin 2.4 3.2 - 4.9 g/dL      Total Protein 5.0 6.0 - 8.2 g/dL      Globulin 2.6 1.9 - 3.5 g/dL      A-G Ratio 0.9 g/dL     Narrative:       Droplet, Contact, and Eye Protection    CBC WITH DIFFERENTIAL [[id number]]  (Abnormal) Collected:  05/05/20 0611    Order Status:  Completed Lab Status:  Final result Updated:  05/05/20 0715    Specimen:  Blood      WBC 25.2 4.8 - 10.8 K/uL      RBC 3.91 4.70 - 6.10 M/uL      Hemoglobin 11.9 14.0 - 18.0 g/dL      Hematocrit 34.6 42.0 - 52.0 %      MCV 88.5 81.4 - 97.8 fL      MCH 30.4 27.0 - 33.0 pg      MCHC 34.4 33.7 - 35.3 g/dL      RDW 44.3 35.9 - 50.0 fL      Platelet Count 124 164 - 446 K/uL      MPV 10.7 9.0 - 12.9 fL      Neutrophils-Polys 92.40 44.00 - 72.00 %      Lymphocytes 2.00 22.00 - 41.00 %      Monocytes 4.00 0.00 - 13.40 %      Eosinophils 0.00 0.00 - 6.90 %      Basophils 0.10 0.00 - 1.80 %      Immature Granulocytes 1.50 0.00 - 0.90 %      Nucleated RBC 0.00 /100 WBC      Neutrophils (Absolute) 23.28 1.82 - 7.42 K/uL      Comment: Includes immature neutrophils, if present.        Lymphs (Absolute) 0.50 1.00 - 4.80 K/uL      Monos (Absolute) 1.01 0.00 - 0.85 K/uL      Eos (Absolute) 0.00 0.00 - 0.51 K/uL      Baso (Absolute) 0.02 0.00 - 0.12 K/uL      Immature Granulocytes (abs) 0.37 0.00 - 0.11 K/uL      NRBC (Absolute) 0.00 K/uL     Narrative:       Droplet,  "Contact, and Eye Protection    URINALYSIS [[id number]]  (Abnormal) Collected:  05/04/20 2041    Order Status:  Completed Lab Status:  Final result Updated:  05/05/20 0712    Specimen:  Urine, Cath      Color Yellow     Character Turbid     Specific Gravity 1.025 <1.035      Ph 5.5 5.0 - 8.0      Glucose 250 Negative mg/dL      Ketones Negative Negative mg/dL      Protein Negative Negative mg/dL      Bilirubin Negative Negative      Urobilinogen, Urine 0.2 Negative      Nitrite Positive Negative      Leukocyte Esterase Negative Negative      Occult Blood Negative Negative      Micro Urine Req Microscopic    Narrative:       Droplet, Contact, and Eye [id number] AMARI NICHOLE    BLOOD CULTURE [[id number]]  (Abnormal) Collected:  05/04/20 1700    Order Status:  Completed Lab Status:  Preliminary result Updated:  05/05/20 0707    Specimen:  Blood from Peripheral      Significant Indicator POS     Source BLD     Site PERIPHERAL     Culture Result Growth detected by Bactec instrument. 05/05/2020  07:06  Gram Stain: Gram negative rods.      Narrative:       CALL  [facility]  [facility] tel. [phone],  CALLED  [facility] tel. [phone] 05/05/2020, 07:07, RB PERF. RESULTS CALLED  TO:[phone] RN  Per Hospital Policy: Only change Specimen Src: to \"Line\" if  specified by physician order.  Left    LACOSAMIDE [[id number]] Collected:  05/05/20 0611    Order Status:  Completed Lab Status:  In process Updated:  05/05/20 0704    Narrative:       Droplet, Contact, and Eye Protection    KEPPRA [[id number]] Collected:  05/05/20 0611    Order Status:  Completed Lab Status:  In process Updated:  05/05/20 0704    Narrative:       Droplet, Contact, and Eye Protection    SARS-CoV-2, PCR (In-House) [[id number]] Collected:  05/04/20 1800    Order Status:  Completed Lab Status:  Final result Updated:  05/04/20 1948     SARS-CoV-2 Source NP Swab     SARS-CoV-2 by PCR NotDetected NotDetected      Comment: [facility] providers: PLEASE REFER TO DE-ESCALATION AND " RETESTING PROTOCOL  on [email or web address]  **The [employer] GeneXpert Xpress SARS-CoV-2 Test has been made available for  use under the Emergency Use Authorization (EUA) only.         Narrative:       Droplet, Contact, and Eye Protection  Rule-out COVID-19 panel, includes droplet/contact/eye  isolation order.  Check influenza to order this test only if  specifically indicated.    Influenza A/B By PCR (Adult - Flu Only) [[id number]] Collected:  05/04/20 1800    Order Status:  Completed Lab Status:  Final result Updated:  05/04/20 1929     Influenza virus A RNA Negative Negative      Influenza virus B, PCR Negative Negative     Narrative:       Droplet, Contact, and Eye Protection  Rule-out COVID-19 panel, includes droplet/contact/eye  isolation order.  Check influenza to order this test only if  specifically indicated.    COVID/SARS CoV-2 [[id number]] Collected:  05/04/20 1800    Order Status:  Completed Lab Status:  Final result Updated:  05/04/20 1843    Specimen:  Respirate from Nasopharyngeal      COVID Order Status Received    Narrative:       Droplet, Contact, and Eye Protection  Rule-out COVID-19 panel, includes droplet/contact/eye  isolation order.  Check influenza to order this test only if  specifically indicated.        Nursing  Diet/Nutrition:  Regular  Medication Administration:  Whole with Liquid Wash  % consumed at meals in last 24 hours:  Consumed -% of meals per documentation.  Meal/Snack Consumption for the past 24 hrs:   Oral Nutrition   05/04/20 1200 Lunch;Between 50-75% Consumed   05/04/20 0800 Breakfast;Between % Consumed     Snack schedule:  None    Appetite:  Excellent  Fluid Intake/Output in past 24 hours: In: 620 [P.O.:620]  Out: 650   Admit Weight:  Weight: 75.5 kg (166 lb 7.2 oz)  Weight Last 7 Days   [73.4 kg (161 lb 13.1 oz)] 73.4 kg (161 lb 13.1 oz) (05/03 1400)    Pain Issues:    Location:  Back (05/04 1001)  Lower (05/04 1001)         Severity:  Denies   Scheduled pain medications:   gabapentin (NEURONTIN)      PRN pain medications used in last 24 hours:  None   Non Pharmacologic Interventions:  relaxation and repositioned  Effectiveness of pain management plan:  good=patient states acceptable comfort after interventions    Bowel:    Bowel Assist Initial Score:  1 - Total Assistance  Bowel Assist Current Score:  1 - Total Assistance  Bowl Accidents in last 7 days:  2  Last bowel movement: 05/03/20  Stool Description: Large, Brown, Formed     Usual bowel pattern:  every third day  Scheduled bowel medications:  None  PRN bowel medications used in last 24 hours:  docusate sodium (COLACE)  Nursing Interventions:  Increased time, Scheduled medication, Supervision, Verbal cueing, Positioning on commode/toilet, Brief  Effectiveness of bowel program:   fair=sometimes needs prn bowel meds for constipation  Bladder:    Bladder Assist Initial Score:  1 - Total Assistance  Bladder Assist Current Score:  1 - Total Assistance  Bladder Accidents in last 7 days:  4  PVR range for past 24-48 hours:  []   Medications affecting bladder:  None    Time void schedule/voiding pattern:  Time void every 3 hours during the day and every 4 hours at night  Interventions:  Supervision, Verbal cueing, Emptying of device, External catheter  Effectiveness of bladder training:  Good=regular, predictable, emptying of bladder, patient initiates time voiding    Graham:    Type:     Patient Lines/Drains/Airways Status    Active Catheter     None              Date placed:          Justification:    Diabetes:  Blood Sugar Frequency:  None    Range of BS for last 48 hours:     Recent Labs     05/04/20  1035   POCGLUCOSE 123*     Scheduled diabetic medications:  None    Wound:         Patient Lines/Drains/Airways Status    Active Current Wounds     Name: Placement date: Placement time: Site: Days:    Wound 04/17/20 Incision Head Right  04/17/20   2245   Head  17                   Interventions:    Effectiveness of intervention:   wound is improving        Sleep/wake cycle:   Average hours slept:  Sleeps 4-6 hours without waking  Sleep medication usage:  None    Patient/Family Training/Education:  Bladder Management/Training, Fall Prevention, General Self Care, Medication Management and Pain Management    Strengths: Willingly participates in therapeutic activities, Supportive family and Pleasant and cooperative   Barriers:   Bladder incontinence, Generalized weakness and Limited mobility       Nursing Problems     Problem: Bowel/Gastric:     Description:     Goal: Normal bowel function is maintained or improved     Description:           Goal: Will not experience complications related to bowel motility     Description:                 Problem: Communication     Description:     Goal: The ability to communicate needs accurately and effectively will improve     Description:                 Problem: Discharge Barriers/Planning     Description:     Goal: Patient's continuum of care needs will be met     Description:                 Problem: Infection     Description:     Goal: Will remain free from infection     Description:                 Problem: Knowledge Deficit     Description:     Goal: Knowledge of disease process/condition, treatment plan, diagnostic tests, and medications will improve     Description:           Goal: Knowledge of the prescribed therapeutic regimen will improve     Description:                 Problem: Pain Management     Description:     Goal: Pain level will decrease to patient's comfort goal     Description:                 Problem: Respiratory:     Description:     Goal: Respiratory status will improve     Description:                 Problem: Safety     Description:     Goal: Will remain free from injury     Description:           Goal: Will remain free from falls     Description:                 Problem: Skin Integrity     Description:     Goal: Risk for impaired skin integrity will decrease     Description:                  Problem: Urinary Elimination:     Description:     Goal: Ability to reestablish a normal urinary elimination pattern will improve     Description:                 Problem: Venous Thromboembolism (VTW)/Deep Vein Thrombosis (DVT) Prevention:     Description:     Goal: Patient will participate in Venous Thrombosis (VTE)/Deep Vein Thrombosis (DVT)Prevention Measures     Description:                        Long Term Goals:   At discharge patient will be able to function safely at home with support.    Section completed by:  Martine Cabrales R.N.        Respiratory Therapy    Pt required oxygen last night, however he has been on RA all day.  His SATS have been in the mid 90s.  There are no other respiratory issues or barriers at this time.    Section completed by:  Clementine Marin, RRT    Mobility  Bed mobility:   Sup  Bed /Chair/Wheelchair Transfer Initial:  4 - Minimal Assistance  Bed /Chair/Wheelchair Transfer Current:  3 - Moderate Assistance   Bed/Chair/Wheelchair Transfer Description:  (mod A supine to sit, max A squat pivote bed <> w/c, min A sit to supine at end of session)  Walk Initial:  1 - Total Assistance  Walk Current:  4 - Minimal Assistance   Walk Description:  Assist device/equipment, Extra time, Safety concerns, Verbal cueing, Supervision for safety, Requires incidental assist(200' x 1 Ryan with unilateral walking pole in R hand)  Wheelchair Initial:  1 - Total Assistance  Wheelchair Current:  1 - Total Assistance   Wheelchair Description:  Extra time, Safety concerns, Supervision for safety, Verbal cueing, Requires incidental assist(~ 10feet with Ryan B LE and UE, difficulty with sequencing and incorportation of L UE)  Stairs Initial:  0 - Not tested,unsafe activity  Stairs Current: 1 - Total Assistance   Stairs Description: Hand rails, Extra time, Safety concerns, Requires incidental assist, Ascends/descends 4 to 11 steps, Verbal cueing(ascended/descended 4 standard height stairs min/modA  balance and advancement of L LE with descent of last 2 stairs. poor sequencing)  Patient/Family Training/Education:  Ongoing with patient  DME/DC Recommendations: TBD (most likely FWW)  Strengths:  Able to follow instructions, Independent PLOF, Motivated for self care and independence and Willingly participates in therapeutic activities  Barriers:   Confused, Decreased endurance, Generalized weakness, Home accessibility, Poor activity tolerance, Poor balance, Poor family support and Other: L inattention/apraxia, baseline vision, fluctuating levels of assistance  # of short term goals set= 4  # of short term goals met=0  Physical Therapy Problems     Problem: Balance     Dates: Start: 04/26/20       Description:     Goal: STG-Within one week, patient will maintain dynamic standing     Dates: Start: 04/26/20       Description: 1) Individualized goal:  To progress through dynamic standing there-ex program with SBA  2) Interventions:  PT Gait Training, PT Self Care/Home Eval, PT Therapeutic Exercises, PT Neuro Re-Ed/Balance, PT Therapeutic Activity and PT Evaluation                   Problem: Mobility     Dates: Start: 04/26/20       Description:     Goal: STG-Within one week, patient will propel wheelchair household distances     Dates: Start: 04/26/20       Description: 1) Individualized goal:  100ft bilateral LE with SPV  2) Interventions: PT Gait Training, PT Self Care/Home Eval, PT Therapeutic Exercises, PT Neuro Re-Ed/Balance, PT Therapeutic Activity and PT Evaluation                Goal: STG-Within one week, patient will ambulate community distances     Dates: Start: 04/26/20       Description: 1) Individualized goal:  200ft FWW SBA  2) Interventions: PT Gait Training, PT Self Care/Home Eval, PT Therapeutic Exercises, PT Neuro Re-Ed/Balance, PT Therapeutic Activity and PT Evaluation                     Problem: Mobility Transfers     Dates: Start: 04/26/20       Description:     Goal: STG-Within one week, patient  will transfer bed to chair     Dates: Start: 04/26/20       Description: 1) Individualized goal:  Mod I SPT w/c to bed/toilet surfaces  2) Interventions: PT Gait Training, PT Self Care/Home Eval, PT Therapeutic Exercises, PT Neuro Re-Ed/Balance, PT Therapeutic Activity and PT Evaluation                     Problem: PT-Long Term Goals     Dates: Start: 04/26/20       Description:     Goal: LTG-By discharge, patient will maintain balance     Dates: Start: 04/26/20       Description: 1) Individualized goal:  To perform [place] Balance Assessment with LOW FALL RISK  2) Interventions: PT Gait Training, PT Self Care/Home Eval, PT Therapeutic Exercises, PT Neuro Re-Ed/Balance, PT Therapeutic Activity and PT Evaluation               Goal: LTG-By discharge, patient will ambulate     Dates: Start: 04/26/20       Description: 1) Individualized goal:  200ft x 2 with SPC mod I level surfaces, SPV for uneven terrain  2) Interventions: PT Gait Training, PT Self Care/Home Eval, PT Therapeutic Exercises, PT Neuro Re-Ed/Balance, PT Therapeutic Activity and PT Evaluation               Goal: LTG-By discharge, patient will transfer one surface to another     Dates: Start: 04/26/20       Description: 1) Individualized goal:  Mod I SPT to all surfaces safely and consistently  2) Interventions: PT Gait Training, PT Self Care/Home Eval, PT Therapeutic Exercises, PT Neuro Re-Ed/Balance, PT Therapeutic Activity and PT Evaluation               Goal: LTG-By discharge, patient will perform home exercise program     Dates: Start: 04/26/20       Description: 1) Individualized goal:  Standing LE strengthening program to be done in safe, independent environment 3x/daily  2) Interventions:PT Gait Training, PT Self Care/Home Eval, PT Therapeutic Exercises, PT Neuro Re-Ed/Balance, PT Therapeutic Activity and PT Evaluation                 Goal: LTG-By discharge, patient will ambulate up/down flight of stairs     Dates: Start: 04/26/20       Description: 1)  Individualized goal:  12 stairs bilateral rails mod I (required in home environment to access laundry; uses strategy of counting and using rails for balance)  2) Interventions:PT Gait Training, PT Self Care/Home Eval, PT Therapeutic Exercises, PT Neuro Re-Ed/Balance, PT Therapeutic Activity and PT Evaluation                               Section completed by:  Parvin Bryant, PT    Activities of Daily Living  Eating Initial:  3 - Moderate Assistance  Eating Current:  5 - Standby Prompting/Supervision or Set-up   Eating Description:     Grooming Initial:  3 - Moderate Assistance  Grooming Current:  5 - Standby Prompting/Supervision or Set-up   Grooming Description:  (setup at sink to comb hair and wash hands, verbal cues and increased time to locate items due to visual deficits)  Bathing Initial:  3 - Moderate Assistance  Bathing Current:  4 - Minimal Assistance   Bathing Description:  Grab bar, Tub bench, Hand held shower, Supervision for safety, Verbal cueing, Set-up of equipment, Initial preparation for task(Mostly SBA, but required min A for standing balance due to impaired awareness that left knee was flexing and pt was sinking)  Upper Body Dressing Initial:  4 - Minimal Assistance  Upper Body Dressing Current:  5 - Standby Prompting/Supervision or Set-up   Upper Body Dressing Description:  Supervision for safety, Verbal cueing, Set-up of equipment(pt initially donned shirt backwards; setup/sba with cues to don/doff pullover)  Lower Body Dressing Initial:  2 - Max Assistance  Lower Body Dressing Current:  2 - Max Assistance   Lower Body Dressing Description:  2 - Max Assistance  Toileting Initial:  2 - Max Assistance  Toileting Current:  4 - Minimal Assistance   Toileting Description:  Grab bar, Increased time, Supervision for safety, Verbal cueing  Toilet Transfer Initial:  4 - Minimal Assistance  Toilet Transfer Current:  5 - Standby Prompting/Supervision or Set-up   Toilet Transfer Description:  5 - Standby  Prompting/Supervision or Set-up  Tub / Shower Transfer Initial:  4 - Minimal Assistance  Tub / Shower Transfer Current:  3 - Moderate Assistance   Tub / Shower Transfer Description:  Grab bar, Increased time, Supervision for safety, Adaptive equipment, Verbal cueing, Set-up of equipment(SBA SPT w/c to bench with cues and gb, mod A bench to w/c with gb and cues due to impulsivity, decreased vision (almost missed the chair))  IADL:   Min A for laundry, cooking not yet addressed   Family Training/Education:  Not yet addressed   DME/DC Recommendations:  Tub transfer bench, grab bar installation in shower and likely next to toilet       Strengths:  Motivated for self care and independence, Pleasant and cooperative and Willingly participates in therapeutic activities  Barriers:  Decreased endurance, Poor balance and Other: impaired vision (decline from baseline), impaired sensation and coordination in LUE/LLE       # of short term goals set= 3    # of short term goals met= 1     Occupational Therapy Goals     Problem: Dressing     Dates: Start: 04/26/20       Description:     Goal: STG-Within one week, patient will dress LB     Dates: Start: 04/26/20   Expected End: 05/03/20       Description: 1) Individualized Goal:  At a Min A level with AE as needed.   2) Interventions:  OT Self Care/ADL, OT Community Reintegration, OT Neuro Re-Ed/Balance, OT Therapeutic Activity, OT Evaluation and OT Therapeutic Exercise                   Problem: Functional Transfers     Dates: Start: 04/26/20       Description:     Goal: STG-Within one week, patient will transfer to tub/shower     Dates: Start: 04/26/20   Expected End: 05/03/20       Description: 1) Individualized Goal:  At a Min A level utilizing a tub shower bench.   2) Interventions:  OT Self Care/ADL, OT Neuro Re-Ed/Balance, OT Therapeutic Activity, OT Evaluation and OT Therapeutic Exercise                   Problem: IADL's     Dates: Start: 04/26/20       Description:     Goal:  STG-Within one week, patient will utilize washer and dryer     Dates: Start: 04/26/20   Expected End: 05/03/20       Description: 1) Individualized Goal:  At a CGA level with fading multimodal cues for initial orientation.   2) Interventions:  OT Self Care/ADL, OT Community Reintegration, OT Neuro Re-Ed/Balance, OT Therapeutic Activity, OT Evaluation and OT Therapeutic Exercise                   Problem: OT Long Term Goals     Dates: Start: 04/26/20       Description:     Goal: LTG-By discharge, patient will complete basic self care tasks     Dates: Start: 04/26/20   Expected End: 05/07/20       Description: 1) Individualized Goal:  At a SUP to Mod I level with AE as needed.   2) Interventions:  OT Neuro Re-Ed/Balance, OT Therapeutic Activity, OT Evaluation and OT Therapeutic Exercise             Goal: LTG-By discharge, patient will perform bathroom transfers     Dates: Start: 04/26/20   Expected End: 05/07/20       Description: 1) Individualized Goal:  At a SUP to Mod I level.   2) Interventions:  OT Self Care/ADL, OT Community Reintegration, OT Neuro Re-Ed/Balance, OT Therapeutic Activity, OT Evaluation and OT Therapeutic Exercise             Goal: LTG-By discharge, patient will complete basic home management     Dates: Start: 04/26/20   Expected End: 05/07/20       Description: 1) Individualized Goal:  At a SUP to Mod I level.   2) Interventions:  OT Self Care/ADL, OT Community Reintegration, OT Neuro Re-Ed/Balance, OT Therapeutic Activity, OT Evaluation and OT Therapeutic Exercise                         Section completed by:  Mary Sorto MS,OTR/L    Cognitive Linquistic Functions  Comprehension Initial:  5 - Stand-by Prompting/Supervision or Set-up  Comprehension Current:  5 - Stand-by Prompting/Supervision or Set-up   Comprehension Description:     Expression Initial:  5 - Stand-by Prompting/Supervision or Set-up  Expression Current:  5 - Stand-by Prompting/Supervision or Set-up   Expression Description:      Social Interaction Initial:  7 - Independent  Social Interaction Current:  6 - Modified Independent   Social Interaction Description:     Problem Solving Initial:  2 - Max Assistance  Problem Solving Current:  4 - Minimal Assistance   Problem Solving Description:     Memory Initial:  3 - Moderate Assistance  Memory Current:  3 - Moderate Assistance   Memory Description:     Executive Functioning / Organization Initial:     Executive Functioning / Organization Current:      Executive Functioning Desciption:  Min A, uses  and auto bill pay.   Swallowing  Swallowing Status:  WFL, currently in t-dine for set up, SLP not following for dysphagia.   Orders Placed This Encounter   Procedures   • Diet Order Regular     Standing Status:   Standing     Number of Occurrences:   1     Order Specific Question:   Diet:     Answer:   Regular [1]     Behavior Modification Plan  Keep instructions simple/concrete, Set clear goals, Redirect to task/topic and Reinforce participation in desired tasks  Assistive Technology  Hearing amplification or closed captioning, Low/Impaired vision equipment and Mid-High tech: voice recorder, smart phone functions (calendar, notes, etc), tablet/iPad/Tracy, etc  Family Training/Education:  Not completed  DC Recommendations:    speech therapy  Strengths:  Able to follow instructions, Making steady progress towards goals, Motivated for self care and independence, Pleasant and cooperative and Willingly participates in therapeutic activities  Barriers:  Other: visual deficits, attention, lives alone  # of short term goals set=3  # of short term goals met=1  Speech Therapy Problems     Problem: Memory STGs     Dates: Start: 04/26/20       Description:     Goal: STG-Within one week, patient will     Dates: Start: 04/26/20       Description: 1) Individualized goal: will complete functional memory and attention tasks with at least 80% accuracy and minimal cues  2) Interventions:  SLP Speech  Language Treatment, SLP Self Care / ADL Training , SLP Cognitive Skill Development and SLP Group Treatment                   Problem: Problem Solving STGs     Dates: Start: 04/28/20       Description:     Goal: STG-Within one week, patient will     Dates: Start: 04/28/20       Description: 1) Individualized goal:  Complete alternating attention tasks with 80% accuracy and min verbal cues.   2) Interventions:  SLP Speech Language Treatment, SLP Self Care / ADL Training , SLP Cognitive Skill Development and SLP Group Treatment                   Problem: Speech/Swallowing LTGs     Dates: Start: 04/26/20       Description:     Goal: LTG-By discharge, patient will safely swallow     Dates: Start: 04/26/20       Description: 1) Individualized goal:  Least restrictive diet without overt s/sx of asp for 100% of PO intake.   2) Interventions:  SLP Swallowing Dysfunction Treatment and SLP Oral Pharyngeal Evaluation, SLP Video Swallow Evaluation             Goal: LTG-By discharge, patient will     Dates: Start: 04/26/20       Description: 1) Individualized goal: improve cognitive function to return to PLOF, as indicated by completing functional cognitive tasks with at least 80% accuracy and minimal cues  2) Interventions:  SLP Speech Language Treatment, SLP Self Care / ADL Training , SLP Cognitive Skill Development and SLP Group Treatment                          Section completed by:  Kelle Samson MS,CCC-SLP          [facility]-Pharmacy IDT Team Note by Feridnand Bal RPH at 5/4/2020  5:56 PM  Version 1 of 1    Author:  Ferdinand Bal RPH Service:  -- Author Type:  Pharmacist    Filed:  5/4/2020  5:58 PM Date of Service:  5/4/2020  5:56 PM Status:  Signed    :  Ferdinand Bal RPH (Pharmacist)         Pharmacy   Pharmacy  Antibiotics/Antifungals/Antivirals:  Medication:      Active Orders (From admission, onward)    Ordered     Ordering Provider       Mon May 4, 2020  4:32 PM    05/04/20 1632  linezolid  (ZYVOX) tablet 600 mg  EVERY 12 HOURS     Question:  Indication  Answer:  Infectious source unknown    Cleo Camejo M.D.    05/04/20 1632  piperacillin-tazobactam (ZOSYN) 3.375 g in  mL IVPB  EVERY 6 HOURS      Cleo Camejo M.D.        Route:        PO. IV  Stop Date: both 5/11/20  Reason: leukocytosis,  Antihypertensives/Cardiac:  Medication:    Orders (72h ago, onward)     Start     Ordered    04/29/20 1515  metoprolol (LOPRESSOR) tablet 25 mg  TWICE DAILY      04/29/20 1454    04/26/20 0900  amLODIPine (NORVASC) tablet 5 mg  DAILY      04/25/20 1338    04/25/20 2100  rosuvastatin (CRESTOR) tablet 10 mg  EVERY BEDTIME      04/25/20 1338    04/25/20 1338  hydrALAZINE (APRESOLINE) tablet 25 mg  EVERY 8 HOURS PRN      04/25/20 1338              Patient Vitals for the past 24 hrs:   BP Pulse   05/04/20 1400 114/62 88   05/04/20 1320 123/58 82   05/04/20 1056 134/69 89   05/04/20 1030 126/59 82   05/04/20 0700 108/65 68   05/04/20 0555 113/62 85   05/03/20 1843 128/64 78   05/03/20 1840 135/71 71     Anticoagulation:  Medication: None                                     Other key medications: A review of the medication list reveals no issues at this time. Patient is currently on antihypertensive(s). Recommend home blood pressure monitoring/recording if antihypertensive(s) regimen(s) continue.    Section completed by: Ferdinand Bal [facility][AW.1]                        Attribution Maciel     AW.1 - Ferdinand Bal [facility] on 5/4/2020  5:56 PM                  DC Planning  DC destination/dispostion: TBD. Lives alone 1st floor apartment in [place], [place], multiple steps to complex. Legally blind, uses cane. Has friends for support & transportation. Daughters live out of area.    [facility]errals: TBD: [facility] Will not be able to refer to [facility] AG[facility] [address] [place] [address] physician.    DC Needs: DME for patient safety & mobility. Therapy f/u unknown. MD f/u appts. Transportation.    Barriers to discharge: Lives alone. Functional mobility deficits.  Cognitive deficits. Legally blind, no technology services previous per daughter. [place],  agency does not accept [facility]CP.    Strengths: Supportive family out of area.     Section completed by:  Daina Denney R.N.      Physician Summary  SHANT Sadler MD  participated and led team conference discussion.

## 2020-05-05 NOTE — DISCHARGE PLANNING
"Spoke w/ patient's daughter, Cleo, via phone ([phone]). Reviewed current status, including low BP today, current medication list, Na 129 & WBC 32, addition of abx today. Cleo lives outside [place], other daughter, Yas, lives in [place]. Patient previously independent w/ cane (legally blind, sees 'outlines), friends assist w/ transportation & iADLs, has housekeeping services. States \"inactive lifestyle since numbness in feet. He use to do a lot of walking, but not so much anymore.\" Lives in \"1st floor apartment w/ stairs to get into apt,\" (unsure of # of DEBORAH, does have bilat handrails, but width of stairs does not allow to use both handrails at same time. Daughter believes friends that provide support & assistance are associated w/ his Worship. Daughters concerned about patient's ability to live alone & care for himself. Discussed HH referral for post acute. Need to [facility] w/ [facility] for [facility]. [facility]. Will contact for appts, DC prescriptions & DME referrals. Updated IDT conference tomorrow & will contact daughter to update after conference. Daughter considering coming to assist patient post acute if discharging home & provide transportation. Will verify w/ leadership regarding family training since coming from [place] just prior to discharge (without 14d self quarantine). Discussed if patient & family have discussed possible relocation closer to family for assistance. States \"we've talked about it, but he has always refused. This may become more important now. He has not wanted to leave his home & friends.\" Questions answered. Emotional support provided.  "

## 2020-05-05 NOTE — THERAPY
Physical Therapy   Daily Treatment     Patient Name: Rafael Mccoy  Age:  74 y.o., Sex:  male  Medical Record #: [id number]  Today's Date: 5/5/2020     Precautions  Precautions: (P) Fall Risk, Swallow Precautions ( See Comments)  Comments: (P) legally blind, L apraxia    Subjective    Pt resting in bed, he was agreeable to PT     Objective       05/05/20 1031   Precautions   Precautions Fall Risk;Swallow Precautions ( See Comments)   Comments legally blind, L apraxia   Vitals   Pulse 68   Patient BP Position Sitting   Blood Pressure  118/53   Sitting Lower Body Exercises   Other Exercises LE motomed x 11' 51/49 L/R symmetry, distance 0.75mi level 2 resistance   Bed Mobility    Supine to Sit Supervised   Sit to Supine Supervised   Sit to Stand Contact Guard Assist   Interdisciplinary Plan of Care Collaboration   IDT Collaboration with  Physician;Occupational Therapist   Patient Position at End of Therapy Seated;Self Releasing Lap Belt Applied  (in t-dine)   Collaboration Comments rounding with patient in am   PT Total Time Spent   PT Individual Total Time Spent (Mins) 60   PT Charge Group   PT Gait Training 1   PT Therapeutic Exercise 1   PT Neuromuscular Re-Education / Balance 1   PT Therapeutic Activities 1   Supervising Physical Therapist Parvin Bryant       FIM Bed/Chair/Wheelchair Transfers Score: 4 - Minimal Assistance  Bed/Chair/Wheelchair Transfers Description:       FIM Walking Score:  1 - Total Assistance  Walking Description:  Extra time, Walker, Safety concerns, Verbal cueing, Supervision for safety, Requires incidental assist(150' x 1 100' x 1 FWW CGA, 2nd person managing IV pole, 40' x 1 in // CGA)    FIM Wheelchair Score:  1 - Total Assistance  Wheelchair Description:       FIM Stairs Score:  1 - Total Assistance  Stairs Description:       Standing tolerance in // with and without UE support 1' each, manual cues for upright posture and midline orientation    Assessment    Pt able to participate in  structured therapy session today with improved performance from yesterdays session, although still reports intermittent lightheadedness an generalized fatigue, vitals above.  pt was able to amb in the // and progressed back to a walker however required a 2nd person for safety to mange the patients IV pole. Limitations/barriers include impaired balance, decreased proprioception, poor postural reactions and impaired vision.    Plan    vector no AD, variable gait, forced use L side, lite gait for increased gait speed, cont gait training with single walking stick

## 2020-05-06 PROCEDURE — 700102 HCHG RX REV CODE 250 W/ 637 OVERRIDE(OP): Performed by: HOSPITALIST

## 2020-05-06 PROCEDURE — 700111 HCHG RX REV CODE 636 W/ 250 OVERRIDE (IP): Performed by: HOSPITALIST

## 2020-05-06 PROCEDURE — A9270 NON-COVERED ITEM OR SERVICE: HCPCS | Performed by: HOSPITALIST

## 2020-05-06 PROCEDURE — 97129 THER IVNTJ 1ST 15 MIN: CPT

## 2020-05-06 PROCEDURE — 97530 THERAPEUTIC ACTIVITIES: CPT

## 2020-05-06 PROCEDURE — 97130 THER IVNTJ EA ADDL 15 MIN: CPT

## 2020-05-06 PROCEDURE — A9270 NON-COVERED ITEM OR SERVICE: HCPCS | Performed by: PHYSICAL MEDICINE & REHABILITATION

## 2020-05-06 PROCEDURE — 97112 NEUROMUSCULAR REEDUCATION: CPT

## 2020-05-06 PROCEDURE — 700105 HCHG RX REV CODE 258: Performed by: PHYSICAL MEDICINE & REHABILITATION

## 2020-05-06 PROCEDURE — 700102 HCHG RX REV CODE 250 W/ 637 OVERRIDE(OP): Performed by: PHYSICAL MEDICINE & REHABILITATION

## 2020-05-06 PROCEDURE — 99232 SBSQ HOSP IP/OBS MODERATE 35: CPT | Performed by: PHYSICAL MEDICINE & REHABILITATION

## 2020-05-06 PROCEDURE — 97116 GAIT TRAINING THERAPY: CPT

## 2020-05-06 PROCEDURE — 770010 HCHG ROOM/CARE - REHAB SEMI PRIVAT*

## 2020-05-06 PROCEDURE — 700105 HCHG RX REV CODE 258: Performed by: HOSPITALIST

## 2020-05-06 PROCEDURE — 99232 SBSQ HOSP IP/OBS MODERATE 35: CPT | Performed by: HOSPITALIST

## 2020-05-06 PROCEDURE — 97535 SELF CARE MNGMENT TRAINING: CPT

## 2020-05-06 RX ORDER — DEXAMETHASONE 1 MG
2 TABLET ORAL 2 TIMES DAILY
Status: DISCONTINUED | OUTPATIENT
Start: 2020-05-06 | End: 2020-05-11 | Stop reason: HOSPADM

## 2020-05-06 RX ADMIN — METOPROLOL TARTRATE 25 MG: 25 TABLET, FILM COATED ORAL at 17:09

## 2020-05-06 RX ADMIN — DEXAMETHASONE 2 MG: 1 TABLET ORAL at 19:12

## 2020-05-06 RX ADMIN — LACOSAMIDE 100 MG: 100 TABLET, FILM COATED ORAL at 08:56

## 2020-05-06 RX ADMIN — PIPERACILLIN AND TAZOBACTAM 3.38 G: 3; .375 INJECTION, POWDER, LYOPHILIZED, FOR SOLUTION INTRAVENOUS; PARENTERAL at 00:01

## 2020-05-06 RX ADMIN — PIPERACILLIN AND TAZOBACTAM 3.38 G: 3; .375 INJECTION, POWDER, LYOPHILIZED, FOR SOLUTION INTRAVENOUS; PARENTERAL at 17:09

## 2020-05-06 RX ADMIN — LEVETIRACETAM 1000 MG: 500 TABLET ORAL at 19:11

## 2020-05-06 RX ADMIN — LINEZOLID 600 MG: 600 TABLET, FILM COATED ORAL at 08:56

## 2020-05-06 RX ADMIN — DEXAMETHASONE 4 MG: 4 TABLET ORAL at 08:56

## 2020-05-06 RX ADMIN — LACOSAMIDE 100 MG: 100 TABLET, FILM COATED ORAL at 19:12

## 2020-05-06 RX ADMIN — GABAPENTIN 600 MG: 300 CAPSULE ORAL at 19:11

## 2020-05-06 RX ADMIN — ROSUVASTATIN CALCIUM 10 MG: 10 TABLET, FILM COATED ORAL at 19:12

## 2020-05-06 RX ADMIN — PIPERACILLIN AND TAZOBACTAM 3.38 G: 3; .375 INJECTION, POWDER, LYOPHILIZED, FOR SOLUTION INTRAVENOUS; PARENTERAL at 05:44

## 2020-05-06 RX ADMIN — LINEZOLID 600 MG: 600 TABLET, FILM COATED ORAL at 19:12

## 2020-05-06 RX ADMIN — PIPERACILLIN AND TAZOBACTAM 3.38 G: 3; .375 INJECTION, POWDER, LYOPHILIZED, FOR SOLUTION INTRAVENOUS; PARENTERAL at 23:49

## 2020-05-06 RX ADMIN — GABAPENTIN 600 MG: 300 CAPSULE ORAL at 17:08

## 2020-05-06 RX ADMIN — SODIUM CHLORIDE: 9 INJECTION, SOLUTION INTRAVENOUS at 03:20

## 2020-05-06 RX ADMIN — GABAPENTIN 600 MG: 300 CAPSULE ORAL at 08:56

## 2020-05-06 RX ADMIN — AMLODIPINE BESYLATE 5 MG: 5 TABLET ORAL at 08:56

## 2020-05-06 RX ADMIN — PIPERACILLIN AND TAZOBACTAM 3.38 G: 3; .375 INJECTION, POWDER, LYOPHILIZED, FOR SOLUTION INTRAVENOUS; PARENTERAL at 12:22

## 2020-05-06 RX ADMIN — LEVETIRACETAM 1000 MG: 500 TABLET ORAL at 08:56

## 2020-05-06 ASSESSMENT — ENCOUNTER SYMPTOMS
DIZZINESS: 0
SHORTNESS OF BREATH: 0
VOMITING: 0
HALLUCINATIONS: 0
NAUSEA: 0
HEADACHES: 0
FEVER: 0
BLURRED VISION: 0
PALPITATIONS: 0

## 2020-05-06 NOTE — THERAPY
Physical Therapy   Daily Treatment     Patient Name: Rafael Mccoy  Age:  74 y.o., Sex:  male  Medical Record #: [id number]  Today's Date: 5/6/2020     Precautions  Precautions: (P) Fall Risk, Swallow Precautions ( See Comments)  Comments: (P) legally blind, L apraxia    Subjective    Patient seated in w/c and agreeable to therapy.     Objective       05/06/20 1001   Precautions   Precautions Fall Risk;Swallow Precautions ( See Comments)   Comments legally blind, L apraxia   Bed Mobility    Supine to Sit Supervised   Sit to Supine Supervised   Sit to Stand Contact Guard Assist   Scooting Supervised   Rolling Supervised   Neuro-Muscular Treatments   Comments Tall kneeling performing BUE anterior press 2x15; low<>tall kneeling squats 2x15; half tall kneeling performing BUE with trunk rotation 1x10 each direction. Performed with SBA-CGA, verbal cues, and prone rest breaks.   Interdisciplinary Plan of Care Collaboration   IDT Collaboration with  Physician   Patient Position at End of Therapy Seated;Chair Alarm On;Self Releasing Lap Belt Applied;Call Light within Reach;Tray Table within Reach   Collaboration Comments CLOF   PT Total Time Spent   PT Individual Total Time Spent (Mins) 60   PT Charge Group   PT Gait Training 1   PT Neuromuscular Re-Education / Balance 2   PT Therapeutic Activities 1       FIM Bed/Chair/Wheelchair Transfers Score: 4 - Minimal Assistance  Bed/Chair/Wheelchair Transfers Description:  (SBA-CGA stand step/pivot transfer with UE support. )    FIM Walking Score:  4 - Minimal Assistance  Walking Description:  Assist device/equipment, Requires incidental assist(125 ftx1, 250 ftx1 with R walking stick and CGA-SBA for steadying and IV pole management. Cues for environment negotiation due to visual deficits)      Assessment    Patient tolerated session well, focus of session on dynamic balance and strengthening in tall kneeling positions. Patient demonstrating good activity tolerance and improving  functional mobility and ambulation.    Plan    vector no AD, variable gait, forced use L side, lite gait for increased gait speed, cont gait training with single walking stick

## 2020-05-06 NOTE — THERAPY
"Occupational Therapy  Daily Treatment     Patient Name: Rafael Mccoy  Age:  74 y.o., Sex:  male  Medical Record #: [id number]  Today's Date: 2020     Precautions  Precautions: (P) Fall Risk, Swallow Precautions ( See Comments)  Comments: (P) legally blind, L apraxia    Safety   ADL Safety : Impaired, Requires Supervision for Safety, Requires Physical Assist for Safety, Impaired Insight into Safety, Requires Cueing for Safety, Impulsive  Bathroom Safety: Impaired, Impulsive, Requires Supervision for Safety, Requires Physical Assist for Safety, Impaired Insight into Safety, Requires Cuing for Safety    Subjective    \"I didn't sleep well last night, I've been worrying about everything\"     Objective       20 0701   Precautions   Precautions Fall Risk;Swallow Precautions ( See Comments)   Comments legally blind, L apraxia   OT Total Time Spent   OT Individual Total Time Spent (Mins) 60   OT Charge Group   OT Self Care / ADL 4       FIM Grooming Score:  5 - Standby Prompting/Supervision or Set-up  Grooming Description:  (setup at sink to comb hair)    FIM Bathing Score:  4 - Minimal Assistance  Bathing Description:       FIM Upper Body Dressin - Standby Prompting/Supervision or Set-up  Upper Body Dressing Description:  (setup to don tshirt)    FIM Lower Body Dressing Score:  4 - Minimal Assistance  Lower Body Dressing Description:  (min A for threading brief, SBA and increased time to don pants and socks, UE support at sink)    FIM Tub/Shower Transfers Score:  4 - Minimal Assistance  Tub/Shower Transfers Description:  Grab bar(stand pivot w/c <> shower chair via grab bar)      Assessment    Pt tolerated session well, demos improvements from earlier this week in mobility, midline orientation, and alertness, requires increased time and min A to complete ADL tasks due to impaired vision, impaired sensation/coordination in LUE, impaired balance. CGA/SBA for standing tasks this session both with and without " UE support.     Plan    LUE neuro re-ed, FM/GM coordination, sitting/standing balance to correct left lean, ADLs, IADLs, management of visual deficits

## 2020-05-06 NOTE — CARE PLAN
Problem: Communication  Goal: The ability to communicate needs accurately and effectively will improve  Outcome: PROGRESSING AS EXPECTED  Note: Pt is able to communicate his needs effectively to staff.      Problem: Urinary Elimination:  Goal: Ability to reestablish a normal urinary elimination pattern will improve  Outcome: PROGRESSING SLOWER THAN EXPECTED  Note: Incontinent of urine and wears condom catheter at nighttime     Problem: Respiratory:  Goal: Respiratory status will improve  Outcome: PROGRESSING AS EXPECTED  Note: Pt is on room air and respirations are WNL.

## 2020-05-06 NOTE — PROGRESS NOTES
[facility] Daily [facility] Note    Date of Service  5/6/2020    Chief Complaint:  Hypertension  Leukocytosis  Hyponatremia    Interval History:  No significant events or changes since last visit    Review of Systems  Review of Systems   Constitutional: Negative for fever.   Eyes: Negative for blurred vision.   Respiratory: Negative for shortness of breath.    Cardiovascular: Negative for palpitations.   Gastrointestinal: Negative for nausea and vomiting.   Neurological: Negative for dizziness and headaches.   Psychiatric/Behavioral: Negative for hallucinations.        Physical Exam  Temp:  [36.3 °C (97.3 °F)-36.5 °C (97.7 °F)] 36.5 °C (97.7 °F)  Pulse:  [55-68] 57  Resp:  [17-18] 17  BP: (106-123)/(51-66) 123/66  SpO2:  [93 %-94 %] 93 %    Physical Exam  Vitals signs and nursing note reviewed.   Constitutional:       Appearance: Normal appearance.   HENT:      Head: Atraumatic.   Eyes:      Conjunctiva/sclera: Conjunctivae normal.      Pupils: Pupils are equal, round, and reactive to light.   Neck:      Musculoskeletal: Normal range of motion and neck supple.   Cardiovascular:      Rate and Rhythm: Normal rate and regular rhythm.      Heart sounds: No murmur.   Pulmonary:      Effort: Pulmonary effort is normal.      Breath sounds: No stridor. No wheezing or rales.   Abdominal:      General: There is no distension.      Palpations: Abdomen is soft.      Tenderness: There is no abdominal tenderness.   Musculoskeletal:      Right lower leg: No edema.      Left lower leg: No edema.   Skin:     General: Skin is warm and dry.      Findings: No rash.   Neurological:      Mental Status: He is alert and oriented to person, place, and time.   Psychiatric:         Mood and Affect: Mood normal.         Behavior: Behavior normal.         Fluids    Intake/Output Summary (Last 24 hours) at 5/6/2020 0987  Last data filed at 5/6/2020 0900  Gross per 24 hour   Intake 1950 ml   Output 750 ml   Net 1200 ml        Laboratory  Recent Labs     05/04/20  1500 05/05/20  0611   WBC 32.3* 25.2*   RBC 4.70 3.91*   HEMOGLOBIN 14.3 11.9*   HEMATOCRIT 41.7* 34.6*   MCV 88.7 88.5   MCH 30.4 30.4   MCHC 34.3 34.4   RDW 43.4 44.3   PLATELETCT 161* 124*   MPV 10.5 10.7     Recent Labs     05/04/20  1250 05/05/20  0611   SODIUM 129* 129*   POTASSIUM 4.1 4.3   CHLORIDE 92* 97   CO2 26 21   GLUCOSE 162* 169*   BUN 25* 20   CREATININE 0.95 0.94   CALCIUM 9.0 8.2*                   Imaging    Assessment/Plan  * Subdural hematoma (HCC)- (present on admission)  Assessment & Plan  2nd to fall in bathtub  S/P craniotomy (4/17)  On Decadron  Repeat CT head: no acute changes    Seizures, 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)  Assessment & Plan  Had breakthrough seizure on prophylactic Keppra  EEG showed cortical instability  Now on Vimpat and Keppra    Essential hypertension- (present on admission)  Assessment & Plan  BP ok  On Norvasc: 5 mg daily  On Lopressor: 25 mg bid  Note: on IVF's -- will d/c (5/6)  Monitor    Leukocytosis  Assessment & Plan  WBC's: 32 --> 25 (5/5)  (5/4): Tmax: 100.1  (5/5): afebrile  (5/6): afebrile  Crani site healing well  CT head: no acute changes  CXR (5/5): ill-defined opacifications in each lung have increased compared to the prior radiograph;                    this could indicate worsening of pulmonary edema or inflammation  Covid-19 (-)  Flu (-)  F/U UC  BC x 2: shows 1 out of 2 bottles pos -- cont to follow  Note: on steroids  On Zyvox & Zosyn empirically (thru 5/11)    Hyponatremia  Assessment & Plan  Na: 129 (5/4) --> 129 (5/5)  On IVF's NS --> will d/c (5/6)  Cont to monitor    Hypercholesterolemia- (present on admission)  Assessment & Plan  On Crestor    Legally blind- (present on admission)  Assessment & Plan  Has hx history

## 2020-05-06 NOTE — PROGRESS NOTES
"Rehab Progress Note     Encounter Date: 5/6/2020    CC: SDH, decreased mobility, weakness    Interval Events (Subjective)  Patient sitting up in therapy gym. He reports he understands we are treating an infection possibly. Discussed will decrease steroids as he has been stable. He denies fever or chills. He is now in agreement for [facility]. He reports he would rather go there than go to one of his daughters home. Discussed with CM and patient will need long term plan though.     IDT Team Meeting 5/5/2020  DC/Disposition:  5/11/20    Objective:  VITAL SIGNS: /58   Pulse 60   Temp 36.3 °C (97.3 °F) (Temporal)   Resp 18   Ht 1.753 m (5' 9\")   Wt 73.4 kg (161 lb 13.1 oz)   SpO2 93%   BMI 23.90 kg/m²   Gen: NAD  Psych: Mood and affect appropriate  CV: RRR, no edema  Resp: CTAB, no upper airway sounds  Abd: NTND  Neuro: AOx4, following simple commands  Unchanged from 5/5/20     Recent Results (from the past 72 hour(s))   ACCU-CHEK GLUCOSE    Collection Time: 05/04/20 10:35 AM   Result Value Ref Range    Glucose - Accu-Ck 123 (H) 65 - 99 mg/dL   Basic Metabolic Panel    Collection Time: 05/04/20 12:50 PM   Result Value Ref Range    Sodium 129 (L) 135 - 145 mmol/L    Potassium 4.1 3.6 - 5.5 mmol/L    Chloride 92 (L) 96 - 112 mmol/L    Co2 26 20 - 33 mmol/L    Glucose 162 (H) 65 - 99 mg/dL    Bun 25 (H) 8 - 22 mg/dL    Creatinine 0.95 0.50 - 1.40 mg/dL    Calcium 9.0 8.5 - 10.5 mg/dL    Anion Gap 11.0 7.0 - 16.0   ESTIMATED GFR    Collection Time: 05/04/20 12:50 PM   Result Value Ref Range    GFR If African American >60 >60 mL/min/1.73 m 2    GFR If Non African American >60 >60 mL/min/1.73 m 2   BLOOD CULTURE    Collection Time: 05/04/20  1:00 PM   Result Value Ref Range    Significant Indicator NEG     Source BLD     Site PERIPHERAL     Culture Result       No Growth  Note: Blood cultures are incubated for 5 days and  are monitored continuously.Positive blood cultures  are called to the RN " and reported as soon as  they are identified.     CBC WITH DIFFERENTIAL    Collection Time: 05/04/20  3:00 PM   Result Value Ref Range    WBC 32.3 (HH) 4.8 - 10.8 K/uL    RBC 4.70 4.70 - 6.10 M/uL    Hemoglobin 14.3 14.0 - 18.0 g/dL    Hematocrit 41.7 (L) 42.0 - 52.0 %    MCV 88.7 81.4 - 97.8 fL    MCH 30.4 27.0 - 33.0 pg    MCHC 34.3 33.7 - 35.3 g/dL    RDW 43.4 35.9 - 50.0 fL    Platelet Count 161 (L) 164 - 446 K/uL    MPV 10.5 9.0 - 12.9 fL    Neutrophils-Polys 90.70 (H) 44.00 - 72.00 %    Lymphocytes 1.10 (L) 22.00 - 41.00 %    Monocytes 5.90 0.00 - 13.40 %    Eosinophils 0.00 0.00 - 6.90 %    Basophils 0.20 0.00 - 1.80 %    Immature Granulocytes 2.10 (H) 0.00 - 0.90 %    Nucleated RBC 0.00 /100 WBC    Neutrophils (Absolute) 29.24 (H) 1.82 - 7.42 K/uL    Lymphs (Absolute) 0.35 (L) 1.00 - 4.80 K/uL    Monos (Absolute) 1.91 (H) 0.00 - 0.85 K/uL    Eos (Absolute) 0.00 0.00 - 0.51 K/uL    Baso (Absolute) 0.06 0.00 - 0.12 K/uL    Immature Granulocytes (abs) 0.69 (H) 0.00 - 0.11 K/uL    NRBC (Absolute) 0.00 K/uL   BLOOD CULTURE    Collection Time: 05/04/20  5:00 PM   Result Value Ref Range    Significant Indicator POS (POS)     Source BLD     Site PERIPHERAL     Culture Result (A)      Growth detected by [employer] instrument. 05/05/2020  07:06    Culture Result Lactose fermenting Gram negative jose a (A)    COVID/SARS CoV-2    Collection Time: 05/04/20  6:00 PM   Result Value Ref Range    COVID Order Status Received    Influenza A/B By PCR (Adult - Flu Only)    Collection Time: 05/04/20  6:00 PM   Result Value Ref Range    Influenza virus A RNA Negative Negative    Influenza virus B, PCR Negative Negative   SARS-CoV-2, PCR (In-House)    Collection Time: 05/04/20  6:00 PM   Result Value Ref Range    SARS-CoV-2 Source NP Swab     SARS-CoV-2 by PCR NotDetected NotDetected   URINALYSIS    Collection Time: 05/04/20  8:41 PM   Result Value Ref Range    Color Yellow     Character Turbid (A)     Specific Gravity 1.025 <1.035    Ph  5.5 5.0 - 8.0    Glucose 250 (A) Negative mg/dL    Ketones Negative Negative mg/dL    Protein Negative Negative mg/dL    Bilirubin Negative Negative    Urobilinogen, Urine 0.2 Negative    Nitrite Positive (A) Negative    Leukocyte Esterase Negative Negative    Occult Blood Negative Negative    Micro Urine Req Microscopic    URINE MICROSCOPIC (W/UA)    Collection Time: 05/04/20  8:41 PM   Result Value Ref Range    WBC 10-20 (A) /hpf    RBC 0-2 (A) /hpf    Bacteria Negative None /hpf    Epithelial Cells Negative /hpf    Amorphous Crystal Present /hpf    Uric Acid Crystal Positive /hpf    Hyaline Cast 0-2 /lpf   CBC WITH DIFFERENTIAL    Collection Time: 05/05/20  6:11 AM   Result Value Ref Range    WBC 25.2 (H) 4.8 - 10.8 K/uL    RBC 3.91 (L) 4.70 - 6.10 M/uL    Hemoglobin 11.9 (L) 14.0 - 18.0 g/dL    Hematocrit 34.6 (L) 42.0 - 52.0 %    MCV 88.5 81.4 - 97.8 fL    MCH 30.4 27.0 - 33.0 pg    MCHC 34.4 33.7 - 35.3 g/dL    RDW 44.3 35.9 - 50.0 fL    Platelet Count 124 (L) 164 - 446 K/uL    MPV 10.7 9.0 - 12.9 fL    Neutrophils-Polys 92.40 (H) 44.00 - 72.00 %    Lymphocytes 2.00 (L) 22.00 - 41.00 %    Monocytes 4.00 0.00 - 13.40 %    Eosinophils 0.00 0.00 - 6.90 %    Basophils 0.10 0.00 - 1.80 %    Immature Granulocytes 1.50 (H) 0.00 - 0.90 %    Nucleated RBC 0.00 /100 WBC    Neutrophils (Absolute) 23.28 (H) 1.82 - 7.42 K/uL    Lymphs (Absolute) 0.50 (L) 1.00 - 4.80 K/uL    Monos (Absolute) 1.01 (H) 0.00 - 0.85 K/uL    Eos (Absolute) 0.00 0.00 - 0.51 K/uL    Baso (Absolute) 0.02 0.00 - 0.12 K/uL    Immature Granulocytes (abs) 0.37 (H) 0.00 - 0.11 K/uL    NRBC (Absolute) 0.00 K/uL   Comp Metabolic Panel    Collection Time: 05/05/20  6:11 AM   Result Value Ref Range    Sodium 129 (L) 135 - 145 mmol/L    Potassium 4.3 3.6 - 5.5 mmol/L    Chloride 97 96 - 112 mmol/L    Co2 21 20 - 33 mmol/L    Anion Gap 11.0 7.0 - 16.0    Glucose 169 (H) 65 - 99 mg/dL    Bun 20 8 - 22 mg/dL    Creatinine 0.94 0.50 - 1.40 mg/dL    Calcium 8.2  (L) 8.5 - 10.5 mg/dL    AST(SGOT) 15 12 - 45 U/L    ALT(SGPT) 30 2 - 50 U/L    Alkaline Phosphatase 64 30 - 99 U/L    Total Bilirubin 0.6 0.1 - 1.5 mg/dL    Albumin 2.4 (L) 3.2 - 4.9 g/dL    Total Protein 5.0 (L) 6.0 - 8.2 g/dL    Globulin 2.6 1.9 - 3.5 g/dL    A-G Ratio 0.9 g/dL   MAGNESIUM    Collection Time: 05/05/20  6:11 AM   Result Value Ref Range    Magnesium 2.2 1.5 - 2.5 mg/dL   PHOSPHORUS    Collection Time: 05/05/20  6:11 AM   Result Value Ref Range    Phosphorus 3.2 2.5 - 4.5 mg/dL   ESTIMATED GFR    Collection Time: 05/05/20  6:11 AM   Result Value Ref Range    GFR If African American >60 >60 mL/min/1.73 m 2    GFR If Non African American >60 >60 mL/min/1.73 m 2       Current Facility-Administered Medications   Medication Frequency   • piperacillin-tazobactam (ZOSYN) 3.375 g in  mL IVPB Q6HRS   • linezolid (ZYVOX) tablet 600 mg Q12HRS   • senna-docusate (PERICOLACE or SENOKOT S) 8.6-50 MG per tablet 2 Tab BID PRN    And   • polyethylene glycol/lytes (MIRALAX) PACKET 1 Packet QDAY PRN    And   • magnesium hydroxide (MILK OF MAGNESIA) suspension 30 mL QDAY PRN    And   • bisacodyl (DULCOLAX) suppository 10 mg QDAY PRN   • gabapentin (NEURONTIN) capsule 600 mg TID   • metoprolol (LOPRESSOR) tablet 25 mg TWICE DAILY   • Respiratory Therapy Consult Continuous RT   • oxyCODONE immediate-release (ROXICODONE) tablet 5 mg Q3HRS PRN   • oxyCODONE immediate release (ROXICODONE) tablet 10 mg Q3HRS PRN   • tramadol (ULTRAM) 50 MG tablet 50 mg Q4HRS PRN   • hydrALAZINE (APRESOLINE) tablet 25 mg Q8HRS PRN   • acetaminophen (TYLENOL) tablet 650 mg Q4HRS PRN   • artificial tears ophthalmic solution 1 Drop PRN   • benzocaine-menthol (CEPACOL) lozenge 1 Lozenge Q2HRS PRN   • mag hydrox-al hydrox-simeth (MAALOX PLUS ES or MYLANTA DS) suspension 20 mL Q2HRS PRN   • ondansetron (ZOFRAN ODT) dispertab 4 mg 4X/DAY PRN    Or   • ondansetron (ZOFRAN) syringe/vial injection 4 mg 4X/DAY PRN   • traZODone (DESYREL) tablet 50  mg QHS PRN   • sodium chloride (OCEAN) 0.65 % nasal spray 2 Spray PRN   • amLODIPine (NORVASC) tablet 5 mg DAILY   • dexamethasone (DECADRON) tablet 4 mg BID   • lacosamide (VIMPAT) tablet 100 mg BID   • levETIRAcetam (KEPPRA) tablet 1,000 mg BID   • rosuvastatin (CRESTOR) tablet 10 mg QHS       Orders Placed This Encounter   Procedures   • Diet Order Regular     Standing Status:   Standing     Number of Occurrences:   1     Order Specific Question:   Diet:     Answer:   Regular [1]       Assessment:  Active Hospital Problems    Diagnosis   • *Subdural hematoma (HCC)   • Impaired mobility and ADLs   • Seizures, post-traumatic (HCC)   • Oropharyngeal dysphagia   • Urinary retention   • Essential hypertension   • Hypercholesterolemia   • Legally blind       Medical Decision Making and Plan:  TBI (Traumatic Brain Injury): Fall with SDH s/p R sided evacuation with Dr. Mendiola on 4/17/20. Patient started on Steroids. Had post-operative seizure like activity, EEG with cortical irritation  -PT and OT for mobility and ADLs  -SLP for cognition  -NSG follow-up. Per most recent NSG recommendations continue steroids for a week. Recommend CT head - scheduled 5/4/20 - stable changes.   -On Decadron 4 mg BID per NSG -> decrease to 2 mg BID     Dysphagia - On dysphagia diet on transfer. SLP for swallow evaluation - continue dysphagia 3 with NTL. MBSS 4/27/20 - upgraded to dysphagia with thins. Upgraded to regular/thins. Resolved     Seizure disorder - Patient with previous CVA with seizures. Increased [facility]EDs at [facility] to Keppra 1000 mg BID and Vimpat 100 mg BID  -Follow-up Neurology seizure clinic     Orthostatic Hypotension - Occurred on 5/4/20 when going up stairs. Start IVF recheck. Improved    Leukocytosis - up to 32 after orthostatic hypotension. On steroids, but much elevated. Consulted hospitalist and check blood cultures. Covid 19 - negative x1. WBC improved to 25 on 5/5/20, blood cultures positive x1 on broad spectrum  antibiotics. Patient clinically feels better.   -Blood cultures gram - lactose fermenters. UCx - pending. Continue Zosyn and Zyvox    HTN - Patient on 5 mg Amlodipine. Previously on metoprolol as well. Continue to monitor, SBP elevated restart Metoprolol 25 mg BID     Legally blind - High risk for falls.      Anemia - 13.1 on admission, continue to monitor    Hyponatremia - 129 on 5/4/20, now on IVF    Hiccups - Started on Gabapentin 300 mg TID -> 400 mg TID. Improved but occasional increase to 600    Azotemia - Patient on NTL diet on admission with poor fluid intake. Improved with fluids    HLD - Patient on Rosuvastatin 10 mg on transfer.     GI Ppx - Patient with loose stools on senna-docusate. Change to PRN    DVT Ppx - Patient high risk for bleed. Ambulating > 100 feet.     Dispo - [facility] agreeable [facility].     Total time:  26 minutes.  I spent greater than 50% of the time for patient care, counseling, and coordination on this date, including unit/floor time, and face-to-face time with the patient as per interval events and assessment and plan above. Topics discussed included discharge planning, following blood cultures and urine cultures, improving orthostatic hypotension, and decrease steroids.     Lyn Sadler M.D.

## 2020-05-06 NOTE — THERAPY
Speech Language Pathology  Daily Treatment     Patient Name: Rafael Mccoy  Age:  74 y.o., Sex:  male  Medical Record #: [id number]  Today's Date: 5/6/2020     Subjective    Patient was willing to participate.      Objective       05/06/20 1332   Cognition   Auditory Math Moderate (3)   Functional Math / Financial Management Moderate (3)   SLP Total Time Spent   SLP Individual Total Time Spent (Mins) 60   Charge Group   SLP Cognitive Skill Development First 15 Minutes 1   SLP Cognitive Skill Development Additional 15 Minutes 3         Assessment    Patient completed auditory math related to financial management. Min cues initially needed increasing to mod with poor endurance noted.     Plan    Continue functional problem solving tasks.

## 2020-05-06 NOTE — DISCHARGE PLANNING
Spoke w/ daughter via phone to update IDT team recommendations & targeted DC date 5.11.2020. Updated regarding lack of services available in [place] for post acute mgmt w/ [facility] mg[facility]. No accepting HH agency in area. Daughter willing to come to area to stay w/ patient, but would need HH for ongoing therapies. Questions answered. Emotional support provided. Daughter has left message w/  for patient's apartment to inquire about having grab bars installed in shower.    Met w/ patient to updated IDT recommendations & targeted DC date. Patient concerned about DC home alone. Updated regarding conversation w/ daughter about coming to area to assist post acute. Updated regarding no accepting HH agency for [place] w/ [facility]. Will continue to work w/ patient & daughter for DC planning assistance & referrals. Questions answered. Emotional support provided.

## 2020-05-07 LAB
BACTERIA BLD CULT: ABNORMAL
BACTERIA BLD CULT: ABNORMAL
BACTERIA UR CULT: NORMAL
LEVETIRACETAM SERPL-MCNC: 19 UG/ML (ref 12–46)
SIGNIFICANT IND 70042: ABNORMAL
SIGNIFICANT IND 70042: NORMAL
SITE SITE: ABNORMAL
SITE SITE: NORMAL
SOURCE SOURCE: ABNORMAL
SOURCE SOURCE: NORMAL

## 2020-05-07 PROCEDURE — 97112 NEUROMUSCULAR REEDUCATION: CPT

## 2020-05-07 PROCEDURE — 97116 GAIT TRAINING THERAPY: CPT

## 2020-05-07 PROCEDURE — 97130 THER IVNTJ EA ADDL 15 MIN: CPT

## 2020-05-07 PROCEDURE — 97535 SELF CARE MNGMENT TRAINING: CPT | Mod: CO

## 2020-05-07 PROCEDURE — A9270 NON-COVERED ITEM OR SERVICE: HCPCS | Performed by: HOSPITALIST

## 2020-05-07 PROCEDURE — 700111 HCHG RX REV CODE 636 W/ 250 OVERRIDE (IP): Performed by: HOSPITALIST

## 2020-05-07 PROCEDURE — 99232 SBSQ HOSP IP/OBS MODERATE 35: CPT | Performed by: PHYSICAL MEDICINE & REHABILITATION

## 2020-05-07 PROCEDURE — 97129 THER IVNTJ 1ST 15 MIN: CPT

## 2020-05-07 PROCEDURE — 97110 THERAPEUTIC EXERCISES: CPT | Mod: CO

## 2020-05-07 PROCEDURE — 770010 HCHG ROOM/CARE - REHAB SEMI PRIVAT*

## 2020-05-07 PROCEDURE — 700105 HCHG RX REV CODE 258: Performed by: HOSPITALIST

## 2020-05-07 PROCEDURE — 99232 SBSQ HOSP IP/OBS MODERATE 35: CPT | Performed by: HOSPITALIST

## 2020-05-07 PROCEDURE — 700102 HCHG RX REV CODE 250 W/ 637 OVERRIDE(OP): Performed by: HOSPITALIST

## 2020-05-07 PROCEDURE — 700102 HCHG RX REV CODE 250 W/ 637 OVERRIDE(OP): Performed by: PHYSICAL MEDICINE & REHABILITATION

## 2020-05-07 PROCEDURE — A9270 NON-COVERED ITEM OR SERVICE: HCPCS | Performed by: PHYSICAL MEDICINE & REHABILITATION

## 2020-05-07 RX ADMIN — PIPERACILLIN AND TAZOBACTAM 3.38 G: 3; .375 INJECTION, POWDER, LYOPHILIZED, FOR SOLUTION INTRAVENOUS; PARENTERAL at 06:00

## 2020-05-07 RX ADMIN — DEXAMETHASONE 2 MG: 1 TABLET ORAL at 20:47

## 2020-05-07 RX ADMIN — GABAPENTIN 600 MG: 300 CAPSULE ORAL at 09:17

## 2020-05-07 RX ADMIN — GABAPENTIN 600 MG: 300 CAPSULE ORAL at 20:47

## 2020-05-07 RX ADMIN — GABAPENTIN 600 MG: 300 CAPSULE ORAL at 14:21

## 2020-05-07 RX ADMIN — LINEZOLID 600 MG: 600 TABLET, FILM COATED ORAL at 09:17

## 2020-05-07 RX ADMIN — ROSUVASTATIN CALCIUM 10 MG: 10 TABLET, FILM COATED ORAL at 20:47

## 2020-05-07 RX ADMIN — AMLODIPINE BESYLATE 5 MG: 5 TABLET ORAL at 09:17

## 2020-05-07 RX ADMIN — DEXAMETHASONE 2 MG: 1 TABLET ORAL at 09:17

## 2020-05-07 RX ADMIN — PIPERACILLIN AND TAZOBACTAM 3.38 G: 3; .375 INJECTION, POWDER, LYOPHILIZED, FOR SOLUTION INTRAVENOUS; PARENTERAL at 18:27

## 2020-05-07 RX ADMIN — PIPERACILLIN AND TAZOBACTAM 3.38 G: 3; .375 INJECTION, POWDER, LYOPHILIZED, FOR SOLUTION INTRAVENOUS; PARENTERAL at 13:24

## 2020-05-07 RX ADMIN — PIPERACILLIN AND TAZOBACTAM 3.38 G: 3; .375 INJECTION, POWDER, LYOPHILIZED, FOR SOLUTION INTRAVENOUS; PARENTERAL at 23:30

## 2020-05-07 RX ADMIN — LEVETIRACETAM 1000 MG: 500 TABLET ORAL at 20:47

## 2020-05-07 RX ADMIN — LACOSAMIDE 100 MG: 100 TABLET, FILM COATED ORAL at 09:17

## 2020-05-07 RX ADMIN — LEVETIRACETAM 1000 MG: 500 TABLET ORAL at 09:17

## 2020-05-07 RX ADMIN — LACOSAMIDE 100 MG: 100 TABLET, FILM COATED ORAL at 20:47

## 2020-05-07 RX ADMIN — METOPROLOL TARTRATE 25 MG: 25 TABLET, FILM COATED ORAL at 17:16

## 2020-05-07 ASSESSMENT — ENCOUNTER SYMPTOMS
NERVOUS/ANXIOUS: 0
COUGH: 0
DIARRHEA: 0
BLURRED VISION: 0
FEVER: 0
DIZZINESS: 0

## 2020-05-07 NOTE — DISCHARGE PLANNING
Patient accepted at the [facility] for a Monday transport.  [facility] will do the transport.  No time scheduled yet.  CM called daughter to update her on acceptance.  She would like a call when we know time. CM tried to contact patient on his cell and there was not answer.  Also tried bedside phone-NA.  This CM let patients regular YRIS Lama know of above.

## 2020-05-07 NOTE — THERAPY
"Speech Language Pathology  Daily Treatment     Patient Name: Rafael Mccoy  Age:  74 y.o., Sex:  male  Medical Record #: [id number]  Today's Date: 5/7/2020     Subjective    Patient was willing to participate.      Objective       05/07/20 1032   Cognition   Moderate Attention Moderate (3)   Complex Information Processing Moderate (3)   Voice   Modulating Loudness Minimal (4)   Laryngeal Relaxation Moderate (3)   Respiration Training Minimal (4)   Elimination of Vocal Abuse / Misuse Behaviors Minimal (4)   SLP Total Time Spent   SLP Individual Total Time Spent (Mins) 60   Charge Group   SLP Cognitive Skill Development First 15 Minutes 1   SLP Cognitive Skill Development Additional 15 Minutes 3       Assessment    Patient required max cues for pitch shift. Sustained /a/ 6 seconds max. Discussed diaphragmatic breathing and vocal hygiene. States he does not think \"voice techniques actually work\" so he has not completed.   Completed sustained attention tasks with mod verbal cues needed.     Plan    Functional problem solving. Attention and recall.     "

## 2020-05-07 NOTE — CARE PLAN
Problem: Knowledge Deficit  Goal: Knowledge of disease process/condition, treatment plan, diagnostic tests, and medications will improve  Outcome: PROGRESSING AS EXPECTED  Patient understands plan of care.        Problem: Skin Integrity  Goal: Risk for impaired skin integrity will decrease  Outcome: PROGRESSING AS EXPECTED  Patients skin/ incision site is free of any sign of infection.

## 2020-05-07 NOTE — PROGRESS NOTES
"Rehab Progress Note     Encounter Date: 5/7/2020    CC: SDH, decreased mobility, weakness    Interval Events (Subjective)  Patient sitting up in room. He reports he is doing well. Denies NVD. Denies SOB. D[facility]. Discussed with hospitalist and recommending Zosyn only and then Levaquin. Blood cultures with E coli, urine culture most likely will be as well. Denies NVD. Denies SOB    IDT Team Meeting 5/5/2020  DC/Disposition:  5/11/20    Objective:  VITAL SIGNS: /60   Pulse 67   Temp 36.5 °C (97.7 °F) (Oral)   Resp 16   Ht 1.753 m (5' 9\")   Wt 73.4 kg (161 lb 13.1 oz)   SpO2 96%   BMI 23.90 kg/m²   Gen: NAD  Psych: Mood and affect appropriate  CV: RRR, no edema  Resp: CTAB, no upper airway sounds  Abd: NTND  Neuro: AOx4, following simple commands, poor vision bilaterally    Recent Results (from the past 72 hour(s))   BLOOD CULTURE    Collection Time: 05/04/20  5:00 PM   Result Value Ref Range    Significant Indicator POS (POS)     Source BLD     Site PERIPHERAL     Culture Result (A)      Growth detected by [employer] instrument. 05/05/2020  07:06    Culture Result Escherichia coli (A)        Susceptibility    Escherichia coli - YOSELIN     Ampicillin <=8 Sensitive mcg/mL     Ceftriaxone <=1 Sensitive mcg/mL     Ceftazidime <=1 Sensitive mcg/mL     Cefotaxime <=2 Sensitive mcg/mL     Cefazolin <=2 Sensitive mcg/mL     Ciprofloxacin <=1 Sensitive mcg/mL     Ampicillin/sulbactam <=8/4 Sensitive mcg/mL     Cefepime <=2 Sensitive mcg/mL     Tobramycin <=4 Sensitive mcg/mL     Cefotetan <=16 Sensitive mcg/mL     Ertapenem <=0.5 Sensitive mcg/mL     Gentamicin <=4 Sensitive mcg/mL     Pip/Tazobactam <=16 Sensitive mcg/mL     Trimeth/Sulfa <=2/38 Sensitive mcg/mL   COVID/SARS CoV-2    Collection Time: 05/04/20  6:00 PM   Result Value Ref Range    COVID Order Status Received    Influenza A/B By PCR (Adult - Flu Only)    Collection Time: " 05/04/20  6:00 PM   Result Value Ref Range    Influenza virus A RNA Negative Negative    Influenza virus B, PCR Negative Negative   SARS-CoV-2, PCR (In-House)    Collection Time: 05/04/20  6:00 PM   Result Value Ref Range    SARS-CoV-2 Source NP Swab     SARS-CoV-2 by PCR NotDetected NotDetected   URINE CULTURE-EXISTING-LESS THAN 48 HOURS    Collection Time: 05/04/20  8:00 PM   Result Value Ref Range    Significant Indicator NEG     Source UR     Site URINE, CLEAN CATCH     Culture Result Mixed skin rené <10,000 cfu/mL    URINALYSIS    Collection Time: 05/04/20  8:41 PM   Result Value Ref Range    Color Yellow     Character Turbid (A)     Specific Gravity 1.025 <1.035    Ph 5.5 5.0 - 8.0    Glucose 250 (A) Negative mg/dL    Ketones Negative Negative mg/dL    Protein Negative Negative mg/dL    Bilirubin Negative Negative    Urobilinogen, Urine 0.2 Negative    Nitrite Positive (A) Negative    Leukocyte Esterase Negative Negative    Occult Blood Negative Negative    Micro Urine Req Microscopic    URINE MICROSCOPIC (W/UA)    Collection Time: 05/04/20  8:41 PM   Result Value Ref Range    WBC 10-20 (A) /hpf    RBC 0-2 (A) /hpf    Bacteria Negative None /hpf    Epithelial Cells Negative /hpf    Amorphous Crystal Present /hpf    Uric Acid Crystal Positive /hpf    Hyaline Cast 0-2 /lpf   CBC WITH DIFFERENTIAL    Collection Time: 05/05/20  6:11 AM   Result Value Ref Range    WBC 25.2 (H) 4.8 - 10.8 K/uL    RBC 3.91 (L) 4.70 - 6.10 M/uL    Hemoglobin 11.9 (L) 14.0 - 18.0 g/dL    Hematocrit 34.6 (L) 42.0 - 52.0 %    MCV 88.5 81.4 - 97.8 fL    MCH 30.4 27.0 - 33.0 pg    MCHC 34.4 33.7 - 35.3 g/dL    RDW 44.3 35.9 - 50.0 fL    Platelet Count 124 (L) 164 - 446 K/uL    MPV 10.7 9.0 - 12.9 fL    Neutrophils-Polys 92.40 (H) 44.00 - 72.00 %    Lymphocytes 2.00 (L) 22.00 - 41.00 %    Monocytes 4.00 0.00 - 13.40 %    Eosinophils 0.00 0.00 - 6.90 %    Basophils 0.10 0.00 - 1.80 %    Immature Granulocytes 1.50 (H) 0.00 - 0.90 %     Nucleated RBC 0.00 /100 WBC    Neutrophils (Absolute) 23.28 (H) 1.82 - 7.42 K/uL    Lymphs (Absolute) 0.50 (L) 1.00 - 4.80 K/uL    Monos (Absolute) 1.01 (H) 0.00 - 0.85 K/uL    Eos (Absolute) 0.00 0.00 - 0.51 K/uL    Baso (Absolute) 0.02 0.00 - 0.12 K/uL    Immature Granulocytes (abs) 0.37 (H) 0.00 - 0.11 K/uL    NRBC (Absolute) 0.00 K/uL   Comp Metabolic Panel    Collection Time: 05/05/20  6:11 AM   Result Value Ref Range    Sodium 129 (L) 135 - 145 mmol/L    Potassium 4.3 3.6 - 5.5 mmol/L    Chloride 97 96 - 112 mmol/L    Co2 21 20 - 33 mmol/L    Anion Gap 11.0 7.0 - 16.0    Glucose 169 (H) 65 - 99 mg/dL    Bun 20 8 - 22 mg/dL    Creatinine 0.94 0.50 - 1.40 mg/dL    Calcium 8.2 (L) 8.5 - 10.5 mg/dL    AST(SGOT) 15 12 - 45 U/L    ALT(SGPT) 30 2 - 50 U/L    Alkaline Phosphatase 64 30 - 99 U/L    Total Bilirubin 0.6 0.1 - 1.5 mg/dL    Albumin 2.4 (L) 3.2 - 4.9 g/dL    Total Protein 5.0 (L) 6.0 - 8.2 g/dL    Globulin 2.6 1.9 - 3.5 g/dL    A-G Ratio 0.9 g/dL   KEPPRA    Collection Time: 05/05/20  6:11 AM   Result Value Ref Range    Keppra 19 12 - 46 ug/mL   MAGNESIUM    Collection Time: 05/05/20  6:11 AM   Result Value Ref Range    Magnesium 2.2 1.5 - 2.5 mg/dL   PHOSPHORUS    Collection Time: 05/05/20  6:11 AM   Result Value Ref Range    Phosphorus 3.2 2.5 - 4.5 mg/dL   ESTIMATED GFR    Collection Time: 05/05/20  6:11 AM   Result Value Ref Range    GFR If African American >60 >60 mL/min/1.73 m 2    GFR If Non African American >60 >60 mL/min/1.73 m 2       Current Facility-Administered Medications   Medication Frequency   • dexamethasone (DECADRON) tablet 2 mg BID   • piperacillin-tazobactam (ZOSYN) 3.375 g in  mL IVPB Q6HRS   • senna-docusate (PERICOLACE or SENOKOT S) 8.6-50 MG per tablet 2 Tab BID PRN    And   • polyethylene glycol/lytes (MIRALAX) PACKET 1 Packet QDAY PRN    And   • magnesium hydroxide (MILK OF MAGNESIA) suspension 30 mL QDAY PRN    And   • bisacodyl (DULCOLAX) suppository 10 mg QDAY PRN   •  gabapentin (NEURONTIN) capsule 600 mg TID   • metoprolol (LOPRESSOR) tablet 25 mg TWICE DAILY   • Respiratory Therapy Consult Continuous RT   • oxyCODONE immediate-release (ROXICODONE) tablet 5 mg Q3HRS PRN   • oxyCODONE immediate release (ROXICODONE) tablet 10 mg Q3HRS PRN   • tramadol (ULTRAM) 50 MG tablet 50 mg Q4HRS PRN   • hydrALAZINE (APRESOLINE) tablet 25 mg Q8HRS PRN   • acetaminophen (TYLENOL) tablet 650 mg Q4HRS PRN   • artificial tears ophthalmic solution 1 Drop PRN   • benzocaine-menthol (CEPACOL) lozenge 1 Lozenge Q2HRS PRN   • mag hydrox-al hydrox-simeth (MAALOX PLUS ES or MYLANTA DS) suspension 20 mL Q2HRS PRN   • ondansetron (ZOFRAN ODT) dispertab 4 mg 4X/DAY PRN    Or   • ondansetron (ZOFRAN) syringe/vial injection 4 mg 4X/DAY PRN   • traZODone (DESYREL) tablet 50 mg QHS PRN   • sodium chloride (OCEAN) 0.65 % nasal spray 2 Spray PRN   • amLODIPine (NORVASC) tablet 5 mg DAILY   • lacosamide (VIMPAT) tablet 100 mg BID   • levETIRAcetam (KEPPRA) tablet 1,000 mg BID   • rosuvastatin (CRESTOR) tablet 10 mg QHS       Orders Placed This Encounter   Procedures   • Diet Order Regular     Standing Status:   Standing     Number of Occurrences:   1     Order Specific Question:   Diet:     Answer:   Regular [1]       Assessment:  Active Hospital Problems    Diagnosis   • *Subdural hematoma (HCC)   • Impaired mobility and ADLs   • Seizures, post-traumatic (HCC)   • Oropharyngeal dysphagia   • Urinary retention   • Essential hypertension   • Hypercholesterolemia   • Legally blind       Medical Decision Making and Plan:  TBI (Traumatic Brain Injury): Fall with SDH s/p R sided evacuation with Dr. Mendiola on 4/17/20. Patient started on Steroids. Had post-operative seizure like activity, EEG with cortical irritation  -PT and OT for mobility and ADLs  -SLP for cognition  -NSG follow-up. Per most recent NSG recommendations continue steroids for a week. Recommend CT head - scheduled 5/4/20 - stable changes.   -On Decadron  4 mg BID per NSG -> decrease to 2 mg BID     Dysphagia - On dysphagia diet on transfer. SLP for swallow evaluation - continue dysphagia 3 with NTL. MBSS 4/27/20 - upgraded to dysphagia with thins. Upgraded to regular/thins. Resolved     Seizure disorder - Patient with previous CVA with seizures. Increased AEDs at [facility] to Keppra 1000 mg BID and Vimpat 100 mg BID  -Follow-up Neurology seizure clinic     Orthostatic Hypotension - Occurred on 5/4/20 when going up stairs. Start IVF recheck. Improved    Leukocytosis - up to 32 after orthostatic hypotension. On steroids, but much elevated. Consulted hospitalist and check blood cultures. Covid 19 - negative x1. WBC improved to 25 on 5/5/20, blood cultures positive x1 on broad spectrum antibiotics. Patient clinically feels better.   -Blood cultures gram - E coli. UCx - pending. Continue Zosyn and Zyvox -> Zosyn only with plan for Levaquin    HTN - Patient on 5 mg Amlodipine. Previously on metoprolol as well. Continue to monitor, SBP elevated restart Metoprolol 25 mg BID     Legally blind - High risk for falls.      Anemia - 13.1 on admission, continue to monitor    Hyponatremia - 129 on 5/4/20, now on IVF    Hiccups - Started on Gabapentin 300 mg TID -> 400 mg TID. Improved but occasional increase to 600    Azotemia - Patient on NTL diet on admission with poor fluid intake. Improved with fluids    HLD - Patient on Rosuvastatin 10 mg on transfer.     GI Ppx - Patient with loose stools on senna-docusate. Change to PRN    DVT Ppx - Patient high risk for bleed. Ambulating > 100 feet.     [facility] - [facility] if [facility] out which it was. Plan for Monday transfer.    Total time:  26 minutes.  I spent greater than 50% of the time for patient care, counseling, and coordination on this date, including unit/floor time, and face-to-face time with the patient as per interval events and assessment and plan above. Topics  discussed included discharge planning, blood culture with e coli, and narrowing antibiotics.     Lyn Sadler M.D.

## 2020-05-07 NOTE — CARE PLAN
Problem: Communication  Goal: The ability to communicate needs accurately and effectively will improve  Outcome: PROGRESSING AS EXPECTED  Note: Pt is able to communicate his needs effectively to staff.      Problem: Safety  Goal: Will remain free from injury  Outcome: PROGRESSING AS EXPECTED  Note: Pt uses call light consistently for staff assistance. Pt has good safety awareness, no impulsivity observed.      Problem: Pain Management  Goal: Pain level will decrease to patient's comfort goal  Outcome: PROGRESSING AS EXPECTED  Note: No reports of pain at this time. Will continue to monitor through shift with hourly rounds.

## 2020-05-07 NOTE — THERAPY
"Physical Therapy   Daily Treatment     Patient Name: Rafael Mccoy  Age:  74 y.o., Sex:  male  Medical Record #: [id number]  Today's Date: 5/7/2020     Precautions  Precautions: (P) Fall Risk, Swallow Precautions ( See Comments)  Comments: (P) legally blind (absent central vision), L apraxia    Subjective    Patient seated in w/c and agreeable to therapy.     Objective       05/07/20 1231   Precautions   Precautions Fall Risk;Swallow Precautions ( See Comments)   Comments legally blind (absent central vision), L apraxia   Neuro-Muscular Treatments   Comments Standing balance activities with RUE support using walking stick: modified tandem EVELYN 2x60 seconds with CGA-Min A.   Interdisciplinary Plan of Care Collaboration   Patient Position at End of Therapy Seated;Self Releasing Lap Belt Applied;Other (Comments)  (nursing present)   PT Total Time Spent   PT Individual Total Time Spent (Mins) 60   PT Charge Group   PT Gait Training 3   PT Neuromuscular Re-Education / Balance 1       FIM Bed/Chair/Wheelchair Transfers Score: 4 - Minimal Assistance  Bed/Chair/Wheelchair Transfers Description:  Verbal cueing, Increased time, Adaptive equipment(CGA stand step transfers with walking stick)    FIM Walking Score:  4 - Minimal Assistance  Walking Description:  Assist device/equipment, Extra time, Safety concerns, Requires incidental assist(150 ftx3 with walking stick and CGA on outdoor surfaces. Tendency for lateral LOBs with timely and inconsistently effective balance strategies requiring additional steadying)    FIM Stairs Score:  4 - Minimal Assistance  Stairs Description:  Hand rails, Extra time, Safety concerns, Requires incidental assist(12x1 6\" step with R rail, walking pole, and CGA. Reciprocal pattern ascending and descending with general safety issues due to visual impairments.)    Outdoor ambulation using walking stick for balance as well as for scanning obstacles in environment, negotiating inclines/declines as " well as curbs x4 ascending/descending. Patient requiring increased assistance on curbs with several LOBs laterally requiring Min A to regain balance.    Assessment    Patient tolerated session well, focus on outdoor ambulation including uneven surface training and environment scanning. Patient with increased number of LOBs outdoors requiring increased assistance due to inconsistently effective balance reactions.    Plan    vector no AD, variable gait, forced use L side, standing balance activities, cont gait training with single walking stick

## 2020-05-07 NOTE — THERAPY
"Occupational Therapy  Daily Treatment     Patient Name: Rafael Mccoy  Age:  74 y.o., Sex:  male  Medical Record #: [id number]  Today's Date: 2020     Precautions  Precautions: Fall Risk, Swallow Precautions ( See Comments)  Comments: legally blind (absent central vision), L apraxia    Safety   ADL Safety : Impaired, Requires Supervision for Safety, Requires Physical Assist for Safety, Impaired Insight into Safety, Requires Cueing for Safety, Impulsive  Bathroom Safety: Impaired, Impulsive, Requires Supervision for Safety, Requires Physical Assist for Safety, Impaired Insight into Safety, Requires Cuing for Safety    Subjective    \"   I need to use the bathroom.\"  \" I think one of the drugs is making me have to go .\"   patient instructed to speak with nursing and Dr Sadler  Concerning this issue.        Objective       20 1001   Precautions   Precautions Fall Risk;Swallow Precautions ( See Comments)   Comments legally blind (absent central vision), L apraxia   Sitting Upper Body Exercises   Chest Press 3 sets of 10;Bilateral  (wegithed pulley 20lbs )   Lat Pull 3 sets of 10;Bilateral  (weighted pulley  25lbs )   Upper Extremity Bike Level 3 Resistance  (x 4 minutes  standing   for balance/ core strength )   Comments cues for abdominal engagment during UE bike performance task   Interdisciplinary Plan of Care Collaboration   IDT Collaboration with  Nursing   Patient Position at End of Therapy Seated;Call Light within Reach;Tray Table within Reach   Collaboration Comments administered meds during tx sessio    OT Total Time Spent   OT Individual Total Time Spent (Mins) 60   OT Charge Group   OT Self Care / ADL 3   OT Therapeutic Exercise  1       FIM Grooming Score:  5 - Standby Prompting/Supervision or Set-up  Grooming Description:  ( oral care  grooming tools placed  on counter patient oriented to location.     brush hair  independent )    FIM Toiletin - Standby Prompting/Supervision or " Set-up  Toileting Description:       FIM Toilet Transfer Score:  5 - Standby Prompting/Supervision or Set-up  Toilet Transfer Description:  Grab bar      Assessment    Extra time for toileting tasks today   Participates to the best of his ability    Difficulty maintaining trunk stability during UE bike  In standing.     Plan     LUE neuro re-ed, FM/GM coordination, sitting/standing balance to correct left lean, ADLs, IADLs, management of visual deficits

## 2020-05-07 NOTE — PROGRESS NOTES
[facility] Medicine Daily Progress Note    Date of Service  5/7/2020    Chief Complaint:  Hypertension  Leukocytosis  Hyponatremia    Interval History:  No significant events or changes since last visit    Review of Systems  Review of Systems   Constitutional: Negative for fever.   Eyes: Negative for blurred vision.   Respiratory: Negative for cough.    Cardiovascular: Negative for chest pain.   Gastrointestinal: Negative for diarrhea.   Musculoskeletal: Negative for joint pain.   Neurological: Negative for dizziness.   Psychiatric/Behavioral: The patient is not nervous/anxious.         Physical Exam  Temp:  [36.2 °C (97.1 °F)-36.5 °C (97.7 °F)] 36.5 °C (97.7 °F)  Pulse:  [54-64] 64  Resp:  [18] 18  BP: (116-128)/(58-64) 120/64  SpO2:  [94 %] 94 %    Physical Exam  Vitals signs and nursing note reviewed.   Constitutional:       Appearance: He is not diaphoretic.   HENT:      Mouth/Throat:      Pharynx: No oropharyngeal exudate or posterior oropharyngeal erythema.   Eyes:      Extraocular Movements: Extraocular movements intact.   Neck:      Vascular: No carotid bruit.   Cardiovascular:      Rate and Rhythm: Normal rate and regular rhythm.      Heart sounds: No murmur.   Pulmonary:      Effort: Pulmonary effort is normal.      Breath sounds: Normal breath sounds. No stridor.   Abdominal:      General: Bowel sounds are normal.      Palpations: Abdomen is soft.   Musculoskeletal:      Right lower leg: No edema.      Left lower leg: No edema.   Skin:     General: Skin is warm and dry.      Findings: No rash.   Neurological:      Mental Status: He is alert and oriented to person, place, and time.   Psychiatric:         Mood and Affect: Mood normal.         Behavior: Behavior normal.         Fluids    Intake/Output Summary (Last 24 hours) at 5/7/2020 0939  Last data filed at 5/7/2020 0900  Gross per 24 hour   Intake 1100 ml   Output 1700 ml   Net -600 ml       Laboratory  Recent Labs     05/04/20  1500 05/05/20  0611   WBC  32.3* 25.2*   RBC 4.70 3.91*   HEMOGLOBIN 14.3 11.9*   HEMATOCRIT 41.7* 34.6*   MCV 88.7 88.5   MCH 30.4 30.4   MCHC 34.3 34.4   RDW 43.4 44.3   PLATELETCT 161* 124*   MPV 10.5 10.7     Recent Labs     05/04/20  1250 05/05/20  0611   SODIUM 129* 129*   POTASSIUM 4.1 4.3   CHLORIDE 92* 97   CO2 26 21   GLUCOSE 162* 169*   BUN 25* 20   CREATININE 0.95 0.94   CALCIUM 9.0 8.2*                   Imaging    Assessment/Plan  * Subdural hematoma (HCC)- (present on admission)  Assessment & Plan  2nd to fall in bathtub  S/P craniotomy (4/17)  On Decadron  Repeat CT head: no acute changes    Seizures, 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)  Assessment & Plan  Had breakthrough seizure on prophylactic Keppra  EEG showed cortical instability  Now on Vimpat and Keppra    Essential hypertension- (present on admission)  Assessment & Plan  BP ok  On Norvasc: 5 mg daily  On Lopressor: 25 mg bid  Note: off IVF's  Monitor    Leukocytosis  Assessment & Plan  WBC's: 32 --> 25 (5/5)  (5/4): Tmax: 100.1  (5/5): afebrile  (5/6): afebrile  (5/7): afebrile  Crani site healing well  CT head: no acute changes  CXR (5/5): ill-defined opacifications in each lung have increased compared to the prior radiograph;                    this could indicate worsening of pulmonary edema or inflammation  Covid-19 (-)  Flu (-)  UC (-)  BC x 2: shows 1 out of 2 bottles pos for lactose fermenting GNRs -- ? contaminant -- cont to follow  Note: on steroids  On Zyvox & Zosyn empirically (thru 5/11) --> will d/c Zyvox (5/7)  Consider changing Zosyn to Levaquin soon    Hyponatremia  Assessment & Plan  Na: 129 (5/4) --> 129 (5/5)  Off IVF's NS (5/6)  Will check levels in am -- if Na not improved, would consider salt tabs  Cont to monitor    Hypercholesterolemia- (present on admission)  Assessment & Plan  On Crestor    Legally blind- (present on admission)  Assessment & Plan  Has hx history

## 2020-05-07 NOTE — THERAPY
"Physical Therapy   Daily Treatment     Patient Name: Rafael Mccoy  Age:  74 y.o., Sex:  male  Medical Record #: [id number]  Today's Date: 5/7/2020     Precautions  Precautions: (P) Fall Risk, Swallow Precautions ( See Comments)  Comments: (P) legally blind (absent central vision), L apraxia    Subjective    Patient seated in w/c and agreeable to therapy.     Objective       05/07/20 0731   Precautions   Precautions Fall Risk;Swallow Precautions ( See Comments)   Comments legally blind (absent central vision), L apraxia   Bed Mobility    Sit to Stand Contact Guard Assist   Interdisciplinary Plan of Care Collaboration   Patient Position at End of Therapy Seated;Chair Alarm On;Self Releasing Lap Belt Applied;Other (Comments)  (in therapeutic dining room)   PT Total Time Spent   PT Individual Total Time Spent (Mins) 30   PT Charge Group   PT Gait Training 2       FIM Bed/Chair/Wheelchair Transfers Score: 4 - Minimal Assistance  Bed/Chair/Wheelchair Transfers Description:  Supervision for safety(CGA stand step transfer with walking pole)    FIM Walking Score:  4 - Minimal Assistance  Walking Description:  Assist device/equipment, Extra time, Safety concerns, Requires incidental assist(110 ftx1, 220 ftx1, 40 ftx2 with walking pole and CGA. Visual impairments requiring cues for environment navigation. Timely and effective balance responses to LOBs)    FIM Stairs Score:  4 - Minimal Assistance  Stairs Description:  Hand rails, Extra time, Safety concerns, Requires incidental assist(12x1 6\" step with R rail, walking pole, and CGA. Reciprocal pattern ascending and descending with general safety issues due to visual impairments.)      Assessment    Patient tolerated session well, focus of session on interval gait training and stair negotiation. Patient with intermittent LOBs and difficulty negotiating environment due to visual deficits; demonstrated timely and effective balance responses to LOBs, CGA provided for " safety.    Plan    vector no AD, variable gait, forced use L side, standing balance activities, cont gait training with single walking stick

## 2020-05-08 ENCOUNTER — APPOINTMENT (OUTPATIENT)
Dept: RADIOLOGY | Facility: REHABILITATION | Age: 75
DRG: 949 | End: 2020-05-08
Attending: PHYSICAL MEDICINE & REHABILITATION
Payer: MEDICARE

## 2020-05-08 PROBLEM — R00.1 BRADYCARDIA: Status: ACTIVE | Noted: 2020-05-08

## 2020-05-08 PROBLEM — R79.89 AZOTEMIA: Status: ACTIVE | Noted: 2020-05-08

## 2020-05-08 LAB
ANION GAP SERPL CALC-SCNC: 8 MMOL/L (ref 7–16)
BASOPHILS # BLD AUTO: 0 % (ref 0–1.8)
BASOPHILS # BLD: 0 K/UL (ref 0–0.12)
BUN SERPL-MCNC: 25 MG/DL (ref 8–22)
CALCIUM SERPL-MCNC: 8.9 MG/DL (ref 8.5–10.5)
CHLORIDE SERPL-SCNC: 95 MMOL/L (ref 96–112)
CO2 SERPL-SCNC: 25 MMOL/L (ref 20–33)
CREAT SERPL-MCNC: 0.93 MG/DL (ref 0.5–1.4)
EOSINOPHIL # BLD AUTO: 0.01 K/UL (ref 0–0.51)
EOSINOPHIL NFR BLD: 0.1 % (ref 0–6.9)
ERYTHROCYTE [DISTWIDTH] IN BLOOD BY AUTOMATED COUNT: 43.8 FL (ref 35.9–50)
GLUCOSE SERPL-MCNC: 105 MG/DL (ref 65–99)
HCT VFR BLD AUTO: 35.5 % (ref 42–52)
HGB BLD-MCNC: 12.1 G/DL (ref 14–18)
IMM GRANULOCYTES # BLD AUTO: 0.04 K/UL (ref 0–0.11)
IMM GRANULOCYTES NFR BLD AUTO: 0.6 % (ref 0–0.9)
LYMPHOCYTES # BLD AUTO: 0.63 K/UL (ref 1–4.8)
LYMPHOCYTES NFR BLD: 8.9 % (ref 22–41)
MAGNESIUM SERPL-MCNC: 2.1 MG/DL (ref 1.5–2.5)
MCH RBC QN AUTO: 30.3 PG (ref 27–33)
MCHC RBC AUTO-ENTMCNC: 34.1 G/DL (ref 33.7–35.3)
MCV RBC AUTO: 89 FL (ref 81.4–97.8)
MONOCYTES # BLD AUTO: 0.5 K/UL (ref 0–0.85)
MONOCYTES NFR BLD AUTO: 7.1 % (ref 0–13.4)
NEUTROPHILS # BLD AUTO: 5.86 K/UL (ref 1.82–7.42)
NEUTROPHILS NFR BLD: 83.3 % (ref 44–72)
NRBC # BLD AUTO: 0 K/UL
NRBC BLD-RTO: 0 /100 WBC
PHOSPHATE SERPL-MCNC: 3.3 MG/DL (ref 2.5–4.5)
PLATELET # BLD AUTO: 146 K/UL (ref 164–446)
PMV BLD AUTO: 10.6 FL (ref 9–12.9)
POTASSIUM SERPL-SCNC: 4.1 MMOL/L (ref 3.6–5.5)
RBC # BLD AUTO: 3.99 M/UL (ref 4.7–6.1)
SODIUM SERPL-SCNC: 128 MMOL/L (ref 135–145)
WBC # BLD AUTO: 7 K/UL (ref 4.8–10.8)

## 2020-05-08 PROCEDURE — 99233 SBSQ HOSP IP/OBS HIGH 50: CPT | Performed by: PHYSICAL MEDICINE & REHABILITATION

## 2020-05-08 PROCEDURE — 97535 SELF CARE MNGMENT TRAINING: CPT

## 2020-05-08 PROCEDURE — 700102 HCHG RX REV CODE 250 W/ 637 OVERRIDE(OP): Performed by: HOSPITALIST

## 2020-05-08 PROCEDURE — 97116 GAIT TRAINING THERAPY: CPT

## 2020-05-08 PROCEDURE — 97130 THER IVNTJ EA ADDL 15 MIN: CPT | Performed by: SPEECH-LANGUAGE PATHOLOGIST

## 2020-05-08 PROCEDURE — 770010 HCHG ROOM/CARE - REHAB SEMI PRIVAT*

## 2020-05-08 PROCEDURE — 97129 THER IVNTJ 1ST 15 MIN: CPT | Performed by: SPEECH-LANGUAGE PATHOLOGIST

## 2020-05-08 PROCEDURE — 36415 COLL VENOUS BLD VENIPUNCTURE: CPT

## 2020-05-08 PROCEDURE — 71045 X-RAY EXAM CHEST 1 VIEW: CPT

## 2020-05-08 PROCEDURE — 97530 THERAPEUTIC ACTIVITIES: CPT

## 2020-05-08 PROCEDURE — 99232 SBSQ HOSP IP/OBS MODERATE 35: CPT | Performed by: HOSPITALIST

## 2020-05-08 PROCEDURE — 80048 BASIC METABOLIC PNL TOTAL CA: CPT

## 2020-05-08 PROCEDURE — A9270 NON-COVERED ITEM OR SERVICE: HCPCS | Performed by: PHYSICAL MEDICINE & REHABILITATION

## 2020-05-08 PROCEDURE — 700111 HCHG RX REV CODE 636 W/ 250 OVERRIDE (IP): Performed by: HOSPITALIST

## 2020-05-08 PROCEDURE — 85025 COMPLETE CBC W/AUTO DIFF WBC: CPT

## 2020-05-08 PROCEDURE — 700105 HCHG RX REV CODE 258: Performed by: HOSPITALIST

## 2020-05-08 PROCEDURE — 97112 NEUROMUSCULAR REEDUCATION: CPT

## 2020-05-08 PROCEDURE — A9270 NON-COVERED ITEM OR SERVICE: HCPCS | Performed by: HOSPITALIST

## 2020-05-08 PROCEDURE — 84100 ASSAY OF PHOSPHORUS: CPT

## 2020-05-08 PROCEDURE — 700102 HCHG RX REV CODE 250 W/ 637 OVERRIDE(OP): Performed by: PHYSICAL MEDICINE & REHABILITATION

## 2020-05-08 PROCEDURE — 83735 ASSAY OF MAGNESIUM: CPT

## 2020-05-08 RX ORDER — SODIUM CHLORIDE 1 G/1
1 TABLET ORAL
Status: DISCONTINUED | OUTPATIENT
Start: 2020-05-08 | End: 2020-05-11 | Stop reason: HOSPADM

## 2020-05-08 RX ORDER — GABAPENTIN 300 MG/1
300 CAPSULE ORAL 3 TIMES DAILY
Status: DISCONTINUED | OUTPATIENT
Start: 2020-05-08 | End: 2020-05-11 | Stop reason: HOSPADM

## 2020-05-08 RX ADMIN — PIPERACILLIN AND TAZOBACTAM 3.38 G: 3; .375 INJECTION, POWDER, LYOPHILIZED, FOR SOLUTION INTRAVENOUS; PARENTERAL at 23:58

## 2020-05-08 RX ADMIN — Medication 1 G: at 12:31

## 2020-05-08 RX ADMIN — TRAMADOL HYDROCHLORIDE 50 MG: 50 TABLET, COATED ORAL at 20:43

## 2020-05-08 RX ADMIN — DEXAMETHASONE 2 MG: 1 TABLET ORAL at 20:43

## 2020-05-08 RX ADMIN — LACOSAMIDE 100 MG: 100 TABLET, FILM COATED ORAL at 09:23

## 2020-05-08 RX ADMIN — LEVETIRACETAM 1000 MG: 500 TABLET ORAL at 20:43

## 2020-05-08 RX ADMIN — DEXAMETHASONE 2 MG: 1 TABLET ORAL at 09:23

## 2020-05-08 RX ADMIN — PIPERACILLIN AND TAZOBACTAM 3.38 G: 3; .375 INJECTION, POWDER, LYOPHILIZED, FOR SOLUTION INTRAVENOUS; PARENTERAL at 05:32

## 2020-05-08 RX ADMIN — GABAPENTIN 600 MG: 300 CAPSULE ORAL at 09:23

## 2020-05-08 RX ADMIN — PIPERACILLIN AND TAZOBACTAM 3.38 G: 3; .375 INJECTION, POWDER, LYOPHILIZED, FOR SOLUTION INTRAVENOUS; PARENTERAL at 12:31

## 2020-05-08 RX ADMIN — Medication 1 G: at 18:28

## 2020-05-08 RX ADMIN — LEVETIRACETAM 1000 MG: 500 TABLET ORAL at 09:23

## 2020-05-08 RX ADMIN — AMLODIPINE BESYLATE 5 MG: 5 TABLET ORAL at 09:23

## 2020-05-08 RX ADMIN — GABAPENTIN 300 MG: 300 CAPSULE ORAL at 20:43

## 2020-05-08 RX ADMIN — LACOSAMIDE 100 MG: 100 TABLET, FILM COATED ORAL at 20:43

## 2020-05-08 RX ADMIN — GABAPENTIN 300 MG: 300 CAPSULE ORAL at 15:20

## 2020-05-08 RX ADMIN — PIPERACILLIN AND TAZOBACTAM 3.38 G: 3; .375 INJECTION, POWDER, LYOPHILIZED, FOR SOLUTION INTRAVENOUS; PARENTERAL at 18:27

## 2020-05-08 RX ADMIN — ROSUVASTATIN CALCIUM 10 MG: 10 TABLET, FILM COATED ORAL at 20:43

## 2020-05-08 ASSESSMENT — ENCOUNTER SYMPTOMS
ABDOMINAL PAIN: 0
NERVOUS/ANXIOUS: 0
FEVER: 0
NAUSEA: 0
SHORTNESS OF BREATH: 0
CHILLS: 0
DIARRHEA: 0
VOMITING: 0

## 2020-05-08 ASSESSMENT — PATIENT HEALTH QUESTIONNAIRE - PHQ9
SUM OF ALL RESPONSES TO PHQ9 QUESTIONS 1 AND 2: 0
1. LITTLE INTEREST OR PLEASURE IN DOING THINGS: NOT AT ALL
2. FEELING DOWN, DEPRESSED, IRRITABLE, OR HOPELESS: NOT AT ALL

## 2020-05-08 NOTE — PROGRESS NOTES
"Rehab Progress Note     Encounter Date: 5/8/2020    CC: SDH, decreased mobility, weakness    Interval Events (Subjective)  Patient sitting up in therapy gym. He reports he is doing well with therapy. Reviewed AM labs and his WBC has resolved. Discussed with hospitalist who contacted ID. Per ID recommending narrowing coverage and complete 5/11/20. [facility] within [facility] hours of [facility]. Discussed will bring it up with administration as no indication in current protocol for repeat. Repeat CXR to monitor effusion.     IDT Team Meeting 5/5/2020  DC/Disposition:  5/11/20    Objective:  VITAL SIGNS: /59   Pulse (!) 55 Comment: notify nurse Mike of heart rate  Temp 36.6 °C (97.9 °F) (Oral)   Resp 16   Ht 1.753 m (5' 9\")   Wt 73.4 kg (161 lb 13.1 oz)   SpO2 97%   BMI 23.90 kg/m²   Gen: NAD  Psych: Mood and affect appropriate  CV: RRR, no edema  Resp: CTAB, no upper airway sounds  Abd: NTND  Neuro: AOx4, following simple commands, poor vision bilaterally  Unchanged from 5/7/20 except improved mobility with bilateral walking sticks    Recent Results (from the past 72 hour(s))   Basic Metabolic Panel    Collection Time: 05/08/20  5:39 AM   Result Value Ref Range    Sodium 128 (L) 135 - 145 mmol/L    Potassium 4.1 3.6 - 5.5 mmol/L    Chloride 95 (L) 96 - 112 mmol/L    Co2 25 20 - 33 mmol/L    Glucose 105 (H) 65 - 99 mg/dL    Bun 25 (H) 8 - 22 mg/dL    Creatinine 0.93 0.50 - 1.40 mg/dL    Calcium 8.9 8.5 - 10.5 mg/dL    Anion Gap 8.0 7.0 - 16.0   MAGNESIUM    Collection Time: 05/08/20  5:39 AM   Result Value Ref Range    Magnesium 2.1 1.5 - 2.5 mg/dL   PHOSPHORUS    Collection Time: 05/08/20  5:39 AM   Result Value Ref Range    Phosphorus 3.3 2.5 - 4.5 mg/dL   CBC WITH DIFFERENTIAL    Collection Time: 05/08/20  5:39 AM   Result Value Ref Range    WBC 7.0 4.8 - 10.8 K/uL    RBC 3.99 (L) 4.70 - 6.10 M/uL    Hemoglobin 12.1 (L) 14.0 - 18.0 g/dL    Hematocrit 35.5 (L) " 42.0 - 52.0 %    MCV 89.0 81.4 - 97.8 fL    MCH 30.3 27.0 - 33.0 pg    MCHC 34.1 33.7 - 35.3 g/dL    RDW 43.8 35.9 - 50.0 fL    Platelet Count 146 (L) 164 - 446 K/uL    MPV 10.6 9.0 - 12.9 fL    Neutrophils-Polys 83.30 (H) 44.00 - 72.00 %    Lymphocytes 8.90 (L) 22.00 - 41.00 %    Monocytes 7.10 0.00 - 13.40 %    Eosinophils 0.10 0.00 - 6.90 %    Basophils 0.00 0.00 - 1.80 %    Immature Granulocytes 0.60 0.00 - 0.90 %    Nucleated RBC 0.00 /100 WBC    Neutrophils (Absolute) 5.86 1.82 - 7.42 K/uL    Lymphs (Absolute) 0.63 (L) 1.00 - 4.80 K/uL    Monos (Absolute) 0.50 0.00 - 0.85 K/uL    Eos (Absolute) 0.01 0.00 - 0.51 K/uL    Baso (Absolute) 0.00 0.00 - 0.12 K/uL    Immature Granulocytes (abs) 0.04 0.00 - 0.11 K/uL    NRBC (Absolute) 0.00 K/uL   ESTIMATED GFR    Collection Time: 05/08/20  5:39 AM   Result Value Ref Range    GFR If African American >60 >60 mL/min/1.73 m 2    GFR If Non African American >60 >60 mL/min/1.73 m 2       Current Facility-Administered Medications   Medication Frequency   • sodium chloride (SALT) tablet 1 g TID WITH MEALS   • [START ON 5/9/2020] cefTRIAXone (ROCEPHIN) 2 g in  mL IVPB Q24HRS   • dexamethasone (DECADRON) tablet 2 mg BID   • piperacillin-tazobactam (ZOSYN) 3.375 g in  mL IVPB Q6HRS   • senna-docusate (PERICOLACE or SENOKOT S) 8.6-50 MG per tablet 2 Tab BID PRN    And   • polyethylene glycol/lytes (MIRALAX) PACKET 1 Packet QDAY PRN    And   • magnesium hydroxide (MILK OF MAGNESIA) suspension 30 mL QDAY PRN    And   • bisacodyl (DULCOLAX) suppository 10 mg QDAY PRN   • gabapentin (NEURONTIN) capsule 600 mg TID   • Respiratory Therapy Consult Continuous RT   • oxyCODONE immediate-release (ROXICODONE) tablet 5 mg Q3HRS PRN   • oxyCODONE immediate release (ROXICODONE) tablet 10 mg Q3HRS PRN   • tramadol (ULTRAM) 50 MG tablet 50 mg Q4HRS PRN   • hydrALAZINE (APRESOLINE) tablet 25 mg Q8HRS PRN   • acetaminophen (TYLENOL) tablet 650 mg Q4HRS PRN   • artificial tears  ophthalmic solution 1 Drop PRN   • benzocaine-menthol (CEPACOL) lozenge 1 Lozenge Q2HRS PRN   • mag hydrox-al hydrox-simeth (MAALOX PLUS ES or MYLANTA DS) suspension 20 mL Q2HRS PRN   • ondansetron (ZOFRAN ODT) dispertab 4 mg 4X/DAY PRN    Or   • ondansetron (ZOFRAN) syringe/vial injection 4 mg 4X/DAY PRN   • traZODone (DESYREL) tablet 50 mg QHS PRN   • sodium chloride (OCEAN) 0.65 % nasal spray 2 Spray PRN   • amLODIPine (NORVASC) tablet 5 mg DAILY   • lacosamide (VIMPAT) tablet 100 mg BID   • levETIRAcetam (KEPPRA) tablet 1,000 mg BID   • rosuvastatin (CRESTOR) tablet 10 mg QHS       Orders Placed This Encounter   Procedures   • Diet Order Regular     Standing Status:   Standing     Number of Occurrences:   1     Order Specific Question:   Diet:     Answer:   Regular [1]       Assessment:  Active Hospital Problems    Diagnosis   • *Subdural hematoma (HCC)   • Impaired mobility and ADLs   • Seizures, post-traumatic (HCC)   • Oropharyngeal dysphagia   • Urinary retention   • Essential hypertension   • Hypercholesterolemia   • Legally blind       Medical Decision Making and Plan:  TBI (Traumatic Brain Injury): Fall with SDH s/p R sided evacuation with Dr. Mendiola on 4/17/20. Patient started on Steroids. Had post-operative seizure like activity, EEG with cortical irritation  -PT and OT for mobility and ADLs  -SLP for cognition  -NSG follow-up. Per most recent NSG recommendations continue steroids for a week. Recommend CT head - scheduled 5/4/20 - stable changes.   -On Decadron 4 mg BID per NSG -> decrease to 2 mg BID     Dysphagia - On dysphagia diet on transfer. SLP for swallow evaluation - continue dysphagia 3 with NTL. MBSS 4/27/20 - upgraded to dysphagia with thins. Upgraded to regular/thins. Resolved     Seizure disorder - Patient with previous CVA with seizures. Increased [facility]ED[facility] at [facility] to Keppra 1000 mg BID and Vimpat 100 mg BID  -Follow-up Neurology seizure clinic     Orthostatic Hypotension - Occurred on 5/4/20  when going up stairs. Start IVF recheck. Improved    Leukocytosis - up to 32 after orthostatic hypotension. On steroids, but much elevated. Consulted hospitalist and check blood cultures. Covid 19 - negative x1. WBC improved to 25 on 5/5/20, blood cultures positive x1 on broad spectrum antibiotics. Patient clinically feels better.   -Blood cultures gram - E coli. UCx - pending. Continue Zosyn and Zyvox -> Zosyn only. Per Hospitalist narrow to Ceftriaxone to finish on 5/11/20. CXR 5/8/20 - resolved pleural effusions.     HTN - Patient on 5 mg Amlodipine. Previously on metoprolol as well. Continue to monitor, SBP elevated restart Metoprolol 25 mg BID     Legally blind - High risk for falls.      Anemia - 13.1 on admission, continue to monitor    Hyponatremia - 129 on 5/4/20, now on IVF    Hiccups - Started on Gabapentin 300 mg TID -> 400 mg TID. Improved but occasional increase to 600. No return will decrease to 300 mg TID    Azotemia - Patient on NTL diet on admission with poor fluid intake. Improved with fluids    HLD - Patient on Rosuvastatin 10 mg on transfer.     GI Ppx - Patient with loose stools on senna-docusate. Change to PRN    DVT Ppx - Patient high risk for bleed. Ambulating > 100 feet.     Dispo - [facility]. Discussed with [facility] and [facility] if Covid ruled out which it was. Plan for Monday transfer but [facility] requiring repeat rule out. Discussed that patient is asymptomatic and being treated for bacterial infection.     Total time:  35 minutes.  I spent greater than 50% of the time for patient care, counseling, and coordination on this date, including unit/floor time, and face-to-face time with the patient as per interval events and assessment and plan above. Topics discussed included discharge planning, blood culture with e coli, and narrowing antibiotics. May not be able to [facility], may require outside SNF.    Lyn Sadler M.D.

## 2020-05-08 NOTE — PROGRESS NOTES
[facility] Medicine Daily Progress Note    Date of Service  5/8/2020    Chief Complaint:  Hypertension  Leukocytosis  Hyponatremia    Interval History:  No significant events or changes since last visit    Review of Systems  Review of Systems   Constitutional: Negative for chills and fever.   Respiratory: Negative for shortness of breath.    Cardiovascular: Negative for chest pain.   Gastrointestinal: Negative for abdominal pain, diarrhea, nausea and vomiting.   Psychiatric/Behavioral: The patient is not nervous/anxious.         Physical Exam  Temp:  [36.3 °C (97.3 °F)-36.6 °C (97.9 °F)] 36.6 °C (97.9 °F)  Pulse:  [51-67] 55  Resp:  [16-18] 16  BP: (110-130)/(59-67) 114/59  SpO2:  [96 %-97 %] 97 %    Physical Exam  Vitals signs and nursing note reviewed.   Constitutional:       Appearance: Normal appearance.   HENT:      Head: Atraumatic.   Eyes:      Conjunctiva/sclera: Conjunctivae normal.      Pupils: Pupils are equal, round, and reactive to light.   Neck:      Musculoskeletal: Normal range of motion and neck supple.   Cardiovascular:      Rate and Rhythm: Normal rate and regular rhythm.   Pulmonary:      Effort: Pulmonary effort is normal.      Breath sounds: Normal breath sounds.   Abdominal:      General: Bowel sounds are normal.      Palpations: Abdomen is soft.   Musculoskeletal:      Right lower leg: No edema.      Left lower leg: No edema.   Skin:     General: Skin is warm and dry.   Neurological:      Mental Status: He is alert and oriented to person, place, and time.   Psychiatric:         Mood and Affect: Mood normal.         Behavior: Behavior normal.         Fluids    Intake/Output Summary (Last 24 hours) at 5/8/2020 0934  Last data filed at 5/8/2020 0521  Gross per 24 hour   Intake 720 ml   Output 1800 ml   Net -1080 ml       Laboratory  Recent Labs     05/08/20  0539   WBC 7.0   RBC 3.99*   HEMOGLOBIN 12.1*   HEMATOCRIT 35.5*   MCV 89.0   MCH 30.3   MCHC 34.1   RDW 43.8   PLATELETCT 146*   MPV 10.6      Recent Labs     05/08/20  0539   SODIUM 128*   POTASSIUM 4.1   CHLORIDE 95*   CO2 25   GLUCOSE 105*   BUN 25*   CREATININE 0.93   CALCIUM 8.9                   Imaging    Assessment/Plan  * Subdural hematoma (HCC)- (present on admission)  Assessment & Plan  2nd to fall in bathtub  S/P craniotomy (4/17)  On Decadron  Repeat CT head: no acute changes    Seizures, 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)  Assessment & Plan  Had breakthrough seizure on prophylactic Keppra  EEG showed cortical instability  Now on Vimpat and Keppra    Essential hypertension- (present on admission)  Assessment & Plan  BP ok  On Norvasc: 5 mg daily  On Lopressor: 25 mg bid --> will d/c 2nd to bradycardia (5/8)  Note: Lopressor has been held in am recently  Note: off IVF's  Monitor    Azotemia  Assessment & Plan  Bun: 25 (5/8)  Encouraging fluid intake  Monitor    Bradycardia  Assessment & Plan  HR 51-67  On Lopressor: 25 mg bid --> will d/c (5/8)  Note: Lopressor has been held on occ in the am  Cont to monitor    Leukocytosis  Assessment & Plan  Resolved  WBC's: 32 --> 25 (5/5) --> 7.0 (5/8)  (5/4): Tmax: 100.1  (5/8): has been afebrile since 5/4  Crani site healing well  CT head: no acute changes  CXR (5/5): ill-defined opacifications in each lung have increased compared to the prior radiograph;                    this could indicate worsening of pulmonary edema or inflammation  Covid-19 (-)  Flu (-)  UC (-)  BC x 2: shows 1 out of 2 bottles pos for E. Coli  Note: on steroids  Off Zyvox (5/7)  On Zosyn empirically (thru 5/11) --> will d/c after today's doses  Will start Rocephin 2g IV daily thru 5/11  D/W ID: suggested Rocephin to complete a 7 day abx course since pt has remained afebrile and WBC normalized    Hyponatremia  Assessment & Plan  Na: 129 (5/4) --> 129 (5/5) -->128 (5/8)  Off IVF's NS (5/6)  ? 2nd to Zosyn or Keppra  Will start salt tabs: 1g tid (5/8)  Cont to monitor    Hypercholesterolemia- (present on  admission)  Assessment & Plan  On Crestor    Legally blind- (present on admission)  Assessment & Plan  Has hx history

## 2020-05-08 NOTE — DISCHARGE PLANNING
Updated daughter, Cleo, via phone of latest information from [facility] regarding COVID test on admit. Emailed form from [facility] for completion by daughter (no PPI listed on cover sheet from [facility] or document).

## 2020-05-08 NOTE — DISCHARGE PLANNING
Updated daughter, Cleo, via phone that [facility] [place] [facility] will not accept patient w/o negative COVID test w/in 3d of admit, which does not follow CDC guidelines.  Verbalizes agreement for referring to all SNF in area for evaluation.  Questions answered. Emotional support provided.

## 2020-05-08 NOTE — DISCHARGE PLANNING
Per Devonte at [facility], they are unable to accept patient without a COVID19 test done within 3 days of admission which we are unable to do at this time.  Per 's request, SNF referral sent to [facility], [facility], [place], [facility], [place], [place], [place] [facility] and [facility].  Awaiting responses.

## 2020-05-08 NOTE — THERAPY
Occupational Therapy  Daily Treatment     Patient Name: Rafael Mccoy  Age:  74 y.o., Sex:  male  Medical Record #: [id number]  Today's Date: 5/8/2020     Precautions  Precautions: Fall Risk, Swallow Precautions ( See Comments)  Comments: legally blind (absent central vision), L apraxia    Safety   ADL Safety : Impaired, Requires Supervision for Safety, Requires Physical Assist for Safety, Impaired Insight into Safety, Requires Cueing for Safety, Impulsive  Bathroom Safety: Impaired, Impulsive, Requires Supervision for Safety, Requires Physical Assist for Safety, Impaired Insight into Safety, Requires Cuing for Safety    Subjective    Pt received asleep in bed, initially lethargic but increased alertness once up in chair     Objective       05/08/20 1301   Precautions   Precautions Fall Risk;Swallow Precautions ( See Comments)   Comments legally blind (absent central vision), L apraxia   IADL Treatments   Kitchen Mobility Education Min A to SBA for kitchen mobility using both support on counter and walking poles, tendency to lean head against cabinet for increased support   Meal Preparation Pt completed simple meal prep (scrambled eggs) with CGA/SBA, required cues for attention to task and sequencing, cues for LUE awareness, did turn stove off at end of task   OT Total Time Spent   OT Individual Total Time Spent (Mins) 60   OT Charge Group   OT Self Care / ADL 1   OT Therapy Activity 3       FIM Lower Body Dressing Score:  4 - Minimal Assistance  Lower Body Dressing Description:  (min A to orient and thread pants, SBA to pull up in standing, increased time and SBA to don socks)      Assessment    Pt tolerated session well, demos continued improvements in balance and mobility requiring only min A to supervision for mobility within novel kitchen. Demos continued decreased proprioception and attention to LUE frequently tilting bowl and pan resulting in spillage with no awareness, discussed safety implications of  decreased awareness of LUE during cooking including stove safety and cutting concerns. Pt required intermittent cues for sequencing during task, demos difficulty alternating attention between task and conversation.     Plan    LUE neuro re-ed, FM/GM coordination, sitting/standing balance to correct left lean, ADLs, IADLs, management of visual deficits

## 2020-05-08 NOTE — DISCHARGE PLANNING
Received call back from Devonte at [facility], they will be able to accept patient on Monday as long as patient is willing to have a COVID19 test done there upon admission.  CM notified.

## 2020-05-08 NOTE — CARE PLAN
Problem: Safety  Goal: Will remain free from falls  Intervention: Implement fall precautions  Flowsheets (Taken 5/8/2020 1058)  Bed Alarm: Yes - Alarm On  Environmental Precautions:   Treaded Slipper Socks on Patient   Personal Belongings, Wastebasket, Call Bell etc. in Easy Reach   Bed in Low Position  Note: Pt legally blind, safety precautions observed . With padded side rails. Needs anticipated , hourly rounding done . Pt safe and free from fall and injury.     Problem: Infection  Goal: Will remain free from infection  Intervention: Assess signs and symptoms of infection  Note: Afebrile ,v/s stable. Iv access to right forearm intact, pt on IV Zosyn . Head incision intact well approximated.

## 2020-05-08 NOTE — THERAPY
Physical Therapy   Daily Treatment     Patient Name: Rafael Mccoy  Age:  74 y.o., Sex:  male  Medical Record #: [id number]  Today's Date: 5/8/2020     Precautions  Precautions: (P) Fall Risk, Swallow Precautions ( See Comments)  Comments: (P) legally blind (absent central vision), L apraxia    Subjective    Patient in bed and agreeable to therapy.     Objective       05/08/20 0931   Precautions   Precautions Fall Risk;Swallow Precautions ( See Comments)   Comments legally blind (absent central vision), L apraxia   Bed Mobility    Supine to Sit Supervised   Sit to Supine Supervised   Sit to Stand Stand by Assist   Scooting Supervised   Rolling Supervised   Neuro-Muscular Treatments   Comments blocked sit<>stands with no UE support on therapy mat 2x10; staggered sit<>stands from elevated therapy mat no UE support with CGA-SBA 2x10 each leg leading.   Interdisciplinary Plan of Care Collaboration   Patient Position at End of Therapy In Bed;Call Light within Reach;Tray Table within Reach;Phone within Reach   PT Total Time Spent   PT Individual Total Time Spent (Mins) 60   PT Charge Group   PT Gait Training 1   PT Neuromuscular Re-Education / Balance 2   PT Therapeutic Activities 1       FIM Bed/Chair/Wheelchair Transfers Score: 5 - Standby Prompting/Supervision or Set-up  Bed/Chair/Wheelchair Transfers Description:  Supervision for safety, Adaptive equipment    FIM Walking Score:  5 - Standby Prompting/Supervision or Set-up  Walking Description:  Assist device/equipment, Extra time, Safety concerns, Supervision for safety(150 ftx1 with single walking stick and CGA; 150 ftx2 with B walking sticks and SBA (occasional CGA). Intermittent cues for environment navigation due to visual deficits)      Assessment    Patient tolerated session well, agreeable to trialing bilateral walking sticks, with patient demonstrating improved stability. Patient fatigued toward end of session however motivated by  progress.    Plan    variable gait, forced use L side, standing balance activities, cont gait training with bilateral walking sticks; anticipated d/c Monday 5/11/2020

## 2020-05-08 NOTE — DISCHARGE PLANNING
Per Janine at [facility], referral accepted.  Per Blanca at [place], referral accepted.  Per Patricia at [place], referral accepted.

## 2020-05-09 LAB
BACTERIA BLD CULT: NORMAL
SIGNIFICANT IND 70042: NORMAL
SITE SITE: NORMAL
SOURCE SOURCE: NORMAL

## 2020-05-09 PROCEDURE — 700105 HCHG RX REV CODE 258: Performed by: HOSPITALIST

## 2020-05-09 PROCEDURE — 700102 HCHG RX REV CODE 250 W/ 637 OVERRIDE(OP): Performed by: HOSPITALIST

## 2020-05-09 PROCEDURE — 700102 HCHG RX REV CODE 250 W/ 637 OVERRIDE(OP): Performed by: PHYSICAL MEDICINE & REHABILITATION

## 2020-05-09 PROCEDURE — 770010 HCHG ROOM/CARE - REHAB SEMI PRIVAT*

## 2020-05-09 PROCEDURE — 700111 HCHG RX REV CODE 636 W/ 250 OVERRIDE (IP): Performed by: HOSPITALIST

## 2020-05-09 PROCEDURE — A9270 NON-COVERED ITEM OR SERVICE: HCPCS | Performed by: PHYSICAL MEDICINE & REHABILITATION

## 2020-05-09 PROCEDURE — A9270 NON-COVERED ITEM OR SERVICE: HCPCS | Performed by: HOSPITALIST

## 2020-05-09 PROCEDURE — 99232 SBSQ HOSP IP/OBS MODERATE 35: CPT | Performed by: HOSPITALIST

## 2020-05-09 PROCEDURE — 99231 SBSQ HOSP IP/OBS SF/LOW 25: CPT | Performed by: PHYSICAL MEDICINE & REHABILITATION

## 2020-05-09 RX ADMIN — TRAZODONE HYDROCHLORIDE 50 MG: 50 TABLET ORAL at 20:49

## 2020-05-09 RX ADMIN — CEFTRIAXONE SODIUM 2 G: 2 INJECTION, POWDER, FOR SOLUTION INTRAMUSCULAR; INTRAVENOUS at 09:13

## 2020-05-09 RX ADMIN — Medication 1 G: at 09:07

## 2020-05-09 RX ADMIN — ROSUVASTATIN CALCIUM 10 MG: 10 TABLET, FILM COATED ORAL at 20:42

## 2020-05-09 RX ADMIN — DEXAMETHASONE 2 MG: 1 TABLET ORAL at 20:42

## 2020-05-09 RX ADMIN — LACOSAMIDE 100 MG: 100 TABLET, FILM COATED ORAL at 20:42

## 2020-05-09 RX ADMIN — LEVETIRACETAM 1000 MG: 500 TABLET ORAL at 09:06

## 2020-05-09 RX ADMIN — LEVETIRACETAM 1000 MG: 500 TABLET ORAL at 20:42

## 2020-05-09 RX ADMIN — GABAPENTIN 300 MG: 300 CAPSULE ORAL at 09:06

## 2020-05-09 RX ADMIN — Medication 1 G: at 11:54

## 2020-05-09 RX ADMIN — AMLODIPINE BESYLATE 5 MG: 5 TABLET ORAL at 09:06

## 2020-05-09 RX ADMIN — DEXAMETHASONE 2 MG: 1 TABLET ORAL at 09:06

## 2020-05-09 RX ADMIN — Medication 1 G: at 17:12

## 2020-05-09 RX ADMIN — LACOSAMIDE 100 MG: 100 TABLET, FILM COATED ORAL at 09:07

## 2020-05-09 RX ADMIN — GABAPENTIN 300 MG: 300 CAPSULE ORAL at 20:42

## 2020-05-09 RX ADMIN — GABAPENTIN 300 MG: 300 CAPSULE ORAL at 14:48

## 2020-05-09 ASSESSMENT — ENCOUNTER SYMPTOMS
VOMITING: 0
HALLUCINATIONS: 0
NAUSEA: 0
SHORTNESS OF BREATH: 0
PALPITATIONS: 0
HEADACHES: 0
BLURRED VISION: 0
FEVER: 0
DIZZINESS: 0

## 2020-05-09 NOTE — THERAPY
Speech Language Pathology  Daily Treatment     Patient Name: Rafael Mccoy  Age:  74 y.o., Sex:  male  Medical Record #: [id number]  Today's Date: 5/8/2020     Subjective    Pt was seen seated in wheelchair in his room. Pt was alert and cooperative but became fatigued at the end of the session.      Objective     05/08/20 1401   Cognition   Moderate Attention Minimal (4)   Orientation  Minimal (4)   Functional Memory Activities Minimal (4)   Safety Awareness Minimal (4)   Functional Math / Financial Management Moderate (3)   Interdisciplinary Plan of Care Collaboration   IDT Collaboration with  Nursing;   Patient Position at End of Therapy In Bed   Collaboration Comments re: d/c plan   SLP Total Time Spent   SLP Individual Total Time Spent (Mins) 60   Charge Group   SLP Cognitive Skill Development First 15 Minutes 1   SLP Cognitive Skill Development Additional 15 Minutes 3       Assessment    SLP provided pt with common scenarios for safety awareness. Pt was able to accurately describe the the rationale for each safety concern for 5/6 in the home, 5/5 regarding equipment (i.e. wheelchair) and 5/6 regarding medication.     SLP also presented pt with auditory math for finances. Pt answered simple math/currency equations with 70% accuracy. Pt was fatigued at the end of the session and not as accurate.       Plan    Cont tx to target problem solving

## 2020-05-09 NOTE — PROGRESS NOTES
"Rehab Progress Note     Encounter Date: 5/9/2020      CC: SDH, decreased mobility, weakness      Interval Events (Subjective):  -Seen in dining room, eating  -Denies any new issues  -No SOB, nausea, vomiting        Objective:  VITAL SIGNS: /50   Pulse (!) 58   Temp 36.9 °C (98.4 °F) (Oral)   Resp 18   Ht 1.753 m (5' 9\")   Wt 73.4 kg (161 lb 13.1 oz)   SpO2 94%   BMI 23.90 kg/m²   Gen: NAD  Psych: Mood and affect appropriate  Resp: unlabored on room air  Abd: Soft, no guarding  Neuro: follows simple commands, poor vision bilaterally        Recent Results (from the past 72 hour(s))   Basic Metabolic Panel    Collection Time: 05/08/20  5:39 AM   Result Value Ref Range    Sodium 128 (L) 135 - 145 mmol/L    Potassium 4.1 3.6 - 5.5 mmol/L    Chloride 95 (L) 96 - 112 mmol/L    Co2 25 20 - 33 mmol/L    Glucose 105 (H) 65 - 99 mg/dL    Bun 25 (H) 8 - 22 mg/dL    Creatinine 0.93 0.50 - 1.40 mg/dL    Calcium 8.9 8.5 - 10.5 mg/dL    Anion Gap 8.0 7.0 - 16.0   MAGNESIUM    Collection Time: 05/08/20  5:39 AM   Result Value Ref Range    Magnesium 2.1 1.5 - 2.5 mg/dL   PHOSPHORUS    Collection Time: 05/08/20  5:39 AM   Result Value Ref Range    Phosphorus 3.3 2.5 - 4.5 mg/dL   CBC WITH DIFFERENTIAL    Collection Time: 05/08/20  5:39 AM   Result Value Ref Range    WBC 7.0 4.8 - 10.8 K/uL    RBC 3.99 (L) 4.70 - 6.10 M/uL    Hemoglobin 12.1 (L) 14.0 - 18.0 g/dL    Hematocrit 35.5 (L) 42.0 - 52.0 %    MCV 89.0 81.4 - 97.8 fL    MCH 30.3 27.0 - 33.0 pg    MCHC 34.1 33.7 - 35.3 g/dL    RDW 43.8 35.9 - 50.0 fL    Platelet Count 146 (L) 164 - 446 K/uL    MPV 10.6 9.0 - 12.9 fL    Neutrophils-Polys 83.30 (H) 44.00 - 72.00 %    Lymphocytes 8.90 (L) 22.00 - 41.00 %    Monocytes 7.10 0.00 - 13.40 %    Eosinophils 0.10 0.00 - 6.90 %    Basophils 0.00 0.00 - 1.80 %    Immature Granulocytes 0.60 0.00 - 0.90 %    Nucleated RBC 0.00 /100 WBC    Neutrophils (Absolute) 5.86 1.82 - 7.42 K/uL    Lymphs (Absolute) 0.63 (L) 1.00 - 4.80 K/uL "    Monos (Absolute) 0.50 0.00 - 0.85 K/uL    Eos (Absolute) 0.01 0.00 - 0.51 K/uL    Baso (Absolute) 0.00 0.00 - 0.12 K/uL    Immature Granulocytes (abs) 0.04 0.00 - 0.11 K/uL    NRBC (Absolute) 0.00 K/uL   ESTIMATED GFR    Collection Time: 05/08/20  5:39 AM   Result Value Ref Range    GFR If African American >60 >60 mL/min/1.73 m 2    GFR If Non African American >60 >60 mL/min/1.73 m 2       Current Facility-Administered Medications   Medication Frequency   • sodium chloride (SALT) tablet 1 g TID WITH MEALS   • cefTRIAXone (ROCEPHIN) 2 g in  mL IVPB Q24HRS   • gabapentin (NEURONTIN) capsule 300 mg TID   • dexamethasone (DECADRON) tablet 2 mg BID   • senna-docusate (PERICOLACE or SENOKOT S) 8.6-50 MG per tablet 2 Tab BID PRN    And   • polyethylene glycol/lytes (MIRALAX) PACKET 1 Packet QDAY PRN    And   • magnesium hydroxide (MILK OF MAGNESIA) suspension 30 mL QDAY PRN    And   • bisacodyl (DULCOLAX) suppository 10 mg QDAY PRN   • Respiratory Therapy Consult Continuous RT   • oxyCODONE immediate-release (ROXICODONE) tablet 5 mg Q3HRS PRN   • oxyCODONE immediate release (ROXICODONE) tablet 10 mg Q3HRS PRN   • tramadol (ULTRAM) 50 MG tablet 50 mg Q4HRS PRN   • hydrALAZINE (APRESOLINE) tablet 25 mg Q8HRS PRN   • acetaminophen (TYLENOL) tablet 650 mg Q4HRS PRN   • artificial tears ophthalmic solution 1 Drop PRN   • benzocaine-menthol (CEPACOL) lozenge 1 Lozenge Q2HRS PRN   • mag hydrox-al hydrox-simeth (MAALOX PLUS ES or MYLANTA DS) suspension 20 mL Q2HRS PRN   • ondansetron (ZOFRAN ODT) dispertab 4 mg 4X/DAY PRN    Or   • ondansetron (ZOFRAN) syringe/vial injection 4 mg 4X/DAY PRN   • traZODone (DESYREL) tablet 50 mg QHS PRN   • sodium chloride (OCEAN) 0.65 % nasal spray 2 Spray PRN   • amLODIPine (NORVASC) tablet 5 mg DAILY   • lacosamide (VIMPAT) tablet 100 mg BID   • levETIRAcetam (KEPPRA) tablet 1,000 mg BID   • rosuvastatin (CRESTOR) tablet 10 mg QHS       Orders Placed This Encounter   Procedures   • Diet  Order Regular     Standing Status:   Standing     Number of Occurrences:   1     Order Specific Question:   Diet:     Answer:   Regular [1]       Assessment:  Active Hospital Problems    Diagnosis   • *Subdural hematoma (HCC)   • Impaired mobility and ADLs   • Seizures, post-traumatic (HCC)   • Oropharyngeal dysphagia   • Urinary retention   • Essential hypertension   • Hypercholesterolemia   • Legally blind       Medical Decision Making and Plan:  TBI (Traumatic Brain Injury): Fall with SDH s/p R sided evacuation with Dr. Mendiola on 4/17/20. Patient started on Steroids. Had post-operative seizure like activity, EEG with cortical irritation  -Continue multidisciplinary rehab  -No changes over the weekend, continue per primary team        Conor Lopez M.D.

## 2020-05-09 NOTE — CARE PLAN
Problem: Communication  Goal: The ability to communicate needs accurately and effectively will improve  Outcome: PROGRESSING AS EXPECTED     Problem: Safety  Goal: Will remain free from injury  Outcome: PROGRESSING AS EXPECTED  Goal: Will remain free from falls  Outcome: PROGRESSING AS EXPECTED     Problem: Infection  Goal: Will remain free from infection  Outcome: PROGRESSING AS EXPECTED     Problem: Venous Thromboembolism (VTW)/Deep Vein Thrombosis (DVT) Prevention:  Goal: Patient will participate in Venous Thrombosis (VTE)/Deep Vein Thrombosis (DVT)Prevention Measures  Outcome: PROGRESSING AS EXPECTED     Problem: Bowel/Gastric:  Goal: Normal bowel function is maintained or improved  Outcome: PROGRESSING AS EXPECTED  Goal: Will not experience complications related to bowel motility  Outcome: PROGRESSING AS EXPECTED     Problem: Knowledge Deficit  Goal: Knowledge of disease process/condition, treatment plan, diagnostic tests, and medications will improve  Outcome: PROGRESSING AS EXPECTED  Goal: Knowledge of the prescribed therapeutic regimen will improve  Outcome: PROGRESSING AS EXPECTED     Problem: Discharge Barriers/Planning  Goal: Patient's continuum of care needs will be met  Outcome: PROGRESSING AS EXPECTED     Problem: Urinary Elimination:  Goal: Ability to reestablish a normal urinary elimination pattern will improve  Outcome: PROGRESSING AS EXPECTED     Problem: Respiratory:  Goal: Respiratory status will improve  Outcome: PROGRESSING AS EXPECTED     Problem: Skin Integrity  Goal: Risk for impaired skin integrity will decrease  Outcome: PROGRESSING AS EXPECTED

## 2020-05-10 LAB
ANION GAP SERPL CALC-SCNC: 8 MMOL/L (ref 7–16)
BUN SERPL-MCNC: 19 MG/DL (ref 8–22)
CALCIUM SERPL-MCNC: 8.7 MG/DL (ref 8.5–10.5)
CHLORIDE SERPL-SCNC: 96 MMOL/L (ref 96–112)
CO2 SERPL-SCNC: 24 MMOL/L (ref 20–33)
CREAT SERPL-MCNC: 0.9 MG/DL (ref 0.5–1.4)
GLUCOSE SERPL-MCNC: 121 MG/DL (ref 65–99)
MAGNESIUM SERPL-MCNC: 2.2 MG/DL (ref 1.5–2.5)
PHOSPHATE SERPL-MCNC: 3 MG/DL (ref 2.5–4.5)
POTASSIUM SERPL-SCNC: 4.2 MMOL/L (ref 3.6–5.5)
SODIUM SERPL-SCNC: 128 MMOL/L (ref 135–145)

## 2020-05-10 PROCEDURE — 97130 THER IVNTJ EA ADDL 15 MIN: CPT | Performed by: SPEECH-LANGUAGE PATHOLOGIST

## 2020-05-10 PROCEDURE — 97129 THER IVNTJ 1ST 15 MIN: CPT | Performed by: SPEECH-LANGUAGE PATHOLOGIST

## 2020-05-10 PROCEDURE — 97530 THERAPEUTIC ACTIVITIES: CPT

## 2020-05-10 PROCEDURE — 99231 SBSQ HOSP IP/OBS SF/LOW 25: CPT | Performed by: PHYSICAL MEDICINE & REHABILITATION

## 2020-05-10 PROCEDURE — 700105 HCHG RX REV CODE 258: Performed by: HOSPITALIST

## 2020-05-10 PROCEDURE — A9270 NON-COVERED ITEM OR SERVICE: HCPCS | Performed by: HOSPITALIST

## 2020-05-10 PROCEDURE — 99232 SBSQ HOSP IP/OBS MODERATE 35: CPT | Performed by: HOSPITALIST

## 2020-05-10 PROCEDURE — 700102 HCHG RX REV CODE 250 W/ 637 OVERRIDE(OP): Performed by: HOSPITALIST

## 2020-05-10 PROCEDURE — 700111 HCHG RX REV CODE 636 W/ 250 OVERRIDE (IP): Performed by: HOSPITALIST

## 2020-05-10 PROCEDURE — 97116 GAIT TRAINING THERAPY: CPT

## 2020-05-10 PROCEDURE — 83735 ASSAY OF MAGNESIUM: CPT

## 2020-05-10 PROCEDURE — 36415 COLL VENOUS BLD VENIPUNCTURE: CPT

## 2020-05-10 PROCEDURE — 770010 HCHG ROOM/CARE - REHAB SEMI PRIVAT*

## 2020-05-10 PROCEDURE — 97112 NEUROMUSCULAR REEDUCATION: CPT

## 2020-05-10 PROCEDURE — A9270 NON-COVERED ITEM OR SERVICE: HCPCS | Performed by: PHYSICAL MEDICINE & REHABILITATION

## 2020-05-10 PROCEDURE — 80048 BASIC METABOLIC PNL TOTAL CA: CPT

## 2020-05-10 PROCEDURE — 84100 ASSAY OF PHOSPHORUS: CPT

## 2020-05-10 PROCEDURE — 700102 HCHG RX REV CODE 250 W/ 637 OVERRIDE(OP): Performed by: PHYSICAL MEDICINE & REHABILITATION

## 2020-05-10 RX ADMIN — GABAPENTIN 300 MG: 300 CAPSULE ORAL at 21:46

## 2020-05-10 RX ADMIN — ROSUVASTATIN CALCIUM 10 MG: 10 TABLET, FILM COATED ORAL at 21:46

## 2020-05-10 RX ADMIN — GABAPENTIN 300 MG: 300 CAPSULE ORAL at 15:35

## 2020-05-10 RX ADMIN — TRAZODONE HYDROCHLORIDE 50 MG: 50 TABLET ORAL at 21:46

## 2020-05-10 RX ADMIN — LEVETIRACETAM 1000 MG: 500 TABLET ORAL at 21:46

## 2020-05-10 RX ADMIN — DEXAMETHASONE 2 MG: 1 TABLET ORAL at 08:43

## 2020-05-10 RX ADMIN — LEVETIRACETAM 1000 MG: 500 TABLET ORAL at 08:43

## 2020-05-10 RX ADMIN — CEFTRIAXONE SODIUM 2 G: 2 INJECTION, POWDER, FOR SOLUTION INTRAMUSCULAR; INTRAVENOUS at 08:43

## 2020-05-10 RX ADMIN — LACOSAMIDE 100 MG: 100 TABLET, FILM COATED ORAL at 08:44

## 2020-05-10 RX ADMIN — LACOSAMIDE 100 MG: 100 TABLET, FILM COATED ORAL at 21:46

## 2020-05-10 RX ADMIN — Medication 1 G: at 11:59

## 2020-05-10 RX ADMIN — Medication 1 G: at 17:41

## 2020-05-10 RX ADMIN — DEXAMETHASONE 2 MG: 1 TABLET ORAL at 21:46

## 2020-05-10 RX ADMIN — Medication 1 G: at 08:44

## 2020-05-10 RX ADMIN — AMLODIPINE BESYLATE 5 MG: 5 TABLET ORAL at 08:43

## 2020-05-10 RX ADMIN — GABAPENTIN 300 MG: 300 CAPSULE ORAL at 08:43

## 2020-05-10 ASSESSMENT — MONTREAL COGNITIVE ASSESSMENT (MOCA)
ORIENTATION SUBSCORE: 5
6. READ LIST OF DIGITS [FORWARD/BACKWARD]: 1
WHAT LEVEL OF EDUCATION WAS ATTAINED: HIGH SCHOOL EDUCATION
12. MEMORY INDEX SCORE: 3
7. [VIGILENCE] TAP WHEN HEARING DESIGNATED LETTER: 1
8. SERIAL SUBTRACTION OF 7S: 2
11. FOR EACH PAIR OF WORDS, WHAT CATEGORY DO THEY BELONG TO (OUT OF 2): 2
9. REPEAT EACH SENTENCE: 2
10. [FLUENCY] NAME WORDS STARTING WITH DESIGNATED LETTER: 0

## 2020-05-10 ASSESSMENT — ENCOUNTER SYMPTOMS
BLURRED VISION: 0
FEVER: 0
NERVOUS/ANXIOUS: 0
DIZZINESS: 0
COUGH: 0
DIARRHEA: 0

## 2020-05-10 NOTE — THERAPY
Occupational Therapy  Daily Treatment     Patient Name: Rafael Mccoy  Age:  74 y.o., Sex:  male  Medical Record #: [id number]  Today's Date: 5/10/2020     Precautions  Precautions: (P) Fall Risk  Comments: (P) legally blind (absent central vision), L apraxia    Safety   ADL Safety : Impaired, Requires Supervision for Safety, Requires Physical Assist for Safety, Impaired Insight into Safety, Requires Cueing for Safety, Impulsive  Bathroom Safety: Impaired, Impulsive, Requires Supervision for Safety, Requires Physical Assist for Safety, Impaired Insight into Safety, Requires Cuing for Safety    Subjective    Pt received up in w/c, agreeable to OT     Objective       05/10/20 1031   Precautions   Precautions Fall Risk   Comments legally blind (absent central vision), L apraxia   Sitting Upper Body Exercises   Upper Extremity Bike Level 6 Resistance  (motomed 15 min for bilateral integration and LUE input)   Standing Upper Body Exercises   Standing Upper Body Exercises Yes   Chest Press 2 sets of 10  (bilateral grasp on 6# ball)   Front Arm Raise 2 sets of 10  (bilateral grasp on 6# ball)   Other Exercises 2x10 core twists with bilateral grasp on 6# ball, 2x10 clockwise/counteclockwise around waist with 1# ball   Comments pt with tendency twd anterior lean during therex though no LOB, able to self-correct with verbal cues   Fine Motor / Dexterity    Fine Motor / Dexterity Interventions object identification using bilateral touch, pt able to identify 11/15 items with no cues, required mod cues to identify remaining 4/15   OT Total Time Spent   OT Individual Total Time Spent (Mins) 60   OT Charge Group   OT Therapy Activity 4     Assessment    Pt tolerated session well with focus on progression of LUE function, demos gradual improvements in motor control and proprioception as well as improved ability to identify objects by touch only to compensate for visual deficits. Pt reports difficulty modulating movements during  breakfast when applying toppings to toast.     Plan    LUE neuro re-ed, FM/GM coordination, sitting/standing balance to correct left lean, ADLs, IADLs, management of visual deficits

## 2020-05-10 NOTE — THERAPY
"Physical Therapy   Daily Treatment     Patient Name: Rafael Mccoy  Age:  74 y.o., Sex:  male  Medical Record #: [id number]  Today's Date: 5/10/2020     Precautions  Precautions: (P) Fall Risk, Other (See Comments)  Comments: (P) legally blind (absent central vision), L apraxia    Subjective    Pt received in bed \"I think I need to take care of business.\" PT assisted pt to the bathroom.     Objective       05/10/20 1331   Precautions   Precautions Fall Risk;Other (See Comments)   Comments legally blind (absent central vision), L apraxia   Cognition    Level of Consciousness Alert   Attention Impaired   Bed Mobility    Supine to Sit Stand by Assist   Sit to Stand Contact Guard Assist  (varies SBA to CGA)   Neuro-Muscular Treatments   Comments unsupported standing balance training: head nods x8, reaching anteriorly and progressively reaching across midline B. 1 minor R lateral LOB when reaching across midline with L UE. Pt developed \"dizziness\" during head nods, subsided with seated rest.   Interdisciplinary Plan of Care Collaboration   IDT Collaboration with  Certified Nursing Assistant   Patient Position at End of Therapy Seated;Other (Comments)   Collaboration Comments tx of care to Rodney CNA   PT Total Time Spent   PT Individual Total Time Spent (Mins) 60   PT Charge Group   PT Gait Training 1   PT Neuromuscular Re-Education / Balance 1   PT Therapeutic Activities 2       FIM Toiletin - Max Assistance  Toileting Description:  Grab bar, Increased time(assist for clothing management)  PT assisted pt in changing pants.    FIM Bed/Chair/Wheelchair Transfers Score: 4 - Minimal Assistance  Bed/Chair/Wheelchair Transfers Description:       FIM Toilet Transfer Score:  4 - Minimal Assistance  Toilet Transfer Description:  (CGA with grab bar wc<>toilet tx)    FIM Walking Score:  4 - Minimal Assistance  Walking Description:  (pt amb ~350' with B walking sticks and SBA to CGA for intermtitent steadying assist)    FIM " "Stairs Score:  4 - Minimal Assistance  Stairs Description:  (pt ascended/descended 3x6 adatpive steps continuously using B HRs, reciprocal pattern, CGA. Pt \"feels\" step with heel prior to descending)    Pt reached down to floor to retrieve object with CGA.    Pt amb over mat with objects placed under it 2x10' with B walking sticks and CGA.    Pt got in/out of low sedan with CGA for ambulatory position.    Assessment    Pt requires CGA to SBA during gait with B walking sticks due to balance and vision impairments.  Mild unsteadiness noted when reaching out of EVELYN.    Plan    [facility]    "

## 2020-05-10 NOTE — CARE PLAN
Problem: Safety  Goal: Will remain free from injury  Outcome: PROGRESSING AS EXPECTED  Goal: Will remain free from falls  Outcome: PROGRESSING AS EXPECTED   Pt education reinforced regarding pt safety AND fall prevention, pt has not attempted self transfer during shift, reinforced education to use call light, will continue to reinforce education and provide care as ordered.

## 2020-05-10 NOTE — PROGRESS NOTES
[facility] Medicine Daily Progress Note    Date of Service  5/10/2020    Chief Complaint:  Hypertension  Leukocytosis  Hyponatremia    Interval History:  No significant events or changes since last visit    Review of Systems  Review of Systems   Constitutional: Negative for fever.   Eyes: Negative for blurred vision.   Respiratory: Negative for cough.    Cardiovascular: Negative for chest pain.   Gastrointestinal: Negative for diarrhea.   Musculoskeletal: Negative for joint pain.   Neurological: Negative for dizziness.   Psychiatric/Behavioral: The patient is not nervous/anxious.         Physical Exam  Temp:  [36.3 °C (97.4 °F)-37 °C (98.6 °F)] 37 °C (98.6 °F)  Pulse:  [62-65] 62  Resp:  [18] 18  BP: (112-127)/(54-62) 112/54    Physical Exam  Vitals signs and nursing note reviewed.   Constitutional:       Appearance: He is not diaphoretic.   HENT:      Mouth/Throat:      Pharynx: No oropharyngeal exudate or posterior oropharyngeal erythema.   Eyes:      Extraocular Movements: Extraocular movements intact.   Neck:      Vascular: No carotid bruit.   Cardiovascular:      Rate and Rhythm: Normal rate and regular rhythm.   Pulmonary:      Effort: Pulmonary effort is normal.      Breath sounds: No wheezing or rales.   Abdominal:      General: There is no distension.      Palpations: Abdomen is soft.      Tenderness: There is no abdominal tenderness.   Musculoskeletal:      Right lower leg: No edema.      Left lower leg: No edema.   Skin:     General: Skin is warm and dry.   Neurological:      Mental Status: He is alert and oriented to person, place, and time.   Psychiatric:         Mood and Affect: Mood normal.         Behavior: Behavior normal.         Fluids    Intake/Output Summary (Last 24 hours) at 5/10/2020 0854  Last data filed at 5/10/2020 0500  Gross per 24 hour   Intake 600 ml   Output 600 ml   Net 0 ml       Laboratory  Recent Labs     05/08/20  0539   WBC 7.0   RBC 3.99*   HEMOGLOBIN 12.1*   HEMATOCRIT 35.5*    MCV 89.0   MCH 30.3   MCHC 34.1   RDW 43.8   PLATELETCT 146*   MPV 10.6     Recent Labs     05/08/20  0539 05/10/20  0613   SODIUM 128* 128*   POTASSIUM 4.1 4.2   CHLORIDE 95* 96   CO2 25 24   GLUCOSE 105* 121*   BUN 25* 19   CREATININE 0.93 0.90   CALCIUM 8.9 8.7                   Imaging    Assessment/Plan  * Subdural hematoma (HCC)- (present on admission)  Assessment & Plan  2nd to fall in bathtub  S/P craniotomy (4/17)  On Decadron  Repeat CT head: no acute changes    Seizures, 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)  Assessment & Plan  Had breakthrough seizure on prophylactic Keppra  EEG showed cortical instability  Now on Vimpat and Keppra    Essential hypertension- (present on admission)  Assessment & Plan  BP ok  On Norvasc: 5 mg daily  Off Lopressor -- 2nd to bradycardia (5/8)  Monitor    Azotemia  Assessment & Plan  Currently resolved  Bun: 25 (5/8) --> 19 (5/10)  Encouraging fluid intake  Monitor    Bradycardia  Assessment & Plan  HR better recently  Off Lopressor 25 mg bid (5/8)  Note: Lopressor had previously been held on occ in the am  Cont to monitor    Leukocytosis  Assessment & Plan  Resolved  WBC's: 32 --> 25 (5/5) --> 7.0 (5/8)  (5/4): Tmax: 100.1  (5/8): has been afebrile since 5/4  Crani site healing well  CT head: no acute changes  CXR (5/5): ill-defined opacifications in each lung have increased compared to the prior radiograph;                    this could indicate worsening of pulmonary edema or inflammation  Covid-19 (-)  Flu (-)  UC (-)  BC x 2: shows 1 out of 2 bottles pos for E. Coli  Note: on steroids  Off Zyvox (5/7)  Off Zosyn (5/8)  On Rocephin 2g IV daily (5/9 --> 5/11)  D/W ID: suggested Rocephin to complete a 7 day abx course since pt has remained afebrile and WBC normalized    Hyponatremia  Assessment & Plan  Na: 129 (5/4) --> 129 (5/5) -->128 (5/8) --> 128 (5/10)  ? 2nd to Zosyn -- now off  ? 2nd to Keppra  On salt tabs: 1g tid (5/8)  Note: pt doesn't use salt on his  food -- ask pt to use salt   Cont to monitor    Hypercholesterolemia- (present on admission)  Assessment & Plan  On Crestor    Legally blind- (present on admission)  Assessment & Plan  Has hx history

## 2020-05-10 NOTE — CARE PLAN
Problem: Communication  Goal: The ability to communicate needs accurately and effectively will improve  Outcome: PROGRESSING AS EXPECTED     Problem: Safety  Goal: Will remain free from injury  Outcome: PROGRESSING AS EXPECTED  Goal: Will remain free from falls  Outcome: PROGRESSING AS EXPECTED     Problem: Infection  Goal: Will remain free from infection  Outcome: PROGRESSING AS EXPECTED     Problem: Venous Thromboembolism (VTW)/Deep Vein Thrombosis (DVT) Prevention:  Goal: Patient will participate in Venous Thrombosis (VTE)/Deep Vein Thrombosis (DVT)Prevention Measures  Outcome: PROGRESSING AS EXPECTED     Problem: Bowel/Gastric:  Goal: Normal bowel function is maintained or improved  Outcome: PROGRESSING AS EXPECTED  Goal: Will not experience complications related to bowel motility  Outcome: PROGRESSING AS EXPECTED     Problem: Knowledge Deficit  Goal: Knowledge of disease process/condition, treatment plan, diagnostic tests, and medications will improve  Outcome: PROGRESSING AS EXPECTED  Goal: Knowledge of the prescribed therapeutic regimen will improve  Outcome: PROGRESSING AS EXPECTED     Problem: Discharge Barriers/Planning  Goal: Patient's continuum of care needs will be met  Outcome: PROGRESSING AS EXPECTED     Problem: Urinary Elimination:  Goal: Ability to reestablish a normal urinary elimination pattern will improve  Outcome: PROGRESSING AS EXPECTED     Problem: Respiratory:  Goal: Respiratory status will improve  Outcome: PROGRESSING AS EXPECTED     Problem: Skin Integrity  Goal: Risk for impaired skin integrity will decrease  Outcome: PROGRESSING AS EXPECTED  Problem: Communication  Goal: The ability to communicate needs accurately and effectively will improve  Outcome: PROGRESSING AS EXPECTED     Problem: Safety  Goal: Will remain free from injury  Outcome: PROGRESSING AS EXPECTED  Goal: Will remain free from falls  Outcome: PROGRESSING AS EXPECTED     Problem: Infection  Goal: Will remain free from  infection  Outcome: PROGRESSING AS EXPECTED     Problem: Venous Thromboembolism (VTW)/Deep Vein Thrombosis (DVT) Prevention:  Goal: Patient will participate in Venous Thrombosis (VTE)/Deep Vein Thrombosis (DVT)Prevention Measures  Outcome: PROGRESSING AS EXPECTED     Problem: Bowel/Gastric:  Goal: Normal bowel function is maintained or improved  Outcome: PROGRESSING AS EXPECTED  Goal: Will not experience complications related to bowel motility  Outcome: PROGRESSING AS EXPECTED     Problem: Knowledge Deficit  Goal: Knowledge of disease process/condition, treatment plan, diagnostic tests, and medications will improve  Outcome: PROGRESSING AS EXPECTED  Goal: Knowledge of the prescribed therapeutic regimen will improve  Outcome: PROGRESSING AS EXPECTED     Problem: Discharge Barriers/Planning  Goal: Patient's continuum of care needs will be met  Outcome: PROGRESSING AS EXPECTED     Problem: Urinary Elimination:  Goal: Ability to reestablish a normal urinary elimination pattern will improve  Outcome: PROGRESSING AS EXPECTED     Problem: Respiratory:  Goal: Respiratory status will improve  Outcome: PROGRESSING AS EXPECTED     Problem: Skin Integrity  Goal: Risk for impaired skin integrity will decrease  Outcome: PROGRESSING AS EXPECTED

## 2020-05-10 NOTE — THERAPY
Speech Language Pathology  Daily Treatment     Patient Name: Rafael Mccoy  Age:  74 y.o., Sex:  male  Medical Record #: [id number]  Today's Date: 5/10/2020     Subjective    Pt was seen seated in his room for tx. Pt was alert and cooperative.      Objective       05/10/20 0931   Reading Comprehension    Barriers to Reading Vision / Visual Processing   Cognition   Moderate Attention Minimal (4)   Orientation  Supervision (5)   Verbal Short Term Memory 10 Minutes   Auditory Math Minimal (4)   Voice   Modulating Loudness Minimal (4)   Elimination of Vocal Abuse / Misuse Behaviors Minimal (4)   MoCA ([place] Cognitive Assessment)   Attention - Digits 1   Attention - Letters 1   Attention - Serial 7 Subtraction 2   Language - Repeat 2   Language - Fluency 0   Language - Abstraction 2   Delayed Recall 3   Orientation 5   Level of Education 0   SLP Total Time Spent   SLP Individual Total Time Spent (Mins) 60   Charge Group   SLP Cognitive Skill Development First 15 Minutes 1   SLP Cognitive Skill Development Additional 15 Minutes 3       Assessment    SLP completed chart review to discuss current d/c plan. [facility]. SLP discussed this with the pt. [facility] so SLP discussed the test and rationale. Pt was in agreement and asked appropriate questions, regarding the test.     SLP administered the MoCA-blind version. Pt scored 16/22, indicating a mild cognitive impairment (WNL>18). This is an improvement from the initial score of 10/22. Pt demonstrated improved cognition in the areas of attention, abstraction and memory. Pt cont to have deficits in the area of memory.     Pt discussed concerns with the loudness and quality of his voice. SLP provided education regarding vocal hygiene. SLP also discussed the option of going to an ENT if voice does not improve.     Plan    Pt with plans to d/c tomorrow to SNF. Pt would benefit from  further speech therapy services to target cognition.

## 2020-05-10 NOTE — PROGRESS NOTES
"Rehab Progress Note     Encounter Date: 5/10/2020      CC: SDH, decreased mobility, weakness      Interval Events (Subjective):  -Had breakfast, waiting to do SLP  -Reports he has limited vision, can see outlines  -[facility]  -Denies any issues        Objective:  VITAL SIGNS: /54   Pulse 62   Temp 37 °C (98.6 °F) (Oral)   Resp 18   Ht 1.753 m (5' 9\")   Wt 73.4 kg (161 lb 13.1 oz)   SpO2 94%   BMI 23.90 kg/m²   Gen: NAD  Skin: shaved frontal part of head  Psych: Mood and affect appropriate  Resp: unlabored on room air  Abd: Soft, no guarding  Neuro: follows simple commands, poor vision bilaterally        Recent Results (from the past 72 hour(s))   Basic Metabolic Panel    Collection Time: 05/08/20  5:39 AM   Result Value Ref Range    Sodium 128 (L) 135 - 145 mmol/L    Potassium 4.1 3.6 - 5.5 mmol/L    Chloride 95 (L) 96 - 112 mmol/L    Co2 25 20 - 33 mmol/L    Glucose 105 (H) 65 - 99 mg/dL    Bun 25 (H) 8 - 22 mg/dL    Creatinine 0.93 0.50 - 1.40 mg/dL    Calcium 8.9 8.5 - 10.5 mg/dL    Anion Gap 8.0 7.0 - 16.0   MAGNESIUM    Collection Time: 05/08/20  5:39 AM   Result Value Ref Range    Magnesium 2.1 1.5 - 2.5 mg/dL   PHOSPHORUS    Collection Time: 05/08/20  5:39 AM   Result Value Ref Range    Phosphorus 3.3 2.5 - 4.5 mg/dL   CBC WITH DIFFERENTIAL    Collection Time: 05/08/20  5:39 AM   Result Value Ref Range    WBC 7.0 4.8 - 10.8 K/uL    RBC 3.99 (L) 4.70 - 6.10 M/uL    Hemoglobin 12.1 (L) 14.0 - 18.0 g/dL    Hematocrit 35.5 (L) 42.0 - 52.0 %    MCV 89.0 81.4 - 97.8 fL    MCH 30.3 27.0 - 33.0 pg    MCHC 34.1 33.7 - 35.3 g/dL    RDW 43.8 35.9 - 50.0 fL    Platelet Count 146 (L) 164 - 446 K/uL    MPV 10.6 9.0 - 12.9 fL    Neutrophils-Polys 83.30 (H) 44.00 - 72.00 %    Lymphocytes 8.90 (L) 22.00 - 41.00 %    Monocytes 7.10 0.00 - 13.40 %    Eosinophils 0.10 0.00 - 6.90 %    Basophils 0.00 0.00 - 1.80 %    Immature Granulocytes 0.60 0.00 - 0.90 %    Nucleated RBC 0.00 /100 WBC "    Neutrophils (Absolute) 5.86 1.82 - 7.42 K/uL    Lymphs (Absolute) 0.63 (L) 1.00 - 4.80 K/uL    Monos (Absolute) 0.50 0.00 - 0.85 K/uL    Eos (Absolute) 0.01 0.00 - 0.51 K/uL    Baso (Absolute) 0.00 0.00 - 0.12 K/uL    Immature Granulocytes (abs) 0.04 0.00 - 0.11 K/uL    NRBC (Absolute) 0.00 K/uL   ESTIMATED GFR    Collection Time: 05/08/20  5:39 AM   Result Value Ref Range    GFR If African American >60 >60 mL/min/1.73 m 2    GFR If Non African American >60 >60 mL/min/1.73 m 2   Basic Metabolic Panel    Collection Time: 05/10/20  6:13 AM   Result Value Ref Range    Sodium 128 (L) 135 - 145 mmol/L    Potassium 4.2 3.6 - 5.5 mmol/L    Chloride 96 96 - 112 mmol/L    Co2 24 20 - 33 mmol/L    Glucose 121 (H) 65 - 99 mg/dL    Bun 19 8 - 22 mg/dL    Creatinine 0.90 0.50 - 1.40 mg/dL    Calcium 8.7 8.5 - 10.5 mg/dL    Anion Gap 8.0 7.0 - 16.0   MAGNESIUM    Collection Time: 05/10/20  6:13 AM   Result Value Ref Range    Magnesium 2.2 1.5 - 2.5 mg/dL   PHOSPHORUS    Collection Time: 05/10/20  6:13 AM   Result Value Ref Range    Phosphorus 3.0 2.5 - 4.5 mg/dL   ESTIMATED GFR    Collection Time: 05/10/20  6:13 AM   Result Value Ref Range    GFR If African American >60 >60 mL/min/1.73 m 2    GFR If Non African American >60 >60 mL/min/1.73 m 2       Current Facility-Administered Medications   Medication Frequency   • sodium chloride (SALT) tablet 1 g TID WITH MEALS   • cefTRIAXone (ROCEPHIN) 2 g in  mL IVPB Q24HRS   • gabapentin (NEURONTIN) capsule 300 mg TID   • dexamethasone (DECADRON) tablet 2 mg BID   • senna-docusate (PERICOLACE or SENOKOT S) 8.6-50 MG per tablet 2 Tab BID PRN    And   • polyethylene glycol/lytes (MIRALAX) PACKET 1 Packet QDAY PRN    And   • magnesium hydroxide (MILK OF MAGNESIA) suspension 30 mL QDAY PRN    And   • bisacodyl (DULCOLAX) suppository 10 mg QDAY PRN   • Respiratory Therapy Consult Continuous RT   • oxyCODONE immediate-release (ROXICODONE) tablet 5 mg Q3HRS PRN   • oxyCODONE immediate  release (ROXICODONE) tablet 10 mg Q3HRS PRN   • tramadol (ULTRAM) 50 MG tablet 50 mg Q4HRS PRN   • hydrALAZINE (APRESOLINE) tablet 25 mg Q8HRS PRN   • acetaminophen (TYLENOL) tablet 650 mg Q4HRS PRN   • artificial tears ophthalmic solution 1 Drop PRN   • benzocaine-menthol (CEPACOL) lozenge 1 Lozenge Q2HRS PRN   • mag hydrox-al hydrox-simeth (MAALOX PLUS ES or MYLANTA DS) suspension 20 mL Q2HRS PRN   • ondansetron (ZOFRAN ODT) dispertab 4 mg 4X/DAY PRN    Or   • ondansetron (ZOFRAN) syringe/vial injection 4 mg 4X/DAY PRN   • traZODone (DESYREL) tablet 50 mg QHS PRN   • sodium chloride (OCEAN) 0.65 % nasal spray 2 Spray PRN   • amLODIPine (NORVASC) tablet 5 mg DAILY   • lacosamide (VIMPAT) tablet 100 mg BID   • levETIRAcetam (KEPPRA) tablet 1,000 mg BID   • rosuvastatin (CRESTOR) tablet 10 mg QHS       Orders Placed This Encounter   Procedures   • Diet Order Regular     Standing Status:   Standing     Number of Occurrences:   1     Order Specific Question:   Diet:     Answer:   Regular [1]       Assessment:  Active Hospital Problems    Diagnosis   • *Subdural hematoma (HCC)   • Impaired mobility and ADLs   • Seizures, post-traumatic (HCC)   • Oropharyngeal dysphagia   • Urinary retention   • Essential hypertension   • Hypercholesterolemia   • Legally blind       Medical Decision Making and Plan:  TBI (Traumatic Brain Injury): Fall with SDH s/p R sided evacuation with Dr. Mendiola on 4/17/20. Patient started on Steroids. Had post-operative seizure like activity, EEG with cortical irritation  -Continue multidisciplinary rehab  -No changes over the weekend, continue per primary team  -[facility]      Conor Lopez M.D.

## 2020-05-10 NOTE — CARE PLAN
Problem: Speech/Swallowing LTGs  Goal: LTG-By discharge, patient will  Description: 1) Individualized goal: improve cognitive function to return to PLOF, as indicated by completing functional cognitive tasks with at least 80% accuracy and minimal cues  2) Interventions:  SLP Speech Language Treatment, SLP Self Care / ADL Training , SLP Cognitive Skill Development and SLP Group Treatment     Outcome: PROGRESSING AS EXPECTED  Note: Pt is making progress in the area of cognition but has not yet returned to PLOF     Problem: Problem Solving STGs  Goal: STG-Within one week, patient will  Description: 1) Individualized goal:  Complete alternating attention tasks with 80% accuracy and min verbal cues.   2) Interventions:  SLP Speech Language Treatment, SLP Self Care / ADL Training , SLP Cognitive Skill Development and SLP Group Treatment     Outcome: PROGRESSING AS EXPECTED  Note: Pt cont to present with deficits in the area of attention.      Problem: Speech/Swallowing LTGs  Goal: LTG-By discharge, patient will safely swallow  Description: 1) Individualized goal:  Least restrictive diet without overt s/sx of asp for 100% of PO intake.   2) Interventions:  SLP Swallowing Dysfunction Treatment and SLP Oral Pharyngeal Evaluation, SLP Video Swallow Evaluation     Outcome: MET  Note: Pt currently tolerating regular textures and thin liquids without overt s/sx of asp.     Problem: Memory STGs  Goal: STG-Within one week, patient will  Description: 1) Individualized goal: will complete functional memory and attention tasks with at least 80% accuracy and minimal cues  2) Interventions:  SLP Speech Language Treatment, SLP Self Care / ADL Training , SLP Cognitive Skill Development and SLP Group Treatment     Outcome: MET  Note: Pt consistently recalls therapy activities and schedule for the day.

## 2020-05-10 NOTE — CARE PLAN
Problem: PT-Long Term Goals  Goal: LTG-By discharge, patient will maintain balance  Description: 1) Individualized goal:  To perform [place] Balance Assessment with LOW FALL RISK  2) Interventions: PT Gait Training, PT Self Care/Home Eval, PT Therapeutic Exercises, PT Neuro Re-Ed/Balance, PT Therapeutic Activity and PT Evaluation       Outcome: NOT MET  Goal: LTG-By discharge, patient will ambulate  Description: 1) Individualized goal:  200ft x 2 with SPC mod I level surfaces, SPV for uneven terrain  2) Interventions: PT Gait Training, PT Self Care/Home Eval, PT Therapeutic Exercises, PT Neuro Re-Ed/Balance, PT Therapeutic Activity and PT Evaluation       Outcome: NOT MET  Goal: LTG-By discharge, patient will transfer one surface to another  Description: 1) Individualized goal:  Mod I SPT to all surfaces safely and consistently  2) Interventions: PT Gait Training, PT Self Care/Home Eval, PT Therapeutic Exercises, PT Neuro Re-Ed/Balance, PT Therapeutic Activity and PT Evaluation       Outcome: NOT MET  Goal: LTG-By discharge, patient will ambulate up/down flight of stairs  Description: 1) Individualized goal:  12 stairs bilateral rails mod I (required in home environment to access laundry; uses strategy of counting and using rails for balance)  2) Interventions:PT Gait Training, PT Self Care/Home Eval, PT Therapeutic Exercises, PT Neuro Re-Ed/Balance, PT Therapeutic Activity and PT Evaluation         Outcome: NOT MET

## 2020-05-11 VITALS
HEART RATE: 70 BPM | SYSTOLIC BLOOD PRESSURE: 116 MMHG | OXYGEN SATURATION: 95 % | DIASTOLIC BLOOD PRESSURE: 61 MMHG | TEMPERATURE: 98.4 F | RESPIRATION RATE: 18 BRPM | HEIGHT: 69 IN | WEIGHT: 161.82 LBS | BODY MASS INDEX: 23.97 KG/M2

## 2020-05-11 PROBLEM — R14.0 ABDOMINAL DISTENSION: Status: RESOLVED | Noted: 2020-05-04 | Resolved: 2020-05-11

## 2020-05-11 PROBLEM — R00.1 BRADYCARDIA: Status: RESOLVED | Noted: 2020-05-08 | Resolved: 2020-05-11

## 2020-05-11 PROBLEM — R79.89 AZOTEMIA: Status: RESOLVED | Noted: 2020-05-08 | Resolved: 2020-05-11

## 2020-05-11 PROBLEM — R13.12 OROPHARYNGEAL DYSPHAGIA: Status: RESOLVED | Noted: 2020-04-21 | Resolved: 2020-05-11

## 2020-05-11 PROBLEM — D72.829 LEUKOCYTOSIS: Status: RESOLVED | Noted: 2020-05-04 | Resolved: 2020-05-11

## 2020-05-11 PROBLEM — R33.9 URINARY RETENTION: Status: RESOLVED | Noted: 2020-04-20 | Resolved: 2020-05-11

## 2020-05-11 PROCEDURE — 700105 HCHG RX REV CODE 258: Performed by: HOSPITALIST

## 2020-05-11 PROCEDURE — 97110 THERAPEUTIC EXERCISES: CPT

## 2020-05-11 PROCEDURE — 700102 HCHG RX REV CODE 250 W/ 637 OVERRIDE(OP): Performed by: PHYSICAL MEDICINE & REHABILITATION

## 2020-05-11 PROCEDURE — 99232 SBSQ HOSP IP/OBS MODERATE 35: CPT | Performed by: HOSPITALIST

## 2020-05-11 PROCEDURE — 700102 HCHG RX REV CODE 250 W/ 637 OVERRIDE(OP): Performed by: HOSPITALIST

## 2020-05-11 PROCEDURE — A9270 NON-COVERED ITEM OR SERVICE: HCPCS | Performed by: PHYSICAL MEDICINE & REHABILITATION

## 2020-05-11 PROCEDURE — 97112 NEUROMUSCULAR REEDUCATION: CPT

## 2020-05-11 PROCEDURE — 700111 HCHG RX REV CODE 636 W/ 250 OVERRIDE (IP): Performed by: HOSPITALIST

## 2020-05-11 PROCEDURE — 99239 HOSP IP/OBS DSCHRG MGMT >30: CPT | Performed by: PHYSICAL MEDICINE & REHABILITATION

## 2020-05-11 PROCEDURE — A9270 NON-COVERED ITEM OR SERVICE: HCPCS | Performed by: HOSPITALIST

## 2020-05-11 PROCEDURE — 97535 SELF CARE MNGMENT TRAINING: CPT

## 2020-05-11 RX ORDER — GABAPENTIN 300 MG/1
300 CAPSULE ORAL 3 TIMES DAILY
Qty: 90 CAP | Status: ON HOLD
Start: 2020-05-11 | End: 2021-04-16 | Stop reason: SDUPTHER

## 2020-05-11 RX ORDER — DEXAMETHASONE 2 MG/1
2 TABLET ORAL 2 TIMES DAILY
Qty: 10 TAB | Refills: 0
Start: 2020-05-11 | End: 2020-09-30

## 2020-05-11 RX ORDER — LACOSAMIDE 100 MG/1
100 TABLET ORAL 2 TIMES DAILY
Qty: 60 TAB | Refills: 0
Start: 2020-05-11 | End: 2020-06-10

## 2020-05-11 RX ORDER — SODIUM CHLORIDE 1 G/1
1 TABLET ORAL
Qty: 90 TAB | Refills: 3 | Status: ON HOLD
Start: 2020-05-11 | End: 2020-06-19 | Stop reason: SDUPTHER

## 2020-05-11 RX ADMIN — CEFTRIAXONE SODIUM 2 G: 2 INJECTION, POWDER, FOR SOLUTION INTRAMUSCULAR; INTRAVENOUS at 08:31

## 2020-05-11 RX ADMIN — DEXAMETHASONE 2 MG: 1 TABLET ORAL at 08:31

## 2020-05-11 RX ADMIN — GABAPENTIN 300 MG: 300 CAPSULE ORAL at 08:31

## 2020-05-11 RX ADMIN — Medication 1 G: at 11:33

## 2020-05-11 RX ADMIN — Medication 1 G: at 08:30

## 2020-05-11 RX ADMIN — LEVETIRACETAM 1000 MG: 500 TABLET ORAL at 08:31

## 2020-05-11 RX ADMIN — LACOSAMIDE 100 MG: 100 TABLET, FILM COATED ORAL at 08:33

## 2020-05-11 RX ADMIN — AMLODIPINE BESYLATE 5 MG: 5 TABLET ORAL at 08:31

## 2020-05-11 ASSESSMENT — ENCOUNTER SYMPTOMS
NAUSEA: 0
CHILLS: 0
SHORTNESS OF BREATH: 0
VOMITING: 0
NERVOUS/ANXIOUS: 0
FEVER: 0
ABDOMINAL PAIN: 0
DIARRHEA: 0

## 2020-05-11 ASSESSMENT — ACTIVITIES OF DAILY LIVING (ADL)
TOILET_TRANSFER_LEVEL_OF_ASSIST: REQUIRES SUPERVISION WITH TOILET TRANSFER
SHOWER_TRANSFER_LEVEL_OF_ASSIST: REQUIRES SUPERVISION WITH SHOWER TRANSFER
TOILETING_LEVEL_OF_ASSIST: REQUIRES SUPERVISION WITH TOILETING

## 2020-05-11 NOTE — THERAPY
Physical Therapy   Daily Treatment     Patient Name: Rafael Mccoy  Age:  74 y.o., Sex:  male  Medical Record #: [id number]  Today's Date: 5/11/2020     Precautions  Precautions: Fall Risk, Other (See Comments)  Comments: legally blind (pt reports progressive central vision impairment), L apraxia    Subjective    Patient agreeable to PT; reports that he would like to work on standing balance activities, including dynamic; IV line in place during session.     Objective       05/11/20 0931   Precautions   Precautions Fall Risk;Other (See Comments)   Comments legally blind (pt reports progressive central vision impairment), L apraxia   Vitals   O2 (LPM) 0   O2 Delivery Device None - Room Air   Standing Lower Body Exercises   Standing Lower Body Exercises   (// bars, IV line management, CGA, cueing, setup)   Hip Abduction 1 set of 15   Marching 1 set of 15   Heel Rise 1 set of 15   Toe Rise 1 set of 15   Mini Squat 1 set of 15   Bed Mobility    Sit to Stand Stand by Assist  (// bars, setup, IV line management, cueing prn)   Neuro-Muscular Treatments   Comments Worked on standing balance activities for 4 standing reps over 40 minute period.  Focus on LUE support with step-through alternating with LE every 10 reps x5 minutes total; otherwise focus on minimal to no use of BUE in // bars with CGA-Mod A, narrow vs tandem EVELYN, with/without vertical and horizontal head turns.  x40 min   Interdisciplinary Plan of Care Collaboration   IDT Collaboration with  Certified Nursing Assistant   Patient Position at End of Therapy Seated;Self Releasing Lap Belt Applied;Call Light within Reach;Tray Table within Reach;Phone within Reach;Other (Comments)  (and CNA arriving to room)   Collaboration Comments CNA in room to assist patient with packing for d/c after lunch   PT Total Time Spent   PT Individual Total Time Spent (Mins) 60   PT Charge Group   PT Therapeutic Exercise 1   PT Neuromuscular Re-Education / Balance 3       FIM  Wheelchair Score:  5 - Standby Prompting/Supervision or Set-up  Wheelchair Description:  (Setup for IV management throughout, increased time but patient able to propel 170 feet from room to location in gym in about 10 minutes with BUE propulsion, SBA due to visual deficits, cueing prn only.)      Assessment    Patient is ready for next level of care; mild L inattention is present; impaired standing balance, especially during weight shifts; impaired balance noted with horizontal head turns as compared to vertical; good motivation and participation; requires IV line management for safety.    Plan    DC to [facility] after lunch.

## 2020-05-11 NOTE — DISCHARGE PLANNING
Case Management Discharge Instructions        Discharge Location: Skilled Nursing Facility     Agency Name / Address / Phone: [facility] [phone]        [address]         [place] [address]      Comments: We will transport to [facility] at 2pm      Follow-up Information:    Dr. Rosemary Somers  [phone]  Primary Care Provider    [facility]    SNF will set up follow up appt prior to discharge      Anthony Mendiola M.D.  [phone]  Neuro-surgery    [address] [facility] [address]   SNF will set up follow up appointment prior to discharge home

## 2020-05-11 NOTE — PROGRESS NOTES
[facility] Medicine Daily Progress Note    Date of Service  5/11/2020    Chief Complaint:  Hypertension  Leukocytosis  Hyponatremia    Interval History:  No significant events or changes since last visit    Review of Systems  Review of Systems   Constitutional: Negative for chills and fever.   Respiratory: Negative for shortness of breath.    Cardiovascular: Negative for chest pain.   Gastrointestinal: Negative for abdominal pain, diarrhea, nausea and vomiting.   Psychiatric/Behavioral: The patient is not nervous/anxious.         Physical Exam  Temp:  [36.4 °C (97.6 °F)-36.7 °C (98.1 °F)] 36.6 °C (97.8 °F)  Pulse:  [56-65] 65  Resp:  [18] 18  BP: (118-136)/(62-73) 136/73  SpO2:  [95 %] 95 %    Physical Exam  Vitals signs and nursing note reviewed.   Constitutional:       Appearance: Normal appearance.   HENT:      Head: Atraumatic.   Eyes:      Conjunctiva/sclera: Conjunctivae normal.      Pupils: Pupils are equal, round, and reactive to light.   Neck:      Musculoskeletal: Normal range of motion and neck supple.   Cardiovascular:      Rate and Rhythm: Normal rate and regular rhythm.      Heart sounds: No murmur.   Pulmonary:      Effort: Pulmonary effort is normal.      Breath sounds: No stridor. No wheezing or rales.   Abdominal:      General: There is no distension.      Palpations: Abdomen is soft.      Tenderness: There is no abdominal tenderness.   Musculoskeletal:      Right lower leg: No edema.      Left lower leg: No edema.   Skin:     General: Skin is warm and dry.      Findings: No rash.   Neurological:      Mental Status: He is alert and oriented to person, place, and time.   Psychiatric:         Mood and Affect: Mood normal.         Behavior: Behavior normal.         Fluids    Intake/Output Summary (Last 24 hours) at 5/11/2020 0800  Last data filed at 5/11/2020 0640  Gross per 24 hour   Intake 360 ml   Output 2100 ml   Net -1740 ml       Laboratory      Recent Labs     05/10/20  0613   SODIUM 128*    POTASSIUM 4.2   CHLORIDE 96   CO2 24   GLUCOSE 121*   BUN 19   CREATININE 0.90   CALCIUM 8.7                   Imaging    Assessment/Plan  * Subdural hematoma (HCC)- (present on admission)  Assessment & Plan  2nd to fall in bathtub  S/P craniotomy (4/17)  On Decadron  Repeat CT head: no acute changes    Seizures, 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)  Assessment & Plan  Had breakthrough seizure on prophylactic Keppra  EEG showed cortical instability  Now on Vimpat and Keppra    Essential hypertension- (present on admission)  Assessment & Plan  BP ok  On Norvasc: 5 mg daily  Off Lopressor -- 2nd to bradycardia (5/8)  Monitor    Azotemia  Assessment & Plan  Currently resolved  Bun: 25 (5/8) --> 19 (5/10)  Encouraging fluid intake  Monitor    Bradycardia  Assessment & Plan  HR ok, occ dips a little lower  Off Lopressor 25 mg bid (5/8)  Cont to monitor    Leukocytosis  Assessment & Plan  Resolved  WBC's: 32 --> 25 (5/5) --> 7.0 (5/8)  (5/4): Tmax: 100.1  (5/8): has been afebrile since 5/4  Crani site healing well  CT head: no acute changes  CXR (5/5): ill-defined opacifications in each lung have increased compared to the prior radiograph;                    this could indicate worsening of pulmonary edema or inflammation  Covid-19 (-)  Flu (-)  UC (-)  BC x 2: shows 1 out of 2 bottles pos for E. Coli  Note: on steroids  Off Zyvox (5/7)  Off Zosyn (5/8)  On Rocephin 2g IV daily (5/9 --> 5/11)  D/W ID: suggested Rocephin to complete a 7 day abx course since pt has remained afebrile and WBC normalized    Hyponatremia  Assessment & Plan  Na: 129 (5/4) --> 129 (5/5) -->128 (5/8) --> 128 (5/10)  ? 2nd to meds  On salt tabs: 1g tid (5/8)  Note: pt doesn't use salt on his food -- ask pt to use salt   Cont to monitor    Hypercholesterolemia- (present on admission)  Assessment & Plan  On Crestor    Legally blind- (present on admission)  Assessment & Plan  Has history

## 2020-05-11 NOTE — CARE PLAN
Problem: Communication  Goal: The ability to communicate needs accurately and effectively will improve  Note: Makes needs known to staff.  Encouraged to use call light for staff assist.      Problem: Safety  Goal: Will remain free from falls  Note: Call light kept within reach and encouraged to use for assistance and with toileting needs. Encouraged to call staff and to wait for staff before transfers.  Bed alarm is in place.  Hourly rounding is in effect.

## 2020-05-11 NOTE — PROGRESS NOTES
Patient discharged to home per order.  Discharge instructions reviewed with patient; they verbalize understanding and signed copies placed in chart.  Patient has all belongings; signed copy of form in chart.  Patient left facility at 1600 via transport accompanied by rehab staff.  Have enjoyed working with this pleasant patient.

## 2020-05-11 NOTE — DISCHARGE INSTRUCTIONS
Subdural Hematoma  A subdural hematoma is a collection of blood between the brain and its tough outermost membrane covering (the dura).  Blood clots that form in this area push down on the brain and cause irritation. A subdural hematoma may cause parts of the brain to stop working and eventually cause death.   CAUSES  A subdural hematoma is caused by bleeding from a ruptured blood vessel (hemorrhage). The bleeding results from trauma to the head, such as from a fall or motor vehicle accident.  There are two types of subdural hemorrhages:  · Acute. This type develops shortly after a serious blow to the head and causes blood to collect very quickly. If not diagnosed and treated promptly, severe brain injury or death can occur.  · Chronic. This is when bleeding develops more slowly, over weeks or months.  RISK FACTORS  People at risk for subdural hematoma include older persons, infants, and alcoholics.  SYMPTOMS  An acute subdural hemorrhage develops over minutes to hours. Symptoms can include:  · Temporary loss of consciousness.  · Weakness of arms or legs on one side of the body.  · Changes in vision or speech.  · A severe headache.  · Seizures.  · Nausea and vomiting.  · Increased sleepiness.  A chronic subdural hemorrhage develops over weeks to months. Symptoms may develop slowly and produce less noticeable problems or changes. Symptoms include:  · A mild headache.  · A change in personality.  · Loss of balance or difficulty walking.  · Weakness, numbness, or tingling in the arms or legs.  · Nausea or vomiting.  · Memory loss.  · Double vision.  · Increased sleepiness.  DIAGNOSIS  Your health care provider will perform a thorough physical and neurological exam. A CT scan or MRI may also be done. If there is blood on the scan, its color will help your health care provider determine how long the hemorrhage has been there.  TREATMENT  If the cause is an acute subdural hemorrhage, immediate treatment is needed. In many  cases an emergency surgery is performed to drain accumulated blood or to remove the blood clot. Sometimes steroid or diuretic medicines or controlled breathing through a ventilator is needed to decrease pressure in the brain. This is especially true if there is any swelling of the brain.  If the cause is a chronic subdural hemorrhage, treatment depends on a variety of factors. Sometimes no treatment is needed. If the subdural hematoma is small and causes minimal or no symptoms, you may be treated with bed rest, medicines, and observation. If the hemorrhage is large or if you have neurological symptoms, an emergency surgery is usually needed to remove the blood clot.  People who develop a subdural hemorrhage are at risk of developing seizures, even after the subdural hematoma has been treated. You may be prescribed an anti-seizure (anticonvulsant) medicine for a year or longer.  HOME CARE INSTRUCTIONS  · Only take medicines as directed by your health care provider.  · Rest if directed by your health care provider.  · Keep all follow-up appointments with your health care provider.  · If you play a contact sport such as football, hockey or soccer and you experienced a significant head injury, allow enough time for healing (up to 15 days) before you start playing again. A repeated injury that occurs during this fragile repair period is likely to result in hemorrhage. This is called the second impact syndrome.  SEEK IMMEDIATE MEDICAL CARE IF:  · You fall or experience minor trauma to your head and you are taking blood thinners. If you are on any blood thinners even a very small injury can cause a subdural hematoma. You should not hesitate to seek medical attention regardless of how minor you think your symptoms are.  · You experience a head injury and have:  ¨ Drowsiness or a decrease in alertness.  ¨ Confusion or forgetfulness.  ¨ Slurred speech.  ¨ Irrational or aggressive behavior.  ¨ Numbness or paralysis in any part  of the body.  ¨ A feeling of being sick to your stomach (nauseous) or you throw up (vomit).  ¨ Difficulty walking or poor coordination.  ¨ Double vision.  ¨ Seizures.  ¨ A bleeding disorder.  ¨ A history of heavy alcohol use.  ¨ Clear fluid draining from your nose or ears.  ¨ Personality changes.  ¨ Difficulty thinking.  ¨ Worsening symptoms.  MAKE SURE YOU:  · Understand these instructions.  · Will watch your condition.  · Will get help right away if you are not doing well or get worse.  FOR MORE INFORMATION  National [place] of Neurological Disorders and Stroke: www.ninds.nih.gov  American Association of Neurological Surgeons: www.neurosurgerytoday.org  American Academy of Neurology (AAN): www.aan.com  Brain Injury Association of [place]: www.biausa.org  This information is not intended to replace advice given to you by your health care provider. Make sure you discuss any questions you have with your health care provider.  Document Released: 11/04/2005 Document Revised: 10/08/2014 Document Reviewed: 06/20/2014  Elsevier Interactive Patient Education © 2017 [employer]      Subdural Hematoma Evacuation  Subdural hematoma evacuation is a procedure used to treat a collection of blood (blood clot) between your brain and its tough outer covering (dura). This is caused by bleeding (hemorrhage) from a torn vein.  Subdural hematoma evacuation is sometimes done for bleeding that develops slowly, over weeks or months (chronic subdural hematoma). The procedure may be needed if the bleeding becomes dangerous or presses on the brain. It is also sometimes done as an emergency procedure after a head injury (acute subdural hematoma). During the procedure, the skull is opened, and the blood clot is gently flushed away using a saline (balanced salt) solution.  LET YOUR HEALTH CARE PROVIDER KNOW ABOUT:   · Any allergies you have.    · All medicines you are taking, including vitamins, herbs, eye drops, creams, and  "over-the-counter medicines.    · Use of steroids (by mouth or creams).    · Previous problems you or members of your family have had with the use of anesthetics.    · Any blood disorders you have, including a history of blood clots (thrombophlebitis).    · Previous surgeries you have had.    · Possibility of pregnancy, if this applies.    · Medical conditions you have.    RISKS AND COMPLICATIONS   Generally, subdural hematoma evacuation is a safe procedure. However, as with any procedure, complications can occur. Possible complications include:  · Infection.    · Allergic reaction to the anesthetic or other medicines given.    · Bleeding after surgery.    · Seizures from brain irritability.     · Brain damage.  · Brain swelling.  · Stroke.    BEFORE THE PROCEDURE   · Do not eat or drink anything for 24 hours before the procedure.  · Ask your health care provider about changing or stopping your regular medicines.   PROCEDURE   · You will be given a medicine to make you sleep through the procedure (general anesthetic).    · A germ-killing solution (antiseptic) will be used to wash the area where the skull will be opened. Hair may be removed from this area.  · Small holes (ama holes) are carefully drilled into your skull. A bone saw is then used to connect the ama holes until a section of your skull can be removed. This is called a bone flap.    · After the bone flap is removed, the blood clot is removed from your brain using saline or other solutions.  · Your surgeon may place a drain inside your skull to remove blood or fluids that can collect after surgery.    · After the procedure is finished, the bone flap is usually replaced. Sometimes there is too much brain swelling to replace the bone, especially if you are being treated for an acute subdural hematoma. In this case, either the bone flap is saved in a special storage area (\"bone bank\") or a pocket is created on your abdomen, and the bone is placed there. The " scalp is then stitched back together.  ¨ If the bone flap is not replaced at the time of your evacuation surgery, it will be replaced later, during another surgery. This will happen when your surgeon determines that the brain swelling has decreased enough to make replacement of the bone flap safe.  AFTER THE PROCEDURE   · You will stay in a recovery area until you are stable. Your progress will be watched closely.    · Once you are stable enough to move, you will go to a regular hospital room or an intensive care unit, depending on your condition.     · Depending on your progress, your hospital stay may vary from a few days to several weeks.  This information is not intended to replace advice given to you by your health care provider. Make sure you discuss any questions you have with your health care provider.  Document Released: 10/15/2010 Document Revised: 04/10/2017 Document Reviewed: 06/09/2014  [email or web address] Interactive Patient Education © 2017 [employer]      Acute Brain Injury, Guide for Family  This is basic information about brain injury and its treatment. Please read it at your own pace. As you learn more about the brain, you will have many questions. Members of the health care team will do their best to answer your questions. Definite answers may not be known because the long term effects of brain injury can be difficult to predict.  HOW DOES THE BRAIN WORK?  The brain controls the actions of the body and allows us to think, learn, and remember. The brain has three main parts which are the:  · Cerebral hemispheres, left and right which are divided into lobes.   · Cerebellum.   · Brain stem.   Each section of the brain has special jobs to do and sections of the brain also work together. The left cerebral hemisphere controls the right side of the body and is usually responsible for speech. The right cerebral hemisphere controls the left side of the body and is usually responsible for creative thinking.  "  PROTECTION AND OXYGEN FOR THE BRAIN  The brain controls many important functions. It needs good protection and blood supply. This is done in the following ways:  · Skull: A hard bone that surrounds the brain tissue.   · Dura: A tough covering around the brain tissue and the spinal cord.   · Cerebrospinal Fluid (CSF) or Spinal Fluid: Fluid that flows through the ventricles and around the brain and spinal cord. The ventricles are spaces inside the brain. The cerebrospinal fluid acts like a \"shock absorber\" for the brain.   · Blood: Provides oxygen and food for the brain.   WHAT TYPES OF BRAIN INJURIES MAY OCCUR?  Even though the brain is well protected, it may be injured. Every injury is different. Most injuries are a result of bruising, bleeding, twisting, or tearing of brain tissue. Damage to the brain may occur at the time of injury. It may also develop after the injury due to swelling or further bleeding. Some types of brain injury are:  · Skull Fracture: a break in the bone that surrounds the brain. These fractures often heal on their own. Surgery may be needed if there has been damage to the brain tissue below the fracture.   · Contusion/Concussion: a mild injury or bruise to the brain which may cause a short loss of consciousness. It may cause headaches, nausea, vomiting, dizziness, and problems with memory and concentration. This injury will not need surgery.   · Coup/Contre-Coup: A [place] word that describes contusions that occur at two sites in the brain. When the head is hit, the impact causes the brain to bump the opposite side of the skull. Damage occurs at the site of impact and on the opposite side of the brain.   · Epidural Hematoma: a blood clot that forms between the skull and the top lining of the brain (dura). This blood clot can cause fast changes in the pressure inside the brain. Emergency surgery may be needed. The size of the clot will determine if surgery is needed.   · Subdural Hematoma: a " blood clot that forms between the dura and the brain tissue. If this bleeding occurs quickly it is called an acute subdural hematoma. If it occurs slowly over several weeks, it is called a chronic subdural hematoma. The clot may cause increased pressure and may need to be removed surgically.   · Intracerebral Hemorrhage: A blood clot deep in the middle of the brain that is hard to remove. Pressure from this clot may cause damage to the brain. Surgery may be needed to relieve the pressure.   · Diffuse Axonal Injury (JOANN): Damage to the pathways (axons) that connect the different areas of the brain. This occurs when there is twisting and turning of the brain tissue at the time of injury. The brain messages are slowed or lost. Treatment is aimed at managing swelling in the brain because torn axons can not be repaired.   · Anoxic Brain Injury: An injury that results from a lack of oxygen to the brain. This is most often from a lack of blood flow due to injury or bleeding.   WHAT HAPPENS WHEN THE BRAIN IS INJURED?  Damage to the brain may occur immediately, as a result of the injury, or it may develop as a result of swelling or bleeding that follows the injury.  BRAIN WITH EDEMA:  Changes can result from brain injury. The brain tissue may swell causing it to take up more room in the skull. This is called edema. When this occurs, the swollen brain tissue will push the other contents of the skull to the side.  BRAIN WITH A HEMATOMA:  There may be bruising called contusions or a collection of blood called a hematoma or clot. This may also push the other contents to one side.  BRAIN WITH HYDROCEPHALUS:  The flow of CSF may also become blocked. This will cause the open spaces (ventricles) to become enlarged. This is called hydrocephalus.  Any of these changes can cause increased intracranial pressure.  WHAT IS INTRACRANIAL PRESSURE?  To understand intracranial pressure, think of the skull as a rigid box. After brain injury,  the skull may become overfilled with swollen brain tissue, blood, or CSF. The skull will not stretch like skin to deal with swelling. The skull may become too full and increase the pressure on the brain tissue. This is called increased intracranial pressure.   WHAT IS CEREBRAL PERFUSION PRESSURE?  Blood flow to the brain is called cerebral perfusion pressure. Blood pressure and intracranial pressure affect the cerebral perfusion pressure. If the blood pressure is low and/or the intracranial pressure is high, the blood flow to the brain may be limited. This causes decreased cerebral perfusion pressure.   HOW WILL THE PATIENT RESPOND TO A BRAIN INJURY?  The patient's responses may vary depending on the type of injury or pressure changes in the brain. Possible responses include:   · Agitation.   · Confusion.   · Patients may also have problems with speech, vision, or muscle weakness in their arms or legs.   · Decreased responses.   · Coma. A state of unresponsiveness when patients do not speak or follow commands, and are unaware of their surroundings. The length of time a patient remains in a coma varies.   HOW ARE BRAIN INJURIES EVALUATED?  Patients with brain injury require frequent assessments and diagnostic tests. These include:   · Neurological Exam: A series of questions and simple commands to see if the patient can open their eyes, move, speak, and understand what is going on around them. For example: What is your name? Where are you? What day is it? Wiggle your toes. Hold up two fingers. A standard way to describe patient responses may be used. Most hospitals use the [place] Coma Scale or [place] Levels of Cognitive Functioning. You can read about these scales and what the scores mean in the Appendix.   · X-ray: A picture that looks at bones to see if they are broken (fractured). It can also be used to take a picture of the chest to look at the lungs. This test may be done at the bedside or in the X-ray  department and takes between 5-30 minutes to complete.   · CT Scan (CAT Scan): An X-ray that takes pictures of the brain or other parts of the body. The scan is painless but the patient must lie very still. The test takes 15-30 minutes to complete.   · MRI (Magnetic Resonance Imaging) Scan: A large magnet and radio waves are used, instead of X-rays, to take pictures of the body's tissues. It is painless but noisy. The machine is shaped like a long tube. The patient must lie still on a flat table in the middle of the machine. The test takes about 60 minutes to complete.   · Angiogram: A test to look at the blood vessels in the brain. Using a catheter, or small flexible tube, dye is put into an artery (usually in the groin) that supplies blood to the brain. This test can tell if the blood vessels have been damaged or are spasming. The test takes 1-3 hours.   · ICP Monitor: A small tube placed into or just on top of the brain through a small hole in the skull. This will measure the pressure inside the brain (intracranial pressure).   · EEG (Electroencephalograph): A test to measure electrical activity in the brain. Special patches called electrodes are applied to the head to measure the activity. The test is painless and can be done at the bedside or in the EEG department. The length of the test varies.   HOW ARE BRAIN INJURIES TREATED?  The goals of brain injury treatment are to:   · Stop any bleeding.   · Prevent an increase in pressure within the skull.   · Control the amount of pressure, when it does increase.   · Maintain adequate blood flow to the brain.   · Remove any large blood clots.   TREATMENT   Treatments will vary with the type of injury. Your caregiver will decide which ones are used. Some common treatments are:  · Positioning: Usually the head of the bed will be elevated slightly and the neck kept straight. This position may decrease the intracranial pressure by allowing blood and CSF to drain from the  brain. Please do not change the position of the bed without asking the nurse.   · Fluid Restriction: It may be necessary to limit the fluids that a patient receives. The brain is like a sponge. It swells with extra fluid. Limiting fluids can help control the swelling. Please do not give fluids without asking the nurse.   · Ventricular Drain (Ventriculostomy): A small tube is placed in the ventricle. It measures and controls pressure inside the skull. It can be used to drain some CSF (cerebrospinal fluid) from the brain.   · Ventilator: A machine used to support the patient in their own breathing, or give the patient breaths. When the ventilator gives extra breaths, the blood vessels in the brain become smaller. This may help control the intracranial pressure.   · Surgery: There are three types of surgery used with brain injury:   · Craniotomy - The skull is opened to relieve the causes of increased pressure inside the skull. Causes may be fractured bones, blood clots, or swollen brain tissue.   · Arbuckle holes - A small opening is made into the skull to remove blood clots.   · Bone flap removal - A piece of bone is removed from the skull to relieve pressure caused by swollen brain tissue.   MEDICATION  There are several types of medications used to treat brain injury. Some of these include:  · Diuretics are used to decrease the amount of water in the patient's body. This makes less water available to the brain for swelling.   · Anticonvulsants are used to prevent seizures. Seizures occur as a result of extra electrical activity in the brain. There are several types of seizures. The most common type causes the patient to have jerking movements of the arms and legs followed by sleep. Other types may cause slight tremors of the face, or staring spells. Please notify the nurse or doctor if you see any of these signs. Some patients have a seizure at the time of injury while others may develop seizures after the injury.  "  · Sedatives are given if the patient's intracranial pressure is very high and hard to control. This medicine puts the patient into a deep \"sleep\". This may help prevent more swelling and damage.   WHAT EQUIPMENT WILL YOU SEE WHEN YOU VISIT?  Depending on the type of brain injury, different kinds of equipment will be used. Ask a member of the health care team if you have any questions about equipment.   · Monitor: A machine that shows heart rate, breathing, blood pressure, intracranial pressure, and cerebral perfusion pressure.   · Head Dressing: A bandage around the head used to keep the wound or incision clean and dry.   · ICP Monitor: A small tube placed into or just on top of the brain through a small hole in the skull. This will measure the amount of pressure inside the brain (intracranial pressure).   · Nasogastric Tube (NG): A tube placed through the nose into the stomach that can be used to suction the stomach or provide liquid formula directly into the stomach.   · Endotracheal Tube: A tube inserted through the patient's nose or mouth into the trachea (windpipe) to help with breathing and suctioning.   · EKG Lead Wires: Wires connected to the chest with small patches that measure the heart rate and rhythm.   · Intravenous Catheter (IV) and Intravenous Fluid: A flexible catheter which allows fluid, nutrients, and medicine to be given directly into a vein.   · Ventilator: A machine used in the Intensive Care Unit to support the patient in their own breathing or give the patient breaths.   · Anti-Embolism Stockings (Frequently call TEDS): Long white stockings used to help prevent the pooling of blood in the legs.   · Sequential Compression Stockings (Frequently called Angel's): Plastic leg wraps that help prevent blood clots by inflating and deflating around the legs.   · Urinary \"Graham\" Catheter: A tube inserted into the bladder to drain and allow for accurate measurement of urine.   WHAT OTHER TREATMENTS " MAY BE USED?  · Antibiotics: Antibiotics are used to prevent and treat infections that occur. It is not unusual for people with brain injuries to get infections. They may get pneumonia, bladder infections, blood infections, or infections in the brain or cerebrospinal fluid called meningitis.   · Chest PT and Suctioning: To prevent or treat pneumonia, staff may use a vibrating machine or may clap on the patient's chest. This loosens the phlegm in the lungs. Then the patient will be asked to cough. If the patient is not able to cough up the phlegm they must be suctioned. When a patient is suctioned a catheter is placed in the back of the throat or into the lungs.   · Tracheostomy (Trach): If the patient has a lot of lung secretions or is on a ventilator for a long time they may need a trach. A trach is a tube placed in the trachea (windpipe). It will make it easier for the patient to cough up phlegm. It also allows the nurse to suction the lungs. Initially the patient will be unable to talk while the trach is in place. As the patient improves, a talking trach may be used. A trach is usually not permanent.   · Suctioning of the Stomach: Sometimes after brain injury, the stomach will stop working for a short time. This is called an ileus. Even though the stomach may not be working it continues to make acid. The acid may damage the stomach lining and cause stomach ulcers if they are not removed. A nasogastric tube (NG) will be placed through the nose into the stomach. This tube will be used to help remove stomach secretions. Medications may also be given to help prevent stomach ulcers.   · Nutrition: Meeting nutrition and fluid needs is important after brain injury. Patients may be less active, yet have very high nutritional needs. At first, nutrition may be supplied by an IV. When the stomach starts working, an evaluation of chewing and swallowing safety will be completed. If the patient is too sleepy to eat, or unable  to swallow, a small nasogastric feeding tube may be used for nutrition. The tube is placed through the nose into the stomach. Liquid formula will be given through the feeding tube. Feedings may be given continuously or several times a day. The dietician will assist with food and fluid selection. Milkshakes and liquid formulas may also be used to provide extra calories and high protein nutrition. A feeding tube may be used if the patient continues to be too sleepy to eat or unable to swallow. A gastrostomy tube is a feeding tube that goes in the stomach. A jejunostomy tube is a feeding tube that goes in the intestine.   · Bowel and Bladder Care: Patients may not have control of their bowel or bladder. Catheters or diapers will be used until bowel and bladder control returns.   · Skin Care: Some things that help prevent bedsores include turning the patient, padding equipment, keeping skin clean and dry, using special mattresses, and making sure the patient gets enough calories.   · Range of Motion (ROM) and Splints: Patients with brain injury may not be able to move their joints as much as needed. This can cause tight muscles and joints called contractures. Range of motion (ROM) exercises and special splints for hands and feet help prevent contractures.   · Pain Control: Comfort measures and medication will be used for pain control. However, medications may be limited to types that do not cause drowsiness.   WHO WILL HELP AFTER BRAIN INJURY?  Members of the health care team will work together with the patient, family, and friends during the hospital stay. Care will be centered on the individual needs of the patient. Family and friends are important members of the team.   · Patient: The patient is the most important member of the team. Care will be planned based on how the patient responds to treatment.   · Family and Friends: You provide emotional support to the patient. Family and friends also provide the health care  team with important facts about the patient's past history and can help watch for changes. Other team members will show you what you can do to help with the recovery process.   · Doctors: Neurosurgery doctors are specialists who help determine the type of brain injury and its treatment. They may perform surgery on the brain. They will work with other doctors if the patient is in intensive care or has injuries to other parts of the body.   · Nurses: Nurses check patient's vitals (temperature, blood pressure, heart and breathing rate) and watch for changes in strength and thinking. They help with daily cares such as eating and bathing. Nurses also coordinate care among the members of the health care team.   · Social Workers: Social workers provide emotional support to help the patient and family adjust to being in the hospital. They coordinate discharge planning, referral to community resources, and answer questions about insurance or disability.   · Physical Therapists (PT): Physical therapists evaluate and treat weaknesses in the patient's strength, flexibility, balance, rolling, sitting, standing and walking. Treatment may include exercises or instruction in use of equipment such as walkers, canes, or wheelchairs.   · Occupational Therapists (OT): Occupational therapists evaluate the patient's ability to perform dressing, bathing, homemaking and activities that require memory and organization. They provide treatment or equipment needed for safe independent living.   · Speech Therapists: Speech therapists test and treat speech, language, thinking and swallowing problems.   · Neuropsychologists: Neuropsychologists test thinking, memory, judgment, emotions, behavior and personality. This information can be used to help guide treatment. It will also help determine the amount of supervision that the patient needs when they leave the hospital.   · Dieticians: Dieticians assess nutritional needs. They work with the  patient and other team members to help the patient meet their nutritional goals.   · Other staff members may work with the patient and family. These include:   · Respiratory therapists.   · Activity Therapists.   · Clergy.   · Child Life Therapists.   · Patient Representatives.   · Vocational Counselors.   · Music Therapists.   · Recreation therapists.   HOW WILL YOU REACT?  When a friend or family member is hospitalized with a brain injury, it is normal for you to have many different emotions. These emotions will come and go at different times.   · Panic and Fear: Panic and fear are common reactions after a family member has a brain injury. Fears are intense because you are worried the patient may not survive. Until the patient becomes medically stable, your physical and emotional signs of panic may continue. These symptoms include rapid breathing, inability to sleep, decreased appetite and upset stomach. Some people may cry uncontrollably.   · Shock and Denial: You may feel that what is happening is not real. You may notice things going on around you, but have trouble remembering information and conversations or meetings with others. You may also have a hard time understanding the seriousness of the injury that has occurred.   · Anger: Many people feel angry that they or their loved ones are in this situation. This may be justified. You may be angry with the patient for putting themselves in a situation where they could be hurt. You may also be angry with family members, friends, or others involved in the accident. You may be upset with the health care team for not doing or saying what you think is right. These are all normal reactions.   · Guilt: Guilt is also a normal reaction. You may feel you could have done something to prevent the accident from happening, even when this may be far from true. You may also think about past events and personal experiences with the patient that you wish could have been different  "or better. If you are feeling angry with the patient, you may also feel guilty about your anger. We encourage you to talk about your feelings with someone close to you or a professional staff member.   · Isolation: During this time you may feel distant from others. In this strange situation, you may have a hard time relating to others. You may think that others will not understand. You may isolate yourself because you think others are scared or disapprove of your feelings. However, a crisis such as a brain injury is a time when it is helpful to accept comfort, support and assistance from others.   · Hope: As the patient begins to stabilize, your anxiety about survival will be combined with hope of recovery. Medical complications and slow recovery may increase anxiety. However, hope may be brought about by the smallest changes.   Any of these emotions are normal reactions to a very stressful situation. You may find it helpful to discuss your feelings with friends, family, or the health care team. It may also be useful to write about your feelings and experiences in a daily journal.   WHAT ARE SUGGESTIONS FOR COPING?  People cope with stressful situations in different ways. What works for one person may not be helpful to another. We hope some of these suggestions will help you get through this difficult time.   · Write important information down in a journal or notebook. You can use this to keep track of questions you want to ask members of the health care team. It may also be useful to share with the patient.   · Establish a \"phone tree.\" Name one person for family and friends to call for information on the patient's condition.   · Rotate family visits. If you need or want to leave the hospital, you may want to have someone stay with the patient so you can feel reassured the patient is not alone.   · When someone offers to help, accept the offer. Try to be specific about how this person can help.   · Express your " "feelings. Discuss your positive and negative feelings with family members, friends, and staff.   · Be kind to yourself. Take time for a walk or have a meal with a friend. Also, try to leave the hospital for a meal or restful night of sleep. It is very important to take care of yourself. By taking care of your own needs, you will be more prepared to make good decisions and support your loved one.   WHAT ARE THE SIGNS OF IMPROVEMENT?  \"How long will it take for my loved one to get better?\" \"What will he or she be like?\" Your health care team may have a hard time answering these questions. Age, extent of damage, length of time since injury, and past mental and physical health of the patient are factors used when predicting the extent of recovery.   Most patients follow a general pattern of recovery after a severe brain injury. This pattern is divided into stages. It is important to know each patient is different and may not follow the stages exactly. Patients vary in the amount of time spent at each stage and their recovery may stop at any stage. Recovery may be grouped into the following four stages:  Stage 1: Unresponsiveness: During this stage the patient does not respond consistently or appropriately. You may hear this stage referred to as a coma. You may notice different movements in the patient. These are referred to as reflexive or generalized responses.  · Decerebrate: A reflex that causes straightening of the arms and legs.   · Decorticate: A reflex that causes bending of the arms and straightening of the legs.   · Generalized responses: Random movement of the arms and legs for no specific reason.   Stage 2: Early Responses: During this stage the patient starts to respond to things that are happening to them. The responses will be more appropriate, but may be inconsistent or slow. The patient will start to have localized responses and follow simple commands. Some examples of early responses to watch for " are:  Localized response: These are appropriate movements by the patient in response to sound, touch, or sight. Turning toward a sound, pulling away from something uncomfortable, or following movement with the eyes are examples.   Following simple commands: Opening and closing eyes, sticking the tongue out, or gripping and releasing hands when asked are examples.  Stage 3. Agitated and Confused: At this stage the patient is responding more consistently. The patient will be confused about where he or she is and what has happened. The patient will have difficulty with memory and behavior. The patient's confusion may lead to yelling, swearing, biting, or striking out. Do not be alarmed if soft wrist and ankle ties are used to protect the patient and prevent tubes from being pulled out. It is very important to remember this stage is a step toward recovery and this behavior is not intentional.  Stage 4. Higher Level Responses: The patient completes routine tasks without difficulty, but still needs help with problem solving, making judgments and decisions. The patient may not understand his or her limitations and safety is a big concern. Unusual or high stress situations make activities more difficult. The patient may seem more like the person you knew before. However, there may be personality changes.  Unfortunately, there is no way to predict how long a person will remain in one stage or what the final outcome will be. The team will work during the hospital stay to achieve the best possible outcome.   HOW CAN YOU HELP WITH RECOVERY?  The family and friends of a person with a brain injury are important members of the team. Your knowledge about the patient's emotional and physical needs is valuable, and so is your participation in helping take care of these needs.  The following are suggestions for things you can do that correspond with the stages of recovery.  Stage 1. Unresponsive Stage  · At this stage the patient  "appears to be in a deep sleep and does not respond to their surroundings. The goal is to obtain a response to touch, sound, sight or smell.   · When speaking to the patient, assume he or she understands what you are saying. Speak in a comforting, positive and familiar way.   · Speak clearly and slowly about familiar people and memories.   · When visitors are present, focus on the patient. Keep the number of visitors to 1 or 2 people at a time. Visits should be short. Other distractions (TV, radio) should be turned off when visiting.   · Provide the patient with pictures, music, and personal items that are comforting and familiar. Use poster board or a bulletin board near the bed.   · The nurses and therapists may encourage you to assist in care of the patient. You may be asked to help with hair care, shaving, applying skin lotion or gently stretching and positioning the patient's arms and legs. If you do not feel comfortable with these activities, that is okay. The staff will understand.   Stage 2. Early Responses  · At this stage the patient is beginning to respond to people and hospital surroundings. The responses may range from turning toward a familiar voice to moving an arm or leg on request.   · The goal is to increase the consistency of responses.   · There may be a delayed response time when asking the patient to move, speak, or pay attention. Always wait 1-2 minutes for the requested response. Repeat your request only a couple of times during this time period.   · Be aware that the patient's attention span may only be 5-10 minutes before fatigue and frustration set in.   · Allow for rest periods. Turn off the TV, music, and lights, and limit visitors. The patient can become stressed by too much noise, light or stimulation.   · Continue with suggestions listed in the \"unresponsive\" stage.   Stage 3. Agitated and Confused Responses  · During this stage, things are confusing. The patient may begin to remember " past events but may be unsure of surroundings and the reason for hospitalization. The goal is to help the patient become oriented and to continue to treat his or her physical needs. Provide one activity at a time and expect the patient to pay attention for only short periods. Keeping the noise level low helps the patient focus.   · The patient may repeat a word, phrase, or activity over and over. Try to interest the patient in a different activity.   · The patient may do socially unacceptable things during this time, such as swearing or hitting. This is common. Calmly tell the patient the behavior is not appropriate.   · Help orient the patient to his or her surroundings with both visual and verbal information (such as the suggestions below). Remembering information from one time to another is difficult.   · A calendar with the days marked off.   · A sign in the room telling them where they are.   · A posted schedule with meal times, therapies, and special appointments.   · To decrease frustration, allow the patient to move about with supervision.   Stage 4. Higher Level Responses  · At this stage the patient is able to take part in daily routines with help for problem solving, making judgments, and decisions. Most of the suggestions from the previous stage continue to apply here. The goal is to decrease the amount of supervision needed and increase independence.   · Help make the environment safe. Safety decisions may still be difficult for the patient to make. Praise safe decisions and give calm explanations about unsafe decisions. Learning is still difficult.   · Encourage the use of memory aids, such as a date book, to help with appointments and daily routines.   · Encourage brief rest periods because the patient will continue to need more rest.   · Check with the health care team on activities that may be completed with or without supervision. These activities may include work or school re-entry, taking  medications, driving, or managing money.   WHAT HAPPENS AFTER ACUTE CARE?  · Discharge Planning: Early in the hospital stay, the  will meet with the patient and family to start discharge planning. Many patients will need care or therapy after they leave acute care. Some patients will be discharged to a nursing facility, while others will be discharged to their homes.   · Discharge to a Nursing Facility: There are several types of nursing facilities.   · Skilled care provides extensive nursing care and daily therapy. Many patients will start with skilled care and then move to acute rehabilitation care, intermediate care, or residential care.   · Acute rehabilitation provides an inpatient program of intense therapy in a hospital. The patient will need to actively participate in 3 to 6 hours of therapy per day (i.e. physical, occupational, speech, and activity therapy).   · Intermediate care provides less extensive nursing care and therapy. Residential care is for patients who are fairly independent and do not need routine nursing care or therapies.   · Discharge to Home: If the patient goes home, they may still need therapy or other care. Some of the options are:   · Outpatient therapy is provided at hospitals, clinics and some nursing homes. Outpatient physical therapy will help build up strength and endurance. Outpatient occupational, speech and cognitive therapy may also be needed. Family or friends may need to arrange transportation for therapy appointments.   · Home health care programs are available in many communities. Some of the services they offer include in-home nursing care, homemaker and health aides, meals-on-wheels, adult day care, home therapy visits, medical equipment rental/purchase, and transportation.   MAKING THE CHOICE  During the acute care hospital stay, the health care team will make recommendations about the level of care the patient will need at discharge. Then the patient and  family will choose the agency that provides the services. Your choice may be based on insurance coverage, location, the patient's potential for recovery, and the patient's or family's feelings about the services provided. Some facilities will come to the hospital to see the patient and their medical record before they decide to accept the patient. If accepted, the date for transfer or discharge will be set.   Document Released: 12/18/2006 Document Revised: 03/11/2013 Document Reviewed: 01/14/2008  [employer]® Patient Information ©2013 [facility].      ST recommendations for memory:  What is memory?   Memory is the ability to learn, store, and retrieve information. New or increasing problems with any or all of these 3 stages of memory often occur after a traumatic brain injury, stroke, brain tumor, multiple sclerosis, or other kind of injury or illness that affects your nervous system.   Some kinds of memory problems may also occur as part of normal aging, when many people have more trouble retrieving new information.     Types of Memory:  •Long-term (remote): memory for old, well-learned information that has been “rehearsed” (used) over time, such as the name of a childhood pet, memories of past vacations, or where you went to high school. Long-term memory tends to be retained after injury or illness.   •Short-term (recent): memory for new experiences that took place a few minutes, hours, or days ago, such as what you had for breakfast or what you did yesterday. Short-term memory tends to be the most severely affected after injury. People who have had brain injuries may have problems with their attention span, how much memory they can store, how quickly they can think, and how efficiently they learn. These memory problems will make it hard to understand and save short-term memories so that they can be correctly rehearsed and stored in long-term memory.   •Immediate (working): memory for information that is current,  that you usually keep track of mentally, such as a phone number you look up, directions someone just gave you, or keeping track of numbers in your head when you add or subtract.   •Prospective: the ability to remember to do something in the future, such as remembering to take a medicine, go to an appointment, or follow through on an assignment or project.       Strategies to Help Improve Your Memory   Your speech therapist can work with you to develop strategies to help you remember new information. There are 2 main types of strategies to help your memory: internal reminders and external reminders.     Internal Reminders   •Rehearsal: retelling yourself information you just learned, or restating it out loud in your own words.   •Repetition: saying the same information over and over, either silently or out loud.   •Clarification: asking someone else to repeat or rephrase information.   •Chunking: grouping items together to reduce the number of items to remember, such as grouping 7-digit phone numbers into 2 chunks, one with 3 numbers and the other with 4 numbers.   •Rhyming: making a rhyme out of important information.   •Acronyms or alphabet cueing: creating a letter for each word you want to remember, or vice versa. One example is using the sentence “Every Good Boy Does Fine” to remember that the lines of a treble staff in music are the notes E, G, B, D, and F.   •Imagery (also called visualization): creating pictures of the information in your mind.   •Association: linking old information or habits with the new, such as remembering to take your medicine every night at the same time that you brush your teeth.   •Personal meaning: making the new information meaningful or emotionally important to you in some way.     External Reminders   •Using a paper or electronic calendar or day planner.   •Setting timers or alarms to remind you to do something.   •Writing down reminders such as to-do lists, shopping lists, and  project outlines.   •Recording new information with a voice recorder.   •Using a medicine organizing tool.   •Creating specific, permanent places for important items. One example is always putting your keys, wallet, and cell phone in the same place every time you get home.    Occupational Therapy Discharge Instructions for Rafael Mccoy    5/11/2020    Level of Assist Required for Eating: Requires Supervision with Eating  Level of Assist Required for Grooming: Requires Supervision with Grooming  Level of Assist Required for Dressing: Requires Supervision with Dressing  Level of Assist Required for Toileting: Requires Supervision with Toileting  Level of Assist Required for Toilet Transfer: Requires Supervision with Toilet Transfer  Level of Assist Required for Bathing: Requires Supervision with Bathing  Level of Assist Required for Shower Transfer: Requires Supervision with Shower Transfer  Level of Assist Required for Home Mgmt: Requires Supervision with Home Management  Level of Assist Required for Meal Prep: Requires Physical Assist with Meal Preparation  Comments: It was great working with you Keith! -Mary OT

## 2020-05-11 NOTE — DISCHARGE SUMMARY
Rehab Discharge Summary    Admission Date: 4/25/2020    Discharge Date: 5/11/2020    Attending Provider: Lyn Sadler MD/PhD    Admission Diagnosis:   Active Hospital Problems    Diagnosis   • *Subdural hematoma (HCC)   • Impaired mobility and ADLs   • Seizures, post-traumatic (HCC)   • Essential hypertension   • Hypercholesterolemia   • Legally blind   • Hyponatremia       Discharge Diagnosis:  Active Hospital Problems    Diagnosis   • *Subdural hematoma (HCC)   • Impaired mobility and ADLs   • Seizures, post-traumatic (HCC)   • Essential hypertension   • Hypercholesterolemia   • Legally blind   • Hyponatremia       HPI per H&P:  The patient is a 74 y.o. right hand dominant male with a past medical history of left pontine stroke 2016, hypertension, central vision blindness;  who presented on 4/17/2020  6:48 PM as a transfer from outside hospital.  Patient fell in the bathtub several days prior to admission, and presented to [facility] with progressive ataxia, CT there showed large subdural hematoma with midline shift, he received Keppra and platelets and was transferred to [facility] for definitive care.  Patient was seen by Dr. Mendiola of neurosurgery and was taken to the OR for right-sided craniotomy for evacuation of subdural hematoma.  Patient had a subsequent EEG which showed slowing in the right frontotemporal region, and multiple signs suggestive of underlying cortical instability, and structural abnormality increasing the risk for seizures.  Patient was being treated for seizures with Keppra 1 g twice daily, Vimpat 100 mg twice daily. He has since been transitioned to PO AEDs. Patient had follow-up CT head which were stable. He initially was NPO but has since been upgraded to dysphagia diet. Patient had chest pain but troponin and EKG were normal. Patient had a olivarez which has been removed and replaced with condom catheter.       Patient was admitted to [facility] on 4/25/2020.     Hospital Course by Problem List:  TBI (Traumatic Brain Injury): Fall with SDH s/p R sided evacuation with Dr. Mendiola on 4/17/20. Patient started on Steroids. Had post-operative seizure like activity, EEG with cortical irritation. On Decadron 4 mg BID per NSG -> decrease to 2 mg BID. Patient underwent acute inpatient rehabilitation from 4/25/20 to 5/11/20 with good improvement in mobility, ADLs, and swallow. Patient previously living alone but still requiring some help with mobility and ADLs, [facility].   -NSG follow-up. Recommend CT head - scheduled 5/4/20 - stable changes.      Dysphagia - On dysphagia diet on transfer. SLP for swallow evaluation - continue dysphagia 3 with NTL. MBSS 4/27/20 - upgraded to dysphagia with thins. Upgraded to regular/thins. Resolved     Seizure disorder - Patient with previous CVA with seizures. Increased AEDs at [facility] to Keppra 1000 mg BID and Vimpat 100 mg BID  -Follow-up Neurology seizure clinic     Leukocytosis - up to 32 after orthostatic hypotension rapid response. On steroids, but much more elevated than expected. Consulted hospitalist and check blood cultures. Covid 19 - negative x1. WBC improved to 25 on 5/5/20, blood cultures positive x1 on broad spectrum antibiotics. Blood cultures gram - E coli. UCx - pending. Continue Zosyn and Zyvox -> Zosyn only. Per Hospitalist narrow to Ceftriaxone to finish on 5/11/20. CXR 5/8/20 - resolved pleural effusions.      HTN - Patient on 5 mg Amlodipine. Previously on metoprolol as well. Continue to monitor, SBP elevated restart Metoprolol 25 mg BID     Legally blind - High risk for falls.      Anemia - 13.1 on admission, continue to monitor     Hyponatremia - 129 on 5/4/20, now on IVF     Hiccups - Started on Gabapentin 300 mg TID -> 400 mg TID. Improved but occasional increase to 600. No return will decrease to 300 mg TID     Azotemia - Patient on NTL diet on admission with poor fluid intake. Improved  with fluids     HLD - Patient on Rosuvastatin 10 mg on transfer.     GI Ppx - Patient with loose stools on senna-docusate. Change to PRN     DVT Ppx - Patient high risk for bleed. Ambulating > 100 feet.      Dispo - [facility]. Discussed with [facility] and [facility] if Covid ruled out which it was. Plan for Monday transfer but [facility] requiring repeat rule out. Discussed that patient is asymptomatic and being treated for bacterial infection.     Functional Status at Discharge  Eating:  3 - Moderate Assistance  Eating Description:     Groomin - Standby Prompting/Supervision or Set-up  Grooming Description:  ( oral care  grooming tools placed  on counter patient oriented to location.     brush hair  independent )  Bathin - Standby Prompting/Supervision or Set-up  Bathing Description:  Grab bar, Set-up of equipment, Tub bench(setup assist to manage visual deficits)  Upper Body Dressin - Standby Prompting/Supervision or Set-up  Upper Body Dressing Description:  Set-up of equipment  Lower Body Dressin - Standby Prompting/Supervsion or Set-up  Lower Body Dressing Description:  5 - Standby Prompting/Supervsion or Set-up  Discharge Location : Skilled Nursing Facility  Level of Supervision Required: 24 Hour Supervision  Long Term Goals Met: 2  Long Term Goals Not Met: 1  Reason(s) for Goals Not Met: Pt continues to require assist for IADLs/home management, supervision for ADLs  Walk:  4 - Minimal Assistance  Distance Walked:  Walks a minimum of 150 feet  Walk Description:  (pt amb ~350' with B walking sticks and SBA to CGA for intermtitent steadying assist)  Wheelchair:  1 - Total Assistance  Distance Propelled:  Propels less than 50 feet   Wheelchair Description:  Extra time, Safety concerns, Supervision for safety, Verbal cueing, Requires incidental assist(~ 10feet with Ryan B LE and UE, difficulty with sequencing and incorportation of L UE)  Stairs 4 - Minimal Assistance  Stairs  "Description(pt ascended/descended 3x6 adatpive steps continuously using B HRs, reciprocal pattern, CGA. Pt \"feels\" step with heel prior to descending)  Discharge Location: Skilled Nursing Facility  Patient Discharging with Assist of: Other (See Comments)  Level of Supervision Required Upon Discharge: Intermittent Supervision  Recommended Equipment for Discharge: (bilateral walking sticks)  Recommeded Services Upon Discharge: (continue skilled PT to address mobiltiy impairments)  Long Term Goals Met: 1  Long Term Goals Not Met: 4  Reason(s) for Goals Not Met: vision impairments  Criteria for Termination of Services: Maximum Function Achieved for Inpatient Rehabilitation  Comprehension Mode:  Auditory  Comprehension:  5 - Stand-by Prompting/Supervision or Set-up  Comprehension Description:     Expression Mode:  Vocal  Expression:  5 - Stand-by Prompting/Supervision or Set-up  Expression Description:     Social Interaction:  7 - Independent  Social Interaction Description:     Problem Solvin - Minimal Assistance  Problem Solving Description:     Memory:  3 - Moderate Assistance  Memory Description:     Progress since Admit: Pt's initial cognitive assessment at admission indicated a moderate cognitive impairment (10/22 on the MoCA-blind). Pt has demonstrated improvement in the areas of attention, memory and abstraction. Current d/c testing indicates a mild cognitive impairment (16/22 on the MoCA-blind). Pt also made progress in the area of dysphagia. Pt was admistted on a modified diet, including thickened liquids. Pt participated with an MBSS and dysphagia tx. Pt currently tolerating regular texture solids and thin liquids. Dysphagia tx no longer warranted.   Discharge Location : Skilled Nursing Facility  Patient Discharging with Assist of: Family   Level of Supervision Required: 24 Hour Supervision  Recommended Services Upon Discharge: Other (See Comments)  Long Term Goals Met: 1  Long Term Goals Not Met: " 1  Reason(s) for Goals Not Met: Pt progressed but not to return to PLOF, con't ST services at next level of care is recommended.   Criteria for Termination of Services: Maximum Function Achieved for Inpatient Rehabilitation    ILyn M.D., personally performed a complete drug regimen review and no potential clinically significant medication issues were identified.   Discharge Medication:     Medication List      START taking these medications      Instructions   gabapentin 300 MG Caps  Commonly known as:  NEURONTIN   Take 1 Cap by mouth 3 times a day.  Dose:  300 mg     sodium chloride 1 GM Tabs  Commonly known as:  SALT   Take 1 Tab by mouth 3 times a day, with meals.  Dose:  1 g        CHANGE how you take these medications      Instructions   dexamethasone 2 MG tablet  What changed:    · medication strength  · how much to take  Commonly known as:  DECADRON   Take 1 Tab by mouth 2 Times a Day.  Dose:  2 mg        CONTINUE taking these medications      Instructions   acetaminophen 325 MG Tabs  Commonly known as:  TYLENOL   Take 2 Tabs by mouth every 6 hours as needed.  Dose:  650 mg     amLODIPine 5 MG Tabs  Commonly known as:  NORVASC   Take 5 mg by mouth every day.  Dose:  5 mg     lacosamide 100 MG Tabs tablet  Commonly known as:  VIMPAT   Take 1 Tab by mouth 2 Times a Day for 30 days.  Dose:  100 mg     levetiracetam 1000 MG tablet  Commonly known as:  KEPPRA   Take 1 Tab by mouth 2 Times a Day.  Dose:  1,000 mg     rosuvastatin 10 MG Tabs  Commonly known as:  CRESTOR   Take 10 mg by mouth every bedtime.  Dose:  10 mg        STOP taking these medications    bisacodyl 10 MG Supp  Commonly known as:  DULCOLAX     docusate sodium 100mg/10mL 150 MG/15ML Liqd  Commonly known as:  COLACE     metoprolol SR 25 MG Tb24  Commonly known as:  TOPROL XL     ondansetron 4 MG/2ML Soln injection  Commonly known as:  ZOFRAN            Discharge Diet:  Regular    Discharge Activity:  As tolerated      Disposition:  Patient to SNF     Equipment:  Per SNF    Follow-up & Discharge Instructions:  Follow up with your primary care provider (PCP) within 7-10 days of discharge to review your medications and take over your care.     If you develop chest pain, fever, chills, change in neurologic function (weakness, sensation changes, vision changes), or other concerning sxs, seek immediate medical attention or call 911.      Condition on Discharge:  Fair    More than 33 minutes was spent on discharging this patient, including face-to-face time, prescription management, and the dictation of this note.    Lny Sadler M.D.    Date of Service: 5/11/2020

## 2020-05-11 NOTE — THERAPY
Occupational Therapy  Daily Treatment     Patient Name: Rafael Mccoy  Age:  74 y.o., Sex:  male  Medical Record #: [id number]  Today's Date: 2020     Precautions  Precautions: (P) Fall Risk, Other (See Comments)  Comments: (P) legally blind (absent central vision), L apraxia    Safety   ADL Safety : (P) Requires Supervision for Safety, Requires Cueing for Safety  Bathroom Safety: (P) Requires Supervision for Safety, Requires Cuing for Safety  Comments: (P) Pt able to complete ADL routine at primarily SBA    Subjective    Pt agreeable to OT     Objective       20 0701   Precautions   Precautions Fall Risk;Other (See Comments)   Comments legally blind (absent central vision), L apraxia   Safety    ADL Safety  Requires Supervision for Safety;Requires Cueing for Safety   Bathroom Safety Requires Supervision for Safety;Requires Cuing for Safety   Comments Pt able to complete ADL routine at primarily SBA   OT Total Time Spent   OT Individual Total Time Spent (Mins) 60   OT Charge Group   OT Self Care / ADL 4       FIM Bathing Score:  5 - Standby Prompting/Supervision or Set-up  Bathing Description:       FIM Upper Body Dressin - Standby Prompting/Supervision or Set-up  Upper Body Dressing Description:  Set-up of equipment    FIM Lower Body Dressing Score:  5 - Standby Prompting/Supervsion or Set-up  Lower Body Dressing Description:  Increased time(donned underwear, pants, socks with increased time, UE support as needed on sink for standing components, no LOB)    FIM Toiletin - Standby Prompting/Supervision or Set-up  Toileting Description:  Verbal cueing, Supervision for safety    FIM Toilet Transfer Score:  5 - Standby Prompting/Supervision or Set-up  Toilet Transfer Description:  Verbal cueing, Grab bar    FIM Tub/Shower Transfers Score:  5 - Standby Prompting/Supervision or Set-up  Tub/Shower Transfers Description:  Supervision for safety      Assessment    Pt tolerated session well, is now  completing ADL routine at primarily stand by assist level, requires intermittent verbal cues and setup assist as well as increased time to manage visual deficits for all tasks. Pt continues to be limited by impaired balance necessitating close supervision for all standing and mobility as well as LUE deficits and visual deficits.     Plan    Pt pending d/c today for continued rehab at lower level, pt requiring 24/7 support at this time for safety

## 2020-05-11 NOTE — CARE PLAN
Problem: OT Long Term Goals  Goal: LTG-By discharge, patient will complete basic home management  Description: 1) Individualized Goal:  At a SUP to Mod I level.   2) Interventions:  OT Self Care/ADL, OT Community Reintegration, OT Neuro Re-Ed/Balance, OT Therapeutic Activity, OT Evaluation and OT Therapeutic Exercise     Outcome: NOT MET     Problem: OT Long Term Goals  Goal: LTG-By discharge, patient will complete basic self care tasks  Description: 1) Individualized Goal:  At a SUP to Mod I level with AE as needed.   2) Interventions:  OT Neuro Re-Ed/Balance, OT Therapeutic Activity, OT Evaluation and OT Therapeutic Exercise     Outcome: MET  Goal: LTG-By discharge, patient will perform bathroom transfers  Description: 1) Individualized Goal:  At a SUP to Mod I level.   2) Interventions:  OT Self Care/ADL, OT Community Reintegration, OT Neuro Re-Ed/Balance, OT Therapeutic Activity, OT Evaluation and OT Therapeutic Exercise     Outcome: MET

## 2020-05-11 NOTE — PROGRESS NOTES
Received bedside shift report from Americo MONGE RN regarding patient and assumed care. Patient awake, calm and stable, currently positioned in bed for comfort and safety; call light within reach. Denies pain or discomfort at this time. Will continue to monitor.

## 2020-05-11 NOTE — DISCHARGE PLANNING
[facility] van to transport to [facility] today at 1400.  Dr. Marvin Vila is accepting physician.  CM notified.

## 2020-05-12 NOTE — DISCHARGE PLANNING
Case management Summary:   Met with patient prior to discharge & updated daughter, Cleo, via phone.   Reviewed all follow up appointments: . [facility] will make f/u appts prior to discharge home.   Referral made to [facility] and they are have accepted referral and are ready to follow.      During hospitalization, I have provided support and education and have been available for questions and information during hours of operation, communicated with therapy team and MD along with providing links/resources  to outside services.    Patient verbalizes agreement with all plans and has an understanding of the next steps within the post acute services.     Individualized Goals:   1. Gain enough independence to go home alone  2. Gain strength  3. Gain mobility    Outcome:   1. Goal not met: patient discharging to . [facility]. It is anticipated he will continue to improve w/ skilled therapy & ongoing mgmt.  2. Goal partially met: patient has made improvement but is not at baseline. It is anticipated he will continue to improve w/ skilled therapy & ongoing mgmt.  3. Goal partially met: patient has made improvement, but is not at baseline. It is anticipated he will continue to improve w/ skilled therapy.

## 2020-05-15 LAB — LACOSAMIDE SERPL-MCNC: 7.4 UG/ML (ref 5–10)

## 2020-06-18 PROBLEM — R82.90 ABNORMAL URINE: Status: ACTIVE | Noted: 2020-06-18

## 2020-06-18 PROBLEM — R60.0 PERIPHERAL EDEMA: Status: ACTIVE | Noted: 2020-06-18

## 2020-06-18 PROBLEM — R60.9 PERIPHERAL EDEMA: Status: ACTIVE | Noted: 2020-06-18

## 2020-06-25 PROBLEM — T14.90XA TRAUMA: Status: RESOLVED | Noted: 2020-04-17 | Resolved: 2020-06-25

## 2020-06-25 PROBLEM — R82.90 ABNORMAL URINE: Status: RESOLVED | Noted: 2020-06-18 | Resolved: 2020-06-25

## 2020-06-25 PROBLEM — Z11.9 SCREENING EXAMINATION FOR INFECTIOUS DISEASE: Status: RESOLVED | Noted: 2020-04-17 | Resolved: 2020-06-25

## 2020-07-30 PROBLEM — R53.1 WEAKNESS: Status: ACTIVE | Noted: 2020-07-30

## 2020-07-30 PROBLEM — N39.0 ACUTE UTI: Status: ACTIVE | Noted: 2020-07-30

## 2020-07-30 PROBLEM — R42 DIZZINESS: Status: ACTIVE | Noted: 2020-07-30

## 2020-08-01 PROBLEM — S06.0X0A CONCUSSION WITHOUT LOSS OF CONSCIOUSNESS: Status: ACTIVE | Noted: 2020-08-01

## 2020-08-01 PROBLEM — N39.0 ACUTE UTI: Status: RESOLVED | Noted: 2020-07-30 | Resolved: 2020-08-01

## 2020-08-01 PROBLEM — R60.9 PERIPHERAL EDEMA: Status: RESOLVED | Noted: 2020-06-18 | Resolved: 2020-08-01

## 2020-08-01 PROBLEM — R60.0 PERIPHERAL EDEMA: Status: RESOLVED | Noted: 2020-06-18 | Resolved: 2020-08-01

## 2020-08-01 PROBLEM — R53.1 WEAKNESS: Status: RESOLVED | Noted: 2020-07-30 | Resolved: 2020-08-01

## 2020-08-03 NOTE — PROGRESS NOTES
Chief Complaint   Patient presents with   • New Patient       Problem List Items Addressed This Visit     Legally blind      Other Visit Diagnoses     Localization-related epilepsy (HCC)        Screening for depression        Hospital discharge follow-up        SOB (shortness of breath)        Chest pain, unspecified type        Hx of subdural hematoma              History of present illness:  Rafael Mccoy 74 y.o. male presents today with Cleo for seizure evaluation.     Pt fell in the bath tub on 4/17/2020 and suffered a subdural hematoma s/p craniotomy. He had seizures in the hospital after his surgery per daughter. He was put on keppra 1000mg BID and Vimpat 100mg BID. No side effects. However, Cleo reports that pt had hit his head again and he started getting dizzy with intermittent confusion. This prompted them to go back to the ED at [place] on 7/31/2020. She reports that CT head was unremarkable and with no bleed. He was given meclizine and Abx for his recurrent UTI and pt felt better again per daughter. He continues to have weakness as he is still possibly recovering from recent hospitalization. He has not f/u with [facility] and daughter has under the impression that we are part of neurosurgery. She states she will make that appointment. Pt has no hx of seizures prior to his fall.     No family hx of seizures.     Not drinking alcohol. Not smoking cigarettes. No recreational drug use.     He is not driving. He is legally blind on both eyes.     Daughter was expecting that we will go over all of his neurological condition this visit including neuropathy but also mentions that she [facility]       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, s/p bilateral laser surgeries   • Head injury    • Hypertension    • Seizure (HCC)    • Stroke (HCC)        Past surgical history:    Past Surgical History:   Procedure Laterality Date   • CRANIOTOMY Right 4/17/2020    Procedure: CRANIOTOMY - SUBDURAL;  Surgeon: Anthony Mendiola M.D.;  Location: SURGERY [facility];  Service: Neurosurgery   • OTHER  04/2020    craniotomy after a fall,TBI   • CRANIOTOMY     • OTHER ORTHOPEDIC SURGERY      R foot fracture   • ROTATOR CUFF REPAIR Right    • TONSILLECTOMY AND ADENOIDECTOMY         Family history:   Family History   Problem Relation Age of Onset   • Other Mother         eye problems   • Cancer Neg Hx    • Heart Disease Neg Hx    • Stroke Neg Hx        Social history:   Social History     Socioeconomic History   • Marital status:      Spouse name: Not on file   • Number of children: Not on file   • Years of education: Not on file   • Highest education level: Not on file   Occupational History   • Not on file   Social Needs   • Financial resource strain: Not on file   • Food insecurity     Worry: Never true     Inability: Never true   • Transportation needs     Medical: No     Non-medical: No   Tobacco Use   • Smoking status: Never Smoker   • Smokeless tobacco: Never Used   Substance and Sexual Activity   • Alcohol use: No   • Drug use: No   • Sexual activity: Not on file   Lifestyle   • Physical activity     Days per week: Not on file     Minutes per session: Not on file   • Stress: Not on file   Relationships   • Social connections     Talks on phone: Not on file     Gets together: Not on file     Attends Rastafari service: Not on file     Active member of club or organization: Not on file     Attends meetings of clubs or organizations: Not on file     Relationship status: Not on file   • Intimate partner violence     Fear of current or ex partner: Not on file     Emotionally abused: Not on file     Physically abused: Not on file     Forced sexual activity: Not on file   Other Topics Concern   • Not on file   Social History Narrative    He lives alone, he was previously in the [employer] during the [place] [employer]       Current medications:   Current Outpatient Medications   Medication   • meclizine (ANTIVERT) 12.5 MG Tab   • sodium chloride (SALT) 1 GM Tab   • mirtazapine (REMERON) 45 MG tablet   • lacosamide (VIMPAT) 100 MG Tab tablet   • dexamethasone (DECADRON) 2 MG tablet   • gabapentin (NEURONTIN) 300 MG Cap   • acetaminophen (TYLENOL) 325 MG Tab   • levETIRAcetam (KEPPRA) 1000 MG tablet   • rosuvastatin (CRESTOR) 10 MG Tab     No current facility-administered medications for this visit.        Medication Allergy:  No Known Allergies      Review of systems:     General: Denies fevers or chills, or nightsweats, or generalized fatigue.    Head: Denies headaches or lightheadedness. + dizziness   EENT: Denies vision changes, vision loss or pain, nasal secretion, nasal bleeding, difficulty swallowing, hearing loss, tinnitus, vertigo, ear pain  Respiratory: Denies cough, sputum, or wheezing. + shortness of breath,  Cardiac: Denies  palpitations, edema or syncope. +chest pain  Gastrointestinal: Denies nausea, vomiting, no abdominal pain or change in bowel habits, no melena or hematochezia  Urinary: Denies dysuria, frequency, hesitancy, or incontinence.  Dermatologic:  Denies new rash  Musculoskeletal: Denies muscle pain or swelling, no atrophy, no neck and back pain or stiffness.   Neurologic: Denies facial droopiness, muscle weakness (focal or generalized), paresthesias, ataxia, change in speech or language, memory loss, abnormal movements, +seizures, loss of consciousness  Psychiatric: Denies anxiety, depression, mood swings, suicidal or homicidal thoughts       Physical examination:   Vitals:    08/05/20 1135   BP: 126/58   BP Location: Left arm   Patient Position: Sitting   BP Cuff Size: Adult   Pulse: (!) 116   Resp: 18   Temp: (!) 35.7 °C (96.2 °F)   TempSrc: Temporal   SpO2: 96%   Weight: 81.9 kg (180 lb 8.9 oz)   Height: 1.753 m (5' 9\")     General: Patient in no acute distress, pleasant " and cooperative.  HEENT: Normocephalic, no signs of acute trauma.   moist conjunctivae. Nares are patent. Oropharynx clear without lesions and normal  hard and soft palates.   Neck: Supple. There is normal range of motion.   Resp: clear to auscultation bilaterally. No wheezes or crackles.   CV: RRR, no murmurs. +edema BLE  Skin: no signs of acute rashes or trauma.   Musculoskeletal: joints exhibit full range of motion, without any pain to palpation. There are no signs of joint or muscle swelling. There is no tenderness to deep palpation of muscles.   Psychiatric: No hallucinatory behavior. No symptoms of depression or suicidal ideation. Mood and affect appear normal on exam.     NEUROLOGICAL EXAM:   Mental status, orientation: Awake, alert and oriented.   Speech and language: speech is clear and fluent. The patient is able to name, repeat and comprehend.   Memory: There is intact recollection of recent and remote events.   Cranial nerve exam:   CN I: Not examined   CN V: Facial sensation intact bilaterally   CN VII: face symmetric   CN VIII: hearing intact to finger rub bilaterally   CN IX, X: palate elevates symmetrically   CN XI: Symmetric shoulder shrug  CN XII: tongue midline. No signs of tongue biting or fasciculations   Motor exam: Strength is 5/5 in all extremities. Tone is normal. No abnormal movements were seen on exam.   Sensory exam reveals normal sense of light touch in all extremities.   Deep tendon reflexes:  Absent ankle jerks, 2+ throughout. Plantar responses are flexor. There is no clonus.   Coordination: shows a normal finger-nose-finger. Normal rapidly alternating movements.   Gait: unsteady. Using walker to ambulate.       ANCILLARY DATA REVIEWED:       Lab Data Review:  Reviewed in chart.     Records reviewed:   Reviewed in chart.    Imaging:   CT head 4/27/2020  1.  Evolving RIGHT hemispheric subdural hematoma, slightly reduced in size from prior exam with improvement of associated mass  effect.  2.  Stable RIGHT LEFT midline shift.  3.  No new hemorrhage since prior exam.    CT head 7/30/2020  Postoperative changes on the right with small residual subdural fluid collection. No definite acute intracranial abnormality is identified.    EEG:  Routine EEG 4/27/2020  This is an abnormal routine EEG recording in the awake and drowsy   state(s). There is slowing in the right frontotemporal region.   There are frequent frontal sharps and triphasic waves, appearing   most pronounced on the right. There are frequent runs of   triphasics and frontal sharps exhibiting a brief generalized   rhythmic pattern. The findings significantly increase risk for   seizures and suggest underlying cortical irritability and   structural abnormality. However no clear seizures captured during   the study.  Clinical and radiological correlation is recommended.         ASSESSMENT AND PLAN:    1. Localization-related epilepsy (HCC)    2. Screening for depression    3. Hospital discharge follow-up    4. SOB (shortness of breath)    5. Chest pain, unspecified type    6. Legally blind    7. 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 Pt had seizures during hospital admission and has had no seizures after discharge. He is currently on keppra 1000mg BID and Vimpat 100mg BID. No side effects. Compliant. His EEG was abnormal noting slowing in the right frontotemporal region. There are frequent frontal sharps and triphasic waves, appearing most pronounced on the right. There are frequent runs of triphasics and frontal sharps exhibiting a brief generalized rhythmic pattern. Repeat CT head in July showed Postoperative changes on the right with small residual subdural fluid collection. Pt has been weak and can have intermittent confusion possibly related to his recurrent UTI vs non convulsive seizure. Recommended a longer EEG but pt's daughter  wants [facility].     No family hx of epilepsy.     Mood is good. No suicidal or homicidal thoughts.     Past ASM's: none    Current ASM's: keppra 1000mg BID, Vimpat 100mg BID      Plan:  - Continue ASM for now. Discussed side effects including dizziness, drowsiness, fatigue and mood changes    -Recommended f/u with [facility]    - Discussed avoidance of spell/sz triggers: alcohol, sleep deprivation, and stress.    - Discussed Vit D supplementation. Recommended taking 2000-5000u daily.    - Discussed driving restrictions. Pt is not driving. Legally blind.     -Labs to be checked for next appointment: none      During history taking around 1210, pt c/o 7/10 chest pain with SOB. Denies n/v, chills, cold like symptoms. No unilateral weakness or paresthesia, slurred speech or facial droop. VS checked /70 P: low 100's Resp: 22 O2 sat: 90's. Pt was transferred to the exam table while waiting for [facility]. [facility] came and pt was transported to the ER and daughter [facility].          FOLLOW-UP:   Return if symptoms worsen or fail to improve. [facility]       EDUCATION AND COUNSELING:  -Education was provided to the patient and/or family regarding diagnosis and prognosis. The chronic and unpredictable nature of the condition were discussed. There is increased risk for additional events, which may carry potential for significant injuries and death. Discussed frequent seizure triggers: sleep deprivation, medication non-compliance, use of illegal drugs/alcohol, stress, and others.   -We reviewed in detail the current antiepileptic regimen. Potential side effects of antiepileptics were discussed at length, including but no limited to: hypersensitivity reactions (rash and others, some of which can be fatal), visual field changes (some of which may be irreversible), glaucoma, diplopia, kidney stones, osteopenia/osteoporosis/bone fractures, hyperthermia/anhydrosis,  hyponatremia, tremors/abnormal movements, ataxia, dizziness, fatigue, increased risk for falls, risk for cardiac arrhythmias/syncope, gastrointestinal side effects(hepatitis, pancreatitis, gastritis, ulcers), gingival hypertrophy/bleeding, drowsiness, sedation, anxiety/nervousness, increased risk for suicide, increased risk for depression, and psychosis.   -We also reviewed drug-drug interactions and their potential effect on seizure control and medication side effects.    -Recommend chronic vitamin D supplementation and regular exercise (if not contraindicated).   -Patient/family educated on risk for SUDEP (Sudden Death in Epilepsy). Counseling was provided on the importance of strict medication and follow up compliance. The patient/family understand the risks associated with non-adherence with the medical plan as outlined, including but not limited to an increased risk for breakthrough seizures, which may contribute to injuries, disability, status epilepticus, and even death.   -Counseling was also provided on potential effects of alcohol and other drugs, which may lower seizure threshold and/or affect the metabolism of antiepileptic drugs. We recommend avoidance of alcohol and illegal drugs.  -Avoid sleep deprivation.   -We extensively discussed the aspects related to safety in drivers who suffer from epilepsy. The patient is encourage to report to the Division of Motor Vehicles of any condition and/or spells related to confusion, disorientation, and/or loss of awareness and/or loss of consciousness; as these may pose a safety issue if they occur while operating a motor vehicle. The patient and/or family are ultimately responsible for exercising caution and abiding to regulations in place.   -Other seizure precautions were discussed at length, including no diving, no skydiving, no climbing or exposure to unprotected heights, no unsupervised swimming, no Jacuzzi or bathing in bathtubs or deep bodies of water. The  patient/family have been advised about risks for operating any machinery while suffering from seizures / syncope / epilepsy and/or while taking antiepileptic drugs.   -The patient understands and agrees that due to the complexity of his/her diagnosis, results of any testing and further recommendations will typically be discussed/made during a face to face encounter in my office. The patient and/or family further understands it is their responsibility to keep proper follow up.     Patient/family agree with plan, as outlined.         America Pastrana, MSN, APRN, FNP-C  [facility]  Office: [phone]  Fax: [phone]

## 2020-08-05 ENCOUNTER — OFFICE VISIT (OUTPATIENT)
Dept: NEUROLOGY | Facility: MEDICAL CENTER | Age: 75
End: 2020-08-05
Payer: COMMERCIAL

## 2020-08-05 VITALS
RESPIRATION RATE: 18 BRPM | DIASTOLIC BLOOD PRESSURE: 58 MMHG | WEIGHT: 180.56 LBS | HEIGHT: 69 IN | SYSTOLIC BLOOD PRESSURE: 126 MMHG | HEART RATE: 116 BPM | TEMPERATURE: 96.2 F | OXYGEN SATURATION: 96 % | BODY MASS INDEX: 26.74 KG/M2

## 2020-08-05 DIAGNOSIS — Z13.31 SCREENING FOR DEPRESSION: ICD-10-CM

## 2020-08-05 DIAGNOSIS — G40.109 LOCALIZATION-RELATED EPILEPSY (HCC): ICD-10-CM

## 2020-08-05 DIAGNOSIS — H54.8 LEGALLY BLIND: ICD-10-CM

## 2020-08-05 DIAGNOSIS — R06.02 SOB (SHORTNESS OF BREATH): ICD-10-CM

## 2020-08-05 DIAGNOSIS — R07.9 CHEST PAIN, UNSPECIFIED TYPE: ICD-10-CM

## 2020-08-05 DIAGNOSIS — Z86.79 HX OF SUBDURAL HEMATOMA: ICD-10-CM

## 2020-08-05 DIAGNOSIS — Z09 HOSPITAL DISCHARGE FOLLOW-UP: ICD-10-CM

## 2020-08-05 PROCEDURE — 99215 OFFICE O/P EST HI 40 MIN: CPT | Performed by: NURSE PRACTITIONER

## 2020-08-05 ASSESSMENT — FIBROSIS 4 INDEX: FIB4 SCORE: 3.82

## 2020-10-01 PROBLEM — S06.9X0S COGNITIVE DEFICIT AS LATE EFFECT OF TRAUMATIC BRAIN INJURY (HCC): Status: ACTIVE | Noted: 2020-10-01

## 2020-10-01 PROBLEM — F06.8 COGNITIVE DEFICIT AS LATE EFFECT OF TRAUMATIC BRAIN INJURY (HCC): Status: ACTIVE | Noted: 2020-10-01

## 2020-11-24 PROBLEM — Z87.828 H/O TRAUMATIC SUBDURAL HEMATOMA: Status: ACTIVE | Noted: 2020-04-17

## 2020-11-24 PROBLEM — F02.818 DEMENTIA ASSOCIATED WITH OTHER UNDERLYING DISEASE WITH BEHAVIORAL DISTURBANCE (HCC): Status: ACTIVE | Noted: 2020-11-24

## 2020-11-24 PROBLEM — F22: Status: ACTIVE | Noted: 2020-11-24

## 2021-01-12 DIAGNOSIS — Z23 NEED FOR VACCINATION: ICD-10-CM

## 2021-03-11 ENCOUNTER — HOSPITAL ENCOUNTER (INPATIENT)
Facility: MEDICAL CENTER | Age: 76
LOS: 36 days | DRG: 100 | End: 2021-04-16
Attending: EMERGENCY MEDICINE | Admitting: STUDENT IN AN ORGANIZED HEALTH CARE EDUCATION/TRAINING PROGRAM
Payer: COMMERCIAL

## 2021-03-11 ENCOUNTER — APPOINTMENT (OUTPATIENT)
Dept: RADIOLOGY | Facility: MEDICAL CENTER | Age: 76
DRG: 100 | End: 2021-03-11
Attending: STUDENT IN AN ORGANIZED HEALTH CARE EDUCATION/TRAINING PROGRAM
Payer: COMMERCIAL

## 2021-03-11 ENCOUNTER — HOSPITAL ENCOUNTER (OUTPATIENT)
Dept: RADIOLOGY | Facility: MEDICAL CENTER | Age: 76
End: 2021-03-11
Payer: MEDICARE

## 2021-03-11 DIAGNOSIS — F02.818 DEMENTIA ASSOCIATED WITH OTHER UNDERLYING DISEASE WITH BEHAVIORAL DISTURBANCE (HCC): ICD-10-CM

## 2021-03-11 DIAGNOSIS — R56.1 SEIZURES, POST-TRAUMATIC (HCC): ICD-10-CM

## 2021-03-11 DIAGNOSIS — I61.1 NONTRAUMATIC CORTICAL HEMORRHAGE OF RIGHT CEREBRAL HEMISPHERE (HCC): ICD-10-CM

## 2021-03-11 DIAGNOSIS — S06.5XAA SUBDURAL HEMATOMA, ACUTE (HCC): ICD-10-CM

## 2021-03-11 DIAGNOSIS — I61.9 NONTRAUMATIC INTRACEREBRAL HEMORRHAGE, UNSPECIFIED CEREBRAL LOCATION, UNSPECIFIED LATERALITY (HCC): ICD-10-CM

## 2021-03-11 DIAGNOSIS — I61.0 NONTRAUMATIC SUBCORTICAL HEMORRHAGE OF LEFT CEREBRAL HEMISPHERE (HCC): ICD-10-CM

## 2021-03-11 DIAGNOSIS — G40.901 SEIZURE DISORDER, NONCONVULSIVE, WITH STATUS EPILEPTICUS (HCC): ICD-10-CM

## 2021-03-11 DIAGNOSIS — R56.9 SEIZURE (HCC): ICD-10-CM

## 2021-03-11 DIAGNOSIS — G93.40 ENCEPHALOPATHY ACUTE: ICD-10-CM

## 2021-03-11 PROBLEM — E87.6 HYPOKALEMIA: Status: ACTIVE | Noted: 2021-03-11

## 2021-03-11 LAB
ALBUMIN SERPL BCP-MCNC: 3.6 G/DL (ref 3.2–4.9)
ALBUMIN/GLOB SERPL: 1.6 G/DL
ALP SERPL-CCNC: 71 U/L (ref 30–99)
ALT SERPL-CCNC: 13 U/L (ref 2–50)
ANION GAP SERPL CALC-SCNC: 10 MMOL/L (ref 7–16)
APPEARANCE UR: CLEAR
APTT PPP: 29.6 SEC (ref 24.7–36)
AST SERPL-CCNC: 18 U/L (ref 12–45)
BASOPHILS # BLD AUTO: 0.7 % (ref 0–1.8)
BASOPHILS # BLD: 0.03 K/UL (ref 0–0.12)
BILIRUB SERPL-MCNC: 0.3 MG/DL (ref 0.1–1.5)
BILIRUB UR QL STRIP.AUTO: NEGATIVE
BUN SERPL-MCNC: 17 MG/DL (ref 8–22)
CALCIUM SERPL-MCNC: 8.8 MG/DL (ref 8.5–10.5)
CFT BLD TEG: 4.9 MIN (ref 5–10)
CHLORIDE SERPL-SCNC: 105 MMOL/L (ref 96–112)
CHOLEST SERPL-MCNC: 106 MG/DL (ref 100–199)
CLOT ANGLE BLD TEG: 60.3 DEGREES (ref 53–72)
CLOT LYSIS 30M P MA LENFR BLD TEG: 0 % (ref 0–8)
CO2 SERPL-SCNC: 24 MMOL/L (ref 20–33)
COLOR UR: YELLOW
CREAT SERPL-MCNC: 1 MG/DL (ref 0.5–1.4)
CT.EXTRINSIC BLD ROTEM: 2.3 MIN (ref 1–3)
EKG IMPRESSION: NORMAL
EOSINOPHIL # BLD AUTO: 0.18 K/UL (ref 0–0.51)
EOSINOPHIL NFR BLD: 4.3 % (ref 0–6.9)
ERYTHROCYTE [DISTWIDTH] IN BLOOD BY AUTOMATED COUNT: 47.3 FL (ref 35.9–50)
GLOBULIN SER CALC-MCNC: 2.3 G/DL (ref 1.9–3.5)
GLUCOSE BLD-MCNC: 95 MG/DL (ref 65–99)
GLUCOSE SERPL-MCNC: 103 MG/DL (ref 65–99)
GLUCOSE UR STRIP.AUTO-MCNC: NEGATIVE MG/DL
HCT VFR BLD AUTO: 43.9 % (ref 42–52)
HDLC SERPL-MCNC: 28 MG/DL
HGB BLD-MCNC: 14.1 G/DL (ref 14–18)
IMM GRANULOCYTES # BLD AUTO: 0.01 K/UL (ref 0–0.11)
IMM GRANULOCYTES NFR BLD AUTO: 0.2 % (ref 0–0.9)
INR PPP: 1.03 (ref 0.87–1.13)
KETONES UR STRIP.AUTO-MCNC: NEGATIVE MG/DL
LDLC SERPL CALC-MCNC: 32 MG/DL
LEUKOCYTE ESTERASE UR QL STRIP.AUTO: NEGATIVE
LYMPHOCYTES # BLD AUTO: 1.02 K/UL (ref 1–4.8)
LYMPHOCYTES NFR BLD: 24.5 % (ref 22–41)
MAGNESIUM SERPL-MCNC: 2 MG/DL (ref 1.5–2.5)
MCF BLD TEG: 59.3 MM (ref 50–70)
MCH RBC QN AUTO: 27.8 PG (ref 27–33)
MCHC RBC AUTO-ENTMCNC: 32.1 G/DL (ref 33.7–35.3)
MCV RBC AUTO: 86.6 FL (ref 81.4–97.8)
MICRO URNS: NORMAL
MONOCYTES # BLD AUTO: 0.59 K/UL (ref 0–0.85)
MONOCYTES NFR BLD AUTO: 14.2 % (ref 0–13.4)
NEUTROPHILS # BLD AUTO: 2.33 K/UL (ref 1.82–7.42)
NEUTROPHILS NFR BLD: 56.1 % (ref 44–72)
NITRITE UR QL STRIP.AUTO: NEGATIVE
NRBC # BLD AUTO: 0 K/UL
NRBC BLD-RTO: 0 /100 WBC
PA AA BLD-ACNC: 0 %
PA ADP BLD-ACNC: 0 %
PH UR STRIP.AUTO: 5.5 [PH] (ref 5–8)
PLATELET # BLD AUTO: 186 K/UL (ref 164–446)
PMV BLD AUTO: 10.2 FL (ref 9–12.9)
POTASSIUM SERPL-SCNC: 3.3 MMOL/L (ref 3.6–5.5)
PROT SERPL-MCNC: 5.9 G/DL (ref 6–8.2)
PROT UR QL STRIP: NEGATIVE MG/DL
PROTHROMBIN TIME: 13.8 SEC (ref 12–14.6)
RBC # BLD AUTO: 5.07 M/UL (ref 4.7–6.1)
RBC UR QL AUTO: NEGATIVE
SARS-COV-2 RNA RESP QL NAA+PROBE: NOTDETECTED
SODIUM SERPL-SCNC: 139 MMOL/L (ref 135–145)
SODIUM SERPL-SCNC: 140 MMOL/L (ref 135–145)
SP GR UR STRIP.AUTO: >=1.03
SPECIMEN SOURCE: NORMAL
TEG ALGORITHM TGALG: ABNORMAL
TRIGL SERPL-MCNC: 228 MG/DL (ref 0–149)
UROBILINOGEN UR STRIP.AUTO-MCNC: 0.2 MG/DL
WBC # BLD AUTO: 4.2 K/UL (ref 4.8–10.8)

## 2021-03-11 PROCEDURE — 700105 HCHG RX REV CODE 258: Performed by: STUDENT IN AN ORGANIZED HEALTH CARE EDUCATION/TRAINING PROGRAM

## 2021-03-11 PROCEDURE — 99291 CRITICAL CARE FIRST HOUR: CPT

## 2021-03-11 PROCEDURE — 99291 CRITICAL CARE FIRST HOUR: CPT | Performed by: INTERNAL MEDICINE

## 2021-03-11 PROCEDURE — 80061 LIPID PANEL: CPT

## 2021-03-11 PROCEDURE — 95714 VEEG EA 12-26 HR UNMNTR: CPT | Performed by: PSYCHIATRY & NEUROLOGY

## 2021-03-11 PROCEDURE — 4A10X4Z MONITORING OF CENTRAL NERVOUS ELECTRICAL ACTIVITY, EXTERNAL APPROACH: ICD-10-PCS | Performed by: PSYCHIATRY & NEUROLOGY

## 2021-03-11 PROCEDURE — 99291 CRITICAL CARE FIRST HOUR: CPT | Mod: AI,GC | Performed by: STUDENT IN AN ORGANIZED HEALTH CARE EDUCATION/TRAINING PROGRAM

## 2021-03-11 PROCEDURE — 700102 HCHG RX REV CODE 250 W/ 637 OVERRIDE(OP): Performed by: PSYCHIATRY & NEUROLOGY

## 2021-03-11 PROCEDURE — U0005 INFEC AGEN DETEC AMPLI PROBE: HCPCS

## 2021-03-11 PROCEDURE — 700111 HCHG RX REV CODE 636 W/ 250 OVERRIDE (IP): Performed by: PSYCHIATRY & NEUROLOGY

## 2021-03-11 PROCEDURE — 85730 THROMBOPLASTIN TIME PARTIAL: CPT

## 2021-03-11 PROCEDURE — 93005 ELECTROCARDIOGRAM TRACING: CPT | Performed by: EMERGENCY MEDICINE

## 2021-03-11 PROCEDURE — 700105 HCHG RX REV CODE 258: Performed by: PSYCHIATRY & NEUROLOGY

## 2021-03-11 PROCEDURE — 36415 COLL VENOUS BLD VENIPUNCTURE: CPT

## 2021-03-11 PROCEDURE — U0003 INFECTIOUS AGENT DETECTION BY NUCLEIC ACID (DNA OR RNA); SEVERE ACUTE RESPIRATORY SYNDROME CORONAVIRUS 2 (SARS-COV-2) (CORONAVIRUS DISEASE [COVID-19]), AMPLIFIED PROBE TECHNIQUE, MAKING USE OF HIGH THROUGHPUT TECHNOLOGIES AS DESCRIBED BY CMS-2020-01-R: HCPCS

## 2021-03-11 PROCEDURE — 770022 HCHG ROOM/CARE - ICU (200)

## 2021-03-11 PROCEDURE — 85347 COAGULATION TIME ACTIVATED: CPT

## 2021-03-11 PROCEDURE — 95816 EEG AWAKE AND DROWSY: CPT | Performed by: PSYCHIATRY & NEUROLOGY

## 2021-03-11 PROCEDURE — 85025 COMPLETE CBC W/AUTO DIFF WBC: CPT

## 2021-03-11 PROCEDURE — 92610 EVALUATE SWALLOWING FUNCTION: CPT

## 2021-03-11 PROCEDURE — A9270 NON-COVERED ITEM OR SERVICE: HCPCS | Performed by: PSYCHIATRY & NEUROLOGY

## 2021-03-11 PROCEDURE — 95700 EEG CONT REC W/VID EEG TECH: CPT | Performed by: PSYCHIATRY & NEUROLOGY

## 2021-03-11 PROCEDURE — 99292 CRITICAL CARE ADDL 30 MIN: CPT | Performed by: PSYCHIATRY & NEUROLOGY

## 2021-03-11 PROCEDURE — 700111 HCHG RX REV CODE 636 W/ 250 OVERRIDE (IP): Performed by: INTERNAL MEDICINE

## 2021-03-11 PROCEDURE — 85610 PROTHROMBIN TIME: CPT

## 2021-03-11 PROCEDURE — 70450 CT HEAD/BRAIN W/O DYE: CPT

## 2021-03-11 PROCEDURE — C9254 INJECTION, LACOSAMIDE: HCPCS | Performed by: STUDENT IN AN ORGANIZED HEALTH CARE EDUCATION/TRAINING PROGRAM

## 2021-03-11 PROCEDURE — 700111 HCHG RX REV CODE 636 W/ 250 OVERRIDE (IP): Performed by: STUDENT IN AN ORGANIZED HEALTH CARE EDUCATION/TRAINING PROGRAM

## 2021-03-11 PROCEDURE — 84295 ASSAY OF SERUM SODIUM: CPT

## 2021-03-11 PROCEDURE — 83735 ASSAY OF MAGNESIUM: CPT

## 2021-03-11 PROCEDURE — 85576 BLOOD PLATELET AGGREGATION: CPT | Mod: 91

## 2021-03-11 PROCEDURE — 95720 EEG PHY/QHP EA INCR W/VEEG: CPT | Performed by: PSYCHIATRY & NEUROLOGY

## 2021-03-11 PROCEDURE — 80177 DRUG SCRN QUAN LEVETIRACETAM: CPT

## 2021-03-11 PROCEDURE — 80053 COMPREHEN METABOLIC PANEL: CPT

## 2021-03-11 PROCEDURE — 81003 URINALYSIS AUTO W/O SCOPE: CPT

## 2021-03-11 PROCEDURE — 85384 FIBRINOGEN ACTIVITY: CPT

## 2021-03-11 PROCEDURE — 82962 GLUCOSE BLOOD TEST: CPT

## 2021-03-11 RX ORDER — POTASSIUM CHLORIDE 20 MEQ/1
40 TABLET, EXTENDED RELEASE ORAL EVERY 6 HOURS
Status: DISPENSED | OUTPATIENT
Start: 2021-03-11 | End: 2021-03-11

## 2021-03-11 RX ORDER — ACETAMINOPHEN 325 MG/1
650 TABLET ORAL EVERY 4 HOURS PRN
Status: DISCONTINUED | OUTPATIENT
Start: 2021-03-11 | End: 2021-03-11

## 2021-03-11 RX ORDER — ONDANSETRON 2 MG/ML
4 INJECTION INTRAMUSCULAR; INTRAVENOUS EVERY 4 HOURS PRN
Status: DISCONTINUED | OUTPATIENT
Start: 2021-03-11 | End: 2021-03-11

## 2021-03-11 RX ORDER — LEVETIRACETAM 500 MG/1
1000 TABLET ORAL 2 TIMES DAILY
Status: DISCONTINUED | OUTPATIENT
Start: 2021-03-11 | End: 2021-03-11

## 2021-03-11 RX ORDER — CHOLECALCIFEROL (VITAMIN D3) 125 MCG
5 CAPSULE ORAL NIGHTLY
Status: DISCONTINUED | OUTPATIENT
Start: 2021-03-11 | End: 2021-03-18

## 2021-03-11 RX ORDER — AMOXICILLIN 250 MG
2 CAPSULE ORAL 2 TIMES DAILY
Status: DISCONTINUED | OUTPATIENT
Start: 2021-03-11 | End: 2021-03-11

## 2021-03-11 RX ORDER — LEVETIRACETAM 500 MG/1
500 TABLET ORAL 2 TIMES DAILY
Status: DISCONTINUED | OUTPATIENT
Start: 2021-03-11 | End: 2021-03-11

## 2021-03-11 RX ORDER — ONDANSETRON 4 MG/1
4 TABLET, ORALLY DISINTEGRATING ORAL EVERY 4 HOURS PRN
Status: DISCONTINUED | OUTPATIENT
Start: 2021-03-11 | End: 2021-03-19

## 2021-03-11 RX ORDER — POLYETHYLENE GLYCOL 3350 17 G/17G
1 POWDER, FOR SOLUTION ORAL
Status: DISCONTINUED | OUTPATIENT
Start: 2021-03-11 | End: 2021-03-11

## 2021-03-11 RX ORDER — DIVALPROEX SODIUM 500 MG/1
500 TABLET, EXTENDED RELEASE ORAL
Status: DISCONTINUED | OUTPATIENT
Start: 2021-03-11 | End: 2021-03-11

## 2021-03-11 RX ORDER — GABAPENTIN 300 MG/1
300 CAPSULE ORAL
COMMUNITY
Start: 2021-01-04 | End: 2021-03-11

## 2021-03-11 RX ORDER — POLYETHYLENE GLYCOL 3350 17 G/17G
1 POWDER, FOR SOLUTION ORAL
Status: DISCONTINUED | OUTPATIENT
Start: 2021-03-11 | End: 2021-03-18

## 2021-03-11 RX ORDER — SIMVASTATIN 20 MG
40 TABLET ORAL
Status: DISCONTINUED | OUTPATIENT
Start: 2021-03-11 | End: 2021-03-18

## 2021-03-11 RX ORDER — ACETAMINOPHEN 325 MG/1
650 TABLET ORAL EVERY 4 HOURS PRN
Status: DISCONTINUED | OUTPATIENT
Start: 2021-03-11 | End: 2021-03-18

## 2021-03-11 RX ORDER — ASCORBIC ACID 500 MG
1000 TABLET ORAL DAILY
Status: ON HOLD | COMMUNITY
End: 2021-04-16

## 2021-03-11 RX ORDER — ACETAMINOPHEN 325 MG/1
650 TABLET ORAL EVERY 6 HOURS PRN
Status: DISCONTINUED | OUTPATIENT
Start: 2021-03-11 | End: 2021-03-11

## 2021-03-11 RX ORDER — SODIUM CHLORIDE, SODIUM LACTATE, POTASSIUM CHLORIDE, CALCIUM CHLORIDE 600; 310; 30; 20 MG/100ML; MG/100ML; MG/100ML; MG/100ML
2000 INJECTION, SOLUTION INTRAVENOUS CONTINUOUS
Status: DISCONTINUED | OUTPATIENT
Start: 2021-03-11 | End: 2021-03-12

## 2021-03-11 RX ORDER — ONDANSETRON 2 MG/ML
4 INJECTION INTRAMUSCULAR; INTRAVENOUS EVERY 4 HOURS PRN
Status: DISCONTINUED | OUTPATIENT
Start: 2021-03-11 | End: 2021-03-19

## 2021-03-11 RX ORDER — GABAPENTIN 300 MG/1
300 CAPSULE ORAL 2 TIMES DAILY
Status: DISCONTINUED | OUTPATIENT
Start: 2021-03-11 | End: 2021-03-18

## 2021-03-11 RX ORDER — LEVETIRACETAM 5 MG/ML
500 INJECTION INTRAVASCULAR EVERY 12 HOURS
Status: DISCONTINUED | OUTPATIENT
Start: 2021-03-11 | End: 2021-03-11

## 2021-03-11 RX ORDER — ACETAMINOPHEN 650 MG/1
650 SUPPOSITORY RECTAL EVERY 4 HOURS PRN
Status: DISCONTINUED | OUTPATIENT
Start: 2021-03-11 | End: 2021-03-18

## 2021-03-11 RX ORDER — LORAZEPAM 2 MG/ML
2 INJECTION INTRAMUSCULAR
Status: DISCONTINUED | OUTPATIENT
Start: 2021-03-11 | End: 2021-04-16 | Stop reason: HOSPADM

## 2021-03-11 RX ORDER — LEVETIRACETAM 10 MG/ML
1000 INJECTION INTRAVASCULAR EVERY 12 HOURS
Status: DISCONTINUED | OUTPATIENT
Start: 2021-03-11 | End: 2021-03-19

## 2021-03-11 RX ORDER — BISACODYL 10 MG
10 SUPPOSITORY, RECTAL RECTAL
Status: DISCONTINUED | OUTPATIENT
Start: 2021-03-11 | End: 2021-03-18

## 2021-03-11 RX ORDER — LABETALOL HYDROCHLORIDE 5 MG/ML
10 INJECTION, SOLUTION INTRAVENOUS EVERY 4 HOURS PRN
Status: DISCONTINUED | OUTPATIENT
Start: 2021-03-11 | End: 2021-04-13

## 2021-03-11 RX ORDER — AMOXICILLIN 250 MG
2 CAPSULE ORAL 2 TIMES DAILY
Status: DISCONTINUED | OUTPATIENT
Start: 2021-03-11 | End: 2021-03-18

## 2021-03-11 RX ORDER — LORAZEPAM 2 MG/ML
2 INJECTION INTRAMUSCULAR
Status: DISCONTINUED | OUTPATIENT
Start: 2021-03-11 | End: 2021-03-11

## 2021-03-11 RX ORDER — LORAZEPAM 2 MG/ML
2 INJECTION INTRAMUSCULAR ONCE
Status: COMPLETED | OUTPATIENT
Start: 2021-03-11 | End: 2021-03-11

## 2021-03-11 RX ORDER — HYDRALAZINE HYDROCHLORIDE 20 MG/ML
10 INJECTION INTRAMUSCULAR; INTRAVENOUS EVERY 4 HOURS PRN
Status: DISCONTINUED | OUTPATIENT
Start: 2021-03-11 | End: 2021-04-13

## 2021-03-11 RX ORDER — LABETALOL HYDROCHLORIDE 5 MG/ML
10 INJECTION, SOLUTION INTRAVENOUS EVERY 4 HOURS PRN
Status: DISCONTINUED | OUTPATIENT
Start: 2021-03-11 | End: 2021-03-11

## 2021-03-11 RX ORDER — ONDANSETRON 4 MG/1
4 TABLET, ORALLY DISINTEGRATING ORAL EVERY 4 HOURS PRN
Status: DISCONTINUED | OUTPATIENT
Start: 2021-03-11 | End: 2021-03-11

## 2021-03-11 RX ORDER — DEXTROSE MONOHYDRATE 25 G/50ML
50 INJECTION, SOLUTION INTRAVENOUS
Status: DISCONTINUED | OUTPATIENT
Start: 2021-03-11 | End: 2021-03-11

## 2021-03-11 RX ORDER — SODIUM CHLORIDE 9 MG/ML
INJECTION, SOLUTION INTRAVENOUS CONTINUOUS
Status: DISCONTINUED | OUTPATIENT
Start: 2021-03-11 | End: 2021-03-11

## 2021-03-11 RX ORDER — BISACODYL 10 MG
10 SUPPOSITORY, RECTAL RECTAL
Status: DISCONTINUED | OUTPATIENT
Start: 2021-03-11 | End: 2021-03-11

## 2021-03-11 RX ORDER — ENALAPRILAT 1.25 MG/ML
1.25 INJECTION INTRAVENOUS EVERY 6 HOURS PRN
Status: DISCONTINUED | OUTPATIENT
Start: 2021-03-11 | End: 2021-04-13

## 2021-03-11 RX ORDER — ACETAMINOPHEN 650 MG/1
650 SUPPOSITORY RECTAL EVERY 4 HOURS PRN
Status: DISCONTINUED | OUTPATIENT
Start: 2021-03-11 | End: 2021-03-11

## 2021-03-11 RX ADMIN — LEVETIRACETAM INJECTION 500 MG: 5 INJECTION INTRAVENOUS at 05:27

## 2021-03-11 RX ADMIN — POTASSIUM CHLORIDE 40 MEQ: 1500 TABLET, EXTENDED RELEASE ORAL at 11:09

## 2021-03-11 RX ADMIN — SODIUM CHLORIDE: 9 INJECTION, SOLUTION INTRAVENOUS at 05:23

## 2021-03-11 RX ADMIN — VALPROATE SODIUM 500 MG: 100 INJECTION, SOLUTION INTRAVENOUS at 17:13

## 2021-03-11 RX ADMIN — LEVETIRACETAM INJECTION 1000 MG: 10 INJECTION INTRAVENOUS at 17:13

## 2021-03-11 RX ADMIN — SODIUM CHLORIDE 100 MG: 9 INJECTION, SOLUTION INTRAVENOUS at 20:22

## 2021-03-11 RX ADMIN — LORAZEPAM 2 MG: 2 INJECTION INTRAMUSCULAR; INTRAVENOUS at 13:21

## 2021-03-11 RX ADMIN — LORAZEPAM 2 MG: 2 INJECTION INTRAMUSCULAR; INTRAVENOUS at 13:02

## 2021-03-11 RX ADMIN — SODIUM CHLORIDE, POTASSIUM CHLORIDE, SODIUM LACTATE AND CALCIUM CHLORIDE 1000 ML: 600; 310; 30; 20 INJECTION, SOLUTION INTRAVENOUS at 22:44

## 2021-03-11 RX ADMIN — SODIUM CHLORIDE 4000 MG: 9 INJECTION, SOLUTION INTRAVENOUS at 13:50

## 2021-03-11 ASSESSMENT — LIFESTYLE VARIABLES
HAVE PEOPLE ANNOYED YOU BY CRITICIZING YOUR DRINKING: NO
TOTAL SCORE: 0
EVER FELT BAD OR GUILTY ABOUT YOUR DRINKING: NO
ALCOHOL_USE: NO
HOW MANY TIMES IN THE PAST YEAR HAVE YOU HAD 5 OR MORE DRINKS IN A DAY: 0
ON A TYPICAL DAY WHEN YOU DRINK ALCOHOL HOW MANY DRINKS DO YOU HAVE: 0
DOES PATIENT WANT TO STOP DRINKING: NO
TOTAL SCORE: 0
AVERAGE NUMBER OF DAYS PER WEEK YOU HAVE A DRINK CONTAINING ALCOHOL: 0
EVER_SMOKED: NEVER
CONSUMPTION TOTAL: NEGATIVE
EVER HAD A DRINK FIRST THING IN THE MORNING TO STEADY YOUR NERVES TO GET RID OF A HANGOVER: NO
TOTAL SCORE: 0
HAVE YOU EVER FELT YOU SHOULD CUT DOWN ON YOUR DRINKING: NO

## 2021-03-11 ASSESSMENT — ENCOUNTER SYMPTOMS
ABDOMINAL PAIN: 0
MYALGIAS: 0
SPUTUM PRODUCTION: 0
FEVER: 0
DIZZINESS: 0
SHORTNESS OF BREATH: 0
FOCAL WEAKNESS: 0
BACK PAIN: 0
WEAKNESS: 1
CHILLS: 0
COUGH: 0
PALPITATIONS: 0
ORTHOPNEA: 0
SPEECH CHANGE: 0
VOMITING: 0
TREMORS: 0
HEMOPTYSIS: 0
SEIZURES: 1
LOSS OF CONSCIOUSNESS: 0
SENSORY CHANGE: 1
DIARRHEA: 0
NAUSEA: 0
DEPRESSION: 0
HEADACHES: 0

## 2021-03-11 ASSESSMENT — COGNITIVE AND FUNCTIONAL STATUS - GENERAL
DAILY ACTIVITIY SCORE: 13
CLIMB 3 TO 5 STEPS WITH RAILING: A LOT
DRESSING REGULAR UPPER BODY CLOTHING: A LOT
MOVING FROM LYING ON BACK TO SITTING ON SIDE OF FLAT BED: A LITTLE
HELP NEEDED FOR BATHING: A LOT
EATING MEALS: A LITTLE
DRESSING REGULAR LOWER BODY CLOTHING: A LOT
TOILETING: A LOT
TURNING FROM BACK TO SIDE WHILE IN FLAT BAD: A LITTLE
MOVING TO AND FROM BED TO CHAIR: A LITTLE
PERSONAL GROOMING: A LOT
MOBILITY SCORE: 15
SUGGESTED CMS G CODE MODIFIER DAILY ACTIVITY: CL
STANDING UP FROM CHAIR USING ARMS: A LOT
WALKING IN HOSPITAL ROOM: A LOT
SUGGESTED CMS G CODE MODIFIER MOBILITY: CK

## 2021-03-11 ASSESSMENT — PAIN DESCRIPTION - PAIN TYPE
TYPE: ACUTE PAIN

## 2021-03-11 ASSESSMENT — COPD QUESTIONNAIRES
DURING THE PAST 4 WEEKS HOW MUCH DID YOU FEEL SHORT OF BREATH: NONE/LITTLE OF THE TIME
DO YOU EVER COUGH UP ANY MUCUS OR PHLEGM?: NO/ONLY WITH OCCASIONAL COLDS OR INFECTIONS
HAVE YOU SMOKED AT LEAST 100 CIGARETTES IN YOUR ENTIRE LIFE: NO/DON'T KNOW
COPD SCREENING SCORE: 2

## 2021-03-11 ASSESSMENT — FIBROSIS 4 INDEX
FIB4 SCORE: 2.01
FIB4 SCORE: 2.61

## 2021-03-11 NOTE — PROGRESS NOTES
Critical Care Progress Note:     Date of Service: 3/11/2021  Primary Team: [facility] ICU Gold Team   Attending: Dr. Kip Mcfadden  Senior Resident: Jeremy Hussein D.O.    Chief Complaint:   Subdural Hematoma    Hospital Course:  Per initial consult note: 75 y.o. male with hx of cerebellar CVA in the past, seizure who presented to ED from [facility] after pt was found to have right parietal SDH, no midline shift 3/10. Pt had witnessed seizure at home by family. Pt was having a zoom convesration with family and family noted pt had some change in mental status with possible seizures. He does not take any anticoagulation. Pt was transferred to [facility] for further management of his ICH     At ED< SBP in 110-120s, HR in 70s, afebrile, on 2L NC, sat >96%. INR 1.03. Neurosurgery, Dr. Capps was consulted and no surgical intervention, recommend repeat CT head in am, frequent neuro check. Patient stated he's having trouble of articulating words.     Interval Events:  -Admitted overnight  -Neuro: Improved word finding, felt as if at baseline. However, EEG shows nonconvulsive status epilepticus and patient undergoing treatment. Neurosurgery stated that CT findings appeared stable and to discuss with Dr. Mendiola if further management needed. 24 hr EEG and management initiated  -Discussed with daughter about [facility]    Consultants/Specialty:  Neurosurgery- Dr. Mendiola    Review of Systems:    Review of Systems   Constitutional: Negative for fever and malaise/fatigue.   Respiratory: Negative for cough and shortness of breath.    Cardiovascular: Negative for chest pain, palpitations and leg swelling.   Gastrointestinal: Negative for abdominal pain, diarrhea, nausea and vomiting.   Musculoskeletal: Negative for back pain.   Neurological: Positive for sensory change, seizures and weakness. Negative for dizziness, tremors, focal weakness, loss of consciousness and headaches.   All other systems reviewed and are negative.      Objective:    Physical Exam:   Vitals:   Temp:  [36.1 °C (97 °F)-36.4 °C (97.6 °F)] 36.4 °C (97.6 °F)  Pulse:  [66-73] 68  Resp:  [13-24] 14  BP: (109-143)/(56-80) 143/67  SpO2:  [94 %-100 %] 95 %    Physical Exam  Vitals and nursing note reviewed.   Constitutional:       General: He is not in acute distress.     Appearance: He is not ill-appearing, toxic-appearing or diaphoretic.   HENT:      Head: Normocephalic and atraumatic.      Mouth/Throat:      Mouth: Mucous membranes are moist.   Cardiovascular:      Rate and Rhythm: Normal rate and regular rhythm.      Pulses: Normal pulses.      Heart sounds: Normal heart sounds. No murmur.   Pulmonary:      Effort: Pulmonary effort is normal. No respiratory distress.      Breath sounds: Normal breath sounds. No wheezing or rales.   Abdominal:      General: Bowel sounds are normal. There is no distension.      Palpations: Abdomen is soft.      Tenderness: There is no abdominal tenderness. There is no guarding.   Musculoskeletal:         General: No swelling or tenderness.      Right lower leg: No edema.      Left lower leg: No edema.   Skin:     General: Skin is warm.      Coloration: Skin is not jaundiced.   Neurological:      General: No focal deficit present.      Mental Status: He is alert and oriented to person, place, and time.      Cranial Nerves: No cranial nerve deficit.         Intake/Output Summary (Last 24 hours) at 3/11/2021 1415  Last data filed at 3/11/2021 1350  Gross per 24 hour   Intake 469.33 ml   Output 700 ml   Net -230.67 ml         Labs:   Recent Labs     03/11/21  0141   WBC 4.2*   RBC 5.07   HEMOGLOBIN 14.1   HEMATOCRIT 43.9   MCV 86.6   MCH 27.8   RDW 47.3   PLATELETCT 186   MPV 10.2   NEUTSPOLYS 56.10   LYMPHOCYTES 24.50   MONOCYTES 14.20*   EOSINOPHILS 4.30   BASOPHILS 0.70     Recent Labs     03/11/21  0141 03/11/21  0541   SODIUM 139 140   POTASSIUM 3.3*  --    CHLORIDE 105  --    CO2 24  --    GLUCOSE 103*  --    BUN 17  --      Recent Labs      03/11/21  0141   ALBUMIN 3.6   TBILIRUBIN 0.3   ALKPHOSPHAT 71   TOTPROTEIN 5.9*   ALTSGPT 13   ASTSGOT 18   CREATININE 1.00       Imaging:   CT-HEAD W/O   Final Result         1.  Mixed right parietal subdural fluid collection likely acute on chronic subdural hematoma.   2.  Nonspecific white matter changes, commonly associated with small vessel ischemic disease.  Associated mild cerebral atrophy is noted.   3.  Atherosclerosis.      OUTSIDE IMAGES-CT HEAD   Final Result          Assessment and Plan:  * Subdural hematoma, acute (HCC)  Assessment & Plan  Right minimal SDH w/o midline shift, stable per neurosurgery on repeat CT head  Pt is not on any anticoagulation.     q2 neurochecks  Keep SBP <150  Dr. Capps recommended discussing with Dr. Mendiola if questions    Seizure disorder, nonconvulsive, with status epilepticus (HCC)  Assessment & Plan  EEG noted status epilepticus  -patient appeared aware and was conversant during findings  -taking vimpat in the past but was weaned off and switched to keppra    Plan:  -24 hr EEG  -Ativan  -Increase Keppra  -Continue neurontin  -Continue IV divalproex  -seroquel held, per daughter said has been dealing with significant psychosis before using it    Seizures- (present on admission)  Assessment & Plan  Continue depakote and keppra  Check depakote level.       DVT: SCD's, bleeding risk  GI: None  Antibiotics: N/A  Code: Full    Dispo: Requires inpatient managmeent of [facility]

## 2021-03-11 NOTE — ED NOTES
Bedside report was given to receiving RN - Dariusz - had no questions or concerns. Pt placed on zoll and is much more responsive than on arrival. Pt sitting up and conversing with staff. All belongings sent with pt.

## 2021-03-11 NOTE — THERAPY
Contact Note    Patient Name: Rafael Mccoy  Age:  75 y.o., Sex:  male  Medical Record #: [id number]  Today's Date: 3/11/2021    consult received; continues with strict bedrest orders; will hold until bedrest removed/appropriate for PT eval/initiate dc planning

## 2021-03-11 NOTE — DISCHARGE PLANNING
[facility] Transitional Care Coordination     Referral from:  Dr. Borjas    [facility]    Potential Rehab Diagnosis: Seizure/ICH  Chart review indicates patient may have on going medical management and may have therapy needs to possibly meet inpatient rehab facility criteria with the goal of returning to community.    D/C support: TBD     Physiatry consultation pended per protocol.      Seizure/ICH.  W/U & TX pending.  Waiting on additional information to determine appropriateness for acute inpatient rehabilitation. Will continue to follow.     Thank you for the referral.

## 2021-03-11 NOTE — H&P
Internal Medicine Admitting History and Physical    Note Author: Pankaj Borjas M.D.       Name Rafael Mccoy     1945   Age/Sex 75 y.o. male   MRN [id number]   Code Status Full code     Chief Complaint:   Transfer with seizure episode secondary to ICH    HPI:  74 yo male patient with PMH of seizure disorder secondary to CVA on Keppra and Depakote, HTN, ? 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 Patient not on AC or Aspirin per chart. Patient presented to ED as a transfer from [facility] after found to be having rt cerebral 10 mm bleed. Patient had an episode of seizure earlier today per his family and he is more confused compared to baseline. Patient denies fever, chills, chest pain, sob or cough, no abdominal pain, no headache.     In the ED, vitally stable a febrile, Alert and oriented x 2 (self and place) dont remember having a seizure. CBC unremarkable, CMP with K 3.3. ECG normal sinus without acute ischemic changes. NSG consulted with recs to admit to ICU, Repeat CT head at 5 am, Neuro check every 2 hours.         Review of Systems   Constitutional: Negative for chills and fever.   HENT: Negative for hearing loss and tinnitus.    Respiratory: Negative for cough, hemoptysis, sputum production and shortness of breath.    Cardiovascular: Negative for chest pain, palpitations, orthopnea and leg swelling.   Gastrointestinal: Negative for abdominal pain, nausea and vomiting.   Genitourinary: Negative for dysuria and urgency.   Musculoskeletal: Negative for myalgias.   Neurological: Negative for speech change and headaches.   Psychiatric/Behavioral: Negative for depression.             Past Medical History (Chronic medical problem, known complications and current treatment)     seizure disorder secondary to CVA on Keppra and Depakote, HTN, ? Ataxia secondary to Cerebellar  stroke using assisted device, lives at [facility], Hx of DLD, Dementia, Legally blind for > 12 years per patient, Insomnia    Past Surgical History:  Past Surgical History:   Procedure Laterality Date   • CRANIOTOMY Right 4/17/2020    Procedure: CRANIOTOMY - SUBDURAL;  Surgeon: Anthony Mendiola M.D.;  Location: SURGERY [facility];  Service: Neurosurgery   • OTHER  04/2020    craniotomy after a fall,TBI   • CRANIOTOMY     • OTHER ORTHOPEDIC SURGERY      R foot fracture   • ROTATOR CUFF REPAIR Right    • TONSILLECTOMY AND ADENOIDECTOMY         Current Outpatient Medications:  Home Medications     Reviewed by Alyson Diaz PhT (Pharmacy Tech) on 03/11/21 at 0240  Med List Status: Complete   Medication Last Dose Status   acetaminophen (TYLENOL) 325 MG Tab Not Taking Active   ascorbic acid (VITAMIN C) 500 MG tablet 3/10/2021 Active   B Complex-Biotin-FA (SUPER B-COMPLEX) Cap 3/10/2021 Active   cyanocobalamin (VITAMIN B12) 500 MCG tablet 3/10/2021 Active   Divalproex Sodium (DEPAKOTE ER PO) 3/10/2021 Active   gabapentin (NEURONTIN) 300 MG Cap 3/10/2021 Active   haloperidol (HALDOL) 2 MG/ML Conc Not Taking Active   levETIRAcetam (KEPPRA) 500 MG Tab 3/10/2021 Active   meclizine (ANTIVERT) 12.5 MG Tab Not Taking Active   melatonin 5 mg Tab 3/10/2021 Active   QUEtiapine (SEROQUEL) 25 MG Tab Not Taking Active   quetiapine (SEROQUEL) 300 MG tablet 3/10/2021 Active   simvastatin (ZOCOR) 40 MG Tab 3/10/2021 Active   sodium chloride (SALT) 1 GM Tab Not Taking Active   vitamin D (VITAMIND D3) 1000 UNIT Tab 3/10/2021 Active                Medication Allergy/Sensitivities:  No Known Allergies      Family History (mandatory)   Family History   Problem Relation Age of Onset   • Other Mother         eye problems   • Cancer Neg Hx    • Heart Disease Neg Hx    • Stroke Neg Hx        Social History (mandatory)   Social History     Socioeconomic History   • Marital status:      Spouse name: Not on file   • Number  "of children: Not on file   • Years of education: Not on file   • Highest education level: Not on file   Occupational History   • Not on file   Tobacco Use   • Smoking status: Never Smoker   • Smokeless tobacco: Never Used   Substance and Sexual Activity   • Alcohol use: No   • Drug use: No   • Sexual activity: Not on file   Other Topics Concern   • Not on file   Social History Narrative    He lives alone, he was previously in the [employer] during the [place] Era     Social Determinants of Health     Financial Resource Strain:    • Difficulty of Paying Living Expenses:    Food Insecurity: No Food Insecurity   • Worried About Running Out of Food in the Last Year: Never true   • Ran Out of Food in the Last Year: Never true   Transportation Needs: No Transportation Needs   • Lack of Transportation (Medical): No   • Lack of Transportation (Non-Medical): No   Physical Activity:    • Days of Exercise per Week:    • Minutes of Exercise per Session:    Stress:    • Feeling of Stress :    Social Connections:    • Frequency of Communication with Friends and Family:    • Frequency of Social Gatherings with Friends and Family:    • Attends Scientologist Services:    • Active Member of Clubs or Organizations:    • Attends Club or Organization Meetings:    • Marital Status:    Intimate Partner Violence:    • Fear of Current or Ex-Partner:    • Emotionally Abused:    • Physically Abused:    • Sexually Abused:      Living situation: [facility]  PCP : Doroteo Babin M.D.    Physical Exam     Vitals:    03/11/21 0154 03/11/21 0157 03/11/21 0200 03/11/21 0215   BP:  121/60 121/57 115/56   Pulse:  73 72 73   Resp:  (!) 21 15 13   Temp:  36.2 °C (97.1 °F)     TempSrc:  Tympanic     SpO2:  97% 96% 96%   Weight: 81.6 kg (180 lb)      Height: 1.753 m (5' 9\")        Body mass index is 26.58 kg/m².  O2 therapy: Pulse Oximetry: 96 %, O2 (LPM): 2, O2 Delivery Device: Nasal Cannula    Physical Exam   Constitutional:   Lying comfortable in " bed   HENT:   Head: Normocephalic.   Eyes:   Legally blind    Cardiovascular: Normal rate and regular rhythm.   Pulmonary/Chest: Effort normal. No respiratory distress. He has no wheezes.   Abdominal: Soft. Bowel sounds are normal. He exhibits no distension. There is no abdominal tenderness.   Musculoskeletal:      Cervical back: Neck supple.   Neurological:   Alert and oriented x2, Moves all 4 limbs in bed. Intact touch sensations both upper and lower extremities.    Skin: Skin is warm.         Data Review       Old Records Request:   Completed  Current Records review/summary: Completed    Lab Data Review:  Recent Results (from the past 24 hour(s))   EKG    Collection Time: 21  1:40 AM   Result Value Ref Range    Report       [facility] Emergency Dept.    Test Date:  2021  Pt Name:    STUART HAYS                 Department: ER  MRN:        [id number]                      Room:       [facility]  Gender:     Male                         Technician:  :        1945                   Requested By:ER TRIAGE PROTOCOL  Order #:    [id number]                    Reading MD: ELLA ARGUETA MD    Measurements  Intervals                                Axis  Rate:       71                           P:          58  NE:         181                          QRS:        -37  QRSD:       108                          T:          63  QT:         418  QTc:        456    Interpretive Statements  Twelve-lead EKG shows a normal sinus rhythm with a ventricular to 71, QRS  shows  left axis deviation, no ST segment elevation or depression, normal T waves.  Electronically Signed On 3- 1:56:01 PST by ELLA ARGUETA MD     CBC WITH DIFFERENTIAL    Collection Time: 21  1:41 AM   Result Value Ref Range    WBC 4.2 (L) 4.8 - 10.8 K/uL    RBC 5.07 4.70 - 6.10 M/uL    Hemoglobin 14.1 14.0 - 18.0 g/dL    Hematocrit 43.9 42.0 - 52.0 %    MCV 86.6 81.4 - 97.8 fL    MCH 27.8 27.0 - 33.0 pg    MCHC 32.1  (L) 33.7 - 35.3 g/dL    RDW 47.3 35.9 - 50.0 fL    Platelet Count 186 164 - 446 K/uL    MPV 10.2 9.0 - 12.9 fL    Neutrophils-Polys 56.10 44.00 - 72.00 %    Lymphocytes 24.50 22.00 - 41.00 %    Monocytes 14.20 (H) 0.00 - 13.40 %    Eosinophils 4.30 0.00 - 6.90 %    Basophils 0.70 0.00 - 1.80 %    Immature Granulocytes 0.20 0.00 - 0.90 %    Nucleated RBC 0.00 /100 WBC    Neutrophils (Absolute) 2.33 1.82 - 7.42 K/uL    Lymphs (Absolute) 1.02 1.00 - 4.80 K/uL    Monos (Absolute) 0.59 0.00 - 0.85 K/uL    Eos (Absolute) 0.18 0.00 - 0.51 K/uL    Baso (Absolute) 0.03 0.00 - 0.12 K/uL    Immature Granulocytes (abs) 0.01 0.00 - 0.11 K/uL    NRBC (Absolute) 0.00 K/uL   Comp Metabolic Panel    Collection Time: 03/11/21  1:41 AM   Result Value Ref Range    Sodium 139 135 - 145 mmol/L    Potassium 3.3 (L) 3.6 - 5.5 mmol/L    Chloride 105 96 - 112 mmol/L    Co2 24 20 - 33 mmol/L    Anion Gap 10.0 7.0 - 16.0    Glucose 103 (H) 65 - 99 mg/dL    Bun 17 8 - 22 mg/dL    Creatinine 1.00 0.50 - 1.40 mg/dL    Calcium 8.8 8.5 - 10.5 mg/dL    AST(SGOT) 18 12 - 45 U/L    ALT(SGPT) 13 2 - 50 U/L    Alkaline Phosphatase 71 30 - 99 U/L    Total Bilirubin 0.3 0.1 - 1.5 mg/dL    Albumin 3.6 3.2 - 4.9 g/dL    Total Protein 5.9 (L) 6.0 - 8.2 g/dL    Globulin 2.3 1.9 - 3.5 g/dL    A-G Ratio 1.6 g/dL   Prothrombin Time    Collection Time: 03/11/21  1:41 AM   Result Value Ref Range    PT 13.8 12.0 - 14.6 sec    INR 1.03 0.87 - 1.13   PTT    Collection Time: 03/11/21  1:41 AM   Result Value Ref Range    APTT 29.6 24.7 - 36.0 sec   ESTIMATED GFR    Collection Time: 03/11/21  1:41 AM   Result Value Ref Range    GFR If African American >60 >60 mL/min/1.73 m 2    GFR If Non African American >60 >60 mL/min/1.73 m 2   SARS-CoV-2 PCR (24 hour In-House): Collect NP swab in VTM    Collection Time: 03/11/21  2:18 AM    Specimen: Nasopharyngeal; Respirate   Result Value Ref Range    SARS-CoV-2 Source NP Swab        Imaging/Procedures Review:    Independant  Imaging Review: Completed  OUTSIDE IMAGES-CT HEAD   Final Result      CT-HEAD W/O    (Results Pending)        EKG:   Sinus rhythm, no acute ischemic changes noticed         Assessment/Plan     ICH (intracerebral hemorrhage) (HCC)  Assessment & Plan  - Patient admitted with seizure episode likely secondary to non traumatic ICH per [facility] imaging with rt sided 10 mm ICH. Patient not on AC as outpatient.   - NSG consulted with recs to admit patient to ICU for close monitoring and Neuro check every 2 hours, repeat CT head at 5 am today.     - Admit patient to ICU  - Neuro check every 2 hours   - Keep patient NPO for possible surgical intervention (Per other facility physician, code status discussed with family and patient, would like to keep him Full code considering possible Neurosurgical intervention however when I talked to patient he wants to be DNR/DNI, so will keep him Full code for now given his underlying dementia and confusion to have further discussion with family in the morning).   - PT/OT/Fall/seizure precautions  - Speech evaluation  - NSG consult.   - Coagulation profile.     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   - Lives at [facility]   - On Melatonin for  insomnia   - Not sure what baseline orientation is, per other facility notes it seems that patient oriented to self and place only, when I saw him he is alert and oriented to self and place.     Legally blind- (present on admission)  Assessment & Plan  - Patient legally blind for more than 12 years per patient  - Lives at [facility]    Essential hypertension- (present on admission)  Assessment & Plan  - Not on home antihypertensive meds  - Prn Labetalol to keep SBP < 150   - NSG following for further recs on target SBP target      Anticipated Hospital stay:  >2 midnights        Quality Measures  Quality-Core Measures  PCP: Doroteo Babin M.D.

## 2021-03-11 NOTE — THERAPY
Speech Language Pathology   Clinical Swallow Evaluation     Patient Name: Rafael Mccoy  AGE:  75 y.o., SEX:  male  Medical Record #: [id number]  Today's Date: 3/11/2021     Precautions: (P) Swallow Precautions ( See Comments)  Comments: (P) Pt is legally blind    Assessment    Pt is 75 y.o. male who presented to ED from [facility] after pt was found to have right parietal SDH. Pt had witnessed seizure at assisted living facility by family via Zoom call.  PMHx includes cerebellar CVA, seizure disorder, R crani in 2020 to prevent seizures, HTN, Ataxia 2/2 to Cerebellar stroke, Dementia, Legally blind for > 12 years per patient, insomnia.      Pt was AAOx4, sitting up in bed and eager to participate.  Pt reports no difficulty swallowing currently and no history of dysphagia with previous CVA.  No gross deficits were noted in oral exam, and both voice and cough were strong.  Pt consumed PO trials of ice chips, thins by tsp, cup and serial cup sip, puree, soft bite sized solids and regular solids without s/sx of aspiration.  Laryngeal elevation palpated as complete, mastication was effective and swallow trigger was timely.  Pt needs assistance for set-up as he is blind, however he can feed himself without difficulty once oriented to tray set up.  Recommend regular diet with thins, with standby assistance.     Plan  Regular diet with thins, with assistance for set up and monitoring    Recommend Speech Therapy 2 times per week until therapy goals are met for the following treatments:  Dysphagia Training and Patient / Family / Caregiver Education.    Discharge Recommendations: Anticipate that the patient will have no further speech therapy needs after discharge from the hospital       Objective     03/11/21 0835   Prior Living Situation   Prior Services Other (Comments)  (Assisted Living)   Housing / Facility Assisted Living Residence   Lives with - Patient's Self Care Capacity Attendant / Paid Care Giver   Prior Level  Of Function   Communication Within Functional Limits   Swallow Within Functional Limits   Dentition Intact   Dentures None   Hearing Within Functional Limits for Evaluation   Hearing Aid None   Vision   (Pt is legally blind)   Patient's Primary Language English   Oral Motor Eval    Is Patient Able to Complete Oral Motor Eval Yes, Within Normal Limits   Laryngeal Function   Voice Quality Within Functional Limits   Volutional Cough Within Functional Limits   Excursion Upon Swallow Complete   Oral Food Presentation   Ice Chips Within Functional Limits   Single Swallow Thin (0) Within Functional Limits   Serial Swallow Thin (0) Within Functional Limits   Pureed (4) Within Functional Limits   Soft & Bite-Sized (6) - (Dysphagia III) Within Functional Limits   Regular (7) Within Functional Limits   Self Feeding Needs Assistance  (for set up - as he is blind)   Dysphagia Strategies / Recommendations   Strategies / Interventions Recommended (Yes / No) Yes   Compensatory Strategies Monitor During Meals;Assistance Needed for Meal Tray Set-up;Head of Bed 90 Degrees During Eating / Drinking;Single Sips / Bites   Diet / Liquid Recommendation Thin (0);Regular (7)   Medication Administration  Whole with Liquid Wash   Therapy Interventions Dysphagia Therapy By Speech Language Pathologist

## 2021-03-11 NOTE — ED PROVIDER NOTES
ED Provider Note    CHIEF COMPLAINT  No chief complaint on file.  As a transfer with an intracranial hemorrhage.    HPI  Rafael Mccoy is a 75 y.o. male who presents as a transfer after the patient was found to have an intracranial hemorrhage.  According to EMS as well as the notes from the transferring facility the patient was residing at [facility] and they have video imaging and family noted a possible brief seizure.  The patient does have a known history of seizures secondary to a cerebrovascular accident.  The patient does not take any anticoagulants.  On my exam the patient does appear to be confused.  He does not remember the events of the evening.  He states he currently does not have any pain.  The patient is alert to his name and age but does not know where he is at.  No other history could be obtained secondary to his altered state.    REVIEW OF SYSTEMS  See HPI for further details. All other systems are negative.     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, s/p bilateral laser surgeries   • Head injury    • Hypertension    • Seizure (HCC)    • Stroke (HCC)        FAMILY HISTORY  [unfilled]    SOCIAL HISTORY  Social History     Socioeconomic History   • Marital status:      Spouse name: Not on file   • Number of children: Not on file   • Years of education: Not on file   • Highest education level: Not on file   Occupational History   • Not on file   Tobacco Use   • Smoking status: Never Smoker   • Smokeless tobacco: Never Used   Substance and Sexual Activity   • Alcohol use: No   • Drug use: No   • Sexual activity: Not on file   Other Topics Concern   • Not on file   Social History Narrative    He lives alone, he was previously in the [employer] during the [place] Era     Social Determinants of Health     Financial Resource Strain:    • Difficulty of Paying Living Expenses:    Food Insecurity: No Food  Insecurity   • Worried About Running Out of Food in the Last Year: Never true   • Ran Out of Food in the Last Year: Never true   Transportation Needs: No Transportation Needs   • Lack of Transportation (Medical): No   • Lack of Transportation (Non-Medical): No   Physical Activity:    • Days of Exercise per Week:    • Minutes of Exercise per Session:    Stress:    • Feeling of Stress :    Social Connections:    • Frequency of Communication with Friends and Family:    • Frequency of Social Gatherings with Friends and Family:    • Attends Moravian Services:    • Active Member of Clubs or Organizations:    • Attends Club or Organization Meetings:    • Marital Status:    Intimate Partner Violence:    • Fear of Current or Ex-Partner:    • Emotionally Abused:    • Physically Abused:    • Sexually Abused:        SURGICAL HISTORY  Past Surgical History:   Procedure Laterality Date   • CRANIOTOMY Right 4/17/2020    Procedure: CRANIOTOMY - SUBDURAL;  Surgeon: Anthony Mendiola M.D.;  Location: SURGERY [facility];  Service: Neurosurgery   • OTHER  04/2020    craniotomy after a fall,TBI   • CRANIOTOMY     • OTHER ORTHOPEDIC SURGERY      R foot fracture   • ROTATOR CUFF REPAIR Right    • TONSILLECTOMY AND ADENOIDECTOMY         CURRENT MEDICATIONS  Home Medications    **Home medications have not yet been reviewed for this encounter**         ALLERGIES  No Known Allergies    PHYSICAL EXAM  VITAL SIGNS: There were no vitals taken for this visit.      Constitutional: Chronically ill in appearance  HENT: Normocephalic, Atraumatic, Bilateral external ears normal, Oropharynx moist, No oral exudates, Nose normal.   Eyes: PERRLA, patient would not follow commands to test extraocular motor function  Neck: Normal range of motion, No tenderness, Supple, No stridor.   Lymphatic: No lymphadenopathy noted.   Cardiovascular: Normal heart rate, Normal rhythm, No murmurs, No rubs, No gallops.   Thorax & Lungs: Normal breath sounds, No  respiratory distress, No wheezing, No chest tenderness.   Abdomen: Bowel sounds normal, Soft, No tenderness, No masses, No pulsatile masses.   Skin: Warm, Dry, No erythema, No rash.   Back: No tenderness, No CVA tenderness.   Extremities: Intact distal pulses, No edema, No tenderness, No cyanosis, No clubbing.    Neurologic: Patient is alert and oriented x2, he does move all 4 extremities but has global weakness.  I cannot appreciate any focal weakness although the limited exam due to his altered state.  Psychiatric: Affect normal, Judgment normal, Mood normal.       Results for orders placed or performed during the hospital encounter of 21   EKG   Result Value Ref Range    Report       [facility] Emergency Dept.    Test Date:  2021  Pt Name:    STUART HAYS                 Department: ER  MRN:        [id number]                      Room:        06  Gender:     Male                         Technician:  :        1945                   Requested By:ER TRIAGE PROTOCOL  Order #:    [id number]                    Reading MD: ELLA ARGUETA MD    Measurements  Intervals                                Axis  Rate:       71                           P:          58  HI:         181                          QRS:        -37  QRSD:       108                          T:          63  QT:         418  QTc:        456    Interpretive Statements  Twelve-lead EKG shows a normal sinus rhythm with a ventricular to 71, QRS  shows  left axis deviation, no ST segment elevation or depression, normal T waves.  Electronically Signed On 3- 1:56:01 PST by ELLA ARGUETA MD             COURSE & MEDICAL DECISION MAKING  Pertinent Labs & Imaging studies reviewed. (See chart for details)  This a 75-year-old male who presents the emergency department as a transfer after he was found to have an intracranial hemorrhage.  The patient is confused at this time not clear what his baseline status is.  I  did speak with the transferring physician he states the patient was a full code and therefore we will have neurosurgery evaluate the films.  I suspect he will require repeat imaging in the morning.  A scan was done at 10:50 PM.  I did not see any coagulation parameters performed and therefore this will be ordered.  Chest x-ray was performed does not show any acute cardiopulmonary process.  The patient is currently on Keppra and Depakote.  The patient's metabolic panel does not show any significant metabolic derangements.    I spoke with neurosurgery and they recommend admission to the ICU with every 2 hours neuro checks and repeat CT scan at 5 AM.  Coagulation parameters are currently pending.  Otherwise work-up has been reviewed from the outlying facility.    FINAL IMPRESSION  1.  Seizure   2.  Intracranial hemorrhage  3.  Critical care time 30 min    Disposition  The patient will be admitted in guarded condition      Electronically signed by: Michael Felder M.D., 3/11/2021 1:46 AM

## 2021-03-11 NOTE — PROCEDURES
CONTINUOUS VIDEO ELECTROENCEPHALOGRAM REPORT      Referring provider: Dr. Mcfadden.     DOS:   3/11/2021 (recording for 17 hours and 45 minutes).     INDICATION:  Rafael Mccoy 75 y.o. male presenting with seizures.     CURRENT ANTIEPILEPTIC REGIMEN: Levetiracetam increased to 1000 mg bid, and Lacosamide 100 mg bid added during the study. Lorazepam prn given during the study.      TECHNIQUE: A 30-channel, continuous video electroencephalogram (VEEG) was performed in accordance with the international 10-20 system. This digital study was reviewed in bipolar and referential montages. The recording examined the patient during wakeful, drowsy and sleep states.     The EEG was set up and taken down by a Neurodiagnostic technologist who performed education to patient and staff.     A minimum but not limited to 23 electrodes and 23 channel recording was setup and performed by Neurodiagnostic technologist.    Impedances, electrode integrity, and technical impressions were documented a minimum of every 2-24 hour period by a Neurodiagnostic Technologist and reviewed by Interpreting physician.     DESCRIPTION OF THE RECORD:  During the awake state, background shows symmetrical 8 Hz alpha activity posteriorly with amplitude of 70 mV.  There was reactivity with eye opening/closure.  Normal anterior-posterior gradient was noted with faster beta frequencies seen anteriorly.  During drowsiness, high-amplitude delta frequencies were seen.    During the sleep state, background shows diffuse high-amplitude 4-5 Hz delta activity.  Symmetrical high-amplitude sleep spindles and vertex sharp activities were seen in the central leads.    ACTIVATION PROCEDURES:   Intermittent Photic stimulation was performed in a stepwise fashion from 1 to 30 Hz, but failed to produce any background changes.     ICTAL AND/OR INTERICTAL FINDINGS:   Intermittent slowing and frequent sharps noted in the right frontotemporal region. Frequent runs of frontal  intermittent rhythmic delta activity noted, most pronounced on the right frontal region, some lasting over ten seconds in duration and suggestive of focal seizures in the right hemisphere. Worsening of the study noted with generalized rhythmic sharp activity lasting for several minutes in duration, suggestive of an episode of non convulsive status epilepticus. Significant improvement of the eeg after patient receiving Lorazepam and further adjustments in anti-seizure medications, and although the study remains abnormal with rare and brief runs of right FIRDA, no further seizures were captured overnight.     EVENT(S):  A seizure was captured during the study, patient confused, sitting up in bed, breathing hard, and HR noted at 143.     EKG: sampling review of EKG recording demonstrated sinus rhythm.       INTERPRETATION:   This is an abnormal continuous video electroencephalogram recording in the awake, drowsy and sleep state. Intermittent slowing and frequent sharps noted in the right frontotemporal region. Frequent runs of frontal intermittent rhythmic delta activity (FIRDA) noted, most pronounced at times on the right frontal region, some lasting over ten seconds in duration and suggestive of focal seizures in the right hemisphere. Then, worsening of the study noted with appearance of generalized rhythmic sharp activity lasting for several minutes in duration, suggestive of an episode of non convulsive status epilepticus. Significant improvement of the eeg after patient receiving Lorazepam, and further adjustments in anti-seizure medications, and although the study remains abnormal with rare and brief runs of right FIRDA, no further seizures were captured overnight. The patient remains at risk for seizures. The findings suggest underlying areas of increased cortical irritability and structural abnormality.  Clinical and radiological correlation is recommended.    Updates provided to Dr. Mcfadden.       Michele An,  MD   Epilepsy and Neurodiagnostics.   Clinical  of Neurology [facility].   Diplomate in Neurology, Epilepsy, and Electrodiagnostic Medicine.   Office: [phone]  Fax: [phone]

## 2021-03-11 NOTE — ASSESSMENT & PLAN NOTE
Reported history of cognitive decline consistent with dementia  Continue to monitor  On seroquel prn (previously on up to 300mg)    Hospice care

## 2021-03-11 NOTE — PROGRESS NOTES
CT's reviewed. Patient had craniotomy with Dr Mendiola 2020, pre event seizures. CT shows stable small residual mixed density fluid collection under the bone flap w/o any mass effect. Seizures per neurology and hospital staff, any further questions can be directed to Dr Mendiola and his office, call with questions.

## 2021-03-11 NOTE — ASSESSMENT & PLAN NOTE
Patient is known to have seizure disorder.  Continue Vimpat, Keppra, Depakote  Appreciate neurology consult. Due to subtherapeutic depakote level was increased to 750mg BID

## 2021-03-11 NOTE — ED NOTES
Med Rec complete per Pt's daughter via phone.   Allergies reviewed  Pt's daughter states Pt has had no oral ABX in the last 14 day.

## 2021-03-11 NOTE — ASSESSMENT & PLAN NOTE
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 Patient not on AC as outpatient.   - NSG consulted with recs to admit patient to ICU for close monitoring and Neuro check every 2 hours, repeat CT head at 5 am today.     - Admit patient to ICU  - Neuro check every 2 hours   - Keep patient NPO for possible surgical intervention (Per other facility physician, code status discussed with family and patient, would like to keep him Full code considering possible Neurosurgical intervention however when I talked to patient he wants to be DNR/DNI, so will keep him Full code for now given his underlying dementia and confusion to have further discussion with family in the morning).   - PT/OT/Fall/seizure precautions  - Speech evaluation  - NSG consult.   - Coagulation profile.

## 2021-03-11 NOTE — ASSESSMENT & PLAN NOTE
Nonconvulsive status epilepticus  -NO sz overnight - off eeg  -Keppra dose adjusted  -Vimpat increased  -Continue neurontin  -Continue IV divalproex qhs

## 2021-03-11 NOTE — ASSESSMENT & PLAN NOTE
Reported history of, though had not been on medications prior to admit as per chart review  BP has trending up. Will initiate amlodipine 5mg for BP control    4/5: Patient's blood pressure currently controlled. Continue same management. Monitor and make changes accordingly.

## 2021-03-11 NOTE — ASSESSMENT & PLAN NOTE
Right minimal SDH w/o midline shift, stable per neurosurgery on repeat CT head  Pt is not on any anticoagulation.     q2 neurochecks  Keep SBP <150  NSG following

## 2021-03-11 NOTE — PROGRESS NOTES
Assumed care from overnight team.    74 y/o with prior cerebellar CVA, hx of sz and SDH admitted as a tx from OSH for small right parietal SDH and sz.    Was doing somewhat better this am and speech had improved.     Called to bedside for hyperventilation, rigidity in the LUE however oddly was able to follow commands and speak. On EEG. Some concern for potential NCSE thus benzo challenge administered. 2mg lorazepam and EEG appeared to somewhat normalize. Called by epileptologist and felt to be in NCSE. Another 2mg of lorazepam administered and loaded with Keppra.    Exam:  No acute distress  Sinus tach  Protecting airway  Moves all 4 ext.    //SDH  Minimal No shift  Q1h neuro checks  NSGY following  SBP < 150mmhg    //NCSE  cEEG  Loaded with BDZ  Loaded with Keppra and adjusted dose  Cont VPA - VPA level in the am  If further need can adjust dose of VPA or add additional agent    I have assessed and reassessed his respiratory status, blood pressure, hemodynamics, cardiovascular status and his  neurologic status.  Multiple reviews of his EEG and time spent discussion case with epileptologist. He is at increased risk for worsening respiratory cardiovascular and Central nervous system dysfunction.      Critical care time = 70 minutes. No time overlap and excludes procedures.

## 2021-03-11 NOTE — CONSULTS
Critical Care Consultation    Date of consult: 3/11/2021    Referring Physician  Kaden Sosa M.D.    Reason for Consultation  ICH    History of Presenting Illness  75 y.o. male with hx of cerebellar CVA in the past, seizure who presented to ED from [facility] after pt was found to have right parietal SDH, no midline shift. Pt had witnessed seizure at home by family. Pt was having a zoom convesration with family and family noted pt had some change in mental status with possible seizures. He does not take any anticoagulation. Pt was transferred to [facility] for further management of his ICH    At ED< SBP in 110-120s, HR in 70s, afebrile, on 2L NC, sat >96%. INR 1.03. Neurosurgery, Dr. Capps was consulted and no surgical intervention, recommend repeat CT head in am, frequent neuro check. Patient stated he's having trouble of articulating words.     Code Status  Full Code    Review of Systems  Review of Systems   Constitutional: Negative for fever and malaise/fatigue.   Respiratory: Negative for cough and shortness of breath.    Cardiovascular: Negative for chest pain, palpitations and leg swelling.   Gastrointestinal: Negative for abdominal pain, diarrhea, nausea and vomiting.   Musculoskeletal: Negative for back pain.   Neurological: Positive for sensory change, seizures and weakness. Negative for dizziness, tremors, focal weakness, loss of consciousness and headaches.   All other systems reviewed and are negative.      Past Medical History   has a past medical history of Blind, Head injury, Hypertension, Seizure (HCC), and Stroke (HCC).    Surgical History   has a past surgical history that includes other orthopedic surgery; rotator cuff repair (Right); tonsillectomy and adenoidectomy; craniotomy (Right, 4/17/2020); craniotomy; and other (04/2020).    Family History  family history includes Other in his mother.    Social History   reports that he has never smoked. He has never used smokeless tobacco. He reports  that he does not drink alcohol and does not use drugs.    Medications  Home Medications     Reviewed by Zoltan Franco (Pharmacy Tech) on 03/11/21 at 0240  Med List Status: Complete   Medication Last Dose Status   acetaminophen (TYLENOL) 325 MG Tab Not Taking Active   ascorbic acid (VITAMIN C) 500 MG tablet 3/10/2021 Active   B Complex-Biotin-FA (SUPER B-COMPLEX) Cap 3/10/2021 Active   cyanocobalamin (VITAMIN B12) 500 MCG tablet 3/10/2021 Active   Divalproex Sodium (DEPAKOTE ER PO) 3/10/2021 Active   gabapentin (NEURONTIN) 300 MG Cap 3/10/2021 Active   haloperidol (HALDOL) 2 MG/ML Conc Not Taking Active   levETIRAcetam (KEPPRA) 500 MG Tab 3/10/2021 Active   meclizine (ANTIVERT) 12.5 MG Tab Not Taking Active   melatonin 5 mg Tab 3/10/2021 Active   QUEtiapine (SEROQUEL) 25 MG Tab Not Taking Active   quetiapine (SEROQUEL) 300 MG tablet 3/10/2021 Active   simvastatin (ZOCOR) 40 MG Tab 3/10/2021 Active   sodium chloride (SALT) 1 GM Tab Not Taking Active   vitamin D (VITAMIND D3) 1000 UNIT Tab 3/10/2021 Active              Current Facility-Administered Medications   Medication Dose Route Frequency Provider Last Rate Last Admin   • senna-docusate (PERICOLACE or SENOKOT S) 8.6-50 MG per tablet 2 tablet  2 tablet Oral BID Pankaj Borjas M.D.        And   • polyethylene glycol/lytes (MIRALAX) PACKET 1 Packet  1 Packet Oral QDAY PRN Pankaj Borjas M.D.        And   • magnesium hydroxide (MILK OF MAGNESIA) suspension 30 mL  30 mL Oral QDAY PRSHERON Borjas M.D.        And   • bisacodyl (DULCOLAX) suppository 10 mg  10 mg Rectal QDAY PRN Pankaj Borjas M.D.       • labetalol (NORMODYNE/TRANDATE) injection 10 mg  10 mg Intravenous Q4HRS PRN Pankaj Borjas M.D.       • divalproex ER (DEPAKOTE ER) tablet 500 mg  500 mg Oral QHS Kaden M Cannone, M.D.       • gabapentin (NEURONTIN) capsule 300 mg  300 mg Oral BID Kaden Sosa M.D.       • levETIRAcetam (KEPPRA) tablet 500 mg  500 mg  Oral BID Kaden Sosa M.D.       • QUEtiapine (Seroquel) tablet 150 mg  150 mg Oral Q EVENING Kaden Sosa M.D.       • simvastatin (ZOCOR) tablet 40 mg  40 mg Oral QHS Kaden Sosa M.D.       • Respiratory Therapy Consult   Nebulization Continuous RT Kaden Sosa M.D.       • NS infusion   Intravenous Continuous Kaden Sosa M.D.       • LORazepam (ATIVAN) injection 2 mg  2 mg Intravenous Q5 MIN PRN Kaden Sosa M.D.       • acetaminophen (Tylenol) tablet 650 mg  650 mg Oral Q4HRS PRN Kaden Sosa M.D.        Or   • acetaminophen (TYLENOL) suppository 650 mg  650 mg Rectal Q4HRS PRN Kaden Sosa M.D.       • ondansetron (ZOFRAN ODT) dispertab 4 mg  4 mg Oral Q4HRS PRN Kaden Sosa M.D.        Or   • ondansetron (ZOFRAN) syringe/vial injection 4 mg  4 mg Intravenous Q4HRS PRN Kaden Sosa M.D.       • MD Alert...ICU Electrolyte Replacement per Pharmacy   Other PHARMACY TO DOSE Kaden Sosa M.D.       • labetalol (NORMODYNE/TRANDATE) injection 10 mg  10 mg Intravenous Q4HRS PRN Kaden Sosa M.D.       • hydrALAZINE (APRESOLINE) injection 10 mg  10 mg Intravenous Q4HRS PRN Kaden Sosa M.D.       • enalaprilat (VASOTEC) injection 1.25 mg  1.25 mg Intravenous Q6HRS PRN Kaden Sosa M.D.       • insulin regular (HumuLIN R,NovoLIN R) injection  1-6 Units Subcutaneous Q6HRS Kaden Sosa M.D.        And   • glucose 4 g chewable tablet 16 g  16 g Oral Q15 MIN PRN Kaden Sosa M.D.        And   • dextrose 50% (D50W) injection 50 mL  50 mL Intravenous Q15 MIN PRN Kaden Sosa M.D.         Current Outpatient Medications   Medication Sig Dispense Refill   • cyanocobalamin (VITAMIN B12) 500 MCG tablet Take 500 mcg by mouth every day.     • ascorbic acid (VITAMIN C) 500 MG tablet Take 1,000 mg by mouth every day.     • vitamin D (VITAMIND D3) 1000 UNIT Tab Take 2,000 Units by mouth every day.     • quetiapine (SEROQUEL) 300 MG tablet Take 0.5-1  Tabs by mouth every day. Patient taking 150mg at every bedtime (Patient taking differently: Take 150 mg by mouth every evening. Patient taking 150mg at every bedtime) 60 Tab 2   • QUEtiapine (SEROQUEL) 25 MG Tab Use 1-2 tabs in addition to 150mg tab as instructed by MD (Patient not taking: Reported on 3/11/2021) 60 Tab 1   • haloperidol (HALDOL) 2 MG/ML Conc Take 1-2 mL by mouth 4 times a day as needed. (Patient not taking: Reported on 3/11/2021) 120 mL 1   • levETIRAcetam (KEPPRA) 500 MG Tab Take 500 mg by mouth 2 times a day.     • B Complex-Biotin-FA (SUPER B-COMPLEX) Cap Take 1 tablet by mouth every day at 6 PM.     • Divalproex Sodium (DEPAKOTE ER PO) Take 500 mg by mouth every bedtime.     • melatonin 5 mg Tab Take 5 mg by mouth every bedtime. take 1 tablet by mouth daily at 2100     • simvastatin (ZOCOR) 40 MG Tab Take 40 mg by mouth every bedtime.     • meclizine (ANTIVERT) 12.5 MG Tab Take 1 Tab by mouth 2 times a day as needed for Dizziness or Vertigo. (Patient not taking: Reported on 3/11/2021) 30 Tab 0   • sodium chloride (SALT) 1 GM Tab Take 1 Tab by mouth every day. (Patient not taking: Reported on 3/11/2021) 30 Tab 0   • gabapentin (NEURONTIN) 300 MG Cap Take 1 Cap by mouth 3 times a day. (Patient taking differently: Take 1 Cap by mouth 2 Times a Day.) 90 Cap    • acetaminophen (TYLENOL) 325 MG Tab Take 2 Tabs by mouth every 6 hours as needed. (Patient not taking: Reported on 3/11/2021) 30 Tab 0       Allergies  No Known Allergies    Vital Signs last 24 hours  Temp:  [36.2 °C (97.1 °F)] 36.2 °C (97.1 °F)  Pulse:  [72-73] 73  Resp:  [13-21] 13  BP: (115-129)/(56-63) 115/56  SpO2:  [96 %-97 %] 96 %    Physical Exam  Physical Exam  Vitals and nursing note reviewed.   Constitutional:       General: He is not in acute distress.     Appearance: He is not ill-appearing, toxic-appearing or diaphoretic.   HENT:      Head: Normocephalic and atraumatic.      Mouth/Throat:      Mouth: Mucous membranes are moist.    Cardiovascular:      Rate and Rhythm: Normal rate and regular rhythm.      Pulses: Normal pulses.      Heart sounds: Normal heart sounds. No murmur.   Pulmonary:      Effort: Pulmonary effort is normal. No respiratory distress.      Breath sounds: Normal breath sounds. No wheezing or rales.   Abdominal:      General: Bowel sounds are normal. There is no distension.      Palpations: Abdomen is soft.      Tenderness: There is no abdominal tenderness. There is no guarding.   Musculoskeletal:         General: No swelling or tenderness.      Right lower leg: No edema.      Left lower leg: No edema.   Skin:     General: Skin is warm.      Coloration: Skin is not jaundiced.   Neurological:      General: No focal deficit present.      Mental Status: He is alert and oriented to person, place, and time.      Cranial Nerves: No cranial nerve deficit.         Fluids  No intake or output data in the 24 hours ending 21 0335    Laboratory  Recent Results (from the past 48 hour(s))   EKG    Collection Time: 21  1:40 AM   Result Value Ref Range    Report       [facility]    Test Date:  2021  Pt Name:    STUART HAYS                 Department: ER  MRN:        [id number]                      Room:       [facility]  Gender:     Male                         Technician:  :        1945                   Requested By:ER TRIAGE PROTOCOL  Order #:    [id number]                    Reading MD: ELLA ARGUETA MD    Measurements  Intervals                                Axis  Rate:       71                           P:          58  GA:         181                          QRS:        -37  QRSD:       108                          T:          63  QT:         418  QTc:        456    Interpretive Statements  Twelve-lead EKG shows a normal sinus rhythm with a ventricular to 71, QRS  shows  left axis deviation, no ST segment elevation or depression, normal T waves.  Electronically Signed On  3- 1:56:01 PST by ELLA ARGUETA MD     CBC WITH DIFFERENTIAL    Collection Time: 03/11/21  1:41 AM   Result Value Ref Range    WBC 4.2 (L) 4.8 - 10.8 K/uL    RBC 5.07 4.70 - 6.10 M/uL    Hemoglobin 14.1 14.0 - 18.0 g/dL    Hematocrit 43.9 42.0 - 52.0 %    MCV 86.6 81.4 - 97.8 fL    MCH 27.8 27.0 - 33.0 pg    MCHC 32.1 (L) 33.7 - 35.3 g/dL    RDW 47.3 35.9 - 50.0 fL    Platelet Count 186 164 - 446 K/uL    MPV 10.2 9.0 - 12.9 fL    Neutrophils-Polys 56.10 44.00 - 72.00 %    Lymphocytes 24.50 22.00 - 41.00 %    Monocytes 14.20 (H) 0.00 - 13.40 %    Eosinophils 4.30 0.00 - 6.90 %    Basophils 0.70 0.00 - 1.80 %    Immature Granulocytes 0.20 0.00 - 0.90 %    Nucleated RBC 0.00 /100 WBC    Neutrophils (Absolute) 2.33 1.82 - 7.42 K/uL    Lymphs (Absolute) 1.02 1.00 - 4.80 K/uL    Monos (Absolute) 0.59 0.00 - 0.85 K/uL    Eos (Absolute) 0.18 0.00 - 0.51 K/uL    Baso (Absolute) 0.03 0.00 - 0.12 K/uL    Immature Granulocytes (abs) 0.01 0.00 - 0.11 K/uL    NRBC (Absolute) 0.00 K/uL   Comp Metabolic Panel    Collection Time: 03/11/21  1:41 AM   Result Value Ref Range    Sodium 139 135 - 145 mmol/L    Potassium 3.3 (L) 3.6 - 5.5 mmol/L    Chloride 105 96 - 112 mmol/L    Co2 24 20 - 33 mmol/L    Anion Gap 10.0 7.0 - 16.0    Glucose 103 (H) 65 - 99 mg/dL    Bun 17 8 - 22 mg/dL    Creatinine 1.00 0.50 - 1.40 mg/dL    Calcium 8.8 8.5 - 10.5 mg/dL    AST(SGOT) 18 12 - 45 U/L    ALT(SGPT) 13 2 - 50 U/L    Alkaline Phosphatase 71 30 - 99 U/L    Total Bilirubin 0.3 0.1 - 1.5 mg/dL    Albumin 3.6 3.2 - 4.9 g/dL    Total Protein 5.9 (L) 6.0 - 8.2 g/dL    Globulin 2.3 1.9 - 3.5 g/dL    A-G Ratio 1.6 g/dL   Prothrombin Time    Collection Time: 03/11/21  1:41 AM   Result Value Ref Range    PT 13.8 12.0 - 14.6 sec    INR 1.03 0.87 - 1.13   PTT    Collection Time: 03/11/21  1:41 AM   Result Value Ref Range    APTT 29.6 24.7 - 36.0 sec   ESTIMATED GFR    Collection Time: 03/11/21  1:41 AM   Result Value Ref Range    GFR If   American >60 >60 mL/min/1.73 m 2    GFR If Non African American >60 >60 mL/min/1.73 m 2   SARS-CoV-2 PCR (24 hour In-House): Collect NP swab in VT    Collection Time: 03/11/21  2:18 AM    Specimen: Nasopharyngeal; Respirate   Result Value Ref Range    SARS-CoV-2 Source NP Swab        Imaging  OUTSIDE IMAGES-CT HEAD   Final Result      CT-HEAD W/O    (Results Pending)       Assessment/Plan  * Subdural hematoma, acute (HCC)  Assessment & Plan  Right minimal SDH w/o midline shift  Pt is not on any anticoagulation.   NSGY was consulted, no surgery, q1 neuro check  Repeat CT head.   Keep SBP <140  Admit to RICU    Seizures- (present on admission)  Assessment & Plan  Continue depakote and keppra  Check depakote level.       Discussed patient condition and risk of morbidity and/or mortality with Hospitalist, RN, RT and Pharmacy.    The patient remains critically ill.  Critical care time = 35 minutes in directly providing and coordinating critical care and extensive data review.  No time overlap and excludes procedures.

## 2021-03-11 NOTE — ED NOTES
RN spoke with pt daughter, Cleo, who is POA. Cleo wants to be updated on anything or if changes occur throughout the hospitalization. Daughter would like to be contacted due to pt not able to make the decisions himself at this time. Listed as Emergency contact.     Cleo [phone]

## 2021-03-11 NOTE — THERAPY
Occupational Therapy Contact Note:     OT orders received, pt w/ strict bedrest orders in place. Will hold until bedrest orders have been discontinued per policy.    Julianne Musa, OTR/L

## 2021-03-11 NOTE — PROGRESS NOTES
2 RN skin check complete with Damaris CARCAMO.   Devices in place BP cuff, nasal canula, EKG leads, Spo2 sticker finger probe.  Skin assessed under devices: sacrum red but blanching mepilex placed   No confirmed pressure ulcers found.  No new potential pressure ulcers noted.  The following interventions in place: mepilex placed, Q2 turns, floating heels, sticky probe changed to finger probe.

## 2021-03-12 LAB
ALBUMIN SERPL BCP-MCNC: 3.7 G/DL (ref 3.2–4.9)
ALBUMIN/GLOB SERPL: 1.3 G/DL
ALP SERPL-CCNC: 74 U/L (ref 30–99)
ALT SERPL-CCNC: 14 U/L (ref 2–50)
ANION GAP SERPL CALC-SCNC: 9 MMOL/L (ref 7–16)
AST SERPL-CCNC: 19 U/L (ref 12–45)
BASOPHILS # BLD AUTO: 0.4 % (ref 0–1.8)
BASOPHILS # BLD: 0.02 K/UL (ref 0–0.12)
BILIRUB SERPL-MCNC: 0.5 MG/DL (ref 0.1–1.5)
BUN SERPL-MCNC: 15 MG/DL (ref 8–22)
CALCIUM SERPL-MCNC: 9.4 MG/DL (ref 8.5–10.5)
CHLORIDE SERPL-SCNC: 102 MMOL/L (ref 96–112)
CO2 SERPL-SCNC: 26 MMOL/L (ref 20–33)
CREAT SERPL-MCNC: 1.08 MG/DL (ref 0.5–1.4)
EOSINOPHIL # BLD AUTO: 0.19 K/UL (ref 0–0.51)
EOSINOPHIL NFR BLD: 4.1 % (ref 0–6.9)
ERYTHROCYTE [DISTWIDTH] IN BLOOD BY AUTOMATED COUNT: 48.4 FL (ref 35.9–50)
GLOBULIN SER CALC-MCNC: 2.9 G/DL (ref 1.9–3.5)
GLUCOSE SERPL-MCNC: 89 MG/DL (ref 65–99)
HCT VFR BLD AUTO: 46.1 % (ref 42–52)
HGB BLD-MCNC: 14.6 G/DL (ref 14–18)
IMM GRANULOCYTES # BLD AUTO: 0.01 K/UL (ref 0–0.11)
IMM GRANULOCYTES NFR BLD AUTO: 0.2 % (ref 0–0.9)
LYMPHOCYTES # BLD AUTO: 1.04 K/UL (ref 1–4.8)
LYMPHOCYTES NFR BLD: 22.4 % (ref 22–41)
MAGNESIUM SERPL-MCNC: 2 MG/DL (ref 1.5–2.5)
MCH RBC QN AUTO: 27.8 PG (ref 27–33)
MCHC RBC AUTO-ENTMCNC: 31.7 G/DL (ref 33.7–35.3)
MCV RBC AUTO: 87.8 FL (ref 81.4–97.8)
MONOCYTES # BLD AUTO: 0.59 K/UL (ref 0–0.85)
MONOCYTES NFR BLD AUTO: 12.7 % (ref 0–13.4)
NEUTROPHILS # BLD AUTO: 2.8 K/UL (ref 1.82–7.42)
NEUTROPHILS NFR BLD: 60.2 % (ref 44–72)
NRBC # BLD AUTO: 0 K/UL
NRBC BLD-RTO: 0 /100 WBC
PLATELET # BLD AUTO: 191 K/UL (ref 164–446)
PMV BLD AUTO: 10.4 FL (ref 9–12.9)
POTASSIUM SERPL-SCNC: 4.4 MMOL/L (ref 3.6–5.5)
PROT SERPL-MCNC: 6.6 G/DL (ref 6–8.2)
RBC # BLD AUTO: 5.25 M/UL (ref 4.7–6.1)
SODIUM SERPL-SCNC: 137 MMOL/L (ref 135–145)
VALPROATE SERPL-MCNC: 26.2 UG/ML (ref 50–100)
WBC # BLD AUTO: 4.7 K/UL (ref 4.8–10.8)

## 2021-03-12 PROCEDURE — 80164 ASSAY DIPROPYLACETIC ACD TOT: CPT

## 2021-03-12 PROCEDURE — 99291 CRITICAL CARE FIRST HOUR: CPT | Performed by: PSYCHIATRY & NEUROLOGY

## 2021-03-12 PROCEDURE — C9254 INJECTION, LACOSAMIDE: HCPCS | Performed by: STUDENT IN AN ORGANIZED HEALTH CARE EDUCATION/TRAINING PROGRAM

## 2021-03-12 PROCEDURE — 770022 HCHG ROOM/CARE - ICU (200)

## 2021-03-12 PROCEDURE — 700111 HCHG RX REV CODE 636 W/ 250 OVERRIDE (IP): Performed by: STUDENT IN AN ORGANIZED HEALTH CARE EDUCATION/TRAINING PROGRAM

## 2021-03-12 PROCEDURE — A9270 NON-COVERED ITEM OR SERVICE: HCPCS | Performed by: STUDENT IN AN ORGANIZED HEALTH CARE EDUCATION/TRAINING PROGRAM

## 2021-03-12 PROCEDURE — 700102 HCHG RX REV CODE 250 W/ 637 OVERRIDE(OP): Performed by: STUDENT IN AN ORGANIZED HEALTH CARE EDUCATION/TRAINING PROGRAM

## 2021-03-12 PROCEDURE — 85025 COMPLETE CBC W/AUTO DIFF WBC: CPT

## 2021-03-12 PROCEDURE — 700111 HCHG RX REV CODE 636 W/ 250 OVERRIDE (IP): Performed by: INTERNAL MEDICINE

## 2021-03-12 PROCEDURE — 97163 PT EVAL HIGH COMPLEX 45 MIN: CPT

## 2021-03-12 PROCEDURE — 80053 COMPREHEN METABOLIC PANEL: CPT

## 2021-03-12 PROCEDURE — 97166 OT EVAL MOD COMPLEX 45 MIN: CPT

## 2021-03-12 PROCEDURE — 700105 HCHG RX REV CODE 258: Performed by: STUDENT IN AN ORGANIZED HEALTH CARE EDUCATION/TRAINING PROGRAM

## 2021-03-12 PROCEDURE — 700111 HCHG RX REV CODE 636 W/ 250 OVERRIDE (IP): Performed by: PSYCHIATRY & NEUROLOGY

## 2021-03-12 PROCEDURE — 83735 ASSAY OF MAGNESIUM: CPT

## 2021-03-12 PROCEDURE — 700105 HCHG RX REV CODE 258: Performed by: PSYCHIATRY & NEUROLOGY

## 2021-03-12 PROCEDURE — 92526 ORAL FUNCTION THERAPY: CPT

## 2021-03-12 PROCEDURE — C9254 INJECTION, LACOSAMIDE: HCPCS | Performed by: PSYCHIATRY & NEUROLOGY

## 2021-03-12 RX ORDER — HYDROXYZINE HYDROCHLORIDE 25 MG/1
25 TABLET, FILM COATED ORAL ONCE
Status: DISCONTINUED | OUTPATIENT
Start: 2021-03-12 | End: 2021-03-12

## 2021-03-12 RX ORDER — HALOPERIDOL 5 MG/ML
2 INJECTION INTRAMUSCULAR ONCE
Status: COMPLETED | OUTPATIENT
Start: 2021-03-12 | End: 2021-03-12

## 2021-03-12 RX ORDER — HYDROXYZINE HYDROCHLORIDE 25 MG/ML
25 INJECTION, SOLUTION INTRAMUSCULAR ONCE
Status: COMPLETED | OUTPATIENT
Start: 2021-03-12 | End: 2021-03-12

## 2021-03-12 RX ADMIN — GABAPENTIN 300 MG: 300 CAPSULE ORAL at 17:06

## 2021-03-12 RX ADMIN — VALPROATE SODIUM 500 MG: 100 INJECTION, SOLUTION INTRAVENOUS at 05:04

## 2021-03-12 RX ADMIN — SODIUM CHLORIDE 100 MG: 9 INJECTION, SOLUTION INTRAVENOUS at 05:19

## 2021-03-12 RX ADMIN — LORAZEPAM 2 MG: 2 INJECTION INTRAMUSCULAR; INTRAVENOUS at 00:45

## 2021-03-12 RX ADMIN — HYDROXYZINE HYDROCHLORIDE 25 MG: 25 INJECTION, SOLUTION INTRAMUSCULAR at 22:18

## 2021-03-12 RX ADMIN — LORAZEPAM 2 MG: 2 INJECTION INTRAMUSCULAR; INTRAVENOUS at 01:55

## 2021-03-12 RX ADMIN — LEVETIRACETAM INJECTION 1000 MG: 10 INJECTION INTRAVENOUS at 05:04

## 2021-03-12 RX ADMIN — LEVETIRACETAM INJECTION 1000 MG: 10 INJECTION INTRAVENOUS at 17:06

## 2021-03-12 RX ADMIN — SODIUM CHLORIDE 100 MG: 9 INJECTION, SOLUTION INTRAVENOUS at 08:00

## 2021-03-12 RX ADMIN — SODIUM CHLORIDE 200 MG: 9 INJECTION, SOLUTION INTRAVENOUS at 18:43

## 2021-03-12 RX ADMIN — SODIUM CHLORIDE 750 MG: 9 INJECTION, SOLUTION INTRAVENOUS at 21:03

## 2021-03-12 RX ADMIN — HALOPERIDOL LACTATE 2 MG: 5 INJECTION, SOLUTION INTRAMUSCULAR at 23:38

## 2021-03-12 ASSESSMENT — COGNITIVE AND FUNCTIONAL STATUS - GENERAL
CLIMB 3 TO 5 STEPS WITH RAILING: TOTAL
DRESSING REGULAR LOWER BODY CLOTHING: A LOT
DRESSING REGULAR UPPER BODY CLOTHING: A LITTLE
TURNING FROM BACK TO SIDE WHILE IN FLAT BAD: UNABLE
WALKING IN HOSPITAL ROOM: A LOT
SUGGESTED CMS G CODE MODIFIER MOBILITY: CM
HELP NEEDED FOR BATHING: A LOT
STANDING UP FROM CHAIR USING ARMS: A LOT
SUGGESTED CMS G CODE MODIFIER DAILY ACTIVITY: CK
MOVING TO AND FROM BED TO CHAIR: UNABLE
PERSONAL GROOMING: A LOT
TOILETING: A LOT
MOVING FROM LYING ON BACK TO SITTING ON SIDE OF FLAT BED: UNABLE
DAILY ACTIVITIY SCORE: 14
EATING MEALS: A LITTLE
MOBILITY SCORE: 8

## 2021-03-12 ASSESSMENT — GAIT ASSESSMENTS: GAIT LEVEL OF ASSIST: UNABLE TO PARTICIPATE

## 2021-03-12 ASSESSMENT — ACTIVITIES OF DAILY LIVING (ADL): TOILETING: INDEPENDENT

## 2021-03-12 ASSESSMENT — PAIN DESCRIPTION - PAIN TYPE
TYPE: ACUTE PAIN

## 2021-03-12 ASSESSMENT — FIBROSIS 4 INDEX: FIB4 SCORE: 2.01

## 2021-03-12 NOTE — THERAPY
Occupational Therapy   Initial Evaluation     Patient Name: Rafael Mccoy  Age:  75 y.o., Sex:  male  Medical Record #: [id number]  Today's Date: 3/12/2021     Precautions  Precautions: Swallow Precautions ( See Comments)(legally blind)  Comments: (P) Pt is legally blind    Assessment  Patient is 75 y.o. male who presents to acute for small right parietal SDH and seizure. PMH includes cerebellar CVA and is legally blind. Pt's L UE is slightly contracted at wrist unclear if it from arthritis or from previous stroke. Per dtr pt received assist w/ bathing but was otherwise independent w/ BADLs at [facility]. Pt demo'd impaired balance, functional mobility, activity tolerance and cognition impacting functional independence.     Plan    Recommend Occupational Therapy 3 times per week until therapy goals are met for the following treatments:  Adaptive Equipment, Neuro Re-Education / Balance, Self Care/Activities of Daily Living, Therapeutic Activities and Therapeutic Exercises.    DC Equipment Recommendations: (P) Unable to determine at this time  Discharge Recommendations: (P) Recommend post-acute placement for additional occupational therapy services prior to discharge home        Objective       03/12/21 1149   Total Time Spent   Total Time Spent (Mins) 32   Charge Group   OT Evaluation OT Evaluation Mod   Initial Contact Note    Initial Contact Note Order Received and Verified, Occupational Therapy Evaluation in Progress with Full Report to Follow.   Prior Living Situation   Prior Services Other (Comments)   Housing / Facility Assisted Living Residence   Comments from [facility]ong [facility], per dtr was independent w/ BADLs and used ski pole to assist as he is blind   Prior Level of ADL Function   Self Feeding Independent   Grooming / Hygiene Independent   Bathing Requires Assist   Dressing Independent   Toileting Independent   Prior Level of IADL Function   Comments would walk to cafeteria using ski pole    Precautions   Precautions  Fall Risk;Swallow Precautions ( See Comments)   Comments Pt is legally blind   Vitals   O2 (LPM) 3   O2 Delivery Device Nasal Cannula   Pain 0 - 10 Group   Therapist Pain Assessment Post Activity Pain Same as Prior to Activity;Nurse Notified  (no c/o pain during session)   Cognition    Cognition / Consciousness X   Speech/ Communication Delayed Responses   Level of Consciousness Confused   Ability To Follow Commands 1 Step   Safety Awareness Impaired   New Learning Impaired   Attention Impaired   Comments pleasent and cooperative , increased processing time   Active ROM Upper Body   Active ROM Upper Body  X   Comments L hand appears to have radial drift, appears to have arthritis    Strength Upper Body   Comments generalized weakness, equal bilaterally   Coordination Upper Body   Coordination X   Comments increased time for coordination   Balance Assessment   Sitting Balance (Static) Poor +   Sitting Balance (Dynamic) Poor   Standing Balance (Static) Poor   Standing Balance (Dynamic) Poor -   Weight Shift Sitting Fair   Weight Shift Standing Poor   Comments B UE support provided   Bed Mobility    Supine to Sit Maximal Assist   Sit to Supine   (in chair post)   Scooting Moderate Assist   ADL Assessment   Grooming Seated;Maximal Assist   Upper Body Dressing Minimal Assist   Lower Body Dressing Maximal Assist   How much help from another person does the patient currently need...   Putting on and taking off regular lower body clothing? 2   Bathing (including washing, rinsing, and drying)? 2   Toileting, which includes using a toilet, bedpan, or urinal? 2   Putting on and taking off regular upper body clothing? 3   Taking care of personal grooming such as brushing teeth? 2   Eating meals? 3   6 Clicks Daily Activity Score 14   Functional Mobility   Sit to Stand Maximal Assist   Bed, Chair, Wheelchair Transfer Maximal Assist   Transfer Method Stand Pivot   Mobility SPT EOB > Chair w/ B UE support   ICU Target Mobility  Level   ICU Mobility - Targeted Level Level 3B   Edema / Skin Assessment   Edema / Skin  Not Assessed   Activity Tolerance   Sitting in Chair up post 10+ min   Sitting Edge of Bed 10 min   Standing 3-5 min   Patient / Family Goals   Patient / Family Goal #1 To go home   Short Term Goals   Short Term Goal # 1 Pt will demo LB dressing w/ SPV   Short Term Goal # 2 Pt will demo standing grooming w/ SPV   Short Term Goal # 3 Pt will demo toilet txf w/ SPV   Education Group   Role of Occupational Therapist Patient Response Patient;Acceptance;Explanation;Demonstration;Verbal Demonstration   Problem List   Problem List Decreased Active Daily Living Skills;Decreased Homemaking Skills;Decreased Functional Mobility;Decreased Activity Tolerance;Impaired Postural Control / Balance   Anticipated Discharge Equipment and Recommendations   DC Equipment Recommendations Unable to determine at this time   Discharge Recommendations Recommend post-acute placement for additional occupational therapy services prior to discharge home   Interdisciplinary Plan of Care Collaboration   IDT Collaboration with  Nursing   Patient Position at End of Therapy Seated;Self Releasing Lap Belt Applied;Call Light within Reach;Tray Table within Reach;Phone within Reach;Chair Alarm On   Collaboration Comments report given   Session Information   Date / Session Number  3/12, 1 (1/3, 3/18)   Priority 3

## 2021-03-12 NOTE — THERAPY
Physical Therapy   Initial Evaluation     Patient Name: Raafel Mccoy  Age:  75 y.o., Sex:  male  Medical Record #: [id number]  Today's Date: 3/12/2021     Precautions: Swallow Precautions ( See Comments)(legally blind)    Assessment  Pt presents to PT with impaired motor control, balance, functional strength and general locomotion associated with medical co-morbidities. His current cognitive deficits are exacerbating his functional impairments and risk for falls. He was able to demonstrate bed mobility with max A, sit<>stands, transfers and pre-gait activities with mod A (x2 with initial attempt for safety). He does better with external cueing and facilitation of movement (in part 2' to blindness + current cog deficits). Will continue to visit with details/recs below.     Plan    Recommend Physical Therapy 4 times per week until therapy goals are met for the following treatments:  Bed Mobility, Community Re-integration, Equipment, Gait Training, Manual Therapy, Neuro Re-Education / Balance, Self Care/Home Evaluation, Stair Training, Therapeutic Activities and Therapeutic Exercises    DC Equipment Recommendations: Unable to determine at this time  Discharge Recommendations: Recommend post-acute placement for additional physical therapy services prior to discharge home        03/12/21 1201   Prior Living Situation   Prior Services Other (Comments)  ([facility])   Housing / Facility Assisted Living Residence   Lives with - Patient's Self Care Capacity Other (Comments)  (lives at [facility]; unclear level of assist)   Comments pt unable to report PLOF or home environment; noted per chart review, pt was liviing at [facility], unclear level of assistd he was provided (given legal blindness) addeddum: see below; pt was at [facility]; handicapped accessible environment    Prior Level of Functional Mobility   Bed Mobility Independent   Transfer Status Independent   Ambulation Independent   Distance Ambulation (Feet)   (limited community)    Assistive Devices Used   (walking stick (ski pole))   Stairs Unable To Determine At This Time   Comments initially unlcear PLOF and home environment (other than [facility]) and level of assistd [facility] provides; addedndum: spoke with dtr post visit who reports that he was essentially I with household/limited community mobility with walking stick/blind cane; he had assist for showering and some assist for blind as needed; however he was not on memory care/dependent side of facility and was intermittent assist with IADLs and some ADLs as needed   Cognition    Cognition / Consciousness X   Level of Consciousness Confused   Ability To Follow Commands 1 Step  (and functional with external cueing; see below)   Safety Awareness Impaired   New Learning Impaired   Attention Impaired   Comments very pleasant and cooperative; does well with facilitation of movement and/or external cueing; difficulty with more complex commands; unclear baseline as pt is also minimally verbally communicative throughout; lethargic as well    Passive ROM Lower Body   Passive ROM Lower Body X   Comments decreased muscle length throughout   Active ROM Lower Body    Active ROM Lower Body  X   Comments grossly WFL as able to test, however pt has difficulty with following consistenly; more difficult testing with hip    Strength Lower Body   Lower Body Strength  X   Comments as above; difficulty with 2 step commands though fnx for standing and at least transfer; ; noted essentially performs SLR at BLE in supine and anticipate grossly WFL   Sensation Lower Body   Lower Extremity Sensation   Not Tested   Neurological Concerns   Neurological Concerns Yes   Comments given cog and motor deficits; noted LUE positioned in contracted position and unable to effectively extend fingers; however is able ot grasp   Balance Assessment   Sitting Balance (Static) Poor +   Sitting Balance (Dynamic) Poor   Standing Balance (Static) Poor   Standing Balance (Dynamic) Poor -    Weight Shift Sitting Fair   Weight Shift Standing Poor   Comments sitting EOB with consistent R lateral lean; no overt attempt to correct without cueing/facilitation thoguh able to correct with cueing; however consistently returns to lateral lean without prompting; standing and able to clear LEs with facilitation of weightshifting during transfer (with 2 assist); does best with external cueing and facilitaiton of mechanics    Gait Analysis   Gait Level Of Assist Unable to Participate   Weight Bearing Status no restrctions   Comments see balance; standing, pre-gait and transfers during eval   Bed Mobility    Supine to Sit Maximal Assist   Sit to Supine   (left in chair)   Scooting Moderate Assist   Comments left in chair post   Functional Mobility   Sit to Stand Moderate Assist  (x2 for safety)   Bed, Chair, Wheelchair Transfer Moderate Assist  (x2 for safety)   Transfer Method Stand Step   Mobility bed mobility, EOB, sit<>stands, stand step transfers   ICU Target Mobility Level   ICU Mobility - Targeted Level Level 3B   How much difficulty does the patient currently have...   Turning over in bed (including adjusting bedclothes, sheets and blankets)? 1   Sitting down on and standing up from a chair with arms (e.g., wheelchair, bedside commode, etc.) 1   Moving from lying on back to sitting on the side of the bed? 1   How much help from another person does the patient currently need...   Moving to and from a bed to a chair (including a wheelchair)? 2   Need to walk in a hospital room? 2   Climbing 3-5 steps with a railing? 1   6 clicks Mobility Score 8   Activity Tolerance   Sitting in Chair at least 1hr   Sitting Edge of Bed at least 10 mins   Standing at least 5 mind   Comments tolerates well   Edema / Skin Assessment   Edema / Skin  Not Assessed   Patient / Family Goals    Patient / Family Goal #1 to return to PLOF (family)   Short Term Goals    Short Term Goal # 1 Pt will be able to perform supine<>sit with HOB  flat/no rails with Spv in 6tx to promote fnx progression towards I    Short Term Goal # 2 Pt will be able to perform sit<>stand/transfers with min A in 6tx to promote fnx progression towards I    Short Term Goal # 3 Pt will be able to ambulate 25ft with FWW with min A in 6tx to promote fnx progression towards I    Education Group   Education Provided Role of Physical Therapist   Role of Physical Therapist Patient Response Patient;Explanation;Verbal Demonstration;Reinforcement Needed

## 2021-03-12 NOTE — PROGRESS NOTES
Visited patient and spoke with daughter and JOE Gallo. Updated on plan and answered all questions. She would like his code status changed to DNR/DNI. She would like an update from the primary team tomorrow as well.     Ferdinand Kelley M.D.

## 2021-03-12 NOTE — PROGRESS NOTES
At change of shift patient's head was moving side to side, tachypneic, no verbal response, patient able to follow simple commands. Dr. Mcfadden and Resident notified. No new orders at this time. Per MD do not administer Ativan now.

## 2021-03-12 NOTE — THERAPY
Occupational Therapy Contact Note:       03/12/21 2310   Interdisciplinary Plan of Care Collaboration   Collaboration Comments Pt remains on strict bedrest. Will hold until orders have been discontinued per policy.      Julianne Musa, OTR/L

## 2021-03-12 NOTE — CARE PLAN
Problem: Communication  Goal: The ability to communicate needs accurately and effectively will improve  Outcome: PROGRESSING SLOWER THAN EXPECTED     Problem: Safety  Goal: Will remain free from injury  Outcome: PROGRESSING AS EXPECTED     Problem: Bowel/Gastric:  Goal: Normal bowel function is maintained or improved  Outcome: PROGRESSING AS EXPECTED     Problem: Knowledge Deficit  Goal: Knowledge of disease process/condition, treatment plan, diagnostic tests, and medications will improve  Outcome: PROGRESSING SLOWER THAN EXPECTED

## 2021-03-12 NOTE — THERAPY
Speech Language Pathology  Daily Treatment     Patient Name: Rafael Mccoy  Age:  75 y.o., Sex:  male  Medical Record #: [id number]  Today's Date: 3/12/2021     Precautions  Precautions: Swallow Precautions ( See Comments)  Comments: Pt is legally blind    Assessment    Pt seen this date for swallow reassessment. CSE complete 3/11/21 in AM with recommendations for regular/thins. In PM on 3/12/21, pt had seizure-like activity, was hyperventilating, with rigid LUE and LLE, and repetitive movements of head in afternoon of 3/12. Pt made NPO 3/12/21 at 9 pm.     PO trials of ice, MTL, liquidized, puree, minced and moist, soft and bite sized and thins assessed. Hyolaryngeal elevation palpated as complete and delayed initiation of swallow appreciated. Wet vocal quality appreciated with trials of consecutive sips of MTL by straw, which is concerning for possible penetration/aspiration. Mild residue appreciated with trials of minced and moist, cleared with MTL wash. Prolonged mastication and moderate oral residue appreciated with trials of soft and bite sized. Wet vocal quality appreciated with initial 2 trials of thins and immediate cough appreciated with 3rd trial of thins, which is concerning for possible penetration/aspiration. No other s/sx of aspiration appreciated with any other consistencies consumed. Pt attempting to drink from right hand throughout session, despite reminders that his hand was empty. Pt required max assistance to self feed due weaknesses and poor proprioception. Pt demonstrated increased lethargy with progression of session.     Recommend diet of minced and moist/mildly thick liquids with adherence to the followin:1 feeding assistance and supervision, PO only when awake/alert, upright at 90* for PO, alternate liquids and solids, slow rate, small bites. Meds crushed or floated in puree. SLP following.     Plan    Treatment plan modified to 3 times per week until therapy goals are met for the  "following treatments:  Dysphagia Training and Patient / Family / Caregiver Education.    Discharge Recommendations: Recommend post-acute placement for additional speech therapy services prior to discharge home    Subjective    Pt was lethargic, confused, and followed basic directions with encouragement. Pt repeating 'March\" in response to all month/day/year questions.     Objective       03/12/21 1122   Dysphagia    Dysphagia X   Positioning / Behavior Modification Self Monitoring;Cough / Clear after Swallow;Alternate Solids and Liquids   Other Treatments PO trials ice, MTL, LQ3, PU, MM5, SB6, TN0   Diet / Liquid Recommendation Mildly Thick (2) - (Nectar Thick);Minced & Moist (5) - (Dysphagia II)   Nutritional Liquid Intake Rating Scale Thickened beverages (mildly thick unless otherwise specified)   Nutritional Food Intake Rating Scale Total oral diet with multiple consistencies but requiring special preparation or compensations   Nursing Communication Swallow Precaution Sign Posted at Head of Bed   Skilled Intervention Compensatory Strategies;Verbal Cueing   Recommended Route of Medication Administration   Medication Administration  Crush all Medications in Puree;Float Whole with Puree   Patient / Family Goals   Patient / Family Goal #1 \"I am ravenous\"   Goal #1 Outcome Progressing slower than expected   Short Term Goals   Short Term Goal # 1 Pt will consume regular diet with thins, without s/sx of aspiration given min assist for set up.   Goal Outcome # 1 Other (see comments)  (goal discontinued, change in status 3/11 PM)   Short Term Goal # 2 NEW: Pt will consume a diet of MM5/MT2 with no s/sx of aspiration and min cues.          "

## 2021-03-12 NOTE — CONSULTS
DATE OF SERVICE:  03/12/2021     NEUROSURGICAL CONSULTATION     HISTORY OF PRESENT ILLNESS:  The patient is a 75-year-old male well known to   me.  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  He was residing in [facility],   family noted a questionable seizure.  He has a seizure history secondary to a   stroke.     PAST MEDICAL HISTORY:  Blindness, head injury, seizures, stroke.     PAST SURGICAL HISTORY:  Right craniotomy for subdural hematoma by me in   04/2020, right foot fracture, right rotator cuff repair, tonsillectomy,   adenoidectomy.     MEDICATIONS AT HOME:  None.     ALLERGIES:  None.     PHYSICAL EXAMINATION:    GENERAL:  Opens eyes minimally to deep stimulation.  HEENT:  Pupils are equal, round, reactive to light, slightly disconjugate   gaze.  NEUROLOGIC:  Face is grossly symmetric.  He is following commands briskly x4.     IMAGING:  A CT scan of the brain, noncontrast from 03/11 at 3:35 a.m. shows   expected postoperative changes underneath the craniotomy flap.  There was no   new intracranial hemorrhage.  There was no mass effect.  There was no shift.     PLAN:  We will sign off.  Please call with questions.  The patient is   otherwise being managed by neurology and critical care.        ______________________________  RHONDA ARECHIGA MD    CPD/JE/FARRUKH    DD:  03/12/2021 08:38  DT:  03/12/2021 09:17    Job#:  [id number]

## 2021-03-12 NOTE — PROGRESS NOTES
[facility] Progress Note      Admit Date: 3/11/2021    Resident(s): Marco Weller M.D.   Attending:  COLTON ODONNELL/ Dr. Mcfadden    Patient ID:    Name:  Rafael Mccoy   YOB: 1945  Age:  75 y.o.  male   MRN:  [id number]    Hospital Course (carried forward and updated):  Rafael Mccoy is a 75 y.o. male who presented to ED from [facility] after pt was found to have right parietal SDH without midline shift that was concerning for possible acute on chronic process.  Apparently, patient was having a Zoom conversation with his family, and the family had noted some change to mental status for which they were concerned for possible seizure thusly precipitating his presentation to [facility] emergency department.  Upon presentation to [facility] ED patient had a repeat CT scan which again displayed a possible acute on chronic right parietal subdural hematoma.  Patient was evaluated by Dr. Capps (neurosurgery), and no surgical intervention required at that time.  Patient admitted to the ICU for close clinical monitoring with frequent neuro checks.  Additionally, continuous EEG was ordered, and patient did have an episode that was concerning for nonconvulsive status epilepticus with focality in the right hemisphere.  Patient did receive lorazepam at the time of this event, and had noted improvement on the EEG after this intervention.  Interestingly, during this event patient was noted to be agitated, but following commands which would be more indicative of a focal onset aware seizure.  EEG was continued overnight, and did not display any further seizure-like activity.  Although, patient did have further similar episodes of agitation without medical intervention.  12th March continuous EEG was discontinued, and patient was again noted at bedside to be shaking his head from side to side, and would move his left hand up and rubbed his forehead.  Upon discussion with patient's daughter patient has previous  history of this behavior which was felt due due to anxiety.  Also, upon further discussion with the daughter patient was having significant delusions, memory and behavioral issues up until early January 2021 when he was discharged from LTAC to home.  Reports that since that time he has been mentally clear, and doing well at home regarding his mentation.  SLP evaluated patient and patient ok for minced/mildly thick liquids w/assistance and upright. PT evaluated patient and he will likely need rehab unless he improves.     Consultants:  Critical Care  Neurosurgery     Interval Events:    -Further agitation events overnight not requiring intervention.  -Increase neurochecks to q2.  -SLP evaluated patient today.  Recommended diet.  See orders.  -Physical therapy also came to bedside and evaluated patient.    NEURO:   Following commands.  CARDIOVASC:  Mild HTN.  RESPIRATORY:  Stable. Titrate off NC.  GI/NUTRITION:  Start diet today.  RENAL/FLUID/LYTES: CMP normal. Oral rehydration.  HEME/ONC:   No significant abnormalities.  INFECTIOUS D:  NA  ENDOCRINE:   No issues.    Vitals Range last 24h:  Temp:  [36.4 °C (97.6 °F)-37.1 °C (98.8 °F)] 36.8 °C (98.3 °F)  Pulse:  [65-96] 96  Resp:  [13-41] 23  BP: (102-150)/(56-82) 127/69  SpO2:  [92 %-100 %] 96 %      Intake/Output Summary (Last 24 hours) at 3/12/2021 1609  Last data filed at 3/12/2021 1400  Gross per 24 hour   Intake 1648.97 ml   Output 2350 ml   Net -701.03 ml        Review of Systems   Unable to perform ROS: Mental acuity         PHYSICAL EXAM:  Vitals:    03/12/21 1200 03/12/21 1300 03/12/21 1400 03/12/21 1500   BP: 133/73 134/59 126/58 127/69   Pulse: 82 65 80 96   Resp: 17 (!) 27 (!) 31 (!) 23   Temp: 37.1 °C (98.8 °F)  36.8 °C (98.3 °F)    TempSrc: Temporal  Temporal    SpO2: 100% 98% 98% 96%   Weight:       Height:        Body mass index is 28.02 kg/m².    O2 therapy: Pulse Oximetry: 96 %, O2 (LPM): 3, O2 Delivery Device: Nasal Cannula    Date 03/12/21 0700 -  03/13/21 0659   Shift 9489-3378 4165-2223 9987-7660 24 Hour Total   INTAKE   P.O. 580   580     P.O. 580   580   IV Piggyback 100   100     Volume (mL) (lacosamide (VIMPAT) 100 mg in  mL ivpb) 100   100   Shift Total 680   680   OUTPUT   Urine 900   900     Number of Times Incontinent of Urine 1 x   1 x     Output (mL) (External Urinary Catheter (Condom)) 900   900   Stool         Number of Times Stooled 0 x   0 x   Shift Total 900   900   NET -220   -220        Physical Exam   Constitutional: He appears distressed.   HENT:   Head: Normocephalic and atraumatic.   Mouth/Throat: No oropharyngeal exudate.   Eyes: Pupils are equal, round, and reactive to light. No scleral icterus.   Cardiovascular: Normal rate and regular rhythm. Exam reveals no gallop and no friction rub.   No murmur heard.  Pulmonary/Chest: Effort normal and breath sounds normal. No respiratory distress. He has no wheezes. He has no rales.   Abdominal: Soft. Bowel sounds are normal. He exhibits no distension and no mass. There is no abdominal tenderness. There is no rebound and no guarding.   Musculoskeletal:         General: No tenderness or edema.      Cervical back: Normal range of motion and neck supple.      Comments: Decreased range of motion. Slow movement.   Lymphadenopathy:     He has no cervical adenopathy.   Neurological: He is alert. He exhibits normal muscle tone.   Does not particularly follow commands to assess strength.   Skin: Skin is warm and dry. No rash noted. He is not diaphoretic. No erythema. No pallor.   Psychiatric: Affect normal.         Meds:  • valproate (DEPACON) IV  750 mg     • lacosamide (VIMPAT) ivpb  200 mg     • senna-docusate  2 tablet      And   • polyethylene glycol/lytes  1 Packet      And   • magnesium hydroxide  30 mL      And   • bisacodyl  10 mg     • gabapentin  300 mg     • simvastatin  40 mg     • labetalol  10 mg     • hydrALAZINE  10 mg     • enalaprilat  1.25 mg     • Respiratory Therapy Consult        • LORazepam  2 mg     • acetaminophen  650 mg      Or   • acetaminophen  650 mg     • ondansetron  4 mg      Or   • ondansetron  4 mg     • MD Alert...Adult ICU Electrolyte Replacement per Pharmacy       • melatonin  5 mg     • levETIRAcetam (Keppra) IV  1,000 mg Stopped (03/12/21 0519)        Procedures:    EEG 11-12th March.    Labs:    Low valproic acid.  CBC/CMP w/out significant abnormalities.    Imaging:    No new imaging.     ASSESSEMENT and PLAN:    * Subdural hematoma, acute (HCC)  Assessment & Plan  Right minimal SDH w/o midline shift, stable per neurosurgery on repeat CT head  Pt is not on any anticoagulation.     q2 neurochecks  Keep SBP <150  NSG following    Seizure disorder, nonconvulsive, with status epilepticus (HCC)  Assessment & Plan  Nonconvulsive status epilepticus  -NO sz overnight - off eeg  -Keppra dose adjusted  -Vimpat increased  -Continue neurontin  -Continue IV divalproex qhs      Dementia associated with other underlying disease with behavioral disturbance (HCC)- (present on admission)  Assessment & Plan  Unclear etiology. Per daughter he was having significant psychosis with delusions and agitation while at LTAC. Per her report his mentation significantly cleared in Jan 2021 after he returned home and has been mentally clear. She feels that quetiapine has been very helpful.    Continue quetiapine 150 mg po nightly.    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   Up with assistance.    Essential hypertension- (present on admission)  Assessment & Plan  IV prns for SBP > 150 mmHg.      DISPO: To remain in ICU for close monitoring.    CODE STATUS: DNAR/DNI.    Quality Measures:  Feeding: PO w/assistance.  Analgesia: PRNs  Sedation: NA  Thromboprophylaxis: SCDs  Head of bed: >30 degrees  Ulcer prophylaxis: NA  Glycemic control: Correctional: NA / Basal: NA  Bowel care: bowel regimen per protocol.  Indwelling lines: PIV.  Deescalation of antibiotics:  KARI.      Marco Weller M.D.

## 2021-03-12 NOTE — PROGRESS NOTES
Critical Care Progress Note    Date of admission  3/11/2021    Chief Complaint  75 y.o. male admitted 3/11/2021 with SDH and sz    Hospital Course  No notes on file    Interval Problem Update  Reviewed last 24 hour events:  Non convulsive Status epilepticus yesterday  Loaded with keppra and vimpat  Aferbile  NC  SBP 12-140s  NSR    Review of Systems  Review of Systems   Unable to perform ROS: Acuity of condition        Vital Signs for last 24 hours   Temp:  [36.4 °C (97.6 °F)-37.1 °C (98.8 °F)] 36.8 °C (98.3 °F)  Pulse:  [65-96] 96  Resp:  [13-41] 23  BP: (102-150)/(56-82) 127/69  SpO2:  [92 %-100 %] 96 %    Hemodynamic parameters for last 24 hours       Respiratory Information for the last 24 hours       Physical Exam   Physical Exam  Constitutional:       General: He is not in acute distress.  HENT:      Head: Normocephalic and atraumatic.      Right Ear: External ear normal.      Left Ear: External ear normal.      Nose: Nose normal.      Mouth/Throat:      Pharynx: Oropharynx is clear.   Cardiovascular:      Rate and Rhythm: Normal rate and regular rhythm.      Pulses: Normal pulses.   Pulmonary:      Effort: Pulmonary effort is normal. No respiratory distress.      Breath sounds: Normal breath sounds.   Abdominal:      General: Abdomen is flat. Bowel sounds are normal.   Skin:     General: Skin is warm and dry.   Neurological:      Mental Status: He is alert and oriented to person, place, and time.      Comments: GCS 15. Follows. Moves all 4 ext         Medications  Current Facility-Administered Medications   Medication Dose Route Frequency Provider Last Rate Last Admin   • valproate (DEPACON) 750 mg in  mL IVPB  750 mg Intravenous Nightly Kip Mcfadden M.D.       • lacosamide (VIMPAT) 200 mg in  mL ivpb  200 mg Intravenous Q12HRS Kip Mcfadden M.D.       • senna-docusate (PERICOLACE or SENOKOT S) 8.6-50 MG per tablet 2 tablet  2 tablet Oral BID Pankaj Borjas M.D.   Stopped at 03/11/21  0441    And   • polyethylene glycol/lytes (MIRALAX) PACKET 1 Packet  1 Packet Oral QDAY PRN Pankaj Borjas M.D.        And   • magnesium hydroxide (MILK OF MAGNESIA) suspension 30 mL  30 mL Oral QDAY PRN Pankaj Borjas M.D.        And   • bisacodyl (DULCOLAX) suppository 10 mg  10 mg Rectal QDAY PRN Pankaj Borjas M.D.       • gabapentin (NEURONTIN) capsule 300 mg  300 mg Oral BID Kaden Sosa M.D.   Stopped at 03/11/21 0440   • simvastatin (ZOCOR) tablet 40 mg  40 mg Oral QHS Kaden Sosa M.D.   Stopped at 03/11/21 2100   • labetalol (NORMODYNE/TRANDATE) injection 10 mg  10 mg Intravenous Q4HRS PRN Kip Mcfadden M.D.       • hydrALAZINE (APRESOLINE) injection 10 mg  10 mg Intravenous Q4HRS PRN Kip Mcfadden M.D.       • enalaprilat (VASOTEC) injection 1.25 mg  1.25 mg Intravenous Q6HRS PRN Kip Mcfadden M.D.       • Respiratory Therapy Consult   Nebulization Continuous RT Pankaj Borjas M.D.       • LORazepam (ATIVAN) injection 2 mg  2 mg Intravenous Q5 MIN PRN Pankaj Borjas M.D.   2 mg at 03/12/21 0155   • acetaminophen (Tylenol) tablet 650 mg  650 mg Oral Q4HRS PRN Pankaj Borjas M.D.        Or   • acetaminophen (TYLENOL) suppository 650 mg  650 mg Rectal Q4HRS PRN Pankaj Borjas M.D.       • ondansetron (ZOFRAN ODT) dispertab 4 mg  4 mg Oral Q4HRS PRN Pankaj Borjas M.D.        Or   • ondansetron (ZOFRAN) syringe/vial injection 4 mg  4 mg Intravenous Q4HRS PRN Pankaj Borjas M.D.       • MD Alert...ICU Electrolyte Replacement per Pharmacy   Other PHARMACY TO DOSE Pankaj Borjas M.D.       • melatonin tablet 5 mg  5 mg Oral Nightly Jeremy Hussein D.O.   Stopped at 03/11/21 2100   • levETIRAcetam (Keppra) 1000 mg in 100 mL NaCl IV premix  1,000 mg Intravenous Q12HRS Kip Mcfadden M.D.   Stopped at 03/12/21 0519       Fluids    Intake/Output Summary (Last 24 hours) at 3/12/2021 1501  Last data filed at 3/12/2021 1400  Gross  per 24 hour   Intake 1648.97 ml   Output 2650 ml   Net -1001.03 ml       Laboratory          Recent Labs     03/11/21  0141 03/11/21  0541 03/12/21  0116   SODIUM 139 140 137   POTASSIUM 3.3*  --  4.4   CHLORIDE 105  --  102   CO2 24  --  26   BUN 17  --  15   CREATININE 1.00  --  1.08   MAGNESIUM 2.0  --  2.0   CALCIUM 8.8  --  9.4     Recent Labs     03/11/21 0141 03/12/21  0116   ALTSGPT 13 14   ASTSGOT 18 19   ALKPHOSPHAT 71 74   TBILIRUBIN 0.3 0.5   GLUCOSE 103* 89     Recent Labs     03/11/21 0141 03/12/21  0116   WBC 4.2* 4.7*   NEUTSPOLYS 56.10 60.20   LYMPHOCYTES 24.50 22.40   MONOCYTES 14.20* 12.70   EOSINOPHILS 4.30 4.10   BASOPHILS 0.70 0.40   ASTSGOT 18 19   ALTSGPT 13 14   ALKPHOSPHAT 71 74   TBILIRUBIN 0.3 0.5     Recent Labs     03/11/21 0141 03/12/21  0116   RBC 5.07 5.25   HEMOGLOBIN 14.1 14.6   HEMATOCRIT 43.9 46.1   PLATELETCT 186 191   PROTHROMBTM 13.8  --    APTT 29.6  --    INR 1.03  --        Imaging  CT:    Reviewed    Assessment/Plan  * Subdural hematoma, acute (HCC)  Assessment & Plan  Right minimal SDH w/o midline shift, stable per neurosurgery on repeat CT head  Pt is not on any anticoagulation.     q2 neurochecks  Keep SBP <150  NSG following    Seizure disorder, nonconvulsive, with status epilepticus (HCC)  Assessment & Plan  Nonconvulsive status epilepticus  -NO sz overnight - off eeg  -Keppra dose adjusted  -Vimpat increased  -Continue neurontin  -Continue IV divalproex qhs         VTE:  Not Indicated  Ulcer: Not Indicated  Lines: None    I have performed a physical exam and reviewed and updated ROS and Plan today (3/12/2021). In review of yesterday's note (3/11/2021), there are no changes except as documented above.     Discussed patient condition and risk of morbidity and/or mortality with RN, RT, Pharmacy, Code status disscussed, Charge nurse / hot rounds, Patient and neurosurgery  The patient remains critically ill.  Critical care time = 36 minutes in directly providing and  coordinating critical care and extensive data review.  No time overlap and excludes procedures.

## 2021-03-12 NOTE — PROGRESS NOTES
Dr. Wiggins at bedside and aware that patient is has had an increase in lethargy and not responding to questions.  MD orders: no new orders at this time, continue to monitor.

## 2021-03-12 NOTE — CARE PLAN
Problem: Safety  Goal: Will remain free from injury  Outcome: PROGRESSING AS EXPECTED     Problem: Safety:  Goal: Will remain free from injury  Outcome: PROGRESSING AS EXPECTED     Problem: Psychosocial Needs:  Goal: Ability to verbalize feelings about condition will improve  Outcome: PROGRESSING SLOWER THAN EXPECTED     Problem: Safety:  Goal: Will remain free from injury  Outcome: PROGRESSING AS EXPECTED     Problem: Psychosocial Needs:  Goal: Ability to verbalize feelings about condition will improve  Outcome: PROGRESSING SLOWER THAN EXPECTED  Goal: Ability to adjust to condition or change in health will improve  Outcome: PROGRESSING AS EXPECTED

## 2021-03-12 NOTE — PROGRESS NOTES
[facility] resident, Dr. Kelley, notified patient is having seizure like activity, hyperventilating, with rigid LUE and LLE, he is still following commands, repetitive movements of head. Given 2 mg of Lorazepam.   MD orders: No new orders at this time.     0050:   Dr. Wiggins at bedside.  No new orders at this time.

## 2021-03-13 ENCOUNTER — APPOINTMENT (OUTPATIENT)
Dept: RADIOLOGY | Facility: MEDICAL CENTER | Age: 76
DRG: 100 | End: 2021-03-13
Attending: STUDENT IN AN ORGANIZED HEALTH CARE EDUCATION/TRAINING PROGRAM
Payer: COMMERCIAL

## 2021-03-13 PROBLEM — G93.40 ENCEPHALOPATHY ACUTE: Status: ACTIVE | Noted: 2021-03-13

## 2021-03-13 LAB
ALBUMIN SERPL BCP-MCNC: 3.6 G/DL (ref 3.2–4.9)
ALBUMIN/GLOB SERPL: 1.3 G/DL
ALP SERPL-CCNC: 71 U/L (ref 30–99)
ALT SERPL-CCNC: 13 U/L (ref 2–50)
ANION GAP SERPL CALC-SCNC: 10 MMOL/L (ref 7–16)
AST SERPL-CCNC: 23 U/L (ref 12–45)
BASOPHILS # BLD AUTO: 0.3 % (ref 0–1.8)
BASOPHILS # BLD: 0.02 K/UL (ref 0–0.12)
BILIRUB SERPL-MCNC: 0.5 MG/DL (ref 0.1–1.5)
BUN SERPL-MCNC: 14 MG/DL (ref 8–22)
CALCIUM SERPL-MCNC: 9.2 MG/DL (ref 8.5–10.5)
CHLORIDE SERPL-SCNC: 106 MMOL/L (ref 96–112)
CO2 SERPL-SCNC: 24 MMOL/L (ref 20–33)
CREAT SERPL-MCNC: 1.05 MG/DL (ref 0.5–1.4)
EOSINOPHIL # BLD AUTO: 0.22 K/UL (ref 0–0.51)
EOSINOPHIL NFR BLD: 3.8 % (ref 0–6.9)
ERYTHROCYTE [DISTWIDTH] IN BLOOD BY AUTOMATED COUNT: 45.9 FL (ref 35.9–50)
GLOBULIN SER CALC-MCNC: 2.7 G/DL (ref 1.9–3.5)
GLUCOSE SERPL-MCNC: 95 MG/DL (ref 65–99)
HCT VFR BLD AUTO: 45.2 % (ref 42–52)
HGB BLD-MCNC: 14.9 G/DL (ref 14–18)
IMM GRANULOCYTES # BLD AUTO: 0.02 K/UL (ref 0–0.11)
IMM GRANULOCYTES NFR BLD AUTO: 0.3 % (ref 0–0.9)
LEVETIRACETAM SERPL-MCNC: 18 UG/ML (ref 12–46)
LYMPHOCYTES # BLD AUTO: 1.18 K/UL (ref 1–4.8)
LYMPHOCYTES NFR BLD: 20.3 % (ref 22–41)
MAGNESIUM SERPL-MCNC: 1.9 MG/DL (ref 1.5–2.5)
MCH RBC QN AUTO: 28.3 PG (ref 27–33)
MCHC RBC AUTO-ENTMCNC: 33 G/DL (ref 33.7–35.3)
MCV RBC AUTO: 85.9 FL (ref 81.4–97.8)
MONOCYTES # BLD AUTO: 0.75 K/UL (ref 0–0.85)
MONOCYTES NFR BLD AUTO: 12.9 % (ref 0–13.4)
NEUTROPHILS # BLD AUTO: 3.62 K/UL (ref 1.82–7.42)
NEUTROPHILS NFR BLD: 62.4 % (ref 44–72)
NRBC # BLD AUTO: 0 K/UL
NRBC BLD-RTO: 0 /100 WBC
PLATELET # BLD AUTO: 184 K/UL (ref 164–446)
PMV BLD AUTO: 10.1 FL (ref 9–12.9)
POTASSIUM SERPL-SCNC: 4 MMOL/L (ref 3.6–5.5)
PROT SERPL-MCNC: 6.3 G/DL (ref 6–8.2)
RBC # BLD AUTO: 5.26 M/UL (ref 4.7–6.1)
SODIUM SERPL-SCNC: 140 MMOL/L (ref 135–145)
WBC # BLD AUTO: 5.8 K/UL (ref 4.8–10.8)

## 2021-03-13 PROCEDURE — 94760 N-INVAS EAR/PLS OXIMETRY 1: CPT

## 2021-03-13 PROCEDURE — A9270 NON-COVERED ITEM OR SERVICE: HCPCS | Performed by: STUDENT IN AN ORGANIZED HEALTH CARE EDUCATION/TRAINING PROGRAM

## 2021-03-13 PROCEDURE — 700102 HCHG RX REV CODE 250 W/ 637 OVERRIDE(OP): Performed by: STUDENT IN AN ORGANIZED HEALTH CARE EDUCATION/TRAINING PROGRAM

## 2021-03-13 PROCEDURE — 770006 HCHG ROOM/CARE - MED/SURG/GYN SEMI*

## 2021-03-13 PROCEDURE — C9254 INJECTION, LACOSAMIDE: HCPCS | Performed by: PSYCHIATRY & NEUROLOGY

## 2021-03-13 PROCEDURE — 85025 COMPLETE CBC W/AUTO DIFF WBC: CPT

## 2021-03-13 PROCEDURE — 74018 RADEX ABDOMEN 1 VIEW: CPT

## 2021-03-13 PROCEDURE — 99233 SBSQ HOSP IP/OBS HIGH 50: CPT | Performed by: HOSPITALIST

## 2021-03-13 PROCEDURE — 700111 HCHG RX REV CODE 636 W/ 250 OVERRIDE (IP): Performed by: PSYCHIATRY & NEUROLOGY

## 2021-03-13 PROCEDURE — 80053 COMPREHEN METABOLIC PANEL: CPT

## 2021-03-13 PROCEDURE — 92526 ORAL FUNCTION THERAPY: CPT

## 2021-03-13 PROCEDURE — 700105 HCHG RX REV CODE 258: Performed by: PSYCHIATRY & NEUROLOGY

## 2021-03-13 PROCEDURE — 700102 HCHG RX REV CODE 250 W/ 637 OVERRIDE(OP): Performed by: HOSPITALIST

## 2021-03-13 PROCEDURE — 302242 IV POLE

## 2021-03-13 PROCEDURE — 83735 ASSAY OF MAGNESIUM: CPT

## 2021-03-13 PROCEDURE — A9270 NON-COVERED ITEM OR SERVICE: HCPCS | Performed by: HOSPITALIST

## 2021-03-13 RX ORDER — MAGNESIUM SULFATE HEPTAHYDRATE 40 MG/ML
2 INJECTION, SOLUTION INTRAVENOUS ONCE
Status: COMPLETED | OUTPATIENT
Start: 2021-03-13 | End: 2021-03-13

## 2021-03-13 RX ORDER — LORAZEPAM 2 MG/ML
1 INJECTION INTRAMUSCULAR ONCE
Status: ACTIVE | OUTPATIENT
Start: 2021-03-13 | End: 2021-03-14

## 2021-03-13 RX ORDER — OXYCODONE HYDROCHLORIDE 5 MG/1
5-10 TABLET ORAL EVERY 4 HOURS PRN
Status: DISCONTINUED | OUTPATIENT
Start: 2021-03-13 | End: 2021-03-18

## 2021-03-13 RX ADMIN — QUETIAPINE FUMARATE 150 MG: 100 TABLET ORAL at 20:52

## 2021-03-13 RX ADMIN — MAGNESIUM SULFATE 2 G: 2 INJECTION INTRAVENOUS at 08:07

## 2021-03-13 RX ADMIN — SODIUM CHLORIDE 200 MG: 9 INJECTION, SOLUTION INTRAVENOUS at 22:33

## 2021-03-13 RX ADMIN — LEVETIRACETAM INJECTION 1000 MG: 10 INJECTION INTRAVENOUS at 05:43

## 2021-03-13 RX ADMIN — LEVETIRACETAM INJECTION 1000 MG: 10 INJECTION INTRAVENOUS at 17:36

## 2021-03-13 RX ADMIN — Medication 5 MG: at 20:52

## 2021-03-13 RX ADMIN — SODIUM CHLORIDE 750 MG: 9 INJECTION, SOLUTION INTRAVENOUS at 21:04

## 2021-03-13 RX ADMIN — GABAPENTIN 300 MG: 300 CAPSULE ORAL at 17:36

## 2021-03-13 RX ADMIN — DOCUSATE SODIUM 50 MG AND SENNOSIDES 8.6 MG 2 TABLET: 8.6; 5 TABLET, FILM COATED ORAL at 17:36

## 2021-03-13 RX ADMIN — BISACODYL 10 MG: 10 SUPPOSITORY RECTAL at 14:52

## 2021-03-13 RX ADMIN — SODIUM CHLORIDE 200 MG: 9 INJECTION, SOLUTION INTRAVENOUS at 06:29

## 2021-03-13 RX ADMIN — SIMVASTATIN 40 MG: 20 TABLET, FILM COATED ORAL at 20:52

## 2021-03-13 ASSESSMENT — PAIN DESCRIPTION - PAIN TYPE
TYPE: ACUTE PAIN

## 2021-03-13 ASSESSMENT — COGNITIVE AND FUNCTIONAL STATUS - GENERAL
WALKING IN HOSPITAL ROOM: A LITTLE
STANDING UP FROM CHAIR USING ARMS: A LITTLE
MOBILITY SCORE: 20
HELP NEEDED FOR BATHING: A LOT
EATING MEALS: TOTAL
CLIMB 3 TO 5 STEPS WITH RAILING: A LITTLE
PERSONAL GROOMING: A LITTLE
DRESSING REGULAR UPPER BODY CLOTHING: A LITTLE
DAILY ACTIVITIY SCORE: 14
TOILETING: A LOT
TURNING FROM BACK TO SIDE WHILE IN FLAT BAD: A LITTLE
DRESSING REGULAR LOWER BODY CLOTHING: A LITTLE
SUGGESTED CMS G CODE MODIFIER DAILY ACTIVITY: CK
SUGGESTED CMS G CODE MODIFIER MOBILITY: CJ

## 2021-03-13 ASSESSMENT — FIBROSIS 4 INDEX
FIB4 SCORE: 2.6
FIB4 SCORE: 2.6

## 2021-03-13 NOTE — PROGRESS NOTES
"Pt started having panic attack at 2130hrs after nurse apprentice and writer attempted to administer oral meds. Unable to administer oral meds as pt refusing to swallow. Stating over and over \"it's not plugged in.\" With multiple attempts to administer meds and pt coughing, med administration stopped. PO meds held at this time. Pt turning head side to side and restless legs. Episode lasting 45min, therefore Hydroxyzine given.  "

## 2021-03-13 NOTE — PROGRESS NOTES
Patient tacypneic, tearful, tachycardic. Daughter at bedside. Emotional support provided. Encouraged slow, deep breaths.     Notified Dr. Weller of assessment. MD to order medications.

## 2021-03-13 NOTE — PROGRESS NOTES
Hospital Medicine Daily Progress Note    Date of Service  3/13/2021    Chief Complaint  75 y.o. male admitted 3/11/2021 with seizure    Hospital Course  History of cerebellar CVA, seizure disorder, presented as a transfer from [facility] after he was found to have right parietal SDH without any midline shift's.  Witnessed seizure by family followed by changes in mental status.  Neurosurgery consulted Dr. Capps, no surgical intervention, recommend repeat CT head and frequent neuro checks.  Admit to the ICU every hour neuro checks, repeat head CT, keep SBP less than 140, continue Depakote and Keppra.  Due to ongoing encephalopathy, he had an EEG and was found to be in non-convulsive status epilepticus and was loaded with Vimpat.    Interval Problem Update  3/13: patient seen and evaluated in the ICU. I discussed with his RN this morning. She states that he ate dinner well then appeared to be aspirating on his pills thus was made NPO. He reportedly became agitated and was given IV Haldol and placed in soft wrist restraints. He knows we are at [facility] and his age but is quite sleepy.     Consultants/Specialty  Critical care. I discussed with Dr. Mcfadden  neurosurgery    Code Status  DNAR/DNI    Disposition  neuro    Review of Systems  Review of Systems   Unable to perform ROS: Mental acuity        Physical Exam  Temp:  [36.3 °C (97.4 °F)-37.1 °C (98.8 °F)] 36.4 °C (97.6 °F)  Pulse:  [] 77  Resp:  [14-59] 30  BP: (126-167)/(58-87) 141/69  SpO2:  [81 %-100 %] 98 %    Physical Exam  Vitals and nursing note reviewed.   Constitutional:       General: He is not in acute distress.     Appearance: He is not ill-appearing.   HENT:      Head: Normocephalic and atraumatic.      Mouth/Throat:      Mouth: Mucous membranes are dry.      Pharynx: Oropharynx is clear.   Eyes:      General: No scleral icterus.     Conjunctiva/sclera: Conjunctivae normal.   Cardiovascular:      Rate and Rhythm: Normal rate and regular rhythm.    Pulmonary:      Effort: Pulmonary effort is normal. No respiratory distress.      Breath sounds: Normal breath sounds.   Abdominal:      General: There is no distension.      Tenderness: There is no abdominal tenderness.   Musculoskeletal:      Cervical back: Normal range of motion and neck supple.      Right lower leg: No edema.      Left lower leg: No edema.   Skin:     General: Skin is warm and dry.   Neurological:      Mental Status: He is alert.      Comments: He moves his extremities equally  Speech is intelligible          Fluids    Intake/Output Summary (Last 24 hours) at 3/13/2021 0705  Last data filed at 3/13/2021 0600  Gross per 24 hour   Intake 780 ml   Output 2500 ml   Net -1720 ml       Laboratory  Recent Labs     03/11/21 0141 03/12/21 0116 03/13/21 0349   WBC 4.2* 4.7* 5.8   RBC 5.07 5.25 5.26   HEMOGLOBIN 14.1 14.6 14.9   HEMATOCRIT 43.9 46.1 45.2   MCV 86.6 87.8 85.9   MCH 27.8 27.8 28.3   MCHC 32.1* 31.7* 33.0*   RDW 47.3 48.4 45.9   PLATELETCT 186 191 184   MPV 10.2 10.4 10.1     Recent Labs     03/11/21 0141 03/11/21 0141 03/11/21  0541 03/12/21 0116 03/13/21 0349   SODIUM 139   < > 140 137 140   POTASSIUM 3.3*  --   --  4.4 4.0   CHLORIDE 105  --   --  102 106   CO2 24  --   --  26 24   GLUCOSE 103*  --   --  89 95   BUN 17  --   --  15 14   CREATININE 1.00  --   --  1.08 1.05   CALCIUM 8.8  --   --  9.4 9.2    < > = values in this interval not displayed.     Recent Labs     03/11/21 0141   APTT 29.6   INR 1.03         Recent Labs     03/11/21 0141   TRIGLYCERIDE 228*   HDL 28*   LDL 32       Imaging  [id number] VIEW   Final Result         No specific finding to suggest small bowel obstruction.      CT-HEAD W/O   Final Result         1.  Mixed right parietal subdural fluid collection likely acute on chronic subdural hematoma.   2.  Nonspecific white matter changes, commonly associated with small vessel ischemic disease.  Associated mild cerebral atrophy is noted.   3.   Atherosclerosis.      OUTSIDE IMAGES-CT HEAD   Final Result           Assessment/Plan  * Seizure disorder, nonconvulsive, with status epilepticus (HCC)- (present on admission)  Assessment & Plan  Noted on EEG (see below)  He was loaded with 4 grams of IV Keppra and initiated on IV Vimpat  Continue depakote and neurontin  Change Keppra to oral as well as Vimpat once he passes his swallow test  Outpatient follow up in the epilepsy clinic has been arranged by Dr. Mcfadden      EEG 3/11/21:  This is an abnormal continuous video electroencephalogram recording in the awake, drowsy and sleep state. Intermittent slowing and frequent sharps noted in the right frontotemporal region. Frequent runs of frontal intermittent rhythmic delta activity (FIRDA) noted, most pronounced at times on the right frontal region, some lasting over ten seconds in duration and suggestive of focal seizures in the right hemisphere. Then, worsening of the study noted with appearance of generalized rhythmic sharp activity lasting for several minutes in duration, suggestive of an episode of non convulsive status epilepticus. Significant improvement of the eeg after patient receiving Lorazepam, and further adjustments in anti-seizure medications, and although the study remains abnormal with rare and brief runs of right FIRDA, no further seizures were captured overnight. The patient remains at risk for seizures. The findings suggest underlying areas of increased cortical irritability and structural abnormality.  Clinical and radiological correlation is recommended.    Subdural hematoma, acute (HCC)- (present on admission)  Assessment & Plan  Noted on CT head  Non-traumatic  Neurosurgery Dr. 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

## 2021-03-13 NOTE — CARE PLAN
Problem: Safety  Goal: Will remain free from injury  Outcome: PROGRESSING AS EXPECTED  Goal: Will remain free from falls  Outcome: PROGRESSING AS EXPECTED     Problem: Safety:  Goal: Will remain free from injury  Outcome: PROGRESSING AS EXPECTED     Problem: Urinary:  Goal: Ability to reestablish a normal urinary elimination pattern will improve  Outcome: PROGRESSING AS EXPECTED     Problem: Safety:  Goal: Will remain free from injury  Outcome: PROGRESSING AS EXPECTED     Problem: Safety - Medical Restraint  Goal: Remains free of injury from restraints (Restraint for Interference with Medical Device)  Description: INTERVENTIONS:  1. Determine that other, less restrictive measures have been tried or would not be effective before applying the restraint  2. Evaluate the patient's condition at the time of restraint application  3. Inform patient/family regarding the reason for restraint  4. Q2H: Monitor safety, psychosocial status, comfort, nutrition and hydration  Outcome: PROGRESSING AS EXPECTED  Goal: Free from restraint(s) (Restraint for Interference with Medical Device)  Description: INTERVENTIONS:  1. ONCE/SHIFT or MINIMUM Q12H: Assess and document the continuing need for restraints  2. Q24H: Continued use of restraint requires LIP to perform face to face examination and written order  3. Identify and implement measures to help patient regain control  Outcome: PROGRESSING AS EXPECTED     Problem: Urinary Elimination:  Goal: Ability to reestablish a normal urinary elimination pattern will improve  Outcome: PROGRESSING AS EXPECTED     Problem: Communication  Goal: The ability to communicate needs accurately and effectively will improve  Outcome: PROGRESSING SLOWER THAN EXPECTED     Problem: Psychosocial Needs:  Goal: Level of anxiety will decrease  Outcome: PROGRESSING SLOWER THAN EXPECTED

## 2021-03-13 NOTE — ASSESSMENT & PLAN NOTE
Noted on EEG on admission  He was loaded with IV Keppra, started on IV Vimpat, and IV depakote  Repeat EEG 3/15 without evidence of seizure and more consistent with encephalopathy  Neurology initially following  AED regimen: Keppra 1000 mg twice daily, Vimpat 200 mg twice daily, gabapentin 300 mg twice daily, Depakote 500 mg every morning and 750 mg every afternoon  Last Depakote level 3/25

## 2021-03-13 NOTE — PROGRESS NOTES
Called by nurse patient suffers from chronic panic attacks and per daughter patient has respond well to Haldol no help with hydroxyzine perscribed    Will try one time dose of Haldol 2mg IV to see if favorable response.     Adeel Wiggins MD  Critical Care Medicine

## 2021-03-13 NOTE — PROGRESS NOTES
Cleo called unit for update. At present time Haldol seems to be starting to calm pt down. Cleo made aware of same.

## 2021-03-13 NOTE — PROGRESS NOTES
Late entry.    Pt's daughter Cleo called unit at 2310hrs for update on pt. Made aware that pt was unable to have night time meds due to choking on crushed meds in applesauce and pt's confusion. Also, made aware that pt having panic attack that is similar to chronic panic attacks. Hydroxyzine not effective for same. Cleo stated Haldol has been effective in the past but she is not aware of the dose. Writer reassured Cleo that physician will be contacted for further med orders.    At 2313hrs,  contacted to page physician on call.    At 2318hrs, Dr. Wiggins contacted unit. Updated on event above. New order received for Haldol.

## 2021-03-13 NOTE — PROGRESS NOTES
Dr. Wiggins notified via [employer] that Haldol has been ineffective to control pt's panic attack. Awaiting a read response.

## 2021-03-13 NOTE — ASSESSMENT & PLAN NOTE
Noted on CT head  Non-traumatic  Neurosurgery consulted, non-operative management  S/p right-sided craniotomy for evacuation of subdural hematoma by neurosurgery almost one year ago  Avoid NSAIDs and anticoagulation  Hospital has accepted the patient and likely to discharge the patient home on Friday with hospice care

## 2021-03-13 NOTE — THERAPY
Speech Language Pathology  Daily Treatment     Patient Name: Rafael Mccoy  Age:  75 y.o., Sex:  male  Medical Record #: [id number]  Today's Date: 3/13/2021     Precautions  Precautions: Swallow Precautions ( See Comments)  Comments: Pt is legally blind    Assessment    Patient seen on this date following episode last night with patient aspirating and not trigger a swallow during medications last night. Upon arrival of SLP, the patient was oriented to self only with disorientation to all other spheres. He was provided PO trials of minced moist meal tray, pureed and liquidized items and mildly thick liquids. The patient consumed minced moist textures with prolonged mastication but otherwise showed no overt s/sx of aspiration. Swallow trigger was slightly delayed. Vocal quality sounded clear throughout trials. Increased fatigue appreciated as trials progressed. Recommend downgrade to liquidized textures with mildly thick liquids d/t patients waxing and waning cognitive status and delayed swallow trigger concerning for aspiration. Continue 1:1 direct supervision during meals with adherence to swallow strategies including PO only when awake/alert, upright at 90* for PO, alternate liquids and solids, slow rate, small bites. Meds crushed or floated in puree.    Plan    Continue current treatment plan.    Discharge Recommendations: Recommend post-acute placement for additional speech therapy services prior to discharge home     Objective       03/13/21 0923   Dysphagia    Positioning / Behavior Modification Self Monitoring;Modulate Rate or Bite Size   Other Treatments PO trials of MT2, MM5, PU4, LQ3   Diet / Liquid Recommendation Mildly Thick (2) - (Nectar Thick);Liquidised (3) - (Nectar Thick Full Liquid)   Nutritional Liquid Intake Rating Scale Thickened beverages (mildly thick unless otherwise specified)   Nutritional Food Intake Rating Scale Total oral diet of a single consistency   Nursing Communication Swallow  "Precaution Sign Posted at Head of Bed   Skilled Intervention Compensatory Strategies;Verbal Cueing   Recommended Route of Medication Administration   Medication Administration  Crush all Medications in Puree;Float Whole with Puree   Patient / Family Goals   Patient / Family Goal #1 \"I am ravenous\"   Goal #1 Outcome Progressing slower than expected   Short Term Goals   Short Term Goal # 2 NEW 3/13: Pt will consume LQ3/MT2 diet with no overt s/sx of aspiration          "

## 2021-03-13 NOTE — ASSESSMENT & PLAN NOTE
Unclear etiology. Per daughter he was having significant psychosis with delusions and agitation while at [facility]. Per her report his mentation significantly cleared in Jan 2021 after he returned home and has been mentally clear. She feels that quetiapine has been very helpful.    Continue quetiapine 150 mg po nightly.

## 2021-03-13 NOTE — ASSESSMENT & PLAN NOTE
Acute encephalopathy likely multifactorial, persistent  In setting of dementia with behavioral disturbance, history of psychiatric disorder, CVA, and seizure disorder in status epilepticus on admission  Continue Seroquel 25mg TID prn for agitation  on gabapentin

## 2021-03-13 NOTE — PROGRESS NOTES
At 0538hrs, resident made aware of panic attack. New order for Ativan received. At 0555hrs, as writer was about to administer Ativan, pt had two episodes of Apnea where he desatted to 72%. Each lasting about 5 seconds in duration. Pt easily arousable when writer into room and SpO2 back up to 96%. Resident made aware of same and Ativan order questioned by writer. Writer instructed to hold Ativan at current time until pt seen by day shift resident. Ativan held.

## 2021-03-14 ENCOUNTER — HOSPITAL ENCOUNTER (INPATIENT)
Facility: REHABILITATION | Age: 76
End: 2021-03-14
Attending: PHYSICAL MEDICINE & REHABILITATION | Admitting: PHYSICAL MEDICINE & REHABILITATION
Payer: COMMERCIAL

## 2021-03-14 PROCEDURE — A9270 NON-COVERED ITEM OR SERVICE: HCPCS | Performed by: STUDENT IN AN ORGANIZED HEALTH CARE EDUCATION/TRAINING PROGRAM

## 2021-03-14 PROCEDURE — 700102 HCHG RX REV CODE 250 W/ 637 OVERRIDE(OP): Performed by: STUDENT IN AN ORGANIZED HEALTH CARE EDUCATION/TRAINING PROGRAM

## 2021-03-14 PROCEDURE — 700111 HCHG RX REV CODE 636 W/ 250 OVERRIDE (IP): Performed by: PSYCHIATRY & NEUROLOGY

## 2021-03-14 PROCEDURE — 700105 HCHG RX REV CODE 258: Performed by: PSYCHIATRY & NEUROLOGY

## 2021-03-14 PROCEDURE — C9254 INJECTION, LACOSAMIDE: HCPCS | Performed by: PSYCHIATRY & NEUROLOGY

## 2021-03-14 PROCEDURE — 99233 SBSQ HOSP IP/OBS HIGH 50: CPT | Performed by: STUDENT IN AN ORGANIZED HEALTH CARE EDUCATION/TRAINING PROGRAM

## 2021-03-14 PROCEDURE — 770006 HCHG ROOM/CARE - MED/SURG/GYN SEMI*

## 2021-03-14 PROCEDURE — 94760 N-INVAS EAR/PLS OXIMETRY 1: CPT

## 2021-03-14 RX ADMIN — SODIUM CHLORIDE 200 MG: 9 INJECTION, SOLUTION INTRAVENOUS at 04:47

## 2021-03-14 RX ADMIN — LEVETIRACETAM INJECTION 1000 MG: 10 INJECTION INTRAVENOUS at 18:00

## 2021-03-14 RX ADMIN — DOCUSATE SODIUM 50 MG AND SENNOSIDES 8.6 MG 2 TABLET: 8.6; 5 TABLET, FILM COATED ORAL at 18:00

## 2021-03-14 RX ADMIN — SODIUM CHLORIDE 200 MG: 9 INJECTION, SOLUTION INTRAVENOUS at 18:33

## 2021-03-14 RX ADMIN — LEVETIRACETAM INJECTION 1000 MG: 10 INJECTION INTRAVENOUS at 04:24

## 2021-03-14 RX ADMIN — GABAPENTIN 300 MG: 300 CAPSULE ORAL at 18:00

## 2021-03-14 ASSESSMENT — COGNITIVE AND FUNCTIONAL STATUS - GENERAL
SUGGESTED CMS G CODE MODIFIER DAILY ACTIVITY: CL
DRESSING REGULAR LOWER BODY CLOTHING: A LOT
STANDING UP FROM CHAIR USING ARMS: A LOT
EATING MEALS: TOTAL
MOVING FROM LYING ON BACK TO SITTING ON SIDE OF FLAT BED: A LOT
SUGGESTED CMS G CODE MODIFIER MOBILITY: CL
DRESSING REGULAR UPPER BODY CLOTHING: A LOT
MOBILITY SCORE: 12
TURNING FROM BACK TO SIDE WHILE IN FLAT BAD: A LOT
CLIMB 3 TO 5 STEPS WITH RAILING: A LOT
HELP NEEDED FOR BATHING: A LOT
DAILY ACTIVITIY SCORE: 9
TOILETING: TOTAL
WALKING IN HOSPITAL ROOM: A LOT
PERSONAL GROOMING: TOTAL
MOVING TO AND FROM BED TO CHAIR: A LOT

## 2021-03-14 NOTE — CARE PLAN
Problem: Safety  Goal: Will remain free from falls  Outcome: PROGRESSING AS EXPECTED  Patient has his bed alarm on and soft wrist restraints on to prevent him from getting up out of bed unassisted.     Problem: Bowel/Gastric:  Goal: Normal bowel function is maintained or improved  Outcome: PROGRESSING AS EXPECTED  Patient had a bowel movement this shift.

## 2021-03-14 NOTE — DISCHARGE PLANNING
Following for post acute services. RPG to consult 03/15. Please have PT/OT update as medically appropriate.

## 2021-03-14 NOTE — CARE PLAN
Problem: Safety  Goal: Will remain free from injury  Outcome: PROGRESSING AS EXPECTED     Problem: Safety:  Goal: Will remain free from injury  Outcome: PROGRESSING AS EXPECTED     Problem: Safety:  Goal: Will remain free from injury  Outcome: PROGRESSING AS EXPECTED     Problem: Communication  Goal: The ability to communicate needs accurately and effectively will improve  Outcome: PROGRESSING SLOWER THAN EXPECTED     Problem: Knowledge Deficit  Goal: Knowledge of the prescribed therapeutic regimen will improve  Outcome: PROGRESSING SLOWER THAN EXPECTED     Problem: Knowledge Deficit:  Goal: Knowledge of the prescribed therapeutic regimen will improve  Outcome: PROGRESSING SLOWER THAN EXPECTED

## 2021-03-14 NOTE — PROGRESS NOTES
Hospital Medicine Daily Progress Note    Date of Service  3/14/2021    Chief Complaint  75 y.o. male admitted 3/11/2021 with seizure    Hospital Course  History of cerebellar CVA, seizure disorder, presented as a transfer from [facility] after he was found to have right parietal SDH without any midline shift's.  Witnessed seizure by family followed by changes in mental status.  Neurosurgery consulted Dr. Capps, no surgical intervention, recommend repeat CT head and frequent neuro checks.  Admit to the ICU every hour neuro checks, repeat head CT, keep SBP less than 140, continue Depakote and Keppra.  Due to ongoing encephalopathy, he had an EEG and was found to be in non-convulsive status epilepticus and was loaded with Vimpat.    Interval Problem Update  Seen and evaluated at bedside.  I discussed the patient with the outgoing hospitalist, Dr. Alcazar.  Patient remains sleepy this morning, though, again, does state we are at [facility].  Not oriented to time, situation.    Consultants/Specialty  Critical care.   Neurosurgery    Code Status  DNAR/DNI    Disposition  neuro    Review of Systems  Review of Systems   Unable to perform ROS: Mental acuity        Physical Exam  Temp:  [36.1 °C (97 °F)-36.9 °C (98.5 °F)] 36.2 °C (97.2 °F)  Pulse:  [] 80  Resp:  [16-56] 16  BP: (103-151)/(50-91) 110/50  SpO2:  [98 %-100 %] 100 %    Physical Exam  Vitals and nursing note reviewed.   Constitutional:       General: He is not in acute distress.     Appearance: He is not ill-appearing.   HENT:      Head: Normocephalic and atraumatic.      Mouth/Throat:      Mouth: Mucous membranes are dry.      Pharynx: Oropharynx is clear.   Eyes:      General: No scleral icterus.     Conjunctiva/sclera: Conjunctivae normal.   Cardiovascular:      Rate and Rhythm: Normal rate and regular rhythm.   Pulmonary:      Effort: Pulmonary effort is normal. No respiratory distress.      Breath sounds: Normal breath sounds.   Abdominal:      General:  There is no distension.      Tenderness: There is no abdominal tenderness.   Musculoskeletal:      Cervical back: Normal range of motion and neck supple.      Right lower leg: No edema.      Left lower leg: No edema.   Skin:     General: Skin is warm and dry.   Neurological:      Mental Status: He is alert.      Comments: He moves his extremities equally.  Speech intelligible.         Fluids  No intake or output data in the 24 hours ending 03/14/21 0921    Laboratory  Recent Labs     03/12/21 0116 03/13/21  0349   WBC 4.7* 5.8   RBC 5.25 5.26   HEMOGLOBIN 14.6 14.9   HEMATOCRIT 46.1 45.2   MCV 87.8 85.9   MCH 27.8 28.3   MCHC 31.7* 33.0*   RDW 48.4 45.9   PLATELETCT 191 184   MPV 10.4 10.1     Recent Labs     03/12/21 0116 03/13/21  0349   SODIUM 137 140   POTASSIUM 4.4 4.0   CHLORIDE 102 106   CO2 26 24   GLUCOSE 89 95   BUN 15 14   CREATININE 1.08 1.05   CALCIUM 9.4 9.2                   Imaging  [id number] VIEW   Final Result         No specific finding to suggest small bowel obstruction.      CT-HEAD W/O   Final Result         1.  Mixed right parietal subdural fluid collection likely acute on chronic subdural hematoma.   2.  Nonspecific white matter changes, commonly associated with small vessel ischemic disease.  Associated mild cerebral atrophy is noted.   3.  Atherosclerosis.      OUTSIDE IMAGES-CT HEAD   Final Result           Assessment/Plan  * Seizure disorder, nonconvulsive, with status epilepticus (HCC)- (present on admission)  Assessment & Plan  Noted on EEG (see below)  He was loaded with 4 grams of IV Keppra and initiated on IV Vimpat  Continue depakote and neurontin  Change Keppra to oral as well as Vimpat once he passes his swallow test  Outpatient follow up in the epilepsy clinic has been arranged by Dr. Mcfadden  If patient has continued seizure like activity or if his mentation appears to decline further, may need repeat EEG.      EEG 3/11/21:  This is an abnormal continuous video  electroencephalogram recording in the awake, drowsy and sleep state. Intermittent slowing and frequent sharps noted in the right frontotemporal region. Frequent runs of frontal intermittent rhythmic delta activity (FIRDA) noted, most pronounced at times on the right frontal region, some lasting over ten seconds in duration and suggestive of focal seizures in the right hemisphere. Then, worsening of the study noted with appearance of generalized rhythmic sharp activity lasting for several minutes in duration, suggestive of an episode of non convulsive status epilepticus. Significant improvement of the eeg after patient receiving Lorazepam, and further adjustments in anti-seizure medications, and although the study remains abnormal with rare and brief runs of right FIRDA, no further seizures were captured overnight. The patient remains at risk for seizures. The findings suggest underlying areas of increased cortical irritability and structural abnormality.  Clinical and radiological correlation is recommended.    Subdural hematoma, acute (HCC)- (present on admission)  Assessment & Plan  Noted on CT head  Non-traumatic  Neurosurgery Dr. Mendiola consulted  Non-operative management     Hypokalemia  Assessment & Plan  Keep K > 4.  Monitor BMP.       Seizures- (present on admission)  Assessment & Plan  - Patient is known to have seizure disorder on Keppra and Depakote      Encephalopathy acute- (present on admission)  Assessment & Plan  Secondary to status epilepticus     Dementia associated with other underlying disease with behavioral disturbance (HCC)- (present on admission)  Assessment & Plan  Reported history of    Legally blind- (present on admission)  Assessment & Plan  - Patient legally blind for more than 12 years per patient  - Lives at [facility]    Essential hypertension- (present on admission)  Assessment & Plan  Reported history of though had not been on medications prior to admit  Refrain from  initiating BP meds for now as his blood pressure had fluctuated       VTE prophylaxis: SCDs only secondary to SDH.

## 2021-03-15 ENCOUNTER — APPOINTMENT (OUTPATIENT)
Dept: RADIOLOGY | Facility: MEDICAL CENTER | Age: 76
DRG: 100 | End: 2021-03-15
Attending: STUDENT IN AN ORGANIZED HEALTH CARE EDUCATION/TRAINING PROGRAM
Payer: COMMERCIAL

## 2021-03-15 LAB
ANION GAP SERPL CALC-SCNC: 12 MMOL/L (ref 7–16)
BASOPHILS # BLD AUTO: 0.3 % (ref 0–1.8)
BASOPHILS # BLD: 0.02 K/UL (ref 0–0.12)
BUN SERPL-MCNC: 18 MG/DL (ref 8–22)
CALCIUM SERPL-MCNC: 9.3 MG/DL (ref 8.5–10.5)
CHLORIDE SERPL-SCNC: 105 MMOL/L (ref 96–112)
CO2 SERPL-SCNC: 23 MMOL/L (ref 20–33)
CREAT SERPL-MCNC: 1.05 MG/DL (ref 0.5–1.4)
EOSINOPHIL # BLD AUTO: 0.16 K/UL (ref 0–0.51)
EOSINOPHIL NFR BLD: 2.7 % (ref 0–6.9)
ERYTHROCYTE [DISTWIDTH] IN BLOOD BY AUTOMATED COUNT: 46.3 FL (ref 35.9–50)
GLUCOSE SERPL-MCNC: 103 MG/DL (ref 65–99)
HCT VFR BLD AUTO: 46.7 % (ref 42–52)
HGB BLD-MCNC: 15.5 G/DL (ref 14–18)
IMM GRANULOCYTES # BLD AUTO: 0.02 K/UL (ref 0–0.11)
IMM GRANULOCYTES NFR BLD AUTO: 0.3 % (ref 0–0.9)
LYMPHOCYTES # BLD AUTO: 1.14 K/UL (ref 1–4.8)
LYMPHOCYTES NFR BLD: 19 % (ref 22–41)
MCH RBC QN AUTO: 28.4 PG (ref 27–33)
MCHC RBC AUTO-ENTMCNC: 33.2 G/DL (ref 33.7–35.3)
MCV RBC AUTO: 85.7 FL (ref 81.4–97.8)
MONOCYTES # BLD AUTO: 0.87 K/UL (ref 0–0.85)
MONOCYTES NFR BLD AUTO: 14.5 % (ref 0–13.4)
NEUTROPHILS # BLD AUTO: 3.8 K/UL (ref 1.82–7.42)
NEUTROPHILS NFR BLD: 63.2 % (ref 44–72)
NRBC # BLD AUTO: 0 K/UL
NRBC BLD-RTO: 0 /100 WBC
PLATELET # BLD AUTO: 200 K/UL (ref 164–446)
PMV BLD AUTO: 10.6 FL (ref 9–12.9)
POTASSIUM SERPL-SCNC: 4.2 MMOL/L (ref 3.6–5.5)
PROLACTIN SERPL-MCNC: 12.6 NG/ML (ref 2.1–17.7)
RBC # BLD AUTO: 5.45 M/UL (ref 4.7–6.1)
SODIUM SERPL-SCNC: 140 MMOL/L (ref 135–145)
WBC # BLD AUTO: 6 K/UL (ref 4.8–10.8)

## 2021-03-15 PROCEDURE — 94760 N-INVAS EAR/PLS OXIMETRY 1: CPT

## 2021-03-15 PROCEDURE — 4A10X4Z MONITORING OF CENTRAL NERVOUS ELECTRICAL ACTIVITY, EXTERNAL APPROACH: ICD-10-PCS | Performed by: STUDENT IN AN ORGANIZED HEALTH CARE EDUCATION/TRAINING PROGRAM

## 2021-03-15 PROCEDURE — 770006 HCHG ROOM/CARE - MED/SURG/GYN SEMI*

## 2021-03-15 PROCEDURE — 70450 CT HEAD/BRAIN W/O DYE: CPT | Mod: MG

## 2021-03-15 PROCEDURE — C9254 INJECTION, LACOSAMIDE: HCPCS | Performed by: PSYCHIATRY & NEUROLOGY

## 2021-03-15 PROCEDURE — 97535 SELF CARE MNGMENT TRAINING: CPT | Mod: CO

## 2021-03-15 PROCEDURE — A9270 NON-COVERED ITEM OR SERVICE: HCPCS | Performed by: STUDENT IN AN ORGANIZED HEALTH CARE EDUCATION/TRAINING PROGRAM

## 2021-03-15 PROCEDURE — 95816 EEG AWAKE AND DROWSY: CPT | Performed by: STUDENT IN AN ORGANIZED HEALTH CARE EDUCATION/TRAINING PROGRAM

## 2021-03-15 PROCEDURE — 700102 HCHG RX REV CODE 250 W/ 637 OVERRIDE(OP): Performed by: STUDENT IN AN ORGANIZED HEALTH CARE EDUCATION/TRAINING PROGRAM

## 2021-03-15 PROCEDURE — 97530 THERAPEUTIC ACTIVITIES: CPT

## 2021-03-15 PROCEDURE — 99233 SBSQ HOSP IP/OBS HIGH 50: CPT | Performed by: STUDENT IN AN ORGANIZED HEALTH CARE EDUCATION/TRAINING PROGRAM

## 2021-03-15 PROCEDURE — 36415 COLL VENOUS BLD VENIPUNCTURE: CPT

## 2021-03-15 PROCEDURE — 85025 COMPLETE CBC W/AUTO DIFF WBC: CPT

## 2021-03-15 PROCEDURE — 95816 EEG AWAKE AND DROWSY: CPT | Mod: 26 | Performed by: STUDENT IN AN ORGANIZED HEALTH CARE EDUCATION/TRAINING PROGRAM

## 2021-03-15 PROCEDURE — 700105 HCHG RX REV CODE 258: Performed by: PSYCHIATRY & NEUROLOGY

## 2021-03-15 PROCEDURE — 99223 1ST HOSP IP/OBS HIGH 75: CPT | Mod: 25 | Performed by: PSYCHIATRY & NEUROLOGY

## 2021-03-15 PROCEDURE — 80048 BASIC METABOLIC PNL TOTAL CA: CPT

## 2021-03-15 PROCEDURE — 700111 HCHG RX REV CODE 636 W/ 250 OVERRIDE (IP): Performed by: PSYCHIATRY & NEUROLOGY

## 2021-03-15 PROCEDURE — 95819 EEG AWAKE AND ASLEEP: CPT | Performed by: STUDENT IN AN ORGANIZED HEALTH CARE EDUCATION/TRAINING PROGRAM

## 2021-03-15 PROCEDURE — 99223 1ST HOSP IP/OBS HIGH 75: CPT | Performed by: PHYSICAL MEDICINE & REHABILITATION

## 2021-03-15 PROCEDURE — 84146 ASSAY OF PROLACTIN: CPT

## 2021-03-15 RX ADMIN — LEVETIRACETAM INJECTION 1000 MG: 10 INJECTION INTRAVENOUS at 05:09

## 2021-03-15 RX ADMIN — LEVETIRACETAM INJECTION 1000 MG: 10 INJECTION INTRAVENOUS at 18:17

## 2021-03-15 RX ADMIN — SODIUM CHLORIDE 750 MG: 9 INJECTION, SOLUTION INTRAVENOUS at 00:38

## 2021-03-15 RX ADMIN — GABAPENTIN 300 MG: 300 CAPSULE ORAL at 05:12

## 2021-03-15 RX ADMIN — QUETIAPINE FUMARATE 150 MG: 100 TABLET ORAL at 20:03

## 2021-03-15 RX ADMIN — SODIUM CHLORIDE 200 MG: 9 INJECTION, SOLUTION INTRAVENOUS at 18:34

## 2021-03-15 RX ADMIN — SODIUM CHLORIDE 200 MG: 9 INJECTION, SOLUTION INTRAVENOUS at 05:32

## 2021-03-15 RX ADMIN — SIMVASTATIN 40 MG: 20 TABLET, FILM COATED ORAL at 20:03

## 2021-03-15 RX ADMIN — Medication 5 MG: at 20:03

## 2021-03-15 RX ADMIN — GABAPENTIN 300 MG: 300 CAPSULE ORAL at 18:17

## 2021-03-15 ASSESSMENT — COGNITIVE AND FUNCTIONAL STATUS - GENERAL
DAILY ACTIVITIY SCORE: 7
TOILETING: TOTAL
HELP NEEDED FOR BATHING: TOTAL
TURNING FROM BACK TO SIDE WHILE IN FLAT BAD: UNABLE
PERSONAL GROOMING: TOTAL
MOBILITY SCORE: 7
CLIMB 3 TO 5 STEPS WITH RAILING: TOTAL
STANDING UP FROM CHAIR USING ARMS: A LOT
SUGGESTED CMS G CODE MODIFIER MOBILITY: CM
DRESSING REGULAR LOWER BODY CLOTHING: TOTAL
SUGGESTED CMS G CODE MODIFIER DAILY ACTIVITY: CM
DRESSING REGULAR UPPER BODY CLOTHING: A LOT
MOVING FROM LYING ON BACK TO SITTING ON SIDE OF FLAT BED: UNABLE
MOVING TO AND FROM BED TO CHAIR: UNABLE
EATING MEALS: TOTAL
WALKING IN HOSPITAL ROOM: TOTAL

## 2021-03-15 ASSESSMENT — GAIT ASSESSMENTS: GAIT LEVEL OF ASSIST: UNABLE TO PARTICIPATE

## 2021-03-15 NOTE — THERAPY
"Occupational Therapy  Daily Treatment     Patient Name: Rafael Mccoy  Age:  75 y.o., Sex:  male  Medical Record #: [id number]  Today's Date: 3/15/2021       Precautions: Fall Risk, Swallow Precautions ( See Comments)(legally blind)  Comments: legally blind     Assessment    Pt seen for OT tx. Continues to be limited by decreased activity tolerance, balance deficits, inattention, poor initiation, delayed responses, rigid UEs and generalized weakness impacting ability to complete ADLs and ADL transfers independently. Pt only able to verbalize \"ya and OK\" given extra time and not consistently. No righting reaction during LOB while seated EOB. Appears to have L inattention, not consistently following commands to accurately assess, during sitting EOB pt head turned to R. Will continue to benefit from OT services while in house.      Plan    Continue current treatment plan.    DC Equipment Recommendations: Unable to determine at this time  Discharge Recommendations: Recommend post-acute placement for additional occupational therapy services prior to discharge home       03/15/21 0845   Cognition    Cognition / Consciousness X   Ability To Follow Commands Unable to Follow 1 Step Commands   Safety Awareness Impaired   New Learning Impaired   Attention Impaired   Initiation Impaired   Comments only answering \"ok, ya\" extensive time to follow and respond d/t delayed responses. only initiation from pt was to stand and return to supine    Passive ROM Upper Body   Passive ROM Upper Body X   Comments rigid UEs   Active ROM Upper Body   Active ROM Upper Body  X   Comments not following commands to accurately assess, very rigid UEs   Strength Upper Body   Upper Body Strength  X   Comments unable to accurately assess d/t not consistently following commands    Balance   Sitting Balance (Static) Poor -   Sitting Balance (Dynamic) Trace +   Standing Balance (Static) Trace   Standing Balance (Dynamic) Trace   Weight Shift Sitting " Poor   Weight Shift Standing Absent   Bed Mobility    Supine to Sit Maximal Assist   Sit to Supine Moderate Assist   Scooting Maximal Assist   Rolling Maximal Assist to Rt.;Maximum Assist to Lt.   Activities of Daily Living   Grooming Maximal Assist;Seated   Upper Body Dressing Moderate Assist   Lower Body Dressing Maximal Assist   Toileting Maximal Assist   Functional Mobility   Sit to Stand Maximal Assist   Short Term Goals   Short Term Goal # 1 Pt will demo LB dressing w/ SPV   Goal Outcome # 1 Progressing slower than expected   Short Term Goal # 2 Pt will demo standing grooming w/ SPV   Goal Outcome # 2 Progressing slower than expected   Short Term Goal # 3 Pt will demo toilet txf w/ SPV   Goal Outcome # 3 Progressing slower than expected   Anticipated Discharge Equipment and Recommendations   DC Equipment Recommendations Unable to determine at this time   Discharge Recommendations Recommend post-acute placement for additional occupational therapy services prior to discharge home

## 2021-03-15 NOTE — CONSULTS
Physical Medicine and Rehabilitation Consultation              Date of initial consultation: 3/15/2021  Consulting provider: Pankaj Finley MD  Reason for consultation: assess for acute inpatient rehab appropriateness  LOS: 4 Day(s)    Chief complaint: SDH    HPI: The patient is a 75 y.o. male with a past medical history of cerebellar CVA, seizure disorder, hypertension, ataxia, dementia, legally blind for 12 years, insomnia, right craniotomy for subdural hematoma 1 year ago;  who presented on 3/11/2021 1:30 AM with altered mental status and concern for seizure.  Patient was having a zoom call with his family when he suffered a brief seizure, he was taken to [facility], found to have SDH on CT, transferred to [facility] for neurosurgical consultation.  NSG did not recommend intervention.  Patient had a EEG which reflected nonconvulsive status epilepticus.  Patient was loaded with Keppra and Vimpat.  Patient is not on anticoagulation secondary to subdural hematoma.    The patient currently is minimally responsive, unable to answer questions with more than a grunt. He has repetitive movements noted in the left lower lip and right foot/ankle. He is able to follow commands regarding to squeezing my hand, but then goes into UE rigidity and is unable to release. LE's seem less affected. Nurse and primary doc notified.     Social Hx:  Assisted living facility    Patient lives at [facility], comprehensive assisted living with memory care available.  Patient was essentially independent with household/limited community mobility.  Was using a walker walking stick/ blind cane.  Patient had assistance for showering.  Was not on memory care.  Intermittent assist with IADLs and some ADLs as needed.    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, s/p bilateral laser surgeries   • Head injury    • Hypertension    • Seizure (HCC)    • Stroke (HCC)        PSH:  Past Surgical History:   Procedure Laterality Date   • CRANIOTOMY Right 4/17/2020    Procedure: CRANIOTOMY - SUBDURAL;  Surgeon: Anthony Mendiola M.D.;  Location: SURGERY [facility];  Service: Neurosurgery   • OTHER  04/2020    craniotomy after a fall,TBI   • CRANIOTOMY     • OTHER ORTHOPEDIC SURGERY      R foot fracture   • ROTATOR CUFF REPAIR Right    • TONSILLECTOMY AND ADENOIDECTOMY         FHX:  Family History   Problem Relation Age of Onset   • Other Mother         eye problems   • Cancer Neg Hx    • Heart Disease Neg Hx    • Stroke Neg Hx        Medications:  Current Facility-Administered Medications   Medication Dose   • oxyCODONE immediate-release (ROXICODONE) tablet 5-10 mg  5-10 mg   • valproate (DEPACON) 750 mg in  mL IVPB  750 mg   • lacosamide (VIMPAT) 200 mg in  mL ivpb  200 mg   • QUEtiapine (Seroquel) tablet 150 mg  150 mg   • senna-docusate (PERICOLACE or SENOKOT S) 8.6-50 MG per tablet 2 tablet  2 tablet    And   • polyethylene glycol/lytes (MIRALAX) PACKET 1 Packet  1 Packet    And   • magnesium hydroxide (MILK OF MAGNESIA) suspension 30 mL  30 mL    And   • bisacodyl (DULCOLAX) suppository 10 mg  10 mg   • gabapentin (NEURONTIN) capsule 300 mg  300 mg   • simvastatin (ZOCOR) tablet 40 mg  40 mg   • labetalol (NORMODYNE/TRANDATE) injection 10 mg  10 mg   • hydrALAZINE (APRESOLINE) injection 10 mg  10 mg   • enalaprilat (VASOTEC) injection 1.25 mg  1.25 mg   • Respiratory Therapy Consult     • LORazepam (ATIVAN) injection  "2 mg  2 mg   • acetaminophen (Tylenol) tablet 650 mg  650 mg    Or   • acetaminophen (TYLENOL) suppository 650 mg  650 mg   • ondansetron (ZOFRAN ODT) dispertab 4 mg  4 mg    Or   • ondansetron (ZOFRAN) syringe/vial injection 4 mg  4 mg   • melatonin tablet 5 mg  5 mg   • levETIRAcetam (Keppra) 1000 mg in 100 mL NaCl IV premix  1,000 mg       Allergies:  No Known Allergies    Physical Exam:  Vitals: /59   Pulse 91   Temp 37.4 °C (99.3 °F) (Temporal)   Resp 18   Ht 1.753 m (5' 9.02\")   Wt 90.6 kg (199 lb 11.8 oz)   SpO2 97%   Gen: Hyporesponsive  Head: NC/AT  Eyes/ Nose/ Mouth: moist mucous membranes. Right pupil appears slightly larger  Cardio: RRR, good distal perfusion, warm extremities  Pulm: normal respiratory effort, no cyanosis   Abd: Soft NTND, negative borborygmi   Ext: No peripheral edema. No calf tenderness. No clubbing. UE's rigid    Labs: Reviewed and significant for   Recent Labs     03/13/21  0349 03/15/21  0506   RBC 5.26 5.45   HEMOGLOBIN 14.9 15.5   HEMATOCRIT 45.2 46.7   PLATELETCT 184 200     Recent Labs     03/13/21  0349 03/15/21  0506   SODIUM 140 140   POTASSIUM 4.0 4.2   CHLORIDE 106 105   CO2 24 23   GLUCOSE 95 103*   BUN 14 18   CREATININE 1.05 1.05   CALCIUM 9.2 9.3     Recent Results (from the past 24 hour(s))   CBC WITH DIFFERENTIAL    Collection Time: 03/15/21  5:06 AM   Result Value Ref Range    WBC 6.0 4.8 - 10.8 K/uL    RBC 5.45 4.70 - 6.10 M/uL    Hemoglobin 15.5 14.0 - 18.0 g/dL    Hematocrit 46.7 42.0 - 52.0 %    MCV 85.7 81.4 - 97.8 fL    MCH 28.4 27.0 - 33.0 pg    MCHC 33.2 (L) 33.7 - 35.3 g/dL    RDW 46.3 35.9 - 50.0 fL    Platelet Count 200 164 - 446 K/uL    MPV 10.6 9.0 - 12.9 fL    Neutrophils-Polys 63.20 44.00 - 72.00 %    Lymphocytes 19.00 (L) 22.00 - 41.00 %    Monocytes 14.50 (H) 0.00 - 13.40 %    Eosinophils 2.70 0.00 - 6.90 %    Basophils 0.30 0.00 - 1.80 %    Immature Granulocytes 0.30 0.00 - 0.90 %    Nucleated RBC 0.00 /100 WBC    Neutrophils (Absolute) " 3.80 1.82 - 7.42 K/uL    Lymphs (Absolute) 1.14 1.00 - 4.80 K/uL    Monos (Absolute) 0.87 (H) 0.00 - 0.85 K/uL    Eos (Absolute) 0.16 0.00 - 0.51 K/uL    Baso (Absolute) 0.02 0.00 - 0.12 K/uL    Immature Granulocytes (abs) 0.02 0.00 - 0.11 K/uL    NRBC (Absolute) 0.00 K/uL   Basic Metabolic Panel    Collection Time: 03/15/21  5:06 AM   Result Value Ref Range    Sodium 140 135 - 145 mmol/L    Potassium 4.2 3.6 - 5.5 mmol/L    Chloride 105 96 - 112 mmol/L    Co2 23 20 - 33 mmol/L    Glucose 103 (H) 65 - 99 mg/dL    Bun 18 8 - 22 mg/dL    Creatinine 1.05 0.50 - 1.40 mg/dL    Calcium 9.3 8.5 - 10.5 mg/dL    Anion Gap 12.0 7.0 - 16.0   ESTIMATED GFR    Collection Time: 03/15/21  5:06 AM   Result Value Ref Range    GFR If African American >60 >60 mL/min/1.73 m 2    GFR If Non African American >60 >60 mL/min/1.73 m 2         ASSESSMENT:  Patient is a 75 y.o. male admitted with seizures now s/p EEG and loading with keppra/vimpat     C Code / Diagnosis to Support: 0002.1 - Brain Dysfunction: Non-Traumatic    Rehabilitation: Impaired ADLs and mobility  Decision pending further work up    Barriers to transfer include: Insurance authorization, TCCs to verify disposition, medical clearance and bed availability     Additional Recommendations:  - Concern for active seizures vs worsening SDH. Neurology and attending physician notified.  - Stat head CT ordered  - EEG ordered  - Hold therapy today   - medical management per primary team. PMR to continue to follow for rehab appropriateness     Medical Complexity:  Seizures  Encephalopathy   Hypertension   Hypokalemia       DVT PPX: SCDs only 2/2 SDH      Thank you for allowing us to participate in the care of this patient.     Patient was seen for 82 minutes on unit/floor of which > 50% of time was spent on counseling and coordination of care regarding the above, including prognosis, risk reduction, benefits of treatment, and options for next stage of care.    Bradley Valerio DO    Physical Medicine and Rehabilitation

## 2021-03-15 NOTE — PROGRESS NOTES
"Hospital Medicine Daily Progress Note    Date of Service  3/15/2021    Chief Complaint  75 y.o. male admitted 3/11/2021 with seizure    Hospital Course  \"History of cerebellar CVA, seizure disorder, presented as a transfer from [facility] after he was found to have right parietal SDH without any midline shift's.  Witnessed seizure by family followed by changes in mental status.  Neurosurgery consulted Dr. Capps, no surgical intervention, recommend repeat CT head and frequent neuro checks.  Admit to the ICU every hour neuro checks, repeat head CT, keep SBP less than 140, continue Depakote and Keppra.  Due to ongoing encephalopathy, he had an EEG and was found to be in non-convulsive status epilepticus and was loaded with Vimpat.\"    Interval Problem Update  Patient seen and evaluated at bedside.  So far patient is calm and sleepy this morning.  He did not answer my questions this morning.  I discussed the plan of care with nursing.    Afternoon update: patient with increased UE rigidity.  I did not witness activity that appeared to be seizures.  I asked him if he was in pain.  He may have said \"yeah,\" but it was difficult to understand him.      Consultants/Specialty  Critical Care  Neurosurgery    Code Status  DNAR/DNI    Disposition  Neurology unit.    Review of Systems  Review of Systems   Unable to perform ROS: Mental acuity        Physical Exam  Temp:  [36.2 °C (97.2 °F)-37.4 °C (99.3 °F)] 37.4 °C (99.3 °F)  Pulse:  [] 91  Resp:  [18-20] 18  BP: (121-140)/(57-79) 130/59  SpO2:  [96 %-100 %] 97 %    Physical Exam  Vitals and nursing note reviewed.   Constitutional:       General: He is not in acute distress.     Appearance: He is not toxic-appearing.   HENT:      Head: Normocephalic and atraumatic.      Mouth/Throat:      Mouth: Mucous membranes are dry.      Pharynx: Oropharynx is clear.   Eyes:      General: No scleral icterus.     Conjunctiva/sclera: Conjunctivae normal.   Cardiovascular:      Rate and " Rhythm: Normal rate and regular rhythm.   Pulmonary:      Effort: Pulmonary effort is normal. No respiratory distress.      Breath sounds: Normal breath sounds.   Abdominal:      General: Bowel sounds are normal.      Palpations: Abdomen is soft.   Musculoskeletal:      Cervical back: Normal range of motion and neck supple.      Right lower leg: No edema.      Left lower leg: No edema.   Skin:     General: Skin is warm and dry.   Neurological:      Mental Status: He is alert.      Comments: He moves his extremities equally.         Fluids    Intake/Output Summary (Last 24 hours) at 3/15/2021 0947  Last data filed at 3/15/2021 0500  Gross per 24 hour   Intake --   Output 2175 ml   Net -2175 ml       Laboratory  Recent Labs     03/13/21  0349 03/15/21  0506   WBC 5.8 6.0   RBC 5.26 5.45   HEMOGLOBIN 14.9 15.5   HEMATOCRIT 45.2 46.7   MCV 85.9 85.7   MCH 28.3 28.4   MCHC 33.0* 33.2*   RDW 45.9 46.3   PLATELETCT 184 200   MPV 10.1 10.6     Recent Labs     03/13/21  0349 03/15/21  0506   SODIUM 140 140   POTASSIUM 4.0 4.2   CHLORIDE 106 105   CO2 24 23   GLUCOSE 95 103*   BUN 14 18   CREATININE 1.05 1.05   CALCIUM 9.2 9.3                   Imaging  [id number] VIEW   Final Result         No specific finding to suggest small bowel obstruction.      CT-HEAD W/O   Final Result         1.  Mixed right parietal subdural fluid collection likely acute on chronic subdural hematoma.   2.  Nonspecific white matter changes, commonly associated with small vessel ischemic disease.  Associated mild cerebral atrophy is noted.   3.  Atherosclerosis.      OUTSIDE IMAGES-CT HEAD   Final Result           Assessment/Plan  * Seizure disorder, nonconvulsive, with status epilepticus (HCC)- (present on admission)  Assessment & Plan  Noted on EEG (see below)  He was loaded with 4 grams of IV Keppra and initiated on IV Vimpat  Continue depakote and neurontin  Change Keppra to oral as well as Vimpat once he passes his swallow test  Outpatient  follow up in the epilepsy clinic has been arranged by Dr. Mcfadden  If patient has continued seizure like activity or if his mentation appears to decline further, may need repeat EEG.    EEG 3/11/21:  This is an abnormal continuous video electroencephalogram recording in the awake, drowsy and sleep state. Intermittent slowing and frequent sharps noted in the right frontotemporal region. Frequent runs of frontal intermittent rhythmic delta activity (FIRDA) noted, most pronounced at times on the right frontal region, some lasting over ten seconds in duration and suggestive of focal seizures in the right hemisphere. Then, worsening of the study noted with appearance of generalized rhythmic sharp activity lasting for several minutes in duration, suggestive of an episode of non convulsive status epilepticus. Significant improvement of the eeg after patient receiving Lorazepam, and further adjustments in anti-seizure medications, and although the study remains abnormal with rare and brief runs of right FIRDA, no further seizures were captured overnight. The patient remains at risk for seizures. The findings suggest underlying areas of increased cortical irritability and structural abnormality.  Clinical and radiological correlation is recommended.    3/15 afternoon:  patient with increased UE rigidity.  I did not witness activity that appeared to be seizures.  Stat head CT without contrast.  Repeat EEG.  Prolactin level.     Subdural hematoma, acute (HCC)- (present on admission)  Assessment & Plan  Noted on CT head  Non-traumatic  Neurosurgery Dr. Mendiola consulted  Non-operative management     Hypokalemia  Assessment & Plan  Keep K > 4.  Monitor BMP.     Seizures- (present on admission)  Assessment & Plan  - Patient is known to have seizure disorder.  Continue Vimpat, Keppra, Depakote    Encephalopathy acute- (present on admission)  Assessment & Plan  Secondary to status epilepticus     Dementia associated with other  underlying disease with behavioral disturbance (HCC)- (present on admission)  Assessment & Plan  Reported history of    Legally blind- (present on admission)  Assessment & Plan  - Patient legally blind for more than 12 years per patient  - Lives at [facility]    Essential hypertension- (present on admission)  Assessment & Plan  Reported history of though had not been on medications prior to admit  Refrain from initiating BP meds for now as his blood pressure had fluctuated       VTE prophylaxis: SCDs only secondary to SDH.

## 2021-03-15 NOTE — THERAPY
"Physical Therapy   Daily Treatment     Patient Name: Rafael Mccoy  Age:  75 y.o., Sex:  male  Medical Record #: [id number]  Today's Date: 3/15/2021     Precautions: Fall Risk, Swallow Precautions (legally blind)    Assessment    Pt generally lethargic today, however agreeable to participate. Minimal verbal output throughout session primarily \"yes\" or nodding his head. He did not verbalize \"no\" even when asked to imitate. Decreased initiation for mobility despite being agreeable to participate however did initiate sit>stand and BTB. Generally rigid in extremities and did not follow commands for extremities with exception of wiggling his toes. Posterior and L lateral lean noted in sitting with no evidence of balance rxns. PT to cont to follow.     Plan    Continue current treatment plan.    DC Equipment Recommendations: Unable to determine at this time  Discharge Recommendations: Recommend post-acute placement for additional physical therapy services prior to discharge home     Objective     03/15/21 0905   Cognition    Speech/ Communication Delayed Responses;Unable to Communicate (only responding \"yes\" or nodding primarily)   Level of Consciousness Lethargic   Ability To Follow Commands Unable to Follow 1 Step Commands (intermittent following verbally, wiggled toes)   Safety Awareness Impaired   Attention Impaired   Initiation Impaired   Comments minimal responses, when asked to repeat \"Hi\" he was able to do so however did not state \"no\" throughout session even when asked to repeat, no command following for extremities. Initiated STS and BTB.   Passive ROM Lower Body   Comments slight resistance to passive LE motion 2/2 rigidity   Active ROM Lower Body    Comments not observed d/t impaired command following for extremity mvt   Lower Body Muscle Tone   Rt Lower Extremity Muscle Tone Rigidity   Lt Lower Extremity Muscle Tone Rigidity   Neurological Concerns   Sitting Posture During ADL's Posterior Lean;Lateral Lean " Left   Balance   Sitting Balance (Static) Poor -   Sitting Balance (Dynamic) Trace +   Standing Balance (Static) Trace   Standing Balance (Dynamic) Trace   Weight Shift Sitting Poor   Weight Shift Standing Absent   Gait Analysis   Gait Level Of Assist Unable to Participate   Weight Bearing Status no restrctions   Bed Mobility    Supine to Sit Maximal Assist   Sit to Supine Minimal Assist (good initiation BTB)   Functional Mobility   Sit to Stand Maximal Assist   Bed, Chair, Wheelchair Transfer Unable to Participate   Short Term Goals    Short Term Goal # 1 Pt will be able to perform supine<>sit with HOB flat/no rails with Spv in 6tx to promote fnx progression towards I    Goal Outcome # 1 goal not met   Short Term Goal # 2 Pt will be able to perform sit<>stand/transfers with min A in 6tx to promote fnx progression towards I    Goal Outcome # 2 Goal not met   Short Term Goal # 3 Pt will be able to ambulate 25ft with FWW with min A in 6tx to promote fnx progression towards I    Goal Outcome # 3 Goal not met

## 2021-03-15 NOTE — CARE PLAN
Problem: Safety:  Goal: Will remain free from injury  Outcome: PROGRESSING AS EXPECTED  Patient in restraints to prevent pulling lines and drains/ prevent falls.     Problem: Medication:  Goal: Patient's knowledge of medication regimen will improve  Outcome: PROGRESSING AS EXPECTED  Patient told about each medication as they were given

## 2021-03-16 PROBLEM — R13.10 DYSPHAGIA: Status: ACTIVE | Noted: 2020-04-21

## 2021-03-16 LAB
ANION GAP SERPL CALC-SCNC: 10 MMOL/L (ref 7–16)
BASOPHILS # BLD AUTO: 0.6 % (ref 0–1.8)
BASOPHILS # BLD: 0.03 K/UL (ref 0–0.12)
BUN SERPL-MCNC: 19 MG/DL (ref 8–22)
CALCIUM SERPL-MCNC: 9 MG/DL (ref 8.5–10.5)
CHLORIDE SERPL-SCNC: 104 MMOL/L (ref 96–112)
CO2 SERPL-SCNC: 24 MMOL/L (ref 20–33)
CREAT SERPL-MCNC: 1.05 MG/DL (ref 0.5–1.4)
EOSINOPHIL # BLD AUTO: 0.12 K/UL (ref 0–0.51)
EOSINOPHIL NFR BLD: 2.4 % (ref 0–6.9)
ERYTHROCYTE [DISTWIDTH] IN BLOOD BY AUTOMATED COUNT: 46.2 FL (ref 35.9–50)
GLUCOSE SERPL-MCNC: 93 MG/DL (ref 65–99)
HCT VFR BLD AUTO: 45.1 % (ref 42–52)
HGB BLD-MCNC: 14.3 G/DL (ref 14–18)
IMM GRANULOCYTES # BLD AUTO: 0.02 K/UL (ref 0–0.11)
IMM GRANULOCYTES NFR BLD AUTO: 0.4 % (ref 0–0.9)
LYMPHOCYTES # BLD AUTO: 1.12 K/UL (ref 1–4.8)
LYMPHOCYTES NFR BLD: 22 % (ref 22–41)
MCH RBC QN AUTO: 27.6 PG (ref 27–33)
MCHC RBC AUTO-ENTMCNC: 31.7 G/DL (ref 33.7–35.3)
MCV RBC AUTO: 86.9 FL (ref 81.4–97.8)
MONOCYTES # BLD AUTO: 0.82 K/UL (ref 0–0.85)
MONOCYTES NFR BLD AUTO: 16.1 % (ref 0–13.4)
NEUTROPHILS # BLD AUTO: 2.99 K/UL (ref 1.82–7.42)
NEUTROPHILS NFR BLD: 58.5 % (ref 44–72)
NRBC # BLD AUTO: 0 K/UL
NRBC BLD-RTO: 0 /100 WBC
PLATELET # BLD AUTO: 181 K/UL (ref 164–446)
PMV BLD AUTO: 10.7 FL (ref 9–12.9)
POTASSIUM SERPL-SCNC: 4.1 MMOL/L (ref 3.6–5.5)
RBC # BLD AUTO: 5.19 M/UL (ref 4.7–6.1)
SODIUM SERPL-SCNC: 138 MMOL/L (ref 135–145)
VALPROATE SERPL-MCNC: 27.1 UG/ML (ref 50–100)
WBC # BLD AUTO: 5.1 K/UL (ref 4.8–10.8)

## 2021-03-16 PROCEDURE — 99232 SBSQ HOSP IP/OBS MODERATE 35: CPT | Performed by: PSYCHIATRY & NEUROLOGY

## 2021-03-16 PROCEDURE — 80048 BASIC METABOLIC PNL TOTAL CA: CPT

## 2021-03-16 PROCEDURE — 85025 COMPLETE CBC W/AUTO DIFF WBC: CPT

## 2021-03-16 PROCEDURE — 99233 SBSQ HOSP IP/OBS HIGH 50: CPT | Performed by: PHYSICAL MEDICINE & REHABILITATION

## 2021-03-16 PROCEDURE — 700105 HCHG RX REV CODE 258: Performed by: PSYCHIATRY & NEUROLOGY

## 2021-03-16 PROCEDURE — 700111 HCHG RX REV CODE 636 W/ 250 OVERRIDE (IP): Performed by: PSYCHIATRY & NEUROLOGY

## 2021-03-16 PROCEDURE — 700102 HCHG RX REV CODE 250 W/ 637 OVERRIDE(OP): Performed by: STUDENT IN AN ORGANIZED HEALTH CARE EDUCATION/TRAINING PROGRAM

## 2021-03-16 PROCEDURE — A9270 NON-COVERED ITEM OR SERVICE: HCPCS | Performed by: STUDENT IN AN ORGANIZED HEALTH CARE EDUCATION/TRAINING PROGRAM

## 2021-03-16 PROCEDURE — 99233 SBSQ HOSP IP/OBS HIGH 50: CPT | Performed by: STUDENT IN AN ORGANIZED HEALTH CARE EDUCATION/TRAINING PROGRAM

## 2021-03-16 PROCEDURE — 700102 HCHG RX REV CODE 250 W/ 637 OVERRIDE(OP): Performed by: PHYSICAL MEDICINE & REHABILITATION

## 2021-03-16 PROCEDURE — 36415 COLL VENOUS BLD VENIPUNCTURE: CPT

## 2021-03-16 PROCEDURE — A9270 NON-COVERED ITEM OR SERVICE: HCPCS | Performed by: PHYSICAL MEDICINE & REHABILITATION

## 2021-03-16 PROCEDURE — C9254 INJECTION, LACOSAMIDE: HCPCS | Performed by: PSYCHIATRY & NEUROLOGY

## 2021-03-16 PROCEDURE — 700101 HCHG RX REV CODE 250: Performed by: STUDENT IN AN ORGANIZED HEALTH CARE EDUCATION/TRAINING PROGRAM

## 2021-03-16 PROCEDURE — 92526 ORAL FUNCTION THERAPY: CPT

## 2021-03-16 PROCEDURE — 770006 HCHG ROOM/CARE - MED/SURG/GYN SEMI*

## 2021-03-16 PROCEDURE — 80164 ASSAY DIPROPYLACETIC ACD TOT: CPT

## 2021-03-16 RX ORDER — QUETIAPINE FUMARATE 100 MG/1
100 TABLET, FILM COATED ORAL NIGHTLY
Status: DISCONTINUED | OUTPATIENT
Start: 2021-03-16 | End: 2021-03-17

## 2021-03-16 RX ORDER — MORPHINE SULFATE 4 MG/ML
2 INJECTION, SOLUTION INTRAMUSCULAR; INTRAVENOUS EVERY 4 HOURS PRN
Status: DISCONTINUED | OUTPATIENT
Start: 2021-03-16 | End: 2021-04-02

## 2021-03-16 RX ORDER — DEXTROSE MONOHYDRATE, SODIUM CHLORIDE, AND POTASSIUM CHLORIDE 50; 1.49; 4.5 G/1000ML; G/1000ML; G/1000ML
INJECTION, SOLUTION INTRAVENOUS CONTINUOUS
Status: DISCONTINUED | OUTPATIENT
Start: 2021-03-16 | End: 2021-03-18

## 2021-03-16 RX ADMIN — LEVETIRACETAM INJECTION 1000 MG: 10 INJECTION INTRAVENOUS at 04:34

## 2021-03-16 RX ADMIN — SODIUM CHLORIDE 750 MG: 9 INJECTION, SOLUTION INTRAVENOUS at 17:40

## 2021-03-16 RX ADMIN — SODIUM CHLORIDE 200 MG: 9 INJECTION, SOLUTION INTRAVENOUS at 04:56

## 2021-03-16 RX ADMIN — POTASSIUM CHLORIDE, DEXTROSE MONOHYDRATE AND SODIUM CHLORIDE: 150; 5; 450 INJECTION, SOLUTION INTRAVENOUS at 17:05

## 2021-03-16 RX ADMIN — SODIUM CHLORIDE 750 MG: 9 INJECTION, SOLUTION INTRAVENOUS at 06:15

## 2021-03-16 RX ADMIN — GABAPENTIN 300 MG: 300 CAPSULE ORAL at 04:35

## 2021-03-16 RX ADMIN — LEVETIRACETAM INJECTION 1000 MG: 10 INJECTION INTRAVENOUS at 17:07

## 2021-03-16 RX ADMIN — SODIUM CHLORIDE 750 MG: 9 INJECTION, SOLUTION INTRAVENOUS at 17:42

## 2021-03-16 RX ADMIN — SODIUM CHLORIDE 200 MG: 9 INJECTION, SOLUTION INTRAVENOUS at 18:24

## 2021-03-16 RX ADMIN — DOCUSATE SODIUM 50 MG AND SENNOSIDES 8.6 MG 2 TABLET: 8.6; 5 TABLET, FILM COATED ORAL at 04:35

## 2021-03-16 NOTE — THERAPY
Missed Therapy     Patient Name: Rafael Mccoy  Age:  75 y.o., Sex:  male  Medical Record #: [id number]  Today's Date: 3/16/2021       03/16/21 1346   Treatment Variance   Reason For Missed Therapy Medical - Patient Unarousable   Interdisciplinary Plan of Care Collaboration   IDT Collaboration with  Nursing   Collaboration Comments Received call from RN - pt awake and SLP to reassess with lunch. Arrived 30 minutes later and was unable to rouse patient despite repositioning and verbal and tactile cues. Per RN, appeared to tolerate thickened water when awake/alert. SLP will attempt another time as appropriate.

## 2021-03-16 NOTE — THERAPY
Missed Therapy     Patient Name: Rafael Mccoy  Age:  75 y.o., Sex:  male  Medical Record #: [id number]  Today's Date: 3/16/2021       03/16/21 0853   Treatment Variance   Reason For Missed Therapy Medical - Patient Unarousable   Interdisciplinary Plan of Care Collaboration   IDT Collaboration with  Nursing;Physician    Attempted to see patient on this date for dysphagia treatment. However, was unable to rouse patient for adequate PO intake despite MAX verbal and tactile cues. Pt would open eyes for a brief moment. Pt was given small sip of MTL but had complete anterior spillage of bolus. Will attempt at a later time as patient is more alert. Consider placement of a cortrak if KHANG does not improve. RN and MD updated.    Patient Position at End of Therapy Seated;In Bed;Call Light within Reach

## 2021-03-16 NOTE — PROGRESS NOTES
Updated pt's status to his daughter, Cleo. And, Cleo wants updates from hospitalist and neurologist. Will relay messages to MDs.

## 2021-03-16 NOTE — CARE PLAN
Problem: Bowel/Gastric:  Goal: Normal bowel function is maintained or improved  Outcome: PROGRESSING AS EXPECTED  Patient had a bowel movement this shift     Problem: Respiratory:  Goal: Respiratory status will improve  Outcome: PROGRESSING AS EXPECTED  Patient put on room air today and has maintained O2 sats greater than 94%

## 2021-03-16 NOTE — DISCHARGE PLANNING
Following for post acute services current documentation does not support IRF level of care. In the event of interval improvement prior to being medically cleared please re consult PMR. TCC no longer following.

## 2021-03-16 NOTE — CARE PLAN
Problem: Safety  Goal: Will remain free from injury  Outcome: PROGRESSING AS EXPECTED  Note: Seizure meds on board, all seizure, fall, and aspiration precautions in place. Pt resting in bed comfortably, will cont to monitor     Problem: Venous Thromboembolism (VTW)/Deep Vein Thrombosis (DVT) Prevention:  Goal: Patient will participate in Venous Thrombosis (VTE)/Deep Vein Thrombosis (DVT)Prevention Measures  Outcome: PROGRESSING AS EXPECTED  Note: SCD in use for dvt prophylaxis, pt compliant     Problem: Safety:  Goal: Will remain free from injury  Outcome: PROGRESSING AS EXPECTED  Note: Seizure meds on board, all seizure, fall, and aspiration precautions in place. Pt resting in bed comfortably, will cont to monitor     Problem: Safety:  Goal: Will remain free from injury  Outcome: PROGRESSING AS EXPECTED  Note: Seizure meds on board, all seizure, fall, and aspiration precautions in place. Pt resting in bed comfortably, will cont to monitor

## 2021-03-16 NOTE — PROGRESS NOTES
Physical Medicine and Rehabilitation Consultation              Date of initial consultation: 3/15/2021  Consulting provider: Pankaj Finley MD  Reason for consultation: assess for acute inpatient rehab appropriateness  LOS: 5 Day(s)    Chief complaint: SDH    HPI: The patient is a 75 y.o. male with a past medical history of cerebellar CVA, seizure disorder, hypertension, ataxia, dementia, legally blind for 12 years, insomnia, right craniotomy for subdural hematoma 1 year ago;  who presented on 3/11/2021 1:30 AM with altered mental status and concern for seizure.  Patient was having a zoom call with his family when he suffered a brief seizure, he was taken to [facility], found to have SDH on CT, transferred to [facility] for neurosurgical consultation.  NSG did not recommend intervention.  Patient had a EEG which reflected nonconvulsive status epilepticus.  Patient was loaded with Keppra and Vimpat.  Patient is not on anticoagulation secondary to subdural hematoma.    The patient currently is minimally responsive, unable to answer questions with more than a grunt. He has repetitive movements noted in the left lower lip and right foot/ankle. He is able to follow commands regarding to squeezing my hand, but then goes into UE rigidity and is unable to release. LE's seem less affected. Nurse and primary doc notified.     3/16/2021  Patient is slightly more alert today.  Able to answer 1-2 questions with single word answers.  Seen by speech today, unable to take an oral intake.  Discussed plan of care with nursing staff.  Seen by neurology today, seizure meds increased.    Social Hx:  Assisted living facility    Patient lives at [facility], comprehensive assisted living with memory care available.  Patient was essentially independent with household/limited community mobility.  Was using a walker walking stick/ blind cane.  Patient had assistance for showering.  Was not on memory care.  Intermittent  assist with IADLs and some ADLs as needed.    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, s/p bilateral laser surgeries   • Head injury    • Hypertension    • Seizure (HCC)    • Stroke (HCC)        PSH:  Past Surgical History:   Procedure Laterality Date   • CRANIOTOMY Right 4/17/2020    Procedure: CRANIOTOMY - SUBDURAL;  Surgeon: Anthony Mendiola M.D.;  Location: SURGERY [facility];  Service: Neurosurgery   • OTHER  04/2020    craniotomy after a fall,TBI   • CRANIOTOMY     • OTHER ORTHOPEDIC SURGERY      R foot fracture   • ROTATOR CUFF REPAIR Right    • TONSILLECTOMY AND ADENOIDECTOMY         FHX:  Family History   Problem Relation Age of Onset   • Other Mother         eye problems   • Cancer Neg Hx    • Heart Disease Neg Hx    • Stroke Neg Hx        Medications:  Current Facility-Administered Medications   Medication Dose   • valproate (DEPACON) 750 mg in  mL IVPB  750 mg   • oxyCODONE immediate-release (ROXICODONE) tablet 5-10 mg  5-10 mg   • lacosamide (VIMPAT) 200 mg in  mL ivpb  200 mg   • QUEtiapine (Seroquel) tablet 150 mg  150 mg   • senna-docusate (PERICOLACE or SENOKOT S) 8.6-50 MG per tablet 2 tablet  2 tablet    And   • polyethylene glycol/lytes (MIRALAX) PACKET 1 Packet  1 Packet    And   • magnesium hydroxide (MILK OF MAGNESIA) suspension 30 mL  30 mL    And   • bisacodyl (DULCOLAX) suppository 10 mg  10 mg   • gabapentin (NEURONTIN) capsule 300 mg  300 mg   • simvastatin  "(ZOCOR) tablet 40 mg  40 mg   • labetalol (NORMODYNE/TRANDATE) injection 10 mg  10 mg   • hydrALAZINE (APRESOLINE) injection 10 mg  10 mg   • enalaprilat (VASOTEC) injection 1.25 mg  1.25 mg   • Respiratory Therapy Consult     • LORazepam (ATIVAN) injection 2 mg  2 mg   • acetaminophen (Tylenol) tablet 650 mg  650 mg    Or   • acetaminophen (TYLENOL) suppository 650 mg  650 mg   • ondansetron (ZOFRAN ODT) dispertab 4 mg  4 mg    Or   • ondansetron (ZOFRAN) syringe/vial injection 4 mg  4 mg   • melatonin tablet 5 mg  5 mg   • levETIRAcetam (Keppra) 1000 mg in 100 mL NaCl IV premix  1,000 mg       Allergies:  No Known Allergies    Physical Exam:  Vitals: /59   Pulse 74   Temp 36.8 °C (98.3 °F) (Temporal)   Resp 16   Ht 1.753 m (5' 9.02\")   Wt 90.6 kg (199 lb 11.8 oz)   SpO2 94%   Gen: Hyporesponsive  Head: NC/AT  Eyes/ Nose/ Mouth: moist mucous membranes. Right pupil appears slightly larger  Cardio: RRR, good distal perfusion, warm extremities  Pulm: normal respiratory effort, no cyanosis   Abd: Soft NTND, negative borborygmi   Ext: No peripheral edema. No calf tenderness. No clubbing. UE's rigid    Labs: Reviewed and significant for   Recent Labs     03/15/21  0506 03/16/21  0354   RBC 5.45 5.19   HEMOGLOBIN 15.5 14.3   HEMATOCRIT 46.7 45.1   PLATELETCT 200 181     Recent Labs     03/15/21  0506 03/16/21  0354   SODIUM 140 138   POTASSIUM 4.2 4.1   CHLORIDE 105 104   CO2 23 24   GLUCOSE 103* 93   BUN 18 19   CREATININE 1.05 1.05   CALCIUM 9.3 9.0     Recent Results (from the past 24 hour(s))   CBC WITH DIFFERENTIAL    Collection Time: 03/16/21  3:54 AM   Result Value Ref Range    WBC 5.1 4.8 - 10.8 K/uL    RBC 5.19 4.70 - 6.10 M/uL    Hemoglobin 14.3 14.0 - 18.0 g/dL    Hematocrit 45.1 42.0 - 52.0 %    MCV 86.9 81.4 - 97.8 fL    MCH 27.6 27.0 - 33.0 pg    MCHC 31.7 (L) 33.7 - 35.3 g/dL    RDW 46.2 35.9 - 50.0 fL    Platelet Count 181 164 - 446 K/uL    MPV 10.7 9.0 - 12.9 fL    Neutrophils-Polys 58.50 " 44.00 - 72.00 %    Lymphocytes 22.00 22.00 - 41.00 %    Monocytes 16.10 (H) 0.00 - 13.40 %    Eosinophils 2.40 0.00 - 6.90 %    Basophils 0.60 0.00 - 1.80 %    Immature Granulocytes 0.40 0.00 - 0.90 %    Nucleated RBC 0.00 /100 WBC    Neutrophils (Absolute) 2.99 1.82 - 7.42 K/uL    Lymphs (Absolute) 1.12 1.00 - 4.80 K/uL    Monos (Absolute) 0.82 0.00 - 0.85 K/uL    Eos (Absolute) 0.12 0.00 - 0.51 K/uL    Baso (Absolute) 0.03 0.00 - 0.12 K/uL    Immature Granulocytes (abs) 0.02 0.00 - 0.11 K/uL    NRBC (Absolute) 0.00 K/uL   Basic Metabolic Panel    Collection Time: 03/16/21  3:54 AM   Result Value Ref Range    Sodium 138 135 - 145 mmol/L    Potassium 4.1 3.6 - 5.5 mmol/L    Chloride 104 96 - 112 mmol/L    Co2 24 20 - 33 mmol/L    Glucose 93 65 - 99 mg/dL    Bun 19 8 - 22 mg/dL    Creatinine 1.05 0.50 - 1.40 mg/dL    Calcium 9.0 8.5 - 10.5 mg/dL    Anion Gap 10.0 7.0 - 16.0   VALPROIC ACID    Collection Time: 03/16/21  3:54 AM   Result Value Ref Range    Valproic Acid 27.1 (L) 50.0 - 100.0 ug/mL   ESTIMATED GFR    Collection Time: 03/16/21  3:54 AM   Result Value Ref Range    GFR If African American >60 >60 mL/min/1.73 m 2    GFR If Non African American >60 >60 mL/min/1.73 m 2         ASSESSMENT:  Patient is a 75 y.o. male admitted with seizures now s/p EEG and loading with keppra/vimpat     [facility] Code / Diagnosis to Support: 0002.1 - Brain Dysfunction: Non-Traumatic    Rehabilitation: Impaired ADLs and mobility  Decision pending further work up    Barriers to transfer include: Insurance authorization, TCCs to verify disposition, medical clearance and bed availability     Additional Recommendations:  -Altered mental status, encephalopathy.  Concern for seizure-like activity yesterday was evaluated with stat EEG and head CT.  EEG was suggestive of diffuse multifocal cerebral dysfunction and moderate encephalopathy.  Patient seen by neurology again today, Depakote level 27.1, Dr. Martínez increase Depakote to 1 g twice  daily.  Continuing on Keppra 1g twice daily and Vimpat 200 mg twice daily  -Patient seen by SLP today, unable to tolerate any oral intake  -Recommend coretrak placement for nutrition and medication administration  -Decreasing Seroquel to 100 mg nightly to combat oversedation  -Okay to continue melatonin for sleep regulation  -We will continue to follow for resolution of AMS      Medical Complexity:  Seizures  Encephalopathy   Hypertension   Hypokalemia       DVT PPX: SCDs only 2/2 SDH      Thank you for allowing us to participate in the care of this patient.     Patient was seen for 37 minutes on unit/floor of which > 50% of time was spent on counseling and coordination of care regarding the above, including prognosis, risk reduction, benefits of treatment, and options for next stage of care.    Bradley Valerio, DO   Physical Medicine and Rehabilitation

## 2021-03-16 NOTE — CARE PLAN
Problem: Safety  Goal: Will remain free from injury  Outcome: PROGRESSING AS EXPECTED     Problem: Communication  Goal: The ability to communicate needs accurately and effectively will improve  Outcome: PROGRESSING SLOWER THAN EXPECTED     Problem: Communication:  Goal: The ability to communicate needs accurately and effectively will improve  Outcome: PROGRESSING SLOWER THAN EXPECTED

## 2021-03-16 NOTE — THERAPY
Speech Language Pathology  Daily Treatment     Patient Name: Rafael Mccoy  Age:  75 y.o., Sex:  male  Medical Record #: [id number]  Today's Date: 3/16/2021     Precautions: Fall Risk, Swallow Precautions ( See Comments)(legally blind)  Comments: legally blind     Assessment    Was notified by RN that pt was awake and appropriate for reassessment. Upon entry, patient awake and alert but nonverbal and not following commands. PO trials were administered and consisted of 3 tsp of MTL. Patient had absent swallow trigger in 3/3 trials despite MAX verbal and tactile cues including administration of dry spoon and cup. Patient had open mouth posture with poor bolus containment and anterior spillage 100% of trials. Oral suction was used to remove oral residue which included suspected soup that RN had administered previously. NO further PO trials were administered due to serious concern for aspiration. RN to notify MD and change to NPO status.     Plan    Patient is at high risk for aspiration given absent pharyngeal swallow. Recommend NPO with reassessment tomorrow AM, as appropriate. SLP following closely.     Continue current treatment plan.    Discharge Recommendations: Recommend post-acute placement for additional speech therapy services prior to discharge home       Objective       03/16/21 1513   Vitals   O2 Delivery Device None - Room Air   Dysphagia    Positioning / Behavior Modification Other (see Comments)  (tactile stim; dry spoon)   Other Treatments PO trials of MTL via tsp x3   Diet / Liquid Recommendation NPO;Pre-Feeding Trials with SLP Only   Skilled Intervention Compensatory Strategies;Verbal Cueing   Recommended Route of Medication Administration   Medication Administration  Via Gastric Tube   Short Term Goals   Short Term Goal # 2 NEW 3/13: Pt will consume LQ3/MT2 diet with no overt s/sx of aspiration    Goal Outcome # 2  Goal not met

## 2021-03-16 NOTE — PROGRESS NOTES
Pt is nonverbal, lethargic, resting in bed, unsafe to feed pt. MD aware and considering FT. Will cont to monitor

## 2021-03-16 NOTE — PROGRESS NOTES
NEUROLOGY PROGRESS NOTE      BACKGROUND:    75 y.o. male was admitted on 3/11/2021  1:30 AM for Intracranial hemorrhage (HCC) [I62.9].  He is being followed by Neurology for seizure and alteration of mental status.    SUBJECTIVE:   No significant changes, remains nonverbal and does not follow commands.  No abnormal movement noted.  No seizure reported by bedside RN.    VITALS:  Vitals:    03/15/21 1952 03/16/21 0400 03/16/21 0748 03/16/21 1134   BP: 144/80 103/55 128/60 114/59   Pulse: 67 88 88 74   Resp: 18 18 16 16   Temp: 36.6 °C (97.9 °F) 36.2 °C (97.1 °F) 36.6 °C (97.9 °F) 36.8 °C (98.3 °F)   TempSrc: Temporal Temporal Temporal Temporal   SpO2: 96% 92% 95% 94%   Weight:       Height:           NEUROLOGICAL EXAM:   MENTAL STATUS:  Awake, but nonverbal.  He does not follow commands.    CRANIAL NERVES:  Face is symmetric, facial sensation cannot be tested.  Pupils are equal and reactive.  Reportedly he is legally blind.  MOTOR: He moves all 4 extremity but not to commands.  Sensation and coordination cannot be tested.  REFLEXES:  2+ and symmetric, toes are downgoing bilaterally  GAIT:  Deferred      OBJECTIVE:    NEUROIMAGING:    CT-HEAD W/O   Final Result      1.  Prior RIGHT frontal craniotomy with underlying dural thickening and subdural hemorrhage, unchanged from prior exam.   2.  No new intracranial hemorrhage demonstrated.   3.  Diffuse atrophy with white matter microvascular ischemic changes.   4.  Small chronic RIGHT posterior frontal infarct.      [id number] VIEW   Final Result         No specific finding to suggest small bowel obstruction.      CT-HEAD W/O   Final Result         1.  Mixed right parietal subdural fluid collection likely acute on chronic subdural hematoma.   2.  Nonspecific white matter changes, commonly associated with small vessel ischemic disease.  Associated mild cerebral atrophy is noted.   3.  Atherosclerosis.      OUTSIDE IMAGES-CT HEAD   Final Result           MEDICATIONS:  Current Facility-Administered Medications   Medication Dose Route Frequency Provider Last Rate Last Admin   • valproate (DEPACON) 750 mg in  mL IVPB  750 mg Intravenous BID Milady Martínez M.D.   Stopped at 03/16/21 0715   • oxyCODONE immediate-release (ROXICODONE) tablet 5-10 mg  5-10 mg Oral Q4HRS PRN Dk Alcazar M.D.   Stopped at 03/13/21 1440   • lacosamide (VIMPAT) 200 mg in  mL ivpb  200 mg Intravenous Q12HRS Kip Mcfadden M.D.   Stopped at 03/16/21 0556   • QUEtiapine (Seroquel) tablet 150 mg  150 mg Oral Nightly Marco Weller M.D.   150 mg at 03/15/21 2003   • senna-docusate (PERICOLACE or SENOKOT S) 8.6-50 MG per tablet 2 tablet  2 tablet Oral BID Pankaj Borjas M.D.   2 tablet at 03/16/21 0435    And   • polyethylene glycol/lytes (MIRALAX) PACKET 1 Packet  1 Packet Oral QDAY PRN Pankaj Borjas M.D.        And   • magnesium hydroxide (MILK OF MAGNESIA) suspension 30 mL  30 mL Oral QDAY PRN Panakj Borjas M.D.        And   • bisacodyl (DULCOLAX) suppository 10 mg  10 mg Rectal QDAY PRN Pankaj Borjas M.D.   10 mg at 03/13/21 1452   • gabapentin (NEURONTIN) capsule 300 mg  300 mg Oral BID Kaden Sosa M.D.   300 mg at 03/16/21 0435   • simvastatin (ZOCOR) tablet 40 mg  40 mg Oral QHS Kaden Sosa M.D.   40 mg at 03/15/21 2003   • labetalol (NORMODYNE/TRANDATE) injection 10 mg  10 mg Intravenous Q4HRS PRN Kip Mcfadden M.D.       • hydrALAZINE (APRESOLINE) injection 10 mg  10 mg Intravenous Q4HRS PRSHERON Mcfadden M.D.       • enalaprilat (VASOTEC) injection 1.25 mg  1.25 mg Intravenous Q6HRS PRN Kip Mcfadden M.D.       • Respiratory Therapy Consult   Nebulization Continuous RT Pankaj Borjas M.D.       • LORazepam (ATIVAN) injection 2 mg  2 mg Intravenous Q5 MIN PRN Pankaj Borjas M.D.   2 mg at 03/12/21 0155   • acetaminophen (Tylenol) tablet 650 mg  650 mg Oral Q4HRS PRN Pankaj Borjas M.D.         Or   • acetaminophen (TYLENOL) suppository 650 mg  650 mg Rectal Q4HRS PRN Pankaj Borjas M.D.       • ondansetron (ZOFRAN ODT) dispertab 4 mg  4 mg Oral Q4HRS PRN Pankaj Borjas M.D.        Or   • ondansetron (ZOFRAN) syringe/vial injection 4 mg  4 mg Intravenous Q4HRS PRN Pankaj Borjas M.D.       • melatonin tablet 5 mg  5 mg Oral Nightly Jeremy GÉNESIS Hussein, D.O.   5 mg at 03/15/21 2003   • levETIRAcetam (Keppra) 1000 mg in 100 mL NaCl IV premix  1,000 mg Intravenous Q12HRS Kip Mcfadden M.D.   Stopped at 03/16/21 0449       LABS:      Recent Labs     03/15/21  0506 03/16/21  0354   WBC 6.0 5.1   RBC 5.45 5.19   HEMOGLOBIN 15.5 14.3   HEMATOCRIT 46.7 45.1   MCV 85.7 86.9   MCH 28.4 27.6   MCHC 33.2* 31.7*   RDW 46.3 46.2   PLATELETCT 200 181   MPV 10.6 10.7     Recent Labs     03/15/21  0506 03/16/21  0354   SODIUM 140 138   POTASSIUM 4.2 4.1   CHLORIDE 105 104   CO2 23 24   GLUCOSE 103* 93   BUN 18 19     INR   Date Value Ref Range Status   03/11/2021 1.03 0.87 - 1.13 Final     Comment:     INR - Non-therapeutic Reference Range: 0.87-1.13  INR - Therapeutic Reference Range: 2.0-4.0       No results found for: POCINR  Lab Results   Component Value Date/Time    CREATININE 1.05 03/16/2021 0354     Lab Results   Component Value Date/Time    IFAFR[place] >60 03/16/2021 0354    IFNOTAFR >60 03/16/2021 0354       ASSESSMENT AND PLAN:  75 y.o. male with history of seizure disorder who was admitted on 3/11/2021 for evaluation of alteration of mental status and possible seizure.  Patient was found to have subdural hematoma which is nonsurgical at this point.  He was noted to have some abnormal movement in left lower lip and right foot and ankle in addition to being rigid in upper extremity concerning for seizure.  A stat EEG was obtained which revealed: Moderate background slowing with abundant generalized periodic discharges frequently with a triphasic morphology.  These findings are most suggestive of  diffuse/multifocal cerebral dysfunction and a moderate encephalopathy.  Cortical irritability and a generalized increase risk of seizures is not entirely excluded in this limited recording.  - Occasional left greater than right frontal spikes embedded within and between the triphasic waves, a finding suggestive of cortical irritability in this region.  - Occasional to frequent right frontal slowing suggestive of focal dysfunction in this region  - No definitive electro-clinical seizures. Clinical correlation is recommended.  -Compared to the studies performed last week, there were no evolving patterns consistent with seizures and the right frontal slowing was less apparent and less frequent.   He is on Keppra 1 g twice a day, Vimpat 200 mg twice a day and Depakote 750 twice daily.  His Depakote level today 27.1.  I will further increase his Depakote to 1 g twice a day.  Discussed with Dr. Da Silva.  I will sign off, please call me if there is any question.

## 2021-03-16 NOTE — CONSULTS
Referring Physician: Dr. Justa Hi    Referral Reason: Possible seizure    HPI:  Mr. Rafael Mccoy is a 75 y.o. male with past medical history significant for hypertension, dementia, previous history of stroke, seizure disorder and history of subdural hematoma status post right craniectomy a year ago who was admitted on 3/11/2021 for evaluation of alteration of mental status and possible seizure.  He was initially evaluated at [facility] and underwent a brain CT which revealed subdural hematoma.  He was evaluated by neurosurgery and no surgical intervention deemed necessary.  Apparently at that time he was found to have some seizure-like activity for which EEG obtained on 3/11/2021 which revealed appearance of generalized rhythmic sharp activity lasting for several minutes in duration, suggestive of an episode of nonconvulsive status epilepticus.  This had improved after administration of lorazepam.  Patient was loaded with Keppra and has not had further seizure.  Today he was being evaluated by Dr. Valerio in rehabilitation consultation when he noted repetitive movement of left lower lip and right foot and ankle concerning for recurrent seizure.  Stat EEG was obtained which revealed occasional left greater than right frontal spikes embedded within and between the triphasic wave suggestive of cortical irritability but there was no definitive electrographic seizure.    ROS:   Unable to obtain.    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, s/p bilateral laser surgeries   • Head injury    • Hypertension    • Seizure (HCC)    • Stroke (HCC)        Past Surgical History:   Past Surgical History:   Procedure Laterality Date   • CRANIOTOMY Right 4/17/2020    Procedure: CRANIOTOMY - SUBDURAL;  Surgeon: Anthony Mendiola M.D.;  Location: SURGERY [facility];  Service: Neurosurgery   • OTHER  04/2020     craniotomy after a fall,TBI   • CRANIOTOMY     • OTHER ORTHOPEDIC SURGERY      R foot fracture   • ROTATOR CUFF REPAIR Right    • TONSILLECTOMY AND ADENOIDECTOMY         Social History:   Social History     Socioeconomic History   • Marital status:      Spouse name: Not on file   • Number of children: Not on file   • Years of education: Not on file   • Highest education level: Not on file   Occupational History   • Not on file   Tobacco Use   • Smoking status: Never Smoker   • Smokeless tobacco: Never Used   Substance and Sexual Activity   • Alcohol use: No   • Drug use: No   • Sexual activity: Not on file   Other Topics Concern   • Not on file   Social History Narrative    He lives alone, he was previously in the [employer] during the [place] Era     Social Determinants of Health     Financial Resource Strain:    • Difficulty of Paying Living Expenses:    Food Insecurity: No Food Insecurity   • Worried About Running Out of Food in the Last Year: Never true   • Ran Out of Food in the Last Year: Never true   Transportation Needs: No Transportation Needs   • Lack of Transportation (Medical): No   • Lack of Transportation (Non-Medical): No   Physical Activity:    • Days of Exercise per Week:    • Minutes of Exercise per Session:    Stress:    • Feeling of Stress :    Social Connections:    • Frequency of Communication with Friends and Family:    • Frequency of Social Gatherings with Friends and Family:    • Attends Yazidi Services:    • Active Member of Clubs or Organizations:    • Attends Club or Organization Meetings:    • Marital Status:    Intimate Partner Violence:    • Fear of Current or Ex-Partner:    • Emotionally Abused:    • Physically Abused:    • Sexually Abused:        Family Hx:   Family History   Problem Relation Age of Onset   • Other Mother         eye problems   • Cancer Neg Hx    • Heart Disease Neg Hx    • Stroke Neg Hx        Current Medications:   Current Facility-Administered  Medications   Medication Dose Route Frequency Provider Last Rate Last Admin   • oxyCODONE immediate-release (ROXICODONE) tablet 5-10 mg  5-10 mg Oral Q4HRS PRN Dk Alcazar M.D.   Stopped at 03/13/21 1440   • valproate (DEPACON) 750 mg in  mL IVPB  750 mg Intravenous Nightly Kip Mcfadden M.D.   Stopped at 03/15/21 0138   • lacosamide (VIMPAT) 200 mg in  mL ivpb  200 mg Intravenous Q12HRS Kip Mcfadden M.D.   Stopped at 03/15/21 0632   • QUEtiapine (Seroquel) tablet 150 mg  150 mg Oral Nightly Marco Weller M.D.   Stopped at 03/14/21 2100   • senna-docusate (PERICOLACE or SENOKOT S) 8.6-50 MG per tablet 2 tablet  2 tablet Oral BID Pankaj Borjas M.D.   Stopped at 03/15/21 0600    And   • polyethylene glycol/lytes (MIRALAX) PACKET 1 Packet  1 Packet Oral QDAY PRN Pankaj Borjas M.D.        And   • magnesium hydroxide (MILK OF MAGNESIA) suspension 30 mL  30 mL Oral QDAY PRN Pankaj Borjas M.D.        And   • bisacodyl (DULCOLAX) suppository 10 mg  10 mg Rectal QDAY PRN Pankaj Borjas M.D.   10 mg at 03/13/21 1452   • gabapentin (NEURONTIN) capsule 300 mg  300 mg Oral BID Kaden Sosa M.D.   300 mg at 03/15/21 0512   • simvastatin (ZOCOR) tablet 40 mg  40 mg Oral QHS Kaden Sosa M.D.   Stopped at 03/14/21 2100   • labetalol (NORMODYNE/TRANDATE) injection 10 mg  10 mg Intravenous Q4HRS PRN Kip Mcfadden M.D.       • hydrALAZINE (APRESOLINE) injection 10 mg  10 mg Intravenous Q4HRS PRN Kip Mcfadden M.D.       • enalaprilat (VASOTEC) injection 1.25 mg  1.25 mg Intravenous Q6HRS PRN Kip Mcfadden M.D.       • Respiratory Therapy Consult   Nebulization Continuous RT Pankaj Borjas M.D.       • LORazepam (ATIVAN) injection 2 mg  2 mg Intravenous Q5 MIN PRN Pankaj Borjas M.D.   2 mg at 03/12/21 0155   • acetaminophen (Tylenol) tablet 650 mg  650 mg Oral Q4HRS PRN Pankaj Borjas M.D.        Or   • acetaminophen (TYLENOL) suppository  650 mg  650 mg Rectal Q4HRS PRN Pankaj Borjas M.D.       • ondansetron (ZOFRAN ODT) dispertab 4 mg  4 mg Oral Q4HRS PRN Pankaj Borjas M.D.        Or   • ondansetron (ZOFRAN) syringe/vial injection 4 mg  4 mg Intravenous Q4HRS PRN Pankaj Borjas M.D.       • melatonin tablet 5 mg  5 mg Oral Nightly Jeremy Hussein D.O.   Stopped at 03/14/21 2100   • levETIRAcetam (Keppra) 1000 mg in 100 mL NaCl IV premix  1,000 mg Intravenous Q12HRS Kip Mcfadden M.D.   Stopped at 03/15/21 0524       Allergies: No Known Allergies    Physical Exam:   Vitals:    03/15/21 0748 03/15/21 1209 03/15/21 1548 03/15/21 1558   BP: 130/59 104/79 136/66    Pulse: 91 92 73 73   Resp: 18 18 16 17   Temp: 37.4 °C (99.3 °F) 37.4 °C (99.3 °F) 36.9 °C (98.5 °F)    TempSrc: Temporal Temporal Temporal    SpO2: 97% 94% 97% 97%   Weight:       Height:           Physical Exam   GENERAL:  Lying in the hospital bed in no apparent distress.  Head: Normocephalic.   Eyes: Pupils are  round, and reactive to light, but very sluggish.  There is a slight anisocornea with a slightly larger right pupil.  Reportedly patient is legally blind.  Cardiovascular: Normal rate and regular rhythm.    Pulmonary/Chest: Breath sounds normal.   Abdominal: Soft. Bowel sounds are normal. He exhibits no distension. There is no tenderness.   Skin: Skin is warm and dry. No rash noted. No erythema.  Neuro Exam  MENTAL STATUS:  Awake, but nonverbal.  He does not follow commands.    CRANIAL NERVES:  Face is symmetric, facial sensation cannot be tested.  MOTOR: He moves all 4 extremity but not to commands.  Sensation and coordination cannot be tested.  REFLEXES:  2+ and symmetric, toes are downgoing bilaterally  GAIT:  Deferred     Labs:  Recent Labs     03/13/21  0349 03/15/21  0506   WBC 5.8 6.0   RBC 5.26 5.45   HEMOGLOBIN 14.9 15.5   HEMATOCRIT 45.2 46.7   MCV 85.9 85.7   MCH 28.3 28.4   MCHC 33.0* 33.2*   RDW 45.9 46.3   PLATELETCT 184 200   MPV 10.1 10.6      Recent Labs     03/13/21  0349 03/15/21  0506   SODIUM 140 140   POTASSIUM 4.0 4.2   CHLORIDE 106 105   CO2 24 23   GLUCOSE 95 103*   BUN 14 18   CREATININE 1.05 1.05   CALCIUM 9.2 9.3                     Recent Labs     03/13/21  0349 03/15/21  0506   SODIUM 140 140   POTASSIUM 4.0 4.2   CHLORIDE 106 105   CO2 24 23   GLUCOSE 95 103*   BUN 14 18     Recent Labs     03/13/21  0349 03/15/21  0506   SODIUM 140 140   POTASSIUM 4.0 4.2   CHLORIDE 106 105   CO2 24 23   BUN 14 18   CREATININE 1.05 1.05   MAGNESIUM 1.9  --    CALCIUM 9.2 9.3         No results found for this or any previous visit.      Imaging reviewed:    CT-HEAD W/O   Final Result      1.  Prior RIGHT frontal craniotomy with underlying dural thickening and subdural hemorrhage, unchanged from prior exam.   2.  No new intracranial hemorrhage demonstrated.   3.  Diffuse atrophy with white matter microvascular ischemic changes.   4.  Small chronic RIGHT posterior frontal infarct.      [id number] VIEW   Final Result         No specific finding to suggest small bowel obstruction.      CT-HEAD W/O   Final Result         1.  Mixed right parietal subdural fluid collection likely acute on chronic subdural hematoma.   2.  Nonspecific white matter changes, commonly associated with small vessel ischemic disease.  Associated mild cerebral atrophy is noted.   3.  Atherosclerosis.      OUTSIDE IMAGES-CT HEAD   Final Result             Assessment/Plan:   75 y.o. male with history of seizure disorder who was admitted on 3/11/2021 for evaluation of alteration of mental status and possible seizure.  Patient was found to have subdural hematoma which is nonsurgical at this point.  Today he was noted to have some abnormal movement in left lower lip and right foot and ankle in addition to being rigid in upper extremity concerning for seizure.  A stat EEG was obtained which revealed: Moderate background slowing with abundant generalized periodic discharges frequently with  a triphasic morphology.  These findings are most suggestive of diffuse/multifocal cerebral dysfunction and a moderate encephalopathy.  Cortical irritability and a generalized increase risk of seizures is not entirely excluded in this limited recording.  - Occasional left greater than right frontal spikes embedded within and between the triphasic waves, a finding suggestive of cortical irritability in this region.  - Occasional to frequent right frontal slowing suggestive of focal dysfunction in this region  - No definitive electro-clinical seizures. Clinical correlation is recommended.  -Compared to the studies performed last week, there were no evolving patterns consistent with seizures and the right frontal slowing was less apparent and less frequent.   He is on Keppra 1 g twice a day, Vimpat 200 mg twice a day and Depakote 750 at night.  I will increase his Depakote to 750 twice a day and will check valproic acid level for tomorrow.  Continue observation and supportive care.

## 2021-03-16 NOTE — PROGRESS NOTES
Patient noted to have some stiffing/ridigity of BL UE, left lower lip quivering and root foot shaking. Unable to follow commands. MD notified, CT and EEG ordered.

## 2021-03-17 PROBLEM — R56.1 SEIZURES, POST-TRAUMATIC (HCC): Status: RESOLVED | Noted: 2020-04-21 | Resolved: 2021-03-17

## 2021-03-17 LAB
ALBUMIN SERPL BCP-MCNC: 3.6 G/DL (ref 3.2–4.9)
ALBUMIN/GLOB SERPL: 1.3 G/DL
ALP SERPL-CCNC: 72 U/L (ref 30–99)
ALT SERPL-CCNC: 13 U/L (ref 2–50)
ANION GAP SERPL CALC-SCNC: 7 MMOL/L (ref 7–16)
AST SERPL-CCNC: 16 U/L (ref 12–45)
BASOPHILS # BLD AUTO: 0.6 % (ref 0–1.8)
BASOPHILS # BLD: 0.03 K/UL (ref 0–0.12)
BILIRUB SERPL-MCNC: 0.6 MG/DL (ref 0.1–1.5)
BUN SERPL-MCNC: 19 MG/DL (ref 8–22)
CALCIUM SERPL-MCNC: 9.3 MG/DL (ref 8.5–10.5)
CHLORIDE SERPL-SCNC: 107 MMOL/L (ref 96–112)
CO2 SERPL-SCNC: 27 MMOL/L (ref 20–33)
CREAT SERPL-MCNC: 0.96 MG/DL (ref 0.5–1.4)
EOSINOPHIL # BLD AUTO: 0.16 K/UL (ref 0–0.51)
EOSINOPHIL NFR BLD: 3.2 % (ref 0–6.9)
ERYTHROCYTE [DISTWIDTH] IN BLOOD BY AUTOMATED COUNT: 45 FL (ref 35.9–50)
GLOBULIN SER CALC-MCNC: 2.8 G/DL (ref 1.9–3.5)
GLUCOSE SERPL-MCNC: 107 MG/DL (ref 65–99)
HCT VFR BLD AUTO: 47.3 % (ref 42–52)
HGB BLD-MCNC: 15.4 G/DL (ref 14–18)
IMM GRANULOCYTES # BLD AUTO: 0.01 K/UL (ref 0–0.11)
IMM GRANULOCYTES NFR BLD AUTO: 0.2 % (ref 0–0.9)
LYMPHOCYTES # BLD AUTO: 1.09 K/UL (ref 1–4.8)
LYMPHOCYTES NFR BLD: 21.8 % (ref 22–41)
MAGNESIUM SERPL-MCNC: 2 MG/DL (ref 1.5–2.5)
MCH RBC QN AUTO: 28.1 PG (ref 27–33)
MCHC RBC AUTO-ENTMCNC: 32.6 G/DL (ref 33.7–35.3)
MCV RBC AUTO: 86.2 FL (ref 81.4–97.8)
MONOCYTES # BLD AUTO: 0.66 K/UL (ref 0–0.85)
MONOCYTES NFR BLD AUTO: 13.2 % (ref 0–13.4)
NEUTROPHILS # BLD AUTO: 3.06 K/UL (ref 1.82–7.42)
NEUTROPHILS NFR BLD: 61 % (ref 44–72)
NRBC # BLD AUTO: 0 K/UL
NRBC BLD-RTO: 0 /100 WBC
PLATELET # BLD AUTO: 193 K/UL (ref 164–446)
PMV BLD AUTO: 10.4 FL (ref 9–12.9)
POTASSIUM SERPL-SCNC: 4.3 MMOL/L (ref 3.6–5.5)
PROT SERPL-MCNC: 6.4 G/DL (ref 6–8.2)
RBC # BLD AUTO: 5.49 M/UL (ref 4.7–6.1)
SODIUM SERPL-SCNC: 141 MMOL/L (ref 135–145)
WBC # BLD AUTO: 5 K/UL (ref 4.8–10.8)

## 2021-03-17 PROCEDURE — 99233 SBSQ HOSP IP/OBS HIGH 50: CPT | Performed by: STUDENT IN AN ORGANIZED HEALTH CARE EDUCATION/TRAINING PROGRAM

## 2021-03-17 PROCEDURE — 700105 HCHG RX REV CODE 258: Performed by: PSYCHIATRY & NEUROLOGY

## 2021-03-17 PROCEDURE — A9270 NON-COVERED ITEM OR SERVICE: HCPCS | Performed by: STUDENT IN AN ORGANIZED HEALTH CARE EDUCATION/TRAINING PROGRAM

## 2021-03-17 PROCEDURE — 770006 HCHG ROOM/CARE - MED/SURG/GYN SEMI*

## 2021-03-17 PROCEDURE — 700111 HCHG RX REV CODE 636 W/ 250 OVERRIDE (IP): Performed by: STUDENT IN AN ORGANIZED HEALTH CARE EDUCATION/TRAINING PROGRAM

## 2021-03-17 PROCEDURE — 97530 THERAPEUTIC ACTIVITIES: CPT

## 2021-03-17 PROCEDURE — 700111 HCHG RX REV CODE 636 W/ 250 OVERRIDE (IP): Performed by: HOSPITALIST

## 2021-03-17 PROCEDURE — 700102 HCHG RX REV CODE 250 W/ 637 OVERRIDE(OP): Performed by: STUDENT IN AN ORGANIZED HEALTH CARE EDUCATION/TRAINING PROGRAM

## 2021-03-17 PROCEDURE — 80053 COMPREHEN METABOLIC PANEL: CPT

## 2021-03-17 PROCEDURE — 99232 SBSQ HOSP IP/OBS MODERATE 35: CPT | Performed by: PHYSICAL MEDICINE & REHABILITATION

## 2021-03-17 PROCEDURE — C9254 INJECTION, LACOSAMIDE: HCPCS | Performed by: PSYCHIATRY & NEUROLOGY

## 2021-03-17 PROCEDURE — 92526 ORAL FUNCTION THERAPY: CPT

## 2021-03-17 PROCEDURE — 36415 COLL VENOUS BLD VENIPUNCTURE: CPT

## 2021-03-17 PROCEDURE — 85025 COMPLETE CBC W/AUTO DIFF WBC: CPT

## 2021-03-17 PROCEDURE — 700111 HCHG RX REV CODE 636 W/ 250 OVERRIDE (IP): Performed by: PSYCHIATRY & NEUROLOGY

## 2021-03-17 PROCEDURE — 700101 HCHG RX REV CODE 250: Performed by: STUDENT IN AN ORGANIZED HEALTH CARE EDUCATION/TRAINING PROGRAM

## 2021-03-17 PROCEDURE — 83735 ASSAY OF MAGNESIUM: CPT

## 2021-03-17 RX ORDER — QUETIAPINE FUMARATE 25 MG/1
50 TABLET, FILM COATED ORAL NIGHTLY
Status: DISCONTINUED | OUTPATIENT
Start: 2021-03-17 | End: 2021-03-18

## 2021-03-17 RX ORDER — HALOPERIDOL 5 MG/ML
5 INJECTION INTRAMUSCULAR EVERY 4 HOURS PRN
Status: DISCONTINUED | OUTPATIENT
Start: 2021-03-17 | End: 2021-03-18

## 2021-03-17 RX ADMIN — MORPHINE SULFATE 2 MG: 4 INJECTION INTRAVENOUS at 19:27

## 2021-03-17 RX ADMIN — POTASSIUM CHLORIDE, DEXTROSE MONOHYDRATE AND SODIUM CHLORIDE: 150; 5; 450 INJECTION, SOLUTION INTRAVENOUS at 05:26

## 2021-03-17 RX ADMIN — MORPHINE SULFATE 2 MG: 4 INJECTION INTRAVENOUS at 05:13

## 2021-03-17 RX ADMIN — MORPHINE SULFATE 2 MG: 4 INJECTION INTRAVENOUS at 14:31

## 2021-03-17 RX ADMIN — DOCUSATE SODIUM 50 MG AND SENNOSIDES 8.6 MG 2 TABLET: 8.6; 5 TABLET, FILM COATED ORAL at 16:23

## 2021-03-17 RX ADMIN — SODIUM CHLORIDE 750 MG: 9 INJECTION, SOLUTION INTRAVENOUS at 05:18

## 2021-03-17 RX ADMIN — SODIUM CHLORIDE 200 MG: 9 INJECTION, SOLUTION INTRAVENOUS at 18:07

## 2021-03-17 RX ADMIN — LEVETIRACETAM INJECTION 1000 MG: 10 INJECTION INTRAVENOUS at 16:23

## 2021-03-17 RX ADMIN — LEVETIRACETAM INJECTION 1000 MG: 10 INJECTION INTRAVENOUS at 05:18

## 2021-03-17 RX ADMIN — HALOPERIDOL LACTATE 5 MG: 5 INJECTION, SOLUTION INTRAMUSCULAR at 21:20

## 2021-03-17 RX ADMIN — SODIUM CHLORIDE 200 MG: 9 INJECTION, SOLUTION INTRAVENOUS at 05:56

## 2021-03-17 RX ADMIN — SODIUM CHLORIDE 750 MG: 9 INJECTION, SOLUTION INTRAVENOUS at 16:51

## 2021-03-17 RX ADMIN — GABAPENTIN 300 MG: 300 CAPSULE ORAL at 16:23

## 2021-03-17 ASSESSMENT — COGNITIVE AND FUNCTIONAL STATUS - GENERAL
PERSONAL GROOMING: TOTAL
TOILETING: TOTAL
DAILY ACTIVITIY SCORE: 7
HELP NEEDED FOR BATHING: TOTAL
SUGGESTED CMS G CODE MODIFIER DAILY ACTIVITY: CM
DRESSING REGULAR UPPER BODY CLOTHING: A LOT
DRESSING REGULAR LOWER BODY CLOTHING: TOTAL
EATING MEALS: TOTAL

## 2021-03-17 ASSESSMENT — PAIN DESCRIPTION - PAIN TYPE
TYPE: ACUTE PAIN

## 2021-03-17 NOTE — PROGRESS NOTES
Physical Medicine and Rehabilitation Consultation              Date of initial consultation: 3/15/2021  Consulting provider: Pankaj Finley MD  Reason for consultation: assess for acute inpatient rehab appropriateness  LOS: 6 Day(s)    Chief complaint: SDH    HPI: The patient is a 75 y.o. male with a past medical history of cerebellar CVA, seizure disorder, hypertension, ataxia, dementia, legally blind for 12 years, insomnia, right craniotomy for subdural hematoma 1 year ago;  who presented on 3/11/2021 1:30 AM with altered mental status and concern for seizure.  Patient was having a zoom call with his family when he suffered a brief seizure, he was taken to [facility], found to have SDH on CT, transferred to [facility] for neurosurgical consultation.  NSG did not recommend intervention.  Patient had a EEG which reflected nonconvulsive status epilepticus.  Patient was loaded with Keppra and Vimpat.  Patient is not on anticoagulation secondary to subdural hematoma.    The patient currently is minimally responsive, unable to answer questions with more than a grunt. He has repetitive movements noted in the left lower lip and right foot/ankle. He is able to follow commands regarding to squeezing my hand, but then goes into UE rigidity and is unable to release. LE's seem less affected. Nurse and primary doc notified.     3/16/2021  Patient is slightly more alert today.  Able to answer 1-2 questions with single word answers.  Seen by speech today, unable to take an oral intake.  Discussed plan of care with nursing staff.  Seen by neurology today, seizure meds increased.    3/17/2021  Patient slightly improved today.  Able to answer 1 question with multi word phrase 1 time.  Per nursing he was able to eat 100% of his breakfast.  Still I have concerns that he has not taking in adequate nutrition.     Social Hx:  Assisted living facility    Patient lives at [facility], comprehensive assisted living with  memory care available.  Patient was essentially independent with household/limited community mobility.  Was using a walker walking stick/ blind cane.  Patient had assistance for showering.  Was not on memory care.  Intermittent assist with IADLs and some ADLs as needed.    THERAPY:  PT: Functional mobility   3/15: Unable to participate in gait, max assist for sit to stand    OT: ADLs  3/15: max assist for lower body dressing, toileting, grooming    SLP:   3/13: Modified diet with liquid eyes textures of mildly thick liquids and swallowing strategies  3/17: Liquidized (level 3) and Mildly Thick Liquid (level 2) diet given STRICT 1:1 feeding    IMAGING:  CT head 3/11 shows acute on chronic subdural fluid collections under prior right craniotomy    PROCEDURES:  EEG on 3/11 showing status epilepticus    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, s/p bilateral laser surgeries   • Head injury    • Hypertension    • Seizure (HCC)    • Stroke (HCC)        PSH:  Past Surgical History:   Procedure Laterality Date   • CRANIOTOMY Right 4/17/2020    Procedure: CRANIOTOMY - SUBDURAL;  Surgeon: Anthony Mendiola M.D.;  Location: SURGERY [facility];  Service: Neurosurgery   • OTHER  04/2020    craniotomy after a fall,TBI   • CRANIOTOMY     • OTHER ORTHOPEDIC SURGERY      R foot fracture   • ROTATOR CUFF REPAIR Right    • TONSILLECTOMY AND ADENOIDECTOMY         FHX:  Family History   Problem Relation Age of Onset   • Other Mother         eye problems   • Cancer Neg Hx    • Heart Disease Neg Hx    • Stroke Neg Hx        Medications:  Current Facility-Administered Medications   Medication Dose   • morphine (pf) 4 mg/mL injection 2 mg  2 mg   • QUEtiapine (Seroquel) tablet 100 mg  100 mg   • dextrose 5 % and 0.45 % NaCl with KCl 20 mEq     • valproate (DEPACON) 750 mg in  mL IVPB  750 mg   • oxyCODONE immediate-release (ROXICODONE) tablet 5-10 mg   "5-10 mg   • lacosamide (VIMPAT) 200 mg in  mL ivpb  200 mg   • senna-docusate (PERICOLACE or SENOKOT S) 8.6-50 MG per tablet 2 tablet  2 tablet    And   • polyethylene glycol/lytes (MIRALAX) PACKET 1 Packet  1 Packet    And   • magnesium hydroxide (MILK OF MAGNESIA) suspension 30 mL  30 mL    And   • bisacodyl (DULCOLAX) suppository 10 mg  10 mg   • gabapentin (NEURONTIN) capsule 300 mg  300 mg   • simvastatin (ZOCOR) tablet 40 mg  40 mg   • labetalol (NORMODYNE/TRANDATE) injection 10 mg  10 mg   • hydrALAZINE (APRESOLINE) injection 10 mg  10 mg   • enalaprilat (VASOTEC) injection 1.25 mg  1.25 mg   • Respiratory Therapy Consult     • LORazepam (ATIVAN) injection 2 mg  2 mg   • acetaminophen (Tylenol) tablet 650 mg  650 mg    Or   • acetaminophen (TYLENOL) suppository 650 mg  650 mg   • ondansetron (ZOFRAN ODT) dispertab 4 mg  4 mg    Or   • ondansetron (ZOFRAN) syringe/vial injection 4 mg  4 mg   • melatonin tablet 5 mg  5 mg   • levETIRAcetam (Keppra) 1000 mg in 100 mL NaCl IV premix  1,000 mg       Allergies:  No Known Allergies    Physical Exam:  Vitals: /75   Pulse 84   Temp 36.9 °C (98.4 °F) (Temporal)   Resp 16   Ht 1.753 m (5' 9.02\")   Wt 90.6 kg (199 lb 11.8 oz)   SpO2 94%   Gen: Hyporesponsive  Head: NC/AT  Eyes/ Nose/ Mouth: moist mucous membranes.   Cardio: RRR, good distal perfusion, warm extremities  Pulm: normal respiratory effort, no cyanosis   Abd: Soft NTND, negative borborygmi   Ext: No peripheral edema. No calf tenderness. No clubbing.     Labs: Reviewed and significant for   Recent Labs     03/15/21  0506 03/16/21 0354 03/17/21  0208   RBC 5.45 5.19 5.49   HEMOGLOBIN 15.5 14.3 15.4   HEMATOCRIT 46.7 45.1 47.3   PLATELETCT 200 181 193     Recent Labs     03/15/21  0506 03/16/21 0354 03/17/21  0208   SODIUM 140 138 141   POTASSIUM 4.2 4.1 4.3   CHLORIDE 105 104 107   CO2 23 24 27   GLUCOSE 103* 93 107*   BUN 18 19 19   CREATININE 1.05 1.05 0.96   CALCIUM 9.3 9.0 9.3     Recent " Results (from the past 24 hour(s))   CBC WITH DIFFERENTIAL    Collection Time: 03/17/21  2:08 AM   Result Value Ref Range    WBC 5.0 4.8 - 10.8 K/uL    RBC 5.49 4.70 - 6.10 M/uL    Hemoglobin 15.4 14.0 - 18.0 g/dL    Hematocrit 47.3 42.0 - 52.0 %    MCV 86.2 81.4 - 97.8 fL    MCH 28.1 27.0 - 33.0 pg    MCHC 32.6 (L) 33.7 - 35.3 g/dL    RDW 45.0 35.9 - 50.0 fL    Platelet Count 193 164 - 446 K/uL    MPV 10.4 9.0 - 12.9 fL    Neutrophils-Polys 61.00 44.00 - 72.00 %    Lymphocytes 21.80 (L) 22.00 - 41.00 %    Monocytes 13.20 0.00 - 13.40 %    Eosinophils 3.20 0.00 - 6.90 %    Basophils 0.60 0.00 - 1.80 %    Immature Granulocytes 0.20 0.00 - 0.90 %    Nucleated RBC 0.00 /100 WBC    Neutrophils (Absolute) 3.06 1.82 - 7.42 K/uL    Lymphs (Absolute) 1.09 1.00 - 4.80 K/uL    Monos (Absolute) 0.66 0.00 - 0.85 K/uL    Eos (Absolute) 0.16 0.00 - 0.51 K/uL    Baso (Absolute) 0.03 0.00 - 0.12 K/uL    Immature Granulocytes (abs) 0.01 0.00 - 0.11 K/uL    NRBC (Absolute) 0.00 K/uL   Comp Metabolic Panel    Collection Time: 03/17/21  2:08 AM   Result Value Ref Range    Sodium 141 135 - 145 mmol/L    Potassium 4.3 3.6 - 5.5 mmol/L    Chloride 107 96 - 112 mmol/L    Co2 27 20 - 33 mmol/L    Anion Gap 7.0 7.0 - 16.0    Glucose 107 (H) 65 - 99 mg/dL    Bun 19 8 - 22 mg/dL    Creatinine 0.96 0.50 - 1.40 mg/dL    Calcium 9.3 8.5 - 10.5 mg/dL    AST(SGOT) 16 12 - 45 U/L    ALT(SGPT) 13 2 - 50 U/L    Alkaline Phosphatase 72 30 - 99 U/L    Total Bilirubin 0.6 0.1 - 1.5 mg/dL    Albumin 3.6 3.2 - 4.9 g/dL    Total Protein 6.4 6.0 - 8.2 g/dL    Globulin 2.8 1.9 - 3.5 g/dL    A-G Ratio 1.3 g/dL   MAGNESIUM    Collection Time: 03/17/21  2:08 AM   Result Value Ref Range    Magnesium 2.0 1.5 - 2.5 mg/dL   ESTIMATED GFR    Collection Time: 03/17/21  2:08 AM   Result Value Ref Range    GFR If African American >60 >60 mL/min/1.73 m 2    GFR If Non African American >60 >60 mL/min/1.73 m 2         ASSESSMENT:  Patient is a 75 y.o. male admitted with  seizures now s/p EEG and loading with keppra/vimpat     [facility] Code / Diagnosis to Support: 0002.1 - Brain Dysfunction: Non-Traumatic    Rehabilitation: Impaired ADLs and mobility  Decision pending further work up    Barriers to transfer include: Insurance authorization, TCCs to verify disposition, medical clearance and bed availability     Additional Recommendations:  -Altered mental status, encephalopathy.  Concern for seizure-like activity yesterday was evaluated with stat EEG and head CT.  EEG was suggestive of diffuse multifocal cerebral dysfunction and moderate encephalopathy.  Patient seen by neurology again today, Depakote level 27.1, Dr. Martínez increase Depakote to 1 g twice daily.  Continuing on Keppra 1g twice daily and Vimpat 200 mg twice daily  -Patient seen by SLP today, unable to tolerate any oral intake  -Continue to recommend coretrak placement for nutrition and medication administration in the setting of decreased mentation.  -Ordering calorie count today  -Decreasing Seroquel to 50 mg nightly   -Okay to continue melatonin for sleep regulation  -We will continue to follow for resolution of AMS      Medical Complexity:  Seizures  Encephalopathy   Hypertension   Hypokalemia       DVT PPX: SCDs only 2/2 SDH      Thank you for allowing us to participate in the care of this patient.     Patient was seen for 27 minutes on unit/floor of which > 50% of time was spent on counseling and coordination of care regarding the above, including prognosis, risk reduction, benefits of treatment, and options for next stage of care.    Bradley Valerio, DO   Physical Medicine and Rehabilitation

## 2021-03-17 NOTE — DIETARY
"Nutrition Support Assessment: Calorie Count Consult  Day 6 of admit.  Rafael Mccoy is a 75 y.o. male with admitting DX of Epidural Bleed and intracranial hemorrhage.     Current problem list:  1. Encephalopathy acute  2. ICH  3. Subdural hematoma, acute  4. Seizure disorder, nonconvulsive, with status epilepticus  5. Hypokalemia  6. Seizures  7. Dysphagia  8. Dementia associated with other underlying disease with behavioral disturbance  9. Essential HTN  10. Legally blind     Assessment:  Estimated Nutritional Needs based on:   Height: 175.3 cm (5' 9.02\")  Weight: 90.6 kg (199 lb 11.8 oz) - bed scale  Weight to Use in Calculations: 90.6 kg (199 lb 11.8 oz)  Body mass index is 29.48 kg/m²., BMI classification: overweight    Calculation/Equation: MSJ x 1.1-1.2 = 4134-5275 kcals  Total Calories / day: 1800 - 2000 (Calories / k - 22)   Total Grams Protein/ Day: 91 - 109  (Grams/k.0 - 1.2)     Evaluation:   1. Consult received for calorie count for 1 day.   2. SLP seen today and recommends liquidized diet with mildly thick liquids. Does note possible feeding tube needed as pt has ongoing waxing and waning mentation.  3. MD notes concern for PO intake. Discussed with RN, pt has been eating poorly (<50%) for 5 days of admit and today (day 6) pt ate % of breakfast and lunch.  4. Noted POLST from 20 notes \"defined trial period for feeding tube\" with no specified time period.  5. MD notes pt with dementia, mentation improved slightly today  6. Weight is increasing during admit.  7. Labs: glucose 107  8. Meds: vimpat, keppra, seroquel, colace, zocor, morphine prn, zofran prn, bowel protocol, D5 with NaCl and KCl  9. Last BM: 3/15     Malnutrition Risk: Pt is at risk with eating <50% of meals for 5 days of admit, unable to further assess at this time.     Recommendations/Plan:  1. Discussed with MD, agrees to Calorie Count for 3 days (3/17 Dinner- 3/20 Dinner) to assess PO intake.  2. Diet per SLP, " encourage PO intake as able. Will order thick Boost Plus BID to promote PO intake per protocol d/t >48 hours of eating <50% of meals.  3. As appropriate, consider starting nutrition support.    RD following.

## 2021-03-17 NOTE — ASSESSMENT & PLAN NOTE
Dysphagia likely secondary to encephalopathy and stroke  Patient intermittently tolerates PO intake, however at times refuses both food and medication  NGT 3/18 placed for nutrition and medication administration, tube feeds currently at goal    4/4: We have resumed tube feedings, tolerating.  4/6: spoke with daughter regarding NG tube and PEG, she does not think her father would have wanted PEG, has AD that said he didn't want one

## 2021-03-17 NOTE — CARE PLAN
Problem: Venous Thromboembolism (VTW)/Deep Vein Thrombosis (DVT) Prevention:  Goal: Patient will participate in Venous Thrombosis (VTE)/Deep Vein Thrombosis (DVT)Prevention Measures  Outcome: PROGRESSING AS EXPECTED  Note: Scd in use for dvt prophylaxis, pt compliant     Problem: Risk of Aspiration  Goal: Absence of aspiration  Outcome: PROGRESSING AS EXPECTED  Note: SLP at bedside performs swallow evaluation. Diet will be order per slp

## 2021-03-17 NOTE — THERAPY
Speech Language Pathology  Daily Treatment     Patient Name: Rafael Mccoy  Age:  75 y.o., Sex:  male  Medical Record #: [id number]  Today's Date: 3/17/2021    Precautions: Fall Risk, Swallow Precautions ( See Comments)  Comments: legally blind     Assessment    Patient was seen on this date for dysphagia reassessment. Pt awake with very limited responses and abandoned phrases. PO trials were administered and consisted of ice chips, 4 oz MTL (tsp/cup), 4 oz applesauce, and thin liquids (tsp x3). PO trials of ice chips and first tsp of MTL resulted in oral holding/prolonged initiation of pharyngeal swallow up to 20-30 seconds despite verbal and tactile cueing. However, with subsequent trials pt achieved mild-moderately delayed swallow with reduced laryngeal elevation to palpation. No overt s/sx of aspiration appreciated with MTL or applesauce. Immediate coughing response occurred with 3rd tsp of thin liquids concerning for aspiration. Mild perseverative chewing pattern with applesauce but overall appeared functional with no oral residue appreciated after the swallow.     Plan    Recommend re-initiation of a Liquidized (level 3) and Mildly Thick Liquid (level 2) diet given STRICT 1:1 feeding. However, may need to consider alternative source of nutrition with ongoing waxing and waning mentation or decline in swallow function. SLP following closely.     Continue current treatment plan.    Discharge Recommendations: Recommend post-acute placement for additional speech therapy services prior to discharge home     Objective       03/17/21 0742   Vitals   O2 Delivery Device None - Room Air   Dysphagia    Positioning / Behavior Modification Modulate Rate or Bite Size   Other Treatments Pre-feeding trials    Diet / Liquid Recommendation Liquidised (3) - (Nectar Thick Full Liquid);Mildly Thick (2) - (Nectar Thick)   Skilled Intervention Compensatory Strategies;Tactile Cueing;Verbal Cueing   Recommended Route of Medication  Administration   Medication Administration  Crush all Medications in Puree   Short Term Goals   Short Term Goal # 2 NEW 3/13: Pt will consume LQ3/MT2 diet with no overt s/sx of aspiration    Goal Outcome # 2  Progressing slower than expected

## 2021-03-17 NOTE — PROGRESS NOTES
"Hospital Medicine Daily Progress Note    Date of Service  3/17/2021    Chief Complaint  75 y.o. male admitted 3/11/2021 with seizure    Hospital Course  \"History of cerebellar CVA, seizure disorder, presented as a transfer from [facility] after he was found to have right parietal SDH without any midline shift's.  Witnessed seizure by family followed by changes in mental status.  Neurosurgery consulted Dr. Capps, no surgical intervention, recommend repeat CT head and frequent neuro checks.  Admit to the ICU every hour neuro checks, repeat head CT, keep SBP less than 140, continue Depakote and Keppra.  Due to ongoing encephalopathy, he had an EEG and was found to be in non-convulsive status epilepticus and was loaded with Vimpat.\"    Interval Problem Update  Patient seen and evaluated at bedside. Patient more alert today. Occasionally follows commands, however most aphasic. Was unable to be orientated and required soft restraints. Was able to tolerate 100% of diet today, will continue to monitor if he stops eating will need NGT for trial of alternative nutrition.    Consultants/Specialty  Critical Care  Neurosurgery  Physiatry    Code Status  DNAR/DNI    Disposition  Will need SNF when medically cleared    Review of Systems  Review of Systems   Unable to perform ROS: Mental acuity        Physical Exam  Temp:  [36.6 °C (97.8 °F)-37 °C (98.6 °F)] 37 °C (98.6 °F)  Pulse:  [60-92] 92  Resp:  [16-18] 18  BP: (128-152)/(71-76) 128/71  SpO2:  [90 %-94 %] 90 %    Physical Exam  Vitals and nursing note reviewed.   Constitutional:       General: He is not in acute distress.     Appearance: He is not toxic-appearing.   HENT:      Head: Normocephalic and atraumatic.      Nose: Nose normal.      Mouth/Throat:      Mouth: Mucous membranes are dry.      Pharynx: Oropharynx is clear.   Eyes:      General: No scleral icterus.     Conjunctiva/sclera: Conjunctivae normal.   Cardiovascular:      Rate and Rhythm: Normal rate and regular " rhythm.   Pulmonary:      Effort: Pulmonary effort is normal. No respiratory distress.      Breath sounds: Normal breath sounds.   Abdominal:      General: Bowel sounds are normal.      Palpations: Abdomen is soft.   Musculoskeletal:      Cervical back: Normal range of motion and neck supple.      Right lower leg: No edema.      Left lower leg: No edema.   Skin:     General: Skin is warm and dry.   Neurological:      Comments: Patient is more alert  Moving extremities spontaneously  Intermittently following commands  Unable to engage with neurological examination         Fluids    Intake/Output Summary (Last 24 hours) at 3/17/2021 1643  Last data filed at 3/17/2021 1343  Gross per 24 hour   Intake 1820.42 ml   Output 1550 ml   Net 270.42 ml       Laboratory  Recent Labs     03/15/21  0506 03/16/21  0354 03/17/21  0208   WBC 6.0 5.1 5.0   RBC 5.45 5.19 5.49   HEMOGLOBIN 15.5 14.3 15.4   HEMATOCRIT 46.7 45.1 47.3   MCV 85.7 86.9 86.2   MCH 28.4 27.6 28.1   MCHC 33.2* 31.7* 32.6*   RDW 46.3 46.2 45.0   PLATELETCT 200 181 193   MPV 10.6 10.7 10.4     Recent Labs     03/15/21  0506 03/16/21  0354 03/17/21  0208   SODIUM 140 138 141   POTASSIUM 4.2 4.1 4.3   CHLORIDE 105 104 107   CO2 23 24 27   GLUCOSE 103* 93 107*   BUN 18 19 19   CREATININE 1.05 1.05 0.96   CALCIUM 9.3 9.0 9.3                   Imaging  CT-HEAD W/O   Final Result      1.  Prior RIGHT frontal craniotomy with underlying dural thickening and subdural hemorrhage, unchanged from prior exam.   2.  No new intracranial hemorrhage demonstrated.   3.  Diffuse atrophy with white matter microvascular ischemic changes.   4.  Small chronic RIGHT posterior frontal infarct.      [id number] VIEW   Final Result         No specific finding to suggest small bowel obstruction.      CT-HEAD W/O   Final Result         1.  Mixed right parietal subdural fluid collection likely acute on chronic subdural hematoma.   2.  Nonspecific white matter changes, commonly associated  with small vessel ischemic disease.  Associated mild cerebral atrophy is noted.   3.  Atherosclerosis.      OUTSIDE IMAGES-CT HEAD   Final Result           Assessment/Plan  * Seizure disorder, nonconvulsive, with status epilepticus (HCC)- (present on admission)  Assessment & Plan  Noted on EEG on admission  He was loaded with IV Keppra, started on IV Vimpat, and IV depakote  Appriciate neurology consult. Depakote level subtherapeutic and increased to 750mg BID by neurology  Outpatient follow up in the epilepsy clinic has been arranged by Dr. Mcfadden  Patient will continue IV seizure medications until passes swallow examination  Repeat EEG 3/15 without evidence of seizure and more consistent with encephalopathy    If patient has continued seizure like activity or if his mentation appears to decline further, may need repeat EEG.    Encephalopathy acute- (present on admission)  Assessment & Plan  Acute encephalopathy likely multifactorial  In setting of dementia with behavioral disturbance, history of psychiatric disorder, CVA, and seizure disorder in status epilepticus on admission  Seroquel decreased to limit somnolence  Patient remains disorientated and aphasic, however more alert and tolerating diet currently. Will continue to monitor      Subdural hematoma, acute (HCC)- (present on admission)  Assessment & Plan  Noted on CT head  Non-traumatic  Neurosurgery consulted, non-operative management     Hypokalemia  Assessment & Plan  Keep K > 4.  Monitor BMP.     Dysphagia  Assessment & Plan  Dysphagia likely secondary to encephalopathy  Patient re-evaluated by SLP today and tolerating diet  Will continue IVF today  Monitor closely. If not able to eat will need NGT placement for alternative nutrition    Dementia associated with other underlying disease with behavioral disturbance (HCC)- (present on admission)  Assessment & Plan  Reported history of cognitive decline consistent with dementia  Continue to monitor    Legally  blind- (present on admission)  Assessment & Plan  - Patient legally blind for more than 12 years per patient  - Lives at [facility]    Essential hypertension- (present on admission)  Assessment & Plan  Reported history of though had not been on medications prior to admit  Refrain from initiating BP meds for now as his blood pressure had fluctuated       VTE prophylaxis: SCDs only secondary to SDH.

## 2021-03-17 NOTE — PROGRESS NOTES
Per NPO order; ok to give sips with meds. Crushed HS meds and put into one small bite of applesauce as patient was awake and responding in one-word answers. Held bite in his mouth and did not follow commands to swallow. Suction use to retrieve bite- oral care provided

## 2021-03-17 NOTE — PROGRESS NOTES
"Hospital Medicine Daily Progress Note    Date of Service  3/16/2021    Chief Complaint  75 y.o. male admitted 3/11/2021 with seizure    Hospital Course  \"History of cerebellar CVA, seizure disorder, presented as a transfer from [facility] after he was found to have right parietal SDH without any midline shift's.  Witnessed seizure by family followed by changes in mental status.  Neurosurgery consulted Dr. Capps, no surgical intervention, recommend repeat CT head and frequent neuro checks.  Admit to the ICU every hour neuro checks, repeat head CT, keep SBP less than 140, continue Depakote and Keppra.  Due to ongoing encephalopathy, he had an EEG and was found to be in non-convulsive status epilepticus and was loaded with Vimpat.\"    Interval Problem Update  Patient seen and evaluated at bedside. Patient very somnolent this morning. Awakens on command briefly and was able to tell me his name before falling back asleep and refusing interaction.    Unable to tolerate PO intake due to somnolence. If he remains unable to tolerate PO intake will consider alternative nutrition with NGT.    Patient had concern for seizure yesterday, EEG completed and showed no evidence of seizure, but irritability and encephalopathy. Neurology following and due to subtherapeutic depakote level, IV valproate was increased to 750mg BID.    Consultants/Specialty  Critical Care  Neurosurgery    Code Status  DNAR/DNI    Disposition  Neurology unit.    Review of Systems  Review of Systems   Unable to perform ROS: Mental acuity        Physical Exam  Temp:  [36.2 °C (97.1 °F)-36.8 °C (98.3 °F)] 36.4 °C (97.6 °F)  Pulse:  [67-88] 80  Resp:  [16-18] 16  BP: (103-144)/(55-80) 137/62  SpO2:  [92 %-96 %] 92 %    Physical Exam  Vitals and nursing note reviewed.   Constitutional:       General: He is not in acute distress.     Appearance: He is not toxic-appearing.   HENT:      Head: Normocephalic and atraumatic.      Mouth/Throat:      Mouth: Mucous " membranes are dry.      Pharynx: Oropharynx is clear.   Eyes:      General: No scleral icterus.     Conjunctiva/sclera: Conjunctivae normal.   Cardiovascular:      Rate and Rhythm: Normal rate and regular rhythm.   Pulmonary:      Effort: Pulmonary effort is normal. No respiratory distress.      Breath sounds: Normal breath sounds.   Abdominal:      General: Bowel sounds are normal.      Palpations: Abdomen is soft.   Musculoskeletal:      Cervical back: Normal range of motion and neck supple.      Right lower leg: No edema.      Left lower leg: No edema.   Skin:     General: Skin is warm and dry.   Neurological:      Mental Status: He is alert.      Comments: He moves his extremities equally.         Fluids    Intake/Output Summary (Last 24 hours) at 3/16/2021 1743  Last data filed at 3/16/2021 1500  Gross per 24 hour   Intake 220 ml   Output 1200 ml   Net -980 ml       Laboratory  Recent Labs     03/15/21  0506 03/16/21  0354   WBC 6.0 5.1   RBC 5.45 5.19   HEMOGLOBIN 15.5 14.3   HEMATOCRIT 46.7 45.1   MCV 85.7 86.9   MCH 28.4 27.6   MCHC 33.2* 31.7*   RDW 46.3 46.2   PLATELETCT 200 181   MPV 10.6 10.7     Recent Labs     03/15/21  0506 03/16/21  0354   SODIUM 140 138   POTASSIUM 4.2 4.1   CHLORIDE 105 104   CO2 23 24   GLUCOSE 103* 93   BUN 18 19   CREATININE 1.05 1.05   CALCIUM 9.3 9.0                   Imaging  CT-HEAD W/O   Final Result      1.  Prior RIGHT frontal craniotomy with underlying dural thickening and subdural hemorrhage, unchanged from prior exam.   2.  No new intracranial hemorrhage demonstrated.   3.  Diffuse atrophy with white matter microvascular ischemic changes.   4.  Small chronic RIGHT posterior frontal infarct.      [id number] VIEW   Final Result         No specific finding to suggest small bowel obstruction.      CT-HEAD W/O   Final Result         1.  Mixed right parietal subdural fluid collection likely acute on chronic subdural hematoma.   2.  Nonspecific white matter changes,  commonly associated with small vessel ischemic disease.  Associated mild cerebral atrophy is noted.   3.  Atherosclerosis.      OUTSIDE IMAGES-CT HEAD   Final Result           Assessment/Plan  * Seizure disorder, nonconvulsive, with status epilepticus (HCC)- (present on admission)  Assessment & Plan  Noted on EEG (see below)  He was loaded with IV Keppra and initiated on IV Vimpat  Appriciate neurology consult. Depakote level subtherapeutic and increased to 750mg BID by neurology  Outpatient follow up in the epilepsy clinic has been arranged by Dr. Mcfadden  Patient will continue IV seizure medications until passes swallow examination  EEG without evidence of seizure and more consistent with encephalopathy    If patient has continued seizure like activity or if his mentation appears to decline further, may need repeat EEG.    Encephalopathy acute- (present on admission)  Assessment & Plan  Acute encephalopathy likely multifactorial  In setting of dementia with behavioral disturbance, history of psychiatric disorder, and seizure disorder  Patient appears somnolent today  Decrease Seroquel from 150mg to 100mg QHS to help with somnolence      Subdural hematoma, acute (HCC)- (present on admission)  Assessment & Plan  Noted on CT head  Non-traumatic  Neurosurgery consulted, non-operative management     Hypokalemia  Assessment & Plan  Keep K > 4.  Monitor BMP.     Dysphagia  Assessment & Plan  Dysphagia likely secondary to encephalopathy  Too somnolent for safe PO intake, however when awake was able to tolerate some thickened broth per nursing  Will start IVF today due to decreased PO intake  If he continues to be unable to tolerate nutrition will place NGT for alternative nutrition tomorrow    Seizures- (present on admission)  Assessment & Plan  Patient is known to have seizure disorder.  Continue Vimpat, Keppra, Depakote  Appreciate neurology consult    Dementia associated with other underlying disease with behavioral  disturbance (HCC)- (present on admission)  Assessment & Plan  Reported history of cognitive decline consistent with dementia  Continue to monitor    Legally blind- (present on admission)  Assessment & Plan  - Patient legally blind for more than 12 years per patient  - Lives at [facility]    Essential hypertension- (present on admission)  Assessment & Plan  Reported history of though had not been on medications prior to admit  Refrain from initiating BP meds for now as his blood pressure had fluctuated       VTE prophylaxis: SCDs only secondary to SDH.

## 2021-03-17 NOTE — CARE PLAN
Problem: Nutritional:  Goal: Achieve adequate nutritional intake  Description: Patient will consume 50% of meals consistently vs starting nutrition support  Outcome: PROGRESSING SLOWER THAN EXPECTED

## 2021-03-17 NOTE — THERAPY
Occupational Therapy  Daily Treatment     Patient Name: Rafael Mccoy  Age:  75 y.o., Sex:  male  Medical Record #: [id number]  Today's Date: 3/17/2021     Precautions  Precautions: (P) Fall Risk, Swallow Precautions ( See Comments)  Comments: (P) legally blind    Assessment    Pt seen for OT treatment today. He presented w/ decreased cognition (delayed responses, confusion, decreased command following, safety awareness, new learning, attention, and initiation), decreased balance, and decreased activity tolerance. The patient will still benefit from post acute placement after acute care stay.     Plan    Continue current treatment plan.    DC Equipment Recommendations: (P) Unable to determine at this time  Discharge Recommendations: (P) Recommend post-acute placement for additional occupational therapy services prior to discharge home    Subjective    Pleasant, but difficulty w/ command following      Objective       03/17/21 1159   Precautions   Precautions Fall Risk;Swallow Precautions ( See Comments)   Comments legally blind   Pain 0 - 10 Group   Therapist Pain Assessment Post Activity Pain Same as Prior to Activity;Nurse Notified   Cognition    Cognition / Consciousness X   Speech/ Communication Delayed Responses  (very few verbal responses; yes/no occasionally)   Level of Consciousness Confused   Ability To Follow Commands Unable to Follow 1 Step Commands  (intermittently follows verbal commands)   Safety Awareness Impaired   New Learning Impaired   Attention Impaired   Initiation Impaired   Comments followed few commands; most actions appear to be automatic in nature   Active ROM Upper Body   Comments able to flex shoulder to slightly above 90 degrees spontaneously; difficult to formally assess due to intermittent command following   Strength Upper Body   Comments able to grasp walker and raise arms in shoulder flexion to slightly above 90 degrees; difficult to formally assess due to inconsistency w/ command  "following   Other Treatments   Other Treatments Provided Patient sat EOB to attempt command following/grooming. When handed a washcloth, the patient grasped it, but did not move it to wash face. Different types of cues were trialed. Once EOB, the patient instantly tried to stand up using therapists shoulder as a support. A walker was retrieved and the patient performed a few sit to stands EOB before replying \"yes\" when asked if he was tired.    Balance   Sitting Balance (Static) Poor +   Sitting Balance (Dynamic) Poor +   Standing Balance (Static) Poor +   Standing Balance (Dynamic) Poor -   Weight Shift Sitting Poor   Weight Shift Standing Poor   Skilled Intervention Verbal Cuing;Sequencing;Postural Facilitation;Facilitation   Comments w/ FWW; Pt took a few small steps towards the R, but otherwise was unable to perform weight shifts; L/R sways were attempted when his legs started to show signs of buckling/fatigue   Bed Mobility    Supine to Sit Maximal Assist   Sit to Supine Moderate Assist   Scooting Maximal Assist   Skilled Intervention Verbal Cuing;Tactile Cuing;Sequencing;Facilitation   Comments Max A due to cognition/weakness   Activities of Daily Living   Grooming Maximal Assist;Seated  (grabbed washcloth seated EOB)   Lower Body Dressing Total Assist  (donned socks)   Comments little command following   Functional Mobility   Sit to Stand Minimal Assist   Mobility EOB only   Skilled Intervention Verbal Cuing;Tactile Cuing;Sequencing;Facilitation   Comments Pt spontaneously attempted standing as soon as EOB   Visual Perception   Visual Perception  X   Comments legally blind   Activity Tolerance   Sitting Edge of Bed ~10 min   Standing ~3-5 min   Comments decreased activity tolerance and decreased cognition   Patient / Family Goals   Patient / Family Goal #1 To go home   Goal #1 Outcome Progressing as expected   Short Term Goals   Short Term Goal # 1 Pt will demo LB dressing w/ SPV   Goal Outcome # 1 " Progressing as expected   Short Term Goal # 2 Pt will demo standing grooming w/ SPV   Goal Outcome # 2 Progressing as expected   Short Term Goal # 3 Pt will demo toilet txf w/ SPV   Goal Outcome # 3 Progressing as expected   Interdisciplinary Plan of Care Collaboration   IDT Collaboration with  Nursing   Patient Position at End of Therapy In Bed;Call Light within Reach;Tray Table within Reach;Bed Alarm On   Collaboration Comments Report given

## 2021-03-17 NOTE — CARE PLAN
Problem: Safety  Goal: Will remain free from injury  Outcome: PROGRESSING AS EXPECTED  Free from falls/injury; precautions in place     Problem: Fluid Volume:  Goal: Will maintain balanced intake and output  Outcome: PROGRESSING AS EXPECTED  Poor oral intake- started on IVF with balanced output     Problem: Skin Integrity  Goal: Risk for impaired skin integrity will decrease  Outcome: PROGRESSING AS EXPECTED  Free from new skin impairment; q2 turns when in bed     Problem: Pain Management  Goal: Pain level will decrease to patient's comfort goal  Outcome: PROGRESSING AS EXPECTED  Free from signs of pain/discomfort     Problem: Safety:  Goal: Will remain free from injury  Outcome: PROGRESSING AS EXPECTED     Problem: Safety:  Goal: Will remain free from injury  Outcome: PROGRESSING AS EXPECTED     Problem: Communication  Goal: The ability to communicate needs accurately and effectively will improve  Outcome: PROGRESSING SLOWER THAN EXPECTED  Unable to communicate needs

## 2021-03-18 ENCOUNTER — APPOINTMENT (OUTPATIENT)
Dept: RADIOLOGY | Facility: MEDICAL CENTER | Age: 76
DRG: 100 | End: 2021-03-18
Attending: STUDENT IN AN ORGANIZED HEALTH CARE EDUCATION/TRAINING PROGRAM
Payer: COMMERCIAL

## 2021-03-18 LAB
ANION GAP SERPL CALC-SCNC: 11 MMOL/L (ref 7–16)
BUN SERPL-MCNC: 14 MG/DL (ref 8–22)
CALCIUM SERPL-MCNC: 9.9 MG/DL (ref 8.5–10.5)
CHLORIDE SERPL-SCNC: 104 MMOL/L (ref 96–112)
CO2 SERPL-SCNC: 26 MMOL/L (ref 20–33)
CREAT SERPL-MCNC: 0.95 MG/DL (ref 0.5–1.4)
GLUCOSE SERPL-MCNC: 111 MG/DL (ref 65–99)
POTASSIUM SERPL-SCNC: 4.3 MMOL/L (ref 3.6–5.5)
SODIUM SERPL-SCNC: 141 MMOL/L (ref 135–145)

## 2021-03-18 PROCEDURE — 700111 HCHG RX REV CODE 636 W/ 250 OVERRIDE (IP): Performed by: PSYCHIATRY & NEUROLOGY

## 2021-03-18 PROCEDURE — 770006 HCHG ROOM/CARE - MED/SURG/GYN SEMI*

## 2021-03-18 PROCEDURE — 80048 BASIC METABOLIC PNL TOTAL CA: CPT

## 2021-03-18 PROCEDURE — 36415 COLL VENOUS BLD VENIPUNCTURE: CPT

## 2021-03-18 PROCEDURE — 700101 HCHG RX REV CODE 250: Performed by: STUDENT IN AN ORGANIZED HEALTH CARE EDUCATION/TRAINING PROGRAM

## 2021-03-18 PROCEDURE — 700105 HCHG RX REV CODE 258: Performed by: PSYCHIATRY & NEUROLOGY

## 2021-03-18 PROCEDURE — 97530 THERAPEUTIC ACTIVITIES: CPT

## 2021-03-18 PROCEDURE — 700102 HCHG RX REV CODE 250 W/ 637 OVERRIDE(OP): Performed by: STUDENT IN AN ORGANIZED HEALTH CARE EDUCATION/TRAINING PROGRAM

## 2021-03-18 PROCEDURE — C9254 INJECTION, LACOSAMIDE: HCPCS | Performed by: PSYCHIATRY & NEUROLOGY

## 2021-03-18 PROCEDURE — 99233 SBSQ HOSP IP/OBS HIGH 50: CPT | Performed by: STUDENT IN AN ORGANIZED HEALTH CARE EDUCATION/TRAINING PROGRAM

## 2021-03-18 PROCEDURE — 99233 SBSQ HOSP IP/OBS HIGH 50: CPT | Performed by: PHYSICAL MEDICINE & REHABILITATION

## 2021-03-18 PROCEDURE — 700111 HCHG RX REV CODE 636 W/ 250 OVERRIDE (IP): Performed by: HOSPITALIST

## 2021-03-18 PROCEDURE — 97116 GAIT TRAINING THERAPY: CPT

## 2021-03-18 PROCEDURE — A9270 NON-COVERED ITEM OR SERVICE: HCPCS | Performed by: STUDENT IN AN ORGANIZED HEALTH CARE EDUCATION/TRAINING PROGRAM

## 2021-03-18 RX ORDER — QUETIAPINE FUMARATE 25 MG/1
25 TABLET, FILM COATED ORAL 3 TIMES DAILY PRN
Status: DISCONTINUED | OUTPATIENT
Start: 2021-03-18 | End: 2021-04-02

## 2021-03-18 RX ORDER — AMOXICILLIN 250 MG
2 CAPSULE ORAL 2 TIMES DAILY
Status: DISCONTINUED | OUTPATIENT
Start: 2021-03-18 | End: 2021-03-19

## 2021-03-18 RX ORDER — GABAPENTIN 300 MG/1
300 CAPSULE ORAL 2 TIMES DAILY
Status: DISCONTINUED | OUTPATIENT
Start: 2021-03-18 | End: 2021-04-16 | Stop reason: HOSPADM

## 2021-03-18 RX ORDER — QUETIAPINE FUMARATE 25 MG/1
25 TABLET, FILM COATED ORAL 3 TIMES DAILY PRN
Status: DISCONTINUED | OUTPATIENT
Start: 2021-03-18 | End: 2021-03-18

## 2021-03-18 RX ORDER — SIMVASTATIN 20 MG
40 TABLET ORAL
Status: DISCONTINUED | OUTPATIENT
Start: 2021-03-18 | End: 2021-04-16 | Stop reason: HOSPADM

## 2021-03-18 RX ORDER — AMLODIPINE BESYLATE 5 MG/1
5 TABLET ORAL
Status: DISCONTINUED | OUTPATIENT
Start: 2021-03-19 | End: 2021-04-16 | Stop reason: HOSPADM

## 2021-03-18 RX ORDER — ACETAMINOPHEN 650 MG/1
650 SUPPOSITORY RECTAL EVERY 4 HOURS PRN
Status: DISCONTINUED | OUTPATIENT
Start: 2021-03-18 | End: 2021-04-13

## 2021-03-18 RX ORDER — BISACODYL 10 MG
10 SUPPOSITORY, RECTAL RECTAL
Status: DISCONTINUED | OUTPATIENT
Start: 2021-03-18 | End: 2021-03-19

## 2021-03-18 RX ORDER — CHOLECALCIFEROL (VITAMIN D3) 125 MCG
5 CAPSULE ORAL NIGHTLY
Status: DISCONTINUED | OUTPATIENT
Start: 2021-03-18 | End: 2021-04-16 | Stop reason: HOSPADM

## 2021-03-18 RX ORDER — AMLODIPINE BESYLATE 5 MG/1
5 TABLET ORAL
Status: DISCONTINUED | OUTPATIENT
Start: 2021-03-18 | End: 2021-03-18

## 2021-03-18 RX ORDER — OXYCODONE HYDROCHLORIDE 5 MG/1
5-10 TABLET ORAL EVERY 4 HOURS PRN
Status: DISCONTINUED | OUTPATIENT
Start: 2021-03-18 | End: 2021-03-31

## 2021-03-18 RX ORDER — QUETIAPINE FUMARATE 25 MG/1
50 TABLET, FILM COATED ORAL NIGHTLY
Status: DISCONTINUED | OUTPATIENT
Start: 2021-03-18 | End: 2021-03-23

## 2021-03-18 RX ORDER — ACETAMINOPHEN 325 MG/1
650 TABLET ORAL EVERY 4 HOURS PRN
Status: DISCONTINUED | OUTPATIENT
Start: 2021-03-18 | End: 2021-04-16 | Stop reason: HOSPADM

## 2021-03-18 RX ORDER — POLYETHYLENE GLYCOL 3350 17 G/17G
1 POWDER, FOR SOLUTION ORAL
Status: DISCONTINUED | OUTPATIENT
Start: 2021-03-18 | End: 2021-03-19

## 2021-03-18 RX ADMIN — GABAPENTIN 300 MG: 300 CAPSULE ORAL at 17:24

## 2021-03-18 RX ADMIN — SIMVASTATIN 40 MG: 20 TABLET, FILM COATED ORAL at 21:17

## 2021-03-18 RX ADMIN — SODIUM CHLORIDE 200 MG: 9 INJECTION, SOLUTION INTRAVENOUS at 17:24

## 2021-03-18 RX ADMIN — POTASSIUM CHLORIDE, DEXTROSE MONOHYDRATE AND SODIUM CHLORIDE: 150; 5; 450 INJECTION, SOLUTION INTRAVENOUS at 01:04

## 2021-03-18 RX ADMIN — HALOPERIDOL LACTATE 5 MG: 5 INJECTION, SOLUTION INTRAMUSCULAR at 10:00

## 2021-03-18 RX ADMIN — HALOPERIDOL LACTATE 5 MG: 5 INJECTION, SOLUTION INTRAMUSCULAR at 01:04

## 2021-03-18 RX ADMIN — QUETIAPINE FUMARATE 50 MG: 25 TABLET ORAL at 21:17

## 2021-03-18 RX ADMIN — SODIUM CHLORIDE 750 MG: 9 INJECTION, SOLUTION INTRAVENOUS at 17:24

## 2021-03-18 RX ADMIN — SODIUM CHLORIDE 200 MG: 9 INJECTION, SOLUTION INTRAVENOUS at 05:35

## 2021-03-18 RX ADMIN — Medication 5 MG: at 21:17

## 2021-03-18 RX ADMIN — SODIUM CHLORIDE 750 MG: 9 INJECTION, SOLUTION INTRAVENOUS at 04:14

## 2021-03-18 RX ADMIN — HALOPERIDOL LACTATE 5 MG: 5 INJECTION, SOLUTION INTRAMUSCULAR at 05:35

## 2021-03-18 RX ADMIN — LEVETIRACETAM INJECTION 1000 MG: 10 INJECTION INTRAVENOUS at 17:24

## 2021-03-18 RX ADMIN — LEVETIRACETAM INJECTION 1000 MG: 10 INJECTION INTRAVENOUS at 04:14

## 2021-03-18 ASSESSMENT — GAIT ASSESSMENTS
GAIT LEVEL OF ASSIST: MODERATE ASSIST
DISTANCE (FEET): 5
ASSISTIVE DEVICE: FRONT WHEEL WALKER

## 2021-03-18 ASSESSMENT — COGNITIVE AND FUNCTIONAL STATUS - GENERAL
MOBILITY SCORE: 6
STANDING UP FROM CHAIR USING ARMS: TOTAL
WALKING IN HOSPITAL ROOM: TOTAL
CLIMB 3 TO 5 STEPS WITH RAILING: TOTAL
MOVING FROM LYING ON BACK TO SITTING ON SIDE OF FLAT BED: UNABLE
MOVING TO AND FROM BED TO CHAIR: UNABLE
TURNING FROM BACK TO SIDE WHILE IN FLAT BAD: UNABLE
SUGGESTED CMS G CODE MODIFIER MOBILITY: CN

## 2021-03-18 NOTE — THERAPY
Physical Therapy   Daily Treatment     Patient Name: Rafael Mccoy  Age:  75 y.o., Sex:  male  Medical Record #: [id number]  Today's Date: 3/18/2021     Precautions: Fall Risk, Swallow Precautions ( See Comments)    Assessment    Rec'd pt alert, in bed, able to vocalize his name, but not much more than that.  He had just pulled off his condom cath, despite having on wrist restraints.  Assisted pt to roll right and left to change and clean,  Mod assist to roll each direction.  He is able to sit edge of bed w/ mod assist.  Able to maintain eob balance w/o use of UE's for support and w/o physical assist.  Attempted several trials of sit to stand each w/ min assist and significant posterior lean.  He was able to take several steps forward and back w/ mod assist to initiate movement and to prevent lob posteriorly.  He has demonstrated significant improvement since last session.  Continue to follow and address goals.  Advance as able.    Plan    Continue current treatment plan.    DC Equipment Recommendations: Unable to determine at this time  Discharge Recommendations: Recommend post-acute placement for additional physical therapy services prior to discharge home         Objective       03/18/21 1301   Balance   Sitting Balance (Static) Fair -   Sitting Balance (Dynamic) Fair -   Standing Balance (Static) Trace +   Standing Balance (Dynamic) Trace +   Weight Shift Sitting Poor   Weight Shift Standing Fair   Skilled Intervention Verbal Cuing;Tactile Cuing;Sequencing;Facilitation   Gait Analysis   Gait Level Of Assist Moderate Assist   Assistive Device Front Wheel Walker   Distance (Feet) 5  (five forward and five back)   Skilled Intervention Verbal Cuing;Tactile Cuing;Sequencing;Postural Facilitation   Comments posterior lean   Bed Mobility    Supine to Sit Moderate Assist   Sit to Supine Minimal Assist   Skilled Intervention Verbal Cuing;Tactile Cuing;Sequencing;Facilitation   Functional Mobility   Sit to Stand Minimal  Assist   Bed, Chair, Wheelchair Transfer Unable to Participate   Skilled Intervention Verbal Cuing;Tactile Cuing;Sequencing;Facilitation   Short Term Goals    Short Term Goal # 1 Pt will be able to perform supine<>sit with HOB flat/no rails with Spv in 6tx to promote fnx progression towards I    Goal Outcome # 1 goal not met   Short Term Goal # 2 Pt will be able to perform sit<>stand/transfers with min A in 6tx to promote fnx progression towards I    Goal Outcome # 2 Goal met, new goal added   Short Term Goal # 2 B  pt to move sit to/from stand w/ spv in 6 visits to iincrease fxl indep   Goal Outcome # 2 B Goal not met   Short Term Goal # 3 Pt will be able to ambulate 25ft with FWW with min A in 6tx to promote fnx progression towards I    Goal Outcome # 3 Goal not met   Anticipated Discharge Equipment and Recommendations   DC Equipment Recommendations Unable to determine at this time   Discharge Recommendations Recommend post-acute placement for additional physical therapy services prior to discharge home

## 2021-03-18 NOTE — CARE PLAN
Problem: Safety  Goal: Will remain free from injury  Outcome: PROGRESSING AS EXPECTED  Free from fall/injury; precautions in place     Problem: Communication  Goal: The ability to communicate needs accurately and effectively will improve  Outcome: PROGRESSING SLOWER THAN EXPECTED   Unable to communicate needs to staff    Problem: Nutritional:  Goal: Dysphagia will improve  Outcome: PROGRESSING SLOWER THAN EXPECTED  Poor PO intake; yamini count in progress

## 2021-03-18 NOTE — PROGRESS NOTES
Physical Medicine and Rehabilitation Consultation              Date of initial consultation: 3/15/2021  Consulting provider: Pankaj Finley MD  Reason for consultation: assess for acute inpatient rehab appropriateness  LOS: 7 Day(s)    Chief complaint: SDH    HPI: The patient is a 75 y.o. male with a past medical history of cerebellar CVA, seizure disorder, hypertension, ataxia, dementia, legally blind for 12 years, insomnia, right craniotomy for subdural hematoma 1 year ago;  who presented on 3/11/2021 1:30 AM with altered mental status and concern for seizure.  Patient was having a zoom call with his family when he suffered a brief seizure, he was taken to [facility], found to have SDH on CT, transferred to [facility] for neurosurgical consultation.  NSG did not recommend intervention.  Patient had a EEG which reflected nonconvulsive status epilepticus.  Patient was loaded with Keppra and Vimpat.  Patient is not on anticoagulation secondary to subdural hematoma.    The patient currently is minimally responsive, unable to answer questions with more than a grunt. He has repetitive movements noted in the left lower lip and right foot/ankle. He is able to follow commands regarding to squeezing my hand, but then goes into UE rigidity and is unable to release. LE's seem less affected. Nurse and primary doc notified.     3/16/2021  Patient is slightly more alert today.  Able to answer 1-2 questions with single word answers.  Seen by speech today, unable to take an oral intake.  Discussed plan of care with nursing staff.  Seen by neurology today, seizure meds increased.    3/17/2021  Patient slightly improved today.  Able to answer 1 question with multi word phrase 1 time.  Per nursing he was able to eat 100% of his breakfast.  Still I have concerns that he has not taking in adequate nutrition.     3/18/2021  Spoke with his caregiver Maxine at bedside today. Patients repetitive lip movement is chronic and was  present at baseline. David is slowly improving, still appears lethargic today and is not answering questions. We discussed overall plan of care and future directions. Minimal to no improvement in patients mentation today. I am glad to see the NG tube and hopefully some nutrition will increase his alertness.     Social Hx:  Assisted living facility    Patient lives at [facility], comprehensive assisted living with memory care available.  Patient was essentially independent with household/limited community mobility.  Was using a walker walking stick/ blind cane.  Patient had assistance for showering.  Was not on memory care.  Intermittent assist with IADLs and some ADLs as needed.    THERAPY:  PT: Functional mobility   3/15: Unable to participate in gait, max assist for sit to stand  3/18: walking 5 feet with FWW at mod assist    OT: ADLs  3/15: max assist for lower body dressing, toileting, grooming  3/17: Max Assist     SLP:   3/13: Modified diet with liquid eyes textures of mildly thick liquids and swallowing strategies  3/17: Liquidized (level 3) and Mildly Thick Liquid (level 2) diet given STRICT 1:1 feeding  3/18: NPO     IMAGING:  CT head 3/11 shows acute on chronic subdural fluid collections under prior right craniotomy    PROCEDURES:  EEG on 3/11 showing status epilepticus    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, s/p bilateral laser surgeries   • Head injury    • Hypertension    • Seizure (HCC)    • Stroke (HCC)        PSH:  Past Surgical History:   Procedure Laterality Date   • CRANIOTOMY Right 4/17/2020    Procedure: CRANIOTOMY - SUBDURAL;  Surgeon: Anthony Mendiola M.D.;  Location: SURGERY [facility];  Service: Neurosurgery   • OTHER  04/2020    craniotomy after a fall,TBI   • CRANIOTOMY     • OTHER ORTHOPEDIC SURGERY      R foot fracture   • ROTATOR CUFF REPAIR Right    • TONSILLECTOMY AND ADENOIDECTOMY    "      FHX:  Family History   Problem Relation Age of Onset   • Other Mother         eye problems   • Cancer Neg Hx    • Heart Disease Neg Hx    • Stroke Neg Hx        Medications:  Current Facility-Administered Medications   Medication Dose   • Pharmacy Consult: Enteral tube insertion - review meds/change route/product selection  1 Each   • [START ON 3/19/2021] amLODIPine (NORVASC) tablet 5 mg  5 mg   • gabapentin (NEURONTIN) capsule 300 mg  300 mg   • melatonin tablet 5 mg  5 mg   • QUEtiapine (Seroquel) tablet 50 mg  50 mg   • senna-docusate (PERICOLACE or SENOKOT S) 8.6-50 MG per tablet 2 tablet  2 tablet    And   • polyethylene glycol/lytes (MIRALAX) PACKET 1 Packet  1 Packet    And   • magnesium hydroxide (MILK OF MAGNESIA) suspension 30 mL  30 mL    And   • bisacodyl (DULCOLAX) suppository 10 mg  10 mg   • simvastatin (ZOCOR) tablet 40 mg  40 mg   • acetaminophen (Tylenol) tablet 650 mg  650 mg    Or   • acetaminophen (TYLENOL) suppository 650 mg  650 mg   • oxyCODONE immediate-release (ROXICODONE) tablet 5-10 mg  5-10 mg   • QUEtiapine (Seroquel) tablet 25 mg  25 mg   • morphine (pf) 4 mg/mL injection 2 mg  2 mg   • dextrose 5 % and 0.45 % NaCl with KCl 20 mEq     • valproate (DEPACON) 750 mg in  mL IVPB  750 mg   • lacosamide (VIMPAT) 200 mg in  mL ivpb  200 mg   • labetalol (NORMODYNE/TRANDATE) injection 10 mg  10 mg   • hydrALAZINE (APRESOLINE) injection 10 mg  10 mg   • enalaprilat (VASOTEC) injection 1.25 mg  1.25 mg   • Respiratory Therapy Consult     • LORazepam (ATIVAN) injection 2 mg  2 mg   • ondansetron (ZOFRAN ODT) dispertab 4 mg  4 mg    Or   • ondansetron (ZOFRAN) syringe/vial injection 4 mg  4 mg   • levETIRAcetam (Keppra) 1000 mg in 100 mL NaCl IV premix  1,000 mg       Allergies:  No Known Allergies    Physical Exam:  Vitals: /86   Pulse (!) 102   Temp 36.6 °C (97.9 °F) (Temporal)   Resp 18   Ht 1.753 m (5' 9.02\")   Wt 90.6 kg (199 lb 11.8 oz)   SpO2 94%   Gen: " Hyporesponsive  Head: NC/AT  Eyes/ Nose/ Mouth: moist mucous membranes.   Cardio: RRR, good distal perfusion, warm extremities  Pulm: normal respiratory effort, no cyanosis   Abd: Soft NTND, negative borborygmi   Ext: No peripheral edema. No calf tenderness. No clubbing.     Labs: Reviewed and significant for   Recent Labs     03/16/21 0354 03/17/21  0208   RBC 5.19 5.49   HEMOGLOBIN 14.3 15.4   HEMATOCRIT 45.1 47.3   PLATELETCT 181 193     Recent Labs     03/16/21  0354 03/17/21  0208 03/18/21  0926   SODIUM 138 141 141   POTASSIUM 4.1 4.3 4.3   CHLORIDE 104 107 104   CO2 24 27 26   GLUCOSE 93 107* 111*   BUN 19 19 14   CREATININE 1.05 0.96 0.95   CALCIUM 9.0 9.3 9.9     Recent Results (from the past 24 hour(s))   Basic Metabolic Panel    Collection Time: 03/18/21  9:26 AM   Result Value Ref Range    Sodium 141 135 - 145 mmol/L    Potassium 4.3 3.6 - 5.5 mmol/L    Chloride 104 96 - 112 mmol/L    Co2 26 20 - 33 mmol/L    Glucose 111 (H) 65 - 99 mg/dL    Bun 14 8 - 22 mg/dL    Creatinine 0.95 0.50 - 1.40 mg/dL    Calcium 9.9 8.5 - 10.5 mg/dL    Anion Gap 11.0 7.0 - 16.0   ESTIMATED GFR    Collection Time: 03/18/21  9:26 AM   Result Value Ref Range    GFR If African American >60 >60 mL/min/1.73 m 2    GFR If Non African American >60 >60 mL/min/1.73 m 2         ASSESSMENT:  Patient is a 75 y.o. male admitted with seizures now s/p EEG and loading with keppra/vimpat     IGC Code / Diagnosis to Support: 0002.1 - Brain Dysfunction: Non-Traumatic    Rehabilitation: Impaired ADLs and mobility  Decision pending further work up    Barriers to transfer include: Insurance authorization, TCCs to verify disposition, medical clearance and bed availability     Additional Recommendations:  -Altered mental status, encephalopathy.  Concern for seizure-like activity yesterday was evaluated with stat EEG and head CT.  EEG was suggestive of diffuse multifocal cerebral dysfunction and moderate encephalopathy.    Seizures: Valproate 750mg  twice daily, Keppra 1g twice daily and Vimpat 200 mg twice daily.   - Coretrak placed for nutrition and hydration   - Seroquel 25mg TID PRN for agitation during daytime hours and seroquel 50 mg nightly   - Okay to continue melatonin for sleep regulation  - We will continue to follow for resolution of AMS  - Gabapentin 300 mg BID added to MAR    Medical Complexity:  Seizures  Encephalopathy   Hypertension   Hypokalemia       DVT PPX: SCDs only 2/2 SDH      Thank you for allowing us to participate in the care of this patient.     Patient was seen for 36 minutes on unit/floor of which > 50% of time was spent on counseling and coordination of care regarding the above, including prognosis, risk reduction, benefits of treatment, and options for next stage of care.    Bradley Valerio, DO   Physical Medicine and Rehabilitation

## 2021-03-18 NOTE — DIETARY
Nutrition Services: Brief Update    Discussed with RN, plan for starting tubefeed today and placing cortrak. Discussed with RN that mentation has decreased overnight, pt was placed in restraints, and ate <25% of breakfast today and 0% of dinner last night. Per ADLs, pt ate 50-75% of dinner last night, likely incorrect. Observed pt, appears adequately nourished.    Consult received for tubefeed start. Estimated needs from assessment yesterday remains appropriate. Labs and meds reviewed, standard formula indicated at this time.    Recommendations/Plan:   Start Fibersource HN @ 25 ml/hr and advance per protocol to 70 ml/hr (goal rate) to provide 2016 kcal (22 kcal/kg), 90 grams protein (1 gm/kg), and 1360 ml free water per day.   Fluids per MD. Consider free water flushes of 150 ml q 4 hours to provide a total of 2230 ml free water per day.  Advance diet per SLP.    RD following.

## 2021-03-18 NOTE — PROGRESS NOTES
"Hospital Medicine Daily Progress Note    Date of Service  3/18/2021    Chief Complaint  75 y.o. male admitted 3/11/2021 with seizure    Hospital Course  \"History of cerebellar CVA, seizure disorder, presented as a transfer from [facility] after he was found to have right parietal SDH without any midline shift's.  Witnessed seizure by family followed by changes in mental status.  Neurosurgery consulted Dr. Capps, no surgical intervention, recommend repeat CT head and frequent neuro checks.  Admit to the ICU every hour neuro checks, repeat head CT, keep SBP less than 140, continue Depakote and Keppra.  Due to ongoing encephalopathy, he had an EEG and was found to be in non-convulsive status epilepticus and was loaded with Vimpat.    Neurology was following case and he was treated with Vimpat 200mg BID, Keppra 1000mg BID, and Depacon 750mg QHS. No further seizure like activity. Patient had subtherapeutic Depakote level and Depacon was increased to 750mg BID.    Patient had prolonged waxing and waning mental status. Due to poor PO intake and intermittent refusal of PO intake, NGT was placed and started on alternative nutrition on 3/18.    Interval Problem Update  Patient seen and evaluated at bedside. Patient alert with some agitation requiring soft restraints, was agitated last night requiring Haldol. Patient ate breakfast and lunch, refused dinner. With his waxing and waning mental status will place NGT today for alternative nutrition support and for medication administration.    Consultants/Specialty  Critical Care  Neurosurgery  Physiatry    Code Status  DNAR/DNI    Disposition  Will need SNF when medically cleared    Review of Systems  Review of Systems   Unable to perform ROS: Mental acuity        Physical Exam  Temp:  [36.4 °C (97.5 °F)-37 °C (98.6 °F)] 36.6 °C (97.9 °F)  Pulse:  [] 86  Resp:  [16-18] 18  BP: (128-161)/(71-73) 156/71  SpO2:  [90 %-94 %] 94 %    Physical Exam  Vitals and nursing note " reviewed.   Constitutional:       General: He is not in acute distress.     Appearance: He is not toxic-appearing.   HENT:      Head: Normocephalic and atraumatic.      Nose: Nose normal.      Mouth/Throat:      Mouth: Mucous membranes are dry.      Pharynx: Oropharynx is clear.   Eyes:      General: No scleral icterus.     Conjunctiva/sclera: Conjunctivae normal.   Cardiovascular:      Rate and Rhythm: Normal rate and regular rhythm.   Pulmonary:      Effort: Pulmonary effort is normal. No respiratory distress.      Breath sounds: Normal breath sounds.   Abdominal:      General: Bowel sounds are normal.      Palpations: Abdomen is soft.   Musculoskeletal:      Cervical back: Normal range of motion and neck supple.      Right lower leg: No edema.      Left lower leg: No edema.   Skin:     General: Skin is warm and dry.   Neurological:      Comments: Patient is more alert  Moving extremities spontaneously  Intermittently following commands  Unable to engage with neurological examination         Fluids    Intake/Output Summary (Last 24 hours) at 3/18/2021 1028  Last data filed at 3/18/2021 0818  Gross per 24 hour   Intake 476.67 ml   Output 1450 ml   Net -973.33 ml       Laboratory  Recent Labs     03/16/21  0354 03/17/21  0208   WBC 5.1 5.0   RBC 5.19 5.49   HEMOGLOBIN 14.3 15.4   HEMATOCRIT 45.1 47.3   MCV 86.9 86.2   MCH 27.6 28.1   MCHC 31.7* 32.6*   RDW 46.2 45.0   PLATELETCT 181 193   MPV 10.7 10.4     Recent Labs     03/16/21  0354 03/17/21  0208 03/18/21  0926   SODIUM 138 141 141   POTASSIUM 4.1 4.3 4.3   CHLORIDE 104 107 104   CO2 24 27 26   GLUCOSE 93 107* 111*   BUN 19 19 14   CREATININE 1.05 0.96 0.95   CALCIUM 9.0 9.3 9.9                   Imaging  CT-HEAD W/O   Final Result      1.  Prior RIGHT frontal craniotomy with underlying dural thickening and subdural hemorrhage, unchanged from prior exam.   2.  No new intracranial hemorrhage demonstrated.   3.  Diffuse atrophy with white matter microvascular  ischemic changes.   4.  Small chronic RIGHT posterior frontal infarct.      [id number] VIEW   Final Result         No specific finding to suggest small bowel obstruction.      CT-HEAD W/O   Final Result         1.  Mixed right parietal subdural fluid collection likely acute on chronic subdural hematoma.   2.  Nonspecific white matter changes, commonly associated with small vessel ischemic disease.  Associated mild cerebral atrophy is noted.   3.  Atherosclerosis.      OUTSIDE IMAGES-CT HEAD   Final Result      DX-ABDOMEN FOR TUBE PLACEMENT    (Results Pending)        Assessment/Plan  * Seizure disorder, nonconvulsive, with status epilepticus (HCC)- (present on admission)  Assessment & Plan  Noted on EEG on admission  He was loaded with IV Keppra, started on IV Vimpat, and IV depakote  Appriciate neurology consult. Depakote level subtherapeutic and increased to 750mg BID by neurology  Outpatient follow up in the epilepsy clinic has been arranged by Dr. Mcfadden  Patient will continue IV seizure medications until passes swallow examination  Repeat EEG 3/15 without evidence of seizure and more consistent with encephalopathy    If patient has continued seizure like activity or if his mentation appears to decline further, may need repeat EEG.    Encephalopathy acute- (present on admission)  Assessment & Plan  Acute encephalopathy likely multifactorial  In setting of dementia with behavioral disturbance, history of psychiatric disorder, CVA, and seizure disorder in status epilepticus on admission  Seroquel decreased to limit somnolence  Patient remains disorientated and aphasic with some agitation requiring Haldol overnight and requiring soft restraints for patient safety       Subdural hematoma, acute (HCC)- (present on admission)  Assessment & Plan  Noted on CT head  Non-traumatic  Neurosurgery consulted, non-operative management     Hypokalemia  Assessment & Plan  Keep K > 4.  Monitor BMP.     Dysphagia  Assessment &  Plan  Dysphagia likely secondary to encephalopathy  Patient intermittently tolerates PO intake, however at times refuses both food and medication  Will continue IVF today  Place NGT 3/18 for nutrition and medication administration    Dementia associated with other underlying disease with behavioral disturbance (HCC)- (present on admission)  Assessment & Plan  Reported history of cognitive decline consistent with dementia  Continue to monitor    Legally blind- (present on admission)  Assessment & Plan  - Patient legally blind for more than 12 years per patient  - Lives at [facility]    Essential hypertension- (present on admission)  Assessment & Plan  Reported history of though had not been on medications prior to admit  BP has trending up. Will initiate amlodipine 5mg for BP control       VTE prophylaxis: SCDs only secondary to SDH.

## 2021-03-18 NOTE — PROGRESS NOTES
Increased agitation and anxiety despite being given morphine for signs of pain. Spoke with hospitalist on call, SBAR given. Order received for haldol PRN, first dose given

## 2021-03-18 NOTE — THERAPY
Missed Therapy     Patient Name: Rafael Mccoy  Age:  75 y.o., Sex:  male  Medical Record #: [id number]  Today's Date: 3/18/2021       03/18/21 1101   Treatment Variance   Reason For Missed Therapy Medical - Patient not Able To Participate   Interdisciplinary Plan of Care Collaboration   IDT Collaboration with  Physician;Nursing   Collaboration Comments Spoke with MD - pt remains with waxing and waning mentation. Inconsistent PO intake; therefore, cortrak was placed. Per MD, pt to remian NPO with reassessment tomorrow as appropriate.

## 2021-03-18 NOTE — PROGRESS NOTES
Cortrak Placement    Tube Team verified patient name and medical record number prior to tube placement.  Cortrak tube (43 inches, 12 Estonian) placed at 55 cm in right nare.  Per Cortrak picture, tube appears to be in the stomach.    Nursing Instructions: Awaiting KUB to confirm placement before use for medications or feeding. Once placement confirmed, flush tube with 30 ml of water, and then remove and save stylet, in patient medication drawer.

## 2021-03-19 LAB
ALBUMIN SERPL BCP-MCNC: 3.6 G/DL (ref 3.2–4.9)
ALBUMIN/GLOB SERPL: 1.2 G/DL
ALP SERPL-CCNC: 69 U/L (ref 30–99)
ALT SERPL-CCNC: 14 U/L (ref 2–50)
ANION GAP SERPL CALC-SCNC: 7 MMOL/L (ref 7–16)
AST SERPL-CCNC: 24 U/L (ref 12–45)
BILIRUB SERPL-MCNC: 0.5 MG/DL (ref 0.1–1.5)
BUN SERPL-MCNC: 13 MG/DL (ref 8–22)
CALCIUM SERPL-MCNC: 9.5 MG/DL (ref 8.5–10.5)
CHLORIDE SERPL-SCNC: 105 MMOL/L (ref 96–112)
CO2 SERPL-SCNC: 26 MMOL/L (ref 20–33)
CREAT SERPL-MCNC: 1.08 MG/DL (ref 0.5–1.4)
CRP SERPL HS-MCNC: 0.75 MG/DL (ref 0–0.75)
GLOBULIN SER CALC-MCNC: 2.9 G/DL (ref 1.9–3.5)
GLUCOSE SERPL-MCNC: 103 MG/DL (ref 65–99)
MAGNESIUM SERPL-MCNC: 2.2 MG/DL (ref 1.5–2.5)
PHOSPHATE SERPL-MCNC: 3.7 MG/DL (ref 2.5–4.5)
POTASSIUM SERPL-SCNC: 3.8 MMOL/L (ref 3.6–5.5)
PREALB SERPL-MCNC: 18.8 MG/DL (ref 18–38)
PROT SERPL-MCNC: 6.5 G/DL (ref 6–8.2)
SODIUM SERPL-SCNC: 138 MMOL/L (ref 135–145)
VALPROATE SERPL-MCNC: 101.3 UG/ML (ref 50–100)

## 2021-03-19 PROCEDURE — 83735 ASSAY OF MAGNESIUM: CPT

## 2021-03-19 PROCEDURE — 700105 HCHG RX REV CODE 258: Performed by: STUDENT IN AN ORGANIZED HEALTH CARE EDUCATION/TRAINING PROGRAM

## 2021-03-19 PROCEDURE — 97535 SELF CARE MNGMENT TRAINING: CPT | Mod: CO

## 2021-03-19 PROCEDURE — 80053 COMPREHEN METABOLIC PANEL: CPT

## 2021-03-19 PROCEDURE — 700111 HCHG RX REV CODE 636 W/ 250 OVERRIDE (IP): Performed by: PSYCHIATRY & NEUROLOGY

## 2021-03-19 PROCEDURE — 770006 HCHG ROOM/CARE - MED/SURG/GYN SEMI*

## 2021-03-19 PROCEDURE — 700102 HCHG RX REV CODE 250 W/ 637 OVERRIDE(OP): Performed by: STUDENT IN AN ORGANIZED HEALTH CARE EDUCATION/TRAINING PROGRAM

## 2021-03-19 PROCEDURE — 700111 HCHG RX REV CODE 636 W/ 250 OVERRIDE (IP): Performed by: STUDENT IN AN ORGANIZED HEALTH CARE EDUCATION/TRAINING PROGRAM

## 2021-03-19 PROCEDURE — 97530 THERAPEUTIC ACTIVITIES: CPT | Mod: CO

## 2021-03-19 PROCEDURE — 700105 HCHG RX REV CODE 258: Performed by: PSYCHIATRY & NEUROLOGY

## 2021-03-19 PROCEDURE — 306565 RIGID MIT RESTRAINT(PAIR): Performed by: STUDENT IN AN ORGANIZED HEALTH CARE EDUCATION/TRAINING PROGRAM

## 2021-03-19 PROCEDURE — 86140 C-REACTIVE PROTEIN: CPT

## 2021-03-19 PROCEDURE — 84134 ASSAY OF PREALBUMIN: CPT

## 2021-03-19 PROCEDURE — C9254 INJECTION, LACOSAMIDE: HCPCS | Performed by: PSYCHIATRY & NEUROLOGY

## 2021-03-19 PROCEDURE — 80164 ASSAY DIPROPYLACETIC ACD TOT: CPT

## 2021-03-19 PROCEDURE — 84100 ASSAY OF PHOSPHORUS: CPT

## 2021-03-19 PROCEDURE — A9270 NON-COVERED ITEM OR SERVICE: HCPCS | Performed by: STUDENT IN AN ORGANIZED HEALTH CARE EDUCATION/TRAINING PROGRAM

## 2021-03-19 PROCEDURE — 99233 SBSQ HOSP IP/OBS HIGH 50: CPT | Performed by: STUDENT IN AN ORGANIZED HEALTH CARE EDUCATION/TRAINING PROGRAM

## 2021-03-19 RX ORDER — ONDANSETRON 2 MG/ML
4 INJECTION INTRAMUSCULAR; INTRAVENOUS EVERY 4 HOURS PRN
Status: DISCONTINUED | OUTPATIENT
Start: 2021-03-19 | End: 2021-04-02

## 2021-03-19 RX ORDER — ONDANSETRON 4 MG/1
4 TABLET, ORALLY DISINTEGRATING ORAL EVERY 4 HOURS PRN
Status: DISCONTINUED | OUTPATIENT
Start: 2021-03-19 | End: 2021-04-02

## 2021-03-19 RX ORDER — LEVETIRACETAM 500 MG/1
1000 TABLET ORAL 2 TIMES DAILY
Status: DISCONTINUED | OUTPATIENT
Start: 2021-03-19 | End: 2021-04-16 | Stop reason: HOSPADM

## 2021-03-19 RX ORDER — LACOSAMIDE 100 MG/1
200 TABLET ORAL 2 TIMES DAILY
Status: DISCONTINUED | OUTPATIENT
Start: 2021-03-19 | End: 2021-04-16 | Stop reason: HOSPADM

## 2021-03-19 RX ORDER — POLYETHYLENE GLYCOL 3350 17 G/17G
1 POWDER, FOR SOLUTION ORAL
Status: DISCONTINUED | OUTPATIENT
Start: 2021-03-19 | End: 2021-04-16 | Stop reason: HOSPADM

## 2021-03-19 RX ORDER — AMOXICILLIN 250 MG
2 CAPSULE ORAL 2 TIMES DAILY
Status: DISCONTINUED | OUTPATIENT
Start: 2021-03-19 | End: 2021-04-16 | Stop reason: HOSPADM

## 2021-03-19 RX ORDER — BISACODYL 10 MG
10 SUPPOSITORY, RECTAL RECTAL
Status: DISCONTINUED | OUTPATIENT
Start: 2021-03-19 | End: 2021-04-16 | Stop reason: HOSPADM

## 2021-03-19 RX ADMIN — LEVETIRACETAM 1000 MG: 500 TABLET ORAL at 17:18

## 2021-03-19 RX ADMIN — LEVETIRACETAM INJECTION 1000 MG: 10 INJECTION INTRAVENOUS at 05:28

## 2021-03-19 RX ADMIN — GABAPENTIN 300 MG: 300 CAPSULE ORAL at 05:28

## 2021-03-19 RX ADMIN — SODIUM CHLORIDE 200 MG: 9 INJECTION, SOLUTION INTRAVENOUS at 05:52

## 2021-03-19 RX ADMIN — GABAPENTIN 300 MG: 300 CAPSULE ORAL at 17:19

## 2021-03-19 RX ADMIN — Medication 5 MG: at 20:50

## 2021-03-19 RX ADMIN — DEXTROSE MONOHYDRATE 500 MG: 50 INJECTION, SOLUTION INTRAVENOUS at 07:01

## 2021-03-19 RX ADMIN — AMLODIPINE BESYLATE 5 MG: 5 TABLET ORAL at 05:28

## 2021-03-19 RX ADMIN — DOCUSATE SODIUM 50 MG AND SENNOSIDES 8.6 MG 2 TABLET: 8.6; 5 TABLET, FILM COATED ORAL at 17:19

## 2021-03-19 RX ADMIN — VALPROIC ACID 750 MG: 250 SOLUTION ORAL at 17:19

## 2021-03-19 RX ADMIN — LACOSAMIDE 200 MG: 100 TABLET, FILM COATED ORAL at 17:18

## 2021-03-19 RX ADMIN — SIMVASTATIN 40 MG: 20 TABLET, FILM COATED ORAL at 20:49

## 2021-03-19 RX ADMIN — QUETIAPINE FUMARATE 50 MG: 25 TABLET ORAL at 20:49

## 2021-03-19 ASSESSMENT — COGNITIVE AND FUNCTIONAL STATUS - GENERAL
DAILY ACTIVITIY SCORE: 6
EATING MEALS: TOTAL
TOILETING: TOTAL
PERSONAL GROOMING: TOTAL
SUGGESTED CMS G CODE MODIFIER DAILY ACTIVITY: CN
DRESSING REGULAR UPPER BODY CLOTHING: TOTAL
HELP NEEDED FOR BATHING: TOTAL
DRESSING REGULAR LOWER BODY CLOTHING: TOTAL

## 2021-03-19 ASSESSMENT — FIBROSIS 4 INDEX: FIB4 SCORE: 2.49

## 2021-03-19 NOTE — PROGRESS NOTES
"Hospital Medicine Daily Progress Note    Date of Service  3/19/2021    Chief Complaint  75 y.o. male admitted 3/11/2021 with seizure    Hospital Course  \"History of cerebellar CVA, seizure disorder, presented as a transfer from [facility] after he was found to have right parietal SDH without any midline shift's.  Witnessed seizure by family followed by changes in mental status.  Neurosurgery consulted Dr. Capps, no surgical intervention, recommend repeat CT head and frequent neuro checks.  Admit to the ICU every hour neuro checks, repeat head CT, keep SBP less than 140, continue Depakote and Keppra.  Due to ongoing encephalopathy, he had an EEG and was found to be in non-convulsive status epilepticus and was loaded with Vimpat.    Neurology was following case and he was treated with Vimpat 200mg BID, Keppra 1000mg BID, and Depacon 750mg QHS. No further seizure like activity. Patient had subtherapeutic Depakote level and Depacon was increased to 750mg BID.    Patient had prolonged waxing and waning mental status. Due to poor PO intake and intermittent refusal of PO intake, NGT was placed and started on alternative nutrition on 3/18.    Interval Problem Update  No acute events overnight. Remains encephalopathic. Responded \"good morning\" to me this morning, however would not follow commands and was non responsive for rest of exam.    Consultants/Specialty  Critical Care  Neurosurgery  Physiatry    Code Status  DNAR/DNI    Disposition  Will need SNF when medically cleared    Review of Systems  Review of Systems   Unable to perform ROS: Mental acuity        Physical Exam  Temp:  [36.1 °C (96.9 °F)-37.9 °C (100.2 °F)] 37.9 °C (100.2 °F)  Pulse:  [] 81  Resp:  [12-20] 16  BP: (103-166)/(51-75) 143/64  SpO2:  [93 %-95 %] 95 %    Physical Exam  Vitals and nursing note reviewed.   Constitutional:       General: He is not in acute distress.     Appearance: He is not toxic-appearing.   HENT:      Head: Normocephalic and " atraumatic.      Nose: Nose normal.      Mouth/Throat:      Mouth: Mucous membranes are dry.      Pharynx: Oropharynx is clear.   Eyes:      General: No scleral icterus.     Conjunctiva/sclera: Conjunctivae normal.   Cardiovascular:      Rate and Rhythm: Normal rate and regular rhythm.   Pulmonary:      Effort: Pulmonary effort is normal. No respiratory distress.      Breath sounds: Normal breath sounds.   Abdominal:      General: Bowel sounds are normal.      Palpations: Abdomen is soft.   Musculoskeletal:      Cervical back: Normal range of motion and neck supple.      Right lower leg: No edema.      Left lower leg: No edema.   Skin:     General: Skin is warm and dry.   Neurological:      Comments: Patient is more alert  Moving extremities spontaneously  Intermittently following commands  Unable to engage with neurological examination         Fluids    Intake/Output Summary (Last 24 hours) at 3/19/2021 1408  Last data filed at 3/19/2021 1204  Gross per 24 hour   Intake 1680 ml   Output 1050 ml   Net 630 ml       Laboratory  Recent Labs     03/17/21  0208   WBC 5.0   RBC 5.49   HEMOGLOBIN 15.4   HEMATOCRIT 47.3   MCV 86.2   MCH 28.1   MCHC 32.6*   RDW 45.0   PLATELETCT 193   MPV 10.4     Recent Labs     03/17/21  0208 03/18/21  0926 03/19/21  0240   SODIUM 141 141 138   POTASSIUM 4.3 4.3 3.8   CHLORIDE 107 104 105   CO2 27 26 26   GLUCOSE 107* 111* 103*   BUN 19 14 13   CREATININE 0.96 0.95 1.08   CALCIUM 9.3 9.9 9.5                   Imaging  DX-ABDOMEN FOR TUBE PLACEMENT   Final Result      Enteric tube projects over the proximal stomach.      CT-HEAD W/O   Final Result      1.  Prior RIGHT frontal craniotomy with underlying dural thickening and subdural hemorrhage, unchanged from prior exam.   2.  No new intracranial hemorrhage demonstrated.   3.  Diffuse atrophy with white matter microvascular ischemic changes.   4.  Small chronic RIGHT posterior frontal infarct.      [id number] VIEW   Final Result         No  specific finding to suggest small bowel obstruction.      CT-HEAD W/O   Final Result         1.  Mixed right parietal subdural fluid collection likely acute on chronic subdural hematoma.   2.  Nonspecific white matter changes, commonly associated with small vessel ischemic disease.  Associated mild cerebral atrophy is noted.   3.  Atherosclerosis.      OUTSIDE IMAGES-CT HEAD   Final Result           Assessment/Plan  * Seizure disorder, nonconvulsive, with status epilepticus (HCC)- (present on admission)  Assessment & Plan  Noted on EEG on admission  He was loaded with IV Keppra, started on IV Vimpat, and IV depakote  Repeat EEG 3/15 without evidence of seizure and more consistent with encephalopathy  Appriciate neurology consult. Valproic acid level subtherapeutic and increased to 750mg BID by neurology on 3/16  Repeat valproic acid level 101 on 3/19, Depakote changed to 500mg QAM and 750mg QHS, discussed with Dr. Martínez  Repeat valproic acid level scheduled for 3/22  Outpatient follow up in the epilepsy clinic has been arranged by Dr. Mcfadden    If patient has continued seizure like activity or if his mentation appears to decline further, may need repeat EEG.    Encephalopathy acute- (present on admission)  Assessment & Plan  Acute encephalopathy likely multifactorial, persistent  In setting of dementia with behavioral disturbance, history of psychiatric disorder, CVA, and seizure disorder in status epilepticus on admission  Continue Seroquel 25mg TID prn for agitation, Seroquel 50mg QHS  Started on gabapentin  Appreciate physiatry consult  Patient requiring      Subdural hematoma, acute (HCC)- (present on admission)  Assessment & Plan  Noted on CT head  Non-traumatic  Neurosurgery consulted, non-operative management  Avoid NSAIDs and anticoagulation    Hypokalemia  Assessment & Plan  Keep K > 4.  Monitor BMP.     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 Will initiate amlodipine 5mg for BP control       VTE prophylaxis: SCDs only secondary to SDH.

## 2021-03-19 NOTE — DISCHARGE PLANNING
Received Choice form at [phone]  Agency/Facility Name: SNF - 1) [facility], 2) [facility], 3) [facility], 4) [facility]  Referral sent per Choice form @ [phone]

## 2021-03-19 NOTE — THERAPY
Occupational Therapy  Daily Treatment     Patient Name: Rafael Mccoy  Age:  75 y.o., Sex:  male  Medical Record #: [id number]  Today's Date: 3/19/2021       Precautions: Fall Risk, Swallow Precautions ( See Comments), Nasogastric Tube  Comments: legally blind     Assessment    Pt seen for OT tx. Continues to be limited by decreased activity tolerance, balance deficits, inattention and intermittent command following impacting ability to complete ADLs and ADL transfers independently. Continues to not consistently follow commands. Able to maintain sitting balance EOB w/o physical assist from therapist. Will continue to benefit from OT services while in house.     Plan    Continue current treatment plan.    DC Equipment Recommendations: Unable to determine at this time  Discharge Recommendations: Recommend post-acute placement for additional occupational therapy services prior to discharge home       03/19/21 1001   Cognition    Cognition / Consciousness X   Safety Awareness Impaired   New Learning Impaired   Attention Impaired   Initiation Impaired   Active ROM Upper Body   Active ROM Upper Body  X   Comments spontaneous movement BUEs but unable to accurately assess d/t poor command following    Strength Upper Body   Upper Body Strength  X   Comments generalized weakness    Balance   Sitting Balance (Static) Fair -   Sitting Balance (Dynamic) Fair -   Weight Shift Sitting Poor   Bed Mobility    Supine to Sit Moderate Assist   Sit to Supine Minimal Assist   Scooting Maximal Assist   Rolling Maximal Assist to Rt.;Maximum Assist to Lt.   Activities of Daily Living   Grooming Maximal Assist;Seated   Upper Body Dressing Moderate Assist   Lower Body Dressing Maximal Assist   Toileting Maximal Assist   Functional Mobility   Comments did not assess    Short Term Goals   Short Term Goal # 1 Pt will demo LB dressing w/ SPV   Goal Outcome # 1 Goal not met   Short Term Goal # 2 Pt will demo standing grooming w/ SPV   Goal  Outcome # 2 Goal not met   Short Term Goal # 3 Pt will demo toilet txf w/ SPV   Goal Outcome # 3 Goal not met   Anticipated Discharge Equipment and Recommendations   DC Equipment Recommendations Unable to determine at this time   Discharge Recommendations Recommend post-acute placement for additional occupational therapy services prior to discharge home

## 2021-03-19 NOTE — CARE PLAN
Problem: Safety  Goal: Will remain free from injury  Outcome: PROGRESSING AS EXPECTED  Goal: Will remain free from falls  Outcome: PROGRESSING AS EXPECTED     Problem: Infection  Goal: Will remain free from infection  Outcome: PROGRESSING AS EXPECTED     Problem: Venous Thromboembolism (VTW)/Deep Vein Thrombosis (DVT) Prevention:  Goal: Patient will participate in Venous Thrombosis (VTE)/Deep Vein Thrombosis (DVT)Prevention Measures  Outcome: PROGRESSING AS EXPECTED     Problem: Respiratory:  Goal: Respiratory status will improve  Outcome: PROGRESSING AS EXPECTED     Problem: Skin Integrity  Goal: Risk for impaired skin integrity will decrease  Outcome: PROGRESSING AS EXPECTED     Problem: Pain Management  Goal: Pain level will decrease to patient's comfort goal  Outcome: PROGRESSING AS EXPECTED     Problem: Safety:  Goal: Will remain free from injury  Outcome: PROGRESSING AS EXPECTED     Problem: Infection:  Goal: Will remain free from infection  Outcome: PROGRESSING AS EXPECTED     Problem: Safety:  Goal: Will remain free from injury  Outcome: PROGRESSING AS EXPECTED

## 2021-03-19 NOTE — PROGRESS NOTES
0357: Hospitalist Juan Diego Abdi MD notified of critical result- valproic acid 101.3, orders placed by MD to decrease valproate

## 2021-03-19 NOTE — DISCHARGE PLANNING
Anticipated Discharge Disposition:   SNF    Action:    Pt admitted with seizure do, encephalopathy, subdural hematoma, hypokalemia, dysphagia, dementia, legally blind, essential HTN.    RN CM spoke to patient's daughter Cleo via telephone.  Explained SNF for low level rehab.  She provided consent to send referrals to [facility], [facility], [facility] and [facility].  She will check with [facility] to see if patient can return there after SNF.  She stated that they have all levels of care available there.      CARLOS ALBERTO ARNDT spoke with UR CARLOS ALBERTO Shelley with [facility].  Pt does not have a service connected disability.  [facility] Weber [facility].    PASRR:  [id number]    Barriers to Discharge:    Medical clearance  SNF acceptance    Plan:    F/U on referrals.    Care Transition Team Assessment    Information Source  Orientation : Unable to Assess  Information Given By: Relative  Informant's Name: Cleo Mccoy  Who is responsible for making decisions for patient? : POA    Readmission Evaluation  Is this a readmission?: No    Elopement Risk  Legal Hold: No  Ambulatory or Self Mobile in Wheelchair: No-Not an Elopement Risk  Elopement Risk: Not at Risk for Elopement    Interdisciplinary Discharge Planning  Lives with - Patient's Self Care Capacity: Other (Comments)(lives at [facility]; unclear level of assist)  Patient or legal guardian wants to designate a caregiver: Yes  Caregiver name: Cleo  Caregiver contact info: in chart  [place]ization for Release of Health Information has been completed: Patient refused to sign  Housing / Facility: Assisted Living Residence  Prior Services: Other (Comments)([facility])    Discharge Preparedness  What is your plan after discharge?: Skilled nursing facility  What are your discharge supports?: Child  Prior Functional Level: Ambulatory, Needs Assist with Activities of Daily Living, Needs Assist with Medication  Management, Uses Cane, Uses Walker  Difficulity with ADLs: Bathing, Brushing teeth, Dressing, Eating, Toileting, Walking  Difficulity with IADLs: Cooking, Driving, Keeping track of finances, Laundry, Managing medication, Shopping, Using the telephone or computer    Functional Assesment  Prior Functional Level: Ambulatory, Needs Assist with Activities of Daily Living, Needs Assist with Medication Management, Uses Cane, Uses Walker    Finances  Financial Barriers to Discharge: No  Prescription Coverage: Yes    Vision / Hearing Impairment  Right Eye Vision: Blind  Left Eye Vision: Blind         Advance Directive  Advance Directive?: DPOA for Health Care    Domestic Abuse  Have you ever been the victim of abuse or violence?: No  Physical Abuse or Sexual Abuse: No  Verbal Abuse or Emotional Abuse: No  Possible Abuse/Neglect Reported to:: Not Applicable         Discharge Risks or Barriers  Discharge risks or barriers?: Uninsured / underinsured  Patient risk factors: Cognitive / sensory / physical deficit, Uninsured or underinsured    Anticipated Discharge Information  Discharge Disposition: Still a Patient (30)  Discharge Contact Phone Number: [phone]

## 2021-03-20 LAB
ANION GAP SERPL CALC-SCNC: 11 MMOL/L (ref 7–16)
BUN SERPL-MCNC: 22 MG/DL (ref 8–22)
CALCIUM SERPL-MCNC: 9.2 MG/DL (ref 8.5–10.5)
CHLORIDE SERPL-SCNC: 104 MMOL/L (ref 96–112)
CO2 SERPL-SCNC: 27 MMOL/L (ref 20–33)
CREAT SERPL-MCNC: 1.12 MG/DL (ref 0.5–1.4)
GLUCOSE SERPL-MCNC: 105 MG/DL (ref 65–99)
MAGNESIUM SERPL-MCNC: 2.1 MG/DL (ref 1.5–2.5)
PHOSPHATE SERPL-MCNC: 3.4 MG/DL (ref 2.5–4.5)
POTASSIUM SERPL-SCNC: 3.7 MMOL/L (ref 3.6–5.5)
SODIUM SERPL-SCNC: 142 MMOL/L (ref 135–145)

## 2021-03-20 PROCEDURE — 36415 COLL VENOUS BLD VENIPUNCTURE: CPT

## 2021-03-20 PROCEDURE — 84100 ASSAY OF PHOSPHORUS: CPT

## 2021-03-20 PROCEDURE — 770006 HCHG ROOM/CARE - MED/SURG/GYN SEMI*

## 2021-03-20 PROCEDURE — 80048 BASIC METABOLIC PNL TOTAL CA: CPT

## 2021-03-20 PROCEDURE — A9270 NON-COVERED ITEM OR SERVICE: HCPCS | Performed by: STUDENT IN AN ORGANIZED HEALTH CARE EDUCATION/TRAINING PROGRAM

## 2021-03-20 PROCEDURE — 83735 ASSAY OF MAGNESIUM: CPT

## 2021-03-20 PROCEDURE — 99233 SBSQ HOSP IP/OBS HIGH 50: CPT | Performed by: STUDENT IN AN ORGANIZED HEALTH CARE EDUCATION/TRAINING PROGRAM

## 2021-03-20 PROCEDURE — 700102 HCHG RX REV CODE 250 W/ 637 OVERRIDE(OP): Performed by: STUDENT IN AN ORGANIZED HEALTH CARE EDUCATION/TRAINING PROGRAM

## 2021-03-20 RX ADMIN — LEVETIRACETAM 1000 MG: 500 TABLET ORAL at 16:09

## 2021-03-20 RX ADMIN — GABAPENTIN 300 MG: 300 CAPSULE ORAL at 05:02

## 2021-03-20 RX ADMIN — AMLODIPINE BESYLATE 5 MG: 5 TABLET ORAL at 05:02

## 2021-03-20 RX ADMIN — LACOSAMIDE 200 MG: 100 TABLET, FILM COATED ORAL at 05:01

## 2021-03-20 RX ADMIN — OXYCODONE 5 MG: 5 TABLET ORAL at 12:12

## 2021-03-20 RX ADMIN — DOCUSATE SODIUM 50 MG AND SENNOSIDES 8.6 MG 2 TABLET: 8.6; 5 TABLET, FILM COATED ORAL at 16:09

## 2021-03-20 RX ADMIN — LACOSAMIDE 200 MG: 100 TABLET, FILM COATED ORAL at 16:10

## 2021-03-20 RX ADMIN — SIMVASTATIN 40 MG: 20 TABLET, FILM COATED ORAL at 20:44

## 2021-03-20 RX ADMIN — GABAPENTIN 300 MG: 300 CAPSULE ORAL at 16:09

## 2021-03-20 RX ADMIN — QUETIAPINE FUMARATE 50 MG: 25 TABLET ORAL at 20:44

## 2021-03-20 RX ADMIN — VALPROIC ACID 500 MG: 250 SOLUTION ORAL at 05:03

## 2021-03-20 RX ADMIN — LEVETIRACETAM 1000 MG: 500 TABLET ORAL at 05:02

## 2021-03-20 RX ADMIN — Medication 5 MG: at 20:44

## 2021-03-20 RX ADMIN — VALPROIC ACID 750 MG: 250 SOLUTION ORAL at 16:09

## 2021-03-20 NOTE — CARE PLAN
Problem: Safety  Goal: Will remain free from injury  Outcome: PROGRESSING AS EXPECTED  Goal: Will remain free from falls  Outcome: PROGRESSING AS EXPECTED    Problem: Communication  Goal: The ability to communicate needs accurately and effectively will improve  Outcome: PROGRESSING SLOWER THAN EXPECTED

## 2021-03-20 NOTE — CARE PLAN
Problem: Safety  Goal: Will remain free from falls  Outcome: PROGRESSING AS EXPECTED  Note: Fall precautions in place     Problem: Skin Integrity  Goal: Risk for impaired skin integrity will decrease  Outcome: PROGRESSING AS EXPECTED  Note: Turn & reposition every 2 hours

## 2021-03-20 NOTE — PROGRESS NOTES
"Hospital Medicine Daily Progress Note    Date of Service  3/20/2021    Chief Complaint  75 y.o. male admitted 3/11/2021 with seizure    Hospital Course  \"History of cerebellar CVA, seizure disorder, presented as a transfer from [facility] after he was found to have right parietal SDH without any midline shift's.  Witnessed seizure by family followed by changes in mental status.  Neurosurgery consulted Dr. Capps, no surgical intervention, recommend repeat CT head and frequent neuro checks.  Admit to the ICU every hour neuro checks, repeat head CT, keep SBP less than 140, continue Depakote and Keppra.  Due to ongoing encephalopathy, he had an EEG and was found to be in non-convulsive status epilepticus and was loaded with Vimpat.    Neurology was following case and he was treated with Vimpat 200mg BID, Keppra 1000mg BID, and Depacon 750mg QHS. No further seizure like activity. Patient had subtherapeutic Depakote level and Depacon was increased to 750mg BID.    Patient had prolonged waxing and waning mental status. Due to poor PO intake and intermittent refusal of PO intake, NGT was placed and started on alternative nutrition on 3/18.    Interval Problem Update    No acute events overnight. Patient spontaneously moving extremities, however not following commands or responding to my examination. Remains encephalopathic.    Consultants/Specialty  Critical Care  Neurosurgery  Physiatry    Code Status  DNAR/DNI    Disposition  Will need SNF when medically cleared    Review of Systems  Review of Systems   Unable to perform ROS: Mental acuity        Physical Exam  Temp:  [36.1 °C (96.9 °F)-36.8 °C (98.2 °F)] 36.7 °C (98.1 °F)  Pulse:  [83-99] 87  Resp:  [16] 16  BP: (113-140)/(44-70) 113/44  SpO2:  [93 %-96 %] 94 %    Physical Exam  Vitals and nursing note reviewed.   Constitutional:       General: He is not in acute distress.     Appearance: He is not toxic-appearing.   HENT:      Head: Normocephalic and atraumatic.      " Nose: Nose normal.      Mouth/Throat:      Mouth: Mucous membranes are dry.      Pharynx: Oropharynx is clear.   Eyes:      General: No scleral icterus.     Conjunctiva/sclera: Conjunctivae normal.   Cardiovascular:      Rate and Rhythm: Normal rate and regular rhythm.   Pulmonary:      Effort: Pulmonary effort is normal. No respiratory distress.      Breath sounds: Normal breath sounds.   Abdominal:      General: Bowel sounds are normal.      Palpations: Abdomen is soft.   Musculoskeletal:      Cervical back: Normal range of motion and neck supple.      Right lower leg: No edema.      Left lower leg: No edema.   Skin:     General: Skin is warm and dry.   Neurological:      Comments: Patient is more alert  Moving extremities spontaneously  Intermittently following commands  Unable to engage with neurological examination         Fluids    Intake/Output Summary (Last 24 hours) at 3/20/2021 1424  Last data filed at 3/20/2021 1204  Gross per 24 hour   Intake 2160 ml   Output 1350 ml   Net 810 ml       Laboratory      Recent Labs     03/18/21  0926 03/19/21  0240 03/20/21  0354   SODIUM 141 138 142   POTASSIUM 4.3 3.8 3.7   CHLORIDE 104 105 104   CO2 26 26 27   GLUCOSE 111* 103* 105*   BUN 14 13 22   CREATININE 0.95 1.08 1.12   CALCIUM 9.9 9.5 9.2                   Imaging  DX-ABDOMEN FOR TUBE PLACEMENT   Final Result      Enteric tube projects over the proximal stomach.      CT-HEAD W/O   Final Result      1.  Prior RIGHT frontal craniotomy with underlying dural thickening and subdural hemorrhage, unchanged from prior exam.   2.  No new intracranial hemorrhage demonstrated.   3.  Diffuse atrophy with white matter microvascular ischemic changes.   4.  Small chronic RIGHT posterior frontal infarct.      [id number] VIEW   Final Result         No specific finding to suggest small bowel obstruction.      CT-HEAD W/O   Final Result         1.  Mixed right parietal subdural fluid collection likely acute on chronic subdural  hematoma.   2.  Nonspecific white matter changes, commonly associated with small vessel ischemic disease.  Associated mild cerebral atrophy is noted.   3.  Atherosclerosis.      OUTSIDE IMAGES-CT HEAD   Final Result           Assessment/Plan  * Seizure disorder, nonconvulsive, with status epilepticus (HCC)- (present on admission)  Assessment & Plan  Noted on EEG on admission  He was loaded with IV Keppra, started on IV Vimpat, and IV depakote  Repeat EEG 3/15 without evidence of seizure and more consistent with encephalopathy  Appriciate neurology consult. Valproic acid level subtherapeutic and increased to 750mg BID by neurology on 3/16  Repeat valproic acid level 101 on 3/19, Depakote changed to 500mg QAM and 750mg QHS, discussed with Dr. Martínez  Repeat valproic acid level scheduled for 3/22  Outpatient follow up in the epilepsy clinic has been arranged by Dr. Mcfadden    If patient has continued seizure like activity or if his mentation appears to decline further, may need repeat EEG.    Encephalopathy acute- (present on admission)  Assessment & Plan  Acute encephalopathy likely multifactorial, persistent  In setting of dementia with behavioral disturbance, history of psychiatric disorder, CVA, and seizure disorder in status epilepticus on admission  Continue Seroquel 25mg TID prn for agitation, Seroquel 50mg QHS  Started on gabapentin  Appreciate physiatry consult  Patient requiring      Subdural hematoma, acute (HCC)- (present on admission)  Assessment & Plan  Noted on CT head  Non-traumatic  Neurosurgery consulted, non-operative management  Avoid NSAIDs and anticoagulation    Hypokalemia  Assessment & Plan  Keep K > 4.  Monitor BMP.     Dysphagia  Assessment & Plan  Dysphagia likely secondary to encephalopathy  Patient intermittently tolerates PO intake, however at times refuses both food and medication  NGT 3/18 placed for nutrition and medication administration, tube feeds currently at goal    Dementia  associated with other underlying disease with behavioral disturbance (HCC)- (present on admission)  Assessment & Plan  Reported history of cognitive decline consistent with dementia  Continue to monitor    Legally blind- (present on admission)  Assessment & Plan  - Patient legally blind for more than 12 years per patient  - Lives at [facility]    Essential hypertension- (present on admission)  Assessment & Plan  Reported history of though had not been on medications prior to admit  BP has trending up. Will initiate amlodipine 5mg for BP control       VTE prophylaxis: SCDs only secondary to SDH.

## 2021-03-21 LAB
ANION GAP SERPL CALC-SCNC: 7 MMOL/L (ref 7–16)
BUN SERPL-MCNC: 23 MG/DL (ref 8–22)
CALCIUM SERPL-MCNC: 9.1 MG/DL (ref 8.5–10.5)
CHLORIDE SERPL-SCNC: 103 MMOL/L (ref 96–112)
CO2 SERPL-SCNC: 29 MMOL/L (ref 20–33)
CREAT SERPL-MCNC: 0.98 MG/DL (ref 0.5–1.4)
GLUCOSE SERPL-MCNC: 135 MG/DL (ref 65–99)
POTASSIUM SERPL-SCNC: 4 MMOL/L (ref 3.6–5.5)
SODIUM SERPL-SCNC: 139 MMOL/L (ref 135–145)

## 2021-03-21 PROCEDURE — 36415 COLL VENOUS BLD VENIPUNCTURE: CPT

## 2021-03-21 PROCEDURE — 99233 SBSQ HOSP IP/OBS HIGH 50: CPT | Performed by: STUDENT IN AN ORGANIZED HEALTH CARE EDUCATION/TRAINING PROGRAM

## 2021-03-21 PROCEDURE — 700102 HCHG RX REV CODE 250 W/ 637 OVERRIDE(OP): Performed by: STUDENT IN AN ORGANIZED HEALTH CARE EDUCATION/TRAINING PROGRAM

## 2021-03-21 PROCEDURE — 80048 BASIC METABOLIC PNL TOTAL CA: CPT

## 2021-03-21 PROCEDURE — A9270 NON-COVERED ITEM OR SERVICE: HCPCS | Performed by: STUDENT IN AN ORGANIZED HEALTH CARE EDUCATION/TRAINING PROGRAM

## 2021-03-21 PROCEDURE — 770006 HCHG ROOM/CARE - MED/SURG/GYN SEMI*

## 2021-03-21 RX ORDER — DIVALPROEX SODIUM 125 MG/1
750 CAPSULE, COATED PELLETS ORAL EVERY EVENING
Status: DISCONTINUED | OUTPATIENT
Start: 2021-03-21 | End: 2021-04-12

## 2021-03-21 RX ORDER — DIVALPROEX SODIUM 125 MG/1
500 CAPSULE, COATED PELLETS ORAL EVERY MORNING
Status: DISCONTINUED | OUTPATIENT
Start: 2021-03-22 | End: 2021-04-12

## 2021-03-21 RX ADMIN — QUETIAPINE FUMARATE 50 MG: 25 TABLET ORAL at 21:00

## 2021-03-21 RX ADMIN — DIVALPROEX SODIUM 750 MG: 125 CAPSULE ORAL at 18:01

## 2021-03-21 RX ADMIN — DOCUSATE SODIUM 50 MG AND SENNOSIDES 8.6 MG 2 TABLET: 8.6; 5 TABLET, FILM COATED ORAL at 05:06

## 2021-03-21 RX ADMIN — VALPROIC ACID 500 MG: 250 SOLUTION ORAL at 05:06

## 2021-03-21 RX ADMIN — LACOSAMIDE 200 MG: 100 TABLET, FILM COATED ORAL at 05:06

## 2021-03-21 RX ADMIN — Medication 5 MG: at 21:00

## 2021-03-21 RX ADMIN — LACOSAMIDE 200 MG: 100 TABLET, FILM COATED ORAL at 18:02

## 2021-03-21 RX ADMIN — LEVETIRACETAM 1000 MG: 500 TABLET ORAL at 18:01

## 2021-03-21 RX ADMIN — GABAPENTIN 300 MG: 300 CAPSULE ORAL at 05:06

## 2021-03-21 RX ADMIN — AMLODIPINE BESYLATE 5 MG: 5 TABLET ORAL at 05:06

## 2021-03-21 RX ADMIN — SIMVASTATIN 40 MG: 20 TABLET, FILM COATED ORAL at 21:00

## 2021-03-21 RX ADMIN — GABAPENTIN 300 MG: 300 CAPSULE ORAL at 18:02

## 2021-03-21 RX ADMIN — LEVETIRACETAM 1000 MG: 500 TABLET ORAL at 05:06

## 2021-03-21 NOTE — CARE PLAN
Problem: Safety  Goal: Will remain free from falls  Outcome: PROGRESSING AS EXPECTED  Note: Fall precautions in place.     Problem: Bowel/Gastric:  Goal: Normal bowel function is maintained or improved  Outcome: PROGRESSING AS EXPECTED  Note: Daily BM     Problem: Fluid Volume:  Goal: Will maintain balanced intake and output  Outcome: PROGRESSING AS EXPECTED  Note: Tolerating TF & free water.     Problem: Skin Integrity  Goal: Risk for impaired skin integrity will decrease  Outcome: PROGRESSING AS EXPECTED  Note: Turn & reposition every 2 hours.

## 2021-03-21 NOTE — DISCHARGE PLANNING
ELISSA called [facility] admissions to f/u on referral. Per Kenny, referral still pending and insurance needs to be ran. He advises us to call Saranya at [facility] tomorrow to f/u and request insurance auth.

## 2021-03-21 NOTE — CARE PLAN
Problem: Venous Thromboembolism (VTW)/Deep Vein Thrombosis (DVT) Prevention:  Goal: Patient will participate in Venous Thrombosis (VTE)/Deep Vein Thrombosis (DVT)Prevention Measures  Outcome: PROGRESSING AS EXPECTED  Note: SCD in use.      Problem: Communication  Goal: The ability to communicate needs accurately and effectively will improve  Outcome: PROGRESSING SLOWER THAN EXPECTED  Note: Pt oriented only to self. Pt not very communicative, may say a few words here or there, does not answer questions, does not follow commands.      Problem: Respiratory:  Goal: Respiratory status will improve  Outcome: MET  Note: Pt remains on RA. No respiratory distress noted.

## 2021-03-21 NOTE — PROGRESS NOTES
"Hospital Medicine Daily Progress Note    Date of Service  3/21/2021    Chief Complaint  75 y.o. male admitted 3/11/2021 with seizure    Hospital Course  \"History of cerebellar CVA, seizure disorder, presented as a transfer from [facility] after he was found to have right parietal SDH without any midline shift's.  Witnessed seizure by family followed by changes in mental status.  Neurosurgery consulted Dr. Capps, no surgical intervention, recommend repeat CT head and frequent neuro checks.  Admit to the ICU every hour neuro checks, repeat head CT, keep SBP less than 140, continue Depakote and Keppra.  Due to ongoing encephalopathy, he had an EEG and was found to be in non-convulsive status epilepticus and was loaded with Vimpat.    Neurology was following case and he was treated with Vimpat 200mg BID, Keppra 1000mg BID, and Depacon 750mg QHS. No further seizure like activity. Patient had subtherapeutic Depakote level and Depacon was increased to 750mg BID.    Patient had prolonged waxing and waning mental status. Due to poor PO intake and intermittent refusal of PO intake, NGT was placed and started on alternative nutrition on 3/18.    Interval Problem Update    No acute events overnight. Patient was able to tell me his name today, however still encephalopathic.    Consultants/Specialty  Critical Care  Neurosurgery  Physiatry    Code Status  DNAR/DNI    Disposition  Will need SNF when medically cleared    Review of Systems  Review of Systems   Unable to perform ROS: Mental acuity        Physical Exam  Temp:  [36.2 °C (97.1 °F)-36.9 °C (98.5 °F)] 36.2 °C (97.1 °F)  Pulse:  [78-89] 82  Resp:  [16] 16  BP: (111-151)/(58-71) 122/59  SpO2:  [92 %-96 %] 96 %    Physical Exam  Vitals and nursing note reviewed.   Constitutional:       General: He is not in acute distress.     Appearance: He is not toxic-appearing.   HENT:      Head: Normocephalic and atraumatic.      Nose: Nose normal.      Mouth/Throat:      Mouth: Mucous " membranes are dry.      Pharynx: Oropharynx is clear.   Eyes:      General: No scleral icterus.     Conjunctiva/sclera: Conjunctivae normal.   Cardiovascular:      Rate and Rhythm: Normal rate and regular rhythm.   Pulmonary:      Effort: Pulmonary effort is normal. No respiratory distress.      Breath sounds: Normal breath sounds.   Abdominal:      General: Bowel sounds are normal.      Palpations: Abdomen is soft.   Musculoskeletal:      Cervical back: Normal range of motion and neck supple.      Right lower leg: No edema.      Left lower leg: No edema.   Skin:     General: Skin is warm and dry.   Neurological:      Comments: Patient is more alert  Moving extremities spontaneously  Intermittently following commands  Unable to engage with neurological examination         Fluids    Intake/Output Summary (Last 24 hours) at 3/21/2021 1206  Last data filed at 3/21/2021 0845  Gross per 24 hour   Intake 1740 ml   Output 1250 ml   Net 490 ml       Laboratory      Recent Labs     03/19/21  0240 03/20/21  0354 03/21/21  0316   SODIUM 138 142 139   POTASSIUM 3.8 3.7 4.0   CHLORIDE 105 104 103   CO2 26 27 29   GLUCOSE 103* 105* 135*   BUN 13 22 23*   CREATININE 1.08 1.12 0.98   CALCIUM 9.5 9.2 9.1                   Imaging  DX-ABDOMEN FOR TUBE PLACEMENT   Final Result      Enteric tube projects over the proximal stomach.      CT-HEAD W/O   Final Result      1.  Prior RIGHT frontal craniotomy with underlying dural thickening and subdural hemorrhage, unchanged from prior exam.   2.  No new intracranial hemorrhage demonstrated.   3.  Diffuse atrophy with white matter microvascular ischemic changes.   4.  Small chronic RIGHT posterior frontal infarct.      [id number] VIEW   Final Result         No specific finding to suggest small bowel obstruction.      CT-HEAD W/O   Final Result         1.  Mixed right parietal subdural fluid collection likely acute on chronic subdural hematoma.   2.  Nonspecific white matter changes,  commonly associated with small vessel ischemic disease.  Associated mild cerebral atrophy is noted.   3.  Atherosclerosis.      OUTSIDE IMAGES-CT HEAD   Final Result           Assessment/Plan  * Seizure disorder, nonconvulsive, with status epilepticus (HCC)- (present on admission)  Assessment & Plan  Noted on EEG on admission  He was loaded with IV Keppra, started on IV Vimpat, and IV depakote  Repeat EEG 3/15 without evidence of seizure and more consistent with encephalopathy  Appriciate neurology consult. Valproic acid level subtherapeutic and increased to 750mg BID by neurology on 3/16  Repeat valproic acid level 101 on 3/19, Depakote changed to 500mg QAM and 750mg QHS, discussed with Dr. Martínez  Repeat valproic acid level scheduled for 3/22  Outpatient follow up in the epilepsy clinic has been arranged by Dr. Mcfadden    If patient has continued seizure like activity or if his mentation appears to decline further, may need repeat EEG.    Encephalopathy acute- (present on admission)  Assessment & Plan  Acute encephalopathy likely multifactorial, persistent  In setting of dementia with behavioral disturbance, history of psychiatric disorder, CVA, and seizure disorder in status epilepticus on admission  Continue Seroquel 25mg TID prn for agitation, Seroquel 50mg QHS  Started on gabapentin  Appreciate physiatry consult  Patient requiring      Subdural hematoma, acute (HCC)- (present on admission)  Assessment & Plan  Noted on CT head  Non-traumatic  Neurosurgery consulted, non-operative management  Avoid NSAIDs and anticoagulation    Hypokalemia  Assessment & Plan  Keep K > 4.  Monitor BMP.     Dysphagia  Assessment & Plan  Dysphagia likely secondary to encephalopathy  Patient intermittently tolerates PO intake, however at times refuses both food and medication  NGT 3/18 placed for nutrition and medication administration, tube feeds currently at goal    Dementia associated with other underlying disease with behavioral  disturbance (HCC)- (present on admission)  Assessment & Plan  Reported history of cognitive decline consistent with dementia  Continue to monitor    Legally blind- (present on admission)  Assessment & Plan  - Patient legally blind for more than 12 years per patient  - Lives at [facility]    Essential hypertension- (present on admission)  Assessment & Plan  Reported history of though had not been on medications prior to admit  BP has trending up. Will initiate amlodipine 5mg for BP control       VTE prophylaxis: SCDs only secondary to SDH.

## 2021-03-21 NOTE — PROGRESS NOTES
Pt lying in bed, resting eyes closed. Pt arouses appropriately to voice. Pt remains on RA. Tube feed, SCD, fall precautions all in place. No distress noted. Will continue to monitor.

## 2021-03-22 LAB
CRP SERPL HS-MCNC: 1.23 MG/DL (ref 0–0.75)
PREALB SERPL-MCNC: 20.4 MG/DL (ref 18–38)
VALPROATE SERPL-MCNC: 75.3 UG/ML (ref 50–100)

## 2021-03-22 PROCEDURE — 92526 ORAL FUNCTION THERAPY: CPT

## 2021-03-22 PROCEDURE — 80164 ASSAY DIPROPYLACETIC ACD TOT: CPT

## 2021-03-22 PROCEDURE — 97530 THERAPEUTIC ACTIVITIES: CPT

## 2021-03-22 PROCEDURE — 97112 NEUROMUSCULAR REEDUCATION: CPT

## 2021-03-22 PROCEDURE — 84134 ASSAY OF PREALBUMIN: CPT

## 2021-03-22 PROCEDURE — 86140 C-REACTIVE PROTEIN: CPT

## 2021-03-22 PROCEDURE — A9270 NON-COVERED ITEM OR SERVICE: HCPCS | Performed by: STUDENT IN AN ORGANIZED HEALTH CARE EDUCATION/TRAINING PROGRAM

## 2021-03-22 PROCEDURE — 700102 HCHG RX REV CODE 250 W/ 637 OVERRIDE(OP): Performed by: STUDENT IN AN ORGANIZED HEALTH CARE EDUCATION/TRAINING PROGRAM

## 2021-03-22 PROCEDURE — 99233 SBSQ HOSP IP/OBS HIGH 50: CPT | Performed by: STUDENT IN AN ORGANIZED HEALTH CARE EDUCATION/TRAINING PROGRAM

## 2021-03-22 PROCEDURE — 36415 COLL VENOUS BLD VENIPUNCTURE: CPT

## 2021-03-22 PROCEDURE — 770006 HCHG ROOM/CARE - MED/SURG/GYN SEMI*

## 2021-03-22 RX ADMIN — DIVALPROEX SODIUM 750 MG: 125 CAPSULE ORAL at 20:19

## 2021-03-22 RX ADMIN — DOCUSATE SODIUM 50 MG AND SENNOSIDES 8.6 MG 2 TABLET: 8.6; 5 TABLET, FILM COATED ORAL at 19:00

## 2021-03-22 RX ADMIN — GABAPENTIN 300 MG: 300 CAPSULE ORAL at 04:44

## 2021-03-22 RX ADMIN — LACOSAMIDE 200 MG: 100 TABLET, FILM COATED ORAL at 04:43

## 2021-03-22 RX ADMIN — QUETIAPINE FUMARATE 50 MG: 25 TABLET ORAL at 20:18

## 2021-03-22 RX ADMIN — LEVETIRACETAM 1000 MG: 500 TABLET ORAL at 04:43

## 2021-03-22 RX ADMIN — DIVALPROEX SODIUM 500 MG: 125 CAPSULE ORAL at 05:06

## 2021-03-22 RX ADMIN — LACOSAMIDE 200 MG: 100 TABLET, FILM COATED ORAL at 19:00

## 2021-03-22 RX ADMIN — GABAPENTIN 300 MG: 300 CAPSULE ORAL at 19:00

## 2021-03-22 RX ADMIN — Medication 5 MG: at 20:18

## 2021-03-22 RX ADMIN — SIMVASTATIN 40 MG: 20 TABLET, FILM COATED ORAL at 20:18

## 2021-03-22 RX ADMIN — AMLODIPINE BESYLATE 5 MG: 5 TABLET ORAL at 04:44

## 2021-03-22 RX ADMIN — LEVETIRACETAM 1000 MG: 500 TABLET ORAL at 19:00

## 2021-03-22 ASSESSMENT — COGNITIVE AND FUNCTIONAL STATUS - GENERAL
TURNING FROM BACK TO SIDE WHILE IN FLAT BAD: UNABLE
DRESSING REGULAR LOWER BODY CLOTHING: TOTAL
WALKING IN HOSPITAL ROOM: TOTAL
HELP NEEDED FOR BATHING: TOTAL
MOBILITY SCORE: 6
MOVING FROM LYING ON BACK TO SITTING ON SIDE OF FLAT BED: UNABLE
DRESSING REGULAR UPPER BODY CLOTHING: A LOT
DAILY ACTIVITIY SCORE: 8
CLIMB 3 TO 5 STEPS WITH RAILING: TOTAL
PERSONAL GROOMING: A LOT
SUGGESTED CMS G CODE MODIFIER MOBILITY: CN
EATING MEALS: TOTAL
TOILETING: TOTAL
SUGGESTED CMS G CODE MODIFIER DAILY ACTIVITY: CM
STANDING UP FROM CHAIR USING ARMS: TOTAL
MOVING TO AND FROM BED TO CHAIR: UNABLE

## 2021-03-22 ASSESSMENT — GAIT ASSESSMENTS: GAIT LEVEL OF ASSIST: UNABLE TO PARTICIPATE

## 2021-03-22 NOTE — CARE PLAN
Problem: Safety  Goal: Will remain free from injury  Note: Bed locked and in lowest position. Call light and personal belongings in reach. Bed alarm in place. Hourly rounding.       Problem: Skin Integrity  Goal: Risk for impaired skin integrity will decrease  Note: Waffle overlay mattress in use. Q2 turns. Pillows in use for support and positioning. Barrier wipes in use.

## 2021-03-22 NOTE — THERAPY
"Occupational Therapy  Daily Treatment     Patient Name: Rafael Mccoy  Age:  75 y.o., Sex:  male  Medical Record #: [id number]  Today's Date: 3/22/2021     Precautions  Precautions: Fall Risk, Nasogastric Tube, Swallow Precautions ( See Comments)  Comments: legally blind    Assessment    Remains with flat affect and difficulty sequencing, initiating, and following through with tasks.     Plan    Continue current treatment plan.    DC Equipment Recommendations: Unable to determine at this time  Discharge Recommendations: Recommend post-acute placement for additional occupational therapy services prior to discharge home    Subjective    \"Yeah\"     Objective       03/22/21 1154   Cognition    Cognition / Consciousness X   Speech/ Communication Delayed Responses   Level of Consciousness Alert   Ability To Follow Commands Unable to Follow 1 Step Commands   Safety Awareness Impaired   New Learning Impaired   Attention Impaired   Initiation Impaired   Comments flat affect, appeared to respond well to music being played   Balance   Sitting Balance (Static) Poor +   Sitting Balance (Dynamic) Poor   Standing Balance (Static) Poor -   Standing Balance (Dynamic) Trace +   Weight Shift Sitting Fair   Weight Shift Standing Poor   Comments w/ HHA x2   Bed Mobility    Supine to Sit Maximal Assist   Sit to Supine Maximal Assist   Scooting Maximal Assist   Comments unable to initiate bed mobility   Activities of Daily Living   Lower Body Dressing Maximal Assist   Functional Mobility   Sit to Stand Moderate Assist   Bed, Chair, Wheelchair Transfer Unable to Participate   Mobility EOB>STS x2>BTB   Comments Attempted side stepping, difficult time sequencing and weight shifting   Patient / Family Goals   Patient / Family Goal #1 To go home   Goal #1 Outcome Progressing as expected   Short Term Goals   Short Term Goal # 1 Pt will demo LB dressing w/ SPV   Goal Outcome # 1 Goal not met   Short Term Goal # 2 Pt will demo standing grooming " w/ SPV   Goal Outcome # 2 Goal not met   Short Term Goal # 3 Pt will demo toilet txf w/ SPV   Goal Outcome # 3 Goal not met

## 2021-03-22 NOTE — THERAPY
Physical Therapy   Daily Treatment     Patient Name: Rafael Mccoy  Age:  75 y.o., Sex:  male  Medical Record #: [id number]  Today's Date: 3/22/2021     Precautions: (P) Fall Risk, Nasogastric Tube, Swallow Precautions ( See Comments)    Assessment    Pt is progressing slower than expected from prior PT treatment session. Pt required significant assist with bed mobility, sit<>stands, upright posture, and attempts to take side steps. Unable to transfer today due to poor safety and decline in functional mobility. Pt able to tolerate standing for about 3-5 mins with HHA x2. Pt presented with poor initiation and following with all mobility. Pt able to tolerate stretch to upper chest and assist with trunk extension. Demonstrates with flexed posture when sitting at EOB. Pt will continue to benefit from skilled PT while in house..     Plan    Continue current treatment plan.    DC Equipment Recommendations: (P) Unable to determine at this time  Discharge Recommendations: (P) Recommend post-acute placement for additional physical therapy services prior to discharge home    Objective       03/22/21 1159   Precautions   Precautions Fall Risk;Nasogastric Tube;Swallow Precautions ( See Comments)   Comments legally blind    Cognition    Cognition / Consciousness X   Speech/ Communication Delayed Responses   Level of Consciousness Alert   Ability To Follow Commands Unable to Follow 1 Step Commands   Safety Awareness Impaired   New Learning Impaired   Attention Impaired   Initiation Impaired   Comments flat affect; poor following, frequent cues and redirection; reponds well with music    Neuro-Muscular Treatments   Neuro-Muscular Treatments Anterior weight shift;Postural Changes;Postural Facilitation;Verbal Cuing;Weight Shift Right;Weight Shift Left   Other Treatments   Other Treatments Provided pt sat at EOB and provided with manual faciliation to maintain upright posture; Pt provided with manual faciliation and assist to  maintain head in neutral and upright; Provided with extension and chest stretch while sitting EOB    Neurological Concerns   Neurological Concerns Yes   Sitting Posture During ADL's Posterior Lean;Lateral Lean Left   Balance   Sitting Balance (Static) Poor +   Sitting Balance (Dynamic) Poor   Standing Balance (Static) Poor -   Standing Balance (Dynamic) Trace +   Weight Shift Sitting Fair   Weight Shift Standing Poor   Skilled Intervention Verbal Cuing;Tactile Cuing;Sequencing;Facilitation   Comments w/ HHA x2; requires initiation for standing    Gait Analysis   Gait Level Of Assist Unable to Participate   Comments apperas to be demonstrating with worsening cognition and poor following   Bed Mobility    Supine to Sit Maximal Assist   Sit to Supine Maximal Assist   Scooting Maximal Assist   Rolling Maximal Assist to Rt.;Maximum Assist to Lt.   Skilled Intervention Verbal Cuing;Tactile Cuing;Sequencing   Comments unable to initate bed mobility    Functional Mobility   Sit to Stand Moderate Assist   Bed, Chair, Wheelchair Transfer Unable to Participate   Mobility EOB, sit<>stand x2, BTB    Skilled Intervention Verbal Cuing;Tactile Cuing;Sequencing   Comments attempted side stepping, however, pt unable to sequencing; assisted with manual faciliation, however, pt unable to progress and demosntrated with poor weight shift   How much difficulty does the patient currently have...   Turning over in bed (including adjusting bedclothes, sheets and blankets)? 1   Sitting down on and standing up from a chair with arms (e.g., wheelchair, bedside commode, etc.) 1   Moving from lying on back to sitting on the side of the bed? 1   How much help from another person does the patient currently need...   Moving to and from a bed to a chair (including a wheelchair)? 1   Need to walk in a hospital room? 1   Climbing 3-5 steps with a railing? 1   6 clicks Mobility Score 6   Activity Tolerance   Sitting in Chair NT   Sitting Edge of Bed 13  mins    Standing 2-3 mins   Comments poor cogntion and following    Patient / Family Goals    Patient / Family Goal #1 to return to PLOF (family)   Short Term Goals    Short Term Goal # 1 Pt will be able to perform supine<>sit with HOB flat/no rails with Spv in 6tx to promote fnx progression towards I    Goal Outcome # 1 goal not met   Short Term Goal # 2 Pt will be able to perform sit<>stand/transfers with min A in 6tx to promote fnx progression towards I    Goal Outcome # 2 Goal met, new goal added   Short Term Goal # 2 B  pt to move sit to/from stand w/ spv in 6 visits to iincrease fxl indep   Goal Outcome # 2 B Goal not met   Short Term Goal # 3 Pt will be able to ambulate 25ft with FWW with min A in 6tx to promote fnx progression towards I    Goal Outcome # 3 Goal not met   Education Group   Education Provided Role of Physical Therapist   Role of Physical Therapist Patient Response Patient;Explanation;Verbal Demonstration;Reinforcement Needed   Anticipated Discharge Equipment and Recommendations   DC Equipment Recommendations Unable to determine at this time   Discharge Recommendations Recommend post-acute placement for additional physical therapy services prior to discharge home   Interdisciplinary Plan of Care Collaboration   IDT Collaboration with  Nursing;Occupational Therapist   Patient Position at End of Therapy In Bed;Bed Alarm On;Call Light within Reach;Tray Table within Reach;Phone within Reach;Other (Comments)  (hand mitts applied )   Collaboration Comments aware of visit and recs    Session Information   Date / Session Number  3/22-4 (1/4, 3/28)   Priority 3

## 2021-03-22 NOTE — PROGRESS NOTES
Pt's daughter called. Updates given. Daughter really requesting that pt get up to chair. Explained pt's impulsiveness, need for four side rails as well as padding as well as mittens to prevent pt from climbing out of bed. Daughter feels pt would be less impulsive if he was up in the chair. Explained fall precautions and need to ensure that pt's safety can be maintained. Daughter voiced understanding, but really wanting pt up. This RN got pt to edge of bed. Pt sat up on edge of bed for 5 minutes. Pt also stood at edge of bed sitting and then standing multiple times. Pt difficult to get to follow commands, commands only intermittent, and pt having difficulty understanding to take a step forward. CNA then joined this RN and both staff members were able to get pt to take three steps towards the recliner chair. Pt then brought out to nurses desk, in recliner, with lap belt and chair alarm. Will continue to monitor.

## 2021-03-22 NOTE — THERAPY
"Speech Language Pathology  Daily Treatment     Patient Name: Rafael Mccoy  Age:  75 y.o., Sex:  male  Medical Record #: [id number]  Today's Date: 3/22/2021     Precautions  Precautions: Fall Risk, Nasogastric Tube, Swallow Precautions ( See Comments)  Comments: Legally blind    Assessment  Patient seen this date for swallowing re-assessment. Per RN, patient has continued to wax and wane in status. Patient made NPO by MD with cortrak in place due to worsening condition. CNA present to turn patient, with patient rousing minimally. At one patient, patient did state, \"Hey\" to SLP, but no other verbalizations made. Minimal oral opening observed this date. Max verbal/tactile cues provided for presentations of ice chips and mildly thick liquid. 6 presentations of PO (trace amount, stimulation presentations), with swallow initiation observed x2/6 with ongoing max cues. SLP discontinued PO due to minimal response. Oral suctioning attempted, but patient presented with labial resistance and did not open lips to allow suction. No oral care completed d/t pt resistance.   At this time, due to patient condition with oral resistance to PO or oral care, continue NPO status with tube feeding. SLP to continue to follow for dysphagia intervention as patient is able to participate.       Plan  1) NPO, tube feed  2) Frequent oral care  3) PRN ice chips after oral care only when patient is alert    Continue current treatment plan.    Discharge Recommendations: Recommend post-acute placement for additional speech therapy services prior to discharge home    Subjective  Patient seen this date for dysphagia intervention.     Objective     03/22/21 1005   Charge Group   SLP Swallowing Dysfunction Treatment Swallowing Dysfunction Treatment   Dysphagia    Dysphagia X   Positioning / Behavior Modification Modulate Rate or Bite Size   Other Treatments Pre-feeding trials including ice chips and mildly thick liquids   Diet / Liquid Recommendation " NPO;Pre-Feeding Trials with SLP Only   Nutritional Liquid Intake Rating Scale Nothing by mouth   Nutritional Food Intake Rating Scale Nothing by mouth   Nursing Communication Swallow Precaution Sign Posted at Head of Bed   Skilled Intervention Compensatory Strategies   Recommended Route of Medication Administration   Medication Administration  Via Gastric Tube   Short Term Goals   Short Term Goal # 2 NEW 3/13: Pt will consume LQ3/MT2 diet with no overt s/sx of aspiration    Goal Outcome # 2  Other (see comments)  (Goal discontinued, decline in status)   Short Term Goal # 3 NEW 3/22 - Pt will participate in pre-feeding trials with SLP prior to PO diet advancement.   Education Group   Education Provided Dysphagia;Medications / Pain Control;Role of Speech Therapy   Dysphagia Patient Response Patient;Nonacceptance;Demonstration;No Learning Evidence;Reinforcement Needed   Meds / Pain Control Patient Response Patient;Nonacceptance;Demonstration;Reinforcement Needed;No Learning Evidence   Role of SLP Patient Response Patient;Nonacceptance;Demonstration;Reinforcement Needed;No Learning Evidence   Additional Comments Pt will require ongoing education   Anticipated Discharge Needs   Discharge Recommendations Recommend post-acute placement for additional speech therapy services prior to discharge home   Therapy Recommendations Upon DC Dysphagia Training;Community Re-Integration;Patient / Family / Caregiver Education

## 2021-03-22 NOTE — CARE PLAN
Problem: Safety  Goal: Will remain free from falls  Outcome: PROGRESSING AS EXPECTED  Note: Fall precaution in place     Problem: Bowel/Gastric:  Goal: Normal bowel function is maintained or improved  Outcome: PROGRESSING AS EXPECTED     Problem: Discharge Barriers/Planning  Goal: Patient's continuum of care needs will be met  Outcome: PROGRESSING AS EXPECTED  Note: Updated daughter at bedside for discharge plan & update.     Problem: Safety - Medical Restraint  Goal: Remains free of injury from restraints (Restraint for Interference with Medical Device)  Description: INTERVENTIONS:  1. Determine that other, less restrictive measures have been tried or would not be effective before applying the restraint  2. Evaluate the patient's condition at the time of restraint application  3. Inform patient/family regarding the reason for restraint  4. Q2H: Monitor safety, psychosocial status, comfort, nutrition and hydration  Outcome: PROGRESSING AS EXPECTED

## 2021-03-22 NOTE — PROGRESS NOTES
"Hospital Medicine Daily Progress Note    Date of Service  3/22/2021    Chief Complaint  75 y.o. male admitted 3/11/2021 with seizure    Hospital Course  \"History of cerebellar CVA, seizure disorder, presented as a transfer from [facility] after he was found to have right parietal SDH without any midline shift's.  Witnessed seizure by family followed by changes in mental status.  Neurosurgery consulted Dr. Capps, no surgical intervention, recommend repeat CT head and frequent neuro checks.  Admit to the ICU every hour neuro checks, repeat head CT, keep SBP less than 140, continue Depakote and Keppra.  Due to ongoing encephalopathy, he had an EEG and was found to be in non-convulsive status epilepticus and was loaded with Vimpat.    Neurology was following case and he was treated with Vimpat 200mg BID, Keppra 1000mg BID, and Depacon 750mg QHS. No further seizure like activity. Patient had subtherapeutic Depakote level and Depacon was increased to 750mg BID.    Patient had prolonged waxing and waning mental status. Due to poor PO intake and intermittent refusal of PO intake, NGT was placed and started on alternative nutrition on 3/18.    Interval Problem Update    No acute events overnight. Patient remains encephalopathic and mental status waxes and wanes. Did not follow commands for nurse, however was able to tell me his name and stuck tongue out when commanded, but would not do other commands. Moving extremities spontaneously.    Consultants/Specialty  Critical Care  Neurosurgery  Physiatry    Code Status  DNAR/DNI    Disposition  Will need SNF when medically cleared    Review of Systems  Review of Systems   Unable to perform ROS: Mental acuity        Physical Exam  Temp:  [36.1 °C (96.9 °F)-36.6 °C (97.8 °F)] 36.6 °C (97.8 °F)  Pulse:  [] 65  Resp:  [16-18] 18  BP: (103-150)/(55-75) 136/57  SpO2:  [94 %-96 %] 94 %    Physical Exam  Vitals and nursing note reviewed.   Constitutional:       General: He is not " in acute distress.     Appearance: He is not toxic-appearing.   HENT:      Head: Normocephalic and atraumatic.      Nose: Nose normal.      Mouth/Throat:      Mouth: Mucous membranes are dry.      Pharynx: Oropharynx is clear.   Eyes:      General: No scleral icterus.     Conjunctiva/sclera: Conjunctivae normal.   Cardiovascular:      Rate and Rhythm: Normal rate and regular rhythm.   Pulmonary:      Effort: Pulmonary effort is normal. No respiratory distress.      Breath sounds: Normal breath sounds.   Abdominal:      General: Bowel sounds are normal.      Palpations: Abdomen is soft.   Musculoskeletal:      Cervical back: Normal range of motion and neck supple.      Right lower leg: No edema.      Left lower leg: No edema.   Skin:     General: Skin is warm and dry.   Neurological:      Comments: Patient is more alert  Moving extremities spontaneously  Intermittently following commands  Unable to engage with neurological examination         Fluids    Intake/Output Summary (Last 24 hours) at 3/22/2021 1211  Last data filed at 3/22/2021 0800  Gross per 24 hour   Intake 600 ml   Output 1350 ml   Net -750 ml       Laboratory      Recent Labs     03/20/21  0354 03/21/21  0316   SODIUM 142 139   POTASSIUM 3.7 4.0   CHLORIDE 104 103   CO2 27 29   GLUCOSE 105* 135*   BUN 22 23*   CREATININE 1.12 0.98   CALCIUM 9.2 9.1                   Imaging  DX-ABDOMEN FOR TUBE PLACEMENT   Final Result      Enteric tube projects over the proximal stomach.      CT-HEAD W/O   Final Result      1.  Prior RIGHT frontal craniotomy with underlying dural thickening and subdural hemorrhage, unchanged from prior exam.   2.  No new intracranial hemorrhage demonstrated.   3.  Diffuse atrophy with white matter microvascular ischemic changes.   4.  Small chronic RIGHT posterior frontal infarct.      [id number] VIEW   Final Result         No specific finding to suggest small bowel obstruction.      CT-HEAD W/O   Final Result         1.  Mixed right  parietal subdural fluid collection likely acute on chronic subdural hematoma.   2.  Nonspecific white matter changes, commonly associated with small vessel ischemic disease.  Associated mild cerebral atrophy is noted.   3.  Atherosclerosis.      OUTSIDE IMAGES-CT HEAD   Final Result           Assessment/Plan  * Seizure disorder, nonconvulsive, with status epilepticus (HCC)- (present on admission)  Assessment & Plan  Noted on EEG on admission  He was loaded with IV Keppra, started on IV Vimpat, and IV depakote  Repeat EEG 3/15 without evidence of seizure and more consistent with encephalopathy  Appriciate neurology consult. Valproic acid level subtherapeutic and increased to 750mg BID by neurology on 3/16  Repeat valproic acid level 101 on 3/19, Depakote changed to 500mg QAM and 750mg QHS, discussed with Dr. Martínez  Repeat valproic acid level scheduled for 3/22  Outpatient follow up in the epilepsy clinic has been arranged by Dr. Mcfadden    If patient has continued seizure like activity or if his mentation appears to decline further, may need repeat EEG.    Encephalopathy acute- (present on admission)  Assessment & Plan  Acute encephalopathy likely multifactorial, persistent  In setting of dementia with behavioral disturbance, history of psychiatric disorder, CVA, and seizure disorder in status epilepticus on admission  Continue Seroquel 25mg TID prn for agitation, Seroquel 50mg QHS  Started on gabapentin  Appreciate physiatry consult  Patient requiring      Subdural hematoma, acute (HCC)- (present on admission)  Assessment & Plan  Noted on CT head  Non-traumatic  Neurosurgery consulted, non-operative management  Avoid NSAIDs and anticoagulation    Hypokalemia  Assessment & Plan  Keep K > 4.  Monitor BMP.     Dysphagia  Assessment & Plan  Dysphagia likely secondary to encephalopathy  Patient intermittently tolerates PO intake, however at times refuses both food and medication  NGT 3/18 placed for nutrition and  medication administration, tube feeds currently at goal    Dementia associated with other underlying disease with behavioral disturbance (HCC)- (present on admission)  Assessment & Plan  Reported history of cognitive decline consistent with dementia  Continue to monitor    Legally blind- (present on admission)  Assessment & Plan  - Patient legally blind for more than 12 years per patient  - Lives at [facility]    Essential hypertension- (present on admission)  Assessment & Plan  Reported history of though had not been on medications prior to admit  BP has trending up. Will initiate amlodipine 5mg for BP control       VTE prophylaxis: SCDs only secondary to SDH.

## 2021-03-22 NOTE — DISCHARGE PLANNING
Agency/Facility Name: [facility]  Spoke To: Jennifer  Outcome: Updated therapy and MD progress notes sent via fax @ [phone]

## 2021-03-22 NOTE — DISCHARGE PLANNING
Agency/Facility Name: [facility]  Spoke To: Patricia  Outcome: Will review referral and update DPA.     Agency/Facility Name: [facility]  Spoke To: Jennifer   Outcome: Referral pending onsite evaluation.

## 2021-03-22 NOTE — PROGRESS NOTES
Assumed care of pt at 0700. Pt mostly nonverbal upon assessment, VSS. Pt up to chair this afternoon with x2 assist. Daughter, Cleo, updated via phone. All questions answered at this time. Will continue hourly rounding.

## 2021-03-22 NOTE — DISCHARGE PLANNING
Care Transition Team Discharge Planning    Anticipated Discharge Disposition: TBD    Action: [facility] attempted to meet with the patient but was unable to do so due to the patient was sleeping. The [facility] liaison also requested an updated therapy and MD progress notes.    JATINDER sent the requested information e/b updated therapy and MD progress notes to [facility].    Barriers to Discharge:  Awaiting SNF approval.    Plan: Lsw will assist the medical team with the DC plan.

## 2021-03-23 LAB
ALBUMIN SERPL BCP-MCNC: 3.3 G/DL (ref 3.2–4.9)
ALBUMIN/GLOB SERPL: 1.1 G/DL
ALP SERPL-CCNC: 70 U/L (ref 30–99)
ALT SERPL-CCNC: 13 U/L (ref 2–50)
ANION GAP SERPL CALC-SCNC: 7 MMOL/L (ref 7–16)
AST SERPL-CCNC: 15 U/L (ref 12–45)
BASOPHILS # BLD AUTO: 0.7 % (ref 0–1.8)
BASOPHILS # BLD: 0.04 K/UL (ref 0–0.12)
BILIRUB SERPL-MCNC: 0.3 MG/DL (ref 0.1–1.5)
BUN SERPL-MCNC: 26 MG/DL (ref 8–22)
CALCIUM SERPL-MCNC: 9 MG/DL (ref 8.5–10.5)
CHLORIDE SERPL-SCNC: 105 MMOL/L (ref 96–112)
CO2 SERPL-SCNC: 29 MMOL/L (ref 20–33)
CREAT SERPL-MCNC: 1.28 MG/DL (ref 0.5–1.4)
EOSINOPHIL # BLD AUTO: 0.2 K/UL (ref 0–0.51)
EOSINOPHIL NFR BLD: 3.3 % (ref 0–6.9)
ERYTHROCYTE [DISTWIDTH] IN BLOOD BY AUTOMATED COUNT: 45.9 FL (ref 35.9–50)
GLOBULIN SER CALC-MCNC: 3 G/DL (ref 1.9–3.5)
GLUCOSE SERPL-MCNC: 130 MG/DL (ref 65–99)
HCT VFR BLD AUTO: 49.5 % (ref 42–52)
HGB BLD-MCNC: 16 G/DL (ref 14–18)
IMM GRANULOCYTES # BLD AUTO: 0.08 K/UL (ref 0–0.11)
IMM GRANULOCYTES NFR BLD AUTO: 1.3 % (ref 0–0.9)
LYMPHOCYTES # BLD AUTO: 1.26 K/UL (ref 1–4.8)
LYMPHOCYTES NFR BLD: 20.7 % (ref 22–41)
MAGNESIUM SERPL-MCNC: 2.3 MG/DL (ref 1.5–2.5)
MCH RBC QN AUTO: 28.1 PG (ref 27–33)
MCHC RBC AUTO-ENTMCNC: 32.3 G/DL (ref 33.7–35.3)
MCV RBC AUTO: 86.8 FL (ref 81.4–97.8)
MONOCYTES # BLD AUTO: 0.87 K/UL (ref 0–0.85)
MONOCYTES NFR BLD AUTO: 14.3 % (ref 0–13.4)
NEUTROPHILS # BLD AUTO: 3.63 K/UL (ref 1.82–7.42)
NEUTROPHILS NFR BLD: 59.7 % (ref 44–72)
NRBC # BLD AUTO: 0 K/UL
NRBC BLD-RTO: 0 /100 WBC
PHOSPHATE SERPL-MCNC: 3.8 MG/DL (ref 2.5–4.5)
PLATELET # BLD AUTO: 219 K/UL (ref 164–446)
PMV BLD AUTO: 10.8 FL (ref 9–12.9)
POTASSIUM SERPL-SCNC: 4.2 MMOL/L (ref 3.6–5.5)
PROT SERPL-MCNC: 6.3 G/DL (ref 6–8.2)
RBC # BLD AUTO: 5.7 M/UL (ref 4.7–6.1)
SODIUM SERPL-SCNC: 141 MMOL/L (ref 135–145)
WBC # BLD AUTO: 6.1 K/UL (ref 4.8–10.8)

## 2021-03-23 PROCEDURE — 92526 ORAL FUNCTION THERAPY: CPT

## 2021-03-23 PROCEDURE — 99232 SBSQ HOSP IP/OBS MODERATE 35: CPT | Performed by: PHYSICAL MEDICINE & REHABILITATION

## 2021-03-23 PROCEDURE — 700102 HCHG RX REV CODE 250 W/ 637 OVERRIDE(OP): Performed by: STUDENT IN AN ORGANIZED HEALTH CARE EDUCATION/TRAINING PROGRAM

## 2021-03-23 PROCEDURE — 84100 ASSAY OF PHOSPHORUS: CPT

## 2021-03-23 PROCEDURE — 36415 COLL VENOUS BLD VENIPUNCTURE: CPT

## 2021-03-23 PROCEDURE — 83735 ASSAY OF MAGNESIUM: CPT

## 2021-03-23 PROCEDURE — A9270 NON-COVERED ITEM OR SERVICE: HCPCS | Performed by: STUDENT IN AN ORGANIZED HEALTH CARE EDUCATION/TRAINING PROGRAM

## 2021-03-23 PROCEDURE — 85025 COMPLETE CBC W/AUTO DIFF WBC: CPT

## 2021-03-23 PROCEDURE — 99233 SBSQ HOSP IP/OBS HIGH 50: CPT | Performed by: INTERNAL MEDICINE

## 2021-03-23 PROCEDURE — 80053 COMPREHEN METABOLIC PANEL: CPT

## 2021-03-23 PROCEDURE — 700111 HCHG RX REV CODE 636 W/ 250 OVERRIDE (IP): Performed by: STUDENT IN AN ORGANIZED HEALTH CARE EDUCATION/TRAINING PROGRAM

## 2021-03-23 PROCEDURE — 770006 HCHG ROOM/CARE - MED/SURG/GYN SEMI*

## 2021-03-23 RX ORDER — QUETIAPINE FUMARATE 25 MG/1
50 TABLET, FILM COATED ORAL NIGHTLY PRN
Status: DISCONTINUED | OUTPATIENT
Start: 2021-03-23 | End: 2021-04-02

## 2021-03-23 RX ADMIN — DOCUSATE SODIUM 50 MG AND SENNOSIDES 8.6 MG 2 TABLET: 8.6; 5 TABLET, FILM COATED ORAL at 04:19

## 2021-03-23 RX ADMIN — LEVETIRACETAM 1000 MG: 500 TABLET ORAL at 04:20

## 2021-03-23 RX ADMIN — SIMVASTATIN 40 MG: 20 TABLET, FILM COATED ORAL at 20:57

## 2021-03-23 RX ADMIN — GABAPENTIN 300 MG: 300 CAPSULE ORAL at 17:16

## 2021-03-23 RX ADMIN — ONDANSETRON 4 MG: 2 INJECTION INTRAMUSCULAR; INTRAVENOUS at 18:57

## 2021-03-23 RX ADMIN — Medication 5 MG: at 20:57

## 2021-03-23 RX ADMIN — GABAPENTIN 300 MG: 300 CAPSULE ORAL at 04:20

## 2021-03-23 RX ADMIN — DOCUSATE SODIUM 50 MG AND SENNOSIDES 8.6 MG 2 TABLET: 8.6; 5 TABLET, FILM COATED ORAL at 17:15

## 2021-03-23 RX ADMIN — DIVALPROEX SODIUM 750 MG: 125 CAPSULE ORAL at 17:15

## 2021-03-23 RX ADMIN — AMLODIPINE BESYLATE 5 MG: 5 TABLET ORAL at 04:19

## 2021-03-23 RX ADMIN — LEVETIRACETAM 1000 MG: 500 TABLET ORAL at 17:16

## 2021-03-23 RX ADMIN — DIVALPROEX SODIUM 500 MG: 125 CAPSULE ORAL at 04:19

## 2021-03-23 RX ADMIN — LACOSAMIDE 200 MG: 100 TABLET, FILM COATED ORAL at 04:19

## 2021-03-23 RX ADMIN — LACOSAMIDE 200 MG: 100 TABLET, FILM COATED ORAL at 17:15

## 2021-03-23 NOTE — DISCHARGE PLANNING
On 3/23/21, this Lsw attempted to contact Jayashree with [facility] to inform her that the medical team reported that the patient usually speaks just one syllable words.. There was no answer.    On 3/25/21, Lsw was informed by the medical team that the patient is considering either hospice or comfort care. Palliative will contact the dtr to discuss with her.

## 2021-03-23 NOTE — PROGRESS NOTES
Physical Medicine and Rehabilitation Consultation              Date of initial consultation: 3/15/2021  Consulting provider: Pankaj Finley MD  Reason for consultation: assess for acute inpatient rehab appropriateness  LOS: 12 Day(s)    Chief complaint: SDH    HPI: The patient is a 75 y.o. male with a past medical history of cerebellar CVA, seizure disorder, hypertension, ataxia, dementia, legally blind for 12 years, insomnia, right craniotomy for subdural hematoma 1 year ago;  who presented on 3/11/2021 1:30 AM with altered mental status and concern for seizure.  Patient was having a zoom call with his family when he suffered a brief seizure, he was taken to [facility], found to have SDH on CT, transferred to [facility] for neurosurgical consultation.  NSG did not recommend intervention.  Patient had a EEG which reflected nonconvulsive status epilepticus.  Patient was loaded with Keppra and Vimpat.  Patient is not on anticoagulation secondary to subdural hematoma.    The patient currently is minimally responsive, unable to answer questions with more than a grunt. He has repetitive movements noted in the left lower lip and right foot/ankle. He is able to follow commands regarding to squeezing my hand, but then goes into UE rigidity and is unable to release. LE's seem less affected. Nurse and primary doc notified.     3/16/2021  Patient is slightly more alert today.  Able to answer 1-2 questions with single word answers.  Seen by speech today, unable to take an oral intake.  Discussed plan of care with nursing staff.  Seen by neurology today, seizure meds increased.    3/17/2021  Patient slightly improved today.  Able to answer 1 question with multi word phrase 1 time.  Per nursing he was able to eat 100% of his breakfast.  Still I have concerns that he has not taking in adequate nutrition.     3/18/2021  Spoke with his caregiver Maxine at bedside today. Patients repetitive lip movement is chronic and  was present at baseline. David is slowly improving, still appears lethargic today and is not answering questions. We discussed overall plan of care and future directions. Minimal to no improvement in patients mentation today. I am glad to see the NG tube and hopefully some nutrition will increase his alertness.     3/23/2021  Patient seen in follow-up.  Patient has made little improvement over the weekend, he is nonresponsive to questioning.  Patient continues to have rigidity.  Patient was seen by speech therapy today who recommends continuation of n.p.o. status and alternative source of nutrition/hydration.    Social Hx:  Assisted living facility    Patient lives at [facility], comprehensive assisted living with memory care available.  Patient was essentially independent with household/limited community mobility.  Was using a walker walking stick/ blind cane.  Patient had assistance for showering.  Was not on memory care.  Intermittent assist with IADLs and some ADLs as needed.    THERAPY:  PT: Functional mobility   3/15: Unable to participate in gait, max assist for sit to stand  3/18: walking 5 feet with FWW at mod assist  3/22: Mod assist sit to stand    OT: ADLs  3/15: max assist for lower body dressing, toileting, grooming  3/17: Max Assist   3/22: Max assist    SLP:   3/13: Modified diet with liquid eyes textures of mildly thick liquids and swallowing strategies  3/17: Liquidized (level 3) and Mildly Thick Liquid (level 2) diet given STRICT 1:1 feeding  3/18: NPO   3/23: N.p.o. 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, s/p bilateral laser surgeries   • Head injury    • Hypertension    • Seizure (HCC)    • Stroke (HCC)         PSH:  Past Surgical History:   Procedure Laterality Date   • CRANIOTOMY Right 4/17/2020    Procedure: CRANIOTOMY - SUBDURAL;  Surgeon: Anthony Mendiola M.D.;  Location: SURGERY [facility];  Service: Neurosurgery   • OTHER  04/2020    craniotomy after a fall,TBI   • CRANIOTOMY     • OTHER ORTHOPEDIC SURGERY      R foot fracture   • ROTATOR CUFF REPAIR Right    • TONSILLECTOMY AND ADENOIDECTOMY         FHX:  Family History   Problem Relation Age of Onset   • Other Mother         eye problems   • Cancer Neg Hx    • Heart Disease Neg Hx    • Stroke Neg Hx        Medications:  Current Facility-Administered Medications   Medication Dose   • divalproex (DEPAKOTE SPRINKLE) capsule 750 mg  750 mg   • divalproex (DEPAKOTE SPRINKLE) capsule 500 mg  500 mg   • levETIRAcetam (KEPPRA) tablet 1,000 mg  1,000 mg   • lacosamide (VIMPAT) tablet 200 mg  200 mg   • magnesium hydroxide (MILK OF MAGNESIA) suspension 30 mL  30 mL    And   • senna-docusate (PERICOLACE or SENOKOT S) 8.6-50 MG per tablet 2 tablet  2 tablet    And   • polyethylene glycol/lytes (MIRALAX) PACKET 1 Packet  1 Packet    And   • bisacodyl (DULCOLAX) suppository 10 mg  10 mg   • ondansetron (ZOFRAN ODT) dispertab 4 mg  4 mg    Or   • ondansetron (ZOFRAN) syringe/vial injection 4 mg  4 mg   • Pharmacy Consult: Enteral tube insertion - review meds/change route/product selection  1 Each   • amLODIPine (NORVASC) tablet 5 mg  5 mg   • gabapentin (NEURONTIN) capsule 300 mg  300 mg   • melatonin tablet 5 mg  5 mg   • QUEtiapine (Seroquel) tablet 50 mg  50 mg   • simvastatin (ZOCOR) tablet 40 mg  40 mg   • acetaminophen (Tylenol) tablet 650 mg  650 mg    Or   • acetaminophen (TYLENOL) suppository 650 mg  650 mg   • oxyCODONE immediate-release (ROXICODONE) tablet 5-10 mg  5-10 mg   • QUEtiapine (Seroquel) tablet 25 mg  25 mg   • morphine (pf) 4 mg/mL injection 2 mg  2 mg   • labetalol (NORMODYNE/TRANDATE) injection 10 mg  10 mg   • hydrALAZINE (APRESOLINE)  "injection 10 mg  10 mg   • enalaprilat (VASOTEC) injection 1.25 mg  1.25 mg   • Respiratory Therapy Consult     • LORazepam (ATIVAN) injection 2 mg  2 mg       Allergies:  No Known Allergies    Physical Exam:  Vitals: /65   Pulse 64   Temp 36.2 °C (97.2 °F) (Temporal)   Resp 17   Ht 1.753 m (5' 9.02\")   Wt 84.2 kg (185 lb 10 oz)   SpO2 94%   Gen: Hyporesponsive  Head: NC/AT  Eyes/ Nose/ Mouth: moist mucous membranes.   Cardio: RRR, good distal perfusion, warm extremities  Pulm: normal respiratory effort, no cyanosis   Abd: Soft NTND, negative borborygmi   Ext: No peripheral edema. No calf tenderness. No clubbing.     Labs: Reviewed and significant for   Recent Labs     03/23/21  0408   RBC 5.70   HEMOGLOBIN 16.0   HEMATOCRIT 49.5   PLATELETCT 219     Recent Labs     03/21/21  0316 03/23/21  0408   SODIUM 139 141   POTASSIUM 4.0 4.2   CHLORIDE 103 105   CO2 29 29   GLUCOSE 135* 130*   BUN 23* 26*   CREATININE 0.98 1.28   CALCIUM 9.1 9.0     Recent Results (from the past 24 hour(s))   CBC WITH DIFFERENTIAL    Collection Time: 03/23/21  4:08 AM   Result Value Ref Range    WBC 6.1 4.8 - 10.8 K/uL    RBC 5.70 4.70 - 6.10 M/uL    Hemoglobin 16.0 14.0 - 18.0 g/dL    Hematocrit 49.5 42.0 - 52.0 %    MCV 86.8 81.4 - 97.8 fL    MCH 28.1 27.0 - 33.0 pg    MCHC 32.3 (L) 33.7 - 35.3 g/dL    RDW 45.9 35.9 - 50.0 fL    Platelet Count 219 164 - 446 K/uL    MPV 10.8 9.0 - 12.9 fL    Neutrophils-Polys 59.70 44.00 - 72.00 %    Lymphocytes 20.70 (L) 22.00 - 41.00 %    Monocytes 14.30 (H) 0.00 - 13.40 %    Eosinophils 3.30 0.00 - 6.90 %    Basophils 0.70 0.00 - 1.80 %    Immature Granulocytes 1.30 (H) 0.00 - 0.90 %    Nucleated RBC 0.00 /100 WBC    Neutrophils (Absolute) 3.63 1.82 - 7.42 K/uL    Lymphs (Absolute) 1.26 1.00 - 4.80 K/uL    Monos (Absolute) 0.87 (H) 0.00 - 0.85 K/uL    Eos (Absolute) 0.20 0.00 - 0.51 K/uL    Baso (Absolute) 0.04 0.00 - 0.12 K/uL    Immature Granulocytes (abs) 0.08 0.00 - 0.11 K/uL    NRBC " (Absolute) 0.00 K/uL   Comp Metabolic Panel    Collection Time: 03/23/21  4:08 AM   Result Value Ref Range    Sodium 141 135 - 145 mmol/L    Potassium 4.2 3.6 - 5.5 mmol/L    Chloride 105 96 - 112 mmol/L    Co2 29 20 - 33 mmol/L    Anion Gap 7.0 7.0 - 16.0    Glucose 130 (H) 65 - 99 mg/dL    Bun 26 (H) 8 - 22 mg/dL    Creatinine 1.28 0.50 - 1.40 mg/dL    Calcium 9.0 8.5 - 10.5 mg/dL    AST(SGOT) 15 12 - 45 U/L    ALT(SGPT) 13 2 - 50 U/L    Alkaline Phosphatase 70 30 - 99 U/L    Total Bilirubin 0.3 0.1 - 1.5 mg/dL    Albumin 3.3 3.2 - 4.9 g/dL    Total Protein 6.3 6.0 - 8.2 g/dL    Globulin 3.0 1.9 - 3.5 g/dL    A-G Ratio 1.1 g/dL   MAGNESIUM    Collection Time: 03/23/21  4:08 AM   Result Value Ref Range    Magnesium 2.3 1.5 - 2.5 mg/dL   PHOSPHORUS    Collection Time: 03/23/21  4:08 AM   Result Value Ref Range    Phosphorus 3.8 2.5 - 4.5 mg/dL   ESTIMATED GFR    Collection Time: 03/23/21  4:08 AM   Result Value Ref Range    GFR If African American >60 >60 mL/min/1.73 m 2    GFR If Non African American 55 (A) >60 mL/min/1.73 m 2         ASSESSMENT:  Patient is a 75 y.o. male admitted with seizures now s/p EEG and loading with keppra/vimpat     [facility] Code / Diagnosis to Support: 0002.1 - Brain Dysfunction: Non-Traumatic    Rehabilitation: Impaired ADLs and mobility  Unable to tolerate IPR at this time    Barriers to transfer include: Insurance authorization, TCCs to verify disposition, medical clearance and bed availability     Additional Recommendations:  -Patient has made little progress in his encephalopathy symptoms.  Still requiring max assistance for PT and OT, not taking in enough calories or hydration support himself.  Patient is not a candidate for IPR at this time due to inability to tolerate 3 hours of therapy a day, recommend alternative placement.      Medication list reviewed for polypharmacy contributing to toxic/metabolic encephalopathy  - Seroquel 25mg TID PRN for agitation during daytime hours and  seroquel 50 mg nightly CHANGED TO AS NEEDED for agitation/insomnia  - Okay to continue melatonin for sleep regulation  - Gabapentin 300 mg BID added to MAR    Medical Complexity:  Seizures  Encephalopathy   Hypertension   Hypokalemia       DVT PPX: SCDs only 2/2 SDH      Thank you for allowing us to participate in the care of this patient.     Patient was seen for 26 minutes on unit/floor of which > 50% of time was spent on counseling and coordination of care regarding the above, including prognosis, risk reduction, benefits of treatment, and options for next stage of care.    Bradley Valerio, DO   Physical Medicine and Rehabilitation

## 2021-03-23 NOTE — DISCHARGE PLANNING
Agency/Facility Name: [facility]  Spoke To: Jayashree  Outcome: Pt will remain as pending until mentation improves and he can be further evaluated for SNF.

## 2021-03-23 NOTE — PROGRESS NOTES
"Hospital Medicine Daily Progress Note    Date of Service  3/23/2021    Chief Complaint  75 y.o. male admitted 3/11/2021 with seizure    Hospital Course  \"History of cerebellar CVA, seizure disorder, presented as a transfer from [facility] after he was found to have right parietal SDH without any midline shift's.  Witnessed seizure by family followed by changes in mental status.  Neurosurgery consulted Dr. Capps, no surgical intervention, recommend repeat CT head and frequent neuro checks.  Admit to the ICU every hour neuro checks, repeat head CT, keep SBP less than 140, continue Depakote and Keppra.  Due to ongoing encephalopathy, he had an EEG and was found to be in non-convulsive status epilepticus and was loaded with Vimpat.    Neurology was following case and he was treated with Vimpat 200mg BID, Keppra 1000mg BID, and Depacon 750mg QHS. No further seizure like activity. Patient had subtherapeutic Depakote level and Depacon was increased to 750mg BID.    Patient had prolonged waxing and waning mental status. Due to poor PO intake and intermittent refusal of PO intake, NGT was placed and started on alternative nutrition on 3/18.    Interval Problem Update    Ongoing encephalopathy  - max assistance for transfer to chair, intermittently following commands, when asked to stick out his tongue      Consultants/Specialty  Critical Care  Neurosurgery  Physiatry    Code Status  DNAR/DNI    Disposition  Will need SNF when medically cleared  May need palliative care assistance  Requiring CT for TF    Review of Systems  Review of Systems   Unable to perform ROS: Mental acuity        Physical Exam  Temp:  [35.9 °C (96.6 °F)-36.9 °C (98.4 °F)] 36.2 °C (97.2 °F)  Pulse:  [60-66] 64  Resp:  [16-17] 17  BP: (119-148)/(49-89) 119/65  SpO2:  [93 %-96 %] 94 %    Physical Exam  Vitals and nursing note reviewed.   Constitutional:       General: He is not in acute distress.     Appearance: He is ill-appearing. He is not " toxic-appearing or diaphoretic.   HENT:      Head: Normocephalic and atraumatic.      Nose: Nose normal.      Mouth/Throat:      Mouth: Mucous membranes are dry.      Pharynx: Oropharynx is clear.   Eyes:      Conjunctiva/sclera: Conjunctivae normal.   Cardiovascular:      Rate and Rhythm: Normal rate and regular rhythm.   Pulmonary:      Effort: Pulmonary effort is normal. No respiratory distress.      Breath sounds: Normal breath sounds.   Abdominal:      General: Bowel sounds are normal.      Palpations: Abdomen is soft.   Musculoskeletal:         General: No swelling or tenderness.      Cervical back: Normal range of motion and neck supple.      Right lower leg: No edema.      Left lower leg: No edema.   Skin:     General: Skin is warm and dry.   Neurological:      Motor: Weakness present.      Comments: Patient is more alert  Moving extremities spontaneously  Intermittently following commands  Unable to engage with neurological examination         Fluids    Intake/Output Summary (Last 24 hours) at 3/23/2021 1254  Last data filed at 3/23/2021 0800  Gross per 24 hour   Intake 600 ml   Output 1200 ml   Net -600 ml       Laboratory  Recent Labs     03/23/21  0408   WBC 6.1   RBC 5.70   HEMOGLOBIN 16.0   HEMATOCRIT 49.5   MCV 86.8   MCH 28.1   MCHC 32.3*   RDW 45.9   PLATELETCT 219   MPV 10.8     Recent Labs     03/21/21  0316 03/23/21  0408   SODIUM 139 141   POTASSIUM 4.0 4.2   CHLORIDE 103 105   CO2 29 29   GLUCOSE 135* 130*   BUN 23* 26*   CREATININE 0.98 1.28   CALCIUM 9.1 9.0                   Imaging  DX-ABDOMEN FOR TUBE PLACEMENT   Final Result      Enteric tube projects over the proximal stomach.      CT-HEAD W/O   Final Result      1.  Prior RIGHT frontal craniotomy with underlying dural thickening and subdural hemorrhage, unchanged from prior exam.   2.  No new intracranial hemorrhage demonstrated.   3.  Diffuse atrophy with white matter microvascular ischemic changes.   4.  Small chronic RIGHT posterior  frontal infarct.      [id number] VIEW   Final Result         No specific finding to suggest small bowel obstruction.      CT-HEAD W/O   Final Result         1.  Mixed right parietal subdural fluid collection likely acute on chronic subdural hematoma.   2.  Nonspecific white matter changes, commonly associated with small vessel ischemic disease.  Associated mild cerebral atrophy is noted.   3.  Atherosclerosis.      OUTSIDE IMAGES-CT HEAD   Final Result           Assessment/Plan  * Seizure disorder, nonconvulsive, with status epilepticus (HCC)- (present on admission)  Assessment & Plan  Noted on EEG on admission  He was loaded with IV Keppra, started on IV Vimpat, and IV depakote  Repeat EEG 3/15 without evidence of seizure and more consistent with encephalopathy  Appriciate neurology consult. Valproic acid level subtherapeutic and increased to 750mg BID by neurology on 3/16  Repeat valproic acid level 101 on 3/19, Depakote changed to 500mg QAM and 750mg QHS, discussed with Dr. Martínez  Repeat valproic acid level scheduled for 3/22  Outpatient follow up in the epilepsy clinic has been arranged by Dr. Mcfadden    If patient has continued seizure like activity or if his mentation appears to decline further, may need repeat EEG.    Encephalopathy acute- (present on admission)  Assessment & Plan  Acute encephalopathy likely multifactorial, persistent  In setting of dementia with behavioral disturbance, history of psychiatric disorder, CVA, and seizure disorder in status epilepticus on admission  Continue Seroquel 25mg TID prn for agitation, Seroquel 50mg QHS  on gabapentin  Appreciate physiatry consult  Patient requiring      Subdural hematoma, acute (HCC)- (present on admission)  Assessment & Plan  Noted on CT head  Non-traumatic  Neurosurgery consulted, non-operative management  Avoid NSAIDs and anticoagulation    Hypokalemia  Assessment & Plan  Keep K > 4.  Monitor BMP.     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 Will initiate amlodipine 5mg for BP control       VTE prophylaxis: SCDs only secondary to SDH.

## 2021-03-23 NOTE — CARE PLAN
Problem: Safety  Goal: Will remain free from injury  Outcome: PROGRESSING AS EXPECTED  Goal: Will remain free from falls  Outcome: PROGRESSING AS EXPECTED     Problem: Skin Integrity  Goal: Risk for impaired skin integrity will decrease  Outcome: PROGRESSING AS EXPECTED     Problem: Safety:  Goal: Will remain free from injury  Outcome: PROGRESSING AS EXPECTED     Problem: Safety:  Goal: Will remain free from injury  Outcome: PROGRESSING AS EXPECTED

## 2021-03-23 NOTE — THERAPY
"Speech Language Pathology  Daily Treatment     Patient Name: Rafael Mccoy  Age:  75 y.o., Sex:  male  Medical Record #: [id number]  Today's Date: 3/23/2021     Precautions: Fall Risk, Swallow Precautions ( See Comments), Nasogastric Tube  Comments: legally blind    Assessment    Dysphagia follow up completed with pt up in chair, Cortrak in place. Pt moved from main RN station to room for session. Pt with vocalization x 1 this date, (\"mm hmm\" in response to SLP introducing self and session) with no other vocalizations or command following appreciated. Oral care completed via suction toothbrush at start of session. Pt allowed passage of toothbrush, though kept mouth in open posture with tongue pulled posteriorly for remainder of session. PO stimulation provided via ice chips and mildly thick liquids.  Attempted to provide additional stimulation with changes in pressure, temperature, and taste. Mildly thick liquids presented via spoon dipped in cranberry juice and by toothette. Attempted to incorporate lingual and labial stimulation. Absent oral response to all presentations, requiring removal via Yankauer suction by this clinician. No pharyngeal swallows observed. Cough x1 at end of session, concerning for possible aspiration/penetration. Oral care completed with suction again at end of session.    Recommend continue NPO with alternative means of nutrition/hydration and frequent oral care via suction toothbrush, as able to complete. Continue ice chips after oral care if pt is alert and participatory only. SLP will continue to follow.     Plan    Continue current treatment plan.    Discharge Recommendations: Recommend post-acute placement for additional speech therapy services prior to discharge home    Objective       03/23/21 1224   Vitals   O2 Delivery Device None - Room Air   Dysphagia    Dysphagia X   Positioning / Behavior Modification Modulate Rate or Bite Size   Other Treatments prefeeding trials of ice " chips and mildly thick liquids   Diet / Liquid Recommendation NPO;Pre-Feeding Trials with SLP Only   Nutritional Liquid Intake Rating Scale Nothing by mouth   Nutritional Food Intake Rating Scale Nothing by mouth   Nursing Communication Swallow Precaution Sign Posted at Head of Bed   Skilled Intervention Compensatory Strategies;Tactile Cueing;Verbal Cueing   Recommended Route of Medication Administration   Medication Administration  Via Gastric Tube   Patient / Family Goals   Patient / Family Goal #1 pt did not state   Goal #1 Outcome Progressing slower than expected   Short Term Goals   Short Term Goal # 3 NEW 3/22 - Pt will participate in pre-feeding trials with SLP prior to PO diet advancement.   Goal Outcome  # 3 Progressing slower than expected

## 2021-03-24 ENCOUNTER — APPOINTMENT (OUTPATIENT)
Dept: RADIOLOGY | Facility: MEDICAL CENTER | Age: 76
DRG: 100 | End: 2021-03-24
Attending: INTERNAL MEDICINE
Payer: COMMERCIAL

## 2021-03-24 LAB
ANION GAP SERPL CALC-SCNC: 9 MMOL/L (ref 7–16)
BASOPHILS # BLD AUTO: 0.5 % (ref 0–1.8)
BASOPHILS # BLD: 0.04 K/UL (ref 0–0.12)
BUN SERPL-MCNC: 31 MG/DL (ref 8–22)
CALCIUM SERPL-MCNC: 8.8 MG/DL (ref 8.5–10.5)
CHLORIDE SERPL-SCNC: 97 MMOL/L (ref 96–112)
CO2 SERPL-SCNC: 29 MMOL/L (ref 20–33)
CREAT SERPL-MCNC: 1.28 MG/DL (ref 0.5–1.4)
EOSINOPHIL # BLD AUTO: 0.24 K/UL (ref 0–0.51)
EOSINOPHIL NFR BLD: 3 % (ref 0–6.9)
ERYTHROCYTE [DISTWIDTH] IN BLOOD BY AUTOMATED COUNT: 45.7 FL (ref 35.9–50)
GLUCOSE SERPL-MCNC: 124 MG/DL (ref 65–99)
HCT VFR BLD AUTO: 48.1 % (ref 42–52)
HGB BLD-MCNC: 15.8 G/DL (ref 14–18)
IMM GRANULOCYTES # BLD AUTO: 0.06 K/UL (ref 0–0.11)
IMM GRANULOCYTES NFR BLD AUTO: 0.8 % (ref 0–0.9)
LYMPHOCYTES # BLD AUTO: 1.35 K/UL (ref 1–4.8)
LYMPHOCYTES NFR BLD: 17.1 % (ref 22–41)
MCH RBC QN AUTO: 28.5 PG (ref 27–33)
MCHC RBC AUTO-ENTMCNC: 32.8 G/DL (ref 33.7–35.3)
MCV RBC AUTO: 86.8 FL (ref 81.4–97.8)
MONOCYTES # BLD AUTO: 1.25 K/UL (ref 0–0.85)
MONOCYTES NFR BLD AUTO: 15.8 % (ref 0–13.4)
NEUTROPHILS # BLD AUTO: 4.96 K/UL (ref 1.82–7.42)
NEUTROPHILS NFR BLD: 62.8 % (ref 44–72)
NRBC # BLD AUTO: 0 K/UL
NRBC BLD-RTO: 0 /100 WBC
PLATELET # BLD AUTO: 229 K/UL (ref 164–446)
PMV BLD AUTO: 10.6 FL (ref 9–12.9)
POTASSIUM SERPL-SCNC: 4.7 MMOL/L (ref 3.6–5.5)
PROCALCITONIN SERPL-MCNC: <0.05 NG/ML
RBC # BLD AUTO: 5.54 M/UL (ref 4.7–6.1)
SODIUM SERPL-SCNC: 135 MMOL/L (ref 135–145)
WBC # BLD AUTO: 7.9 K/UL (ref 4.8–10.8)

## 2021-03-24 PROCEDURE — 770006 HCHG ROOM/CARE - MED/SURG/GYN SEMI*

## 2021-03-24 PROCEDURE — 74018 RADEX ABDOMEN 1 VIEW: CPT

## 2021-03-24 PROCEDURE — 97530 THERAPEUTIC ACTIVITIES: CPT

## 2021-03-24 PROCEDURE — A9270 NON-COVERED ITEM OR SERVICE: HCPCS | Performed by: INTERNAL MEDICINE

## 2021-03-24 PROCEDURE — 97112 NEUROMUSCULAR REEDUCATION: CPT

## 2021-03-24 PROCEDURE — 99233 SBSQ HOSP IP/OBS HIGH 50: CPT | Performed by: INTERNAL MEDICINE

## 2021-03-24 PROCEDURE — 85025 COMPLETE CBC W/AUTO DIFF WBC: CPT

## 2021-03-24 PROCEDURE — 700101 HCHG RX REV CODE 250: Performed by: PSYCHIATRY & NEUROLOGY

## 2021-03-24 PROCEDURE — 71045 X-RAY EXAM CHEST 1 VIEW: CPT

## 2021-03-24 PROCEDURE — 84145 PROCALCITONIN (PCT): CPT

## 2021-03-24 PROCEDURE — 700105 HCHG RX REV CODE 258: Performed by: INTERNAL MEDICINE

## 2021-03-24 PROCEDURE — 700111 HCHG RX REV CODE 636 W/ 250 OVERRIDE (IP): Performed by: STUDENT IN AN ORGANIZED HEALTH CARE EDUCATION/TRAINING PROGRAM

## 2021-03-24 PROCEDURE — 700102 HCHG RX REV CODE 250 W/ 637 OVERRIDE(OP): Performed by: STUDENT IN AN ORGANIZED HEALTH CARE EDUCATION/TRAINING PROGRAM

## 2021-03-24 PROCEDURE — 700102 HCHG RX REV CODE 250 W/ 637 OVERRIDE(OP): Performed by: INTERNAL MEDICINE

## 2021-03-24 PROCEDURE — 36415 COLL VENOUS BLD VENIPUNCTURE: CPT

## 2021-03-24 PROCEDURE — A9270 NON-COVERED ITEM OR SERVICE: HCPCS | Performed by: STUDENT IN AN ORGANIZED HEALTH CARE EDUCATION/TRAINING PROGRAM

## 2021-03-24 PROCEDURE — 80048 BASIC METABOLIC PNL TOTAL CA: CPT

## 2021-03-24 RX ORDER — SODIUM CHLORIDE 9 MG/ML
INJECTION, SOLUTION INTRAVENOUS CONTINUOUS
Status: DISCONTINUED | OUTPATIENT
Start: 2021-03-24 | End: 2021-03-30

## 2021-03-24 RX ADMIN — ONDANSETRON 4 MG: 2 INJECTION INTRAMUSCULAR; INTRAVENOUS at 19:35

## 2021-03-24 RX ADMIN — Medication 5 MG: at 19:35

## 2021-03-24 RX ADMIN — LACOSAMIDE 200 MG: 100 TABLET, FILM COATED ORAL at 17:10

## 2021-03-24 RX ADMIN — SIMVASTATIN 40 MG: 20 TABLET, FILM COATED ORAL at 19:35

## 2021-03-24 RX ADMIN — DIVALPROEX SODIUM 750 MG: 125 CAPSULE ORAL at 17:09

## 2021-03-24 RX ADMIN — GABAPENTIN 300 MG: 300 CAPSULE ORAL at 17:11

## 2021-03-24 RX ADMIN — LEVETIRACETAM 1000 MG: 500 TABLET ORAL at 04:27

## 2021-03-24 RX ADMIN — LACOSAMIDE 200 MG: 100 TABLET, FILM COATED ORAL at 04:28

## 2021-03-24 RX ADMIN — AMLODIPINE BESYLATE 5 MG: 5 TABLET ORAL at 04:28

## 2021-03-24 RX ADMIN — DOCUSATE SODIUM 50 MG AND SENNOSIDES 8.6 MG 2 TABLET: 8.6; 5 TABLET, FILM COATED ORAL at 17:10

## 2021-03-24 RX ADMIN — LABETALOL HYDROCHLORIDE 10 MG: 5 INJECTION, SOLUTION INTRAVENOUS at 19:35

## 2021-03-24 RX ADMIN — BISACODYL 10 MG: 10 SUPPOSITORY RECTAL at 13:52

## 2021-03-24 RX ADMIN — GABAPENTIN 300 MG: 300 CAPSULE ORAL at 04:27

## 2021-03-24 RX ADMIN — SODIUM CHLORIDE: 9 INJECTION, SOLUTION INTRAVENOUS at 10:17

## 2021-03-24 RX ADMIN — LEVETIRACETAM 1000 MG: 500 TABLET ORAL at 17:10

## 2021-03-24 RX ADMIN — DOCUSATE SODIUM 50 MG AND SENNOSIDES 8.6 MG 2 TABLET: 8.6; 5 TABLET, FILM COATED ORAL at 04:28

## 2021-03-24 RX ADMIN — DIVALPROEX SODIUM 500 MG: 125 CAPSULE ORAL at 04:27

## 2021-03-24 ASSESSMENT — PAIN DESCRIPTION - PAIN TYPE: TYPE: ACUTE PAIN

## 2021-03-24 ASSESSMENT — COGNITIVE AND FUNCTIONAL STATUS - GENERAL
STANDING UP FROM CHAIR USING ARMS: TOTAL
MOVING TO AND FROM BED TO CHAIR: UNABLE
TURNING FROM BACK TO SIDE WHILE IN FLAT BAD: UNABLE
MOVING FROM LYING ON BACK TO SITTING ON SIDE OF FLAT BED: UNABLE
SUGGESTED CMS G CODE MODIFIER MOBILITY: CN
MOBILITY SCORE: 6
CLIMB 3 TO 5 STEPS WITH RAILING: TOTAL
WALKING IN HOSPITAL ROOM: TOTAL

## 2021-03-24 ASSESSMENT — GAIT ASSESSMENTS: GAIT LEVEL OF ASSIST: UNABLE TO PARTICIPATE

## 2021-03-24 NOTE — THERAPY
Occupational Therapy Contact Note    Attempted OT tx, per RN pt lethargic and vomited 3 times this morning. Attempted to rouse pt however he was very lethargic. Will attempt later as able and appropriate.    Camila Simpson, OTR/L

## 2021-03-24 NOTE — CARE PLAN
Problem: Safety  Goal: Will remain free from injury  Outcome: PROGRESSING AS EXPECTED  Goal: Will remain free from falls  Outcome: PROGRESSING AS EXPECTED     Problem: Safety:  Goal: Will remain free from injury  Outcome: PROGRESSING AS EXPECTED     Problem: Safety:  Goal: Will remain free from injury  Outcome: PROGRESSING AS EXPECTED     Problem: Nutritional:  Goal: Dysphagia will improve  Outcome: PROGRESSING SLOWER THAN EXPECTED  Goal: Maintenance of adequate nutrition will improve  Outcome: PROGRESSING SLOWER THAN EXPECTED

## 2021-03-24 NOTE — CARE PLAN
Problem: Communication  Goal: The ability to communicate needs accurately and effectively will improve  Outcome: NOT MET     Problem: Knowledge Deficit  Goal: Knowledge of disease process/condition, treatment plan, diagnostic tests, and medications will improve  Outcome: NOT MET  Goal: Knowledge of the prescribed therapeutic regimen will improve  Outcome: NOT MET     Problem: Communication:  Goal: The ability to communicate needs accurately and effectively will improve  Outcome: NOT MET     Problem: Knowledge Deficit:  Goal: Knowledge of disease process/condition, treatment plan, diagnostic tests, and medications will improve  Outcome: NOT MET     Problem: Knowledge Deficit:  Goal: Knowledge of the prescribed therapeutic regimen will improve  Outcome: NOT MET     Problem: Safety  Goal: Will remain free from injury  Outcome: PROGRESSING AS EXPECTED  Goal: Will remain free from falls  Outcome: PROGRESSING AS EXPECTED     Problem: Venous Thromboembolism (VTW)/Deep Vein Thrombosis (DVT) Prevention:  Goal: Patient will participate in Venous Thrombosis (VTE)/Deep Vein Thrombosis (DVT)Prevention Measures  Outcome: PROGRESSING AS EXPECTED     Problem: Bowel/Gastric:  Goal: Normal bowel function is maintained or improved  Outcome: PROGRESSING AS EXPECTED  Goal: Will not experience complications related to bowel motility  Outcome: PROGRESSING AS EXPECTED     Problem: Safety:  Goal: Will remain free from injury  Outcome: PROGRESSING AS EXPECTED     Problem: Safety:  Goal: Will remain free from injury  Outcome: PROGRESSING AS EXPECTED

## 2021-03-24 NOTE — PROGRESS NOTES
"Hospital Medicine Daily Progress Note    Date of Service  3/24/2021    Chief Complaint  75 y.o. male admitted 3/11/2021 with seizure    Hospital Course  \"History of cerebellar CVA, seizure disorder, presented as a transfer from [facility] after he was found to have right parietal SDH without any midline shift's.  Witnessed seizure by family followed by changes in mental status.  Neurosurgery consulted Dr. Capps, no surgical intervention, recommend repeat CT head and frequent neuro checks.  Admit to the ICU every hour neuro checks, repeat head CT, keep SBP less than 140, continue Depakote and Keppra.  Due to ongoing encephalopathy, he had an EEG and was found to be in non-convulsive status epilepticus and was loaded with Vimpat.    Neurology was following case and he was treated with Vimpat 200mg BID, Keppra 1000mg BID, and Depacon 750mg QHS. No further seizure like activity. Patient had subtherapeutic Depakote level and Depacon was increased to 750mg BID.    Patient had prolonged waxing and waning mental status. Due to poor PO intake and intermittent refusal of PO intake, NGT was placed and started on alternative nutrition on 3/18.    Interval Problem Update    Lethargy today with several episodes of nausea/vomiting.  - on exam, mumbling, arousable  Decreased bs  TF held    Consultants/Specialty  Critical Care  Neurosurgery  Physiatry    Code Status  DNAR/DNI    Disposition  Will need SNF when medically cleared  palliative care consult, due to declining medical condition  Requiring CT for TF  Limit sedative rx    D/w pt's dtr, Cleo, considering transitioning to comfort care if pt's condition declines.  Palliative care consult, continue conservative measures.        Review of Systems  Review of Systems   Unable to perform ROS: Mental status change        Physical Exam  Temp:  [35.9 °C (96.6 °F)-37.3 °C (99.2 °F)] 37.3 °C (99.2 °F)  Pulse:  [62-72] 72  Resp:  [16-17] 17  BP: (104-145)/(40-88) 145/59  SpO2:  [93 " %-96 %] 93 %    Physical Exam  Vitals and nursing note reviewed.   Constitutional:       Appearance: He is ill-appearing. He is not diaphoretic.      Comments: malaise   HENT:      Head: Normocephalic and atraumatic.      Nose: Nose normal.      Mouth/Throat:      Mouth: Mucous membranes are dry.   Eyes:      Conjunctiva/sclera: Conjunctivae normal.   Cardiovascular:      Rate and Rhythm: Normal rate and regular rhythm.   Pulmonary:      Effort: Pulmonary effort is normal.      Comments: Decreased b/l  Abdominal:      General: Bowel sounds are normal.   Musculoskeletal:         General: No swelling or tenderness.      Right lower leg: No edema.      Left lower leg: No edema.   Skin:     General: Skin is warm and dry.   Neurological:      Comments: Lethargy, difficult to arouse         Fluids    Intake/Output Summary (Last 24 hours) at 3/24/2021 1201  Last data filed at 3/24/2021 0616  Gross per 24 hour   Intake 750 ml   Output 625 ml   Net 125 ml       Laboratory  Recent Labs     03/23/21  0408 03/24/21  0901   WBC 6.1 7.9   RBC 5.70 5.54   HEMOGLOBIN 16.0 15.8   HEMATOCRIT 49.5 48.1   MCV 86.8 86.8   MCH 28.1 28.5   MCHC 32.3* 32.8*   RDW 45.9 45.7   PLATELETCT 219 229   MPV 10.8 10.6     Recent Labs     03/23/21  0408 03/24/21  0901   SODIUM 141 135   POTASSIUM 4.2 4.7   CHLORIDE 105 97   CO2 29 29   GLUCOSE 130* 124*   BUN 26* 31*   CREATININE 1.28 1.28   CALCIUM 9.0 8.8                   Imaging  DX-ABDOMEN FOR TUBE PLACEMENT   Final Result      Enteric tube projects over the proximal stomach.      CT-HEAD W/O   Final Result      1.  Prior RIGHT frontal craniotomy with underlying dural thickening and subdural hemorrhage, unchanged from prior exam.   2.  No new intracranial hemorrhage demonstrated.   3.  Diffuse atrophy with white matter microvascular ischemic changes.   4.  Small chronic RIGHT posterior frontal infarct.      [id number] VIEW   Final Result         No specific finding to suggest small bowel  obstruction.      CT-HEAD W/O   Final Result         1.  Mixed right parietal subdural fluid collection likely acute on chronic subdural hematoma.   2.  Nonspecific white matter changes, commonly associated with small vessel ischemic disease.  Associated mild cerebral atrophy is noted.   3.  Atherosclerosis.      OUTSIDE IMAGES-CT HEAD   Final Result      DX-CHEST-PORTABLE (1 VIEW)    (Results Pending)   [id number] VIEW    (Results Pending)        Assessment/Plan  * Seizure disorder, nonconvulsive, with status epilepticus (HCC)- (present on admission)  Assessment & Plan  Noted on EEG on admission  He was loaded with IV Keppra, started on IV Vimpat, and IV depakote  Repeat EEG 3/15 without evidence of seizure and more consistent with encephalopathy  Appriciate neurology consult. Valproic acid level subtherapeutic and increased to 750mg BID by neurology on 3/16  Repeat valproic acid level 101 on 3/19, Depakote changed to 500mg QAM and 750mg QHS, discussed with Dr. Martínez  Repeat valproic acid level scheduled for 3/22  Outpatient follow up in the epilepsy clinic has been arranged by Dr. Mcfadden    If patient has continued seizure like activity or if his mentation appears to decline further, may need repeat EEG.    Encephalopathy acute- (present on admission)  Assessment & Plan  Acute encephalopathy likely multifactorial, persistent  In setting of dementia with behavioral disturbance, history of psychiatric disorder, CVA, and seizure disorder in status epilepticus on admission  Continue Seroquel 25mg TID prn for agitation, Seroquel 50mg QHS  on gabapentin  Appreciate physiatry consult, monitoring  Patient requiring      Subdural hematoma, acute (HCC)- (present on admission)  Assessment & Plan  Noted on CT head  Non-traumatic  Neurosurgery consulted, non-operative management  Avoid NSAIDs and anticoagulation    Hypokalemia  Assessment & Plan  Keep K > 4.  Monitor BMP.     Dysphagia  Assessment & Plan  Dysphagia likely  secondary to encephalopathy  Patient intermittently tolerates PO intake, however at times refuses both food and medication  NGT 3/18 placed for nutrition and medication administration, tube feeds currently at goal    3/24 with vomiting, TF held  - f/u abd/cxr, eval for aspiration  F/u stat labs  Palliative care consult, assist with determining goals of care    Dementia associated with other underlying disease with behavioral disturbance (HCC)- (present on admission)  Assessment & Plan  Reported history of cognitive decline consistent with dementia  Continue to monitor    Legally blind- (present on admission)  Assessment & Plan  - Patient legally blind for more than 12 years per patient  - Lives at [facility]    Essential hypertension- (present on admission)  Assessment & Plan  Reported history of though had not been on medications prior to admit  BP has trending up. Will initiate amlodipine 5mg for BP control       VTE prophylaxis: SCDs only secondary to SDH.

## 2021-03-24 NOTE — THERAPY
Physical Therapy   Daily Treatment     Patient Name: Rafael Mccoy  Age:  75 y.o., Sex:  male  Medical Record #: [id number]  Today's Date: 3/24/2021     Precautions: (P) Fall Risk, Swallow Precautions per SLP, Nasogastric Tube(legally blind; hand stella restraints)    Assessment    Pt continues to present with a decline in his ability to participate with PT and progress with his goals since the initial evaluation. His tone has increased and is made worse with transitional movements. In initial sitting EOB he demonstrated a strong R lateral lean which improved after 5 min of PT given inhibition input. Eventually, he was able to sit upright w/o LOB in static sitting; added cross body movements which increased his tone and inability to maintain midline. However, this improved slightly with slow repetition of the task. He fatigued after 20 min and began to fall asleep at the EOB.     His daughter stated she had just moved him to an assisted living facility 5 days prior to admission. Discussed discharge disposition and she is aware that he will not likely be able to return there given his decline.     Plan    Continue current treatment plan.    DC Equipment Recommendations: Unable to determine at this time  Discharge Recommendations: Recommend post-acute placement for additional physical therapy services prior to discharge home      Subjective    Pt not able to speak, however daughter confirmed name and birthdate     Objective       03/24/21 1509   Cognition    Level of Consciousness Confused   Ability To Follow Commands Unable to Follow 1 Step Commands   Comments saying yes/no randomly; not able to answer simple questions, not following commands   Neuro-Muscular Treatments   Neuro-Muscular Treatments Compensatory Strategies;Postural Changes;Verbal Cuing   Comments cross body movements; tone management with repeated flexion and extension   Other Treatments   Other Treatments Provided EOB therapeutic activity to include  cross body movements, maintaining midline, wt shifting, decreasing tone   Balance   Sitting Balance (Static) Poor +   Sitting Balance (Dynamic) Poor -   Comments not able to stand today; not able to maintain sitting balance without consistent intervention so progression to standing was deferred    Gait Analysis   Gait Level Of Assist Unable to Participate   Bed Mobility    Supine to Sit Total Assist   Sit to Supine Total Assist   Scooting Total Assist   Rolling Total Assist to Rt.   Comments increased need for assist during bed mobility; not able to sequence; significant increase in tone in all limbs   Functional Mobility   Sit to Stand Unable to Participate   Mobility EOB activity   Skilled Intervention Verbal Cuing;Tactile Cuing;Sequencing;Inhibition   How much help from another person does the patient currently need...   6 clicks Mobility Score 6   Activity Tolerance   Comments tolerated 20 min of EOB activity before becoming fatigued to the point of no longer keeping eyes open   Patient / Family Goals    Patient / Family Goal #1 to return to PLOF (family)   Goal #1 Outcome Goal not met   Short Term Goals    Short Term Goal # 1 Pt will be able to perform supine<>sit with HOB flat/no rails with Spv in 6tx to promote fnx progression towards I    Goal Outcome # 1 goal not met   Short Term Goal # 2 Pt will be able to perform sit<>stand/transfers with min A in 6tx to promote fnx progression towards I    Goal Outcome # 2 Goal not met   Short Term Goal # 2 B  pt to move sit to/from stand w/ spv in 6 visits to iincrease fxl indep   Goal Outcome # 2 B Goal not met   Short Term Goal # 3 Pt will be able to ambulate 25ft with FWW with min A in 6tx to promote fnx progression towards I    Goal Outcome # 3 Goal not met     Rachel Valerio, PT

## 2021-03-25 LAB
ANION GAP SERPL CALC-SCNC: 8 MMOL/L (ref 7–16)
ANION GAP SERPL CALC-SCNC: 9 MMOL/L (ref 7–16)
BASOPHILS # BLD AUTO: 0.2 % (ref 0–1.8)
BASOPHILS # BLD: 0.02 K/UL (ref 0–0.12)
BUN SERPL-MCNC: 32 MG/DL (ref 8–22)
BUN SERPL-MCNC: 33 MG/DL (ref 8–22)
CALCIUM SERPL-MCNC: 8.8 MG/DL (ref 8.5–10.5)
CALCIUM SERPL-MCNC: 9 MG/DL (ref 8.5–10.5)
CHLORIDE SERPL-SCNC: 104 MMOL/L (ref 96–112)
CHLORIDE SERPL-SCNC: 104 MMOL/L (ref 96–112)
CO2 SERPL-SCNC: 28 MMOL/L (ref 20–33)
CO2 SERPL-SCNC: 29 MMOL/L (ref 20–33)
CREAT SERPL-MCNC: 1.34 MG/DL (ref 0.5–1.4)
CREAT SERPL-MCNC: 1.45 MG/DL (ref 0.5–1.4)
EOSINOPHIL # BLD AUTO: 0.04 K/UL (ref 0–0.51)
EOSINOPHIL NFR BLD: 0.4 % (ref 0–6.9)
ERYTHROCYTE [DISTWIDTH] IN BLOOD BY AUTOMATED COUNT: 46 FL (ref 35.9–50)
GLUCOSE SERPL-MCNC: 133 MG/DL (ref 65–99)
GLUCOSE SERPL-MCNC: 134 MG/DL (ref 65–99)
HCT VFR BLD AUTO: 46.8 % (ref 42–52)
HGB BLD-MCNC: 14.7 G/DL (ref 14–18)
IMM GRANULOCYTES # BLD AUTO: 0.07 K/UL (ref 0–0.11)
IMM GRANULOCYTES NFR BLD AUTO: 0.7 % (ref 0–0.9)
LYMPHOCYTES # BLD AUTO: 1.1 K/UL (ref 1–4.8)
LYMPHOCYTES NFR BLD: 11.1 % (ref 22–41)
MCH RBC QN AUTO: 27.9 PG (ref 27–33)
MCHC RBC AUTO-ENTMCNC: 31.4 G/DL (ref 33.7–35.3)
MCV RBC AUTO: 88.8 FL (ref 81.4–97.8)
MONOCYTES # BLD AUTO: 1.15 K/UL (ref 0–0.85)
MONOCYTES NFR BLD AUTO: 11.6 % (ref 0–13.4)
NEUTROPHILS # BLD AUTO: 7.54 K/UL (ref 1.82–7.42)
NEUTROPHILS NFR BLD: 76 % (ref 44–72)
NRBC # BLD AUTO: 0 K/UL
NRBC BLD-RTO: 0 /100 WBC
PLATELET # BLD AUTO: 220 K/UL (ref 164–446)
PMV BLD AUTO: 11.3 FL (ref 9–12.9)
POTASSIUM SERPL-SCNC: 5.1 MMOL/L (ref 3.6–5.5)
POTASSIUM SERPL-SCNC: 5.7 MMOL/L (ref 3.6–5.5)
RBC # BLD AUTO: 5.27 M/UL (ref 4.7–6.1)
SODIUM SERPL-SCNC: 141 MMOL/L (ref 135–145)
SODIUM SERPL-SCNC: 141 MMOL/L (ref 135–145)
VALPROATE SERPL-MCNC: 64.3 UG/ML (ref 50–100)
WBC # BLD AUTO: 9.9 K/UL (ref 4.8–10.8)

## 2021-03-25 PROCEDURE — 80164 ASSAY DIPROPYLACETIC ACD TOT: CPT

## 2021-03-25 PROCEDURE — 700102 HCHG RX REV CODE 250 W/ 637 OVERRIDE(OP): Performed by: INTERNAL MEDICINE

## 2021-03-25 PROCEDURE — 97535 SELF CARE MNGMENT TRAINING: CPT

## 2021-03-25 PROCEDURE — 770006 HCHG ROOM/CARE - MED/SURG/GYN SEMI*

## 2021-03-25 PROCEDURE — 80048 BASIC METABOLIC PNL TOTAL CA: CPT

## 2021-03-25 PROCEDURE — A9270 NON-COVERED ITEM OR SERVICE: HCPCS | Performed by: STUDENT IN AN ORGANIZED HEALTH CARE EDUCATION/TRAINING PROGRAM

## 2021-03-25 PROCEDURE — 36415 COLL VENOUS BLD VENIPUNCTURE: CPT

## 2021-03-25 PROCEDURE — 94760 N-INVAS EAR/PLS OXIMETRY 1: CPT

## 2021-03-25 PROCEDURE — 99232 SBSQ HOSP IP/OBS MODERATE 35: CPT | Performed by: INTERNAL MEDICINE

## 2021-03-25 PROCEDURE — A9270 NON-COVERED ITEM OR SERVICE: HCPCS | Performed by: INTERNAL MEDICINE

## 2021-03-25 PROCEDURE — 700105 HCHG RX REV CODE 258: Performed by: INTERNAL MEDICINE

## 2021-03-25 PROCEDURE — 97530 THERAPEUTIC ACTIVITIES: CPT

## 2021-03-25 PROCEDURE — 85025 COMPLETE CBC W/AUTO DIFF WBC: CPT

## 2021-03-25 PROCEDURE — 700102 HCHG RX REV CODE 250 W/ 637 OVERRIDE(OP): Performed by: STUDENT IN AN ORGANIZED HEALTH CARE EDUCATION/TRAINING PROGRAM

## 2021-03-25 RX ORDER — SODIUM POLYSTYRENE SULFONATE 15 G/60ML
15 SUSPENSION ORAL; RECTAL ONCE
Status: COMPLETED | OUTPATIENT
Start: 2021-03-25 | End: 2021-03-25

## 2021-03-25 RX ADMIN — GABAPENTIN 300 MG: 300 CAPSULE ORAL at 04:13

## 2021-03-25 RX ADMIN — DOCUSATE SODIUM 50 MG AND SENNOSIDES 8.6 MG 2 TABLET: 8.6; 5 TABLET, FILM COATED ORAL at 16:59

## 2021-03-25 RX ADMIN — DOCUSATE SODIUM 50 MG AND SENNOSIDES 8.6 MG 2 TABLET: 8.6; 5 TABLET, FILM COATED ORAL at 04:14

## 2021-03-25 RX ADMIN — AMLODIPINE BESYLATE 5 MG: 5 TABLET ORAL at 04:14

## 2021-03-25 RX ADMIN — LEVETIRACETAM 1000 MG: 500 TABLET ORAL at 16:59

## 2021-03-25 RX ADMIN — DIVALPROEX SODIUM 500 MG: 125 CAPSULE ORAL at 04:14

## 2021-03-25 RX ADMIN — SODIUM CHLORIDE: 9 INJECTION, SOLUTION INTRAVENOUS at 07:35

## 2021-03-25 RX ADMIN — LACOSAMIDE 200 MG: 100 TABLET, FILM COATED ORAL at 04:14

## 2021-03-25 RX ADMIN — LACOSAMIDE 200 MG: 100 TABLET, FILM COATED ORAL at 16:59

## 2021-03-25 RX ADMIN — SIMVASTATIN 40 MG: 20 TABLET, FILM COATED ORAL at 20:12

## 2021-03-25 RX ADMIN — SODIUM POLYSTYRENE SULFONATE 15 G: 15 SUSPENSION ORAL; RECTAL at 11:11

## 2021-03-25 RX ADMIN — DIVALPROEX SODIUM 750 MG: 125 CAPSULE ORAL at 16:59

## 2021-03-25 RX ADMIN — LEVETIRACETAM 1000 MG: 500 TABLET ORAL at 04:14

## 2021-03-25 RX ADMIN — GABAPENTIN 300 MG: 300 CAPSULE ORAL at 16:59

## 2021-03-25 RX ADMIN — Medication 5 MG: at 21:00

## 2021-03-25 ASSESSMENT — COGNITIVE AND FUNCTIONAL STATUS - GENERAL
EATING MEALS: TOTAL
HELP NEEDED FOR BATHING: TOTAL
TOILETING: TOTAL
DRESSING REGULAR LOWER BODY CLOTHING: TOTAL
PERSONAL GROOMING: TOTAL
SUGGESTED CMS G CODE MODIFIER DAILY ACTIVITY: CN
DAILY ACTIVITIY SCORE: 6
DRESSING REGULAR UPPER BODY CLOTHING: TOTAL

## 2021-03-25 NOTE — CONSULTS
Reason for PC Consult: Advance Care Planning    Consulted by: Dr. Gunderson    Assessment:  General: Pt is a 76 yo male that came in through [facility] after he was found to have right parietal SDH without any midline shift's.  Witnessed seizure by family followed by changes in mental status.  Neurosurgery consulted Dr. Capps, no surgical intervention, recommend, subdural hematoma acute with SDH with out midline shift. Pt has a PMH of HTN, dementia, seizure disorder, subdural hematoma, s/p right frontal craniotomy 1 yr ago. Per Daughter was doing better in Dec of 2020 and then had a decline with PNA and C Diff also had terrible delusions that was difficult to treat. Had a stay at [facility] and [facility]. She finally was able to get her father into a Assisted living facility [facility] and was there 5 days prior to this most recent event.     Social: Pt was a current resident at [facility] assisted living [facility]. He was living with his daughter Cleo at his apt in [place] up until 5 days prior to coming into the hospital. Cleo Mccoy is pt's DPOA. He has an AD that is scanned into the chart. Cleo lives locally but now has to travel for work and primary office is in [facility]. She has provided care with a care giver for her father for the last year after his first hematoma and craniotomy. She stated that it has been very difficult  her father is legally blind and has been for the last 20 yrs. He prides him self on being able to be active and be outside and move around. Cleo has her sister, Yas Hobbs.  Pt's other daughter that lives in [facility] and is a support to Cleo but has young children and is not active in his care.     Consults: Neurology and surgery Neurosurgery as well as Physical medicine and Rehab    Dyspnea: No-    Last BM: 03/25/21-    Pain: No-    Depression: No-    Dementia: No;       Spiritual:  Is Moravian or spirituality important for coping with this  "illness? Yes-    Has a  or spiritual provider visit been requested? Yes    Palliative Performance Scale: 30 %    Advance Directive: Advance Directive, DPOA- #2,#3, #5  DPOA: Yes- Cleo Mccoy  POLST: Yes-      Code Status: DNR- DNR/DNI    Outcome:  Met with RN and Dr. Gunderson prior to calling pt's family. Visited with pt and he open her eyes, tube feeding intact minimal response. Per MD pt does a little better in the afternoons. Called Cleo to introduced self and Palliative care. Cleo has been in to see pt and has a good understanding of his health situation. Cleo conveyed that her father has had a difficult year since that last CVA a year ago. She has been providing support in his Apt in [place] and was able to do this because she was able to work from home. Now that travel restrictions have been lifted she had to return back to work and travel at least once a month and has to be in her office which is in [place], [place]. Cleo stated that her dad was finally getting to were he had better insight and was able to ask for help vs being impulsive. She stated that they would take walks and that provided him with enjoyment.     Explored pt's values, beliefs and preferences in order to identify GOC. Cleo explained that her father was very active and would love to hike and explore. He lost his sight 20 yrs ago but was able to manage and remain active. When queried Cleo about what provides him Staci she stated, \"he prides himself on his having a clear mind.\" She stated, \"he would not want to live like this and has communicated that with her.\" Cleo offered to have friends come to visit and he has declined, she feels b/c he does not want people to see him in a compromised state. Cleo rely's on the help of her sister and pt's friends for guidance in this past year for decision making.     PC RN did provide Cleo education about hospice benefit and the options ie. use of VA " benefits placed at SNF with hospice of choice, or a  that that could provide 24/7 support and hospice would be able to provide POC and set up needs throughout the week. Cleo stated that she would not be able to take more time from work b/c of the past year. Cleo asked appropriate questions. PC RN described comfort care focus about d/c'ing the nutrition and IV support and education provided about the dying process and how to treat pt's symptoms during the process.   Cleo expressed that she was not ready to make any decisions at this time and wanted to wait a little longer to see if there was any improvement in his condition. PC RN offered supportive empathic communication and PC contact information. Cleo stated that her father is Pentecostal and a  would be a nice service to provide at this time. PC RN encouraged to call with any questions and again emphasized to reach out if she needed support in this difficult time. Cleo was very appreciative of the phone call.     Recommendations: I do not recommend an ethics or hospice consult at this time because Cleo would like to continue with conservative treatment and see if her father has a improved resonse..    Updated: Dr. Gunderson  Plan: Continue to follow and provide support to pt and family with [facility]     Thank you for allowing Palliative Care to participate in this patient's care. Please feel free to call [phone] with any questions or concerns.

## 2021-03-25 NOTE — PALLIATIVE CARE
Spiritual Care Note    Patient Information     Patient's Name: Rafael Mccoy   MRN: [id number]    YOB: 1945   Age and Gender: 75 y.o. male   Service Area: Neuroscience    Room (and Bed): [place] [address]   Ethnicity or Nationality:     Primary Language: English   Orthodoxy/Spiritual preference: Other Yvonne/World Jew   Place of Residence: [place]   Family/Friends/Others Present: No   Clinical Team Present: No   Medical Diagnosis(-es)/Procedure(s): Intracranial hemorrhage    Code Status: DNAR/DNI    Date of Admission: 3/11/2021   Length of Stay: 14 days        Spiritual Care Provider Information:  Name of Spiritual Care Provider: Alvarez Flower  Title of Spiritual Care Provider: Associate   Phone Number: [phone]  E-mail: [email or web address]  Total time : 5 minutes    Spiritual Screen Results:    Gen Nursing        Palliative Care  PC Orthodoxy/Spiritual Screening  Is your spiritual health or inner well-being important to you as you cope with your medical condition?: Yes  Would you like to receive a visit from our Spiritual Care team or your own Church or spiritual leader?: Yes      Encounter/Request Information    Encounter/Request Type   Visited With: Patient, Patient not available    Nature of the Visit: Initial, On shift    Continue Visiting: Yes    General Visit: Yes    Referral From/ Origin of Request: Epic nursing    Religous Needs/Values       Spiritual Assessment     Spiritual Care Encounters  Interacton/Conversation: Pt was asleep and so  will make a return visit    Notes:

## 2021-03-25 NOTE — DIETARY
Nutrition Support Weekly Update: Day 14 of admit.  Rafael Mccoy is a 75 y.o. male with admitting DX of Intracranial hemorrhage. Tube feeding initiated on 3/18. Current TF via Gastric Cortrak is Fibersource HN @ 70 ml/hr, providing 2016 kcals, 90 grams protein, 1360 mL free water per day     RN contacted RD stating pt's daughter wanted to speak to RD regarding TF.     Spoke to daughter Cleo. Cleo inquiring adjusting father's TF formula to one with better ingredients due to pt having episodes of vomiting yesterday. Vomiting has resolved and TF was restarted. Discussed adjusting TF to Diabetisource AC (formula contains fruits and vegetables). Daughter prefers ingredients in TF like Liquid Hope. Unable to provide formula at [facility] and contains a lot of fiber. Daughter discussing potentially buying formula and bringing it in. If this were to happen formula needs to be approved by MD and formula reviewed by RD. Discussed possibility of special ordering Compleat. Daughter liked ingredients in Compleat.     Spoke to RN. RN states pt has been tolerating TF well today and TF was advanced to 50 mL/hr.     Discussed with Clinical Nutrition Manager of potentially ordering Compleat. Per discussion since pt is tolerating current TF formula per RN there is no medically necessity to change TF formula. Compleat is a special order and not stocked at [facility]. Called Cleo to discussed not adjusting TF at this time as pt is tolerating TF. Daughter disappointed and states she will discuss with MD for the potential of other formulas. Current formula is well-balanced and meet pt's estimated nutritional needs.     Assessment:  Wt 3/19: 84.2 kg via bed scale - wt increased from admit stated up scale wt.     Evaluation:   1. TF was running @ goal. TF stopped yesterday due to vomiting. TF currently running @ 25 mL/hr  2. SLP continues to recommend NPO with alternative means of nutrition/hydration per note 3/23  3. Labs: K 5.7,  Glucose 134, BUN 33, Creatinine 1.45  4. Meds: norvasc, depakote sprinkle, neurontin, vimpat, keppra, melatonin, pericolace, zocor  5. Fluids: NS infusion @ 50 mL/hr, free water flushes 150 mL Q4 hours (900 mL)  6. Last BM: 3/25  7. Current feeding remains appropriate at this time.      Malnutrition Risk: Doesn't meet criteria at this time.     Recommendations/Plan:  1. Continue TF formula and rate, advance to goal as pt tolerates   2. Fluids per MD  3. Monitor weight  4. Diet upgrades per SLP     RD following

## 2021-03-25 NOTE — PROGRESS NOTES
"Hospital Medicine Daily Progress Note    Date of Service  3/25/2021    Chief Complaint  75 y.o. male admitted 3/11/2021 with seizure    Hospital Course  \"History of cerebellar CVA, seizure disorder, presented as a transfer from [facility] after he was found to have right parietal SDH without any midline shift's.  Witnessed seizure by family followed by changes in mental status.  Neurosurgery consulted Dr. Capps, no surgical intervention, recommend repeat CT head and frequent neuro checks.  Admit to the ICU every hour neuro checks, repeat head CT, keep SBP less than 140, continue Depakote and Keppra.  Due to ongoing encephalopathy, he had an EEG and was found to be in non-convulsive status epilepticus and was loaded with Vimpat.    Neurology was following case and he was treated with Vimpat 200mg BID, Keppra 1000mg BID, and Depacon 750mg QHS. No further seizure like activity. Patient had subtherapeutic Depakote level and Depacon was increased to 750mg BID.    Patient had prolonged waxing and waning mental status. Due to poor PO intake and intermittent refusal of PO intake, NGT was placed and started on alternative nutrition on 3/18.    Interval Problem Update    Lethargy, 1 episode of vomiting  + bm this am  D/w palliative care    Consultants/Specialty  Critical Care  Neurosurgery  Physiatry    Code Status  DNAR/DNI    Disposition  Will need SNF when medically cleared  palliative care consult, due to declining medical condition  Requiring CT for TF  Limit sedative rx    D/w pt's dtr, Cleo, considering transitioning to comfort care if pt's condition declines.  Palliative care consult, continue conservative measures.        Review of Systems  Review of Systems   Unable to perform ROS: Mental status change        Physical Exam  Temp:  [36.2 °C (97.1 °F)-36.6 °C (97.9 °F)] 36.4 °C (97.6 °F)  Pulse:  [49-75] 49  Resp:  [16-18] 18  BP: (108-160)/(43-97) 111/67  SpO2:  [92 %-99 %] 96 %    Physical Exam  Vitals and " nursing note reviewed.   Constitutional:       General: He is not in acute distress.     Appearance: He is ill-appearing. He is not diaphoretic.      Comments: Lethargy, arousable   HENT:      Head: Normocephalic and atraumatic.      Nose: Nose normal.      Mouth/Throat:      Mouth: Mucous membranes are dry.   Eyes:      Conjunctiva/sclera: Conjunctivae normal.   Cardiovascular:      Rate and Rhythm: Normal rate.   Pulmonary:      Effort: Pulmonary effort is normal.      Comments: Decreased b/l  Abdominal:      General: Bowel sounds are normal.   Musculoskeletal:         General: No swelling or tenderness.   Skin:     General: Skin is warm.   Neurological:      Comments: Lethargy         Fluids    Intake/Output Summary (Last 24 hours) at 3/25/2021 1320  Last data filed at 3/25/2021 0800  Gross per 24 hour   Intake 150 ml   Output 750 ml   Net -600 ml       Laboratory  Recent Labs     03/23/21  0408 03/24/21  0901 03/25/21  0143   WBC 6.1 7.9 9.9   RBC 5.70 5.54 5.27   HEMOGLOBIN 16.0 15.8 14.7   HEMATOCRIT 49.5 48.1 46.8   MCV 86.8 86.8 88.8   MCH 28.1 28.5 27.9   MCHC 32.3* 32.8* 31.4*   RDW 45.9 45.7 46.0   PLATELETCT 219 229 220   MPV 10.8 10.6 11.3     Recent Labs     03/23/21  0408 03/24/21  0901 03/25/21  0143   SODIUM 141 135 141   POTASSIUM 4.2 4.7 5.7*   CHLORIDE 105 97 104   CO2 29 29 28   GLUCOSE 130* 124* 134*   BUN 26* 31* 33*   CREATININE 1.28 1.28 1.45*   CALCIUM 9.0 8.8 9.0                   Imaging  DX-CHEST-PORTABLE (1 VIEW)   Final Result      1.  Cardiomegaly with increased interstitial opacity diffusely which may represent interstitial edema or pneumonia.      2.  No lobar consolidation.      ZE-[id number]-2 VIEW   Final Result         Moderate amount of stool throughout the colon.      DX-ABDOMEN FOR TUBE PLACEMENT   Final Result      Enteric tube projects over the proximal stomach.      CT-HEAD W/O   Final Result      1.  Prior RIGHT frontal craniotomy with underlying dural thickening and  subdural hemorrhage, unchanged from prior exam.   2.  No new intracranial hemorrhage demonstrated.   3.  Diffuse atrophy with white matter microvascular ischemic changes.   4.  Small chronic RIGHT posterior frontal infarct.      [id number] VIEW   Final Result         No specific finding to suggest small bowel obstruction.      CT-HEAD W/O   Final Result         1.  Mixed right parietal subdural fluid collection likely acute on chronic subdural hematoma.   2.  Nonspecific white matter changes, commonly associated with small vessel ischemic disease.  Associated mild cerebral atrophy is noted.   3.  Atherosclerosis.      OUTSIDE IMAGES-CT HEAD   Final Result           Assessment/Plan  * Seizure disorder, nonconvulsive, with status epilepticus (HCC)- (present on admission)  Assessment & Plan  Noted on EEG on admission  He was loaded with IV Keppra, started on IV Vimpat, and IV depakote  Repeat EEG 3/15 without evidence of seizure and more consistent with encephalopathy  Appriciate neurology consult. Valproic acid level subtherapeutic and increased to 750mg BID by neurology on 3/16  Repeat valproic acid level 101 on 3/19, Depakote changed to 500mg QAM and 750mg QHS, discussed with Dr. Martínez  Repeat valproic acid level scheduled for 3/22  Outpatient follow up in the epilepsy clinic has been arranged by Dr. Mcfadden    If patient has continued seizure like activity or if his mentation appears to decline further, may need repeat EEG.    3/25 d/w dtr, aware of declining condition, palliative care to f/u  Consider transition to CC if condition further deteriorates  - encourage activity    Encephalopathy acute- (present on admission)  Assessment & Plan  Acute encephalopathy likely multifactorial, persistent  In setting of dementia with behavioral disturbance, history of psychiatric disorder, CVA, and seizure disorder in status epilepticus on admission  Continue Seroquel 25mg TID prn for agitation, Seroquel 50mg QHS  on  gabapentin  Appreciate physiatry consult, monitoring  Patient requiring    3/25 limit sedative rx, gabapentin  D/w RN  Mentation improving, participating with PT        Subdural hematoma, acute (HCC)- (present on admission)  Assessment & Plan  Noted on CT head  Non-traumatic  Neurosurgery consulted, non-operative management  Avoid NSAIDs and anticoagulation    Hypokalemia  Assessment & Plan  Keep K > 4.  Monitor BMP.     Dysphagia  Assessment & Plan  Dysphagia likely secondary to encephalopathy  Patient intermittently tolerates PO intake, however at times refuses both food and medication  NGT 3/18 placed for nutrition and medication administration, tube feeds currently at goal    3/24 with vomiting, TF held  - f/u abd/cxr, eval for aspiration  F/u stat labs  Palliative care consult, assist with determining goals of care    3/25 abd xray with mod stool  Vomiting resolved, TF resumed at slow rate  CXR, mild edema, procal neg    Dementia associated with other underlying disease with behavioral disturbance (HCC)- (present on admission)  Assessment & Plan  Reported history of cognitive decline consistent with dementia  Continue to monitor    Legally blind- (present on admission)  Assessment & Plan  - Patient legally blind for more than 12 years per patient  - [facility]    Essential hypertension- (present on admission)  Assessment & Plan  Reported history of though had not been on medications prior to admit  BP has trending up. Will initiate amlodipine 5mg for BP control       VTE prophylaxis: SCDs only secondary to SDH.

## 2021-03-25 NOTE — THERAPY
Occupational Therapy  Daily Treatment     Patient Name: Rafael Mccoy  Age:  75 y.o., Sex:  male  Medical Record #: [id number]  Today's Date: 3/25/2021     Precautions  Precautions: (P) Fall Risk  Comments: (P) legally blind    Assessment    Lethargic throughout. Echolalic. Tactile cues to initiate, complete one step commands. Max assist to EOB, sat x 15 minutes. Eyes closed mostly. Max assist to return to supine and position for comfort    Plan    Continue current treatment plan.    DC Equipment Recommendations: (P) Unable to determine at this time  Discharge Recommendations: (P) Recommend post-acute placement for additional occupational therapy services prior to discharge home    Subjective    Cooperative, pleasant     Objective       03/25/21 1310   Total Time Spent   Total Time Spent (Mins) 44   Treatment Charges   OT Self Care / ADL 2   OT Therapy Activity 1   Precautions   Precautions Fall Risk   Comments legally blind   Vitals   O2 (LPM) 1   O2 Delivery Device Silicone Nasal Cannula   Pain 0 - 10 Group   Therapist Pain Assessment Post Activity Pain Same as Prior to Activity;Nurse Notified;0   Cognition    Speech/ Communication Delayed Responses   Level of Consciousness Lethargic   Ability To Follow Commands Unable to Follow 1 Step Commands   Safety Awareness Impaired   New Learning Impaired   Attention Impaired   Initiation Impaired   Comments echolalic   Passive ROM Upper Body   Comments rigidity, both UEs   Active ROM Upper Body   Dominant Hand Left   Comments no purposeful active motion observed   Strength Upper Body   Comments weakness bilaterally   Neuro-Muscular Treatments   Neuro-Muscular Treatments Anterior weight shift;Weight Shift Right;Weight Shift Left;Verbal Cuing;Tapping;Sequencing   Other Treatments   Other Treatments Provided EOB balance and pre-ADL activity   Balance   Sitting Balance (Static) Poor +   Sitting Balance (Dynamic) Poor -   Weight Shift Sitting Poor   Skilled Intervention  Verbal Cuing;Tactile Cuing;Sequencing;Postural Facilitation   Bed Mobility    Supine to Sit Maximal Assist   Sit to Supine Maximal Assist   Scooting Maximal Assist   Rolling Maximal Assist to Rt.;Maximum Assist to Lt.   Skilled Intervention Verbal Cuing;Tactile Cuing;Sequencing   Activities of Daily Living   Grooming Maximal Assist;Seated   Upper Body Dressing Maximal Assist   Lower Body Dressing Maximal Assist   Toileting Maximal Assist   Skilled Intervention Verbal Cuing;Tactile Cuing;Sequencing   How much help from another person does the patient currently need...   Putting on and taking off regular lower body clothing? 1   Bathing (including washing, rinsing, and drying)? 1   Toileting, which includes using a toilet, bedpan, or urinal? 1   Putting on and taking off regular upper body clothing? 1   Taking care of personal grooming such as brushing teeth? 1   Eating meals? 1   6 Clicks Daily Activity Score 6   Functional Mobility   Sit to Stand Unable to Participate   Visual Perception   Comments legally blind, eyes closed throughout   Patient / Family Goals   Patient / Family Goal #1 To go home   Goal #1 Outcome Progressing slower than expected   Short Term Goals   Short Term Goal # 1 Pt will demo LB dressing w/ SPV   Goal Outcome # 1 Progressing slower than expected   Short Term Goal # 2 Pt will demo standing grooming w/ SPV   Goal Outcome # 2 Progressing slower than expected   Short Term Goal # 3 Pt will demo toilet txf w/ SPV   Goal Outcome # 3 Progressing slower than expected   Education Group   Role of Occupational Therapist Patient Response Patient;Acceptance;Explanation;Demonstration;Reinforcement Needed   ADL Patient Response Patient;Acceptance;Explanation;Demonstration;Reinforcement Needed   Anticipated Discharge Equipment and Recommendations   DC Equipment Recommendations Unable to determine at this time   Discharge Recommendations Recommend post-acute placement for additional occupational therapy  services prior to discharge home   Interdisciplinary Plan of Care Collaboration   IDT Collaboration with  Nursing   Patient Position at End of Therapy In Bed;Bed Alarm On;Call Light within Reach;Tray Table within Reach   Collaboration Comments report given   Session Information   Date / Session Number  3/25,6 (3/3,3/25)   Priority 2

## 2021-03-25 NOTE — CARE PLAN
Problem: Knowledge Deficit  Goal: Knowledge of disease process/condition, treatment plan, diagnostic tests, and medications will improve  Outcome: PROGRESSING AS EXPECTED  Note: Education provided on plan of care. All questions answered. Call light is within reach.      Problem: Safety - Medical Restraint  Goal: Remains free of injury from restraints (Restraint for Interference with Medical Device)  Description: INTERVENTIONS:  1. Determine that other, less restrictive measures have been tried or would not be effective before applying the restraint  2. Evaluate the patient's condition at the time of restraint application  3. Inform patient/family regarding the reason for restraint  4. Q2H: Monitor safety, psychosocial status, comfort, nutrition and hydration  Outcome: PROGRESSING AS EXPECTED

## 2021-03-25 NOTE — CARE PLAN
Problem: Safety  Goal: Will remain free from injury  Outcome: PROGRESSING AS EXPECTED  Goal: Will remain free from falls  Outcome: PROGRESSING AS EXPECTED     Problem: Safety:  Goal: Will remain free from injury  Outcome: PROGRESSING AS EXPECTED     Problem: Safety:  Goal: Will remain free from injury  Outcome: PROGRESSING AS EXPECTED     Problem: Communication  Goal: The ability to communicate needs accurately and effectively will improve  Outcome: PROGRESSING SLOWER THAN EXPECTED     Problem: Psychosocial Needs:  Goal: Ability to verbalize feelings about condition will improve  Outcome: PROGRESSING SLOWER THAN EXPECTED     Problem: Psychosocial Needs:  Goal: Ability to verbalize feelings about condition will improve  Outcome: PROGRESSING SLOWER THAN EXPECTED

## 2021-03-26 LAB
ANION GAP SERPL CALC-SCNC: 7 MMOL/L (ref 7–16)
BASOPHILS # BLD AUTO: 0.6 % (ref 0–1.8)
BASOPHILS # BLD: 0.04 K/UL (ref 0–0.12)
BUN SERPL-MCNC: 23 MG/DL (ref 8–22)
CALCIUM SERPL-MCNC: 8.8 MG/DL (ref 8.5–10.5)
CHLORIDE SERPL-SCNC: 102 MMOL/L (ref 96–112)
CO2 SERPL-SCNC: 26 MMOL/L (ref 20–33)
CREAT SERPL-MCNC: 0.85 MG/DL (ref 0.5–1.4)
EOSINOPHIL # BLD AUTO: 0.18 K/UL (ref 0–0.51)
EOSINOPHIL NFR BLD: 2.9 % (ref 0–6.9)
ERYTHROCYTE [DISTWIDTH] IN BLOOD BY AUTOMATED COUNT: 44.5 FL (ref 35.9–50)
GLUCOSE SERPL-MCNC: 126 MG/DL (ref 65–99)
HCT VFR BLD AUTO: 44.9 % (ref 42–52)
HGB BLD-MCNC: 14.3 G/DL (ref 14–18)
IMM GRANULOCYTES # BLD AUTO: 0.07 K/UL (ref 0–0.11)
IMM GRANULOCYTES NFR BLD AUTO: 1.1 % (ref 0–0.9)
LYMPHOCYTES # BLD AUTO: 1.03 K/UL (ref 1–4.8)
LYMPHOCYTES NFR BLD: 16.5 % (ref 22–41)
MCH RBC QN AUTO: 28.1 PG (ref 27–33)
MCHC RBC AUTO-ENTMCNC: 31.8 G/DL (ref 33.7–35.3)
MCV RBC AUTO: 88.2 FL (ref 81.4–97.8)
MONOCYTES # BLD AUTO: 0.91 K/UL (ref 0–0.85)
MONOCYTES NFR BLD AUTO: 14.5 % (ref 0–13.4)
NEUTROPHILS # BLD AUTO: 4.03 K/UL (ref 1.82–7.42)
NEUTROPHILS NFR BLD: 64.4 % (ref 44–72)
NRBC # BLD AUTO: 0 K/UL
NRBC BLD-RTO: 0 /100 WBC
PLATELET # BLD AUTO: 210 K/UL (ref 164–446)
PMV BLD AUTO: 10.6 FL (ref 9–12.9)
POTASSIUM SERPL-SCNC: 4.1 MMOL/L (ref 3.6–5.5)
RBC # BLD AUTO: 5.09 M/UL (ref 4.7–6.1)
SODIUM SERPL-SCNC: 135 MMOL/L (ref 135–145)
WBC # BLD AUTO: 6.3 K/UL (ref 4.8–10.8)

## 2021-03-26 PROCEDURE — A9270 NON-COVERED ITEM OR SERVICE: HCPCS | Performed by: INTERNAL MEDICINE

## 2021-03-26 PROCEDURE — 99232 SBSQ HOSP IP/OBS MODERATE 35: CPT | Performed by: INTERNAL MEDICINE

## 2021-03-26 PROCEDURE — 85025 COMPLETE CBC W/AUTO DIFF WBC: CPT

## 2021-03-26 PROCEDURE — 36415 COLL VENOUS BLD VENIPUNCTURE: CPT

## 2021-03-26 PROCEDURE — 770006 HCHG ROOM/CARE - MED/SURG/GYN SEMI*

## 2021-03-26 PROCEDURE — 700105 HCHG RX REV CODE 258: Performed by: INTERNAL MEDICINE

## 2021-03-26 PROCEDURE — 80048 BASIC METABOLIC PNL TOTAL CA: CPT

## 2021-03-26 PROCEDURE — 700102 HCHG RX REV CODE 250 W/ 637 OVERRIDE(OP): Performed by: STUDENT IN AN ORGANIZED HEALTH CARE EDUCATION/TRAINING PROGRAM

## 2021-03-26 PROCEDURE — 700102 HCHG RX REV CODE 250 W/ 637 OVERRIDE(OP): Performed by: INTERNAL MEDICINE

## 2021-03-26 PROCEDURE — 92526 ORAL FUNCTION THERAPY: CPT

## 2021-03-26 PROCEDURE — A9270 NON-COVERED ITEM OR SERVICE: HCPCS | Performed by: STUDENT IN AN ORGANIZED HEALTH CARE EDUCATION/TRAINING PROGRAM

## 2021-03-26 RX ADMIN — LEVETIRACETAM 1000 MG: 500 TABLET ORAL at 04:43

## 2021-03-26 RX ADMIN — ACETAMINOPHEN 650 MG: 325 TABLET, FILM COATED ORAL at 03:28

## 2021-03-26 RX ADMIN — Medication 5 MG: at 20:08

## 2021-03-26 RX ADMIN — DOCUSATE SODIUM 50 MG AND SENNOSIDES 8.6 MG 2 TABLET: 8.6; 5 TABLET, FILM COATED ORAL at 04:43

## 2021-03-26 RX ADMIN — LACOSAMIDE 200 MG: 100 TABLET, FILM COATED ORAL at 17:29

## 2021-03-26 RX ADMIN — LEVETIRACETAM 1000 MG: 500 TABLET ORAL at 17:28

## 2021-03-26 RX ADMIN — SIMVASTATIN 40 MG: 20 TABLET, FILM COATED ORAL at 20:08

## 2021-03-26 RX ADMIN — SODIUM CHLORIDE: 9 INJECTION, SOLUTION INTRAVENOUS at 22:49

## 2021-03-26 RX ADMIN — DIVALPROEX SODIUM 750 MG: 125 CAPSULE ORAL at 17:28

## 2021-03-26 RX ADMIN — DIVALPROEX SODIUM 500 MG: 125 CAPSULE ORAL at 04:43

## 2021-03-26 RX ADMIN — GABAPENTIN 300 MG: 300 CAPSULE ORAL at 04:43

## 2021-03-26 RX ADMIN — LACOSAMIDE 200 MG: 100 TABLET, FILM COATED ORAL at 04:43

## 2021-03-26 RX ADMIN — DOCUSATE SODIUM 50 MG AND SENNOSIDES 8.6 MG 2 TABLET: 8.6; 5 TABLET, FILM COATED ORAL at 17:29

## 2021-03-26 RX ADMIN — SODIUM CHLORIDE: 9 INJECTION, SOLUTION INTRAVENOUS at 04:49

## 2021-03-26 RX ADMIN — GABAPENTIN 300 MG: 300 CAPSULE ORAL at 17:29

## 2021-03-26 RX ADMIN — AMLODIPINE BESYLATE 5 MG: 5 TABLET ORAL at 04:44

## 2021-03-26 ASSESSMENT — PAIN DESCRIPTION - PAIN TYPE: TYPE: ACUTE PAIN

## 2021-03-26 NOTE — CARE PLAN
Problem: Venous Thromboembolism (VTW)/Deep Vein Thrombosis (DVT) Prevention:  Goal: Patient will participate in Venous Thrombosis (VTE)/Deep Vein Thrombosis (DVT)Prevention Measures  Outcome: PROGRESSING AS EXPECTED  SCDs, Sequential Compression Device: On  Pharmacologic Prophylaxis Used: Contraindicated per MD     Problem: Safety - Medical Restraint  Goal: Remains free of injury from restraints (Restraint for Interference with Medical Device)  Description: INTERVENTIONS:  1. Determine that other, less restrictive measures have been tried or would not be effective before applying the restraint  2. Evaluate the patient's condition at the time of restraint application  3. Inform patient/family regarding the reason for restraint  4. Q2H: Monitor safety, psychosocial status, comfort, nutrition and hydration  Outcome: PROGRESSING AS EXPECTED

## 2021-03-26 NOTE — PROGRESS NOTES
Completed daily phone call to designated support person, name Bess  Discussed patient condition and plan of care. All questions answered.  Follow up requested: Yes, Bess is going to call back this afternoon

## 2021-03-26 NOTE — CARE PLAN
Problem: Safety  Goal: Will remain free from injury  Outcome: PROGRESSING AS EXPECTED   Patient is free of falls, alarms on for safety and 3 side rails up. Patient continues to need bilateral hand mitts to prevent from pulling out lines. ROM provided and skin checks are WNL.     Problem: Safety:  Goal: Will remain free from injury  Outcome: PROGRESSING AS EXPECTED     Problem: Safety:  Goal: Will remain free from injury  Outcome: PROGRESSING AS EXPECTED     Problem: Communication  Goal: The ability to communicate needs accurately and effectively will improve  Outcome: PROGRESSING SLOWER THAN EXPECTED   Patient is unable to communicate his needs. Patient is intermittently alert to self only, unable to use call light. Patient is rounded on every hour, offered oral cares, turned and repositioned every 2 hours for skin safety and comfort. Patient continues to be NPO, he is tolerating tube feeds, speech therapy is seeing patient.

## 2021-03-26 NOTE — PROGRESS NOTES
"Hospital Medicine Daily Progress Note    Date of Service  3/26/2021    Chief Complaint  75 y.o. male admitted 3/11/2021 with seizure    Hospital Course  \"History of cerebellar CVA, seizure disorder, presented as a transfer from [facility] after he was found to have right parietal SDH without any midline shift's.  Witnessed seizure by family followed by changes in mental status.  Neurosurgery consulted Dr. Capps, no surgical intervention, recommend repeat CT head and frequent neuro checks.  Admit to the ICU every hour neuro checks, repeat head CT, keep SBP less than 140, continue Depakote and Keppra.  Due to ongoing encephalopathy, he had an EEG and was found to be in non-convulsive status epilepticus and was loaded with Vimpat.    Neurology was following case and he was treated with Vimpat 200mg BID, Keppra 1000mg BID, and Depacon 750mg QHS. No further seizure like activity. Patient had subtherapeutic Depakote level and Depacon was increased to 750mg BID.    Patient had prolonged waxing and waning mental status. Due to poor PO intake and intermittent refusal of PO intake, NGT was placed and started on alternative nutrition on 3/18.    Interval Problem Update    Lethargy, easy unlabored respiration      Consultants/Specialty  Critical Care  Neurosurgery  Physiatry    Code Status  DNAR/DNI    Disposition  Will need SNF when medically cleared  palliative care consult, due to declining medical condition  Requiring CT for TF  Limit sedative rx    D/w pt's dtr, Cleo, considering transitioning to comfort care if pt's condition declines.  Palliative care following        Review of Systems  Review of Systems   Unable to perform ROS: Mental status change        Physical Exam  Temp:  [36.1 °C (97 °F)-37.2 °C (98.9 °F)] 37.2 °C (98.9 °F)  Pulse:  [74-84] 74  Resp:  [18-20] 18  BP: (116-151)/(51-66) 122/55  SpO2:  [93 %-97 %] 94 %    Physical Exam  Vitals and nursing note reviewed.   Constitutional:       General: He is not " in acute distress.     Appearance: He is ill-appearing.      Comments: Lethargy, arousable   HENT:      Head: Normocephalic and atraumatic.      Nose: Nose normal.      Mouth/Throat:      Mouth: Mucous membranes are dry.   Eyes:      Conjunctiva/sclera: Conjunctivae normal.   Cardiovascular:      Rate and Rhythm: Normal rate.   Pulmonary:      Effort: Pulmonary effort is normal. No respiratory distress.      Breath sounds: No wheezing.      Comments: Decreased b/l  Abdominal:      General: Bowel sounds are normal. There is no distension.      Tenderness: There is no abdominal tenderness.   Musculoskeletal:         General: No swelling or tenderness.   Skin:     General: Skin is warm.   Neurological:      Motor: Weakness present.      Comments: Lethargy         Fluids    Intake/Output Summary (Last 24 hours) at 3/26/2021 1248  Last data filed at 3/26/2021 1204  Gross per 24 hour   Intake 2405 ml   Output 2100 ml   Net 305 ml       Laboratory  Recent Labs     03/24/21  0901 03/25/21  0143 03/26/21  0822   WBC 7.9 9.9 6.3   RBC 5.54 5.27 5.09   HEMOGLOBIN 15.8 14.7 14.3   HEMATOCRIT 48.1 46.8 44.9   MCV 86.8 88.8 88.2   MCH 28.5 27.9 28.1   MCHC 32.8* 31.4* 31.8*   RDW 45.7 46.0 44.5   PLATELETCT 229 220 210   MPV 10.6 11.3 10.6     Recent Labs     03/25/21  0143 03/25/21  1517 03/26/21  0822   SODIUM 141 141 135   POTASSIUM 5.7* 5.1 4.1   CHLORIDE 104 104 102   CO2 28 29 26   GLUCOSE 134* 133* 126*   BUN 33* 32* 23*   CREATININE 1.45* 1.34 0.85   CALCIUM 9.0 8.8 8.8                   Imaging  DX-CHEST-PORTABLE (1 VIEW)   Final Result      1.  Cardiomegaly with increased interstitial opacity diffusely which may represent interstitial edema or pneumonia.      2.  No lobar consolidation.      [id number] VIEW   Final Result         Moderate amount of stool throughout the colon.      DX-ABDOMEN FOR TUBE PLACEMENT   Final Result      Enteric tube projects over the proximal stomach.      CT-HEAD W/O   Final Result      1.   Prior RIGHT frontal craniotomy with underlying dural thickening and subdural hemorrhage, unchanged from prior exam.   2.  No new intracranial hemorrhage demonstrated.   3.  Diffuse atrophy with white matter microvascular ischemic changes.   4.  Small chronic RIGHT posterior frontal infarct.      [id number] VIEW   Final Result         No specific finding to suggest small bowel obstruction.      CT-HEAD W/O   Final Result         1.  Mixed right parietal subdural fluid collection likely acute on chronic subdural hematoma.   2.  Nonspecific white matter changes, commonly associated with small vessel ischemic disease.  Associated mild cerebral atrophy is noted.   3.  Atherosclerosis.      OUTSIDE IMAGES-CT HEAD   Final Result           Assessment/Plan  * Seizure disorder, nonconvulsive, with status epilepticus (HCC)- (present on admission)  Assessment & Plan  Noted on EEG on admission  He was loaded with IV Keppra, started on IV Vimpat, and IV depakote  Repeat EEG 3/15 without evidence of seizure and more consistent with encephalopathy  Appriciate neurology consult. Valproic acid level subtherapeutic and increased to 750mg BID by neurology on 3/16  Repeat valproic acid level 101 on 3/19, Depakote changed to 500mg QAM and 750mg QHS, discussed with Dr. Martínez  Repeat valproic acid level scheduled for 3/22  Outpatient follow up in the epilepsy clinic has been arranged by Dr. Mcfadden    If patient has continued seizure like activity or if his mentation appears to decline further, may need repeat EEG.    3/25 d/w dtr, aware of declining condition, palliative care to f/u  Consider transition to CC if condition further deteriorates  - encourage activity    Encephalopathy acute- (present on admission)  Assessment & Plan  Acute encephalopathy likely multifactorial, persistent  In setting of dementia with behavioral disturbance, history of psychiatric disorder, CVA, and seizure disorder in status epilepticus on  admission  Continue Seroquel 25mg TID prn for agitation, Seroquel 50mg QHS  on gabapentin  Appreciate physiatry consult, monitoring  Patient requiring    3/25 limit sedative rx, gabapentin  D/w RN  Mentation improving, participating with PT    3/26 minimal participation with therapy, max assistance  Skilled referral when medically cleared  Continue conservative treatment      Subdural hematoma, acute (HCC)- (present on admission)  Assessment & Plan  Noted on CT head  Non-traumatic  Neurosurgery consulted, non-operative management  Avoid NSAIDs and anticoagulation    Hypokalemia  Assessment & Plan  Keep K > 4.  Monitor BMP.     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 Will initiate amlodipine 5mg for BP control       VTE prophylaxis: SCDs only secondary to SDH.

## 2021-03-27 PROCEDURE — 700102 HCHG RX REV CODE 250 W/ 637 OVERRIDE(OP): Performed by: INTERNAL MEDICINE

## 2021-03-27 PROCEDURE — A9270 NON-COVERED ITEM OR SERVICE: HCPCS | Performed by: INTERNAL MEDICINE

## 2021-03-27 PROCEDURE — 770006 HCHG ROOM/CARE - MED/SURG/GYN SEMI*

## 2021-03-27 PROCEDURE — 700102 HCHG RX REV CODE 250 W/ 637 OVERRIDE(OP): Performed by: STUDENT IN AN ORGANIZED HEALTH CARE EDUCATION/TRAINING PROGRAM

## 2021-03-27 PROCEDURE — 99232 SBSQ HOSP IP/OBS MODERATE 35: CPT | Performed by: INTERNAL MEDICINE

## 2021-03-27 PROCEDURE — A9270 NON-COVERED ITEM OR SERVICE: HCPCS | Performed by: STUDENT IN AN ORGANIZED HEALTH CARE EDUCATION/TRAINING PROGRAM

## 2021-03-27 RX ADMIN — LEVETIRACETAM 1000 MG: 500 TABLET ORAL at 04:34

## 2021-03-27 RX ADMIN — AMLODIPINE BESYLATE 5 MG: 5 TABLET ORAL at 04:34

## 2021-03-27 RX ADMIN — LACOSAMIDE 200 MG: 100 TABLET, FILM COATED ORAL at 04:34

## 2021-03-27 RX ADMIN — DIVALPROEX SODIUM 750 MG: 125 CAPSULE ORAL at 19:57

## 2021-03-27 RX ADMIN — SIMVASTATIN 40 MG: 20 TABLET, FILM COATED ORAL at 19:59

## 2021-03-27 RX ADMIN — Medication 5 MG: at 19:59

## 2021-03-27 RX ADMIN — GABAPENTIN 300 MG: 300 CAPSULE ORAL at 18:11

## 2021-03-27 RX ADMIN — GABAPENTIN 300 MG: 300 CAPSULE ORAL at 04:34

## 2021-03-27 RX ADMIN — LEVETIRACETAM 1000 MG: 500 TABLET ORAL at 18:11

## 2021-03-27 RX ADMIN — DIVALPROEX SODIUM 500 MG: 125 CAPSULE ORAL at 04:34

## 2021-03-27 RX ADMIN — LACOSAMIDE 200 MG: 100 TABLET, FILM COATED ORAL at 18:11

## 2021-03-27 RX ADMIN — QUETIAPINE FUMARATE 25 MG: 25 TABLET ORAL at 18:12

## 2021-03-27 ASSESSMENT — FIBROSIS 4 INDEX: FIB4 SCORE: 1.49

## 2021-03-27 NOTE — PROGRESS NOTES
Received phone call from patient's daughter, Cleo. Updated on patient condition and POC. All questions answered. No follow up requested.

## 2021-03-27 NOTE — THERAPY
"Speech Language Pathology  Daily Treatment     Patient Name: Rafael Mccoy  Age:  75 y.o., Sex:  male  Medical Record #: [id number]  Today's Date: 3/26/2021     Precautions: Fall Risk, Swallow Precautions ( See Comments)  Comments: legally blind    Assessment    Patient was seen on this date for dysphagia treatment. Patient awake with appropriate but delayed responses initially. Pt greeted clinician and stated he was thirsty. Patient was able to state, \"My name is, my name is, my name is Rafael.\" However, verbal responses thereafter were minimal. PO trials were administered and consisted of ice chips x2 and 1/2 tsp of MTL x2. Poor response to spoon to mouth and required cues to wrap lips around spoon. Oral holding and minimal oral prep/severely delayed onset of swallow with ice chips. Laryngeal elevation weak to palpation. Mild swishing motion/suspect tongue pumping with MTL and absent swallow trigger in 2/2 trials requiring yankauer to suction bolus from mouth. Poor bolus control as seen by anterior spillage in 1/2 trials of MTL. No further PO trials were administered given high aspiration risk.     Plan    Patient remains at high risk for aspiration and would recommend continue NPO/cortrak. SLP following.     Treatment plan modified to 2 times per week until therapy goals are met for the following treatments:  Dysphagia Training and Patient / Family / Caregiver Education.    Discharge Recommendations: Recommend post-acute placement for additional speech therapy services prior to discharge home    Objective       03/26/21 1603   Vitals   O2 Delivery Device None - Room Air   Cognitive-Linguistic   Level of Consciousness Confused   Dysphagia    Positioning / Behavior Modification Modulate Rate or Bite Size   Other Treatments Pre-feeding trials of ice chips x2 and 1/2 tsp of MTL x2   Diet / Liquid Recommendation NPO;Pre-Feeding Trials with SLP Only   Skilled Intervention Compensatory Strategies;Verbal Cueing "   Recommended Route of Medication Administration   Medication Administration  Via Gastric Tube   Short Term Goals   Short Term Goal # 3 NEW 3/22 - Pt will participate in pre-feeding trials with SLP prior to PO diet advancement.   Goal Outcome  # 3 Progressing slower than expected

## 2021-03-27 NOTE — CARE PLAN
Problem: Safety  Goal: Will remain free from injury  Outcome: PROGRESSING AS EXPECTED   Patient is free of falls. Alarms on for safety. Patient is being repositioned every 2 hours for skin safety and comfort, patient also moves himself in bed occasionally.     Problem: Bowel/Gastric:  Goal: Normal bowel function is maintained or improved  Outcome: PROGRESSING AS EXPECTED  Patient had a bowel movement today, no stool softeners needed.

## 2021-03-27 NOTE — PROGRESS NOTES
"Hospital Medicine Daily Progress Note    Date of Service  3/27/2021    Chief Complaint  75 y.o. male admitted 3/11/2021 with seizure    Hospital Course  \"History of cerebellar CVA, seizure disorder, presented as a transfer from [facility] after he was found to have right parietal SDH without any midline shift's.  Witnessed seizure by family followed by changes in mental status.  Neurosurgery consulted Dr. Capps, no surgical intervention, recommend repeat CT head and frequent neuro checks.  Admit to the ICU every hour neuro checks, repeat head CT, keep SBP less than 140, continue Depakote and Keppra.  Due to ongoing encephalopathy, he had an EEG and was found to be in non-convulsive status epilepticus and was loaded with Vimpat.    Neurology was following case and he was treated with Vimpat 200mg BID, Keppra 1000mg BID, and Depacon 750mg QHS. No further seizure like activity. Patient had subtherapeutic Depakote level and Depacon was increased to 750mg BID.    Patient had prolonged waxing and waning mental status. Due to poor PO intake and intermittent refusal of PO intake, NGT was placed and started on alternative nutrition on 3/18.    Interval Problem Update    Arousable, oriented x 2  Following directions        Consultants/Specialty  Critical Care  Neurosurgery  Physiatry    Code Status  DNAR/DNI    Disposition  Will need SNF when medically cleared  palliative care consult, due to declining medical condition  Requiring CT for TF  Limit sedative rx    D/w pt's dtr, Cleo, considering transitioning to comfort care if pt's condition declines.  Palliative care following    - consider peg tube, palliative care to f/u        Review of Systems  Review of Systems   Unable to perform ROS: Mental status change        Physical Exam  Temp:  [36.3 °C (97.3 °F)-37 °C (98.6 °F)] 36.3 °C (97.3 °F)  Pulse:  [69-83] 69  Resp:  [16-18] 18  BP: (135-150)/(48-78) 137/68  SpO2:  [92 %-96 %] 95 %    Physical Exam  Vitals and nursing " note reviewed.   Constitutional:       General: He is not in acute distress.     Appearance: He is ill-appearing.      Comments: Lethargy, arousable   HENT:      Head: Normocephalic and atraumatic.      Nose: Nose normal.      Mouth/Throat:      Mouth: Mucous membranes are dry.   Eyes:      Conjunctiva/sclera: Conjunctivae normal.   Cardiovascular:      Rate and Rhythm: Normal rate.   Pulmonary:      Effort: Pulmonary effort is normal. No respiratory distress.      Comments: Decreased b/l  Abdominal:      General: Bowel sounds are normal. There is no distension.   Musculoskeletal:         General: No swelling.   Skin:     General: Skin is warm.   Neurological:      Motor: Weakness present.      Comments: Lethargy         Fluids    Intake/Output Summary (Last 24 hours) at 3/27/2021 1237  Last data filed at 3/27/2021 1200  Gross per 24 hour   Intake 3563 ml   Output 2370 ml   Net 1193 ml       Laboratory  Recent Labs     03/25/21  0143 03/26/21  0822   WBC 9.9 6.3   RBC 5.27 5.09   HEMOGLOBIN 14.7 14.3   HEMATOCRIT 46.8 44.9   MCV 88.8 88.2   MCH 27.9 28.1   MCHC 31.4* 31.8*   RDW 46.0 44.5   PLATELETCT 220 210   MPV 11.3 10.6     Recent Labs     03/25/21  0143 03/25/21  1517 03/26/21  0822   SODIUM 141 141 135   POTASSIUM 5.7* 5.1 4.1   CHLORIDE 104 104 102   CO2 28 29 26   GLUCOSE 134* 133* 126*   BUN 33* 32* 23*   CREATININE 1.45* 1.34 0.85   CALCIUM 9.0 8.8 8.8                   Imaging  DX-CHEST-PORTABLE (1 VIEW)   Final Result      1.  Cardiomegaly with increased interstitial opacity diffusely which may represent interstitial edema or pneumonia.      2.  No lobar consolidation.      [id number] VIEW   Final Result         Moderate amount of stool throughout the colon.      DX-ABDOMEN FOR TUBE PLACEMENT   Final Result      Enteric tube projects over the proximal stomach.      CT-HEAD W/O   Final Result      1.  Prior RIGHT frontal craniotomy with underlying dural thickening and subdural hemorrhage, unchanged  from prior exam.   2.  No new intracranial hemorrhage demonstrated.   3.  Diffuse atrophy with white matter microvascular ischemic changes.   4.  Small chronic RIGHT posterior frontal infarct.      [id number] VIEW   Final Result         No specific finding to suggest small bowel obstruction.      CT-HEAD W/O   Final Result         1.  Mixed right parietal subdural fluid collection likely acute on chronic subdural hematoma.   2.  Nonspecific white matter changes, commonly associated with small vessel ischemic disease.  Associated mild cerebral atrophy is noted.   3.  Atherosclerosis.      OUTSIDE IMAGES-CT HEAD   Final Result           Assessment/Plan  * Seizure disorder, nonconvulsive, with status epilepticus (HCC)- (present on admission)  Assessment & Plan  Noted on EEG on admission  He was loaded with IV Keppra, started on IV Vimpat, and IV depakote  Repeat EEG 3/15 without evidence of seizure and more consistent with encephalopathy  Appriciate neurology consult. Valproic acid level subtherapeutic and increased to 750mg BID by neurology on 3/16  Repeat valproic acid level 101 on 3/19, Depakote changed to 500mg QAM and 750mg QHS, discussed with Dr. Martínez  Repeat valproic acid level scheduled for 3/22  Outpatient follow up in the epilepsy clinic has been arranged by Dr. Mcfadden    If patient has continued seizure like activity or if his mentation appears to decline further, may need repeat EEG.    3/25 d/w dtr, aware of declining condition, palliative care to f/u  Consider transition to CC if condition further deteriorates  - encourage activity    3/27 stable    Encephalopathy acute- (present on admission)  Assessment & Plan  Acute encephalopathy likely multifactorial, persistent  In setting of dementia with behavioral disturbance, history of psychiatric disorder, CVA, and seizure disorder in status epilepticus on admission  Continue Seroquel 25mg TID prn for agitation, Seroquel 50mg QHS  on gabapentin  Appreciate  physiatry consult, monitoring  Patient requiring    3/25 limit sedative rx, gabapentin  D/w RN  Mentation improving, participating with PT    3/26 minimal participation with therapy, max assistance  Skilled referral when medically cleared  Continue conservative treatment    3/27  Palliative f/u, consider dc to home with snf on hospice vs home  Will f/u with dtr   Stable/improved    Subdural hematoma, acute (HCC)- (present on admission)  Assessment & Plan  Noted on CT head  Non-traumatic  Neurosurgery consulted, non-operative management  Avoid NSAIDs and anticoagulation    Hypokalemia  Assessment & Plan  Keep K > 4.  Monitor BMP.     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 Will initiate amlodipine 5mg for BP control       VTE prophylaxis: SCDs only secondary to SDH.

## 2021-03-27 NOTE — CARE PLAN
Problem: Safety  Goal: Will remain free from injury  Outcome: PROGRESSING AS EXPECTED   Bed locked and in lowest position.  Bed alarm on.  Treaded socks.  Call light and belongings with in reach.  Pt educated to call for assistance.  Hourly rounding in place.        Problem: Knowledge Deficit  Goal: Knowledge of disease process/condition, treatment plan, diagnostic tests, and medications will improve  Outcome: PROGRESSING AS EXPECTED   Discussed POC and medications with patient.  Reoriented as needed.

## 2021-03-27 NOTE — PROGRESS NOTES
Patient was able to have a Zoom meeting and visit with his daughter Cleo this afternoon. Patient was responsive and conversed with daughter. Cleo was very appreciative of the Zoom meeting. She will call back for updates later today or tomorrow.

## 2021-03-27 NOTE — PROGRESS NOTES
Completed daily phone call to designated support person, name Cleo  Discussed patient condition and plan of care. All questions answered.  Follow up requested: Daughter will call back later this evening to check in.

## 2021-03-28 PROCEDURE — 700102 HCHG RX REV CODE 250 W/ 637 OVERRIDE(OP): Performed by: INTERNAL MEDICINE

## 2021-03-28 PROCEDURE — 700102 HCHG RX REV CODE 250 W/ 637 OVERRIDE(OP): Performed by: STUDENT IN AN ORGANIZED HEALTH CARE EDUCATION/TRAINING PROGRAM

## 2021-03-28 PROCEDURE — 99232 SBSQ HOSP IP/OBS MODERATE 35: CPT | Performed by: INTERNAL MEDICINE

## 2021-03-28 PROCEDURE — 770006 HCHG ROOM/CARE - MED/SURG/GYN SEMI*

## 2021-03-28 PROCEDURE — A9270 NON-COVERED ITEM OR SERVICE: HCPCS | Performed by: STUDENT IN AN ORGANIZED HEALTH CARE EDUCATION/TRAINING PROGRAM

## 2021-03-28 PROCEDURE — A9270 NON-COVERED ITEM OR SERVICE: HCPCS | Performed by: INTERNAL MEDICINE

## 2021-03-28 RX ADMIN — DIVALPROEX SODIUM 500 MG: 125 CAPSULE ORAL at 04:16

## 2021-03-28 RX ADMIN — LEVETIRACETAM 1000 MG: 500 TABLET ORAL at 17:43

## 2021-03-28 RX ADMIN — Medication 5 MG: at 21:48

## 2021-03-28 RX ADMIN — DIVALPROEX SODIUM 750 MG: 125 CAPSULE ORAL at 17:42

## 2021-03-28 RX ADMIN — QUETIAPINE FUMARATE 25 MG: 25 TABLET ORAL at 04:16

## 2021-03-28 RX ADMIN — LEVETIRACETAM 1000 MG: 500 TABLET ORAL at 04:16

## 2021-03-28 RX ADMIN — QUETIAPINE FUMARATE 25 MG: 25 TABLET ORAL at 17:42

## 2021-03-28 RX ADMIN — AMLODIPINE BESYLATE 5 MG: 5 TABLET ORAL at 04:16

## 2021-03-28 RX ADMIN — DOCUSATE SODIUM 50 MG AND SENNOSIDES 8.6 MG 2 TABLET: 8.6; 5 TABLET, FILM COATED ORAL at 17:43

## 2021-03-28 RX ADMIN — GABAPENTIN 300 MG: 300 CAPSULE ORAL at 04:15

## 2021-03-28 RX ADMIN — DOCUSATE SODIUM 50 MG AND SENNOSIDES 8.6 MG 2 TABLET: 8.6; 5 TABLET, FILM COATED ORAL at 04:15

## 2021-03-28 RX ADMIN — GABAPENTIN 300 MG: 300 CAPSULE ORAL at 17:42

## 2021-03-28 RX ADMIN — SIMVASTATIN 40 MG: 20 TABLET, FILM COATED ORAL at 21:48

## 2021-03-28 RX ADMIN — LACOSAMIDE 200 MG: 100 TABLET, FILM COATED ORAL at 17:43

## 2021-03-28 RX ADMIN — LACOSAMIDE 200 MG: 100 TABLET, FILM COATED ORAL at 04:16

## 2021-03-28 NOTE — PROGRESS NOTES
Completed daily phone call to designated support person, name Cleo, daughter  Discussed patient condition and plan of care. All questions answered.  Follow up requested: Cleo will call for update.

## 2021-03-28 NOTE — PROGRESS NOTES
"Hospital Medicine Daily Progress Note    Date of Service  3/28/2021    Chief Complaint  75 y.o. male admitted 3/11/2021 with seizure    Hospital Course  \"History of cerebellar CVA, seizure disorder, presented as a transfer from [facility] after he was found to have right parietal SDH without any midline shift's.  Witnessed seizure by family followed by changes in mental status.  Neurosurgery consulted Dr. Capps, no surgical intervention, recommend repeat CT head and frequent neuro checks.  Admit to the ICU every hour neuro checks, repeat head CT, keep SBP less than 140, continue Depakote and Keppra.  Due to ongoing encephalopathy, he had an EEG and was found to be in non-convulsive status epilepticus and was loaded with Vimpat.    Neurology was following case and he was treated with Vimpat 200mg BID, Keppra 1000mg BID, and Depacon 750mg QHS. No further seizure like activity. Patient had subtherapeutic Depakote level and Depacon was increased to 750mg BID.    Patient had prolonged waxing and waning mental status. Due to poor PO intake and intermittent refusal of PO intake, NGT was placed and started on alternative nutrition on 3/18.    Interval Problem Update    More alert today  As per staff, carrying on a conversation with patient's daughter last evening  Following some commands  Still noted repetitive behavior  Seroquel as needed  Speech therapy to reevaluate in 1 to 2 days      Consultants/Specialty  Critical Care  Neurosurgery  Physiatry    Code Status  DNAR/DNI    Disposition  Will need SNF when medically cleared  palliative care consult, due to declining medical condition  Requiring CT for TF  Limit sedative rx    D/w pt's dtr, Cleo, considering transitioning to comfort care if pt's condition declines.  Palliative care following    - consider peg tube, palliative care to f/u        Review of Systems  Review of Systems   Unable to perform ROS: Mental status change        Physical Exam  Temp:  [36.1 °C (97 " °F)-37 °C (98.6 °F)] 36.1 °C (97 °F)  Pulse:  [63-93] 63  Resp:  [16-18] 17  BP: (129-145)/(49-80) 132/49  SpO2:  [93 %-97 %] 96 %    Physical Exam  Vitals and nursing note reviewed.   Constitutional:       General: He is not in acute distress.     Appearance: He is ill-appearing. He is not diaphoretic.      Comments: Lethargy, arousable   HENT:      Head: Normocephalic and atraumatic.      Nose: Nose normal.      Mouth/Throat:      Mouth: Mucous membranes are dry.   Eyes:      Conjunctiva/sclera: Conjunctivae normal.   Cardiovascular:      Rate and Rhythm: Normal rate.   Pulmonary:      Effort: Pulmonary effort is normal. No respiratory distress.   Abdominal:      General: Bowel sounds are normal. There is no distension.      Tenderness: There is no abdominal tenderness.   Musculoskeletal:         General: No swelling or tenderness.   Skin:     General: Skin is warm.   Neurological:      Motor: Weakness present.      Comments: Lethargy         Fluids    Intake/Output Summary (Last 24 hours) at 3/28/2021 1320  Last data filed at 3/28/2021 1220  Gross per 24 hour   Intake 2098 ml   Output 1680 ml   Net 418 ml       Laboratory  Recent Labs     03/26/21  0822   WBC 6.3   RBC 5.09   HEMOGLOBIN 14.3   HEMATOCRIT 44.9   MCV 88.2   MCH 28.1   MCHC 31.8*   RDW 44.5   PLATELETCT 210   MPV 10.6     Recent Labs     03/25/21  1517 03/26/21  0822   SODIUM 141 135   POTASSIUM 5.1 4.1   CHLORIDE 104 102   CO2 29 26   GLUCOSE 133* 126*   BUN 32* 23*   CREATININE 1.34 0.85   CALCIUM 8.8 8.8                   Imaging  DX-CHEST-PORTABLE (1 VIEW)   Final Result      1.  Cardiomegaly with increased interstitial opacity diffusely which may represent interstitial edema or pneumonia.      2.  No lobar consolidation.      PF-GYGBHER-9 VIEW   Final Result         Moderate amount of stool throughout the colon.      DX-ABDOMEN FOR TUBE PLACEMENT   Final Result      Enteric tube projects over the proximal stomach.      CT-HEAD W/O   Final  Result      1.  Prior RIGHT frontal craniotomy with underlying dural thickening and subdural hemorrhage, unchanged from prior exam.   2.  No new intracranial hemorrhage demonstrated.   3.  Diffuse atrophy with white matter microvascular ischemic changes.   4.  Small chronic RIGHT posterior frontal infarct.      MY-NGXCLOP-5 VIEW   Final Result         No specific finding to suggest small bowel obstruction.      CT-HEAD W/O   Final Result         1.  Mixed right parietal subdural fluid collection likely acute on chronic subdural hematoma.   2.  Nonspecific white matter changes, commonly associated with small vessel ischemic disease.  Associated mild cerebral atrophy is noted.   3.  Atherosclerosis.      OUTSIDE IMAGES-CT HEAD   Final Result           Assessment/Plan  * Seizure disorder, nonconvulsive, with status epilepticus (HCC)- (present on admission)  Assessment & Plan  Noted on EEG on admission  He was loaded with IV Keppra, started on IV Vimpat, and IV depakote  Repeat EEG 3/15 without evidence of seizure and more consistent with encephalopathy  Appriciate neurology consult. Valproic acid level subtherapeutic and increased to 750mg BID by neurology on 3/16  Repeat valproic acid level 101 on 3/19, Depakote changed to 500mg QAM and 750mg QHS, discussed with Dr. Martínez  Repeat valproic acid level scheduled for 3/22  Outpatient follow up in the epilepsy clinic has been arranged by Dr. Mcfadden    If patient has continued seizure like activity or if his mentation appears to decline further, may need repeat EEG.    3/25 d/w dtr, aware of declining condition, palliative care to f/u  Consider transition to CC if condition further deteriorates  - encourage activity    3/27 stable    Encephalopathy acute- (present on admission)  Assessment & Plan  Acute encephalopathy likely multifactorial, persistent  In setting of dementia with behavioral disturbance, history of psychiatric disorder, CVA, and seizure disorder in status  epilepticus on admission  Continue Seroquel 25mg TID prn for agitation, Seroquel 50mg QHS  on gabapentin  Appreciate physiatry consult, monitoring  Patient requiring    3/25 limit sedative rx, gabapentin  D/w RN  Mentation improving, participating with PT    3/26 minimal participation with therapy, max assistance  Skilled referral when medically cleared  Continue conservative treatment    3/27  Palliative f/u, consider dc to home with snf on hospice vs home  Will f/u with dtr   Stable/improved    3/28 on seroquel, restraints renewed for intermittent agitation  - taper off retsraints as tolerated without excessive sedation    Subdural hematoma, acute (HCC)- (present on admission)  Assessment & Plan  Noted on CT head  Non-traumatic  Neurosurgery consulted, non-operative management  Avoid NSAIDs and anticoagulation    Hypokalemia  Assessment & Plan  Keep K > 4.  Monitor BMP.     Dysphagia  Assessment & Plan  Dysphagia likely secondary to encephalopathy  Patient intermittently tolerates PO intake, however at times refuses both food and medication  NGT 3/18 placed for nutrition and medication administration, tube feeds currently at goal    3/24 with vomiting, TF held  - f/u abd/cxr, eval for aspiration  F/u stat labs  Palliative care consult, assist with determining goals of care    3/25 abd xray with mod stool  Vomiting resolved, TF resumed at slow rate  CXR, mild edema, procal neg    3/28  slp following, may need peg tube  Palliative to f/u with family regarding goals of care, snf referral  CM to assist      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 Will initiate amlodipine 5mg for BP control       VTE prophylaxis: SCDs only secondary to SDH.

## 2021-03-28 NOTE — CARE PLAN
Problem: Safety  Goal: Will remain free from injury  Outcome: PROGRESSING AS EXPECTED  Free from falls/injury; precautions in place     Problem: Infection  Goal: Will remain free from infection  Outcome: PROGRESSING AS EXPECTED   Free from signs of infection     Problem: Communication  Goal: The ability to communicate needs accurately and effectively will improve  Outcome: PROGRESSING SLOWER THAN EXPECTED  Unable to communicate needs to staff 2/2 dementia baseline and global aphasia

## 2021-03-28 NOTE — PROGRESS NOTES
Patient received PRN dose of Seroquel for agitation and restlessness. Patient was attempting to get out of bed, unable to follow redirection. Attempted to reposition patient, listen to music, and have conversation to calm him down. Patient continued to attempt to put his legs over the side of the bed.

## 2021-03-29 LAB
ANION GAP SERPL CALC-SCNC: 7 MMOL/L (ref 7–16)
BASOPHILS # BLD AUTO: 0.9 % (ref 0–1.8)
BASOPHILS # BLD: 0.05 K/UL (ref 0–0.12)
BUN SERPL-MCNC: 23 MG/DL (ref 8–22)
CALCIUM SERPL-MCNC: 9.2 MG/DL (ref 8.5–10.5)
CHLORIDE SERPL-SCNC: 101 MMOL/L (ref 96–112)
CO2 SERPL-SCNC: 26 MMOL/L (ref 20–33)
CREAT SERPL-MCNC: 0.99 MG/DL (ref 0.5–1.4)
CRP SERPL HS-MCNC: 0.84 MG/DL (ref 0–0.75)
EOSINOPHIL # BLD AUTO: 0.2 K/UL (ref 0–0.51)
EOSINOPHIL NFR BLD: 3.7 % (ref 0–6.9)
ERYTHROCYTE [DISTWIDTH] IN BLOOD BY AUTOMATED COUNT: 43.8 FL (ref 35.9–50)
GLUCOSE SERPL-MCNC: 102 MG/DL (ref 65–99)
HCT VFR BLD AUTO: 47.3 % (ref 42–52)
HGB BLD-MCNC: 15.7 G/DL (ref 14–18)
IMM GRANULOCYTES # BLD AUTO: 0.06 K/UL (ref 0–0.11)
IMM GRANULOCYTES NFR BLD AUTO: 1.1 % (ref 0–0.9)
LYMPHOCYTES # BLD AUTO: 1.08 K/UL (ref 1–4.8)
LYMPHOCYTES NFR BLD: 20 % (ref 22–41)
MCH RBC QN AUTO: 28.4 PG (ref 27–33)
MCHC RBC AUTO-ENTMCNC: 33.2 G/DL (ref 33.7–35.3)
MCV RBC AUTO: 85.7 FL (ref 81.4–97.8)
MONOCYTES # BLD AUTO: 1 K/UL (ref 0–0.85)
MONOCYTES NFR BLD AUTO: 18.5 % (ref 0–13.4)
NEUTROPHILS # BLD AUTO: 3.01 K/UL (ref 1.82–7.42)
NEUTROPHILS NFR BLD: 55.8 % (ref 44–72)
NRBC # BLD AUTO: 0 K/UL
NRBC BLD-RTO: 0 /100 WBC
PLATELET # BLD AUTO: 246 K/UL (ref 164–446)
PMV BLD AUTO: 10.1 FL (ref 9–12.9)
POTASSIUM SERPL-SCNC: 4 MMOL/L (ref 3.6–5.5)
PREALB SERPL-MCNC: 25 MG/DL (ref 18–38)
RBC # BLD AUTO: 5.52 M/UL (ref 4.7–6.1)
SODIUM SERPL-SCNC: 134 MMOL/L (ref 135–145)
WBC # BLD AUTO: 5.4 K/UL (ref 4.8–10.8)

## 2021-03-29 PROCEDURE — A9270 NON-COVERED ITEM OR SERVICE: HCPCS | Performed by: STUDENT IN AN ORGANIZED HEALTH CARE EDUCATION/TRAINING PROGRAM

## 2021-03-29 PROCEDURE — 97112 NEUROMUSCULAR REEDUCATION: CPT

## 2021-03-29 PROCEDURE — 97530 THERAPEUTIC ACTIVITIES: CPT

## 2021-03-29 PROCEDURE — A9270 NON-COVERED ITEM OR SERVICE: HCPCS | Performed by: INTERNAL MEDICINE

## 2021-03-29 PROCEDURE — 700102 HCHG RX REV CODE 250 W/ 637 OVERRIDE(OP): Performed by: INTERNAL MEDICINE

## 2021-03-29 PROCEDURE — 80048 BASIC METABOLIC PNL TOTAL CA: CPT

## 2021-03-29 PROCEDURE — 770006 HCHG ROOM/CARE - MED/SURG/GYN SEMI*

## 2021-03-29 PROCEDURE — 97535 SELF CARE MNGMENT TRAINING: CPT

## 2021-03-29 PROCEDURE — 36415 COLL VENOUS BLD VENIPUNCTURE: CPT

## 2021-03-29 PROCEDURE — 85025 COMPLETE CBC W/AUTO DIFF WBC: CPT

## 2021-03-29 PROCEDURE — 84134 ASSAY OF PREALBUMIN: CPT

## 2021-03-29 PROCEDURE — 700102 HCHG RX REV CODE 250 W/ 637 OVERRIDE(OP): Performed by: STUDENT IN AN ORGANIZED HEALTH CARE EDUCATION/TRAINING PROGRAM

## 2021-03-29 PROCEDURE — 86140 C-REACTIVE PROTEIN: CPT

## 2021-03-29 PROCEDURE — 99232 SBSQ HOSP IP/OBS MODERATE 35: CPT | Performed by: INTERNAL MEDICINE

## 2021-03-29 RX ADMIN — MAGNESIUM HYDROXIDE 30 ML: 400 SUSPENSION ORAL at 17:02

## 2021-03-29 RX ADMIN — DOCUSATE SODIUM 50 MG AND SENNOSIDES 8.6 MG 2 TABLET: 8.6; 5 TABLET, FILM COATED ORAL at 04:59

## 2021-03-29 RX ADMIN — QUETIAPINE FUMARATE 25 MG: 25 TABLET ORAL at 22:34

## 2021-03-29 RX ADMIN — LEVETIRACETAM 1000 MG: 500 TABLET ORAL at 17:02

## 2021-03-29 RX ADMIN — DIVALPROEX SODIUM 500 MG: 125 CAPSULE ORAL at 04:59

## 2021-03-29 RX ADMIN — DOCUSATE SODIUM 50 MG AND SENNOSIDES 8.6 MG 2 TABLET: 8.6; 5 TABLET, FILM COATED ORAL at 17:02

## 2021-03-29 RX ADMIN — LEVETIRACETAM 1000 MG: 500 TABLET ORAL at 04:59

## 2021-03-29 RX ADMIN — SIMVASTATIN 40 MG: 20 TABLET, FILM COATED ORAL at 20:39

## 2021-03-29 RX ADMIN — POLYETHYLENE GLYCOL 3350 1 PACKET: 17 POWDER, FOR SOLUTION ORAL at 08:59

## 2021-03-29 RX ADMIN — DIVALPROEX SODIUM 750 MG: 125 CAPSULE ORAL at 17:02

## 2021-03-29 RX ADMIN — GABAPENTIN 300 MG: 300 CAPSULE ORAL at 04:59

## 2021-03-29 RX ADMIN — LACOSAMIDE 200 MG: 100 TABLET, FILM COATED ORAL at 17:02

## 2021-03-29 RX ADMIN — GABAPENTIN 300 MG: 300 CAPSULE ORAL at 17:02

## 2021-03-29 RX ADMIN — LACOSAMIDE 200 MG: 100 TABLET, FILM COATED ORAL at 04:59

## 2021-03-29 RX ADMIN — Medication 5 MG: at 20:39

## 2021-03-29 RX ADMIN — AMLODIPINE BESYLATE 5 MG: 5 TABLET ORAL at 04:59

## 2021-03-29 ASSESSMENT — COGNITIVE AND FUNCTIONAL STATUS - GENERAL
EATING MEALS: TOTAL
TURNING FROM BACK TO SIDE WHILE IN FLAT BAD: UNABLE
WALKING IN HOSPITAL ROOM: TOTAL
MOBILITY SCORE: 7
SUGGESTED CMS G CODE MODIFIER MOBILITY: CM
DAILY ACTIVITIY SCORE: 7
PERSONAL GROOMING: A LOT
STANDING UP FROM CHAIR USING ARMS: A LOT
MOVING TO AND FROM BED TO CHAIR: UNABLE
CLIMB 3 TO 5 STEPS WITH RAILING: TOTAL
SUGGESTED CMS G CODE MODIFIER DAILY ACTIVITY: CM
MOVING FROM LYING ON BACK TO SITTING ON SIDE OF FLAT BED: UNABLE
HELP NEEDED FOR BATHING: TOTAL
TOILETING: TOTAL
DRESSING REGULAR LOWER BODY CLOTHING: TOTAL
DRESSING REGULAR UPPER BODY CLOTHING: TOTAL

## 2021-03-29 ASSESSMENT — GAIT ASSESSMENTS: GAIT LEVEL OF ASSIST: UNABLE TO PARTICIPATE

## 2021-03-29 NOTE — CARE PLAN
Problem: Communication  Goal: The ability to communicate needs accurately and effectively will improve  Outcome: PROGRESSING SLOWER THAN EXPECTED  Note: Pt has expressive aphasia impairing his ability to communicate appropriately. Pt appears to be resting comfortably.      Problem: Safety  Goal: Will remain free from falls  Outcome: PROGRESSING AS EXPECTED  Note: Bed is locked and in lowest position, patient has non slip socks, call light and phone are within reach, bed alarm is on, hourly rounding in place.

## 2021-03-29 NOTE — DISCHARGE PLANNING
Anticipated Discharge Disposition: SNF    Action: Discussed in IDT rounds; pt pending medical clearance for SNF placement. MD to discuss goals of care and PEG tube needs with pt's daughter.    Barriers to Discharge:   Medical clearance  PEG tube placement decision  SNF acceptance     Plan: Care coordination will follow up on SNF referrals to assist with placement.

## 2021-03-29 NOTE — PROGRESS NOTES
"Hospital Medicine Daily Progress Note    Date of Service  3/29/2021    Chief Complaint  75 y.o. male admitted 3/11/2021 with seizure    Hospital Course  \"History of cerebellar CVA, seizure disorder, presented as a transfer from [facility] after he was found to have right parietal SDH without any midline shift's.  Witnessed seizure by family followed by changes in mental status.  Neurosurgery consulted Dr. Capps, no surgical intervention, recommend repeat CT head and frequent neuro checks.  Admit to the ICU every hour neuro checks, repeat head CT, keep SBP less than 140, continue Depakote and Keppra.  Due to ongoing encephalopathy, he had an EEG and was found to be in non-convulsive status epilepticus and was loaded with Vimpat.    Neurology was following case and he was treated with Vimpat 200mg BID, Keppra 1000mg BID, and Depacon 750mg QHS. No further seizure like activity. Patient had subtherapeutic Depakote level and Depacon was increased to 750mg BID.    Patient had prolonged waxing and waning mental status. Due to poor PO intake and intermittent refusal of PO intake, NGT was placed and started on alternative nutrition on 3/18.    Interval Problem Update    Awake, not following commands  Still requiring core tract tube feeds, will follow-up with patient's daughter regarding goals of care and PEG tube needs  Palliative care to assist    Still has mitts as restraints, intermittently impulsive, easily reoriented      Consultants/Specialty  Critical Care  Neurosurgery  Physiatry    Code Status  DNAR/DNI    Disposition  Will need SNF when medically cleared    Requiring CT for TF  Limit sedative rx    D/w pt's dtr, Cleo, considering transitioning to comfort care if pt's condition declines.  Palliative care following    - consider peg tube, palliative care to f/u        Review of Systems  Review of Systems   Unable to perform ROS: Mental status change        Physical Exam  Temp:  [36.1 °C (97 °F)-37 °C (98.6 °F)] " 37 °C (98.6 °F)  Pulse:  [66-94] 94  Resp:  [16-18] 18  BP: (116-145)/(42-64) 145/64  SpO2:  [92 %-98 %] 92 %    Physical Exam  Vitals and nursing note reviewed.   Constitutional:       General: He is not in acute distress.     Appearance: He is ill-appearing.      Comments: Lethargy, arousable   HENT:      Head: Normocephalic and atraumatic.      Nose: Nose normal.      Mouth/Throat:      Mouth: Mucous membranes are dry.   Eyes:      Conjunctiva/sclera: Conjunctivae normal.   Cardiovascular:      Rate and Rhythm: Normal rate.   Pulmonary:      Effort: Pulmonary effort is normal. No respiratory distress.   Abdominal:      General: Bowel sounds are normal. There is no distension.      Tenderness: There is no abdominal tenderness.   Musculoskeletal:         General: No tenderness.   Skin:     General: Skin is warm.   Neurological:      Cranial Nerves: No cranial nerve deficit.      Sensory: No sensory deficit.      Motor: Weakness present.      Comments: Lethargy         Fluids    Intake/Output Summary (Last 24 hours) at 3/29/2021 1136  Last data filed at 3/29/2021 0800  Gross per 24 hour   Intake 900 ml   Output 1500 ml   Net -600 ml       Laboratory  Recent Labs     03/29/21  0615   WBC 5.4   RBC 5.52   HEMOGLOBIN 15.7   HEMATOCRIT 47.3   MCV 85.7   MCH 28.4   MCHC 33.2*   RDW 43.8   PLATELETCT 246   MPV 10.1     Recent Labs     03/29/21  0615   SODIUM 134*   POTASSIUM 4.0   CHLORIDE 101   CO2 26   GLUCOSE 102*   BUN 23*   CREATININE 0.99   CALCIUM 9.2                   Imaging  DX-CHEST-PORTABLE (1 VIEW)   Final Result      1.  Cardiomegaly with increased interstitial opacity diffusely which may represent interstitial edema or pneumonia.      2.  No lobar consolidation.      [id number]-5 VIEW   Final Result         Moderate amount of stool throughout the colon.      DX-ABDOMEN FOR TUBE PLACEMENT   Final Result      Enteric tube projects over the proximal stomach.      CT-HEAD W/O   Final Result      1.  Prior RIGHT  frontal craniotomy with underlying dural thickening and subdural hemorrhage, unchanged from prior exam.   2.  No new intracranial hemorrhage demonstrated.   3.  Diffuse atrophy with white matter microvascular ischemic changes.   4.  Small chronic RIGHT posterior frontal infarct.      [id number]-7 VIEW   Final Result         No specific finding to suggest small bowel obstruction.      CT-HEAD W/O   Final Result         1.  Mixed right parietal subdural fluid collection likely acute on chronic subdural hematoma.   2.  Nonspecific white matter changes, commonly associated with small vessel ischemic disease.  Associated mild cerebral atrophy is noted.   3.  Atherosclerosis.      OUTSIDE IMAGES-CT HEAD   Final Result           Assessment/Plan  * Seizure disorder, nonconvulsive, with status epilepticus (HCC)- (present on admission)  Assessment & Plan  Noted on EEG on admission  He was loaded with IV Keppra, started on IV Vimpat, and IV depakote  Repeat EEG 3/15 without evidence of seizure and more consistent with encephalopathy  Appriciate neurology consult. Valproic acid level subtherapeutic and increased to 750mg BID by neurology on 3/16  Repeat valproic acid level 101 on 3/19, Depakote changed to 500mg QAM and 750mg QHS, discussed with Dr. Martínez  Repeat valproic acid level scheduled for 3/22  Outpatient follow up in the epilepsy clinic has been arranged by Dr. Mcfadden    If patient has continued seizure like activity or if his mentation appears to decline further, may need repeat EEG.    3/25 d/w dtr, aware of declining condition, palliative care to f/u  Consider transition to CC if condition further deteriorates  - encourage activity    3/27 stable    Encephalopathy acute- (present on admission)  Assessment & Plan  Acute encephalopathy likely multifactorial, persistent  In setting of dementia with behavioral disturbance, history of psychiatric disorder, CVA, and seizure disorder in status epilepticus on  admission  Continue Seroquel 25mg TID prn for agitation, Seroquel 50mg QHS  on gabapentin  Appreciate physiatry consult, monitoring  Patient requiring    3/25 limit sedative rx, gabapentin  D/w RN  Mentation improving, participating with PT    3/26 minimal participation with therapy, max assistance  Skilled referral when medically cleared  Continue conservative treatment    3/27  Palliative f/u, consider dc to home with snf on hospice vs home  Will f/u with dtr   Stable/improved    3/28 on seroquel, restraints renewed for intermittent agitation  - taper off retsraints as tolerated without excessive sedation    Subdural hematoma, acute (HCC)- (present on admission)  Assessment & Plan  Noted on CT head  Non-traumatic  Neurosurgery consulted, non-operative management  Avoid NSAIDs and anticoagulation    Hypokalemia  Assessment & Plan  Keep K > 4.  Monitor BMP.     Dysphagia  Assessment & Plan  Dysphagia likely secondary to encephalopathy  Patient intermittently tolerates PO intake, however at times refuses both food and medication  NGT 3/18 placed for nutrition and medication administration, tube feeds currently at goal    3/24 with vomiting, TF held  - f/u abd/cxr, eval for aspiration  F/u stat labs  Palliative care consult, assist with determining goals of care    3/25 abd xray with mod stool  Vomiting resolved, TF resumed at slow rate  CXR, mild edema, procal neg    3/28  slp following, may need peg tube  Palliative to f/u with family regarding goals of care, snf referral  CM to assist    3/29 slp reeval, palliative to f/u with dtr regarding peg placement and hospice  snf referral      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 Will initiate amlodipine 5mg for BP control       VTE prophylaxis: SCDs only secondary to SDH.

## 2021-03-29 NOTE — THERAPY
Physical Therapy   Daily Treatment     Patient Name: Rafael Mccoy  Age:  75 y.o., Sex:  male  Medical Record #: [id number]  Today's Date: 3/29/2021     Precautions: Fall Risk, Swallow Precautions     Assessment    Pt with slight improvement in ability to participate today compared to previous sessions. He was more talkative with therapist today and followed commands ~50% of the time. Slight reduction in BLE rigidity resulting in improved fxn of LEs today. He conts to require max A to negotiate bed mob. Initial posterior lean in sitting which improved after STS. Strong posterior lean in standing, slight improvement with anterior grasp on FWW. He required mod A to initiate lateral wt shifting for side step transfer with quick fatigue. Given limited participation documented on several visits, will decrease frequency to 3x/wk and plan to increase as he is able to participate more in acute skilled PT.     Recommend OOB daily with nsg staff to recliner chair.    Plan    Treatment plan modified to 3 times per week until therapy goals are met for the following treatments:  Bed Mobility, Gait Training, Neuro Re-Education / Balance, Therapeutic Activities and Therapeutic Exercises.    DC Equipment Recommendations:  Unable to determine at this time  Discharge Recommendations:  Recommend post-acute placement for additional physical therapy services prior to discharge home     Objective     03/29/21 1324   Cognition    Speech/ Communication Delayed Responses   Level of Consciousness Lethargic   Ability To Follow Commands 1 Step (~50% of commands)   New Learning Impaired   Attention Impaired   Sequencing Impaired   Initiation Impaired   Comments slight improvement in mentation today, more conversant than previous sessions, able to follow commands for LEs once in chair ~50-60% of the time   Strength Lower Body   Comments has adequate strength for WB   Lower Body Muscle Tone   Comments reduced rigidity today compared to previous  sessions resulting in improved fxn   Sitting Lower Body Exercises   Long Arc Quad (1 set of 5 reps each)   Neurological Concerns   Sitting Posture During ADL's Posterior Lean   Standing Posture During ADL's Posterior Lean   Balance   Sitting Balance (Static) Poor   Sitting Balance (Dynamic) Poor -   Standing Balance (Static) Trace +   Standing Balance (Dynamic) Trace   Comments required mod assist to facilitate wt shifting in order to side step for transfer   Gait Analysis   Gait Level Of Assist Unable to Participate (side steps for transfer only)   Weight Bearing Status no restrctions   Bed Mobility    Supine to Sit Maximal Assist   Sit to Supine (NT - up in chair post session)   Scooting Maximal Assist   Functional Mobility   Sit to Stand Moderate Assist (progressed toward min A with FWW on 2nd attempt)   Bed, Chair, Wheelchair Transfer Moderate Assist   Short Term Goals    Short Term Goal # 1 Pt will be able to perform supine<>sit with HOB flat/no rails with Spv in 6tx to promote fnx progression towards I    Goal Outcome # 1 goal not met   Short Term Goal # 2 Pt will be able to perform sit<>stand/transfers with min A in 6tx to promote fnx progression towards I    Goal Outcome # 2 Goal not met   Short Term Goal # 3 Pt will be able to ambulate 25ft with FWW with min A in 6tx to promote fnx progression towards I    Goal Outcome # 3 Goal not met

## 2021-03-29 NOTE — PALLIATIVE CARE
"Palliative Care follow-up  PC RN received phone call from pt's daughter/DPDEDRICK Gallo. Cleo explains that MD had discussion today about PEG placement and overall goals of care. Cleo feels undecided about whether PEG aligns with pt's goals. Cleo states that pt has had some improvement the past couple of days and doesn't want to \"give up\" on her father if he has chance to make improvement. Cleo does feel that pt would prefer to be home rather than a facility, but wonders if SNF would provide him benefit. PC RN explained that PEG tube still carries risk of aspiration and due to pt's complex medical picture, pt it still at high risk of re-admission and complications. PC RN discusses hospice, focusing on symptom management, and eating despite risk rather than treatment as alternate course to SNF and PEG. Cleo plans to discuss with her sister and the patient if he is able to participate during her visit tonight. Plan for PC RN to follow up with Cleo tomorrow afternoon. All questions answered and support provided.       Updated: SHAI RN    Plan: PC RN will follow and support. Plan to call pt's dtr Cleo for further GOC at 3pm tomorrow.     Thank you for allowing Palliative Care to support this patient and family. Contact [phone] for additional assistance, change in patient status, or with any questions/concerns.   "

## 2021-03-29 NOTE — CARE PLAN
Problem: Safety  Goal: Will remain free from falls  Outcome: PROGRESSING AS EXPECTED  Note: Bed alarm on. Bed in lowest, locked position. Call light and belongings within reach. Three bedrails up. Will continue hourly rounding.      Problem: Communication  Goal: The ability to communicate needs accurately and effectively will improve  Outcome: PROGRESSING SLOWER THAN EXPECTED  Note: Pt at risk for impaired communication due to expressive aphasia. Pt does best with y/n questions. All needs met at this time. Pt resting comfortably in bed.

## 2021-03-29 NOTE — CARE PLAN
Problem: Fluid Volume:  Goal: Will maintain balanced intake and output  Outcome: PROGRESSING AS EXPECTED   IV fluids discontinued, patient had a wet cough this morning and lungs had fine crackles in bilateral lobes. TF continues at 70 ml with every 4 hour 150 ml flushes. Patient is having adequate urine output.     Problem: Skin Integrity  Goal: Risk for impaired skin integrity will decrease  Outcome: PROGRESSING AS EXPECTED   Patient is being repositioned every 2 hours for skin safety and comfort. Patient is also able to shift hisself in bed frequently. HOB remains at 30 degrees or higher.

## 2021-03-30 PROBLEM — E87.6 HYPOKALEMIA: Status: RESOLVED | Noted: 2021-03-11 | Resolved: 2021-03-30

## 2021-03-30 PROCEDURE — 99232 SBSQ HOSP IP/OBS MODERATE 35: CPT | Performed by: INTERNAL MEDICINE

## 2021-03-30 PROCEDURE — 700105 HCHG RX REV CODE 258: Performed by: INTERNAL MEDICINE

## 2021-03-30 PROCEDURE — 700102 HCHG RX REV CODE 250 W/ 637 OVERRIDE(OP): Performed by: INTERNAL MEDICINE

## 2021-03-30 PROCEDURE — A9270 NON-COVERED ITEM OR SERVICE: HCPCS | Performed by: INTERNAL MEDICINE

## 2021-03-30 PROCEDURE — 770006 HCHG ROOM/CARE - MED/SURG/GYN SEMI*

## 2021-03-30 PROCEDURE — 700102 HCHG RX REV CODE 250 W/ 637 OVERRIDE(OP): Performed by: STUDENT IN AN ORGANIZED HEALTH CARE EDUCATION/TRAINING PROGRAM

## 2021-03-30 PROCEDURE — A9270 NON-COVERED ITEM OR SERVICE: HCPCS | Performed by: STUDENT IN AN ORGANIZED HEALTH CARE EDUCATION/TRAINING PROGRAM

## 2021-03-30 PROCEDURE — 97530 THERAPEUTIC ACTIVITIES: CPT

## 2021-03-30 RX ADMIN — GABAPENTIN 300 MG: 300 CAPSULE ORAL at 17:04

## 2021-03-30 RX ADMIN — LEVETIRACETAM 1000 MG: 500 TABLET ORAL at 17:04

## 2021-03-30 RX ADMIN — DIVALPROEX SODIUM 750 MG: 125 CAPSULE ORAL at 17:03

## 2021-03-30 RX ADMIN — Medication 5 MG: at 21:16

## 2021-03-30 RX ADMIN — GABAPENTIN 300 MG: 300 CAPSULE ORAL at 04:33

## 2021-03-30 RX ADMIN — DOCUSATE SODIUM 50 MG AND SENNOSIDES 8.6 MG 2 TABLET: 8.6; 5 TABLET, FILM COATED ORAL at 04:33

## 2021-03-30 RX ADMIN — DOCUSATE SODIUM 50 MG AND SENNOSIDES 8.6 MG 2 TABLET: 8.6; 5 TABLET, FILM COATED ORAL at 17:04

## 2021-03-30 RX ADMIN — DIVALPROEX SODIUM 500 MG: 125 CAPSULE ORAL at 04:33

## 2021-03-30 RX ADMIN — SIMVASTATIN 40 MG: 20 TABLET, FILM COATED ORAL at 21:16

## 2021-03-30 RX ADMIN — LACOSAMIDE 200 MG: 100 TABLET, FILM COATED ORAL at 17:04

## 2021-03-30 RX ADMIN — AMLODIPINE BESYLATE 5 MG: 5 TABLET ORAL at 04:33

## 2021-03-30 RX ADMIN — LACOSAMIDE 200 MG: 100 TABLET, FILM COATED ORAL at 04:33

## 2021-03-30 RX ADMIN — LEVETIRACETAM 1000 MG: 500 TABLET ORAL at 04:33

## 2021-03-30 RX ADMIN — SODIUM CHLORIDE: 9 INJECTION, SOLUTION INTRAVENOUS at 05:33

## 2021-03-30 RX ADMIN — QUETIAPINE FUMARATE 25 MG: 25 TABLET ORAL at 15:00

## 2021-03-30 ASSESSMENT — COGNITIVE AND FUNCTIONAL STATUS - GENERAL
HELP NEEDED FOR BATHING: TOTAL
EATING MEALS: TOTAL
PERSONAL GROOMING: A LOT
TOILETING: TOTAL
SUGGESTED CMS G CODE MODIFIER DAILY ACTIVITY: CM
DRESSING REGULAR LOWER BODY CLOTHING: TOTAL
DRESSING REGULAR UPPER BODY CLOTHING: TOTAL
DAILY ACTIVITIY SCORE: 7

## 2021-03-30 NOTE — CARE PLAN
Problem: Safety  Goal: Will remain free from injury  Outcome: PROGRESSING AS EXPECTED  Goal: Will remain free from falls  Outcome: PROGRESSING AS EXPECTED  Note: Free of falls.  Fall precautions in place.       Problem: Safety:  Goal: Will remain free from injury  Outcome: PROGRESSING AS EXPECTED     Problem: Safety:  Goal: Will remain free from injury  Outcome: PROGRESSING AS EXPECTED     Problem: Communication  Goal: The ability to communicate needs accurately and effectively will improve  Outcome: PROGRESSING SLOWER THAN EXPECTED  Note: Patient confused and cannot make his needs known.

## 2021-03-30 NOTE — CARE PLAN
Problem: Communication  Goal: The ability to communicate needs accurately and effectively will improve  Outcome: PROGRESSING AS EXPECTED     Problem: Safety  Goal: Will remain free from falls  Outcome: PROGRESSING AS EXPECTED     Problem: Bowel/Gastric:  Goal: Normal bowel function is maintained or improved  Outcome: PROGRESSING AS EXPECTED     Problem: Skin Integrity  Goal: Risk for impaired skin integrity will decrease  Outcome: PROGRESSING AS EXPECTED     Problem: Pain Management  Goal: Pain level will decrease to patient's comfort goal  Outcome: PROGRESSING AS EXPECTED

## 2021-03-30 NOTE — PROGRESS NOTES
"Hospital Medicine Daily Progress Note    Date of Service  3/30/2021    Chief Complaint  75 y.o. male admitted 3/11/2021 with seizure    Hospital Course  \"History of cerebellar CVA, seizure disorder, presented as a transfer from [facility] after he was found to have right parietal SDH without any midline shift's.  Witnessed seizure by family followed by changes in mental status.  Neurosurgery consulted Dr. Capps, no surgical intervention, recommend repeat CT head and frequent neuro checks.  Admit to the ICU every hour neuro checks, repeat head CT, keep SBP less than 140, continue Depakote and Keppra.  Due to ongoing encephalopathy, he had an EEG and was found to be in non-convulsive status epilepticus and was loaded with Vimpat.    Neurology was following case and he was treated with Vimpat 200mg BID, Keppra 1000mg BID, and Depacon 750mg QHS. No further seizure like activity. Patient had subtherapeutic Depakote level and Depacon was increased to 750mg BID.    Patient had prolonged waxing and waning mental status. Due to poor PO intake and intermittent refusal of PO intake, NGT was placed and started on alternative nutrition on 3/18.    Interval Problem Update  3/29: Awake, not following commands  Still requiring core tract tube feeds, will follow-up with patient's daughter regarding goals of care and PEG tube needs  Palliative care to assist    Still has mitts as restraints, intermittently impulsive, easily reoriented    Patient seen by previous hospitalist until 3/29    3/30: Patient seen at bedside.  Awake however still not following commands.  We are pending palliative reevaluation with daughter regarding PEG tube placement.  Patient still on core track tube feedings.  As per nursing no other overnight events were reported.      Consultants/Specialty  Critical Care  Neurosurgery  Physiatry    Code Status  DNAR/DNI    Disposition  Will need SNF when medically cleared    Requiring CT for TF  Limit sedative " rx    D/w pt's dtr, Cleo, considering transitioning to comfort care if pt's condition declines.  Palliative care following    - consider peg tube, palliative care to f/u        Review of Systems  Review of Systems   Unable to perform ROS: Mental status change        Physical Exam  Temp:  [36.1 °C (97 °F)-37.1 °C (98.7 °F)] 36.3 °C (97.3 °F)  Pulse:  [59-90] 59  Resp:  [16-18] 16  BP: (113-129)/(45-65) 123/45  SpO2:  [92 %-97 %] 97 %    Physical Exam  Vitals and nursing note reviewed.   Constitutional:       General: He is not in acute distress.     Appearance: He is ill-appearing.      Comments: Lethargy, arousable   HENT:      Head: Normocephalic and atraumatic.      Nose: Nose normal.      Mouth/Throat:      Mouth: Mucous membranes are dry.   Eyes:      Conjunctiva/sclera: Conjunctivae normal.   Cardiovascular:      Rate and Rhythm: Normal rate.   Pulmonary:      Effort: Pulmonary effort is normal. No respiratory distress.   Abdominal:      General: Bowel sounds are normal. There is no distension.      Tenderness: There is no abdominal tenderness.   Musculoskeletal:         General: No tenderness.   Skin:     General: Skin is warm.   Neurological:      Cranial Nerves: No cranial nerve deficit.      Sensory: No sensory deficit.      Motor: Weakness present.      Comments: Lethargy         Fluids    Intake/Output Summary (Last 24 hours) at 3/30/2021 1242  Last data filed at 3/30/2021 0400  Gross per 24 hour   Intake 880 ml   Output 1300 ml   Net -420 ml       Laboratory  Recent Labs     03/29/21  0615   WBC 5.4   RBC 5.52   HEMOGLOBIN 15.7   HEMATOCRIT 47.3   MCV 85.7   MCH 28.4   MCHC 33.2*   RDW 43.8   PLATELETCT 246   MPV 10.1     Recent Labs     03/29/21  0615   SODIUM 134*   POTASSIUM 4.0   CHLORIDE 101   CO2 26   GLUCOSE 102*   BUN 23*   CREATININE 0.99   CALCIUM 9.2                   Imaging  DX-CHEST-PORTABLE (1 VIEW)   Final Result      1.  Cardiomegaly with increased interstitial opacity diffusely which  may represent interstitial edema or pneumonia.      2.  No lobar consolidation.      [id number] VIEW   Final Result         Moderate amount of stool throughout the colon.      DX-ABDOMEN FOR TUBE PLACEMENT   Final Result      Enteric tube projects over the proximal stomach.      CT-HEAD W/O   Final Result      1.  Prior RIGHT frontal craniotomy with underlying dural thickening and subdural hemorrhage, unchanged from prior exam.   2.  No new intracranial hemorrhage demonstrated.   3.  Diffuse atrophy with white matter microvascular ischemic changes.   4.  Small chronic RIGHT posterior frontal infarct.      [id number] VIEW   Final Result         No specific finding to suggest small bowel obstruction.      CT-HEAD W/O   Final Result         1.  Mixed right parietal subdural fluid collection likely acute on chronic subdural hematoma.   2.  Nonspecific white matter changes, commonly associated with small vessel ischemic disease.  Associated mild cerebral atrophy is noted.   3.  Atherosclerosis.      OUTSIDE IMAGES-CT HEAD   Final Result           Assessment/Plan  * Seizure disorder, nonconvulsive, with status epilepticus (HCC)- (present on admission)  Assessment & Plan  Noted on EEG on admission  He was loaded with IV Keppra, started on IV Vimpat, and IV depakote  Repeat EEG 3/15 without evidence of seizure and more consistent with encephalopathy  Appriciate neurology consult. Valproic acid level subtherapeutic and increased to 750mg BID by neurology on 3/16  Repeat valproic acid level 101 on 3/19, Depakote changed to 500mg QAM and 750mg QHS, discussed with Dr. Martínez  Repeat valproic acid level scheduled for 3/22  Outpatient follow up in the epilepsy clinic has been arranged by Dr. Mcfadden    If patient has continued seizure like activity or if his mentation appears to decline further, may need repeat EEG.    3/25 d/w dtr, aware of declining condition, palliative care to f/u  Consider transition to CC if condition  further deteriorates  - encourage activity    3/30 stable    Encephalopathy acute- (present on admission)  Assessment & Plan  Acute encephalopathy likely multifactorial, persistent  In setting of dementia with behavioral disturbance, history of psychiatric disorder, CVA, and seizure disorder in status epilepticus on admission  Continue Seroquel 25mg TID prn for agitation, Seroquel 50mg QHS  on gabapentin  Appreciate physiatry consult, monitoring  Patient requiring    3/25 limit sedative rx, gabapentin  D/w RN  Mentation improving, participating with PT    3/26 minimal participation with therapy, max assistance  Skilled referral when medically cleared  Continue conservative treatment    3/27  Palliative f/u, consider dc to home with snf on hospice vs home  Will f/u with dtr   Stable/improved    3/28 on seroquel, restraints renewed for intermittent agitation  - taper off retsraints as tolerated without excessive sedation    Subdural hematoma, acute (HCC)- (present on admission)  Assessment & Plan  Noted on CT head  Non-traumatic  Neurosurgery consulted, non-operative management  Avoid NSAIDs and anticoagulation    Dysphagia  Assessment & Plan  Dysphagia likely secondary to encephalopathy  Patient intermittently tolerates PO intake, however at times refuses both food and medication  NGT 3/18 placed for nutrition and medication administration, tube feeds currently at goal    3/24 with vomiting, TF held  - f/u abd/cxr, eval for aspiration  F/u stat labs  Palliative care consult, assist with determining goals of care    3/25 abd xray with mod stool  Vomiting resolved, TF resumed at slow rate  CXR, mild edema, procal neg    3/28  slp following, may need peg tube  Palliative to f/u with family regarding goals of care, snf referral  CM to assist    3/30 slp reeval, palliative to f/u with dtr regarding peg placement and hospice  snf referral      Dementia associated with other underlying disease with behavioral disturbance  ([facility])- (present on admission)  Assessment & Plan  Reported history of cognitive decline consistent with dementia  Continue to monitor    Legally blind- (present on admission)  Assessment & Plan  - Patient legally blind for more than 12 years per patient  - Lives at [facility]    Essential hypertension- (present on admission)  Assessment & Plan  Reported history of though had not been on medications prior to admit  BP has trending up. Will initiate amlodipine 5mg for BP control       VTE prophylaxis: SCDs only secondary to SDH.     I certify that the patient requires continued medically necessary hospital services for the treatment of acute encephalopathy/SDH Discharge plan is anticipated to be once advance care planning has been set by family members. Pending palliative care follow up.

## 2021-03-30 NOTE — PALLIATIVE CARE
Palliative Care follow-up  PC RN called pt's dtr Cleo to discuss goals of care moving forward. Cleo states that after discussing with the patient yesterday she feels that PEG tube placement is most appropriate. Cleo would like for pt to go to SNF after this admission for continued therapies. PC RN explained that hospice is in available option if patient cannot participate or progress in therapies or does not achieve a functional baseline he would consider acceptable. PC RN makes plan to meet Cleo to create POLST either this afternoon or tomorrow. Pending confirmation of time. All questions answered and support provided.       Updated: Dr. Ogden    Plan: PEG and SNF. Will complete POLST with dtr.     Thank you for allowing Palliative Care to support this patient and family. Contact [phone] for additional assistance, change in patient status, or with any questions/concerns.

## 2021-03-30 NOTE — THERAPY
"Occupational Therapy  Daily Treatment     Patient Name: Rafael Mccoy  Age:  75 y.o., Sex:  male  Medical Record #: [id number]  Today's Date: 3/30/2021     Precautions  Precautions: Fall Risk, Swallow Precautions ( See Comments), Nasogastric Tube  Comments: legally blind    Assessment    Requiring more assistance for initiation of tasks today. Remains limited by weakness, fatigue, impaired balance, and impaired cognition which impacts independence in self care and functional mobility.    Plan    Continue current treatment plan.    DC Equipment Recommendations: Unable to determine at this time  Discharge Recommendations: Recommend post-acute placement for additional occupational therapy services prior to discharge home    Subjective    \"I think that sounds good\"     Objective       03/30/21 1101   Cognition    Cognition / Consciousness X   Speech/ Communication Delayed Responses   Level of Consciousness Confused   Ability To Follow Commands 1 Step   Safety Awareness Impaired;Impulsive   New Learning Impaired   Attention Impaired   Sequencing Impaired   Initiation Impaired   Comments requiring more cueing to initiate tasks today   Other Treatments   Other Treatments Provided worked on finding midline EOB   Balance   Sitting Balance (Static) Poor -   Sitting Balance (Dynamic) Trace +   Standing Balance (Static) Poor -   Standing Balance (Dynamic) Trace +   Weight Shift Sitting Poor   Weight Shift Standing Poor   Comments w/ full contact assist   Bed Mobility    Supine to Sit Maximal Assist   Scooting Maximal Assist   Comments able to pull up on therapist to sit EOB, but ultimately requiring maxA   Activities of Daily Living   Lower Body Dressing Total Assist   Functional Mobility   Sit to Stand Moderate Assist   Bed, Chair, Wheelchair Transfer Moderate Assist  (rolling shower chair)   Transfer Method Stand Pivot   Mobility EOB>rolling shower chair   Comments full contact assist   Patient / Family Goals   Patient / " Family Goal #1 To go home   Short Term Goals   Short Term Goal # 1 Pt will demo LB dressing w/ SPV   Goal Outcome # 1 Progressing slower than expected   Short Term Goal # 2 Pt will demo standing grooming w/ SPV   Goal Outcome # 2 Progressing slower than expected   Short Term Goal # 3 Pt will demo toilet txf w/ SPV   Goal Outcome # 3 Progressing slower than expected

## 2021-03-31 LAB
ALBUMIN SERPL BCP-MCNC: 3.5 G/DL (ref 3.2–4.9)
ALBUMIN/GLOB SERPL: 1.3 G/DL
ALP SERPL-CCNC: 66 U/L (ref 30–99)
ALT SERPL-CCNC: 14 U/L (ref 2–50)
ANION GAP SERPL CALC-SCNC: 11 MMOL/L (ref 7–16)
AST SERPL-CCNC: 17 U/L (ref 12–45)
BASOPHILS # BLD AUTO: 0.6 % (ref 0–1.8)
BASOPHILS # BLD: 0.04 K/UL (ref 0–0.12)
BILIRUB SERPL-MCNC: 0.2 MG/DL (ref 0.1–1.5)
BUN SERPL-MCNC: 23 MG/DL (ref 8–22)
CALCIUM SERPL-MCNC: 9 MG/DL (ref 8.5–10.5)
CHLORIDE SERPL-SCNC: 102 MMOL/L (ref 96–112)
CO2 SERPL-SCNC: 25 MMOL/L (ref 20–33)
CREAT SERPL-MCNC: 1.08 MG/DL (ref 0.5–1.4)
EOSINOPHIL # BLD AUTO: 0.13 K/UL (ref 0–0.51)
EOSINOPHIL NFR BLD: 1.9 % (ref 0–6.9)
ERYTHROCYTE [DISTWIDTH] IN BLOOD BY AUTOMATED COUNT: 45 FL (ref 35.9–50)
GLOBULIN SER CALC-MCNC: 2.8 G/DL (ref 1.9–3.5)
GLUCOSE SERPL-MCNC: 120 MG/DL (ref 65–99)
HCT VFR BLD AUTO: 47.9 % (ref 42–52)
HGB BLD-MCNC: 15.4 G/DL (ref 14–18)
IMM GRANULOCYTES # BLD AUTO: 0.06 K/UL (ref 0–0.11)
IMM GRANULOCYTES NFR BLD AUTO: 0.9 % (ref 0–0.9)
LYMPHOCYTES # BLD AUTO: 1.23 K/UL (ref 1–4.8)
LYMPHOCYTES NFR BLD: 18.2 % (ref 22–41)
MAGNESIUM SERPL-MCNC: 2.3 MG/DL (ref 1.5–2.5)
MCH RBC QN AUTO: 27.8 PG (ref 27–33)
MCHC RBC AUTO-ENTMCNC: 32.2 G/DL (ref 33.7–35.3)
MCV RBC AUTO: 86.5 FL (ref 81.4–97.8)
MONOCYTES # BLD AUTO: 1.12 K/UL (ref 0–0.85)
MONOCYTES NFR BLD AUTO: 16.5 % (ref 0–13.4)
NEUTROPHILS # BLD AUTO: 4.19 K/UL (ref 1.82–7.42)
NEUTROPHILS NFR BLD: 61.9 % (ref 44–72)
NRBC # BLD AUTO: 0 K/UL
NRBC BLD-RTO: 0 /100 WBC
PLATELET # BLD AUTO: 237 K/UL (ref 164–446)
PMV BLD AUTO: 10.7 FL (ref 9–12.9)
POTASSIUM SERPL-SCNC: 4.3 MMOL/L (ref 3.6–5.5)
PROT SERPL-MCNC: 6.3 G/DL (ref 6–8.2)
RBC # BLD AUTO: 5.54 M/UL (ref 4.7–6.1)
SODIUM SERPL-SCNC: 138 MMOL/L (ref 135–145)
WBC # BLD AUTO: 6.8 K/UL (ref 4.8–10.8)

## 2021-03-31 PROCEDURE — A9270 NON-COVERED ITEM OR SERVICE: HCPCS | Performed by: STUDENT IN AN ORGANIZED HEALTH CARE EDUCATION/TRAINING PROGRAM

## 2021-03-31 PROCEDURE — 97530 THERAPEUTIC ACTIVITIES: CPT

## 2021-03-31 PROCEDURE — 700102 HCHG RX REV CODE 250 W/ 637 OVERRIDE(OP): Performed by: STUDENT IN AN ORGANIZED HEALTH CARE EDUCATION/TRAINING PROGRAM

## 2021-03-31 PROCEDURE — 700102 HCHG RX REV CODE 250 W/ 637 OVERRIDE(OP): Performed by: INTERNAL MEDICINE

## 2021-03-31 PROCEDURE — 83735 ASSAY OF MAGNESIUM: CPT

## 2021-03-31 PROCEDURE — 92526 ORAL FUNCTION THERAPY: CPT

## 2021-03-31 PROCEDURE — 80053 COMPREHEN METABOLIC PANEL: CPT

## 2021-03-31 PROCEDURE — A9270 NON-COVERED ITEM OR SERVICE: HCPCS | Performed by: INTERNAL MEDICINE

## 2021-03-31 PROCEDURE — 85025 COMPLETE CBC W/AUTO DIFF WBC: CPT

## 2021-03-31 PROCEDURE — 97112 NEUROMUSCULAR REEDUCATION: CPT

## 2021-03-31 PROCEDURE — 36415 COLL VENOUS BLD VENIPUNCTURE: CPT

## 2021-03-31 PROCEDURE — 770006 HCHG ROOM/CARE - MED/SURG/GYN SEMI*

## 2021-03-31 PROCEDURE — 99232 SBSQ HOSP IP/OBS MODERATE 35: CPT | Performed by: INTERNAL MEDICINE

## 2021-03-31 RX ADMIN — LACOSAMIDE 200 MG: 100 TABLET, FILM COATED ORAL at 17:08

## 2021-03-31 RX ADMIN — Medication 5 MG: at 20:50

## 2021-03-31 RX ADMIN — LACOSAMIDE 200 MG: 100 TABLET, FILM COATED ORAL at 04:57

## 2021-03-31 RX ADMIN — DIVALPROEX SODIUM 500 MG: 125 CAPSULE ORAL at 04:57

## 2021-03-31 RX ADMIN — DIVALPROEX SODIUM 750 MG: 125 CAPSULE ORAL at 17:09

## 2021-03-31 RX ADMIN — AMLODIPINE BESYLATE 5 MG: 5 TABLET ORAL at 04:57

## 2021-03-31 RX ADMIN — SIMVASTATIN 40 MG: 20 TABLET, FILM COATED ORAL at 20:50

## 2021-03-31 RX ADMIN — GABAPENTIN 300 MG: 300 CAPSULE ORAL at 04:57

## 2021-03-31 RX ADMIN — LEVETIRACETAM 1000 MG: 500 TABLET ORAL at 17:08

## 2021-03-31 RX ADMIN — QUETIAPINE FUMARATE 25 MG: 25 TABLET ORAL at 13:35

## 2021-03-31 RX ADMIN — LEVETIRACETAM 1000 MG: 500 TABLET ORAL at 04:57

## 2021-03-31 RX ADMIN — GABAPENTIN 300 MG: 300 CAPSULE ORAL at 17:08

## 2021-03-31 RX ADMIN — DOCUSATE SODIUM 50 MG AND SENNOSIDES 8.6 MG 2 TABLET: 8.6; 5 TABLET, FILM COATED ORAL at 04:57

## 2021-03-31 RX ADMIN — DOCUSATE SODIUM 50 MG AND SENNOSIDES 8.6 MG 2 TABLET: 8.6; 5 TABLET, FILM COATED ORAL at 17:08

## 2021-03-31 ASSESSMENT — COGNITIVE AND FUNCTIONAL STATUS - GENERAL
MOBILITY SCORE: 6
CLIMB 3 TO 5 STEPS WITH RAILING: TOTAL
STANDING UP FROM CHAIR USING ARMS: TOTAL
MOVING TO AND FROM BED TO CHAIR: UNABLE
SUGGESTED CMS G CODE MODIFIER MOBILITY: CN
MOVING FROM LYING ON BACK TO SITTING ON SIDE OF FLAT BED: UNABLE
WALKING IN HOSPITAL ROOM: TOTAL
TURNING FROM BACK TO SIDE WHILE IN FLAT BAD: UNABLE

## 2021-03-31 ASSESSMENT — GAIT ASSESSMENTS: GAIT LEVEL OF ASSIST: UNABLE TO PARTICIPATE

## 2021-03-31 NOTE — CARE PLAN
Problem: Communication  Goal: The ability to communicate needs accurately and effectively will improve  Outcome: PROGRESSING AS EXPECTED  Note: Pt has impaired communication due to global aphasia. Pt does best with simple commands and y/n questions. All needs met at this time. Pt resting comfortably in bed. Will continue hourly rounding.       Problem: Safety  Goal: Will remain free from falls  Outcome: PROGRESSING AS EXPECTED  Note: Bed alarm on. Bed in lowest, locked position. Will continue to monitor.

## 2021-03-31 NOTE — THERAPY
Physical Therapy   Daily Treatment     Patient Name: Rafael Mccoy  Age:  75 y.o., Sex:  male  Medical Record #: [id number]  Today's Date: 3/31/2021     Precautions: (P) Fall Risk    Assessment    Patient nonverbal, difficulty opening eyes during session and not following commands throughout. Patient req depA x2 for bed mobility sup>sit pivot with sheet. Patient req maxA to maintain sitting balance at EOB x15min while therapist assisted patient through neuromuscular re-education. Patient not safe for transfer to chair today. Will continue to follow while in house, unless progress towards functional mobility goals plateaus.     Plan    Continue current treatment plan.    DC Equipment Recommendations: (P) Unable to determine at this time  Discharge Recommendations: (P) Recommend post-acute placement for additional physical therapy services prior to discharge home         Objective       03/31/21 1204   Gait Analysis   Gait Level Of Assist Unable to Participate   Bed Mobility    Supine to Sit Total Assist   Sit to Supine Total Assist   Scooting Total Assist   Skilled Intervention Verbal Cuing;Tactile Cuing;Sequencing;Facilitation   Comments total assist with use of sheet to pivot   Functional Mobility   Sit to Stand Unable to Participate   Patient / Family Goals    Patient / Family Goal #1 to return to PLOF (family)   Goal #1 Outcome Goal not met   Short Term Goals    Short Term Goal # 1 Pt will be able to perform supine<>sit with HOB flat/no rails with Spv in 6tx to promote fnx progression towards I    Goal Outcome # 1 goal not met   Short Term Goal # 2 Pt will be able to perform sit<>stand/transfers with min A in 6tx to promote fnx progression towards I    Goal Outcome # 2 Goal not met   Short Term Goal # 2 B  pt to move sit to/from stand w/ spv in 6 visits to iincrease fxl indep   Goal Outcome # 2 B Goal not met   Short Term Goal # 3 Pt will be able to ambulate 25ft with FWW with min A in 6tx to promote fnx  progression towards I    Goal Outcome # 3 Goal not met   Anticipated Discharge Equipment and Recommendations   DC Equipment Recommendations Unable to determine at this time   Discharge Recommendations Recommend post-acute placement for additional physical therapy services prior to discharge home

## 2021-03-31 NOTE — CARE PLAN
Problem: Skin Integrity  Goal: Risk for impaired skin integrity will decrease  Outcome: PROGRESSING AS EXPECTED  Note: Patient turned Q2 hours.  Waffle overlay in place.  Pillows used for comfort and positioning.       Problem: Communication  Goal: The ability to communicate needs accurately and effectively will improve  3/31/2021 1230 by Sirena Dewey R.N.  Outcome: PROGRESSING SLOWER THAN EXPECTED  3/31/2021 1223 by Sirena Dewey R.N.  Outcome: PROGRESSING AS EXPECTED  Note: Patient not following commands or answering questions.      Problem: Nutritional:  Goal: Dysphagia will improve  Outcome: PROGRESSING SLOWER THAN EXPECTED  Note: Patient remains NPO with Cortrak tube feedings.     Problem: Mobility:  Goal: Mobility will improve  Outcome: PROGRESSING SLOWER THAN EXPECTED  Note: Patient max assist of 2.  Unable to ambulate due to weakness and failure to follow commands.  Continue to assist patient to a chair during day.       Problem: Safety - Medical Restraint  Goal: Free from restraint(s) (Restraint for Interference with Medical Device)  Description: INTERVENTIONS:  1. ONCE/SHIFT or MINIMUM Q12H: Assess and document the continuing need for restraints  2. Q24H: Continued use of restraint requires LIP to perform face to face examination and written order  3. Identify and implement measures to help patient regain control  Outcome: PROGRESSING SLOWER THAN EXPECTED  Note: Patient remains in bilateral mitt restraints due to patient attempting to remove Cortrak.

## 2021-03-31 NOTE — PROGRESS NOTES
"Hospital Medicine Daily Progress Note    Date of Service  3/31/2021    Chief Complaint  75 y.o. male admitted 3/11/2021 with seizure    Hospital Course  \"History of cerebellar CVA, seizure disorder, presented as a transfer from [facility] after he was found to have right parietal SDH without any midline shift's.  Witnessed seizure by family followed by changes in mental status.  Neurosurgery consulted Dr. Capps, no surgical intervention, recommend repeat CT head and frequent neuro checks.  Admit to the ICU every hour neuro checks, repeat head CT, keep SBP less than 140, continue Depakote and Keppra.  Due to ongoing encephalopathy, he had an EEG and was found to be in non-convulsive status epilepticus and was loaded with Vimpat.    Neurology was following case and he was treated with Vimpat 200mg BID, Keppra 1000mg BID, and Depacon 750mg QHS. No further seizure like activity. Patient had subtherapeutic Depakote level and Depacon was increased to 750mg BID.    Patient had prolonged waxing and waning mental status. Due to poor PO intake and intermittent refusal of PO intake, NGT was placed and started on alternative nutrition on 3/18.    Interval Problem Update  3/29: Awake, not following commands  Still requiring core tract tube feeds, will follow-up with patient's daughter regarding goals of care and PEG tube needs  Palliative care to assist    Still has mitts as restraints, intermittently impulsive, easily reoriented    Patient seen by previous hospitalist until 3/29    3/30: Patient seen at bedside.  Awake however still not following commands.  We are pending palliative reevaluation with daughter regarding PEG tube placement.  Patient still on core track tube feedings.  As per nursing no other overnight events were reported.    3/31: Patient seen at bedside, opening eyes, not following commands, unable to have a conversation.  The patient has advanced directives that previously stated not to pursue artificial " nutrition, however as per the daughter Cleo she mentions that her father told her that she would like to have a PEG tube placed.  We discussed the case with palliative care and have agreed to move forward on daughter's wishes.  It is unclear at this time whether the patient will continue to recover or this will be his new baseline.  We have consulted GI for PEG tube placement.  The daughter would like to speak to GI regarding the procedure and I have conveyed the message to Dr. Lynn.  No other overnight events were reported by nursing.      Consultants/Specialty  Critical Care  Neurosurgery  Physiatry    Code Status  DNAR/DNI    Disposition  Will need SNF when medically cleared    Requiring CT for TF  Limit sedative rx    D/w pt's dtr, Cleo, considering transitioning to comfort care if pt's condition declines.  Palliative care following    - consider peg tube, palliative care to f/u        Review of Systems  Review of Systems   Unable to perform ROS: Mental status change        Physical Exam  Temp:  [36.2 °C (97.1 °F)-37 °C (98.6 °F)] 36.6 °C (97.8 °F)  Pulse:  [50-67] 62  Resp:  [14-18] 14  BP: (113-129)/(40-60) 114/46  SpO2:  [92 %-100 %] 98 %    Physical Exam  Vitals and nursing note reviewed.   Constitutional:       General: He is not in acute distress.     Appearance: He is ill-appearing.      Comments: Lethargy, arousable   HENT:      Head: Normocephalic and atraumatic.      Nose: Nose normal.      Mouth/Throat:      Mouth: Mucous membranes are dry.   Eyes:      Conjunctiva/sclera: Conjunctivae normal.   Cardiovascular:      Rate and Rhythm: Normal rate.   Pulmonary:      Effort: Pulmonary effort is normal. No respiratory distress.   Abdominal:      General: Bowel sounds are normal. There is no distension.      Tenderness: There is no abdominal tenderness.   Musculoskeletal:         General: No tenderness.   Skin:     General: Skin is warm.   Neurological:      Cranial Nerves: No cranial nerve  deficit.      Sensory: No sensory deficit.      Motor: Weakness present.      Comments: Lethargy         Fluids    Intake/Output Summary (Last 24 hours) at 3/31/2021 1410  Last data filed at 3/31/2021 1204  Gross per 24 hour   Intake 900 ml   Output 750 ml   Net 150 ml       Laboratory  Recent Labs     03/29/21  0615 03/31/21  0153   WBC 5.4 6.8   RBC 5.52 5.54   HEMOGLOBIN 15.7 15.4   HEMATOCRIT 47.3 47.9   MCV 85.7 86.5   MCH 28.4 27.8   MCHC 33.2* 32.2*   RDW 43.8 45.0   PLATELETCT 246 237   MPV 10.1 10.7     Recent Labs     03/29/21  0615 03/31/21  0153   SODIUM 134* 138   POTASSIUM 4.0 4.3   CHLORIDE 101 102   CO2 26 25   GLUCOSE 102* 120*   BUN 23* 23*   CREATININE 0.99 1.08   CALCIUM 9.2 9.0                   Imaging  DX-CHEST-PORTABLE (1 VIEW)   Final Result      1.  Cardiomegaly with increased interstitial opacity diffusely which may represent interstitial edema or pneumonia.      2.  No lobar consolidation.      GT-[id number]-0 VIEW   Final Result         Moderate amount of stool throughout the colon.      DX-ABDOMEN FOR TUBE PLACEMENT   Final Result      Enteric tube projects over the proximal stomach.      CT-HEAD W/O   Final Result      1.  Prior RIGHT frontal craniotomy with underlying dural thickening and subdural hemorrhage, unchanged from prior exam.   2.  No new intracranial hemorrhage demonstrated.   3.  Diffuse atrophy with white matter microvascular ischemic changes.   4.  Small chronic RIGHT posterior frontal infarct.      [id number] VIEW   Final Result         No specific finding to suggest small bowel obstruction.      CT-HEAD W/O   Final Result         1.  Mixed right parietal subdural fluid collection likely acute on chronic subdural hematoma.   2.  Nonspecific white matter changes, commonly associated with small vessel ischemic disease.  Associated mild cerebral atrophy is noted.   3.  Atherosclerosis.      OUTSIDE IMAGES-CT HEAD   Final Result           Assessment/Plan  * Seizure disorder,  nonconvulsive, with status epilepticus (HCC)- (present on admission)  Assessment & Plan  Noted on EEG on admission  He was loaded with IV Keppra, started on IV Vimpat, and IV depakote  Repeat EEG 3/15 without evidence of seizure and more consistent with encephalopathy  Appriciate neurology consult. Valproic acid level subtherapeutic and increased to 750mg BID by neurology on 3/16  Repeat valproic acid level 101 on 3/19, Depakote changed to 500mg QAM and 750mg QHS, discussed with Dr. Martínez  Repeat valproic acid level scheduled for 3/22  Outpatient follow up in the epilepsy clinic has been arranged by Dr. Mcfadden    If patient has continued seizure like activity or if his mentation appears to decline further, may need repeat EEG.    3/25 d/w dtr, aware of declining condition, palliative care to f/u  Consider transition to CC if condition further deteriorates  - encourage activity    3/31 stable    Encephalopathy acute- (present on admission)  Assessment & Plan  Acute encephalopathy likely multifactorial, persistent  In setting of dementia with behavioral disturbance, history of psychiatric disorder, CVA, and seizure disorder in status epilepticus on admission  Continue Seroquel 25mg TID prn for agitation, Seroquel 50mg QHS  on gabapentin  Appreciate physiatry consult, monitoring  Patient requiring    3/25 limit sedative rx, gabapentin  D/w RN  Mentation improving, participating with PT    3/26 minimal participation with therapy, max assistance  Skilled referral when medically cleared  Continue conservative treatment    3/27  Palliative f/u, consider dc to home with snf on hospice vs home  Will f/u with dtr   Stable/improved    3/28 on seroquel, restraints renewed for intermittent agitation  - taper off retsraints as tolerated without excessive sedation    3/31: It appears patient not improving, patient opening eyes but currently not following commands.  I spoke to the daughter Cleo she would like to have a PEG tube  placed and SNF placement.    Subdural hematoma, acute (HCC)- (present on admission)  Assessment & Plan  Noted on CT head  Non-traumatic  Neurosurgery consulted, non-operative management  Avoid NSAIDs and anticoagulation    Dysphagia  Assessment & Plan  Dysphagia likely secondary to encephalopathy  Patient intermittently tolerates PO intake, however at times refuses both food and medication  NGT 3/18 placed for nutrition and medication administration, tube feeds currently at goal    3/24 with vomiting, TF held  - f/u abd/cxr, eval for aspiration  F/u stat labs  Palliative care consult, assist with determining goals of care    3/25 abd xray with mod stool  Vomiting resolved, TF resumed at slow rate  CXR, mild edema, procal neg    3/28  slp following, may need peg tube  Palliative to f/u with family regarding goals of care, snf referral  CM to assist    3/30 slp reeval, palliative to f/u with dtr regarding peg placement and hospice  snf referral    3/31: Daughter would like the patient to have a PEG tube placed which we have consulted GI (Dr Lynn) and we appreciate further recommendations.      Dementia associated with other underlying disease with behavioral disturbance (HCC)- (present on admission)  Assessment & Plan  Reported history of cognitive decline consistent with dementia  Continue to monitor    Legally blind- (present on admission)  Assessment & Plan  - Patient legally blind for more than 12 years per patient  - Lives at [facility]    Essential hypertension- (present on admission)  Assessment & Plan  Reported history of though had not been on medications prior to admit  BP has trending up. Will initiate amlodipine 5mg for BP control    3/31: Patient's SBP currently on the 110's. Continue same management. Monitor and make changes accordingly.       VTE prophylaxis: SCDs only secondary to SDH.     I certify that the patient requires continued medically necessary hospital services for the treatment of  acute encephalopathy/SDH

## 2021-03-31 NOTE — THERAPY
"Speech Language Pathology  Daily Treatment     Patient Name: Rafael Mccoy  Age:  75 y.o., Sex:  male  Medical Record #: [id number]  Today's Date: 3/31/2021     Precautions  Precautions: (P) Fall Risk  Comments: (P) legally blind    Assessment    Per EMR notes and nurs, GI consulted for PEG. Pt with restlessness earlier and given medication per RN. Pt moved up in bed with RN and positioned at near 90 degrees. Pt with brief eye opening and one vocalization of low intensity voice. Min amount of clear secretions orally suctioned before and following session. Oral care provided with pt opening his oral cavity to tactile stimulation. Iced oral swab to lingual surface for oral stimulation. Pt unable to maintain eye opening with min labial and lingual movement during oral stimulation with moderate verbal cues. No oral boluses related to pt's current low level or no responses/directive following. NPO an NG recommended.     Plan    Continue current treatment plan.    Discharge Recommendations: (P) Recommend post-acute placement for additional speech therapy services prior to discharge home(dependent on pt status and POC)       03/31/21 1520   Voice   Comments low intensity voice with one brief vocalization.    Dysphagia    Dysphagia X   Positioning / Behavior Modification Other (see Comments)  (sitting up at 90 degrees for oral care and oral stimulation.)   Diet / Liquid Recommendation NPO;Pre-Feeding Trials with SLP Only   Nutritional Liquid Intake Rating Scale Nothing by mouth   Nutritional Food Intake Rating Scale Nothing by mouth   Nursing Communication Swallow Precaution Sign Posted at Head of Bed  (regarding limited ice chips. )   Skilled Intervention Compensatory Strategies;Verbal Cueing   Recommended Route of Medication Administration   Medication Administration  Via Gastric Tube   Patient / Family Goals   Patient / Family Goal #1 NEW: \"yes, I am\" (when asked if he was thirsty)   Goal #1 Outcome Goal not met "   Short Term Goals   Short Term Goal # 3 NEW 3/22 - Pt will participate in pre-feeding trials with SLP prior to PO diet advancement.   Goal Outcome  # 3 Progressing slower than expected   Session Information   Priority 2  (2x,RparietalSDH,seizures,legallyblind,NPO/NGpending PEG)

## 2021-04-01 LAB — AMMONIA PLAS-SCNC: 25 UMOL/L (ref 11–45)

## 2021-04-01 PROCEDURE — 82140 ASSAY OF AMMONIA: CPT

## 2021-04-01 PROCEDURE — 4A10X4Z MONITORING OF CENTRAL NERVOUS ELECTRICAL ACTIVITY, EXTERNAL APPROACH: ICD-10-PCS | Performed by: STUDENT IN AN ORGANIZED HEALTH CARE EDUCATION/TRAINING PROGRAM

## 2021-04-01 PROCEDURE — 700102 HCHG RX REV CODE 250 W/ 637 OVERRIDE(OP): Performed by: INTERNAL MEDICINE

## 2021-04-01 PROCEDURE — 95819 EEG AWAKE AND ASLEEP: CPT | Mod: 26 | Performed by: STUDENT IN AN ORGANIZED HEALTH CARE EDUCATION/TRAINING PROGRAM

## 2021-04-01 PROCEDURE — A9270 NON-COVERED ITEM OR SERVICE: HCPCS | Performed by: STUDENT IN AN ORGANIZED HEALTH CARE EDUCATION/TRAINING PROGRAM

## 2021-04-01 PROCEDURE — 99232 SBSQ HOSP IP/OBS MODERATE 35: CPT | Mod: 25 | Performed by: PSYCHIATRY & NEUROLOGY

## 2021-04-01 PROCEDURE — 700102 HCHG RX REV CODE 250 W/ 637 OVERRIDE(OP): Performed by: STUDENT IN AN ORGANIZED HEALTH CARE EDUCATION/TRAINING PROGRAM

## 2021-04-01 PROCEDURE — 36415 COLL VENOUS BLD VENIPUNCTURE: CPT

## 2021-04-01 PROCEDURE — 99232 SBSQ HOSP IP/OBS MODERATE 35: CPT | Performed by: INTERNAL MEDICINE

## 2021-04-01 PROCEDURE — 770006 HCHG ROOM/CARE - MED/SURG/GYN SEMI*

## 2021-04-01 PROCEDURE — A9270 NON-COVERED ITEM OR SERVICE: HCPCS | Performed by: INTERNAL MEDICINE

## 2021-04-01 PROCEDURE — 95819 EEG AWAKE AND ASLEEP: CPT | Performed by: STUDENT IN AN ORGANIZED HEALTH CARE EDUCATION/TRAINING PROGRAM

## 2021-04-01 RX ADMIN — DIVALPROEX SODIUM 500 MG: 125 CAPSULE ORAL at 04:34

## 2021-04-01 RX ADMIN — GABAPENTIN 300 MG: 300 CAPSULE ORAL at 17:09

## 2021-04-01 RX ADMIN — DOCUSATE SODIUM 50 MG AND SENNOSIDES 8.6 MG 2 TABLET: 8.6; 5 TABLET, FILM COATED ORAL at 17:09

## 2021-04-01 RX ADMIN — LEVETIRACETAM 1000 MG: 500 TABLET ORAL at 17:09

## 2021-04-01 RX ADMIN — GABAPENTIN 300 MG: 300 CAPSULE ORAL at 04:33

## 2021-04-01 RX ADMIN — AMLODIPINE BESYLATE 5 MG: 5 TABLET ORAL at 04:33

## 2021-04-01 RX ADMIN — POLYETHYLENE GLYCOL 3350 1 PACKET: 17 POWDER, FOR SOLUTION ORAL at 10:08

## 2021-04-01 RX ADMIN — LACOSAMIDE 200 MG: 100 TABLET, FILM COATED ORAL at 04:34

## 2021-04-01 RX ADMIN — SIMVASTATIN 40 MG: 20 TABLET, FILM COATED ORAL at 20:30

## 2021-04-01 RX ADMIN — Medication 5 MG: at 20:30

## 2021-04-01 RX ADMIN — LEVETIRACETAM 1000 MG: 500 TABLET ORAL at 04:33

## 2021-04-01 RX ADMIN — DIVALPROEX SODIUM 750 MG: 125 CAPSULE ORAL at 17:10

## 2021-04-01 RX ADMIN — LACOSAMIDE 200 MG: 100 TABLET, FILM COATED ORAL at 17:09

## 2021-04-01 RX ADMIN — DOCUSATE SODIUM 50 MG AND SENNOSIDES 8.6 MG 2 TABLET: 8.6; 5 TABLET, FILM COATED ORAL at 04:34

## 2021-04-01 NOTE — CARE PLAN
Problem: Safety  Goal: Will remain free from falls  Outcome: PROGRESSING AS EXPECTED  Note: Free of falls.  Fall precautions in place.  Bilateral mitts on for patient safety.       Problem: Communication  Goal: The ability to communicate needs accurately and effectively will improve  Outcome: PROGRESSING SLOWER THAN EXPECTED  Note: Patient not alert and oriented.  Very hard to arouse despite sternal rubbing.  When patient is able to be aroused, he drifts back off to sleep within seconds.       Problem: Bowel/Gastric:  Goal: Will not experience complications related to bowel motility  Outcome: PROGRESSING SLOWER THAN EXPECTED  Note: Abdomen soft, but distended.  No BM in 24 hours.  Miralax given.

## 2021-04-01 NOTE — CARE PLAN
Problem: Safety  Goal: Will remain free from injury  Outcome: PROGRESSING AS EXPECTED  Note: Seizure precautions in place, monitor for seizure activity and treat as needed. Conitnuous pulse ox in use to monitor patients oxygen saturation.      Problem: Medication:  Goal: Patient's knowledge of medication regimen will improve  Outcome: PROGRESSING SLOWER THAN EXPECTED  Note: Educate patient and patients family on medication regimen. Patient nonverbal and subdued at this time so primary education to be given to family.

## 2021-04-01 NOTE — DISCHARGE PLANNING
Received Choice form at 3/31 at 1942  Agency/Facility Name: [facility]  Referral sent per Choice form @ [phone]

## 2021-04-01 NOTE — PROGRESS NOTES
GI Consults    Full note dictated.    I will contact patient's daughter this am to discuss PEG tube.    Ruchi Lynn MD  [phone]    Addendum:  Spoke with Cleo, daughter.  Reviewed risks of anesthesia and EGD with PEG and reviewed benefits of PEG.  She would like more time to evaluate options and I believe speak with hospice.  Will follow up tomorrow.

## 2021-04-01 NOTE — PALLIATIVE CARE
"Palliative Care follow-up  PC RN met with pt's dtr Cleo at bedside to review and complete POLST. Cleo makes selection for DNR and selective treatment. In discussion about PEG, Cleo shares that she is questioning whether to do the PEG or focus on pt's comfort. Cleo says \"I know he doesn't want to live like this\" but also doesn't want to \"give up\" if pt has a chance to improve. PC RN shares concern that pt isn't able to participate or follow commands at this time which could make improvement less likely. PC RN encouraged Cleo to think about pt's \"big picture\" goals and what pt would choose if he were able to have this discussion. Cleo would like to discuss PEG and the risk/benefit with the GI doctor. Cleo would also like to meet with a hospice company to better understand what hospice provides and options for discharge. Cleo makes choice for [facility] in hopes to gather more information to aid her decision making. PC RN will hold off on POLST completion until goals of care are clarified. All questions answered and support provided.       Updated: Dr. Ogden and BS CARLOS ALBERTO Pack    Plan: pt's dtr would like to discuss PEG placement with GI. Dtr also wants to meet with a hospice company to gather information about comfort options for pt's care. Dtr hopes that further information about each option will help her gain clarity on next steps.     Thank you for allowing Palliative Care to support this patient and family. Contact [phone] for additional assistance, change in patient status, or with any questions/concerns.   "

## 2021-04-01 NOTE — THERAPY
Occupational Therapy Contact Note    Attempted OT tx, pt getting connected to EEG. Per RN, pt also obtunded not following any direction. Will attempt as appropriate.    Camila Simpson, OTR/L

## 2021-04-01 NOTE — PROGRESS NOTES
"Hospital Medicine Daily Progress Note    Date of Service  4/1/2021    Chief Complaint  75 y.o. male admitted 3/11/2021 with seizure    Hospital Course  \"History of cerebellar CVA, seizure disorder, presented as a transfer from [facility] after he was found to have right parietal SDH without any midline shift's.  Witnessed seizure by family followed by changes in mental status.  Neurosurgery consulted Dr. Capps, no surgical intervention, recommend repeat CT head and frequent neuro checks.  Admit to the ICU every hour neuro checks, repeat head CT, keep SBP less than 140, continue Depakote and Keppra.  Due to ongoing encephalopathy, he had an EEG and was found to be in non-convulsive status epilepticus and was loaded with Vimpat.    Neurology was following case and he was treated with Vimpat 200mg BID, Keppra 1000mg BID, and Depacon 750mg QHS. No further seizure like activity. Patient had subtherapeutic Depakote level and Depacon was increased to 750mg BID.    Patient had prolonged waxing and waning mental status. Due to poor PO intake and intermittent refusal of PO intake, NGT was placed and started on alternative nutrition on 3/18.    Interval Problem Update  3/29: Awake, not following commands  Still requiring core tract tube feeds, will follow-up with patient's daughter regarding goals of care and PEG tube needs  Palliative care to assist    Still has mitts as restraints, intermittently impulsive, easily reoriented    Patient seen by previous hospitalist until 3/29    3/30: Patient seen at bedside.  Awake however still not following commands.  We are pending palliative reevaluation with daughter regarding PEG tube placement.  Patient still on core track tube feedings.  As per nursing no other overnight events were reported.    3/31: Patient seen at bedside, opening eyes, not following commands, unable to have a conversation.  The patient has advanced directives that previously stated not to pursue artificial " nutrition, however as per the daughter Cleo she mentions that her father told her that she would like to have a PEG tube placed.  We discussed the case with palliative care and have agreed to move forward on daughter's wishes.  It is unclear at this time whether the patient will continue to recover or this will be his new baseline.  We have consulted GI for PEG tube placement.  The daughter would like to speak to GI regarding the procedure and I have conveyed the message to Dr. Lynn.  No other overnight events were reported by nursing.    4/1: Patient was seen at bedside multiple times today.  Early in the morning the patient was not arousable however in the afternoon the patient was awake and sitting down.  After talking to nursing it appears his mentation waxes and wanes however still confused and unable to have a conversation.  I spoke to the daughter Cleo and it appears she is considering hospice but would like more information.  As per the daughter she would like to stop daily blood draws and to consult with her for every study to be made on the patient prior to ordering it. I told her that while patient is in tube feedings they do weekly blood draws, and she was ok with it.  GI evaluated the patient for possible PEG tube placement, however daughter at this time unsure if she would like to move forward with PEG tube.  No other overnight events were reported by nursing.      Consultants/Specialty  Critical Care  Neurosurgery  Physiatry    Code Status  DNAR/DNI    Disposition  Will need SNF when medically cleared    Requiring CT for TF  Limit sedative rx    D/w pt's dtr, Cleo, considering transitioning to comfort care if pt's condition declines.  Palliative care following    - consider peg tube, palliative care to f/u        Review of Systems  Review of Systems   Unable to perform ROS: Mental status change        Physical Exam  Temp:  [36.1 °C (97 °F)-36.6 °C (97.8 °F)] 36.1 °C (97 °F)  Pulse:   [55-80] 60  Resp:  [17-18] 18  BP: (104-130)/(49-62) 130/62  SpO2:  [93 %-97 %] 97 %    Physical Exam  Vitals and nursing note reviewed.   Constitutional:       General: He is not in acute distress.     Appearance: He is ill-appearing.      Comments: Lethargy, arousable   HENT:      Head: Normocephalic and atraumatic.      Nose: Nose normal.      Mouth/Throat:      Mouth: Mucous membranes are dry.   Eyes:      Conjunctiva/sclera: Conjunctivae normal.   Cardiovascular:      Rate and Rhythm: Normal rate.   Pulmonary:      Effort: Pulmonary effort is normal. No respiratory distress.   Abdominal:      General: Bowel sounds are normal. There is no distension.      Tenderness: There is no abdominal tenderness.   Musculoskeletal:         General: No tenderness.   Skin:     General: Skin is warm.   Neurological:      Cranial Nerves: No cranial nerve deficit.      Sensory: No sensory deficit.      Motor: Weakness present.      Comments: Lethargy         Fluids    Intake/Output Summary (Last 24 hours) at 4/1/2021 1638  Last data filed at 4/1/2021 1204  Gross per 24 hour   Intake 870 ml   Output 600 ml   Net 270 ml       Laboratory  Recent Labs     03/31/21  0153   WBC 6.8   RBC 5.54   HEMOGLOBIN 15.4   HEMATOCRIT 47.9   MCV 86.5   MCH 27.8   MCHC 32.2*   RDW 45.0   PLATELETCT 237   MPV 10.7     Recent Labs     03/31/21  0153   SODIUM 138   POTASSIUM 4.3   CHLORIDE 102   CO2 25   GLUCOSE 120*   BUN 23*   CREATININE 1.08   CALCIUM 9.0                   Imaging  DX-CHEST-PORTABLE (1 VIEW)   Final Result      1.  Cardiomegaly with increased interstitial opacity diffusely which may represent interstitial edema or pneumonia.      2.  No lobar consolidation.      [id number]-[id number]-5 VIEW   Final Result         Moderate amount of stool throughout the colon.      DX-ABDOMEN FOR TUBE PLACEMENT   Final Result      Enteric tube projects over the proximal stomach.      CT-HEAD W/O   Final Result      1.  Prior RIGHT frontal craniotomy with  underlying dural thickening and subdural hemorrhage, unchanged from prior exam.   2.  No new intracranial hemorrhage demonstrated.   3.  Diffuse atrophy with white matter microvascular ischemic changes.   4.  Small chronic RIGHT posterior frontal infarct.      NX-[id number]-6 VIEW   Final Result         No specific finding to suggest small bowel obstruction.      CT-HEAD W/O   Final Result         1.  Mixed right parietal subdural fluid collection likely acute on chronic subdural hematoma.   2.  Nonspecific white matter changes, commonly associated with small vessel ischemic disease.  Associated mild cerebral atrophy is noted.   3.  Atherosclerosis.      OUTSIDE IMAGES-CT HEAD   Final Result           Assessment/Plan  * Seizure disorder, nonconvulsive, with status epilepticus (HCC)- (present on admission)  Assessment & Plan  Noted on EEG on admission  He was loaded with IV Keppra, started on IV Vimpat, and IV depakote  Repeat EEG 3/15 without evidence of seizure and more consistent with encephalopathy  Appriciate neurology consult. Valproic acid level subtherapeutic and increased to 750mg BID by neurology on 3/16  Repeat valproic acid level 101 on 3/19, Depakote changed to 500mg QAM and 750mg QHS, discussed with Dr. Martínez  Repeat valproic acid level scheduled for 3/22  Outpatient follow up in the epilepsy clinic has been arranged by Dr. Mcfadden    If patient has continued seizure like activity or if his mentation appears to decline further, may need repeat EEG.    3/25 d/w dtr, aware of declining condition, palliative care to f/u  Consider transition to CC if condition further deteriorates  - encourage activity    3/31 stable    4/1: Neurology was reconsulted, we appreciate any further recommendations.    Encephalopathy acute- (present on admission)  Assessment & Plan  Acute encephalopathy likely multifactorial, persistent  In setting of dementia with behavioral disturbance, history of psychiatric disorder, CVA, and  seizure disorder in status epilepticus on admission  Continue Seroquel 25mg TID prn for agitation, Seroquel 50mg QHS  on gabapentin  Appreciate physiatry consult, monitoring  Patient requiring    3/25 limit sedative rx, gabapentin  D/w RN  Mentation improving, participating with PT    3/26 minimal participation with therapy, max assistance  Skilled referral when medically cleared  Continue conservative treatment    3/27  Palliative f/u, consider dc to home with snf on hospice vs home  Will f/u with dtr   Stable/improved    3/28 on seroquel, restraints renewed for intermittent agitation  - taper off retsraints as tolerated without excessive sedation    3/31: It appears patient not improving, patient opening eyes but currently not following commands.  I spoke to the daughter Cleo she would like to have a PEG tube placed and SNF placement.    4/1: It appears patient's condition waxes and wanes, daughter unsure about PEG tube. Discussing hospice.    Subdural hematoma, acute (HCC)- (present on admission)  Assessment & Plan  Noted on CT head  Non-traumatic  Neurosurgery consulted, non-operative management  Avoid NSAIDs and anticoagulation    4/1: Patient's daughter still deciding whether to go the hospice route.    Dysphagia  Assessment & Plan  Dysphagia likely secondary to encephalopathy  Patient intermittently tolerates PO intake, however at times refuses both food and medication  NGT 3/18 placed for nutrition and medication administration, tube feeds currently at goal    3/24 with vomiting, TF held  - f/u abd/cxr, eval for aspiration  F/u stat labs  Palliative care consult, assist with determining goals of care    3/25 abd xray with mod stool  Vomiting resolved, TF resumed at slow rate  CXR, mild edema, procal neg    3/28  slp following, may need peg tube  Palliative to f/u with family regarding goals of care, snf referral  CM to assist    3/30 slp reeval, palliative to f/u with dtr regarding peg placement and  hospice  snf referral    3/31: Daughter would like the patient to have a PEG tube placed which we have consulted GI (Dr Lynn) and we appreciate further recommendations.    4/1: GI consulted, however patient's daughter unsure if PEG tube is what she wants. Still discussing. We appreciate further recommendations.      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 Will initiate amlodipine 5mg for BP control    4/1: Patient's SBP currently on the 120's. Continue same management. Monitor and make changes accordingly.       VTE prophylaxis: SCDs only secondary to SDH.     I certify that the patient requires continued medically necessary hospital services for the treatment of acute encephalopathy/SDH

## 2021-04-01 NOTE — PROCEDURES
ROUTINE VIDEO ELECTROENCEPHALOGRAM REPORT      Referring provider:   Dr. Michel    DOS: 04/01/21 (0 hours and 27 minutes of total recording time).     INDICATION:  Rafael Mccoy 75 y.o. male presenting with a history of seizure(s) and altered mental status    CURRENT ANTIEPILEPTIC AND/OR SEDATING REGIMEN:       TECHNIQUE: Routine VEEG was set up by a Neurodiagnostic technologist who performed education to the patient and staff. A minimum of 23 electrodes and 23 channel recording was setup and performed by Neurodiagnostic technologist, in accordance with the international 10-20 system. The study was reviewed in bipolar and referential montages. The recording examined the patient in the  awake, drowsy, and sleep state(s).     DESCRIPTION OF THE RECORD:  During wakefulness, the background was continuous and showed an occasional poorly formed 6-7 Hz posterior dominant rhythm.  An anterior-posterior gradient was noted with faster beta frequencies seen anteriorly.  During drowsiness, theta/delta frequencies were seen.    Sleep was captured and was characterized by diffuse background delta/theta activity with a loss of myogenic artifact.  N2 sleep transients in the form of sleep spindles and vertex waves were seen in the leads over the central regions.     ACTIVATION PROCEDURES:   NA      ICTAL AND INTERICTAL FINDINGS:   abundant right>left generalized periodic discharges frequently with a triphasic morphology.     Occasional right frontal slowing suggestive of focal dysfunction in this region     No clinical events or seizures were reported or recorded during the study.     EKG: sampling of the EKG recording showed a regular rate    EVENTS:  None    INTERPRETATION:   Abnormal video EEG recording in the awake, drowsy, and sleep state(s):  -Moderate background slowing with abundant right>left generalized periodic discharges frequently with a triphasic morphology.  These   findings are most suggestive of  diffuse/multifocal cerebral dysfunction, cortical irritability,  a moderate encephalopathy.   - Occasional right frontal slowing suggestive of focal dysfunction in this region   - No definitive electro-clinical seizures. Clinical correlation is recommended.         Kian Kim MD  Epilepsy and General Neurology  Department of Neurology  Instructor of Clinical Neurology [facility].   Office: [phone]  Fax: [phone]

## 2021-04-01 NOTE — CONSULTS
DATE OF SERVICE:  04/01/2021     GASTROENTEROLOGY CONSULTATION     REFERRING PHYSICIAN:  Varun Dalton MD     REASON FOR CONSULTATION:  PEG tube placement.     HISTORY OF PRESENT ILLNESS:  The patient is a 75-year-old male who was   admitted 03/11 and transferred from [facility].  He has a history of seizure   disorder secondary to a stroke.  He has a history of dementia and per chart,   he has been legally blind for more than 12 years.  He presented to [facility] after he was found to have a right parietal subdural hematoma without   any midline shift.  He was seen in consultation by Neurosurgery, who   recommended no surgical intervention.  He was also followed by Neurology.  The   patient has had a prolonged waxing and waning mental status.  He has had poor   p.o. intake and intermittent refusal of p.o. intake.  NG tube had been   placed.  He was seen by speech pathology yesterday.  He was documented to have   inability to maintain eye opening with minimal labial and lingual movement   during oral stimulation.  He was recommended to continue n.p.o. status.     He was seen by palliative care yesterday who spoke with his daughter who has   had some concern whether he wants to move forward with a PEG tube or hospice.    She is interested in talking to me about PEG tube placement.     PAST MEDICAL HISTORY:  Seizure disorder, legal blindness, dementia, right   parietal subdural hematoma.     PAST SURGICAL HISTORY:  Craniotomy 04/2020, rotator cuff repair,   tonsillectomy.     MEDICATIONS:  Amlodipine 5 mg daily, divalproex 500 mg daily, divalproex 750   mg every evening, gabapentin 300 mg twice daily, lacosamide 200 mg twice   daily, Keppra 1000 mg twice daily, melatonin 5 mg in the evening, simvastatin   40 mg at bedtime.     SOCIAL HISTORY:  Per chart, he is .  He is a nonsmoker and no alcohol   use.     FAMILY HISTORY:  No reported digestive malignancies.     REVIEW OF SYSTEMS:  Unable to  obtain.     PHYSICAL EXAMINATION:  VITAL SIGNS:  Temperature 36.6, pulse 80, respirations 18, blood pressure   127/62.  GENERAL:  This is a 75-year-old male who is in no acute distress.  NEUROLOGIC:  He will not open his eyes for me.  He will wiggle his toes   briefly to command, but does not follow any other commands.  He did respond to   some questions to yes/no, but no other medication.  HEENT:  Pupils are round.  Sclerae are anicteric.  He did not appreciate oral   lesions.  NECK:  Soft, no adenopathy.  LUNGS:  Clear.  HEART:  S1, S2, without murmur, gallop or rub.  ABDOMEN:  Protuberant.  Bowel sounds present.  Tympany throughout.  No   palpable masses.  No surgical scars.  EXTREMITIES:  No clubbing, cyanosis, or peripheral edema.  SKIN:  Smooth, moist, and warm.     LABORATORY DATA:  WBC 6.8, hemoglobin 15.4, platelets 257.  Sodium 138,   potassium 4.3, chloride 102, bicarbonate 25, BUN 23, creatinine 1.08.     IMPRESSION:  1.  Oropharyngeal dysphagia.  2.  Seizure disorder.  3.  Subdural hematoma presentation, dementia, legal blindness, hypertension,   and dyslipidemia.     RECOMMENDATIONS:  I am going to speak with his daughter today about PEG tube   placement.  If we move forward, he needs an abdominal x-ray to assess the   amount of colonic gas, which could complicate the PEG placement.  I have read   in the notes that he may be impulsive and pull and have to wear mitts and this   would certainly be a concern for a PEG tube as pulling out a PEG in the first   few weeks could have drastic consequences.  I will sure the daughter, placing   a PEG tube does not necessarily lead to overall improvement of his health   condition, but provide the means to give nutrition.  Some concern regarding   his advanced directive, in which, he did not want artificial nutrition.     Thank you for involving us in the care of this patient.        ______________________________  MD DEAN DE LOS SANTOS/DIA    DD:   04/01/2021 07:23  DT:  04/01/2021 09:24    Job#:  [id number]

## 2021-04-01 NOTE — PROGRESS NOTES
Neurology Progress Note  Neurohospitalist Service, [facility]    Referring Physician: Varun Pa*    Chief Complaint   Patient presents with   • ALOC     Transfer from OSH - family noticed seizure like activity on a zoom call; slow speech. Concerns for ICH at OSH; A/Ox2       HPI: Refer to initial documented Neurology H&P, as detailed in the patient's chart.    Interval History April 1, 2021:     I was asked by the primary team to see this patient today to evaluate for prognostication and whether we should proceed with hospice versus continue supportive therapy.    Review of systems: In addition to what is detailed in the HPI and/or updated in the interval history, all other systems reviewed and are negative.    Past Medical History:    has a past medical history of Blind, Head injury, Hypertension, Seizure (HCC), and Stroke (HCC).    FHx:  family history includes Other in his mother.    SHx:   reports that he has never smoked. He has never used smokeless tobacco. He reports that he does not drink alcohol and does not use drugs.    Medications:    Current Facility-Administered Medications:   •  QUEtiapine (Seroquel) tablet 50 mg, 50 mg, Enteral Tube, HS PRN, Bradley Valerio D.AUSTIN.  •  divalproex (DEPAKOTE SPRINKLE) capsule 750 mg, 750 mg, Enteral Tube, Q EVENING, Star Da Silva M.D., 750 mg at 03/31/21 1709  •  divalproex (DEPAKOTE SPRINKLE) capsule 500 mg, 500 mg, Enteral Tube, QAM, Star Da Silva M.D., 500 mg at 04/01/21 0434  •  levETIRAcetam (KEPPRA) tablet 1,000 mg, 1,000 mg, Enteral Tube, BID, Star Da Silva M.D., 1,000 mg at 04/01/21 0433  •  lacosamide (VIMPAT) tablet 200 mg, 200 mg, Enteral Tube, BID, Star Da Silva M.D., 200 mg at 04/01/21 0434  •  senna-docusate (PERICOLACE or SENOKOT S) 8.6-50 MG per tablet 2 tablet, 2 tablet, Enteral Tube, BID, 2 tablet at 04/01/21 0434 **AND** polyethylene glycol/lytes (MIRALAX) PACKET 1 Packet, 1 Packet, Enteral Tube,  QDAY PRN, 1 Packet at 04/01/21 1008 **AND** magnesium hydroxide (MILK OF MAGNESIA) suspension 30 mL, 30 mL, Enteral Tube, QDAY PRN, 30 mL at 03/29/21 1702 **AND** bisacodyl (DULCOLAX) suppository 10 mg, 10 mg, Rectal, QDAY PRN, Star Da Silva M.D., 10 mg at 03/24/21 1352  •  ondansetron (ZOFRAN ODT) dispertab 4 mg, 4 mg, Enteral Tube, Q4HRS PRN **OR** ondansetron (ZOFRAN) syringe/vial injection 4 mg, 4 mg, Intravenous, Q4HRS PRN, Star Da Silva M.D., 4 mg at 03/24/21 1935  •  Pharmacy Consult: Enteral tube insertion - review meds/change route/product selection, 1 Each, Other, PHARMACY TO DOSE, Star Da Silva M.D.  •  amLODIPine (NORVASC) tablet 5 mg, 5 mg, Enteral Tube, Q DAY, Star Da Silva M.D., 5 mg at 04/01/21 0433  •  gabapentin (NEURONTIN) capsule 300 mg, 300 mg, Enteral Tube, BID, MARAH Sinclair.GREGORY, 300 mg at 04/01/21 0433  •  melatonin tablet 5 mg, 5 mg, Enteral Tube, Nightly, Star Da Silva M.D., 5 mg at 03/31/21 2050  •  simvastatin (ZOCOR) tablet 40 mg, 40 mg, Enteral Tube, QHS, Star Da Silva M.D., 40 mg at 03/31/21 2050  •  acetaminophen (Tylenol) tablet 650 mg, 650 mg, Enteral Tube, Q4HRS PRN, 650 mg at 03/26/21 0328 **OR** acetaminophen (TYLENOL) suppository 650 mg, 650 mg, Rectal, Q4HRS PRN, Star Da Silva M.D.  •  QUEtiapine (Seroquel) tablet 25 mg, 25 mg, Enteral Tube, TID PRN, Star Da Silva M.D., 25 mg at 03/31/21 1335  •  morphine (pf) 4 mg/mL injection 2 mg, 2 mg, Intravenous, Q4HRS PRN, Star Da Silva M.D., 2 mg at 03/17/21 1927  •  labetalol (NORMODYNE/TRANDATE) injection 10 mg, 10 mg, Intravenous, Q4HRS PRN, Kip Mcfadden M.D., 10 mg at 03/24/21 1935  •  hydrALAZINE (APRESOLINE) injection 10 mg, 10 mg, Intravenous, Q4HRS PRN, Kip Mcfadden M.D.  •  enalaprilat (VASOTEC) injection 1.25 mg, 1.25 mg, Intravenous, Q6HRS PRN, Kip Mcfadden M.D.  •  Respiratory Therapy Consult, , Nebulization, Continuous RT, Pankaj Borjas M.D.  •  LORazepam (ATIVAN)  injection 2 mg, 2 mg, Intravenous, Q5 MIN PRN, Pankaj Borjas M.D., 2 mg at 03/12/21 0155    Physical Examination:     Vitals:    03/31/21 1946 03/31/21 2320 04/01/21 0346 04/01/21 0747   BP: 112/53 104/49 127/62 130/62   Pulse: 62 (!) 55 80 60   Resp: 18 17 18 18   Temp: 36.4 °C (97.6 °F) 36.1 °C (97 °F) 36.6 °C (97.8 °F) 36.1 °C (97 °F)   TempSrc: Temporal Temporal Temporal Temporal   SpO2: 93% 95% 96% 97%   Weight:       Height:           General: Patient is awake and in no acute distress  Eyes: examination of optic disks not indicated at this time  CV: RRR    NEUROLOGICAL EXAM:     Patient will wake up to sternal rub.  He is able to tell me his name.  He told he was in a dental office.  Confused.  Will follow some some commands only.  Moving all 4 to antigravity at least.  Pupils are reactive.  Face appears symmetrical.  There is no gaze preference.  Tongue is midline.  Toes are upgoing bilaterally.  No obvious signs ataxia.    Objective Data:    Labs:  Lab Results   Component Value Date/Time    PROTHROMBTM 13.8 03/11/2021 01:41 AM    INR 1.03 03/11/2021 01:41 AM      Lab Results   Component Value Date/Time    WBC 6.8 03/31/2021 01:53 AM    RBC 5.54 03/31/2021 01:53 AM    HEMOGLOBIN 15.4 03/31/2021 01:53 AM    HEMATOCRIT 47.9 03/31/2021 01:53 AM    MCV 86.5 03/31/2021 01:53 AM    MCH 27.8 03/31/2021 01:53 AM    MCHC 32.2 (L) 03/31/2021 01:53 AM    MPV 10.7 03/31/2021 01:53 AM    NEUTSPOLYS 61.90 03/31/2021 01:53 AM    LYMPHOCYTES 18.20 (L) 03/31/2021 01:53 AM    MONOCYTES 16.50 (H) 03/31/2021 01:53 AM    EOSINOPHILS 1.90 03/31/2021 01:53 AM    BASOPHILS 0.60 03/31/2021 01:53 AM      Lab Results   Component Value Date/Time    SODIUM 138 03/31/2021 01:53 AM    POTASSIUM 4.3 03/31/2021 01:53 AM    CHLORIDE 102 03/31/2021 01:53 AM    CO2 25 03/31/2021 01:53 AM    GLUCOSE 120 (H) 03/31/2021 01:53 AM    BUN 23 (H) 03/31/2021 01:53 AM    CREATININE 1.08 03/31/2021 01:53 AM      Lab Results   Component Value  Date/Time    CHOLSTRLTOT 106 03/11/2021 01:41 AM    LDL 32 03/11/2021 01:41 AM    HDL 28 (A) 03/11/2021 01:41 AM    TRIGLYCERIDE 228 (H) 03/11/2021 01:41 AM       Lab Results   Component Value Date/Time    ALKPHOSPHAT 66 03/31/2021 01:53 AM    ASTSGOT 17 03/31/2021 01:53 AM    ALTSGPT 14 03/31/2021 01:53 AM    TBILIRUBIN 0.2 03/31/2021 01:53 AM        Imaging/Testing:  DX-CHEST-PORTABLE (1 VIEW)   Final Result      1.  Cardiomegaly with increased interstitial opacity diffusely which may represent interstitial edema or pneumonia.      2.  No lobar consolidation.      BV-[id number]-4 VIEW   Final Result         Moderate amount of stool throughout the colon.      DX-ABDOMEN FOR TUBE PLACEMENT   Final Result      Enteric tube projects over the proximal stomach.      CT-HEAD W/O   Final Result      1.  Prior RIGHT frontal craniotomy with underlying dural thickening and subdural hemorrhage, unchanged from prior exam.   2.  No new intracranial hemorrhage demonstrated.   3.  Diffuse atrophy with white matter microvascular ischemic changes.   4.  Small chronic RIGHT posterior frontal infarct.      [id number] VIEW   Final Result         No specific finding to suggest small bowel obstruction.      CT-HEAD W/O   Final Result         1.  Mixed right parietal subdural fluid collection likely acute on chronic subdural hematoma.   2.  Nonspecific white matter changes, commonly associated with small vessel ischemic disease.  Associated mild cerebral atrophy is noted.   3.  Atherosclerosis.      OUTSIDE IMAGES-CT HEAD   Final Result            Assessment and Plan:    75-year-old male was admitted on March 11.  He has a right sided subdural.  Was found to be in nonconvulsive status at the beginning of the admission.  Currently on Depakote, Vimpat, and Keppra for the seizures.  He has had minimal improvement.  Looking over the notes looks like he has been having problems before the mission for some time now. At Baseline he is blind.   Has been needing more help with his ADLs before admission.  There may be a component of dementia, there was some mention of him having some hallucinations before this admission.  Currently he does awaken.  He is alert to himself only.  Not to situation or place.  Does not walk.  There were planning on doing a PEG tube placement.  I did an EEG today which shows multiple spikes.  However he is not having seizures.  I ordered an ammonia level which is still pending.  However given the overall picture  prognosis is poor.  I do not foresee future of him being independent again.  He will most likely will end up needing to be in a nursing home or situation where he requires 24/7 care.  I based this on the fact that his baseline before this admission status appear to be compromised.  Along with the EEG still showing irritation, and the seizure requiring triple therapy.  Which all leads to him being having decreased mental status.  Looking at the palliative care notes appears family goal is for him to be somewhat independent and fully conversive I think this is unlikely.  May be going with hospice route could be appropriate.        The evaluation of the patient, and recommended management, was discussed with the resident staff. I have performed a physical exam and reviewed and updated ROS and Plan today (4/1/2021). In review of yesterday's note (3/31/2021), there are no changes except as documented above.    This chart was partially generated using voice recognition technology and sound alike word replacement may be present, best efforts were made to make the chart accurate.    Rush Michel MD  Board Certified Neurology, ABPN  t) [phone]

## 2021-04-01 NOTE — DIETARY
Nutrition support weekly update:  Day 21 of admit.  Rafael Mccoy is a 75 y.o. male with admitting DX of Intracranial hemorrhage .      Tube feeding initiated on 3/18. Current TF via gastric cortrak is Fibersource HN @ 70 ml/hr ml/hr, providing 2016 kcals, 90 grams protein, 1360 mL free water.    Assessment:  Weight 3/27: 86.5 kg via bed scale. Wt up since admit wt on stand up scale.     Evaluation:   1. TF running at goal  2. SLP visited pt on 3/31 and recommended pt remain NPO. Possible PEG tube placement pending daughter's decision  3. Labs: Glucose 120, BUN 23  4. Pre-albumin WNL and CRP 0.84 but trending down from last week- indicating decreasing inflammation  5. Meds: Senna, Zocor, Novasc  6. Last BM: 3/31  7. Current feeding remains appropriate to meet estimated nutritional needs    Malnutrition risk: criteria not met at this time    Recommendations/Plan:  1. Continue TF formula and rate  2. Fluids per MD  3. Monitor weights    RD following

## 2021-04-02 ENCOUNTER — APPOINTMENT (OUTPATIENT)
Dept: RADIOLOGY | Facility: MEDICAL CENTER | Age: 76
DRG: 100 | End: 2021-04-02
Attending: STUDENT IN AN ORGANIZED HEALTH CARE EDUCATION/TRAINING PROGRAM
Payer: COMMERCIAL

## 2021-04-02 PROCEDURE — 700102 HCHG RX REV CODE 250 W/ 637 OVERRIDE(OP): Performed by: STUDENT IN AN ORGANIZED HEALTH CARE EDUCATION/TRAINING PROGRAM

## 2021-04-02 PROCEDURE — A9270 NON-COVERED ITEM OR SERVICE: HCPCS | Performed by: STUDENT IN AN ORGANIZED HEALTH CARE EDUCATION/TRAINING PROGRAM

## 2021-04-02 PROCEDURE — 99232 SBSQ HOSP IP/OBS MODERATE 35: CPT | Performed by: INTERNAL MEDICINE

## 2021-04-02 PROCEDURE — 770006 HCHG ROOM/CARE - MED/SURG/GYN SEMI*

## 2021-04-02 PROCEDURE — A9270 NON-COVERED ITEM OR SERVICE: HCPCS | Performed by: INTERNAL MEDICINE

## 2021-04-02 PROCEDURE — 700102 HCHG RX REV CODE 250 W/ 637 OVERRIDE(OP): Performed by: INTERNAL MEDICINE

## 2021-04-02 RX ORDER — PROCHLORPERAZINE EDISYLATE 5 MG/ML
5 INJECTION INTRAMUSCULAR; INTRAVENOUS EVERY 8 HOURS PRN
Status: DISCONTINUED | OUTPATIENT
Start: 2021-04-02 | End: 2021-04-16 | Stop reason: HOSPADM

## 2021-04-02 RX ADMIN — LEVETIRACETAM 1000 MG: 500 TABLET ORAL at 16:27

## 2021-04-02 RX ADMIN — LACOSAMIDE 200 MG: 100 TABLET, FILM COATED ORAL at 04:30

## 2021-04-02 RX ADMIN — DOCUSATE SODIUM 50 MG AND SENNOSIDES 8.6 MG 2 TABLET: 8.6; 5 TABLET, FILM COATED ORAL at 04:32

## 2021-04-02 RX ADMIN — MAGNESIUM HYDROXIDE 30 ML: 400 SUSPENSION ORAL at 04:30

## 2021-04-02 RX ADMIN — GABAPENTIN 300 MG: 300 CAPSULE ORAL at 16:27

## 2021-04-02 RX ADMIN — DIVALPROEX SODIUM 500 MG: 125 CAPSULE ORAL at 04:30

## 2021-04-02 RX ADMIN — SIMVASTATIN 40 MG: 20 TABLET, FILM COATED ORAL at 20:27

## 2021-04-02 RX ADMIN — LEVETIRACETAM 1000 MG: 500 TABLET ORAL at 04:30

## 2021-04-02 RX ADMIN — GABAPENTIN 300 MG: 300 CAPSULE ORAL at 04:29

## 2021-04-02 RX ADMIN — Medication 5 MG: at 20:27

## 2021-04-02 RX ADMIN — DIVALPROEX SODIUM 750 MG: 125 CAPSULE ORAL at 16:27

## 2021-04-02 RX ADMIN — AMLODIPINE BESYLATE 5 MG: 5 TABLET ORAL at 04:30

## 2021-04-02 RX ADMIN — DOCUSATE SODIUM 50 MG AND SENNOSIDES 8.6 MG 2 TABLET: 8.6; 5 TABLET, FILM COATED ORAL at 16:27

## 2021-04-02 RX ADMIN — LACOSAMIDE 200 MG: 100 TABLET, FILM COATED ORAL at 16:27

## 2021-04-02 NOTE — CARE PLAN
Problem: Safety  Goal: Will remain free from falls  Outcome: PROGRESSING AS EXPECTED  Note: Bed low and locked. Bed alarm in place. Educate patient on fall risk and family members.      Problem: Bowel/Gastric:  Goal: Normal bowel function is maintained or improved  Outcome: PROGRESSING AS EXPECTED  Flowsheets  Taken 4/1/2021 0849 by Sirena Dewey RROSEANNE.  Last BM: 03/31/21  Taken 3/31/2021 0000 by Lyric Mustafa RDANIEL  Number of Times Stooled: 1  Note: Administer bowel meds as needed. Encourage ambulation as tolerated.

## 2021-04-02 NOTE — PROGRESS NOTES
"Patient had another episode of upper extremity shaking primarily focused on patients left arm. Patient brings arm above his head and has shaking/jerking movements. Patient was alert and able to voice \"I'm alright\" to this RN during the episode but did not say anything else. Episode lasted for 30 seconds. Patient now resting and comfortable.  "

## 2021-04-02 NOTE — PROGRESS NOTES
Patient had a couple of alert episodes throughout the night and was able to have small conversations with this RN.

## 2021-04-02 NOTE — PROGRESS NOTES
"Patient became alert but confused this afternoon.  Assisted patient to a chair.  Patient was awake and conversational with nurse out on unit patio.  RN spoon fed patient single ice chips and patient did well.  No coughing or signs of aspiration noted.  Daughter visiting.  Patient became more tired and lethargic.  Left corner of patient's mouth began twitching, muscles became stiff, and he was having fine tremors.  Patient assisted back to bed and MD notified.  Hesitant to give Ativan and cause sedation.  Patient currently has no IV access and daughter wanting to avoid invasive procedures and is possibly leaning towards comfort care.  Continue to monitor patient.  Before leaving the room asked patient how he was going and he mumbled \"I'm fine\".    "

## 2021-04-02 NOTE — PROGRESS NOTES
"Hospital Medicine Daily Progress Note    Date of Service  4/2/2021    Chief Complaint  75 y.o. male admitted 3/11/2021 with seizure    Hospital Course  \"History of cerebellar CVA, seizure disorder, presented as a transfer from [facility] after he was found to have right parietal SDH without any midline shift's.  Witnessed seizure by family followed by changes in mental status.  Neurosurgery consulted Dr. Capps, no surgical intervention, recommend repeat CT head and frequent neuro checks.  Admit to the ICU every hour neuro checks, repeat head CT, keep SBP less than 140, continue Depakote and Keppra.  Due to ongoing encephalopathy, he had an EEG and was found to be in non-convulsive status epilepticus and was loaded with Vimpat.    Neurology was following case and he was treated with Vimpat 200mg BID, Keppra 1000mg BID, and Depacon 750mg QHS. No further seizure like activity. Patient had subtherapeutic Depakote level and Depacon was increased to 750mg BID.    Patient had prolonged waxing and waning mental status. Due to poor PO intake and intermittent refusal of PO intake, NGT was placed and started on alternative nutrition on 3/18.    Interval Problem Update  3/29: Awake, not following commands  Still requiring core tract tube feeds, will follow-up with patient's daughter regarding goals of care and PEG tube needs  Palliative care to assist    Still has mitts as restraints, intermittently impulsive, easily reoriented    Patient seen by previous hospitalist until 3/29    3/30: Patient seen at bedside.  Awake however still not following commands.  We are pending palliative reevaluation with daughter regarding PEG tube placement.  Patient still on core track tube feedings.  As per nursing no other overnight events were reported.    3/31: Patient seen at bedside, opening eyes, not following commands, unable to have a conversation.  The patient has advanced directives that previously stated not to pursue artificial " nutrition, however as per the daughter Cleo she mentions that her father told her that she would like to have a PEG tube placed.  We discussed the case with palliative care and have agreed to move forward on daughter's wishes.  It is unclear at this time whether the patient will continue to recover or this will be his new baseline.  We have consulted GI for PEG tube placement.  The daughter would like to speak to GI regarding the procedure and I have conveyed the message to Dr. Lynn.  No other overnight events were reported by nursing.    4/1: Patient was seen at bedside multiple times today.  Early in the morning the patient was not arousable however in the afternoon the patient was awake and sitting down.  After talking to nursing it appears his mentation waxes and wanes however still confused and unable to have a conversation.  I spoke to the daughter Cleo and it appears she is considering hospice but would like more information.  As per the daughter she would like to stop daily blood draws and to consult with her for every study to be made on the patient prior to ordering it. I told her that while patient is in tube feedings they do weekly blood draws, and she was ok with it. GI evaluated the patient for possible PEG tube placement, however daughter at this time unsure if she would like to move forward with PEG tube.  No other overnight events were reported by nursing.    4/2: Patient seen at bedside this morning.  Patient seems more awake than yesterday, but still unable to have a conversation or follow simple commands.  Nursing have reported the patient had episodes of arm twitching and lip twitching.  I have spoken with neurology in regards to modify antiseizure medications but it appears it would not be beneficial for the patient at this time as it would make him more drowsy.  Daughter appears to be leaning towards hospice however she has not made that decision  yet.      Consultants/Specialty  Critical Care  Neurosurgery  Physiatry  Neurology  GI    Code Status  DNAR/DNI    Disposition  Daughter leaning towards hospice, pending decision.    If not hospice, patient should have PEG tube and SNF placement.    Review of Systems  Review of Systems   Unable to perform ROS: Mental status change        Physical Exam  Temp:  [36.1 °C (97 °F)-37.2 °C (99 °F)] 37.2 °C (99 °F)  Pulse:  [80-91] 91  Resp:  [15-18] 15  BP: (120-143)/(54-62) 131/54  SpO2:  [93 %-98 %] 93 %    Physical Exam  Vitals and nursing note reviewed.   Constitutional:       General: He is not in acute distress.     Appearance: He is ill-appearing.      Comments: Lethargy, arousable   HENT:      Head: Normocephalic and atraumatic.      Nose: Nose normal.      Mouth/Throat:      Mouth: Mucous membranes are dry.   Eyes:      Conjunctiva/sclera: Conjunctivae normal.   Cardiovascular:      Rate and Rhythm: Normal rate.   Pulmonary:      Effort: Pulmonary effort is normal. No respiratory distress.   Abdominal:      General: Bowel sounds are normal. There is no distension.      Tenderness: There is no abdominal tenderness.   Musculoskeletal:         General: No tenderness.   Skin:     General: Skin is warm.   Neurological:      Cranial Nerves: No cranial nerve deficit.      Sensory: No sensory deficit.      Motor: Weakness present.      Comments: Lethargy         Fluids    Intake/Output Summary (Last 24 hours) at 4/2/2021 1317  Last data filed at 4/2/2021 0900  Gross per 24 hour   Intake 1550 ml   Output 700 ml   Net 850 ml       Laboratory  Recent Labs     03/31/21  0153   WBC 6.8   RBC 5.54   HEMOGLOBIN 15.4   HEMATOCRIT 47.9   MCV 86.5   MCH 27.8   MCHC 32.2*   RDW 45.0   PLATELETCT 237   MPV 10.7     Recent Labs     03/31/21  0153   SODIUM 138   POTASSIUM 4.3   CHLORIDE 102   CO2 25   GLUCOSE 120*   BUN 23*   CREATININE 1.08   CALCIUM 9.0                   Imaging  DX-CHEST-PORTABLE (1 VIEW)   Final Result      1.   Cardiomegaly with increased interstitial opacity diffusely which may represent interstitial edema or pneumonia.      2.  No lobar consolidation.      [id number] VIEW   Final Result         Moderate amount of stool throughout the colon.      DX-ABDOMEN FOR TUBE PLACEMENT   Final Result      Enteric tube projects over the proximal stomach.      CT-HEAD W/O   Final Result      1.  Prior RIGHT frontal craniotomy with underlying dural thickening and subdural hemorrhage, unchanged from prior exam.   2.  No new intracranial hemorrhage demonstrated.   3.  Diffuse atrophy with white matter microvascular ischemic changes.   4.  Small chronic RIGHT posterior frontal infarct.      [id number]-7 VIEW   Final Result         No specific finding to suggest small bowel obstruction.      CT-HEAD W/O   Final Result         1.  Mixed right parietal subdural fluid collection likely acute on chronic subdural hematoma.   2.  Nonspecific white matter changes, commonly associated with small vessel ischemic disease.  Associated mild cerebral atrophy is noted.   3.  Atherosclerosis.      OUTSIDE IMAGES-CT HEAD   Final Result           Assessment/Plan  * Seizure disorder, nonconvulsive, with status epilepticus (HCC)- (present on admission)  Assessment & Plan  Noted on EEG on admission  He was loaded with IV Keppra, started on IV Vimpat, and IV depakote  Repeat EEG 3/15 without evidence of seizure and more consistent with encephalopathy  Appriciate neurology consult. Valproic acid level subtherapeutic and increased to 750mg BID by neurology on 3/16  Repeat valproic acid level 101 on 3/19, Depakote changed to 500mg QAM and 750mg QHS, discussed with Dr. Martínez  Repeat valproic acid level scheduled for 3/22  Outpatient follow up in the epilepsy clinic has been arranged by Dr. Mcfadden    If patient has continued seizure like activity or if his mentation appears to decline further, may need repeat EEG.    3/25 d/w dtr, aware of declining condition,  palliative care to f/u  Consider transition to CC if condition further deteriorates  - encourage activity    3/31 stable  4/1: Neurology was reconsulted, we appreciate any further recommendations.    4/2: Patient more awake, however still disoriented.  As per nursing there was a concern of lip twitching, after talking to neurology he does not seem appropriate to make further antiseizure medication changes as needed will probably make him more drowsy.  I spoke to the daughter Cleo yesterday and it appears she is leaning towards hospice however she has not made that decision yet.    Encephalopathy acute- (present on admission)  Assessment & Plan  Acute encephalopathy likely multifactorial, persistent  In setting of dementia with behavioral disturbance, history of psychiatric disorder, CVA, and seizure disorder in status epilepticus on admission  Continue Seroquel 25mg TID prn for agitation, Seroquel 50mg QHS  on gabapentin  Appreciate physiatry consult, monitoring  Patient requiring    3/25 limit sedative rx, gabapentin  D/w RN  Mentation improving, participating with PT    3/26 minimal participation with therapy, max assistance  Skilled referral when medically cleared  Continue conservative treatment    3/27  Palliative f/u, consider dc to home with snf on hospice vs home  Will f/u with dtr   Stable/improved  3/28 on seroquel, restraints renewed for intermittent agitation  - taper off retsraints as tolerated without excessive sedation  3/31: It appears patient not improving, patient opening eyes but currently not following commands.  I spoke to the daughter Cleo she would like to have a PEG tube placed and SNF placement.    4/1: It appears patient's condition waxes and wanes, daughter unsure about PEG tube. Discussing hospice.    4/2: Pending decision towards hospice.  We are awaiting daughter's decision.    Subdural hematoma, acute (HCC)- (present on admission)  Assessment & Plan  Noted on CT  head  Non-traumatic  Neurosurgery consulted, non-operative management  Avoid NSAIDs and anticoagulation    4/2: Patient's daughter still deciding whether to go the hospice route.    Dysphagia  Assessment & Plan  Dysphagia likely secondary to encephalopathy  Patient intermittently tolerates PO intake, however at times refuses both food and medication  NGT 3/18 placed for nutrition and medication administration, tube feeds currently at goal    3/24 with vomiting, TF held  - f/u abd/cxr, eval for aspiration  F/u stat labs  Palliative care consult, assist with determining goals of care    3/25 abd xray with mod stool  Vomiting resolved, TF resumed at slow rate  CXR, mild edema, procal neg    3/28  slp following, may need peg tube  Palliative to f/u with family regarding goals of care, snf referral  CM to assist    3/30 slp reeval, palliative to f/u with dtr regarding peg placement and hospice  snf referral    3/31: Daughter would like the patient to have a PEG tube placed which we have consulted GI (Dr Lynn) and we appreciate further recommendations.  4/1: GI consulted, however patient's daughter unsure if PEG tube is what she wants. Still discussing. We appreciate further recommendations.    4/2: I spoke to the daughter yesterday at length.  It appears she is leaning towards hospice, however she has not made that decision.  She does not want the PEG tube placed as of right now.      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 Will initiate  amlodipine 5mg for BP control    4/2: Patient's blood pressure currently controlled. Continue same management. Monitor and make changes accordingly.       VTE prophylaxis: SCDs only secondary to SDH.     I certify that the patient requires continued medically necessary hospital services for the treatment of acute encephalopathy/SDH

## 2021-04-02 NOTE — PROGRESS NOTES
Gastroenterology Progress Note     Author: Ruchi Lynn M.D.   Date & Time Created: 4/2/2021 2:59 PM    Chief Complaint:  Oropharyngeal dysphagia    Interval History:  HPI:  The patient is a 75-year-old male who was admitted 03/11 and transferred from [facility].  He has a history of seizure   disorder secondary to a stroke.  He has a history of dementia and per chart,   he has been legally blind for more than 12 years.  He presented to [facility] after he was found to have a right parietal subdural hematoma without   any midline shift.  He was seen in consultation by Neurosurgery, who   recommended no surgical intervention.  He was also followed by Neurology. The  patient has had a prolonged waxing and waning mental status.  He has had poor  p.o. intake and intermittent refusal of p.o. intake.  NG tube had been   placed.  He was seen by speech pathology yesterday.  He was documented to have  inability to maintain eye opening with minimal labial and lingual movement during oral stimulation.  He was recommended to continue n.p.o. status.     He was seen by palliative care yesterday who spoke with his daughter who has   had some concern whether he wants to move forward with a PEG tube or hospice.    She is interested in talking to me about PEG tube placement.    4/2/21:  Thorough discussion with daughter yesterday about risks and benefits of PEG.  Repeated my name but no other verbal communication with me.    Review of Systems:  Review of Systems   Unable to perform ROS: Medical condition       Physical Exam:  Physical Exam  Constitutional:       Comments: Knees bent up, left arm on his head, lips moving but does not open eyes while speaking with him   HENT:      Head: Normocephalic and atraumatic.   Cardiovascular:      Rate and Rhythm: Normal rate and regular rhythm.      Heart sounds: Normal heart sounds.   Pulmonary:      Effort: Pulmonary effort is normal.      Breath sounds: Normal breath sounds.    Abdominal:      General: Bowel sounds are normal. There is distension.      Palpations: Abdomen is soft.   Musculoskeletal:         General: Normal range of motion.      Cervical back: Normal range of motion and neck supple.   Skin:     General: Skin is warm and dry.         Labs:          Recent Labs     21   SODIUM 138   POTASSIUM 4.3   CHLORIDE 102   CO2 25   BUN 23*   CREATININE 1.08   MAGNESIUM 2.3   CALCIUM 9.0     Recent Labs     21   ALTSGPT 14   ASTSGOT 17   ALKPHOSPHAT 66   TBILIRUBIN 0.2   GLUCOSE 120*     Recent Labs     21   RBC 5.54   HEMOGLOBIN 15.4   HEMATOCRIT 47.9   PLATELETCT 237     Recent Labs     21   WBC 6.8   NEUTSPOLYS 61.90   LYMPHOCYTES 18.20*   MONOCYTES 16.50*   EOSINOPHILS 1.90   BASOPHILS 0.60   ASTSGOT 17   ALTSGPT 14   ALKPHOSPHAT 66   TBILIRUBIN 0.2     Hemodynamics:  Temp (24hrs), Av.5 °C (97.7 °F), Min:36.1 °C (97 °F), Max:37.2 °C (99 °F)  Temperature: 37.2 °C (99 °F)  Pulse  Av.6  Min: 49  Max: 110   Blood Pressure : 131/54     Respiratory:    Respiration: 15, Pulse Oximetry: 93 %     Work Of Breathing / Effort: Within Normal Limits  RUL Breath Sounds: Clear, RML Breath Sounds: Diminished, RLL Breath Sounds: Diminished, CARLENE Breath Sounds: Clear, LLL Breath Sounds: Diminished  Fluids:    Intake/Output Summary (Last 24 hours) at 2021 1459  Last data filed at 2021 1300  Gross per 24 hour   Intake 1700 ml   Output 700 ml   Net 1000 ml        GI/Nutrition:  Orders Placed This Encounter   Procedures   • Diet NPO     Standing Status:   Standing     Number of Occurrences:   1     Order Specific Question:   Restrict to:     Answer:   With Tube Feed [4]     Medical Decision Making, by Problem:  Active Hospital Problems    Diagnosis    • *Seizure disorder, nonconvulsive, with status epilepticus (HCC) [G40.901]    • Encephalopathy acute [G93.40]    • Subdural hematoma, acute (HCC) [S06.5X9A]    • Dysphagia [R13.10]    •  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  Oropharyngeal dysphagia.  2.  Abdominal distention  3.  Seizure disorder.  4.  Subdural hematoma presentation  5.  Dementia  6.  legal blindness  7.  hypertension,   8.  dyslipidemia      Plan:  Reviewed notes and daughter may be leaning towards hospice.  I will sign off but if plan of care changes and PEG requested, please notify us.  My partner, Dr. Mcclure will assume GI service on Monday.    Ensure adequate stooling with abdominal distention and tympany throughout. Last stool I can see was 3/31    Quality-Core Measures

## 2021-04-03 PROBLEM — R11.10 VOMITING: Status: ACTIVE | Noted: 2021-04-03

## 2021-04-03 PROBLEM — K59.00 CONSTIPATION: Status: ACTIVE | Noted: 2021-04-03

## 2021-04-03 PROCEDURE — 700102 HCHG RX REV CODE 250 W/ 637 OVERRIDE(OP): Performed by: INTERNAL MEDICINE

## 2021-04-03 PROCEDURE — A9270 NON-COVERED ITEM OR SERVICE: HCPCS | Performed by: INTERNAL MEDICINE

## 2021-04-03 PROCEDURE — 770006 HCHG ROOM/CARE - MED/SURG/GYN SEMI*

## 2021-04-03 PROCEDURE — 99232 SBSQ HOSP IP/OBS MODERATE 35: CPT | Performed by: INTERNAL MEDICINE

## 2021-04-03 PROCEDURE — 700102 HCHG RX REV CODE 250 W/ 637 OVERRIDE(OP): Performed by: STUDENT IN AN ORGANIZED HEALTH CARE EDUCATION/TRAINING PROGRAM

## 2021-04-03 PROCEDURE — A9270 NON-COVERED ITEM OR SERVICE: HCPCS | Performed by: STUDENT IN AN ORGANIZED HEALTH CARE EDUCATION/TRAINING PROGRAM

## 2021-04-03 RX ADMIN — DIVALPROEX SODIUM 500 MG: 125 CAPSULE ORAL at 04:46

## 2021-04-03 RX ADMIN — GABAPENTIN 300 MG: 300 CAPSULE ORAL at 04:45

## 2021-04-03 RX ADMIN — LACOSAMIDE 200 MG: 100 TABLET, FILM COATED ORAL at 04:45

## 2021-04-03 RX ADMIN — DOCUSATE SODIUM 50 MG AND SENNOSIDES 8.6 MG 2 TABLET: 8.6; 5 TABLET, FILM COATED ORAL at 04:45

## 2021-04-03 RX ADMIN — LEVETIRACETAM 1000 MG: 500 TABLET ORAL at 18:05

## 2021-04-03 RX ADMIN — MAGNESIUM HYDROXIDE 30 ML: 400 SUSPENSION ORAL at 10:35

## 2021-04-03 RX ADMIN — LACOSAMIDE 200 MG: 100 TABLET, FILM COATED ORAL at 18:04

## 2021-04-03 RX ADMIN — AMLODIPINE BESYLATE 5 MG: 5 TABLET ORAL at 04:45

## 2021-04-03 RX ADMIN — GABAPENTIN 300 MG: 300 CAPSULE ORAL at 18:07

## 2021-04-03 RX ADMIN — LEVETIRACETAM 1000 MG: 500 TABLET ORAL at 04:45

## 2021-04-03 RX ADMIN — Medication 5 MG: at 20:33

## 2021-04-03 RX ADMIN — SIMVASTATIN 40 MG: 20 TABLET, FILM COATED ORAL at 20:33

## 2021-04-03 RX ADMIN — DIVALPROEX SODIUM 750 MG: 125 CAPSULE ORAL at 18:09

## 2021-04-03 RX ADMIN — DOCUSATE SODIUM 50 MG AND SENNOSIDES 8.6 MG 2 TABLET: 8.6; 5 TABLET, FILM COATED ORAL at 18:08

## 2021-04-03 ASSESSMENT — PAIN DESCRIPTION - PAIN TYPE
TYPE: ACUTE PAIN

## 2021-04-03 NOTE — PROGRESS NOTES
With patient’s permission (if able), completed daily phone call to designated support person, name Cleo  Discussed patient condition and plan of care. All questions answered.  Follow up requested: none

## 2021-04-03 NOTE — ASSESSMENT & PLAN NOTE
Patient has had 2 episodes of vomiting since yesterday.  We have held the tube feedings for now.  X-ray of the abdomen done last night showed moderate stool in the colon suggesting constipation.  We will continue bowel regimen, hold tube feedings until bowel movement.  This could be the reason for the patient's vomiting.  We have added Compazine 5 mg every 8 hours if patient continues to have vomiting, however this could cause CNS depression, previous EKG on 3/11/2021, QT was borderline high this is why we have stopped Zofran.  I talked to the patient's daughter and ask her about the Compazine and the risks with it, and she agreed if the patient continues to have vomiting we will give Compazine.    4/4: Resolved. Patient had a BM, and is now tolerating tube feeds. We will continue to monitor.

## 2021-04-03 NOTE — PROGRESS NOTES
Pt vomited moderate amount around 18:00. Notified MD and daughter. Placed on 2 L NC SpO2 94-96%, lungs dim. IV compazine ordered, however pt doesn't currently have IV access. Daughter uncertain on whether she wants pt to go through getting poked in order to have IV placed. Asked that nursing keep in touch with her overnight if patient vomits again and needs IV access for meds. Will continue TF for now unless pt vomits then will hold TF per MD order.

## 2021-04-03 NOTE — PROGRESS NOTES
Pt vomited small amount at 2030 that was tan colored. Tube feeds being held at this time per MD order. On call hospitalist consulted and an xray of the abdomen to confirm tube placement was ordered and was also okay with holding tube feeds at this time. Daughter Cleo was also informed and was okay with the xray for tube placement, will continue to hold off on IV placement per daughter unless patient vomits a third time with tube feeds off. Patients lungs sound clear/diminished. No tachypnea noted at this time. Vital signs stable.

## 2021-04-03 NOTE — PROGRESS NOTES
"Hospital Medicine Daily Progress Note    Date of Service  4/3/2021    Chief Complaint  75 y.o. male admitted 3/11/2021 with seizure    Hospital Course  \"History of cerebellar CVA, seizure disorder, presented as a transfer from [facility] after he was found to have right parietal SDH without any midline shift's.  Witnessed seizure by family followed by changes in mental status.  Neurosurgery consulted Dr. Capps, no surgical intervention, recommend repeat CT head and frequent neuro checks.  Admit to the ICU every hour neuro checks, repeat head CT, keep SBP less than 140, continue Depakote and Keppra.  Due to ongoing encephalopathy, he had an EEG and was found to be in non-convulsive status epilepticus and was loaded with Vimpat.    Neurology was following case and he was treated with Vimpat 200mg BID, Keppra 1000mg BID, and Depacon 750mg QHS. No further seizure like activity. Patient had subtherapeutic Depakote level and Depacon was increased to 750mg BID.    Patient had prolonged waxing and waning mental status. Due to poor PO intake and intermittent refusal of PO intake, NGT was placed and started on alternative nutrition on 3/18.    Interval Problem Update  3/29: Awake, not following commands  Still requiring core tract tube feeds, will follow-up with patient's daughter regarding goals of care and PEG tube needs  Palliative care to assist    Still has mitts as restraints, intermittently impulsive, easily reoriented    Patient seen by previous hospitalist until 3/29    3/30: Patient seen at bedside.  Awake however still not following commands.  We are pending palliative reevaluation with daughter regarding PEG tube placement.  Patient still on core track tube feedings.  As per nursing no other overnight events were reported.    3/31: Patient seen at bedside, opening eyes, not following commands, unable to have a conversation.  The patient has advanced directives that previously stated not to pursue artificial " nutrition, however as per the daughter Cleo she mentions that her father told her that she would like to have a PEG tube placed.  We discussed the case with palliative care and have agreed to move forward on daughter's wishes.  It is unclear at this time whether the patient will continue to recover or this will be his new baseline.  We have consulted GI for PEG tube placement.  The daughter would like to speak to GI regarding the procedure and I have conveyed the message to Dr. Lynn.  No other overnight events were reported by nursing.    4/1: Patient was seen at bedside multiple times today.  Early in the morning the patient was not arousable however in the afternoon the patient was awake and sitting down.  After talking to nursing it appears his mentation waxes and wanes however still confused and unable to have a conversation.  I spoke to the daughter Cleo and it appears she is considering hospice but would like more information.  As per the daughter she would like to stop daily blood draws and to consult with her for every study to be made on the patient prior to ordering it. I told her that while patient is in tube feedings they do weekly blood draws, and she was ok with it. GI evaluated the patient for possible PEG tube placement, however daughter at this time unsure if she would like to move forward with PEG tube.  No other overnight events were reported by nursing.    4/2: Patient seen at bedside this morning.  Patient seems more awake than yesterday, but still unable to have a conversation or follow simple commands.  Nursing have reported the patient had episodes of arm twitching and lip twitching.  I have spoken with neurology in regards to modify antiseizure medications but it appears it would not be beneficial for the patient at this time as it would make him more drowsy.  Daughter appears to be leaning towards hospice however she has not made that decision yet.    4/3: Patient seen at bedside  this morning.  It appears patient has vomited twice since yesterday, we have held tube feedings for now.  It appears patient is constipated we have escalated bowel regimen until bowel movement.  We will hold tube feedings until large bowel movement and restart tube feeding slowly.  We have also added Compazine low-dose 5 mg every 8 hours as needed for nausea and vomiting.  We are still pending hospice approval by daughter.  Patient currently without and IV access, and as per daughter she does not want to have an IV access for now.  If patient vomits again we will need to get approval from daughter to get an IV access.  As per nursing no other overnight events were reported.      Consultants/Specialty  Critical Care  Neurosurgery  Physiatry  Neurology  GI    Code Status  DNAR/DNI    Disposition  Daughter leaning towards hospice, pending decision.    If not hospice, patient should have PEG tube and SNF placement.    Review of Systems  Review of Systems   Unable to perform ROS: Mental status change        Physical Exam  Temp:  [36 °C (96.8 °F)-36.9 °C (98.5 °F)] 36.9 °C (98.5 °F)  Pulse:  [78-96] 78  Resp:  [17-20] 17  BP: (135-165)/(63-72) 136/63  SpO2:  [92 %-97 %] 93 %    Physical Exam  Vitals and nursing note reviewed.   Constitutional:       General: He is not in acute distress.     Appearance: He is ill-appearing.      Comments: Lethargy, arousable   HENT:      Head: Normocephalic and atraumatic.      Nose: Nose normal.      Mouth/Throat:      Mouth: Mucous membranes are dry.   Eyes:      Conjunctiva/sclera: Conjunctivae normal.   Cardiovascular:      Rate and Rhythm: Normal rate.   Pulmonary:      Effort: Pulmonary effort is normal. No respiratory distress.   Abdominal:      General: Bowel sounds are normal. There is no distension.      Tenderness: There is no abdominal tenderness.   Musculoskeletal:         General: No tenderness.   Skin:     General: Skin is warm.   Neurological:      Cranial Nerves: No  cranial nerve deficit.      Sensory: No sensory deficit.      Motor: Weakness present.      Comments: Lethargy         Fluids    Intake/Output Summary (Last 24 hours) at 4/3/2021 1330  Last data filed at 4/3/2021 1200  Gross per 24 hour   Intake 1116 ml   Output 1225 ml   Net -109 ml       Laboratory                        Imaging  DX-ABDOMEN FOR TUBE PLACEMENT   Final Result         1.  Moderate stool in the colon suggests changes of constipation, otherwise nonspecific bowel gas pattern   2.  Dobbhoff tube tip terminates overlying the expected location of the gastric body.   3.  Hazy left lung base opacity could represent edema or infiltrate      DX-CHEST-PORTABLE (1 VIEW)   Final Result      1.  Cardiomegaly with increased interstitial opacity diffusely which may represent interstitial edema or pneumonia.      2.  No lobar consolidation.      [id number] VIEW   Final Result         Moderate amount of stool throughout the colon.      DX-ABDOMEN FOR TUBE PLACEMENT   Final Result      Enteric tube projects over the proximal stomach.      CT-HEAD W/O   Final Result      1.  Prior RIGHT frontal craniotomy with underlying dural thickening and subdural hemorrhage, unchanged from prior exam.   2.  No new intracranial hemorrhage demonstrated.   3.  Diffuse atrophy with white matter microvascular ischemic changes.   4.  Small chronic RIGHT posterior frontal infarct.      [id number] VIEW   Final Result         No specific finding to suggest small bowel obstruction.      CT-HEAD W/O   Final Result         1.  Mixed right parietal subdural fluid collection likely acute on chronic subdural hematoma.   2.  Nonspecific white matter changes, commonly associated with small vessel ischemic disease.  Associated mild cerebral atrophy is noted.   3.  Atherosclerosis.      OUTSIDE IMAGES-CT HEAD   Final Result           Assessment/Plan  * Seizure disorder, nonconvulsive, with status epilepticus (HCC)- (present on  admission)  Assessment & Plan  Noted on EEG on admission  He was loaded with IV Keppra, started on IV Vimpat, and IV depakote  Repeat EEG 3/15 without evidence of seizure and more consistent with encephalopathy  Appriciate neurology consult. Valproic acid level subtherapeutic and increased to 750mg BID by neurology on 3/16  Repeat valproic acid level 101 on 3/19, Depakote changed to 500mg QAM and 750mg QHS, discussed with Dr. Martínez  Repeat valproic acid level scheduled for 3/22  Outpatient follow up in the epilepsy clinic has been arranged by Dr. Mcfadden    If patient has continued seizure like activity or if his mentation appears to decline further, may need repeat EEG.    3/25 d/w dtr, aware of declining condition, palliative care to f/u  Consider transition to CC if condition further deteriorates  - encourage activity    3/31 stable  4/1: Neurology was reconsulted, we appreciate any further recommendations.    4/2: Patient more awake, however still disoriented.  As per nursing there was a concern of lip twitching, after talking to neurology he does not seem appropriate to make further antiseizure medication changes as needed will probably make him more drowsy.  I spoke to the daughter Cleo yesterday and it appears she is leaning towards hospice however she has not made that decision yet.    4/3: Pending decision from daughter for hospice.    Constipation  Assessment & Plan  As per abdominal x-ray patient with constipation.  As per nursing the patient had not had a bowel movement in a couple of days.  We will escalate bowel regimen until bowel movement.    Vomiting  Assessment & Plan  Patient has had 2 episodes of vomiting since yesterday.  We have held the tube feedings for now.  X-ray of the abdomen done last night showed moderate stool in the colon suggesting constipation.  We will continue bowel regimen, hold tube feedings until bowel movement.  This could be the reason for the patient's vomiting.  We have  added Compazine 5 mg every 8 hours if patient continues to have vomiting, however this could cause CNS depression, previous EKG on 3/11/2021, QT was borderline high this is why we have stopped Zofran.  I talked to the patient's daughter and ask her about the Compazine and the risks with it, and she agreed if the patient continues to have vomiting we will give Compazine.    Encephalopathy acute- (present on admission)  Assessment & Plan  Acute encephalopathy likely multifactorial, persistent  In setting of dementia with behavioral disturbance, history of psychiatric disorder, CVA, and seizure disorder in status epilepticus on admission  Continue Seroquel 25mg TID prn for agitation, Seroquel 50mg QHS  on gabapentin  Appreciate physiatry consult, monitoring  Patient requiring    3/25 limit sedative rx, gabapentin  D/w RN  Mentation improving, participating with PT    3/26 minimal participation with therapy, max assistance  Skilled referral when medically cleared  Continue conservative treatment    3/27  Palliative f/u, consider dc to home with snf on hospice vs home  Will f/u with dtr   Stable/improved  3/28 on seroquel, restraints renewed for intermittent agitation  - taper off retsraints as tolerated without excessive sedation  3/31: It appears patient not improving, patient opening eyes but currently not following commands.  I spoke to the daughter Cleo she would like to have a PEG tube placed and SNF placement.    4/1: It appears patient's condition waxes and wanes, daughter unsure about PEG tube. Discussing hospice.    4/3: Pending decision towards hospice.  We are awaiting daughter's decision.    Subdural hematoma, acute (HCC)- (present on admission)  Assessment & Plan  Noted on CT head  Non-traumatic  Neurosurgery consulted, non-operative management  Avoid NSAIDs and anticoagulation    4/3: Patient's daughter still deciding whether to go the hospice route.    Dysphagia  Assessment & Plan  Dysphagia likely  secondary to encephalopathy  Patient intermittently tolerates PO intake, however at times refuses both food and medication  NGT 3/18 placed for nutrition and medication administration, tube feeds currently at goal    3/24 with vomiting, TF held  - f/u abd/cxr, eval for aspiration  F/u stat labs  Palliative care consult, assist with determining goals of care    3/25 abd xray with mod stool  Vomiting resolved, TF resumed at slow rate  CXR, mild edema, procal neg    3/28  slp following, may need peg tube  Palliative to f/u with family regarding goals of care, snf referral  CM to assist    3/30 slp reeval, palliative to f/u with dtr regarding peg placement and hospice  snf referral    3/31: Daughter would like the patient to have a PEG tube placed which we have consulted GI (Dr Lynn) and we appreciate further recommendations.  4/1: GI consulted, however patient's daughter unsure if PEG tube is what she wants. Still discussing. We appreciate further recommendations.    4/2: I spoke to the daughter yesterday at length.  It appears she is leaning towards hospice, however she has not made that decision.  She does not want the PEG tube placed as of right now.    4/3: We have stopped tube feedings due to 2 episodes of vomiting.  It appears patient is constipated so we have escalated bowel regimen.    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 Will initiate amlodipine 5mg for BP control    4/3: Patient's blood pressure currently controlled. Continue  same management. Monitor and make changes accordingly.       VTE prophylaxis: SCDs only secondary to SDH.     I certify that the patient requires continued medically necessary hospital services for the treatment of acute encephalopathy/SDH

## 2021-04-03 NOTE — ASSESSMENT & PLAN NOTE
As per abdominal x-ray patient with constipation.  As per nursing the patient had not had a bowel movement in a couple of days.  We will escalate bowel regimen until bowel movement.    4/4: Resolved

## 2021-04-03 NOTE — CARE PLAN
Problem: Skin Integrity  Goal: Risk for impaired skin integrity will decrease  Outcome: PROGRESSING AS EXPECTED     Problem: Safety - Medical Restraint  Goal: Remains free of injury from restraints (Restraint for Interference with Medical Device)  Description: INTERVENTIONS:  1. Determine that other, less restrictive measures have been tried or would not be effective before applying the restraint  2. Evaluate the patient's condition at the time of restraint application  3. Inform patient/family regarding the reason for restraint  4. Q2H: Monitor safety, psychosocial status, comfort, nutrition and hydration  Outcome: PROGRESSING AS EXPECTED  Flowsheets (Taken 4/3/2021 1511)  Addressed this shift: Remains free of injury from restraints (restraint for interference with medical device): Every 2 hours: Monitor safety, psychosocial status, comfort, nutrition and hydration     Problem: Bowel/Gastric:  Goal: Will not experience complications related to bowel motility  Outcome: PROGRESSING SLOWER THAN EXPECTED

## 2021-04-03 NOTE — CARE PLAN
Problem: Safety  Goal: Will remain free from injury  Outcome: PROGRESSING AS EXPECTED  Note: Seizure precautions in place. Administer seizure medications as needed. Communicate with hospitalist and neurology on plan of care.       Problem: Medication:  Goal: Risk for medication side effects will decrease  Outcome: PROGRESSING AS EXPECTED  Note: Monitor for adverse effects and notify MD as needed.

## 2021-04-04 PROCEDURE — A9270 NON-COVERED ITEM OR SERVICE: HCPCS | Performed by: INTERNAL MEDICINE

## 2021-04-04 PROCEDURE — 700102 HCHG RX REV CODE 250 W/ 637 OVERRIDE(OP): Performed by: INTERNAL MEDICINE

## 2021-04-04 PROCEDURE — 770006 HCHG ROOM/CARE - MED/SURG/GYN SEMI*

## 2021-04-04 PROCEDURE — 700102 HCHG RX REV CODE 250 W/ 637 OVERRIDE(OP): Performed by: STUDENT IN AN ORGANIZED HEALTH CARE EDUCATION/TRAINING PROGRAM

## 2021-04-04 PROCEDURE — A9270 NON-COVERED ITEM OR SERVICE: HCPCS | Performed by: STUDENT IN AN ORGANIZED HEALTH CARE EDUCATION/TRAINING PROGRAM

## 2021-04-04 PROCEDURE — 99232 SBSQ HOSP IP/OBS MODERATE 35: CPT | Performed by: INTERNAL MEDICINE

## 2021-04-04 RX ADMIN — AMLODIPINE BESYLATE 5 MG: 5 TABLET ORAL at 05:04

## 2021-04-04 RX ADMIN — LEVETIRACETAM 1000 MG: 500 TABLET ORAL at 05:04

## 2021-04-04 RX ADMIN — DIVALPROEX SODIUM 500 MG: 125 CAPSULE ORAL at 05:04

## 2021-04-04 RX ADMIN — LACOSAMIDE 200 MG: 100 TABLET, FILM COATED ORAL at 05:04

## 2021-04-04 RX ADMIN — LEVETIRACETAM 1000 MG: 500 TABLET ORAL at 17:53

## 2021-04-04 RX ADMIN — DOCUSATE SODIUM 50 MG AND SENNOSIDES 8.6 MG 2 TABLET: 8.6; 5 TABLET, FILM COATED ORAL at 17:58

## 2021-04-04 RX ADMIN — LACOSAMIDE 200 MG: 100 TABLET, FILM COATED ORAL at 17:58

## 2021-04-04 RX ADMIN — DOCUSATE SODIUM 50 MG AND SENNOSIDES 8.6 MG 2 TABLET: 8.6; 5 TABLET, FILM COATED ORAL at 05:04

## 2021-04-04 RX ADMIN — GABAPENTIN 300 MG: 300 CAPSULE ORAL at 17:58

## 2021-04-04 RX ADMIN — GABAPENTIN 300 MG: 300 CAPSULE ORAL at 05:04

## 2021-04-04 RX ADMIN — DIVALPROEX SODIUM 750 MG: 125 CAPSULE ORAL at 17:58

## 2021-04-04 RX ADMIN — Medication 5 MG: at 20:36

## 2021-04-04 RX ADMIN — SIMVASTATIN 40 MG: 20 TABLET, FILM COATED ORAL at 20:36

## 2021-04-04 ASSESSMENT — PAIN DESCRIPTION - PAIN TYPE
TYPE: ACUTE PAIN

## 2021-04-04 NOTE — CARE PLAN
Problem: Bowel/Gastric:  Goal: Will not experience complications related to bowel motility  Outcome: PROGRESSING AS EXPECTED  Flowsheets (Taken 4/4/2021 0800)  Last BM: 04/04/21  Note: Patient now has regular bowel movements and bowel protocol is in place     Problem: Fluid Volume:  Goal: Will maintain balanced intake and output  Outcome: PROGRESSING AS EXPECTED     Problem: Skin Integrity  Goal: Risk for impaired skin integrity will decrease  Outcome: PROGRESSING AS EXPECTED  Note: Skin was assessed and is blanching, patient is turned every two hours and has pillows to float heels and provide support     Problem: Safety - Medical Restraint  Goal: Remains free of injury from restraints (Restraint for Interference with Medical Device)  Description: INTERVENTIONS:  1. Determine that other, less restrictive measures have been tried or would not be effective before applying the restraint  2. Evaluate the patient's condition at the time of restraint application  3. Inform patient/family regarding the reason for restraint  4. Q2H: Monitor safety, psychosocial status, comfort, nutrition and hydration  Outcome: PROGRESSING AS EXPECTED  Flowsheets (Taken 4/4/2021 1401)  Addressed this shift: Remains free of injury from restraints (restraint for interference with medical device):   Inform patient/family regarding the reason for restraint   Every 2 hours: Monitor safety, psychosocial status, comfort, nutrition and hydration

## 2021-04-04 NOTE — PROGRESS NOTES
"Hospital Medicine Daily Progress Note    Date of Service  4/4/2021    Chief Complaint  75 y.o. male admitted 3/11/2021 with seizure    Hospital Course  \"History of cerebellar CVA, seizure disorder, presented as a transfer from [facility] after he was found to have right parietal SDH without any midline shift's.  Witnessed seizure by family followed by changes in mental status.  Neurosurgery consulted Dr. Capps, no surgical intervention, recommend repeat CT head and frequent neuro checks.  Admit to the ICU every hour neuro checks, repeat head CT, keep SBP less than 140, continue Depakote and Keppra.  Due to ongoing encephalopathy, he had an EEG and was found to be in non-convulsive status epilepticus and was loaded with Vimpat.    Neurology was following case and he was treated with Vimpat 200mg BID, Keppra 1000mg BID, and Depacon 750mg QHS. No further seizure like activity. Patient had subtherapeutic Depakote level and Depacon was increased to 750mg BID.    Patient had prolonged waxing and waning mental status. Due to poor PO intake and intermittent refusal of PO intake, NGT was placed and started on alternative nutrition on 3/18.    Interval Problem Update  3/29: Awake, not following commands  Still requiring core tract tube feeds, will follow-up with patient's daughter regarding goals of care and PEG tube needs  Palliative care to assist    Still has mitts as restraints, intermittently impulsive, easily reoriented    Patient seen by previous hospitalist until 3/29    3/30: Patient seen at bedside.  Awake however still not following commands.  We are pending palliative reevaluation with daughter regarding PEG tube placement.  Patient still on core track tube feedings.  As per nursing no other overnight events were reported.    3/31: Patient seen at bedside, opening eyes, not following commands, unable to have a conversation.  The patient has advanced directives that previously stated not to pursue artificial " nutrition, however as per the daughter Cleo she mentions that her father told her that she would like to have a PEG tube placed.  We discussed the case with palliative care and have agreed to move forward on daughter's wishes.  It is unclear at this time whether the patient will continue to recover or this will be his new baseline.  We have consulted GI for PEG tube placement.  The daughter would like to speak to GI regarding the procedure and I have conveyed the message to Dr. Lynn.  No other overnight events were reported by nursing.    4/1: Patient was seen at bedside multiple times today.  Early in the morning the patient was not arousable however in the afternoon the patient was awake and sitting down.  After talking to nursing it appears his mentation waxes and wanes however still confused and unable to have a conversation.  I spoke to the daughter Cleo and it appears she is considering hospice but would like more information.  As per the daughter she would like to stop daily blood draws and to consult with her for every study to be made on the patient prior to ordering it. I told her that while patient is in tube feedings they do weekly blood draws, and she was ok with it. GI evaluated the patient for possible PEG tube placement, however daughter at this time unsure if she would like to move forward with PEG tube.  No other overnight events were reported by nursing.    4/2: Patient seen at bedside this morning.  Patient seems more awake than yesterday, but still unable to have a conversation or follow simple commands.  Nursing have reported the patient had episodes of arm twitching and lip twitching.  I have spoken with neurology in regards to modify antiseizure medications but it appears it would not be beneficial for the patient at this time as it would make him more drowsy.  Daughter appears to be leaning towards hospice however she has not made that decision yet.    4/3: Patient seen at bedside  this morning.  It appears patient has vomited twice since yesterday, we have held tube feedings for now.  It appears patient is constipated we have escalated bowel regimen until bowel movement.  We will hold tube feedings until large bowel movement and restart tube feeding slowly.  We have also added Compazine low-dose 5 mg every 8 hours as needed for nausea and vomiting.  We are still pending hospice approval by daughter.  Patient currently without and IV access, and as per daughter she does not want to have an IV access for now.  If patient vomits again we will need to get approval from daughter to get an IV access.  As per nursing no other overnight events were reported.    4/4: Patient seen at bedside this morning.  Afebrile, normotensive on room air. Currently laying in bed, unresponsive and arousable, not following commands.  As per nursing the patient has not had any more vomiting, and constipation has been resolved.  Tube feedings have been resumed and patient is tolerating well.  Hospice has already reached out to daughter, however daughter has not made a decision yet, but it appears she is leaning towards hospice.  As per nursing no other overnight events were reported.      Consultants/Specialty  Critical Care  Neurosurgery  Physiatry  Neurology  GI    Code Status  DNAR/DNI    Disposition  Daughter leaning towards hospice, pending decision.    If not hospice, patient should have PEG tube and SNF placement.    Review of Systems  Review of Systems   Unable to perform ROS: Mental status change        Physical Exam  Temp:  [36.3 °C (97.4 °F)-37 °C (98.6 °F)] 36.8 °C (98.2 °F)  Pulse:  [80-88] 87  Resp:  [17-20] 18  BP: (117-134)/(49-66) 119/49  SpO2:  [93 %-94 %] 93 %    Physical Exam  Vitals and nursing note reviewed.   Constitutional:       General: He is not in acute distress.     Appearance: He is ill-appearing.      Comments: Lethargy, arousable   HENT:      Head: Normocephalic and atraumatic.      Nose:  Nose normal.      Mouth/Throat:      Mouth: Mucous membranes are dry.   Eyes:      Conjunctiva/sclera: Conjunctivae normal.   Cardiovascular:      Rate and Rhythm: Normal rate.   Pulmonary:      Effort: Pulmonary effort is normal. No respiratory distress.   Abdominal:      General: Bowel sounds are normal. There is no distension.      Tenderness: There is no abdominal tenderness.   Musculoskeletal:         General: No tenderness.   Skin:     General: Skin is warm.   Neurological:      Cranial Nerves: No cranial nerve deficit.      Sensory: No sensory deficit.      Motor: Weakness present.      Comments: Lethargy         Fluids    Intake/Output Summary (Last 24 hours) at 4/4/2021 1447  Last data filed at 4/4/2021 1230  Gross per 24 hour   Intake 750 ml   Output 1125 ml   Net -375 ml       Laboratory                        Imaging  DX-ABDOMEN FOR TUBE PLACEMENT   Final Result         1.  Moderate stool in the colon suggests changes of constipation, otherwise nonspecific bowel gas pattern   2.  Dobbhoff tube tip terminates overlying the expected location of the gastric body.   3.  Hazy left lung base opacity could represent edema or infiltrate      DX-CHEST-PORTABLE (1 VIEW)   Final Result      1.  Cardiomegaly with increased interstitial opacity diffusely which may represent interstitial edema or pneumonia.      2.  No lobar consolidation.      [id number] VIEW   Final Result         Moderate amount of stool throughout the colon.      DX-ABDOMEN FOR TUBE PLACEMENT   Final Result      Enteric tube projects over the proximal stomach.      CT-HEAD W/O   Final Result      1.  Prior RIGHT frontal craniotomy with underlying dural thickening and subdural hemorrhage, unchanged from prior exam.   2.  No new intracranial hemorrhage demonstrated.   3.  Diffuse atrophy with white matter microvascular ischemic changes.   4.  Small chronic RIGHT posterior frontal infarct.      PE-[id number]-7 VIEW   Final Result         No specific  finding to suggest small bowel obstruction.      CT-HEAD W/O   Final Result         1.  Mixed right parietal subdural fluid collection likely acute on chronic subdural hematoma.   2.  Nonspecific white matter changes, commonly associated with small vessel ischemic disease.  Associated mild cerebral atrophy is noted.   3.  Atherosclerosis.      OUTSIDE IMAGES-CT HEAD   Final Result           Assessment/Plan  * Seizure disorder, nonconvulsive, with status epilepticus (HCC)- (present on admission)  Assessment & Plan  Noted on EEG on admission  He was loaded with IV Keppra, started on IV Vimpat, and IV depakote  Repeat EEG 3/15 without evidence of seizure and more consistent with encephalopathy  Appriciate neurology consult. Valproic acid level subtherapeutic and increased to 750mg BID by neurology on 3/16  Repeat valproic acid level 101 on 3/19, Depakote changed to 500mg QAM and 750mg QHS, discussed with Dr. Martínez  Repeat valproic acid level scheduled for 3/22  Outpatient follow up in the epilepsy clinic has been arranged by Dr. Mcfadden    If patient has continued seizure like activity or if his mentation appears to decline further, may need repeat EEG.    3/25 d/w dtr, aware of declining condition, palliative care to f/u  Consider transition to CC if condition further deteriorates  - encourage activity    3/31 stable  4/1: Neurology was reconsulted, we appreciate any further recommendations.    4/2: Patient more awake, however still disoriented.  As per nursing there was a concern of lip twitching, after talking to neurology he does not seem appropriate to make further antiseizure medication changes as needed will probably make him more drowsy.  I spoke to the daughter Cleo yesterday and it appears she is leaning towards hospice however she has not made that decision yet.    4/4: Pending decision from daughter for hospice.    Constipation  Assessment & Plan  As per abdominal x-ray patient with constipation.  As per  nursing the patient had not had a bowel movement in a couple of days.  We will escalate bowel regimen until bowel movement.    4/4: Resolved    Vomiting  Assessment & Plan  Patient has had 2 episodes of vomiting since yesterday.  We have held the tube feedings for now.  X-ray of the abdomen done last night showed moderate stool in the colon suggesting constipation.  We will continue bowel regimen, hold tube feedings until bowel movement.  This could be the reason for the patient's vomiting.  We have added Compazine 5 mg every 8 hours if patient continues to have vomiting, however this could cause CNS depression, previous EKG on 3/11/2021, QT was borderline high this is why we have stopped Zofran.  I talked to the patient's daughter and ask her about the Compazine and the risks with it, and she agreed if the patient continues to have vomiting we will give Compazine.    4/4: Resolved. Patient had a BM, and is now tolerating tube feeds. We will continue to monitor.    Encephalopathy acute- (present on admission)  Assessment & Plan  Acute encephalopathy likely multifactorial, persistent  In setting of dementia with behavioral disturbance, history of psychiatric disorder, CVA, and seizure disorder in status epilepticus on admission  Continue Seroquel 25mg TID prn for agitation, Seroquel 50mg QHS  on gabapentin  Appreciate physiatry consult, monitoring  Patient requiring    3/25 limit sedative rx, gabapentin  D/w RN  Mentation improving, participating with PT    3/26 minimal participation with therapy, max assistance  Skilled referral when medically cleared  Continue conservative treatment  3/27  Palliative f/u, consider dc to home with snf on hospice vs home  Will f/u with dtr   Stable/improved  3/28 on seroquel, restraints renewed for intermittent agitation  - taper off retsraints as tolerated without excessive sedation  3/31: It appears patient not improving, patient opening eyes but currently not following commands.   I spoke to the daughter Cleo she would like to have a PEG tube placed and SNF placement.  4/1: It appears patient's condition waxes and wanes, daughter unsure about PEG tube. Discussing hospice.    4/4: Pending decision towards hospice.  We are awaiting daughter's decision.    Subdural hematoma, acute (HCC)- (present on admission)  Assessment & Plan  Noted on CT head  Non-traumatic  Neurosurgery consulted, non-operative management  Avoid NSAIDs and anticoagulation    4/4: Patient's daughter still deciding whether to go the hospice route.    Dysphagia  Assessment & Plan  Dysphagia likely secondary to encephalopathy  Patient intermittently tolerates PO intake, however at times refuses both food and medication  NGT 3/18 placed for nutrition and medication administration, tube feeds currently at goal    3/24 with vomiting, TF held  - f/u abd/cxr, eval for aspiration  F/u stat labs  Palliative care consult, assist with determining goals of care    3/25 abd xray with mod stool  Vomiting resolved, TF resumed at slow rate  CXR, mild edema, procal neg    3/28  slp following, may need peg tube  Palliative to f/u with family regarding goals of care, snf referral  CM to assist    3/30 slp reeval, palliative to f/u with dtr regarding peg placement and hospice  snf referral    3/31: Daughter would like the patient to have a PEG tube placed which we have consulted GI (Dr Lynn) and we appreciate further recommendations.  4/1: GI consulted, however patient's daughter unsure if PEG tube is what she wants. Still discussing. We appreciate further recommendations.  4/2: I spoke to the daughter yesterday at length.  It appears she is leaning towards hospice, however she has not made that decision.  She does not want the PEG tube placed as of right now.  4/3: We have stopped tube feedings due to 2 episodes of vomiting.  It appears patient is constipated so we have escalated bowel regimen.    4/4: We have resumed tube feedings,  tolerating.    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 Will initiate amlodipine 5mg for BP control    4/4: Patient's blood pressure currently controlled. Continue same management. Monitor and make changes accordingly.       VTE prophylaxis: SCDs only secondary to SDH.     I certify that the patient requires continued medically necessary hospital services for the treatment of acute encephalopathy/SDH

## 2021-04-05 LAB
CRP SERPL HS-MCNC: 0.36 MG/DL (ref 0–0.75)
PREALB SERPL-MCNC: 27.3 MG/DL (ref 18–38)

## 2021-04-05 PROCEDURE — 36415 COLL VENOUS BLD VENIPUNCTURE: CPT

## 2021-04-05 PROCEDURE — 86140 C-REACTIVE PROTEIN: CPT

## 2021-04-05 PROCEDURE — 770006 HCHG ROOM/CARE - MED/SURG/GYN SEMI*

## 2021-04-05 PROCEDURE — A9270 NON-COVERED ITEM OR SERVICE: HCPCS | Performed by: STUDENT IN AN ORGANIZED HEALTH CARE EDUCATION/TRAINING PROGRAM

## 2021-04-05 PROCEDURE — 84134 ASSAY OF PREALBUMIN: CPT

## 2021-04-05 PROCEDURE — 99232 SBSQ HOSP IP/OBS MODERATE 35: CPT | Performed by: INTERNAL MEDICINE

## 2021-04-05 PROCEDURE — 700102 HCHG RX REV CODE 250 W/ 637 OVERRIDE(OP): Performed by: STUDENT IN AN ORGANIZED HEALTH CARE EDUCATION/TRAINING PROGRAM

## 2021-04-05 PROCEDURE — 700102 HCHG RX REV CODE 250 W/ 637 OVERRIDE(OP): Performed by: INTERNAL MEDICINE

## 2021-04-05 PROCEDURE — A9270 NON-COVERED ITEM OR SERVICE: HCPCS | Performed by: INTERNAL MEDICINE

## 2021-04-05 PROCEDURE — 97535 SELF CARE MNGMENT TRAINING: CPT | Mod: CO

## 2021-04-05 PROCEDURE — 97530 THERAPEUTIC ACTIVITIES: CPT

## 2021-04-05 RX ADMIN — LEVETIRACETAM 1000 MG: 500 TABLET ORAL at 18:11

## 2021-04-05 RX ADMIN — AMLODIPINE BESYLATE 5 MG: 5 TABLET ORAL at 06:00

## 2021-04-05 RX ADMIN — DIVALPROEX SODIUM 750 MG: 125 CAPSULE ORAL at 18:11

## 2021-04-05 RX ADMIN — DOCUSATE SODIUM 50 MG AND SENNOSIDES 8.6 MG 2 TABLET: 8.6; 5 TABLET, FILM COATED ORAL at 04:37

## 2021-04-05 RX ADMIN — LACOSAMIDE 200 MG: 100 TABLET, FILM COATED ORAL at 04:37

## 2021-04-05 RX ADMIN — LEVETIRACETAM 1000 MG: 500 TABLET ORAL at 04:37

## 2021-04-05 RX ADMIN — GABAPENTIN 300 MG: 300 CAPSULE ORAL at 18:11

## 2021-04-05 RX ADMIN — LACOSAMIDE 200 MG: 100 TABLET, FILM COATED ORAL at 18:11

## 2021-04-05 RX ADMIN — SIMVASTATIN 40 MG: 20 TABLET, FILM COATED ORAL at 20:17

## 2021-04-05 RX ADMIN — DIVALPROEX SODIUM 500 MG: 125 CAPSULE ORAL at 04:36

## 2021-04-05 RX ADMIN — DOCUSATE SODIUM 50 MG AND SENNOSIDES 8.6 MG 2 TABLET: 8.6; 5 TABLET, FILM COATED ORAL at 18:11

## 2021-04-05 RX ADMIN — Medication 5 MG: at 20:17

## 2021-04-05 RX ADMIN — GABAPENTIN 300 MG: 300 CAPSULE ORAL at 04:37

## 2021-04-05 ASSESSMENT — COGNITIVE AND FUNCTIONAL STATUS - GENERAL
STANDING UP FROM CHAIR USING ARMS: TOTAL
PERSONAL GROOMING: TOTAL
WALKING IN HOSPITAL ROOM: TOTAL
DRESSING REGULAR LOWER BODY CLOTHING: TOTAL
CLIMB 3 TO 5 STEPS WITH RAILING: TOTAL
DRESSING REGULAR UPPER BODY CLOTHING: TOTAL
HELP NEEDED FOR BATHING: TOTAL
MOVING TO AND FROM BED TO CHAIR: UNABLE
MOVING FROM LYING ON BACK TO SITTING ON SIDE OF FLAT BED: UNABLE
TOILETING: TOTAL
MOBILITY SCORE: 6
EATING MEALS: TOTAL
SUGGESTED CMS G CODE MODIFIER DAILY ACTIVITY: CN
DAILY ACTIVITIY SCORE: 6
TURNING FROM BACK TO SIDE WHILE IN FLAT BAD: UNABLE
SUGGESTED CMS G CODE MODIFIER MOBILITY: CN

## 2021-04-05 ASSESSMENT — PAIN DESCRIPTION - PAIN TYPE
TYPE: ACUTE PAIN

## 2021-04-05 ASSESSMENT — GAIT ASSESSMENTS: GAIT LEVEL OF ASSIST: UNABLE TO PARTICIPATE

## 2021-04-05 NOTE — CARE PLAN
Problem: Safety  Goal: Will remain free from injury  Outcome: PROGRESSING AS EXPECTED  Free from falls/ injury; precautions in place     Problem: Infection  Goal: Will remain free from infection  Outcome: PROGRESSING AS EXPECTED  Free from signs of infection

## 2021-04-05 NOTE — PROGRESS NOTES
With patient’s permission (if able), completed daily phone call to designated support person, name Cleo  Discussed patient condition and plan of care. All questions answered.  Follow up requested: when zoom call iks set up later in the day.

## 2021-04-05 NOTE — DISCHARGE PLANNING
Care Transition Team Discharge Planning    Anticipated Discharge Disposition: TBD    Action: Lsw  Spoke to DPOA/dtr Cleo to inform her that [facility] had declined the referral to the patient's resident not being in their service area.    The dtr had asked this Lsw not to submit a referral to another agency yet. The dtr is indecisive if she should get caretakers to come ot her father's home with hospice or if she should find a SNF near the [place], [place] area.     Lsw provided the DPOA with phone numbers of SNFs and Hospice providers in the [place], [place] area.    Barriers to Discharge: DPOA is undecisive of where her father would return home too.    Plan: Lsw will wait to hear back from the dtr regarding submitting a CHOICE form.

## 2021-04-05 NOTE — THERAPY
"Occupational Therapy  Daily Treatment     Patient Name: Rafael Mccoy  Age:  75 y.o., Sex:  male  Medical Record #: [id number]  Today's Date: 4/5/2021       Precautions: Fall Risk  Comments: legally blind     Assessment    Pt seen for OT tx. Continues to be limited by decreased activity tolerance, balance deficits and delayed responses impacting ability to complete ADLs and ADL transfers independently. Required extra time to initiate ADL tasks or commands. Pt only verbally responsive to name stating, \"oh yeah.\" Intermittently following commands during session. Will continue to follow while in house.     Plan    Continue current treatment plan.    DC Equipment Recommendations: Unable to determine at this time  Discharge Recommendations: Recommend post-acute placement for additional occupational therapy services prior to discharge home       04/05/21 0930   Cognition    Cognition / Consciousness X   Safety Awareness Impaired;Impulsive   New Learning Impaired   Attention Impaired   Sequencing Impaired   Initiation Impaired   Passive ROM Upper Body   Passive ROM Upper Body X   Comments rigidity to BUEs, LUE > RUE    Active ROM Upper Body   Active ROM Upper Body  X   Comments no movement of LUE during session   Strength Upper Body   Upper Body Strength  X   Comments generalized weakness bilaterally, LUE appears weaker than RUE    Balance   Sitting Balance (Static) Poor -   Sitting Balance (Dynamic) Trace   Standing Balance (Static) Dependent   Standing Balance (Dynamic) Dependent   Weight Shift Sitting Absent   Weight Shift Standing Absent   Bed Mobility    Supine to Sit Total Assist   Sit to Supine Total Assist   Scooting Total Assist   Rolling Total Assist to Rt.;Total Assist to Lt.   Activities of Daily Living   Grooming Maximal Assist;Seated   Upper Body Dressing Total Assist   Lower Body Dressing Total Assist   Toileting Total Assist   Functional Mobility   Sit to Stand Maximal Assist   Short Term Goals   Short " Term Goal # 1 Pt will demo LB dressing w/ SPV   Goal Outcome # 1 Progressing slower than expected   Short Term Goal # 2 Pt will demo standing grooming w/ SPV   Goal Outcome # 2 Progressing slower than expected   Short Term Goal # 3 Pt will demo toilet txf w/ SPV   Goal Outcome # 3 Progressing slower than expected   Anticipated Discharge Equipment and Recommendations   DC Equipment Recommendations Unable to determine at this time   Discharge Recommendations Recommend post-acute placement for additional occupational therapy services prior to discharge home

## 2021-04-05 NOTE — THERAPY
Physical Therapy   Daily Treatment     Patient Name: Rafael Mcocy  Age:  75 y.o., Sex:  male  Medical Record #: [id number]  Today's Date: 4/5/2021     Precautions: Fall Risk    Assessment    Pt did respond consistently to his name, however limited command following. Posterior lean both sitting and standing. Able to achieve midline momentairily and hold self at EOB. Did hold onto therapist with UEs to stand, however strong posterior lean persisted and dependent to maintain position. Unsafe to attempt ambulation. Continue to recommend post acute placement. Acute PT to follow.    Plan    Treatment plan modified to 2 times per week until therapy goals are met for the following treatments:  Bed Mobility, Gait Training, Neuro Re-Education / Balance, Stair Training, Therapeutic Activities and Therapeutic Exercises.    DC Equipment Recommendations: Unable to determine at this time  Discharge Recommendations: Recommend post-acute placement for additional physical therapy services prior to discharge home           Objective       04/05/21 0945   Cognition    Level of Consciousness Confused   Ability To Follow Commands Unable to Follow 1 Step Commands   Safety Awareness Impaired   Comments answered to his name and simple questions. Only command following was a very delayed squeeze of therapist's hand   Balance   Sitting Balance (Static) Poor -   Sitting Balance (Dynamic) Trace +   Standing Balance (Static) Dependent   Standing Balance (Dynamic) Dependent   Weight Shift Sitting Absent   Weight Shift Standing Absent   Comments Posterior lean both sitting and standing. Able to achieve midline momentairily and hold self at EOB, total assist when standing.   Gait Analysis   Gait Level Of Assist Unable to Participate   Bed Mobility    Supine to Sit Total Assist   Sit to Supine Total Assist   Scooting Total Assist   Functional Mobility   Sit to Stand Maximal Assist   Short Term Goals    Short Term Goal # 1 Pt will be able to  perform supine<>sit with HOB flat/no rails with Spv in 6tx to promote fnx progression towards I    Goal Outcome # 1 goal not met   Short Term Goal # 2 Pt will be able to perform sit<>stand/transfers with min A in 6tx to promote fnx progression towards I    Goal Outcome # 2 Goal not met   Short Term Goal # 2 B  pt to move sit to/from stand w/ spv in 6 visits to iincrease fxl indep   Goal Outcome # 2 B Goal not met   Short Term Goal # 3 Pt will be able to ambulate 25ft with FWW with min A in 6tx to promote fnx progression towards I    Goal Outcome # 3 Goal not met

## 2021-04-05 NOTE — DISCHARGE PLANNING
@0162  Agency/Facility Name: [facility]  Spoke To: Effie  Outcome: Declined due to out of service area.    @5077  Agency/Facility Name: [facility]  Outcome: Left message, awaiting call back.    @0264  Agency/Facility Name: [facility]  Outcome: Left message, awaiting call back.

## 2021-04-05 NOTE — PROGRESS NOTES
With patient’s permission (if able), completed daily phone call to designated support person, name Cleo  Discussed patient condition and plan of care. All questions answered.  Follow up requested:none

## 2021-04-06 PROCEDURE — 99232 SBSQ HOSP IP/OBS MODERATE 35: CPT | Mod: 25 | Performed by: INTERNAL MEDICINE

## 2021-04-06 PROCEDURE — 770006 HCHG ROOM/CARE - MED/SURG/GYN SEMI*

## 2021-04-06 PROCEDURE — 99497 ADVNCD CARE PLAN 30 MIN: CPT | Performed by: INTERNAL MEDICINE

## 2021-04-06 PROCEDURE — 700102 HCHG RX REV CODE 250 W/ 637 OVERRIDE(OP): Performed by: INTERNAL MEDICINE

## 2021-04-06 PROCEDURE — A9270 NON-COVERED ITEM OR SERVICE: HCPCS | Performed by: INTERNAL MEDICINE

## 2021-04-06 PROCEDURE — A9270 NON-COVERED ITEM OR SERVICE: HCPCS | Performed by: STUDENT IN AN ORGANIZED HEALTH CARE EDUCATION/TRAINING PROGRAM

## 2021-04-06 PROCEDURE — 700102 HCHG RX REV CODE 250 W/ 637 OVERRIDE(OP): Performed by: STUDENT IN AN ORGANIZED HEALTH CARE EDUCATION/TRAINING PROGRAM

## 2021-04-06 RX ADMIN — LEVETIRACETAM 1000 MG: 500 TABLET ORAL at 04:43

## 2021-04-06 RX ADMIN — LACOSAMIDE 200 MG: 100 TABLET, FILM COATED ORAL at 17:25

## 2021-04-06 RX ADMIN — DIVALPROEX SODIUM 500 MG: 125 CAPSULE ORAL at 04:43

## 2021-04-06 RX ADMIN — DOCUSATE SODIUM 50 MG AND SENNOSIDES 8.6 MG 2 TABLET: 8.6; 5 TABLET, FILM COATED ORAL at 04:43

## 2021-04-06 RX ADMIN — AMLODIPINE BESYLATE 5 MG: 5 TABLET ORAL at 04:43

## 2021-04-06 RX ADMIN — GABAPENTIN 300 MG: 300 CAPSULE ORAL at 04:43

## 2021-04-06 RX ADMIN — Medication 5 MG: at 20:15

## 2021-04-06 RX ADMIN — LACOSAMIDE 200 MG: 100 TABLET, FILM COATED ORAL at 04:43

## 2021-04-06 RX ADMIN — LEVETIRACETAM 1000 MG: 500 TABLET ORAL at 17:26

## 2021-04-06 RX ADMIN — DIVALPROEX SODIUM 750 MG: 125 CAPSULE ORAL at 17:25

## 2021-04-06 RX ADMIN — GABAPENTIN 300 MG: 300 CAPSULE ORAL at 17:25

## 2021-04-06 RX ADMIN — SIMVASTATIN 40 MG: 20 TABLET, FILM COATED ORAL at 20:15

## 2021-04-06 NOTE — HOSPITAL COURSE
75-year-old male with a history of cerebellar CVA, seizure disorder who was transferred from [facility] after he was found to have right parietal SDH without any midline shift's.  Witnessed seizure by family followed by changes in mental status.  Neurosurgery was consulted (Dr. Capps) no surgical intervention recommended.  Initially admitted to ICU.  EEG obtained which showed nonconvulsive status epilepticus, patient was loaded with Vimpat.  Neurology was consulted he was continued on Vimpat, Keppra, Depacon without further seizure-like activity.  Patient had persistent waxing and waning mental status.  Patient had poor p.o. intake after discussions with family NGT was placed and started on enteral tube feeds on 3/18.

## 2021-04-06 NOTE — CARE PLAN
Problem: Safety  Goal: Will remain free from falls  Outcome: PROGRESSING AS EXPECTED     Problem: Infection  Goal: Will remain free from infection  Outcome: PROGRESSING AS EXPECTED     Problem: Venous Thromboembolism (VTW)/Deep Vein Thrombosis (DVT) Prevention:  Goal: Patient will participate in Venous Thrombosis (VTE)/Deep Vein Thrombosis (DVT)Prevention Measures  Outcome: PROGRESSING AS EXPECTED     Problem: Skin Integrity  Goal: Risk for impaired skin integrity will decrease  Outcome: PROGRESSING AS EXPECTED     Problem: Pain Management  Goal: Pain level will decrease to patient's comfort goal  Outcome: PROGRESSING AS EXPECTED     Problem: Infection:  Goal: Will remain free from infection  Outcome: PROGRESSING AS EXPECTED

## 2021-04-06 NOTE — PROGRESS NOTES
"Hospital Medicine Daily Progress Note    Date of Service  4/5/2021    Chief Complaint  75 y.o. male admitted 3/11/2021 with seizure    Hospital Course  \"History of cerebellar CVA, seizure disorder, presented as a transfer from [facility] after he was found to have right parietal SDH without any midline shift's.  Witnessed seizure by family followed by changes in mental status.  Neurosurgery consulted Dr. Capps, no surgical intervention, recommend repeat CT head and frequent neuro checks.  Admit to the ICU every hour neuro checks, repeat head CT, keep SBP less than 140, continue Depakote and Keppra.  Due to ongoing encephalopathy, he had an EEG and was found to be in non-convulsive status epilepticus and was loaded with Vimpat.    Neurology was following case and he was treated with Vimpat 200mg BID, Keppra 1000mg BID, and Depacon 750mg QHS. No further seizure like activity. Patient had subtherapeutic Depakote level and Depacon was increased to 750mg BID.    Patient had prolonged waxing and waning mental status. Due to poor PO intake and intermittent refusal of PO intake, NGT was placed and started on alternative nutrition on 3/18.    Interval Problem Update  3/29: Awake, not following commands  Still requiring core tract tube feeds, will follow-up with patient's daughter regarding goals of care and PEG tube needs  Palliative care to assist    Still has mitts as restraints, intermittently impulsive, easily reoriented    Patient seen by previous hospitalist until 3/29    3/30: Patient seen at bedside.  Awake however still not following commands.  We are pending palliative reevaluation with daughter regarding PEG tube placement.  Patient still on core track tube feedings.  As per nursing no other overnight events were reported.    3/31: Patient seen at bedside, opening eyes, not following commands, unable to have a conversation.  The patient has advanced directives that previously stated not to pursue artificial " nutrition, however as per the daughter Cleo she mentions that her father told her that she would like to have a PEG tube placed.  We discussed the case with palliative care and have agreed to move forward on daughter's wishes.  It is unclear at this time whether the patient will continue to recover or this will be his new baseline.  We have consulted GI for PEG tube placement.  The daughter would like to speak to GI regarding the procedure and I have conveyed the message to Dr. Lynn.  No other overnight events were reported by nursing.    4/1: Patient was seen at bedside multiple times today.  Early in the morning the patient was not arousable however in the afternoon the patient was awake and sitting down.  After talking to nursing it appears his mentation waxes and wanes however still confused and unable to have a conversation.  I spoke to the daughter Cleo and it appears she is considering hospice but would like more information.  As per the daughter she would like to stop daily blood draws and to consult with her for every study to be made on the patient prior to ordering it. I told her that while patient is in tube feedings they do weekly blood draws, and she was ok with it. GI evaluated the patient for possible PEG tube placement, however daughter at this time unsure if she would like to move forward with PEG tube.  No other overnight events were reported by nursing.    4/2: Patient seen at bedside this morning.  Patient seems more awake than yesterday, but still unable to have a conversation or follow simple commands.  Nursing have reported the patient had episodes of arm twitching and lip twitching.  I have spoken with neurology in regards to modify antiseizure medications but it appears it would not be beneficial for the patient at this time as it would make him more drowsy.  Daughter appears to be leaning towards hospice however she has not made that decision yet.    4/3: Patient seen at bedside  this morning.  It appears patient has vomited twice since yesterday, we have held tube feedings for now.  It appears patient is constipated we have escalated bowel regimen until bowel movement.  We will hold tube feedings until large bowel movement and restart tube feeding slowly.  We have also added Compazine low-dose 5 mg every 8 hours as needed for nausea and vomiting.  We are still pending hospice approval by daughter.  Patient currently without and IV access, and as per daughter she does not want to have an IV access for now.  If patient vomits again we will need to get approval from daughter to get an IV access.  As per nursing no other overnight events were reported.    4/4: Patient seen at bedside this morning.  Afebrile, normotensive on room air. Currently laying in bed, unresponsive and arousable, not following commands.  As per nursing the patient has not had any more vomiting, and constipation has been resolved.  Tube feedings have been resumed and patient is tolerating well.  Hospice has already reached out to daughter, however daughter has not made a decision yet, but it appears she is leaning towards hospice.  As per nursing no other overnight events were reported.    4/5: Patient seen at bedside this morning.  Afebrile, normotensive on room air.  Currently laying in bed, was able to open eyes this morning, however not following commands.  Patient has been tolerating tube feedings and has not had more episodes of vomiting.  We are pending patient's decision for hospice, case management on board we appreciate further recommendations.  As per nursing no other overnight events were reported.      Consultants/Specialty  Critical Care  Neurosurgery  Physiatry  Neurology  GI    Code Status  DNAR/DNI    Disposition  Daughter leaning towards hospice, pending decision.    If not hospice, patient should have PEG tube and SNF placement.    Review of Systems  Review of Systems   Unable to perform ROS: Mental  status change        Physical Exam  Temp:  [36.3 °C (97.4 °F)-36.6 °C (97.8 °F)] 36.3 °C (97.4 °F)  Pulse:  [59-84] 80  Resp:  [18-20] 20  BP: (125-151)/(61-63) 151/61  SpO2:  [94 %-98 %] 98 %    Physical Exam  Vitals and nursing note reviewed.   Constitutional:       General: He is not in acute distress.     Appearance: He is ill-appearing.      Comments: Lethargy, arousable   HENT:      Head: Normocephalic and atraumatic.      Nose: Nose normal.      Mouth/Throat:      Mouth: Mucous membranes are dry.   Eyes:      Conjunctiva/sclera: Conjunctivae normal.   Cardiovascular:      Rate and Rhythm: Normal rate.   Pulmonary:      Effort: Pulmonary effort is normal. No respiratory distress.   Abdominal:      General: Bowel sounds are normal. There is no distension.      Tenderness: There is no abdominal tenderness.   Musculoskeletal:         General: No tenderness.   Skin:     General: Skin is warm.   Neurological:      Cranial Nerves: No cranial nerve deficit.      Sensory: No sensory deficit.      Motor: Weakness present.      Comments: Lethargy         Fluids    Intake/Output Summary (Last 24 hours) at 4/5/2021 1942  Last data filed at 4/5/2021 1815  Gross per 24 hour   Intake 3663 ml   Output 1500 ml   Net 2163 ml       Laboratory                        Imaging  DX-ABDOMEN FOR TUBE PLACEMENT   Final Result         1.  Moderate stool in the colon suggests changes of constipation, otherwise nonspecific bowel gas pattern   2.  Dobbhoff tube tip terminates overlying the expected location of the gastric body.   3.  Hazy left lung base opacity could represent edema or infiltrate      DX-CHEST-PORTABLE (1 VIEW)   Final Result      1.  Cardiomegaly with increased interstitial opacity diffusely which may represent interstitial edema or pneumonia.      2.  No lobar consolidation.      IU-JOTLUSP-7 VIEW   Final Result         Moderate amount of stool throughout the colon.      DX-ABDOMEN FOR TUBE PLACEMENT   Final Result       Enteric tube projects over the proximal stomach.      CT-HEAD W/O   Final Result      1.  Prior RIGHT frontal craniotomy with underlying dural thickening and subdural hemorrhage, unchanged from prior exam.   2.  No new intracranial hemorrhage demonstrated.   3.  Diffuse atrophy with white matter microvascular ischemic changes.   4.  Small chronic RIGHT posterior frontal infarct.      KA-[id number] VIEW   Final Result         No specific finding to suggest small bowel obstruction.      CT-HEAD W/O   Final Result         1.  Mixed right parietal subdural fluid collection likely acute on chronic subdural hematoma.   2.  Nonspecific white matter changes, commonly associated with small vessel ischemic disease.  Associated mild cerebral atrophy is noted.   3.  Atherosclerosis.      OUTSIDE IMAGES-CT HEAD   Final Result           Assessment/Plan  * Seizure disorder, nonconvulsive, with status epilepticus (HCC)- (present on admission)  Assessment & Plan  Noted on EEG on admission  He was loaded with IV Keppra, started on IV Vimpat, and IV depakote  Repeat EEG 3/15 without evidence of seizure and more consistent with encephalopathy  Appriciate neurology consult. Valproic acid level subtherapeutic and increased to 750mg BID by neurology on 3/16  Repeat valproic acid level 101 on 3/19, Depakote changed to 500mg QAM and 750mg QHS, discussed with Dr. Martínez  Repeat valproic acid level scheduled for 3/22  Outpatient follow up in the epilepsy clinic has been arranged by Dr. Mcfadden    If patient has continued seizure like activity or if his mentation appears to decline further, may need repeat EEG.    3/25 d/w dtr, aware of declining condition, palliative care to f/u  Consider transition to CC if condition further deteriorates  - encourage activity    3/31 stable  4/1: Neurology was reconsulted, we appreciate any further recommendations.    4/2: Patient more awake, however still disoriented.  As per nursing there was a concern of  lip twitching, after talking to neurology he does not seem appropriate to make further antiseizure medication changes as needed will probably make him more drowsy.  I spoke to the daughter Cleo yesterday and it appears she is leaning towards hospice however she has not made that decision yet.    4/5: Pending decision from daughter for hospice.    Constipation  Assessment & Plan  As per abdominal x-ray patient with constipation.  As per nursing the patient had not had a bowel movement in a couple of days.  We will escalate bowel regimen until bowel movement.    4/4: Resolved    Vomiting  Assessment & Plan  Patient has had 2 episodes of vomiting since yesterday.  We have held the tube feedings for now.  X-ray of the abdomen done last night showed moderate stool in the colon suggesting constipation.  We will continue bowel regimen, hold tube feedings until bowel movement.  This could be the reason for the patient's vomiting.  We have added Compazine 5 mg every 8 hours if patient continues to have vomiting, however this could cause CNS depression, previous EKG on 3/11/2021, QT was borderline high this is why we have stopped Zofran.  I talked to the patient's daughter and ask her about the Compazine and the risks with it, and she agreed if the patient continues to have vomiting we will give Compazine.    4/4: Resolved. Patient had a BM, and is now tolerating tube feeds. We will continue to monitor.    Encephalopathy acute- (present on admission)  Assessment & Plan  Acute encephalopathy likely multifactorial, persistent  In setting of dementia with behavioral disturbance, history of psychiatric disorder, CVA, and seizure disorder in status epilepticus on admission  Continue Seroquel 25mg TID prn for agitation, Seroquel 50mg QHS  on gabapentin  Appreciate physiatry consult, monitoring  Patient requiring    3/25 limit sedative rx, gabapentin  D/w RN  Mentation improving, participating with PT    3/26 minimal  participation with therapy, max assistance  Skilled referral when medically cleared  Continue conservative treatment  3/27  Palliative f/u, consider dc to home with snf on hospice vs home  Will f/u with dtr   Stable/improved  3/28 on seroquel, restraints renewed for intermittent agitation  - taper off retsraints as tolerated without excessive sedation  3/31: It appears patient not improving, patient opening eyes but currently not following commands.  I spoke to the daughter Cleo she would like to have a PEG tube placed and SNF placement.  4/1: It appears patient's condition waxes and wanes, daughter unsure about PEG tube. Discussing hospice.    4/5: Pending decision towards hospice.  We are awaiting daughter's decision.    Subdural hematoma, acute (HCC)- (present on admission)  Assessment & Plan  Noted on CT head  Non-traumatic  Neurosurgery consulted, non-operative management  S/p right-sided craniotomy for evacuation of subdural hematoma by neurosurgery almost one year ago  Avoid NSAIDs and anticoagulation    4/5: Patient's daughter still deciding whether to go the hospice route.    Dysphagia  Assessment & Plan  Dysphagia likely secondary to encephalopathy  Patient intermittently tolerates PO intake, however at times refuses both food and medication  NGT 3/18 placed for nutrition and medication administration, tube feeds currently at goal    3/24 with vomiting, TF held  - f/u abd/cxr, eval for aspiration  F/u stat labs  Palliative care consult, assist with determining goals of care    3/25 abd xray with mod stool  Vomiting resolved, TF resumed at slow rate  CXR, mild edema, procal neg    3/28  slp following, may need peg tube  Palliative to f/u with family regarding goals of care, snf referral  CM to assist    3/30 slp reeval, palliative to f/u with dtr regarding peg placement and hospice  snf referral    3/31: Daughter would like the patient to have a PEG tube placed which we have consulted GI (Dr Lynn)  and we appreciate further recommendations.  4/1: GI consulted, however patient's daughter unsure if PEG tube is what she wants. Still discussing. We appreciate further recommendations.  4/2: I spoke to the daughter yesterday at length.  It appears she is leaning towards hospice, however she has not made that decision.  She does not want the PEG tube placed as of right now.  4/3: We have stopped tube feedings due to 2 episodes of vomiting.  It appears patient is constipated so we have escalated bowel regimen.    4/4: We have resumed tube feedings, tolerating.    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    Legally blind- (present on admission)  Assessment & Plan  - Patient legally blind for more than 12 years per patient  - Lives at [facility]    Essential hypertension- (present on admission)  Assessment & Plan  Reported history of, though had not been on medications prior to admit as per chart review  BP has trending up. Will initiate amlodipine 5mg for BP control    4/5: Patient's blood pressure currently controlled. Continue same management. Monitor and make changes accordingly.       VTE prophylaxis: SCDs only secondary to SDH.     I certify that the patient requires continued medically necessary hospital services for the treatment of acute encephalopathy/SDH

## 2021-04-06 NOTE — CARE PLAN
Problem: Skin Integrity  Goal: Risk for impaired skin integrity will decrease  Outcome: PROGRESSING AS EXPECTED     Problem: Communication  Goal: The ability to communicate needs accurately and effectively will improve  Outcome: PROGRESSING SLOWER THAN EXPECTED

## 2021-04-06 NOTE — THERAPY
Missed Therapy     Patient Name: Rafael Mccoy  Age:  75 y.o., Sex:  male  Medical Record #: [id number]  Today's Date: 4/6/2021 04/06/21 1128   Treatment Variance   Reason For Missed Therapy Medical - Patient Unarousable   Interdisciplinary Plan of Care Collaboration   IDT Collaboration with  Nursing   Collaboration Comments Attempted to see pt on this date for dysphagia treatment. Was unable to rouse patient despite MAX verbal and tactile cues. Will attempt at a later time as appropriate. Has cortrak for nutrition.

## 2021-04-06 NOTE — CARE PLAN
Problem: Safety  Goal: Will remain free from injury  Outcome: PROGRESSING AS EXPECTED  Goal: Will remain free from falls  Outcome: PROGRESSING AS EXPECTED     Problem: Safety:  Goal: Will remain free from injury  Outcome: PROGRESSING AS EXPECTED     Problem: Safety:  Goal: Will remain free from injury  Outcome: PROGRESSING AS EXPECTED     Problem: Communication  Goal: The ability to communicate needs accurately and effectively will improve  Outcome: PROGRESSING SLOWER THAN EXPECTED

## 2021-04-06 NOTE — PROGRESS NOTES
Hospital Medicine Daily Progress Note    Date of Service  4/6/2021    Chief Complaint  75 y.o. male admitted 3/11/2021 with seizure    Hospital Course  75-year-old male with a history of cerebellar CVA, seizure disorder who was transferred from [facility] after he was found to have right parietal SDH without any midline shift's.  Witnessed seizure by family followed by changes in mental status.  Neurosurgery was consulted (Dr. Capps) no surgical intervention recommended.  Initially admitted to ICU.  EEG obtained which showed nonconvulsive status epilepticus, patient was loaded with Vimpat.  Neurology was consulted he was continued on Vimpat, Keppra, Depacon without further seizure-like activity.  Patient had persistent waxing and waning mental status.  Patient had poor p.o. intake after discussions with family NGT was placed and started on enteral tube feeds on 3/18.      Interval Problem Update  No acute overnight events  Daughter lives in [facility]  Had long conversation with daughter regarding patient's status and GOC, daughter looking into options for hospice either at patient's apartment or someplace else, needing to get things in order before making final decision about hospice, feeding tube and discharge.  Start: 2:40, end: 3:12pm      Consultants/Specialty  Palliative Care  Neurology  Neurosurgery  CC/pulm    Code Status  DNAR/DNI    Disposition  TBD, likely hospice    Review of Systems  Review of Systems   Unable to perform ROS: Patient unresponsive        Physical Exam  Temp:  [36.2 °C (97.2 °F)-37.2 °C (98.9 °F)] 37.2 °C (98.9 °F)  Pulse:  [73-89] 84  Resp:  [16-20] 18  BP: (123-151)/(53-90) 140/53  SpO2:  [95 %-98 %] 96 %    Physical Exam  Vitals and nursing note reviewed.   Constitutional:       General: He is not in acute distress.     Appearance: He is not toxic-appearing or diaphoretic.   HENT:      Nose:      Comments: NGT in place     Mouth/Throat:      Mouth: Mucous membranes are moist.    Eyes:      Pupils: Pupils are equal, round, and reactive to light.   Cardiovascular:      Rate and Rhythm: Normal rate and regular rhythm.      Heart sounds: No murmur. No friction rub. No gallop.    Pulmonary:      Effort: Pulmonary effort is normal.      Breath sounds: Normal breath sounds.   Abdominal:      General: Abdomen is flat. Bowel sounds are normal.      Palpations: Abdomen is soft.   Musculoskeletal:      Right lower leg: No edema.      Left lower leg: No edema.   Skin:     General: Skin is warm and dry.      Coloration: Skin is not jaundiced.   Neurological:      Mental Status: He is lethargic.      Comments: Unable to perform much of neuro exam given somnolence         Fluids    Intake/Output Summary (Last 24 hours) at 4/6/2021 1446  Last data filed at 4/6/2021 0900  Gross per 24 hour   Intake 2673 ml   Output 900 ml   Net 1773 ml       Laboratory                        Imaging  DX-ABDOMEN FOR TUBE PLACEMENT   Final Result         1.  Moderate stool in the colon suggests changes of constipation, otherwise nonspecific bowel gas pattern   2.  Dobbhoff tube tip terminates overlying the expected location of the gastric body.   3.  Hazy left lung base opacity could represent edema or infiltrate      DX-CHEST-PORTABLE (1 VIEW)   Final Result      1.  Cardiomegaly with increased interstitial opacity diffusely which may represent interstitial edema or pneumonia.      2.  No lobar consolidation.      [id number] VIEW   Final Result         Moderate amount of stool throughout the colon.      DX-ABDOMEN FOR TUBE PLACEMENT   Final Result      Enteric tube projects over the proximal stomach.      CT-HEAD W/O   Final Result      1.  Prior RIGHT frontal craniotomy with underlying dural thickening and subdural hemorrhage, unchanged from prior exam.   2.  No new intracranial hemorrhage demonstrated.   3.  Diffuse atrophy with white matter microvascular ischemic changes.   4.  Small chronic RIGHT posterior frontal  infarct.      [id number] VIEW   Final Result         No specific finding to suggest small bowel obstruction.      CT-HEAD W/O   Final Result         1.  Mixed right parietal subdural fluid collection likely acute on chronic subdural hematoma.   2.  Nonspecific white matter changes, commonly associated with small vessel ischemic disease.  Associated mild cerebral atrophy is noted.   3.  Atherosclerosis.      OUTSIDE IMAGES-CT HEAD   Final Result           Assessment/Plan  * Seizure disorder, nonconvulsive, with status epilepticus (HCC)- (present on admission)  Assessment & Plan  Noted on EEG on admission  He was loaded with IV Keppra, started on IV Vimpat, and IV depakote  Repeat EEG 3/15 without evidence of seizure and more consistent with encephalopathy  Appriciate neurology consult. Valproic acid level subtherapeutic and increased to 750mg BID by neurology on 3/16  Repeat valproic acid level 101 on 3/19, Depakote changed to 500mg QAM and 750mg QHS, discussed with Dr. Martínez  Repeat valproic acid level scheduled for 3/22  Outpatient follow up in the epilepsy clinic has been arranged by Dr. Mcfadden    If patient has continued seizure like activity or if his mentation appears to decline further, may need repeat EEG.    3/25 d/w dtr, aware of declining condition, palliative care to f/u  Consider transition to CC if condition further deteriorates  - encourage activity    3/31 stable  4/1: Neurology was reconsulted, we appreciate any further recommendations.    4/2: Patient more awake, however still disoriented.  As per nursing there was a concern of lip twitching, after talking to neurology he does not seem appropriate to make further antiseizure medication changes as needed will probably make him more drowsy.  I spoke to the daughter Cleo yesterday and it appears she is leaning towards hospice however she has not made that decision yet.    4/5: Pending decision from daughter for hospice.    Constipation  Assessment  & Plan  As per abdominal x-ray patient with constipation.  As per nursing the patient had not had a bowel movement in a couple of days.  We will escalate bowel regimen until bowel movement.    4/4: Resolved    Vomiting  Assessment & Plan  Patient has had 2 episodes of vomiting since yesterday.  We have held the tube feedings for now.  X-ray of the abdomen done last night showed moderate stool in the colon suggesting constipation.  We will continue bowel regimen, hold tube feedings until bowel movement.  This could be the reason for the patient's vomiting.  We have added Compazine 5 mg every 8 hours if patient continues to have vomiting, however this could cause CNS depression, previous EKG on 3/11/2021, QT was borderline high this is why we have stopped Zofran.  I talked to the patient's daughter and ask her about the Compazine and the risks with it, and she agreed if the patient continues to have vomiting we will give Compazine.    4/4: Resolved. Patient had a BM, and is now tolerating tube feeds. We will continue to monitor.    Encephalopathy acute- (present on admission)  Assessment & Plan  Acute encephalopathy likely multifactorial, persistent  In setting of dementia with behavioral disturbance, history of psychiatric disorder, CVA, and seizure disorder in status epilepticus on admission  Continue Seroquel 25mg TID prn for agitation, Seroquel 50mg QHS  on gabapentin  Appreciate physiatry consult, monitoring  Patient requiring    3/25 limit sedative rx, gabapentin  D/w RN  Mentation improving, participating with PT    3/26 minimal participation with therapy, max assistance  Skilled referral when medically cleared  Continue conservative treatment  3/27  Palliative f/u, consider dc to home with snf on hospice vs home  Will f/u with dtr   Stable/improved  3/28 on seroquel, restraints renewed for intermittent agitation  - taper off retsraints as tolerated without excessive sedation  3/31: It appears patient not  improving, patient opening eyes but currently not following commands.  I spoke to the daughter Cleo she would like to have a PEG tube placed and SNF placement.  4/1: It appears patient's condition waxes and wanes, daughter unsure about PEG tube. Discussing hospice.    4/5: Pending decision towards hospice.  We are awaiting daughter's decision.    Subdural hematoma, acute (HCC)- (present on admission)  Assessment & Plan  Noted on CT head  Non-traumatic  Neurosurgery consulted, non-operative management  S/p right-sided craniotomy for evacuation of subdural hematoma by neurosurgery almost one year ago  Avoid NSAIDs and anticoagulation    4/5: Patient's daughter still deciding whether to go the hospice route.  4/6: spoke with daughter on phone regarding GOC, still deciding but likely hospice    Dysphagia  Assessment & Plan  Dysphagia likely secondary to encephalopathy  Patient intermittently tolerates PO intake, however at times refuses both food and medication  NGT 3/18 placed for nutrition and medication administration, tube feeds currently at goal    3/24 with vomiting, TF held  - f/u abd/cxr, eval for aspiration  F/u stat labs  Palliative care consult, assist with determining goals of care    3/25 abd xray with mod stool  Vomiting resolved, TF resumed at slow rate  CXR, mild edema, procal neg    3/28  slp following, may need peg tube  Palliative to f/u with family regarding goals of care, snf referral  CM to assist    3/30 slp reeval, palliative to f/u with dtr regarding peg placement and hospice  snf referral    3/31: Daughter would like the patient to have a PEG tube placed which we have consulted GI (Dr Lynn) and we appreciate further recommendations.  4/1: GI consulted, however patient's daughter unsure if PEG tube is what she wants. Still discussing. We appreciate further recommendations.  4/2: I spoke to the daughter yesterday at length.  It appears she is leaning towards hospice, however she has not  made that decision.  She does not want the PEG tube placed as of right now.  4/3: We have stopped tube feedings due to 2 episodes of vomiting.  It appears patient is constipated so we have escalated bowel regimen.    4/4: We have resumed tube feedings, tolerating.    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    Legally blind- (present on admission)  Assessment & Plan  - Patient legally blind for more than 12 years per patient  - Lives at [facility]    Essential hypertension- (present on admission)  Assessment & Plan  Reported history of, though had not been on medications prior to admit as per chart review  BP has trending up. Will initiate amlodipine 5mg for BP control    4/5: Patient's blood pressure currently controlled. Continue same management. Monitor and make changes accordingly.       VTE prophylaxis: SCDs

## 2021-04-07 PROCEDURE — 770006 HCHG ROOM/CARE - MED/SURG/GYN SEMI*

## 2021-04-07 PROCEDURE — 700102 HCHG RX REV CODE 250 W/ 637 OVERRIDE(OP): Performed by: INTERNAL MEDICINE

## 2021-04-07 PROCEDURE — 97535 SELF CARE MNGMENT TRAINING: CPT | Mod: CO

## 2021-04-07 PROCEDURE — 700102 HCHG RX REV CODE 250 W/ 637 OVERRIDE(OP): Performed by: STUDENT IN AN ORGANIZED HEALTH CARE EDUCATION/TRAINING PROGRAM

## 2021-04-07 PROCEDURE — 99232 SBSQ HOSP IP/OBS MODERATE 35: CPT | Performed by: INTERNAL MEDICINE

## 2021-04-07 PROCEDURE — A9270 NON-COVERED ITEM OR SERVICE: HCPCS | Performed by: STUDENT IN AN ORGANIZED HEALTH CARE EDUCATION/TRAINING PROGRAM

## 2021-04-07 PROCEDURE — A9270 NON-COVERED ITEM OR SERVICE: HCPCS | Performed by: INTERNAL MEDICINE

## 2021-04-07 RX ADMIN — DIVALPROEX SODIUM 750 MG: 125 CAPSULE ORAL at 17:05

## 2021-04-07 RX ADMIN — LACOSAMIDE 200 MG: 100 TABLET, FILM COATED ORAL at 17:06

## 2021-04-07 RX ADMIN — GABAPENTIN 300 MG: 300 CAPSULE ORAL at 17:06

## 2021-04-07 RX ADMIN — LEVETIRACETAM 1000 MG: 500 TABLET ORAL at 17:06

## 2021-04-07 RX ADMIN — SIMVASTATIN 40 MG: 20 TABLET, FILM COATED ORAL at 21:46

## 2021-04-07 RX ADMIN — DOCUSATE SODIUM 50 MG AND SENNOSIDES 8.6 MG 2 TABLET: 8.6; 5 TABLET, FILM COATED ORAL at 17:06

## 2021-04-07 RX ADMIN — DIVALPROEX SODIUM 500 MG: 125 CAPSULE ORAL at 04:55

## 2021-04-07 RX ADMIN — AMLODIPINE BESYLATE 5 MG: 5 TABLET ORAL at 04:55

## 2021-04-07 RX ADMIN — Medication 5 MG: at 21:46

## 2021-04-07 RX ADMIN — GABAPENTIN 300 MG: 300 CAPSULE ORAL at 04:55

## 2021-04-07 RX ADMIN — LACOSAMIDE 200 MG: 100 TABLET, FILM COATED ORAL at 04:55

## 2021-04-07 RX ADMIN — LEVETIRACETAM 1000 MG: 500 TABLET ORAL at 04:55

## 2021-04-07 ASSESSMENT — COGNITIVE AND FUNCTIONAL STATUS - GENERAL
DAILY ACTIVITIY SCORE: 6
SUGGESTED CMS G CODE MODIFIER DAILY ACTIVITY: CN
PERSONAL GROOMING: TOTAL
DRESSING REGULAR UPPER BODY CLOTHING: TOTAL
DRESSING REGULAR LOWER BODY CLOTHING: TOTAL
HELP NEEDED FOR BATHING: TOTAL
EATING MEALS: TOTAL
TOILETING: TOTAL

## 2021-04-07 NOTE — CARE PLAN
Problem: Communication  Goal: The ability to communicate needs accurately and effectively will improve  Outcome: PROGRESSING AS EXPECTED     Problem: Safety  Goal: Will remain free from falls  Outcome: PROGRESSING AS EXPECTED     Problem: Venous Thromboembolism (VTW)/Deep Vein Thrombosis (DVT) Prevention:  Goal: Patient will participate in Venous Thrombosis (VTE)/Deep Vein Thrombosis (DVT)Prevention Measures  Outcome: PROGRESSING AS EXPECTED  Note: SCDs on. ROM performed.      Problem: Bowel/Gastric:  Goal: Will not experience complications related to bowel motility  Outcome: PROGRESSING AS EXPECTED     Problem: Skin Integrity  Goal: Risk for impaired skin integrity will decrease  Outcome: PROGRESSING AS EXPECTED  Note: Q2 turns implemented. Waffle cushion in place.

## 2021-04-07 NOTE — PROGRESS NOTES
Hospital Medicine Daily Progress Note    Date of Service  4/7/2021    Chief Complaint  75 y.o. male admitted 3/11/2021 with seizure    Hospital Course  75-year-old male with a history of cerebellar CVA, seizure disorder who was transferred from [facility] after he was found to have right parietal SDH without any midline shift's.  Witnessed seizure by family followed by changes in mental status.  Neurosurgery was consulted (Dr. Capps) no surgical intervention recommended.  Initially admitted to ICU.  EEG obtained which showed nonconvulsive status epilepticus, patient was loaded with Vimpat.  Neurology was consulted he was continued on Vimpat, Keppra, Depacon without further seizure-like activity.  Patient had persistent waxing and waning mental status.  Patient had poor p.o. intake after discussions with family NGT was placed and started on enteral tube feeds on 3/18.      Interval Problem Update  No acute overnight events  At bedside when previous caregiver (Maxine) at bedside, patient more awake today, conversing with Maxine   -Palliative care had meeting with patient's daughter, still deciding appropriate course and timeline  - afebrile  - BP controlled      Consultants/Specialty  Palliative Care  Neurology  Neurosurgery  CC/pulm    Code Status  DNAR/DNI    Disposition  TBD, likely hospice    Review of Systems  Review of Systems   Unable to perform ROS: Patient unresponsive        Physical Exam  Temp:  [36.3 °C (97.3 °F)-36.9 °C (98.4 °F)] 36.3 °C (97.3 °F)  Pulse:  [58-80] 58  Resp:  [15-20] 15  BP: (109-138)/(48-63) 109/59  SpO2:  [97 %-98 %] 97 %    Physical Exam  Vitals and nursing note reviewed.   Constitutional:       General: He is not in acute distress.     Appearance: He is not toxic-appearing or diaphoretic.   HENT:      Nose:      Comments: NGT in place     Mouth/Throat:      Mouth: Mucous membranes are moist.   Eyes:      Pupils: Pupils are equal, round, and reactive to light.   Cardiovascular:       Rate and Rhythm: Normal rate and regular rhythm.      Heart sounds: No murmur. No friction rub. No gallop.    Pulmonary:      Effort: Pulmonary effort is normal.      Breath sounds: Normal breath sounds.   Abdominal:      General: Abdomen is flat. Bowel sounds are normal.      Palpations: Abdomen is soft.   Musculoskeletal:      Right lower leg: No edema.      Left lower leg: No edema.   Skin:     General: Skin is warm and dry.      Coloration: Skin is not jaundiced.   Neurological:      Comments: More alert today, able to communicate with caregiver         Fluids    Intake/Output Summary (Last 24 hours) at 4/7/2021 1445  Last data filed at 4/7/2021 1300  Gross per 24 hour   Intake 900 ml   Output 2200 ml   Net -1300 ml       Laboratory                        Imaging  DX-ABDOMEN FOR TUBE PLACEMENT   Final Result         1.  Moderate stool in the colon suggests changes of constipation, otherwise nonspecific bowel gas pattern   2.  Dobbhoff tube tip terminates overlying the expected location of the gastric body.   3.  Hazy left lung base opacity could represent edema or infiltrate      DX-CHEST-PORTABLE (1 VIEW)   Final Result      1.  Cardiomegaly with increased interstitial opacity diffusely which may represent interstitial edema or pneumonia.      2.  No lobar consolidation.      [id number] VIEW   Final Result         Moderate amount of stool throughout the colon.      DX-ABDOMEN FOR TUBE PLACEMENT   Final Result      Enteric tube projects over the proximal stomach.      CT-HEAD W/O   Final Result      1.  Prior RIGHT frontal craniotomy with underlying dural thickening and subdural hemorrhage, unchanged from prior exam.   2.  No new intracranial hemorrhage demonstrated.   3.  Diffuse atrophy with white matter microvascular ischemic changes.   4.  Small chronic RIGHT posterior frontal infarct.      [id number] VIEW   Final Result         No specific finding to suggest small bowel obstruction.      CT-HEAD W/O    Final Result         1.  Mixed right parietal subdural fluid collection likely acute on chronic subdural hematoma.   2.  Nonspecific white matter changes, commonly associated with small vessel ischemic disease.  Associated mild cerebral atrophy is noted.   3.  Atherosclerosis.      OUTSIDE IMAGES-CT HEAD   Final Result           Assessment/Plan  * Seizure disorder, nonconvulsive, with status epilepticus (HCC)- (present on admission)  Assessment & Plan  Noted on EEG on admission  He was loaded with IV Keppra, started on IV Vimpat, and IV depakote  Repeat EEG 3/15 without evidence of seizure and more consistent with encephalopathy  Appriciate neurology consult. Valproic acid level subtherapeutic and increased to 750mg BID by neurology on 3/16  Repeat valproic acid level 101 on 3/19, Depakote changed to 500mg QAM and 750mg QHS, discussed with Dr. Martínez  Repeat valproic acid level scheduled for 3/22  Outpatient follow up in the epilepsy clinic has been arranged by Dr. Mcfadden    If patient has continued seizure like activity or if his mentation appears to decline further, may need repeat EEG.      Constipation  Assessment & Plan  As per abdominal x-ray patient with constipation.  As per nursing the patient had not had a bowel movement in a couple of days.  We will escalate bowel regimen until bowel movement.    4/4: Resolved    Vomiting  Assessment & Plan  Patient has had 2 episodes of vomiting since yesterday.  We have held the tube feedings for now.  X-ray of the abdomen done last night showed moderate stool in the colon suggesting constipation.  We will continue bowel regimen, hold tube feedings until bowel movement.  This could be the reason for the patient's vomiting.  We have added Compazine 5 mg every 8 hours if patient continues to have vomiting, however this could cause CNS depression, previous EKG on 3/11/2021, QT was borderline high this is why we have stopped Zofran.  I talked to the patient's daughter and  ask her about the Compazine and the risks with it, and she agreed if the patient continues to have vomiting we will give Compazine.    4/4: Resolved. Patient had a BM, and is now tolerating tube feeds. We will continue to monitor.    Encephalopathy acute- (present on admission)  Assessment & Plan  Acute encephalopathy likely multifactorial, persistent  In setting of dementia with behavioral disturbance, history of psychiatric disorder, CVA, and seizure disorder in status epilepticus on admission  Continue Seroquel 25mg TID prn for agitation, Seroquel 50mg QHS  on gabapentin    4/5: Pending decision towards hospice.  We are awaiting daughter's decision.    Subdural hematoma, acute (HCC)- (present on admission)  Assessment & Plan  Noted on CT head  Non-traumatic  Neurosurgery consulted, non-operative management  S/p right-sided craniotomy for evacuation of subdural hematoma by neurosurgery almost one year ago  Avoid NSAIDs and anticoagulation      Dysphagia  Assessment & Plan  Dysphagia likely secondary to encephalopathy and stroke  Patient intermittently tolerates PO intake, however at times refuses both food and medication  NGT 3/18 placed for nutrition and medication administration, tube feeds currently at goal    4/4: We have resumed tube feedings, tolerating.  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    Legally blind- (present on admission)  Assessment & Plan  - Patient legally blind for more than 12 years per patient  - [facility] at [facility]    Essential hypertension- (present on admission)  Assessment & Plan  Reported history of, though had not  been on medications prior to admit as per chart review  BP has trending up. Will initiate amlodipine 5mg for BP control    4/5: Patient's blood pressure currently controlled. Continue same management. Monitor and make changes accordingly.       VTE prophylaxis: SCDs

## 2021-04-07 NOTE — CARE PLAN
Problem: Safety  Goal: Will remain free from falls  Outcome: PROGRESSING AS EXPECTED  Intervention: Implement fall precautions  Flowsheets  Taken 4/7/2021 0900 by Dena Mackay RDANIEL  Bed Alarm: Yes - Alarm On  Taken 4/6/2021 2000 by Lyric Mustafa R.N.  Environmental Precautions:   Treaded Slipper Socks on Patient   Personal Belongings, Wastebasket, Call Bell etc. in Easy Reach   Transferred to Stronger Side   Report Given to Other Health Care Providers Regarding Fall Risk   Bed in Low Position   Communication Sign for Patients & Families   Mobility Assessed & Appropriate Sign Placed     Problem: Infection  Goal: Will remain free from infection  Outcome: PROGRESSING AS EXPECTED     Problem: Infection:  Goal: Will remain free from infection  Outcome: PROGRESSING AS EXPECTED     Problem: Communication  Goal: The ability to communicate needs accurately and effectively will improve  Outcome: PROGRESSING SLOWER THAN EXPECTED

## 2021-04-07 NOTE — PALLIATIVE CARE
Palliative Care follow-up  Pt's daughter called and would like a meeting via phone with her and her sister to discussed pt's current medical condition. She wanted to have RN communicate that she is doing the right thing by going in the direction of Hospice care and reiterate what the MD's have discussed with her.       Plan: 11 am meeting with via phone with [place].    Thank you for allowing Palliative Care to support this patient and family. Contact [phone] for additional assistance, change in patient status, or with any questions/concerns.

## 2021-04-07 NOTE — THERAPY
Occupational Therapy  Daily Treatment     Patient Name: Rafael Mccoy  Age:  75 y.o., Sex:  male  Medical Record #: [id number]  Today's Date: 4/7/2021     Precautions  Precautions: Fall Risk  Comments: legally blind     Assessment    Pt seen for OT tx. Continues to be limited by decreased activity tolerance, balance deficits, inattention and generalized weakness impacting ability to complete ADLs and ADL transfers independently. Pt alert and responsive only to name. No consistent command following during session. Continues to require max-total A for mobility and ADLs. Will discuss POC w/ OT regarding goals and frequency.     Plan    Will discuss POC w/ OT regarding goals and frequency.    DC Equipment Recommendations: Unable to determine at this time  Discharge Recommendations: Recommend post-acute placement for additional occupational therapy services prior to discharge home.        04/07/21 1101   Cognition    Cognition / Consciousness X   Ability To Follow Commands Unable to Follow 1 Step Commands   New Learning Impaired   Attention Impaired   Sequencing Impaired   Active ROM Upper Body   Active ROM Upper Body  X   Comments no movement of LUE during session, rigidity w/ ROM    Strength Upper Body   Upper Body Strength  X   Comments generalized weakness bilaterally, LUE weaker than RUE, pt moving RUE more    Balance   Sitting Balance (Static) Poor -   Sitting Balance (Dynamic) Trace +   Bed Mobility    Supine to Sit Total Assist   Sit to Supine Total Assist   Scooting Total Assist   Activities of Daily Living   Grooming Seated;Maximal Assist   Upper Body Dressing Total Assist   Lower Body Dressing Total Assist   Toileting Total Assist   Functional Mobility   Comments did not assess    Short Term Goals   Short Term Goal # 1 Pt will demo LB dressing w/ SPV   Goal Outcome # 1 Progressing slower than expected   Short Term Goal # 2 Pt will demo standing grooming w/ SPV   Goal Outcome # 2 Progressing slower than  expected   Short Term Goal # 3 Pt will demo toilet txf w/ SPV   Goal Outcome # 3 Progressing slower than expected   Anticipated Discharge Equipment and Recommendations   DC Equipment Recommendations Unable to determine at this time   Discharge Recommendations Recommend post-acute placement for additional occupational therapy services prior to discharge home

## 2021-04-07 NOTE — PALLIATIVE CARE
Palliative Care follow-up  Spoke with Cleo and Yas on the phone and updated Yas on pt's current medical situation. She is the pt's other daughter that lives in [place]. Cleo is overwhelmed still looking at options for hospice care in the pt's home with private care givers and or GH with hospice in [facility]. Both Yas and Cleo understand the poor prognosis of their father's situation and understand that his wishes based on his AD would not be to be on long term feeding tubes and live in this condition.       Updated: Team    Plan: Continue with the support for family to help move the pt forward with a Plan on hospice care    Thank you for allowing Palliative Care to support this patient and family. Contact [phone] for additional assistance, change in patient status, or with any questions/concerns.

## 2021-04-08 PROCEDURE — A9270 NON-COVERED ITEM OR SERVICE: HCPCS | Performed by: INTERNAL MEDICINE

## 2021-04-08 PROCEDURE — 770006 HCHG ROOM/CARE - MED/SURG/GYN SEMI*

## 2021-04-08 PROCEDURE — 99232 SBSQ HOSP IP/OBS MODERATE 35: CPT | Performed by: INTERNAL MEDICINE

## 2021-04-08 PROCEDURE — 700102 HCHG RX REV CODE 250 W/ 637 OVERRIDE(OP): Performed by: STUDENT IN AN ORGANIZED HEALTH CARE EDUCATION/TRAINING PROGRAM

## 2021-04-08 PROCEDURE — 700102 HCHG RX REV CODE 250 W/ 637 OVERRIDE(OP): Performed by: INTERNAL MEDICINE

## 2021-04-08 PROCEDURE — A9270 NON-COVERED ITEM OR SERVICE: HCPCS | Performed by: STUDENT IN AN ORGANIZED HEALTH CARE EDUCATION/TRAINING PROGRAM

## 2021-04-08 RX ADMIN — DOCUSATE SODIUM 50 MG AND SENNOSIDES 8.6 MG 2 TABLET: 8.6; 5 TABLET, FILM COATED ORAL at 16:38

## 2021-04-08 RX ADMIN — AMLODIPINE BESYLATE 5 MG: 5 TABLET ORAL at 04:41

## 2021-04-08 RX ADMIN — DOCUSATE SODIUM 50 MG AND SENNOSIDES 8.6 MG 2 TABLET: 8.6; 5 TABLET, FILM COATED ORAL at 04:41

## 2021-04-08 RX ADMIN — LACOSAMIDE 200 MG: 100 TABLET, FILM COATED ORAL at 16:37

## 2021-04-08 RX ADMIN — SIMVASTATIN 40 MG: 20 TABLET, FILM COATED ORAL at 20:40

## 2021-04-08 RX ADMIN — LACOSAMIDE 200 MG: 100 TABLET, FILM COATED ORAL at 04:41

## 2021-04-08 RX ADMIN — DIVALPROEX SODIUM 500 MG: 125 CAPSULE ORAL at 04:41

## 2021-04-08 RX ADMIN — GABAPENTIN 300 MG: 300 CAPSULE ORAL at 16:37

## 2021-04-08 RX ADMIN — LEVETIRACETAM 1000 MG: 500 TABLET ORAL at 16:38

## 2021-04-08 RX ADMIN — GABAPENTIN 300 MG: 300 CAPSULE ORAL at 04:41

## 2021-04-08 RX ADMIN — DIVALPROEX SODIUM 750 MG: 125 CAPSULE ORAL at 16:37

## 2021-04-08 RX ADMIN — Medication 5 MG: at 20:40

## 2021-04-08 RX ADMIN — LEVETIRACETAM 1000 MG: 500 TABLET ORAL at 04:41

## 2021-04-08 ASSESSMENT — PAIN DESCRIPTION - PAIN TYPE: TYPE: ACUTE PAIN;CHRONIC PAIN

## 2021-04-08 NOTE — PROGRESS NOTES
Hospital Medicine Daily Progress Note    Date of Service  4/8/2021    Chief Complaint  75 y.o. male admitted 3/11/2021 with seizure    Hospital Course  75-year-old male with a history of cerebellar CVA, seizure disorder who was transferred from [facility] after he was found to have right parietal SDH without any midline shift's.  Witnessed seizure by family followed by changes in mental status.  Neurosurgery was consulted (Dr. Capps) no surgical intervention recommended.  Initially admitted to ICU.  EEG obtained which showed nonconvulsive status epilepticus, patient was loaded with Vimpat.  Neurology was consulted he was continued on Vimpat, Keppra, Depacon without further seizure-like activity.  Patient had persistent waxing and waning mental status.  Patient had poor p.o. intake after discussions with family NGT was placed and started on enteral tube feeds on 3/18.      Interval Problem Update  No acute overnight events  More somnolent today  Afebrile  No labs today      Consultants/Specialty  Palliative Care  Neurology  Neurosurgery  CC/pulm    Code Status  DNAR/DNI    Disposition  TBD, likely hospice    Review of Systems  Review of Systems   Unable to perform ROS: Patient unresponsive        Physical Exam  Temp:  [36.4 °C (97.6 °F)-37.1 °C (98.8 °F)] 36.4 °C (97.6 °F)  Pulse:  [61-98] 98  Resp:  [16-18] 16  BP: (116-129)/(36-66) 124/36  SpO2:  [97 %-99 %] 97 %    Physical Exam  Vitals and nursing note reviewed.   Constitutional:       General: He is not in acute distress.     Appearance: He is not toxic-appearing or diaphoretic.   HENT:      Nose:      Comments: NGT in place     Mouth/Throat:      Mouth: Mucous membranes are moist.   Eyes:      Pupils: Pupils are equal, round, and reactive to light.   Cardiovascular:      Rate and Rhythm: Normal rate and regular rhythm.      Heart sounds: No murmur. No friction rub. No gallop.    Pulmonary:      Effort: Pulmonary effort is normal.      Breath sounds: Normal  "breath sounds.   Abdominal:      General: Abdomen is flat. Bowel sounds are normal.      Palpations: Abdomen is soft.   Musculoskeletal:      Right lower leg: No edema.      Left lower leg: No edema.   Skin:     General: Skin is warm and dry.      Coloration: Skin is not jaundiced.   Neurological:      Comments: Somnolent, does open eyes to voice slightly and did say \"ya\" when I said his name, able to squeeze my hands and wiggle his toes           Fluids    Intake/Output Summary (Last 24 hours) at 4/8/2021 1433  Last data filed at 4/8/2021 1411  Gross per 24 hour   Intake 1600 ml   Output 1100 ml   Net 500 ml       Laboratory                        Imaging  DX-ABDOMEN FOR TUBE PLACEMENT   Final Result         1.  Moderate stool in the colon suggests changes of constipation, otherwise nonspecific bowel gas pattern   2.  Dobbhoff tube tip terminates overlying the expected location of the gastric body.   3.  Hazy left lung base opacity could represent edema or infiltrate      DX-CHEST-PORTABLE (1 VIEW)   Final Result      1.  Cardiomegaly with increased interstitial opacity diffusely which may represent interstitial edema or pneumonia.      2.  No lobar consolidation.      [id number] VIEW   Final Result         Moderate amount of stool throughout the colon.      DX-ABDOMEN FOR TUBE PLACEMENT   Final Result      Enteric tube projects over the proximal stomach.      CT-HEAD W/O   Final Result      1.  Prior RIGHT frontal craniotomy with underlying dural thickening and subdural hemorrhage, unchanged from prior exam.   2.  No new intracranial hemorrhage demonstrated.   3.  Diffuse atrophy with white matter microvascular ischemic changes.   4.  Small chronic RIGHT posterior frontal infarct.      [id number] VIEW   Final Result         No specific finding to suggest small bowel obstruction.      CT-HEAD W/O   Final Result         1.  Mixed right parietal subdural fluid collection likely acute on chronic subdural hematoma. "   2.  Nonspecific white matter changes, commonly associated with small vessel ischemic disease.  Associated mild cerebral atrophy is noted.   3.  Atherosclerosis.      OUTSIDE IMAGES-CT HEAD   Final Result           Assessment/Plan  * Seizure disorder, nonconvulsive, with status epilepticus (HCC)- (present on admission)  Assessment & Plan  Noted on EEG on admission  He was loaded with IV Keppra, started on IV Vimpat, and IV depakote  Repeat EEG 3/15 without evidence of seizure and more consistent with encephalopathy  Appriciate neurology consult. Valproic acid level subtherapeutic and increased to 750mg BID by neurology on 3/16  Repeat valproic acid level 101 on 3/19, Depakote changed to 500mg QAM and 750mg QHS, discussed with Dr. Martínez  Repeat valproic acid level scheduled for 3/22  Outpatient follow up in the epilepsy clinic has been arranged by Dr. Mcfadden    If patient has continued seizure like activity or if his mentation appears to decline further, may need repeat EEG.      Constipation  Assessment & Plan  As per abdominal x-ray patient with constipation.  As per nursing the patient had not had a bowel movement in a couple of days.  We will escalate bowel regimen until bowel movement.    4/4: Resolved    Vomiting  Assessment & Plan  Patient has had 2 episodes of vomiting since yesterday.  We have held the tube feedings for now.  X-ray of the abdomen done last night showed moderate stool in the colon suggesting constipation.  We will continue bowel regimen, hold tube feedings until bowel movement.  This could be the reason for the patient's vomiting.  We have added Compazine 5 mg every 8 hours if patient continues to have vomiting, however this could cause CNS depression, previous EKG on 3/11/2021, QT was borderline high this is why we have stopped Zofran.  I talked to the patient's daughter and ask her about the Compazine and the risks with it, and she agreed if the patient continues to have vomiting we will  give Compazine.    4/4: Resolved. Patient had a BM, and is now tolerating tube feeds. We will continue to monitor.    Encephalopathy acute- (present on admission)  Assessment & Plan  Acute encephalopathy likely multifactorial, persistent  In setting of dementia with behavioral disturbance, history of psychiatric disorder, CVA, and seizure disorder in status epilepticus on admission  Continue Seroquel 25mg TID prn for agitation, Seroquel 50mg QHS  on gabapentin    4/5: Pending decision towards hospice.  We are awaiting daughter's decision.    Subdural hematoma, acute (HCC)- (present on admission)  Assessment & Plan  Noted on CT head  Non-traumatic  Neurosurgery consulted, non-operative management  S/p right-sided craniotomy for evacuation of subdural hematoma by neurosurgery almost one year ago  Avoid NSAIDs and anticoagulation      Dysphagia  Assessment & Plan  Dysphagia likely secondary to encephalopathy and stroke  Patient intermittently tolerates PO intake, however at times refuses both food and medication  NGT 3/18 placed for nutrition and medication administration, tube feeds currently at goal    4/4: We have resumed tube feedings, tolerating.  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    Legally blind- (present on admission)  Assessment & Plan  - Patient legally blind for more than 12 years per patient  - Lives at [facility]    Essential hypertension- (present on admission)  Assessment & Plan  Reported history of, though had not been on medications prior to admit as per chart review  BP has trending up. Will initiate amlodipine 5mg for BP  control    4/5: Patient's blood pressure currently controlled. Continue same management. Monitor and make changes accordingly.       VTE prophylaxis: SCDs

## 2021-04-08 NOTE — CARE PLAN
Problem: Safety  Goal: Will remain free from injury  Outcome: PROGRESSING AS EXPECTED  Free from injury/fall; precautions in place     Problem: Infection  Goal: Will remain free from infection  Outcome: PROGRESSING AS EXPECTED  Free from signs of infection     Problem: Skin Integrity  Goal: Risk for impaired skin integrity will decrease  Outcome: PROGRESSING AS EXPECTED  Turn Q2H, pillows used for support        Problem: Communication  Goal: The ability to communicate needs accurately and effectively will improve  Outcome: PROGRESSING SLOWER THAN EXPECTED  Unable to make needs known

## 2021-04-08 NOTE — PROGRESS NOTES
Pt was placed in cardiac chair and wheeled out to the patio with RN, pt care tech, and nursing student present. Facetime video with Cleo, pt's daughter. Pt tolerated 1 hour in cardiac chair

## 2021-04-08 NOTE — CARE PLAN
Problem: Safety  Goal: Will remain free from falls  Outcome: PROGRESSING AS EXPECTED  Note: Patient at risk for falls due, bed alarm on, walker out of sight, nonskid socks on, call light within reach, personal belongings within reach, toileting offered.        Problem: Communication  Goal: The ability to communicate needs accurately and effectively will improve  Outcome: PROGRESSING SLOWER THAN EXPECTED  Note: Encouraged use of call light, assessed needs, encouraged pt to voice feelings.

## 2021-04-08 NOTE — DIETARY
Nutrition support weekly update:  Day 28 of admit.  Rafael Mccoy is a 75 y.o. male with admitting DX of Intracranial hemorrhage.  Tube feeding initiated on 3/18. Current TF via gastric cortrak is Fibersource HN goal rate of 70 ml/hour providing 2016 kcal, 90 gm protein, 1360 ml free water.    Assessment:  Weight: no new weight since 3/27: 86.5 kg. Discussed with RN and requested new weight.  Re-estimate of nutritional needs not indicated at this time.     Evaluation:   1. Pt tolerating tube feed at goal rate per RN. No nausea or vomiting per RN.  2. Labs 4/5 include Pre Alb 27.3, CRP 0.36  3. Medications include Senna, Keppra, Zocor  4. No skin breakdown per chart review.  5. LBM 4/6  6. Fluids: 150 ml free water flush q 4 hours.  7. Palliative care working with family, considering hospice.  8. Current feeding remains appropriate at this time.    Malnutrition risk: Criteria not met.    Recommendations/Plan:  1. Continue Fibersource HN goal rate of 70 ml/hour as tolerated.  2. Fluids per MD.  3. Monitor weight. Requested re-weigh.    RD following

## 2021-04-09 PROCEDURE — 97530 THERAPEUTIC ACTIVITIES: CPT | Mod: CQ

## 2021-04-09 PROCEDURE — A9270 NON-COVERED ITEM OR SERVICE: HCPCS | Performed by: STUDENT IN AN ORGANIZED HEALTH CARE EDUCATION/TRAINING PROGRAM

## 2021-04-09 PROCEDURE — 700102 HCHG RX REV CODE 250 W/ 637 OVERRIDE(OP): Performed by: STUDENT IN AN ORGANIZED HEALTH CARE EDUCATION/TRAINING PROGRAM

## 2021-04-09 PROCEDURE — 770006 HCHG ROOM/CARE - MED/SURG/GYN SEMI*

## 2021-04-09 PROCEDURE — A9270 NON-COVERED ITEM OR SERVICE: HCPCS | Performed by: INTERNAL MEDICINE

## 2021-04-09 PROCEDURE — 700102 HCHG RX REV CODE 250 W/ 637 OVERRIDE(OP): Performed by: INTERNAL MEDICINE

## 2021-04-09 PROCEDURE — 99232 SBSQ HOSP IP/OBS MODERATE 35: CPT | Performed by: INTERNAL MEDICINE

## 2021-04-09 RX ORDER — QUETIAPINE FUMARATE 25 MG/1
25 TABLET, FILM COATED ORAL 3 TIMES DAILY PRN
Status: DISCONTINUED | OUTPATIENT
Start: 2021-04-09 | End: 2021-04-12

## 2021-04-09 RX ORDER — QUETIAPINE FUMARATE 25 MG/1
25 TABLET, FILM COATED ORAL 3 TIMES DAILY PRN
Status: DISCONTINUED | OUTPATIENT
Start: 2021-04-09 | End: 2021-04-09

## 2021-04-09 RX ADMIN — POLYETHYLENE GLYCOL 3350 1 PACKET: 17 POWDER, FOR SOLUTION ORAL at 05:18

## 2021-04-09 RX ADMIN — LEVETIRACETAM 1000 MG: 500 TABLET ORAL at 16:59

## 2021-04-09 RX ADMIN — GABAPENTIN 300 MG: 300 CAPSULE ORAL at 05:17

## 2021-04-09 RX ADMIN — LEVETIRACETAM 1000 MG: 500 TABLET ORAL at 05:17

## 2021-04-09 RX ADMIN — DIVALPROEX SODIUM 500 MG: 125 CAPSULE ORAL at 05:18

## 2021-04-09 RX ADMIN — LACOSAMIDE 200 MG: 100 TABLET, FILM COATED ORAL at 05:18

## 2021-04-09 RX ADMIN — Medication 5 MG: at 20:18

## 2021-04-09 RX ADMIN — LACOSAMIDE 200 MG: 100 TABLET, FILM COATED ORAL at 16:59

## 2021-04-09 RX ADMIN — DOCUSATE SODIUM 50 MG AND SENNOSIDES 8.6 MG 2 TABLET: 8.6; 5 TABLET, FILM COATED ORAL at 05:17

## 2021-04-09 RX ADMIN — SIMVASTATIN 40 MG: 20 TABLET, FILM COATED ORAL at 20:18

## 2021-04-09 RX ADMIN — AMLODIPINE BESYLATE 5 MG: 5 TABLET ORAL at 05:18

## 2021-04-09 RX ADMIN — GABAPENTIN 300 MG: 300 CAPSULE ORAL at 16:59

## 2021-04-09 RX ADMIN — DIVALPROEX SODIUM 750 MG: 125 CAPSULE ORAL at 16:59

## 2021-04-09 RX ADMIN — DOCUSATE SODIUM 50 MG AND SENNOSIDES 8.6 MG 2 TABLET: 8.6; 5 TABLET, FILM COATED ORAL at 16:59

## 2021-04-09 ASSESSMENT — COGNITIVE AND FUNCTIONAL STATUS - GENERAL
WALKING IN HOSPITAL ROOM: TOTAL
SUGGESTED CMS G CODE MODIFIER MOBILITY: CN
CLIMB 3 TO 5 STEPS WITH RAILING: TOTAL
MOVING TO AND FROM BED TO CHAIR: UNABLE
TURNING FROM BACK TO SIDE WHILE IN FLAT BAD: UNABLE
STANDING UP FROM CHAIR USING ARMS: TOTAL
MOBILITY SCORE: 6
MOVING FROM LYING ON BACK TO SITTING ON SIDE OF FLAT BED: UNABLE

## 2021-04-09 ASSESSMENT — GAIT ASSESSMENTS: GAIT LEVEL OF ASSIST: UNABLE TO PARTICIPATE

## 2021-04-09 ASSESSMENT — PAIN DESCRIPTION - PAIN TYPE: TYPE: ACUTE PAIN;CHRONIC PAIN

## 2021-04-09 NOTE — THERAPY
Physical Therapy   Daily Treatment     Patient Name: Rafael Mccoy  Age:  75 y.o., Sex:  male  Medical Record #: [id number]  Today's Date: 4/9/2021     Precautions: Fall Risk    Assessment  Pt currently requiring total assist for bed mobility and EOB. Pt is not making progress w/PT @ this time. PT will hold further tx efforts until POC has been established and will be available only if family training is warranted.     DC Equipment Recommendations: Unable to determine at this time  Discharge Recommendations: Recommend post-acute placement for additional physical therapy services prior to discharge home     Objective       04/09/21 1155   Other Treatments   Other Treatments Provided EOB x 6 mins w/total assist for midline. Pt not following commands to assist w/his mobility.    Balance   Sitting Balance (Static) Dependent   Sitting Balance (Dynamic) Dependent   Standing Balance (Static) Dependent   Standing Balance (Dynamic) Dependent   Weight Shift Sitting Absent   Weight Shift Standing Absent   Gait Analysis   Gait Level Of Assist Unable to Participate   Bed Mobility    Supine to Sit Total Assist   Sit to Supine Total Assist   Scooting Total Assist   Rolling Total Assist to Rt.;Total Assist to Lt.   Functional Mobility   Sit to Stand Unable to Participate   Bed, Chair, Wheelchair Transfer Unable to Participate   Comments Pt medical decline does not allow for safe functional mobility. Pt would benefit from OOB to cardiac chair.    How much difficulty does the patient currently have...   Turning over in bed (including adjusting bedclothes, sheets and blankets)? 1   Sitting down on and standing up from a chair with arms (e.g., wheelchair, bedside commode, etc.) 1   Moving from lying on back to sitting on the side of the bed? 1   How much help from another person does the patient currently need...   Moving to and from a bed to a chair (including a wheelchair)? 1   Need to walk in a hospital room? 1   Climbing 3-5  steps with a railing? 1   6 clicks Mobility Score 6   Short Term Goals    Short Term Goal # 1 Pt will be able to perform supine<>sit with HOB flat/no rails with Spv in 6tx to promote fnx progression towards I    Goal Outcome # 1 goal not met   Short Term Goal # 2 Pt will be able to perform sit<>stand/transfers with min A in 6tx to promote fnx progression towards I    Goal Outcome # 2 Goal not met   Short Term Goal # 2 B  pt to move sit to/from stand w/ spv in 6 visits to iincrease fxl indep   Goal Outcome # 2 B Goal not met   Short Term Goal # 3 Pt will be able to ambulate 25ft with FWW with min A in 6tx to promote fnx progression towards I    Goal Outcome # 3 Goal not met

## 2021-04-09 NOTE — PROGRESS NOTES
Hospital Medicine Daily Progress Note    Date of Service  4/9/2021    Chief Complaint  75 y.o. male admitted 3/11/2021 with seizure    Hospital Course  75-year-old male with a history of cerebellar CVA, seizure disorder who was transferred from [facility] after he was found to have right parietal SDH without any midline shift's.  Witnessed seizure by family followed by changes in mental status.  Neurosurgery was consulted (Dr. Capps) no surgical intervention recommended.  Initially admitted to ICU.  EEG obtained which showed nonconvulsive status epilepticus, patient was loaded with Vimpat.  Neurology was consulted he was continued on Vimpat, Keppra, Depacon without further seizure-like activity.  Patient had persistent waxing and waning mental status.  Patient had poor p.o. intake after discussions with family NGT was placed and started on enteral tube feeds on 3/18.      Interval Problem Update  No acute overnight events  More somnolent today  Spoke with patient's daughter Cleo today, she was told by nursing that patient having more agitation, stiffening of his body which he's done in the past with anxiety, asking if we can re-start seroquel, re-started seroquel 25mg TID PRN      Consultants/Specialty  Palliative Care  Neurology  Neurosurgery  CC/pulm    Code Status  DNAR/DNI    Disposition  TBD, likely hospice    Review of Systems  Review of Systems   Unable to perform ROS: Patient unresponsive        Physical Exam  Temp:  [36.1 °C (96.9 °F)-37.1 °C (98.7 °F)] 36.3 °C (97.3 °F)  Pulse:  [61-82] 64  Resp:  [16-22] 22  BP: (105-133)/(60-67) 131/67  SpO2:  [94 %-97 %] 94 %    Physical Exam  Vitals and nursing note reviewed.   Constitutional:       General: He is not in acute distress.     Appearance: He is not toxic-appearing or diaphoretic.   HENT:      Nose:      Comments: NGT in place     Mouth/Throat:      Mouth: Mucous membranes are moist.   Eyes:      Pupils: Pupils are equal, round, and reactive to light.    Cardiovascular:      Rate and Rhythm: Normal rate and regular rhythm.      Heart sounds: No murmur. No friction rub. No gallop.    Pulmonary:      Effort: Pulmonary effort is normal.      Breath sounds: Normal breath sounds.   Abdominal:      General: Abdomen is flat. Bowel sounds are normal.      Palpations: Abdomen is soft.   Musculoskeletal:      Right lower leg: No edema.      Left lower leg: No edema.   Skin:     General: Skin is warm and dry.      Coloration: Skin is not jaundiced.   Neurological:      Comments: Somnolent, able to squeeze my hands and wiggle his toes           Fluids    Intake/Output Summary (Last 24 hours) at 4/9/2021 1442  Last data filed at 4/9/2021 0800  Gross per 24 hour   Intake 1290 ml   Output 650 ml   Net 640 ml       Laboratory                        Imaging  DX-ABDOMEN FOR TUBE PLACEMENT   Final Result         1.  Moderate stool in the colon suggests changes of constipation, otherwise nonspecific bowel gas pattern   2.  Dobbhoff tube tip terminates overlying the expected location of the gastric body.   3.  Hazy left lung base opacity could represent edema or infiltrate      DX-CHEST-PORTABLE (1 VIEW)   Final Result      1.  Cardiomegaly with increased interstitial opacity diffusely which may represent interstitial edema or pneumonia.      2.  No lobar consolidation.      [id number] VIEW   Final Result         Moderate amount of stool throughout the colon.      DX-ABDOMEN FOR TUBE PLACEMENT   Final Result      Enteric tube projects over the proximal stomach.      CT-HEAD W/O   Final Result      1.  Prior RIGHT frontal craniotomy with underlying dural thickening and subdural hemorrhage, unchanged from prior exam.   2.  No new intracranial hemorrhage demonstrated.   3.  Diffuse atrophy with white matter microvascular ischemic changes.   4.  Small chronic RIGHT posterior frontal infarct.      [id number] VIEW   Final Result         No specific finding to suggest small bowel  obstruction.      CT-HEAD W/O   Final Result         1.  Mixed right parietal subdural fluid collection likely acute on chronic subdural hematoma.   2.  Nonspecific white matter changes, commonly associated with small vessel ischemic disease.  Associated mild cerebral atrophy is noted.   3.  Atherosclerosis.      OUTSIDE IMAGES-CT HEAD   Final Result           Assessment/Plan  * Seizure disorder, nonconvulsive, with status epilepticus (HCC)- (present on admission)  Assessment & Plan  Noted on EEG on admission  He was loaded with IV Keppra, started on IV Vimpat, and IV depakote  Repeat EEG 3/15 without evidence of seizure and more consistent with encephalopathy  Appriciate neurology consult. Valproic acid level subtherapeutic and increased to 750mg BID by neurology on 3/16  Repeat valproic acid level 101 on 3/19, Depakote changed to 500mg QAM and 750mg QHS, discussed with Dr. Martínez  Repeat valproic acid level scheduled for 3/22  Outpatient follow up in the epilepsy clinic has been arranged by Dr. Mcfadden    If patient has continued seizure like activity or if his mentation appears to decline further, may need repeat EEG.      Constipation  Assessment & Plan  As per abdominal x-ray patient with constipation.  As per nursing the patient had not had a bowel movement in a couple of days.  We will escalate bowel regimen until bowel movement.    4/4: Resolved    Vomiting  Assessment & Plan  Patient has had 2 episodes of vomiting since yesterday.  We have held the tube feedings for now.  X-ray of the abdomen done last night showed moderate stool in the colon suggesting constipation.  We will continue bowel regimen, hold tube feedings until bowel movement.  This could be the reason for the patient's vomiting.  We have added Compazine 5 mg every 8 hours if patient continues to have vomiting, however this could cause CNS depression, previous EKG on 3/11/2021, QT was borderline high this is why we have stopped Zofran.  I talked  to the patient's daughter and ask her about the Compazine and the risks with it, and she agreed if the patient continues to have vomiting we will give Compazine.    4/4: Resolved. Patient had a BM, and is now tolerating tube feeds. We will continue to monitor.    Encephalopathy acute- (present on admission)  Assessment & Plan  Acute encephalopathy likely multifactorial, persistent  In setting of dementia with behavioral disturbance, history of psychiatric disorder, CVA, and seizure disorder in status epilepticus on admission  Continue Seroquel 25mg TID prn for agitation  on gabapentin    4/5: Pending decision towards hospice.  We are awaiting daughter's decision.    Subdural hematoma, acute (HCC)- (present on admission)  Assessment & Plan  Noted on CT head  Non-traumatic  Neurosurgery consulted, non-operative management  S/p right-sided craniotomy for evacuation of subdural hematoma by neurosurgery almost one year ago  Avoid NSAIDs and anticoagulation      Dysphagia  Assessment & Plan  Dysphagia likely secondary to encephalopathy and stroke  Patient intermittently tolerates PO intake, however at times refuses both food and medication  NGT 3/18 placed for nutrition and medication administration, tube feeds currently at goal    4/4: We have resumed tube feedings, tolerating.  4/6: spoke with daughter regarding NG tube and PEG, she does not think her father would have wanted PEG, has AD that said he didn't want one    Dementia associated with other underlying disease with behavioral disturbance (HCC)- (present on admission)  Assessment & Plan  Reported history of cognitive decline consistent with dementia  Continue to monitor  On seroquel (previously on up to 300mg)    4/5: Pending decision of daughter towards hospice.    Legally blind- (present on admission)  Assessment & Plan  - Patient legally blind for more than 12 years per patient  - Lives at [facility]    Essential hypertension- (present on  admission)  Assessment & Plan  Reported history of, though had not been on medications prior to admit as per chart review  BP has trending up. Will initiate amlodipine 5mg for BP control    4/5: Patient's blood pressure currently controlled. Continue same management. Monitor and make changes accordingly.       VTE prophylaxis: SCDs

## 2021-04-09 NOTE — CARE PLAN
Problem: Safety  Goal: Will remain free from injury  Outcome: PROGRESSING AS EXPECTED  Free from injury/fall; precautions in place     Problem: Fluid Volume:  Goal: Will maintain balanced intake and output  Outcome: PROGRESSING AS EXPECTED   Balanced I&O    Problem: Skin Integrity  Goal: Risk for impaired skin integrity will decrease  Outcome: PROGRESSING AS EXPECTED  Q2H turns, supported with pillows, heels floated     Problem: Communication  Goal: The ability to communicate needs accurately and effectively will improve  Outcome: PROGRESSING SLOWER THAN EXPECTED  Unable to communicate needs with staff

## 2021-04-10 PROCEDURE — 700102 HCHG RX REV CODE 250 W/ 637 OVERRIDE(OP): Performed by: INTERNAL MEDICINE

## 2021-04-10 PROCEDURE — 94760 N-INVAS EAR/PLS OXIMETRY 1: CPT

## 2021-04-10 PROCEDURE — 770006 HCHG ROOM/CARE - MED/SURG/GYN SEMI*

## 2021-04-10 PROCEDURE — A9270 NON-COVERED ITEM OR SERVICE: HCPCS | Performed by: INTERNAL MEDICINE

## 2021-04-10 PROCEDURE — 99232 SBSQ HOSP IP/OBS MODERATE 35: CPT | Performed by: INTERNAL MEDICINE

## 2021-04-10 PROCEDURE — 700102 HCHG RX REV CODE 250 W/ 637 OVERRIDE(OP): Performed by: STUDENT IN AN ORGANIZED HEALTH CARE EDUCATION/TRAINING PROGRAM

## 2021-04-10 PROCEDURE — A9270 NON-COVERED ITEM OR SERVICE: HCPCS | Performed by: STUDENT IN AN ORGANIZED HEALTH CARE EDUCATION/TRAINING PROGRAM

## 2021-04-10 RX ADMIN — GABAPENTIN 300 MG: 300 CAPSULE ORAL at 04:15

## 2021-04-10 RX ADMIN — LEVETIRACETAM 1000 MG: 500 TABLET ORAL at 17:21

## 2021-04-10 RX ADMIN — Medication 5 MG: at 21:33

## 2021-04-10 RX ADMIN — LEVETIRACETAM 1000 MG: 500 TABLET ORAL at 04:15

## 2021-04-10 RX ADMIN — DIVALPROEX SODIUM 500 MG: 125 CAPSULE ORAL at 04:15

## 2021-04-10 RX ADMIN — LACOSAMIDE 200 MG: 100 TABLET, FILM COATED ORAL at 04:15

## 2021-04-10 RX ADMIN — SIMVASTATIN 40 MG: 20 TABLET, FILM COATED ORAL at 21:33

## 2021-04-10 RX ADMIN — POLYETHYLENE GLYCOL 3350 1 PACKET: 17 POWDER, FOR SOLUTION ORAL at 04:14

## 2021-04-10 RX ADMIN — DIVALPROEX SODIUM 750 MG: 125 CAPSULE ORAL at 17:21

## 2021-04-10 RX ADMIN — QUETIAPINE FUMARATE 25 MG: 25 TABLET ORAL at 11:52

## 2021-04-10 RX ADMIN — LACOSAMIDE 200 MG: 100 TABLET, FILM COATED ORAL at 17:21

## 2021-04-10 RX ADMIN — DOCUSATE SODIUM 50 MG AND SENNOSIDES 8.6 MG 2 TABLET: 8.6; 5 TABLET, FILM COATED ORAL at 04:15

## 2021-04-10 RX ADMIN — DOCUSATE SODIUM 50 MG AND SENNOSIDES 8.6 MG 2 TABLET: 8.6; 5 TABLET, FILM COATED ORAL at 17:21

## 2021-04-10 RX ADMIN — GABAPENTIN 300 MG: 300 CAPSULE ORAL at 17:21

## 2021-04-10 ASSESSMENT — PAIN DESCRIPTION - PAIN TYPE: TYPE: ACUTE PAIN

## 2021-04-10 NOTE — PROGRESS NOTES
Hospital Medicine Daily Progress Note    Date of Service  4/10/2021    Chief Complaint  75 y.o. male admitted 3/11/2021 with seizure    Hospital Course  75-year-old male with a history of cerebellar CVA, seizure disorder who was transferred from [facility] after he was found to have right parietal SDH without any midline shift's.  Witnessed seizure by family followed by changes in mental status.  Neurosurgery was consulted (Dr. Capps) no surgical intervention recommended.  Initially admitted to ICU.  EEG obtained which showed nonconvulsive status epilepticus, patient was loaded with Vimpat.  Neurology was consulted he was continued on Vimpat, Keppra, Depacon without further seizure-like activity.  Patient had persistent waxing and waning mental status.  Patient had poor p.o. intake after discussions with family NGT was placed and started on enteral tube feeds on 3/18.      Interval Problem Update  - Requiring oxygen 1-2L NC, no cough, afebrile  - more agitated yesterday, added back seroquel, arms above head, more stiff, will give dose of seroquel today      Consultants/Specialty  Palliative Care  Neurology  Neurosurgery  CC/pulm    Code Status  DNAR/DNI    Disposition  TBD, likely hospice    Review of Systems  Review of Systems   Unable to perform ROS: Patient unresponsive        Physical Exam  Temp:  [36.3 °C (97.4 °F)-37.1 °C (98.8 °F)] 37 °C (98.6 °F)  Pulse:  [80-92] 90  Resp:  [17-30] 18  BP: ()/(46-65) 119/58  SpO2:  [90 %-96 %] 94 %    Physical Exam  Vitals and nursing note reviewed.   Constitutional:       General: He is not in acute distress.     Appearance: He is not toxic-appearing or diaphoretic.   HENT:      Nose:      Comments: NGT in place     Mouth/Throat:      Mouth: Mucous membranes are moist.   Eyes:      Pupils: Pupils are equal, round, and reactive to light.   Cardiovascular:      Rate and Rhythm: Normal rate and regular rhythm.      Heart sounds: No murmur. No friction rub. No gallop.     Pulmonary:      Effort: Pulmonary effort is normal.      Breath sounds: Normal breath sounds.   Abdominal:      General: Abdomen is flat. Bowel sounds are normal.      Palpations: Abdomen is soft.   Musculoskeletal:      Right lower leg: No edema.      Left lower leg: No edema.   Skin:     General: Skin is warm and dry.      Coloration: Skin is not jaundiced.   Neurological:      Comments: Somnolent, able to squeeze my hands and wiggle his toes           Fluids    Intake/Output Summary (Last 24 hours) at 4/10/2021 1222  Last data filed at 4/10/2021 0800  Gross per 24 hour   Intake 1590 ml   Output 1200 ml   Net 390 ml       Laboratory                        Imaging  DX-ABDOMEN FOR TUBE PLACEMENT   Final Result         1.  Moderate stool in the colon suggests changes of constipation, otherwise nonspecific bowel gas pattern   2.  Dobbhoff tube tip terminates overlying the expected location of the gastric body.   3.  Hazy left lung base opacity could represent edema or infiltrate      DX-CHEST-PORTABLE (1 VIEW)   Final Result      1.  Cardiomegaly with increased interstitial opacity diffusely which may represent interstitial edema or pneumonia.      2.  No lobar consolidation.      [id number] VIEW   Final Result         Moderate amount of stool throughout the colon.      DX-ABDOMEN FOR TUBE PLACEMENT   Final Result      Enteric tube projects over the proximal stomach.      CT-HEAD W/O   Final Result      1.  Prior RIGHT frontal craniotomy with underlying dural thickening and subdural hemorrhage, unchanged from prior exam.   2.  No new intracranial hemorrhage demonstrated.   3.  Diffuse atrophy with white matter microvascular ischemic changes.   4.  Small chronic RIGHT posterior frontal infarct.      [id number] VIEW   Final Result         No specific finding to suggest small bowel obstruction.      CT-HEAD W/O   Final Result         1.  Mixed right parietal subdural fluid collection likely acute on chronic  subdural hematoma.   2.  Nonspecific white matter changes, commonly associated with small vessel ischemic disease.  Associated mild cerebral atrophy is noted.   3.  Atherosclerosis.      OUTSIDE IMAGES-CT HEAD   Final Result           Assessment/Plan  * Seizure disorder, nonconvulsive, with status epilepticus (HCC)- (present on admission)  Assessment & Plan  Noted on EEG on admission  He was loaded with IV Keppra, started on IV Vimpat, and IV depakote  Repeat EEG 3/15 without evidence of seizure and more consistent with encephalopathy  Appriciate neurology consult. Valproic acid level subtherapeutic and increased to 750mg BID by neurology on 3/16  Repeat valproic acid level 101 on 3/19, Depakote changed to 500mg QAM and 750mg QHS, discussed with Dr. Martínez  Repeat valproic acid level scheduled for 3/22  Outpatient follow up in the epilepsy clinic has been arranged by Dr. Mcfadden    If patient has continued seizure like activity or if his mentation appears to decline further, may need repeat EEG.      Constipation  Assessment & Plan  As per abdominal x-ray patient with constipation.  As per nursing the patient had not had a bowel movement in a couple of days.  We will escalate bowel regimen until bowel movement.    4/4: Resolved    Vomiting  Assessment & Plan  Patient has had 2 episodes of vomiting since yesterday.  We have held the tube feedings for now.  X-ray of the abdomen done last night showed moderate stool in the colon suggesting constipation.  We will continue bowel regimen, hold tube feedings until bowel movement.  This could be the reason for the patient's vomiting.  We have added Compazine 5 mg every 8 hours if patient continues to have vomiting, however this could cause CNS depression, previous EKG on 3/11/2021, QT was borderline high this is why we have stopped Zofran.  I talked to the patient's daughter and ask her about the Compazine and the risks with it, and she agreed if the patient continues to have  vomiting we will give Compazine.    4/4: Resolved. Patient had a BM, and is now tolerating tube feeds. We will continue to monitor.    Encephalopathy acute- (present on admission)  Assessment & Plan  Acute encephalopathy likely multifactorial, persistent  In setting of dementia with behavioral disturbance, history of psychiatric disorder, CVA, and seizure disorder in status epilepticus on admission  Continue Seroquel 25mg TID prn for agitation  on gabapentin    4/5: Pending decision towards hospice.  We are awaiting daughter's decision.    Subdural hematoma, acute (HCC)- (present on admission)  Assessment & Plan  Noted on CT head  Non-traumatic  Neurosurgery consulted, non-operative management  S/p right-sided craniotomy for evacuation of subdural hematoma by neurosurgery almost one year ago  Avoid NSAIDs and anticoagulation      Dysphagia  Assessment & Plan  Dysphagia likely secondary to encephalopathy and stroke  Patient intermittently tolerates PO intake, however at times refuses both food and medication  NGT 3/18 placed for nutrition and medication administration, tube feeds currently at goal    4/4: We have resumed tube feedings, tolerating.  4/6: spoke with daughter regarding NG tube and PEG, she does not think her father would have wanted PEG, has AD that said he didn't want one    Dementia associated with other underlying disease with behavioral disturbance (HCC)- (present on admission)  Assessment & Plan  Reported history of cognitive decline consistent with dementia  Continue to monitor  On seroquel (previously on up to 300mg)    4/5: Pending decision of daughter towards hospice.    Legally blind- (present on admission)  Assessment & Plan  - Patient legally blind for more than 12 years per patient  - Lives at [facility]    Essential hypertension- (present on admission)  Assessment & Plan  Reported history of, though had not been on medications prior to admit as per chart review  BP has trending up.  Will initiate amlodipine 5mg for BP control    4/5: Patient's blood pressure currently controlled. Continue same management. Monitor and make changes accordingly.       VTE prophylaxis: SCDs

## 2021-04-10 NOTE — CARE PLAN
Problem: Safety  Goal: Will remain free from falls  Outcome: PROGRESSING AS EXPECTED  Note: Patient at risk for falls, bed alarm on, walker out of sight, nonskid socks on, call light within reach, personal belongings within reach, toileting offered.     Problem: Communication  Goal: The ability to communicate needs accurately and effectively will improve  Outcome: PROGRESSING SLOWER THAN EXPECTED  Note: Encouraged use of call light, assessed needs, encouraged pt to voice feelings.

## 2021-04-10 NOTE — PROGRESS NOTES
Called received from pt's daughter, Cleo. Call returned and daughter updated on pt's condition and plan of care.

## 2021-04-10 NOTE — PROGRESS NOTES
With patient’s permission (if able), completed daily phone call to designated support person, name Cleo  Discussed patient condition and plan of care. All questions answered.

## 2021-04-10 NOTE — CARE PLAN
Problem: Safety:  Goal: Will remain free from injury  Outcome: PROGRESSING AS EXPECTED  Note: Bed alarm on, wheels locked, in lowest position. Room near nurses station. Hourly rounding in place.  applied and suction at bedside.      Problem: Respiratory:  Goal: Ability to maintain a clear airway will improve  Outcome: PROGRESSING AS EXPECTED  Note: Pt oxygen saturation adequate on room air. No dyspnea or labored breathing noted at this time.  applied. Will continue to monitor.

## 2021-04-11 LAB
ANION GAP SERPL CALC-SCNC: 11 MMOL/L (ref 7–16)
BASOPHILS # BLD AUTO: 0.7 % (ref 0–1.8)
BASOPHILS # BLD: 0.04 K/UL (ref 0–0.12)
BUN SERPL-MCNC: 22 MG/DL (ref 8–22)
CALCIUM SERPL-MCNC: 9.3 MG/DL (ref 8.5–10.5)
CHLORIDE SERPL-SCNC: 104 MMOL/L (ref 96–112)
CO2 SERPL-SCNC: 25 MMOL/L (ref 20–33)
CREAT SERPL-MCNC: 0.98 MG/DL (ref 0.5–1.4)
EOSINOPHIL # BLD AUTO: 0.17 K/UL (ref 0–0.51)
EOSINOPHIL NFR BLD: 2.8 % (ref 0–6.9)
ERYTHROCYTE [DISTWIDTH] IN BLOOD BY AUTOMATED COUNT: 44.9 FL (ref 35.9–50)
GLUCOSE SERPL-MCNC: 82 MG/DL (ref 65–99)
HCT VFR BLD AUTO: 50.3 % (ref 42–52)
HGB BLD-MCNC: 16.2 G/DL (ref 14–18)
IMM GRANULOCYTES # BLD AUTO: 0.03 K/UL (ref 0–0.11)
IMM GRANULOCYTES NFR BLD AUTO: 0.5 % (ref 0–0.9)
LYMPHOCYTES # BLD AUTO: 1.18 K/UL (ref 1–4.8)
LYMPHOCYTES NFR BLD: 19.4 % (ref 22–41)
MAGNESIUM SERPL-MCNC: 2.3 MG/DL (ref 1.5–2.5)
MCH RBC QN AUTO: 28.2 PG (ref 27–33)
MCHC RBC AUTO-ENTMCNC: 32.2 G/DL (ref 33.7–35.3)
MCV RBC AUTO: 87.6 FL (ref 81.4–97.8)
MONOCYTES # BLD AUTO: 0.81 K/UL (ref 0–0.85)
MONOCYTES NFR BLD AUTO: 13.3 % (ref 0–13.4)
NEUTROPHILS # BLD AUTO: 3.85 K/UL (ref 1.82–7.42)
NEUTROPHILS NFR BLD: 63.3 % (ref 44–72)
NRBC # BLD AUTO: 0 K/UL
NRBC BLD-RTO: 0 /100 WBC
PHOSPHATE SERPL-MCNC: 4.6 MG/DL (ref 2.5–4.5)
PLATELET # BLD AUTO: 177 K/UL (ref 164–446)
PMV BLD AUTO: 11.2 FL (ref 9–12.9)
POTASSIUM SERPL-SCNC: 4.6 MMOL/L (ref 3.6–5.5)
RBC # BLD AUTO: 5.74 M/UL (ref 4.7–6.1)
SODIUM SERPL-SCNC: 140 MMOL/L (ref 135–145)
WBC # BLD AUTO: 6.1 K/UL (ref 4.8–10.8)

## 2021-04-11 PROCEDURE — 36415 COLL VENOUS BLD VENIPUNCTURE: CPT

## 2021-04-11 PROCEDURE — A9270 NON-COVERED ITEM OR SERVICE: HCPCS | Performed by: STUDENT IN AN ORGANIZED HEALTH CARE EDUCATION/TRAINING PROGRAM

## 2021-04-11 PROCEDURE — 84100 ASSAY OF PHOSPHORUS: CPT

## 2021-04-11 PROCEDURE — 83735 ASSAY OF MAGNESIUM: CPT

## 2021-04-11 PROCEDURE — 770006 HCHG ROOM/CARE - MED/SURG/GYN SEMI*

## 2021-04-11 PROCEDURE — 99232 SBSQ HOSP IP/OBS MODERATE 35: CPT | Performed by: INTERNAL MEDICINE

## 2021-04-11 PROCEDURE — 85025 COMPLETE CBC W/AUTO DIFF WBC: CPT

## 2021-04-11 PROCEDURE — 700102 HCHG RX REV CODE 250 W/ 637 OVERRIDE(OP): Performed by: STUDENT IN AN ORGANIZED HEALTH CARE EDUCATION/TRAINING PROGRAM

## 2021-04-11 PROCEDURE — 80048 BASIC METABOLIC PNL TOTAL CA: CPT

## 2021-04-11 PROCEDURE — 700102 HCHG RX REV CODE 250 W/ 637 OVERRIDE(OP): Performed by: INTERNAL MEDICINE

## 2021-04-11 PROCEDURE — A9270 NON-COVERED ITEM OR SERVICE: HCPCS | Performed by: INTERNAL MEDICINE

## 2021-04-11 RX ADMIN — AMLODIPINE BESYLATE 5 MG: 5 TABLET ORAL at 05:16

## 2021-04-11 RX ADMIN — DOCUSATE SODIUM 50 MG AND SENNOSIDES 8.6 MG 2 TABLET: 8.6; 5 TABLET, FILM COATED ORAL at 05:15

## 2021-04-11 RX ADMIN — Medication 5 MG: at 21:58

## 2021-04-11 RX ADMIN — LEVETIRACETAM 1000 MG: 500 TABLET ORAL at 05:15

## 2021-04-11 RX ADMIN — DIVALPROEX SODIUM 750 MG: 125 CAPSULE ORAL at 17:05

## 2021-04-11 RX ADMIN — GABAPENTIN 300 MG: 300 CAPSULE ORAL at 17:43

## 2021-04-11 RX ADMIN — LACOSAMIDE 200 MG: 100 TABLET, FILM COATED ORAL at 05:15

## 2021-04-11 RX ADMIN — DIVALPROEX SODIUM 500 MG: 125 CAPSULE ORAL at 05:15

## 2021-04-11 RX ADMIN — LACOSAMIDE 200 MG: 100 TABLET, FILM COATED ORAL at 17:08

## 2021-04-11 RX ADMIN — GABAPENTIN 300 MG: 300 CAPSULE ORAL at 05:15

## 2021-04-11 RX ADMIN — LEVETIRACETAM 1000 MG: 500 TABLET ORAL at 17:43

## 2021-04-11 RX ADMIN — SIMVASTATIN 40 MG: 20 TABLET, FILM COATED ORAL at 21:58

## 2021-04-11 ASSESSMENT — PAIN DESCRIPTION - PAIN TYPE: TYPE: CHRONIC PAIN;ACUTE PAIN

## 2021-04-11 ASSESSMENT — PAIN SCALES - WONG BAKER: WONGBAKER_NUMERICALRESPONSE: DOESN'T HURT AT ALL

## 2021-04-11 ASSESSMENT — FIBROSIS 4 INDEX: FIB4 SCORE: 1.93

## 2021-04-11 NOTE — CARE PLAN
Problem: Safety:  Goal: Risk of aspiration will decrease  Outcome: PROGRESSING AS EXPECTED  Note: Provide stroke patient education about importance of maintaining tube feed status, remind patient the dangers of consuming food/drinks orally and that risk of aspiration will increase. Check that HOB >30, auscultate all lung fields and listen for signs of aspiration, check that x-ray was performed and placement of tube was confirmed.       Problem: Knowledge Deficit:  Goal: Ability to state lifestyle or environmental changes necessary to maintain safety will improve  Outcome: PROGRESSING AS EXPECTED  Note: Educated patient about current stroke POC, activities, encouraging patient to ask questions regarding stroke care plan, providing answers to patient's questions, educating patient about medications, encouraging patient involvement in care process. Continuing with current POC.

## 2021-04-11 NOTE — PROGRESS NOTES
Hospital Medicine Daily Progress Note    Date of Service  4/11/2021    Chief Complaint  75 y.o. male admitted 3/11/2021 with seizure    Hospital Course  75-year-old male with a history of cerebellar CVA, seizure disorder who was transferred from [facility] after he was found to have right parietal SDH without any midline shift's.  Witnessed seizure by family followed by changes in mental status.  Neurosurgery was consulted (Dr. Capps) no surgical intervention recommended.  Initially admitted to ICU.  EEG obtained which showed nonconvulsive status epilepticus, patient was loaded with Vimpat.  Neurology was consulted he was continued on Vimpat, Keppra, Depacon without further seizure-like activity.  Patient had persistent waxing and waning mental status.  Patient had poor p.o. intake after discussions with family NGT was placed and started on enteral tube feeds on 3/18.      Interval Problem Update  - No acute overnight events  - Still requiring 1-2L NC  - was given dose of seroquel yesterday for anxiety/agitation      Consultants/Specialty  Palliative Care  Neurology  Neurosurgery  CC/pulm    Code Status  DNAR/DNI    Disposition  TBD, likely hospice    Review of Systems  Review of Systems   Unable to perform ROS: Patient unresponsive        Physical Exam  Temp:  [36.1 °C (96.9 °F)-37 °C (98.6 °F)] 36.3 °C (97.3 °F)  Pulse:  [81-90] 83  Resp:  [16-18] 18  BP: (112-132)/(44-57) 112/46  SpO2:  [91 %-97 %] 91 %    Physical Exam  Vitals and nursing note reviewed.   Constitutional:       General: He is not in acute distress.     Appearance: He is not toxic-appearing or diaphoretic.   HENT:      Nose:      Comments: NGT in place     Mouth/Throat:      Mouth: Mucous membranes are moist.   Eyes:      Pupils: Pupils are equal, round, and reactive to light.   Cardiovascular:      Rate and Rhythm: Normal rate and regular rhythm.      Heart sounds: No murmur. No friction rub. No gallop.    Pulmonary:      Effort: Pulmonary  effort is normal.      Breath sounds: Normal breath sounds.   Abdominal:      General: Abdomen is flat. Bowel sounds are normal.      Palpations: Abdomen is soft.   Musculoskeletal:      Right lower leg: No edema.      Left lower leg: No edema.   Skin:     General: Skin is warm and dry.      Coloration: Skin is not jaundiced.   Neurological:      Comments: Somnolent, not following commands today           Fluids    Intake/Output Summary (Last 24 hours) at 4/11/2021 1117  Last data filed at 4/11/2021 0400  Gross per 24 hour   Intake 450 ml   Output 500 ml   Net -50 ml       Laboratory  Recent Labs     04/11/21  0141   WBC 6.1   RBC 5.74   HEMOGLOBIN 16.2   HEMATOCRIT 50.3   MCV 87.6   MCH 28.2   MCHC 32.2*   RDW 44.9   PLATELETCT 177   MPV 11.2     Recent Labs     04/11/21  0141   SODIUM 140   POTASSIUM 4.6   CHLORIDE 104   CO2 25   GLUCOSE 82   BUN 22   CREATININE 0.98   CALCIUM 9.3                   Imaging  DX-ABDOMEN FOR TUBE PLACEMENT   Final Result         1.  Moderate stool in the colon suggests changes of constipation, otherwise nonspecific bowel gas pattern   2.  Dobbhoff tube tip terminates overlying the expected location of the gastric body.   3.  Hazy left lung base opacity could represent edema or infiltrate      DX-CHEST-PORTABLE (1 VIEW)   Final Result      1.  Cardiomegaly with increased interstitial opacity diffusely which may represent interstitial edema or pneumonia.      2.  No lobar consolidation.      [id number] VIEW   Final Result         Moderate amount of stool throughout the colon.      DX-ABDOMEN FOR TUBE PLACEMENT   Final Result      Enteric tube projects over the proximal stomach.      CT-HEAD W/O   Final Result      1.  Prior RIGHT frontal craniotomy with underlying dural thickening and subdural hemorrhage, unchanged from prior exam.   2.  No new intracranial hemorrhage demonstrated.   3.  Diffuse atrophy with white matter microvascular ischemic changes.   4.  Small chronic RIGHT  posterior frontal infarct.      [id number] VIEW   Final Result         No specific finding to suggest small bowel obstruction.      CT-HEAD W/O   Final Result         1.  Mixed right parietal subdural fluid collection likely acute on chronic subdural hematoma.   2.  Nonspecific white matter changes, commonly associated with small vessel ischemic disease.  Associated mild cerebral atrophy is noted.   3.  Atherosclerosis.      OUTSIDE IMAGES-CT HEAD   Final Result           Assessment/Plan  * Seizure disorder, nonconvulsive, with status epilepticus (HCC)- (present on admission)  Assessment & Plan  Noted on EEG on admission  He was loaded with IV Keppra, started on IV Vimpat, and IV depakote  Repeat EEG 3/15 without evidence of seizure and more consistent with encephalopathy  Appriciate neurology consult. Valproic acid level subtherapeutic and increased to 750mg BID by neurology on 3/16  Repeat valproic acid level 101 on 3/19, Depakote changed to 500mg QAM and 750mg QHS, discussed with Dr. Martínez  Repeat valproic acid level scheduled for 3/22  Outpatient follow up in the epilepsy clinic has been arranged by Dr. Mcfadden    If patient has continued seizure like activity or if his mentation appears to decline further, may need repeat EEG.      Constipation  Assessment & Plan  As per abdominal x-ray patient with constipation.  As per nursing the patient had not had a bowel movement in a couple of days.  We will escalate bowel regimen until bowel movement.    4/4: Resolved    Vomiting  Assessment & Plan  Patient has had 2 episodes of vomiting since yesterday.  We have held the tube feedings for now.  X-ray of the abdomen done last night showed moderate stool in the colon suggesting constipation.  We will continue bowel regimen, hold tube feedings until bowel movement.  This could be the reason for the patient's vomiting.  We have added Compazine 5 mg every 8 hours if patient continues to have vomiting, however this could  cause CNS depression, previous EKG on 3/11/2021, QT was borderline high this is why we have stopped Zofran.  I talked to the patient's daughter and ask her about the Compazine and the risks with it, and she agreed if the patient continues to have vomiting we will give Compazine.    4/4: Resolved. Patient had a BM, and is now tolerating tube feeds. We will continue to monitor.    Encephalopathy acute- (present on admission)  Assessment & Plan  Acute encephalopathy likely multifactorial, persistent  In setting of dementia with behavioral disturbance, history of psychiatric disorder, CVA, and seizure disorder in status epilepticus on admission  Continue Seroquel 25mg TID prn for agitation  on gabapentin    4/5: Pending decision towards hospice.  We are awaiting daughter's decision.    Subdural hematoma, acute (HCC)- (present on admission)  Assessment & Plan  Noted on CT head  Non-traumatic  Neurosurgery consulted, non-operative management  S/p right-sided craniotomy for evacuation of subdural hematoma by neurosurgery almost one year ago  Avoid NSAIDs and anticoagulation      Dysphagia  Assessment & Plan  Dysphagia likely secondary to encephalopathy and stroke  Patient intermittently tolerates PO intake, however at times refuses both food and medication  NGT 3/18 placed for nutrition and medication administration, tube feeds currently at goal    4/4: We have resumed tube feedings, tolerating.  4/6: spoke with daughter regarding NG tube and PEG, she does not think her father would have wanted PEG, has AD that said he didn't want one    Dementia associated with other underlying disease with behavioral disturbance (HCC)- (present on admission)  Assessment & Plan  Reported history of cognitive decline consistent with dementia  Continue to monitor  On seroquel prn (previously on up to 300mg)    4/5: Pending decision of daughter towards hospice.    Legally blind- (present on admission)  Assessment & Plan  - Patient legally  blind for more than 12 years per patient  - Lives at [facility]    Essential hypertension- (present on admission)  Assessment & Plan  Reported history of, though had not been on medications prior to admit as per chart review  BP has trending up. Will initiate amlodipine 5mg for BP control    4/5: Patient's blood pressure currently controlled. Continue same management. Monitor and make changes accordingly.       VTE prophylaxis: SCDs

## 2021-04-12 LAB
CRP SERPL HS-MCNC: <0.3 MG/DL (ref 0–0.75)
PREALB SERPL-MCNC: 28.1 MG/DL (ref 18–38)

## 2021-04-12 PROCEDURE — 700102 HCHG RX REV CODE 250 W/ 637 OVERRIDE(OP): Performed by: STUDENT IN AN ORGANIZED HEALTH CARE EDUCATION/TRAINING PROGRAM

## 2021-04-12 PROCEDURE — 770006 HCHG ROOM/CARE - MED/SURG/GYN SEMI*

## 2021-04-12 PROCEDURE — 94760 N-INVAS EAR/PLS OXIMETRY 1: CPT

## 2021-04-12 PROCEDURE — 700102 HCHG RX REV CODE 250 W/ 637 OVERRIDE(OP): Performed by: INTERNAL MEDICINE

## 2021-04-12 PROCEDURE — 99232 SBSQ HOSP IP/OBS MODERATE 35: CPT | Mod: 25 | Performed by: INTERNAL MEDICINE

## 2021-04-12 PROCEDURE — A9270 NON-COVERED ITEM OR SERVICE: HCPCS | Performed by: STUDENT IN AN ORGANIZED HEALTH CARE EDUCATION/TRAINING PROGRAM

## 2021-04-12 PROCEDURE — 84134 ASSAY OF PREALBUMIN: CPT

## 2021-04-12 PROCEDURE — 99497 ADVNCD CARE PLAN 30 MIN: CPT | Performed by: INTERNAL MEDICINE

## 2021-04-12 PROCEDURE — 86140 C-REACTIVE PROTEIN: CPT

## 2021-04-12 PROCEDURE — 36415 COLL VENOUS BLD VENIPUNCTURE: CPT

## 2021-04-12 PROCEDURE — A9270 NON-COVERED ITEM OR SERVICE: HCPCS | Performed by: INTERNAL MEDICINE

## 2021-04-12 RX ORDER — QUETIAPINE FUMARATE 25 MG/1
25 TABLET, FILM COATED ORAL 3 TIMES DAILY PRN
Status: DISCONTINUED | OUTPATIENT
Start: 2021-04-12 | End: 2021-04-16 | Stop reason: HOSPADM

## 2021-04-12 RX ADMIN — QUETIAPINE FUMARATE 25 MG: 25 TABLET ORAL at 11:02

## 2021-04-12 RX ADMIN — DIVALPROEX SODIUM 500 MG: 125 CAPSULE ORAL at 05:53

## 2021-04-12 RX ADMIN — LEVETIRACETAM 1000 MG: 500 TABLET ORAL at 05:53

## 2021-04-12 RX ADMIN — LEVETIRACETAM 1000 MG: 500 TABLET ORAL at 18:10

## 2021-04-12 RX ADMIN — LACOSAMIDE 200 MG: 100 TABLET, FILM COATED ORAL at 05:53

## 2021-04-12 RX ADMIN — SIMVASTATIN 40 MG: 20 TABLET, FILM COATED ORAL at 22:46

## 2021-04-12 RX ADMIN — GABAPENTIN 300 MG: 300 CAPSULE ORAL at 05:53

## 2021-04-12 RX ADMIN — DOCUSATE SODIUM 50 MG AND SENNOSIDES 8.6 MG 2 TABLET: 8.6; 5 TABLET, FILM COATED ORAL at 05:53

## 2021-04-12 RX ADMIN — GABAPENTIN 300 MG: 300 CAPSULE ORAL at 18:10

## 2021-04-12 RX ADMIN — LACOSAMIDE 200 MG: 100 TABLET, FILM COATED ORAL at 18:10

## 2021-04-12 RX ADMIN — VALPROIC ACID 750 MG: 250 SOLUTION ORAL at 22:46

## 2021-04-12 ASSESSMENT — PAIN SCALES - PAIN ASSESSMENT IN ADVANCED DEMENTIA (PAINAD)
FACIALEXPRESSION: SMILING OR INEXPRESSIVE
BREATHING: NORMAL
CONSOLABILITY: NO NEED TO CONSOLE
FACIALEXPRESSION: SMILING OR INEXPRESSIVE
BREATHING: OCCASIONAL LABORED BREATHING, SHORT PERIOD OF HYPERVENTILATION
BODYLANGUAGE: RELAXED
FACIALEXPRESSION: SMILING OR INEXPRESSIVE
BODYLANGUAGE: RIGID, FISTS CLENCHED, KNEES UP, PUSHING/PULLING AWAY, STRIKES OUT
CONSOLABILITY: DISTRACTED OR REASSURED BY VOICE/TOUCH
BODYLANGUAGE: RELAXED
CONSOLABILITY: NO NEED TO CONSOLE
TOTALSCORE: 0
BREATHING: NORMAL
TOTALSCORE: 0
TOTALSCORE: 4

## 2021-04-12 ASSESSMENT — PAIN DESCRIPTION - PAIN TYPE: TYPE: ACUTE PAIN;CHRONIC PAIN

## 2021-04-12 NOTE — PROGRESS NOTES
Hospital Medicine Daily Progress Note    Date of Service  4/12/2021    Chief Complaint  75 y.o. male admitted 3/11/2021 with seizure    Hospital Course  75-year-old male with a history of cerebellar CVA, seizure disorder who was transferred from [facility] after he was found to have right parietal SDH without any midline shift's.  Witnessed seizure by family followed by changes in mental status.  Neurosurgery was consulted (Dr. Capps) no surgical intervention recommended.  Initially admitted to ICU.  EEG obtained which showed nonconvulsive status epilepticus, patient was loaded with Vimpat.  Neurology was consulted he was continued on Vimpat, Keppra, Depacon without further seizure-like activity.  Patient had persistent waxing and waning mental status.  Patient had poor p.o. intake after discussions with family NGT was placed and started on enteral tube feeds on 3/18.      Interval Problem Update  - No acute overnight events  - Still requiring 1L NC  - continues to have some more agitation and rigidity, on seroquel prn  - had long conversation with both daughters today (Cleo and Yas) regarding Rafael's prognosis and that I was worried about how long the cortrak has been in place, his agitation/anxiety and GOC.  Discussed that if moving towards hospice then it may be time to talk about taking the cortrak out and that if moving towards hospice then PEG would not be appropriate.  Discussed the damage to Rafael's brain and how end of life (including removing the feeding tube would look and feel). Discussed options including comfort care.  Daughters were agreeable to hospice referral and going to think about comfort care and removing tube in next few days. Total time 30 minutes      Consultants/Specialty  Palliative Care  Neurology  Neurosurgery  CC/pulm    Code Status  DNAR/DNI    Disposition  TBD, likely hospice    Review of Systems  Review of Systems   Unable to perform ROS: Patient unresponsive        Physical  Exam  Temp:  [36.1 °C (97 °F)-37.1 °C (98.8 °F)] 36.3 °C (97.3 °F)  Pulse:  [] 79  Resp:  [16-18] 18  BP: (108-153)/(48-70) 112/61  SpO2:  [92 %-95 %] 94 %    Physical Exam  Vitals and nursing note reviewed.   Constitutional:       General: He is not in acute distress.     Appearance: He is not toxic-appearing or diaphoretic.   HENT:      Nose:      Comments: NGT in place     Mouth/Throat:      Mouth: Mucous membranes are moist.   Eyes:      Pupils: Pupils are equal, round, and reactive to light.   Cardiovascular:      Rate and Rhythm: Normal rate and regular rhythm.      Heart sounds: No murmur. No friction rub. No gallop.    Pulmonary:      Effort: Pulmonary effort is normal.      Breath sounds: Normal breath sounds.   Abdominal:      General: Abdomen is flat. Bowel sounds are normal.      Palpations: Abdomen is soft.   Musculoskeletal:      Right lower leg: No edema.      Left lower leg: No edema.   Skin:     General: Skin is warm and dry.      Coloration: Skin is not jaundiced.   Neurological:      Comments: Somnolent, not following commands today           Fluids    Intake/Output Summary (Last 24 hours) at 4/12/2021 1537  Last data filed at 4/12/2021 0500  Gross per 24 hour   Intake --   Output 1720 ml   Net -1720 ml       Laboratory  Recent Labs     04/11/21  0141   WBC 6.1   RBC 5.74   HEMOGLOBIN 16.2   HEMATOCRIT 50.3   MCV 87.6   MCH 28.2   MCHC 32.2*   RDW 44.9   PLATELETCT 177   MPV 11.2     Recent Labs     04/11/21  0141   SODIUM 140   POTASSIUM 4.6   CHLORIDE 104   CO2 25   GLUCOSE 82   BUN 22   CREATININE 0.98   CALCIUM 9.3                   Imaging  DX-ABDOMEN FOR TUBE PLACEMENT   Final Result         1.  Moderate stool in the colon suggests changes of constipation, otherwise nonspecific bowel gas pattern   2.  Dobbhoff tube tip terminates overlying the expected location of the gastric body.   3.  Hazy left lung base opacity could represent edema or infiltrate      DX-CHEST-PORTABLE (1 VIEW)    Final Result      1.  Cardiomegaly with increased interstitial opacity diffusely which may represent interstitial edema or pneumonia.      2.  No lobar consolidation.      [id number] VIEW   Final Result         Moderate amount of stool throughout the colon.      DX-ABDOMEN FOR TUBE PLACEMENT   Final Result      Enteric tube projects over the proximal stomach.      CT-HEAD W/O   Final Result      1.  Prior RIGHT frontal craniotomy with underlying dural thickening and subdural hemorrhage, unchanged from prior exam.   2.  No new intracranial hemorrhage demonstrated.   3.  Diffuse atrophy with white matter microvascular ischemic changes.   4.  Small chronic RIGHT posterior frontal infarct.      [id number] VIEW   Final Result         No specific finding to suggest small bowel obstruction.      CT-HEAD W/O   Final Result         1.  Mixed right parietal subdural fluid collection likely acute on chronic subdural hematoma.   2.  Nonspecific white matter changes, commonly associated with small vessel ischemic disease.  Associated mild cerebral atrophy is noted.   3.  Atherosclerosis.      OUTSIDE IMAGES-CT HEAD   Final Result           Assessment/Plan  * Seizure disorder, nonconvulsive, with status epilepticus (HCC)- (present on admission)  Assessment & Plan  Noted on EEG on admission  He was loaded with IV Keppra, started on IV Vimpat, and IV depakote  Repeat EEG 3/15 without evidence of seizure and more consistent with encephalopathy  Neurology initially following  AED regimen: Keppra 1000 mg twice daily, Vimpat 200 mg twice daily, gabapentin 300 mg twice daily, Depakote 500 mg every morning and 750 mg every afternoon  Last Depakote level 3/25              Constipation  Assessment & Plan  As per abdominal x-ray patient with constipation.  As per nursing the patient had not had a bowel movement in a couple of days.  We will escalate bowel regimen until bowel movement.    4/4: Resolved    Vomiting  Assessment &  Plan  Patient has had 2 episodes of vomiting since yesterday.  We have held the tube feedings for now.  X-ray of the abdomen done last night showed moderate stool in the colon suggesting constipation.  We will continue bowel regimen, hold tube feedings until bowel movement.  This could be the reason for the patient's vomiting.  We have added Compazine 5 mg every 8 hours if patient continues to have vomiting, however this could cause CNS depression, previous EKG on 3/11/2021, QT was borderline high this is why we have stopped Zofran.  I talked to the patient's daughter and ask her about the Compazine and the risks with it, and she agreed if the patient continues to have vomiting we will give Compazine.    4/4: Resolved. Patient had a BM, and is now tolerating tube feeds. We will continue to monitor.    Encephalopathy acute- (present on admission)  Assessment & Plan  Acute encephalopathy likely multifactorial, persistent  In setting of dementia with behavioral disturbance, history of psychiatric disorder, CVA, and seizure disorder in status epilepticus on admission  Continue Seroquel 25mg TID prn for agitation  on gabapentin    4/5: Pending decision towards hospice.  We are awaiting daughter's decision.    Subdural hematoma, acute (HCC)- (present on admission)  Assessment & Plan  Noted on CT head  Non-traumatic  Neurosurgery consulted, non-operative management  S/p right-sided craniotomy for evacuation of subdural hematoma by neurosurgery almost one year ago  Avoid NSAIDs and anticoagulation      Dysphagia  Assessment & Plan  Dysphagia likely secondary to encephalopathy and stroke  Patient intermittently tolerates PO intake, however at times refuses both food and medication  NGT 3/18 placed for nutrition and medication administration, tube feeds currently at goal    4/4: We have resumed tube feedings, tolerating.  4/6: spoke with daughter regarding NG tube and PEG, she does not think her father would have wanted PEG,  has AD that said he didn't want one    Dementia associated with other underlying disease with behavioral disturbance (HCC)- (present on admission)  Assessment & Plan  Reported history of cognitive decline consistent with dementia  Continue to monitor  On seroquel prn (previously on up to 300mg)    4/5: Pending decision of daughter towards hospice.    Legally blind- (present on admission)  Assessment & Plan  - Patient legally blind for more than 12 years per patient  - Lives at [facility]    Essential hypertension- (present on admission)  Assessment & Plan  Reported history of, though had not been on medications prior to admit as per chart review  BP has trending up. Will initiate amlodipine 5mg for BP control    4/5: Patient's blood pressure currently controlled. Continue same management. Monitor and make changes accordingly.       VTE prophylaxis: SCDs

## 2021-04-12 NOTE — CARE PLAN
Problem: Safety  Goal: Will remain free from injury  Outcome: PROGRESSING AS EXPECTED  Goal: Will remain free from falls  Outcome: PROGRESSING AS EXPECTED     Problem: Bowel/Gastric:  Goal: Normal bowel function is maintained or improved  Outcome: PROGRESSING AS EXPECTED  Note: Regular normal bowel movements noted     Problem: Safety:  Goal: Will remain free from injury  Outcome: PROGRESSING AS EXPECTED     Problem: Safety:  Goal: Will remain free from injury  Outcome: PROGRESSING AS EXPECTED     Problem: Discharge Barriers/Planning  Goal: Patient's continuum of care needs will be met  Outcome: PROGRESSING SLOWER THAN EXPECTED  Note: Discharge plan still to be determined.  Cleo expected to visit today

## 2021-04-12 NOTE — THERAPY
Missed Therapy     Patient Name: Rafael Mccoy  Age:  75 y.o., Sex:  male  Medical Record #: [id number]  Today's Date: 4/12/2021    Discussed missed therapy with nursing.        04/12/21 1300   Interdisciplinary Plan of Care Collaboration   IDT Collaboration with  Nursing   Collaboration Comments Attempted to see patient for dysphagia therapy. Per RN, patient is non-responsive and likely going hospice. Will hold, please reconsult with any change in status or plan of care.

## 2021-04-12 NOTE — CARE PLAN
Problem: Safety  Goal: Will remain free from injury  Outcome: PROGRESSING AS EXPECTED  Goal: Will remain free from falls  Outcome: PROGRESSING AS EXPECTED     Problem: Bowel/Gastric:  Goal: Normal bowel function is maintained or improved  4/12/2021 1110 by Kanwal Fuller R.N.  Outcome: PROGRESSING AS EXPECTED  4/12/2021 0736 by Kanwal Fuller R.N.  Outcome: PROGRESSING AS EXPECTED  Note: Regular normal bowel movements noted  Goal: Will not experience complications related to bowel motility  Outcome: PROGRESSING AS EXPECTED     Problem: Safety:  Goal: Will remain free from injury  Outcome: PROGRESSING AS EXPECTED     Problem: Safety:  Goal: Will remain free from injury  Outcome: PROGRESSING AS EXPECTED     Problem: Communication  Goal: The ability to communicate needs accurately and effectively will improve  Outcome: PROGRESSING SLOWER THAN EXPECTED     Problem: Discharge Barriers/Planning  Goal: Patient's continuum of care needs will be met  4/12/2021 1110 by Kanwal Fuller R.N.  Outcome: PROGRESSING SLOWER THAN EXPECTED  4/12/2021 0736 by Kanwal Fuller R.N.  Outcome: PROGRESSING SLOWER THAN EXPECTED  Note: Discharge plan still to be determined.  Cleo expected to visit today

## 2021-04-12 NOTE — PALLIATIVE CARE
Palliative Care follow-up  Call to daughter in regards to her plan for Hospice for her father and if she has determined where she would like that service. Left VM.     Cleo just got off the phone with MD and stated that the MD wanted to discontinue the feeding tube due to the length of time it has been in. Spoke with Cleo about her trajectory of his disease process, how long a pt can last with out food and water. PC RN talked about signs and symptoms of end of life. Cleo was calling priviate care givers today to determine how to bring him home and provide care. She was working and has not been able to do that yet. Asked if it would be ok to send referral to [facility] and she confirmed that it would be good to start the process.      Updated: Dr. Manzanares    Plan: Continue to follow for support/ Order placed for hospice and [facility] Referral sent.     Thank you for allowing Palliative Care to support this patient and family. Contact [phone] for additional assistance, change in patient status, or with any questions/concerns.

## 2021-04-12 NOTE — DISCHARGE PLANNING
Received Choice form at 4119  Agency/Facility Name: [facility]  Referral sent per Choice form @ [phone]

## 2021-04-13 PROCEDURE — 94760 N-INVAS EAR/PLS OXIMETRY 1: CPT

## 2021-04-13 PROCEDURE — 99232 SBSQ HOSP IP/OBS MODERATE 35: CPT | Performed by: INTERNAL MEDICINE

## 2021-04-13 PROCEDURE — A9270 NON-COVERED ITEM OR SERVICE: HCPCS | Performed by: STUDENT IN AN ORGANIZED HEALTH CARE EDUCATION/TRAINING PROGRAM

## 2021-04-13 PROCEDURE — 700102 HCHG RX REV CODE 250 W/ 637 OVERRIDE(OP): Performed by: INTERNAL MEDICINE

## 2021-04-13 PROCEDURE — 700102 HCHG RX REV CODE 250 W/ 637 OVERRIDE(OP): Performed by: STUDENT IN AN ORGANIZED HEALTH CARE EDUCATION/TRAINING PROGRAM

## 2021-04-13 PROCEDURE — A9270 NON-COVERED ITEM OR SERVICE: HCPCS | Performed by: INTERNAL MEDICINE

## 2021-04-13 PROCEDURE — 770006 HCHG ROOM/CARE - MED/SURG/GYN SEMI*

## 2021-04-13 RX ADMIN — VALPROIC ACID 750 MG: 250 SOLUTION ORAL at 18:31

## 2021-04-13 RX ADMIN — VALPROIC ACID 500 MG: 250 SOLUTION ORAL at 04:36

## 2021-04-13 RX ADMIN — SIMVASTATIN 40 MG: 20 TABLET, FILM COATED ORAL at 22:28

## 2021-04-13 RX ADMIN — Medication 5 MG: at 00:20

## 2021-04-13 RX ADMIN — AMLODIPINE BESYLATE 5 MG: 5 TABLET ORAL at 04:27

## 2021-04-13 RX ADMIN — LACOSAMIDE 200 MG: 100 TABLET, FILM COATED ORAL at 04:27

## 2021-04-13 RX ADMIN — Medication 5 MG: at 22:28

## 2021-04-13 RX ADMIN — LEVETIRACETAM 1000 MG: 500 TABLET ORAL at 18:31

## 2021-04-13 RX ADMIN — DOCUSATE SODIUM 50 MG AND SENNOSIDES 8.6 MG 2 TABLET: 8.6; 5 TABLET, FILM COATED ORAL at 04:27

## 2021-04-13 RX ADMIN — GABAPENTIN 300 MG: 300 CAPSULE ORAL at 18:31

## 2021-04-13 RX ADMIN — GABAPENTIN 300 MG: 300 CAPSULE ORAL at 04:27

## 2021-04-13 RX ADMIN — LEVETIRACETAM 1000 MG: 500 TABLET ORAL at 04:27

## 2021-04-13 RX ADMIN — LACOSAMIDE 200 MG: 100 TABLET, FILM COATED ORAL at 18:31

## 2021-04-13 RX ADMIN — QUETIAPINE FUMARATE 25 MG: 25 TABLET ORAL at 12:41

## 2021-04-13 ASSESSMENT — PAIN SCALES - PAIN ASSESSMENT IN ADVANCED DEMENTIA (PAINAD)
TOTALSCORE: 2
CONSOLABILITY: DISTRACTED OR REASSURED BY VOICE/TOUCH
CONSOLABILITY: NO NEED TO CONSOLE
FACIALEXPRESSION: SMILING OR INEXPRESSIVE
BREATHING: NORMAL
TOTALSCORE: 0
BODYLANGUAGE: TENSE, DISTRESSED PACING, FIDGETING
BREATHING: NOISY LABORED BREATHING, LONG PERIODS OF HYPERVENTILATION, CHEYNE-STOKES RESPIRATIONS
CONSOLABILITY: DISTRACTED OR REASSURED BY VOICE/TOUCH
BREATHING: NORMAL
TOTALSCORE: 7
FACIALEXPRESSION: SMILING OR INEXPRESSIVE
BODYLANGUAGE: RIGID, FISTS CLENCHED, KNEES UP, PUSHING/PULLING AWAY, STRIKES OUT
BODYLANGUAGE: RELAXED
FACIALEXPRESSION: FACIAL GRIMACING

## 2021-04-13 NOTE — PROGRESS NOTES
Pharmacy Pharmacotherapy Consult for LOS >30 days    Admit Date: 3/11/2021      Medications were reviewed for appropriateness and ongoing need.     Current Facility-Administered Medications   Medication Dose Route Frequency Provider Last Rate Last Admin   • QUEtiapine (Seroquel) tablet 25 mg  25 mg Enteral Tube TID PRN Ayla Madera M.D.   25 mg at 04/13/21 1241   • Valproate Sodium (DEPAKENE) oral solution 750 mg  750 mg Enteral Tube Q EVENING Ayla Madera M.D.   750 mg at 04/12/21 2246   • Valproate Sodium (DEPAKENE) oral solution 500 mg  500 mg Enteral Tube DAILY Ayla Madera M.D.   500 mg at 04/13/21 0436   • prochlorperazine (COMPAZINE) injection 5 mg  5 mg Intravenous Q8HRS PRN Varun Dalton M.D.   Stopped at 04/02/21 1833   • levETIRAcetam (KEPPRA) tablet 1,000 mg  1,000 mg Enteral Tube BID Star Da Silva M.D.   1,000 mg at 04/13/21 0427   • lacosamide (VIMPAT) tablet 200 mg  200 mg Enteral Tube BID Star Da Silva M.D.   200 mg at 04/13/21 0427   • magnesium hydroxide (MILK OF MAGNESIA) suspension 30 mL  30 mL Enteral Tube QDAY PRN Star Da Silva M.D.   30 mL at 04/03/21 1035    And   • senna-docusate (PERICOLACE or SENOKOT S) 8.6-50 MG per tablet 2 tablet  2 tablet Enteral Tube BID Star Da Silva M.D.   2 tablet at 04/13/21 0427    And   • polyethylene glycol/lytes (MIRALAX) PACKET 1 Packet  1 Packet Enteral Tube QDAY PRN Star Da Silva M.D.   1 Packet at 04/10/21 0414    And   • bisacodyl (DULCOLAX) suppository 10 mg  10 mg Rectal QDAY PRN Star Da Silva M.D.   Stopped at 04/03/21 1619   • Pharmacy Consult: Enteral tube insertion - review meds/change route/product selection  1 Each Other PHARMACY TO DOSE Star Da Silva M.D.       • amLODIPine (NORVASC) tablet 5 mg  5 mg Enteral Tube Q DAY Star Da Silva M.D.   5 mg at 04/13/21 0427   • gabapentin (NEURONTIN) capsule 300 mg  300 mg Enteral Tube BID MARAH Sinclair.[place].   300 mg at 04/13/21 0427   •  melatonin tablet 5 mg  5 mg Enteral Tube Nightly Star Da Silva M.D.   5 mg at 04/13/21 0020   • simvastatin (ZOCOR) tablet 40 mg  40 mg Enteral Tube QHS Star Da Silva M.D.   40 mg at 04/12/21 2246   • acetaminophen (Tylenol) tablet 650 mg  650 mg Enteral Tube Q4HRS PRN Star Da Silva M.D.   650 mg at 03/26/21 0328    Or   • acetaminophen (TYLENOL) suppository 650 mg  650 mg Rectal Q4HRS PRN Star Da Silva M.D.       • labetalol (NORMODYNE/TRANDATE) injection 10 mg  10 mg Intravenous Q4HRS PRN Kip Mcfadden M.D.   10 mg at 03/24/21 1935   • hydrALAZINE (APRESOLINE) injection 10 mg  10 mg Intravenous Q4HRS PRN Kip Mcfadden M.D.       • enalaprilat (VASOTEC) injection 1.25 mg  1.25 mg Intravenous Q6HRS PRN Kip Mcfadden M.D.       • Respiratory Therapy Consult   Nebulization Continuous RT Pankaj Borjas M.D.       • LORazepam (ATIVAN) injection 2 mg  2 mg Intravenous Q5 MIN PRN Pankaj Borjas M.D.   2 mg at 03/12/21 0155       Recommendations:  1. Tylenol 650 mg Supp.- Recommend discontinuing d/t non-utilization and oral form that is available to patient if needed.  2. Enalaprilat 1.25mg inj- Recommend discontinuing d/t non-utilization and stable BP.  3. Labetalol 10mg IV- Recommend discontinuing d/t non-utilization and stable BP.  4. Hydaralazine 10mg IV- Recommend discontinuing d/t non-utilization and stable BP.    Orlin Acuna, Pharmacy Intern    Reviewed and discussed medication changes with Orlin Pharm Intern.   Recommendations communicated to Dr. Foster. Changes made accordingly.    Reginald Malin, PharmD, BCPS

## 2021-04-13 NOTE — CARE PLAN
Problem: Safety  Goal: Will remain free from injury  Outcome: PROGRESSING AS EXPECTED  Goal: Will remain free from falls  Outcome: PROGRESSING AS EXPECTED     Problem: Infection  Goal: Will remain free from infection  Outcome: PROGRESSING AS EXPECTED     Problem: Venous Thromboembolism (VTW)/Deep Vein Thrombosis (DVT) Prevention:  Goal: Patient will participate in Venous Thrombosis (VTE)/Deep Vein Thrombosis (DVT)Prevention Measures  Outcome: PROGRESSING AS EXPECTED     Problem: Bowel/Gastric:  Goal: Normal bowel function is maintained or improved  Outcome: PROGRESSING AS EXPECTED     Problem: Discharge Barriers/Planning  Goal: Patient's continuum of care needs will be met  Outcome: PROGRESSING AS EXPECTED  Note: Plan put in place yesterday afternoon, see notes     Problem: Safety:  Goal: Will remain free from injury  Outcome: PROGRESSING AS EXPECTED     Problem: Infection:  Goal: Will remain free from infection  Outcome: PROGRESSING AS EXPECTED     Problem: Safety:  Goal: Will remain free from injury  Outcome: PROGRESSING AS EXPECTED     Problem: Communication  Goal: The ability to communicate needs accurately and effectively will improve  Outcome: PROGRESSING SLOWER THAN EXPECTED

## 2021-04-13 NOTE — PROGRESS NOTES
Hospital Medicine Daily Progress Note    Date of Service  4/13/2021    Chief Complaint  75 y.o. male admitted 3/11/2021 with seizure    Hospital Course  75-year-old male with a history of cerebellar CVA, seizure disorder who was transferred from [facility] after he was found to have right parietal SDH without any midline shift's.  Witnessed seizure by family followed by changes in mental status.  Neurosurgery was consulted (Dr. Capps) no surgical intervention recommended.  Initially admitted to ICU.  EEG obtained which showed nonconvulsive status epilepticus, patient was loaded with Vimpat.  Neurology was consulted he was continued on Vimpat, Keppra, Depacon without further seizure-like activity.  Patient had persistent waxing and waning mental status.  Patient had poor p.o. intake after discussions with family NGT was placed and started on enteral tube feeds on 3/18.      Interval Problem Update  - No acute overnight events  - Still requiring 1L NC  - continues to have some more agitation and rigidity, on seroquel prn  - had long conversation with both daughters today (Cleo and Yas) regarding Rafael's prognosis and that I was worried about how long the cortrak has been in place, his agitation/anxiety and GOC.  Discussed that if moving towards hospice then it may be time to talk about taking the cortrak out and that if moving towards hospice then PEG would not be appropriate.  Discussed the damage to Rafael's brain and how end of life (including removing the feeding tube would look and feel). Discussed options including comfort care.  Daughters were agreeable to hospice referral and going to think about comfort care and removing tube in next few days. Total time 30 minutes    4/13  Pending hospice referral and likely discharge home with hospice care.  Consultants/Specialty  Palliative Care  Neurology  Neurosurgery  CC/pulm    Code Status  DNAR/DNI    Disposition  TBD, likely hospice    Review of  Systems  Review of Systems   Unable to perform ROS: Patient unresponsive        Physical Exam  Temp:  [36.2 °C (97.2 °F)-37.1 °C (98.7 °F)] 37.1 °C (98.7 °F)  Pulse:  [73-85] 79  Resp:  [16-18] 16  BP: (116-139)/(56-70) 139/56  SpO2:  [95 %] 95 %    Physical Exam  Vitals and nursing note reviewed.   Constitutional:       General: He is not in acute distress.     Appearance: He is not toxic-appearing or diaphoretic.   HENT:      Nose:      Comments: NGT in place     Mouth/Throat:      Mouth: Mucous membranes are moist.   Eyes:      Pupils: Pupils are equal, round, and reactive to light.   Cardiovascular:      Rate and Rhythm: Normal rate and regular rhythm.      Heart sounds: No murmur. No friction rub. No gallop.    Pulmonary:      Effort: Pulmonary effort is normal.   Abdominal:      General: Abdomen is flat.      Palpations: Abdomen is soft.   Musculoskeletal:      Right lower leg: No edema.      Left lower leg: No edema.   Skin:     General: Skin is warm and dry.      Coloration: Skin is not jaundiced.   Neurological:      Comments: Somnolent, not following commands today           Fluids    Intake/Output Summary (Last 24 hours) at 4/13/2021 0746  Last data filed at 4/13/2021 0400  Gross per 24 hour   Intake 450 ml   Output --   Net 450 ml       Laboratory  Recent Labs     04/11/21  0141   WBC 6.1   RBC 5.74   HEMOGLOBIN 16.2   HEMATOCRIT 50.3   MCV 87.6   MCH 28.2   MCHC 32.2*   RDW 44.9   PLATELETCT 177   MPV 11.2     Recent Labs     04/11/21  0141   SODIUM 140   POTASSIUM 4.6   CHLORIDE 104   CO2 25   GLUCOSE 82   BUN 22   CREATININE 0.98   CALCIUM 9.3                   Imaging  DX-ABDOMEN FOR TUBE PLACEMENT   Final Result         1.  Moderate stool in the colon suggests changes of constipation, otherwise nonspecific bowel gas pattern   2.  Dobbhoff tube tip terminates overlying the expected location of the gastric body.   3.  Hazy left lung base opacity could represent edema or infiltrate       DX-CHEST-PORTABLE (1 VIEW)   Final Result      1.  Cardiomegaly with increased interstitial opacity diffusely which may represent interstitial edema or pneumonia.      2.  No lobar consolidation.      [id number] VIEW   Final Result         Moderate amount of stool throughout the colon.      DX-ABDOMEN FOR TUBE PLACEMENT   Final Result      Enteric tube projects over the proximal stomach.      CT-HEAD W/O   Final Result      1.  Prior RIGHT frontal craniotomy with underlying dural thickening and subdural hemorrhage, unchanged from prior exam.   2.  No new intracranial hemorrhage demonstrated.   3.  Diffuse atrophy with white matter microvascular ischemic changes.   4.  Small chronic RIGHT posterior frontal infarct.      [id number] VIEW   Final Result         No specific finding to suggest small bowel obstruction.      CT-HEAD W/O   Final Result         1.  Mixed right parietal subdural fluid collection likely acute on chronic subdural hematoma.   2.  Nonspecific white matter changes, commonly associated with small vessel ischemic disease.  Associated mild cerebral atrophy is noted.   3.  Atherosclerosis.      OUTSIDE IMAGES-CT HEAD   Final Result           Assessment/Plan  * Seizure disorder, nonconvulsive, with status epilepticus (HCC)- (present on admission)  Assessment & Plan  Noted on EEG on admission  He was loaded with IV Keppra, started on IV Vimpat, and IV depakote  Repeat EEG 3/15 without evidence of seizure and more consistent with encephalopathy  Neurology initially following  AED regimen: Keppra 1000 mg twice daily, Vimpat 200 mg twice daily, gabapentin 300 mg twice daily, Depakote 500 mg every morning and 750 mg every afternoon  Last Depakote level 3/25              Constipation  Assessment & Plan  As per abdominal x-ray patient with constipation.  As per nursing the patient had not had a bowel movement in a couple of days.  We will escalate bowel regimen until bowel movement.    4/4:  Resolved    Vomiting  Assessment & Plan  Patient has had 2 episodes of vomiting since yesterday.  We have held the tube feedings for now.  X-ray of the abdomen done last night showed moderate stool in the colon suggesting constipation.  We will continue bowel regimen, hold tube feedings until bowel movement.  This could be the reason for the patient's vomiting.  We have added Compazine 5 mg every 8 hours if patient continues to have vomiting, however this could cause CNS depression, previous EKG on 3/11/2021, QT was borderline high this is why we have stopped Zofran.  I talked to the patient's daughter and ask her about the Compazine and the risks with it, and she agreed if the patient continues to have vomiting we will give Compazine.    4/4: Resolved. Patient had a BM, and is now tolerating tube feeds. We will continue to monitor.    Encephalopathy acute- (present on admission)  Assessment & Plan  Acute encephalopathy likely multifactorial, persistent  In setting of dementia with behavioral disturbance, history of psychiatric disorder, CVA, and seizure disorder in status epilepticus on admission  Continue Seroquel 25mg TID prn for agitation  on gabapentin    4/5: Pending decision towards hospice.  We are awaiting daughter's decision.    Subdural hematoma, acute (HCC)- (present on admission)  Assessment & Plan  Noted on CT head  Non-traumatic  Neurosurgery consulted, non-operative management  S/p right-sided craniotomy for evacuation of subdural hematoma by neurosurgery almost one year ago  Avoid NSAIDs and anticoagulation  Pending hospice referral and discharged home with hospice care with possible comfort care      Dysphagia  Assessment & Plan  Dysphagia likely secondary to encephalopathy and stroke  Patient intermittently tolerates PO intake, however at times refuses both food and medication  NGT 3/18 placed for nutrition and medication administration, tube feeds currently at goal    4/4: We have resumed tube  feedings, tolerating.  4/6: spoke with daughter regarding NG tube and PEG, she does not think her father would have wanted PEG, has AD that said he didn't want one    Dementia associated with other underlying disease with behavioral disturbance (HCC)- (present on admission)  Assessment & Plan  Reported history of cognitive decline consistent with dementia  Continue to monitor  On seroquel prn (previously on up to 300mg)    4/5: Pending decision of daughter towards hospice.    Legally blind- (present on admission)  Assessment & Plan  - Patient legally blind for more than 12 years per patient  - Lives at [facility]    Essential hypertension- (present on admission)  Assessment & Plan  Reported history of, though had not been on medications prior to admit as per chart review  BP has trending up. Will initiate amlodipine 5mg for BP control    4/5: Patient's blood pressure currently controlled. Continue same management. Monitor and make changes accordingly.       VTE prophylaxis: SCDs

## 2021-04-14 PROCEDURE — A9270 NON-COVERED ITEM OR SERVICE: HCPCS | Performed by: INTERNAL MEDICINE

## 2021-04-14 PROCEDURE — 770006 HCHG ROOM/CARE - MED/SURG/GYN SEMI*

## 2021-04-14 PROCEDURE — 700102 HCHG RX REV CODE 250 W/ 637 OVERRIDE(OP): Performed by: INTERNAL MEDICINE

## 2021-04-14 PROCEDURE — 99231 SBSQ HOSP IP/OBS SF/LOW 25: CPT | Performed by: INTERNAL MEDICINE

## 2021-04-14 RX ADMIN — DOCUSATE SODIUM 50 MG AND SENNOSIDES 8.6 MG 2 TABLET: 8.6; 5 TABLET, FILM COATED ORAL at 18:00

## 2021-04-14 RX ADMIN — VALPROIC ACID 750 MG: 250 SOLUTION ORAL at 18:00

## 2021-04-14 RX ADMIN — GABAPENTIN 300 MG: 300 CAPSULE ORAL at 05:47

## 2021-04-14 RX ADMIN — VALPROIC ACID 500 MG: 250 SOLUTION ORAL at 05:47

## 2021-04-14 RX ADMIN — AMLODIPINE BESYLATE 5 MG: 5 TABLET ORAL at 05:47

## 2021-04-14 RX ADMIN — DOCUSATE SODIUM 50 MG AND SENNOSIDES 8.6 MG 2 TABLET: 8.6; 5 TABLET, FILM COATED ORAL at 05:47

## 2021-04-14 RX ADMIN — Medication 5 MG: at 21:20

## 2021-04-14 RX ADMIN — LEVETIRACETAM 1000 MG: 500 TABLET ORAL at 18:00

## 2021-04-14 RX ADMIN — LEVETIRACETAM 1000 MG: 500 TABLET ORAL at 05:47

## 2021-04-14 RX ADMIN — SIMVASTATIN 40 MG: 20 TABLET, FILM COATED ORAL at 21:20

## 2021-04-14 RX ADMIN — LACOSAMIDE 200 MG: 100 TABLET, FILM COATED ORAL at 18:00

## 2021-04-14 RX ADMIN — GABAPENTIN 300 MG: 300 CAPSULE ORAL at 18:00

## 2021-04-14 RX ADMIN — LACOSAMIDE 200 MG: 100 TABLET, FILM COATED ORAL at 05:47

## 2021-04-14 ASSESSMENT — PAIN DESCRIPTION - PAIN TYPE
TYPE: ACUTE PAIN;CHRONIC PAIN
TYPE: ACUTE PAIN;CHRONIC PAIN

## 2021-04-14 NOTE — PROGRESS NOTES
[facility] Hospital Medicine Daily Progress Note    Date of Service  4/14/2021    Chief Complaint  75 y.o. male admitted 3/11/2021 with seizure    Hospital Course  75-year-old male with a history of cerebellar CVA, seizure disorder who was transferred from [facility] after he was found to have right parietal SDH without any midline shift's.  Witnessed seizure by family followed by changes in mental status.  Neurosurgery was consulted (Dr. Capps) no surgical intervention recommended.  Initially admitted to ICU.  EEG obtained which showed nonconvulsive status epilepticus, patient was loaded with Vimpat.  Neurology was consulted he was continued on Vimpat, Keppra, Depacon without further seizure-like activity.  Patient had persistent waxing and waning mental status.  Patient had poor p.o. intake after discussions with family NGT was placed and started on enteral tube feeds on 3/18.      Interval Problem Update  - No acute overnight events  - Still requiring 1L NC  - continues to have some more agitation and rigidity, on seroquel prn  - had long conversation with both daughters today (Cleo and Yas) regarding Rafael's prognosis and that I was worried about how long the cortrak has been in place, his agitation/anxiety and GOC.  Discussed that if moving towards hospice then it may be time to talk about taking the cortrak out and that if moving towards hospice then PEG would not be appropriate.  Discussed the damage to Rafael's brain and how end of life (including removing the feeding tube would look and feel). Discussed options including comfort care.  Daughters were agreeable to hospice referral and going to think about comfort care and removing tube in next few days. Total time 30 minutes    4/13  Pending hospice referral and likely discharge home with hospice care.    4/14 hospice has accepted the patient and likely will be able to discharge him on this coming Friday.  Consultants/Specialty  Palliative  Care  Neurology  Neurosurgery  CC/pulm    Code Status  DNAR/DNI    Disposition  TBD, likely hospice    Review of Systems  Review of Systems   Unable to perform ROS: Patient unresponsive        Physical Exam  Temp:  [36.2 °C (97.1 °F)-37.5 °C (99.5 °F)] 37.5 °C (99.5 °F)  Pulse:  [72-86] 75  Resp:  [16-20] 16  BP: (118-139)/(54-64) 119/59  SpO2:  [95 %-98 %] 96 %    Physical Exam  Vitals and nursing note reviewed.   HENT:      Nose:      Comments: NGT in place     Mouth/Throat:      Mouth: Mucous membranes are moist.   Eyes:      Pupils: Pupils are equal, round, and reactive to light.   Cardiovascular:      Rate and Rhythm: Normal rate and regular rhythm.   Pulmonary:      Effort: Pulmonary effort is normal.   Abdominal:      General: Abdomen is flat.      Palpations: Abdomen is soft.   Skin:     General: Skin is warm and dry.   Neurological:      Comments: Somnolent, not following commands today           Fluids    Intake/Output Summary (Last 24 hours) at 4/14/2021 0744  Last data filed at 4/13/2021 2000  Gross per 24 hour   Intake 150 ml   Output --   Net 150 ml       Laboratory                        Imaging  DX-ABDOMEN FOR TUBE PLACEMENT   Final Result         1.  Moderate stool in the colon suggests changes of constipation, otherwise nonspecific bowel gas pattern   2.  Dobbhoff tube tip terminates overlying the expected location of the gastric body.   3.  Hazy left lung base opacity could represent edema or infiltrate      DX-CHEST-PORTABLE (1 VIEW)   Final Result      1.  Cardiomegaly with increased interstitial opacity diffusely which may represent interstitial edema or pneumonia.      2.  No lobar consolidation.      [id number] VIEW   Final Result         Moderate amount of stool throughout the colon.      DX-ABDOMEN FOR TUBE PLACEMENT   Final Result      Enteric tube projects over the proximal stomach.      CT-HEAD W/O   Final Result      1.  Prior RIGHT frontal craniotomy with underlying dural thickening  and subdural hemorrhage, unchanged from prior exam.   2.  No new intracranial hemorrhage demonstrated.   3.  Diffuse atrophy with white matter microvascular ischemic changes.   4.  Small chronic RIGHT posterior frontal infarct.      [id number] VIEW   Final Result         No specific finding to suggest small bowel obstruction.      CT-HEAD W/O   Final Result         1.  Mixed right parietal subdural fluid collection likely acute on chronic subdural hematoma.   2.  Nonspecific white matter changes, commonly associated with small vessel ischemic disease.  Associated mild cerebral atrophy is noted.   3.  Atherosclerosis.      OUTSIDE IMAGES-CT HEAD   Final Result           Assessment/Plan  * Seizure disorder, nonconvulsive, with status epilepticus (HCC)- (present on admission)  Assessment & Plan  Noted on EEG on admission  He was loaded with IV Keppra, started on IV Vimpat, and IV depakote  Repeat EEG 3/15 without evidence of seizure and more consistent with encephalopathy  Neurology initially following  AED regimen: Keppra 1000 mg twice daily, Vimpat 200 mg twice daily, gabapentin 300 mg twice daily, Depakote 500 mg every morning and 750 mg every afternoon  Last Depakote level 3/25              Constipation  Assessment & Plan  As per abdominal x-ray patient with constipation.  As per nursing the patient had not had a bowel movement in a couple of days.  We will escalate bowel regimen until bowel movement.    4/4: Resolved    Vomiting  Assessment & Plan  Patient has had 2 episodes of vomiting since yesterday.  We have held the tube feedings for now.  X-ray of the abdomen done last night showed moderate stool in the colon suggesting constipation.  We will continue bowel regimen, hold tube feedings until bowel movement.  This could be the reason for the patient's vomiting.  We have added Compazine 5 mg every 8 hours if patient continues to have vomiting, however this could cause CNS depression, previous EKG on  3/11/2021, QT was borderline high this is why we have stopped Zofran.  I talked to the patient's daughter and ask her about the Compazine and the risks with it, and she agreed if the patient continues to have vomiting we will give Compazine.    4/4: Resolved. Patient had a BM, and is now tolerating tube feeds. We will continue to monitor.    Encephalopathy acute- (present on admission)  Assessment & Plan  Acute encephalopathy likely multifactorial, persistent  In setting of dementia with behavioral disturbance, history of psychiatric disorder, CVA, and seizure disorder in status epilepticus on admission  Continue Seroquel 25mg TID prn for agitation  on gabapentin      Subdural hematoma, acute (HCC)- (present on admission)  Assessment & Plan  Noted on CT head  Non-traumatic  Neurosurgery consulted, non-operative management  S/p right-sided craniotomy for evacuation of subdural hematoma by neurosurgery almost one year ago  Avoid NSAIDs and anticoagulation  Hospital has accepted the patient and likely to discharge the patient home on Friday with hospice care      Dysphagia  Assessment & Plan  Dysphagia likely secondary to encephalopathy and stroke  Patient intermittently tolerates PO intake, however at times refuses both food and medication  NGT 3/18 placed for nutrition and medication administration, tube feeds currently at goal    4/4: We have resumed tube feedings, tolerating.  4/6: spoke with daughter regarding NG tube and PEG, she does not think her father would have wanted PEG, has AD that said he didn't want one    Dementia associated with other underlying disease with behavioral disturbance (HCC)- (present on admission)  Assessment & Plan  Reported history of cognitive decline consistent with dementia  Continue to monitor  On seroquel prn (previously on up to 300mg)    Hospice care    Legally blind- (present on admission)  Assessment & Plan  - Patient legally blind for more than 12 years per patient  - Lives at  [facility]    Essential hypertension- (present on admission)  Assessment & Plan  Reported history of, though had not been on medications prior to admit as per chart review  BP has trending up. Will initiate amlodipine 5mg for BP control    4/5: Patient's blood pressure currently controlled. Continue same management. Monitor and make changes accordingly.       VTE prophylaxis: SCDs

## 2021-04-14 NOTE — DISCHARGE PLANNING
Agency/Facility Name: [facility]  Spoke To: Ligia  Outcome: They do not do DME equipment, [facility] needs to order that for the patient.

## 2021-04-14 NOTE — PROGRESS NOTES
With patient’s permission (if able), completed daily phone call to designated support person, name Cleo  Discussed patient condition and plan of care. All questions answered.  Follow up requested: she will call later to check in again.

## 2021-04-14 NOTE — FACE TO FACE
Face to Face Note  -  Durable Medical Equipment    Salomon Foster M.D. - NPI: [id number]  I certify that this patient is under my care and that they had a durable medical equipment(DME)face to face encounter by myself that meets the physician DME face-to-face encounter requirements with this patient on:    Date of encounter:   Patient:                    MRN:                       YOB: 2021  Rafael Mccoy  [id number]  1945     The encounter with the patient was in whole, or in part, for the following medical condition, which is the primary reason for durable medical equipment:  Other - cva    I certify that, based on my findings, the following durable medical equipment is medically necessary:  Other DME Equipment - hospital bed.    HOME O2 Saturation Measurements:(Values must be present for Home Oxygen orders)         ,     ,         My Clinical findings support the need for the above equipment due to:  Other - cva    Supporting Symptoms: cva    If patient feels more short of breath, they can go up to 6 liters per minute and contact healthcare provider.

## 2021-04-14 NOTE — DISCHARGE PLANNING
Care Transition Team Discharge Planning    Anticipated Discharge Disposition: DC home with family pending.    Action: Arpita spoke with Mavis with Palliative who informed this Lsw that the patient's DC date will be 4/16/21.  Arpita is working on getting a hospital bed for the patient.  CHOICE form was sent to [facility].  CHOICW for was placed in CM basket.    Barriers to Discharge: Awaiting for CHOICE approval.    Plan: Arpita will work with medical team on DC planning.

## 2021-04-14 NOTE — PALLIATIVE CARE
Palliative Care follow-up  Plan for pt to be discharge hopefully Friday with [facility] transport to home in [place]. [place] [facility] has accepted. Pt will need a Hospital bed to be delivered,  Carmela  updated. Cleo the pt's daughter is on board with plan for D/c and stated that both her and her sister will provide care with the support of hospice care.   Also spoke with Cleo about filling out a new POLST the one on file does not align with [facility].     12:12 spoke with Cleo and sent POLST via e-mail at [email or web address]      Updated: Dr. Foster and Carmela    Plan: Home with [facility]. Complete a new POLST for discharge.    Thank you for allowing Palliative Care to support this patient and family. Contact [phone] for additional assistance, change in patient status, or with any questions/concerns.

## 2021-04-15 LAB
GLUCOSE BLD-MCNC: 124 MG/DL (ref 65–99)
GLUCOSE BLD-MCNC: 158 MG/DL (ref 65–99)

## 2021-04-15 PROCEDURE — 770006 HCHG ROOM/CARE - MED/SURG/GYN SEMI*

## 2021-04-15 PROCEDURE — 82962 GLUCOSE BLOOD TEST: CPT

## 2021-04-15 PROCEDURE — 700102 HCHG RX REV CODE 250 W/ 637 OVERRIDE(OP): Performed by: INTERNAL MEDICINE

## 2021-04-15 PROCEDURE — A9270 NON-COVERED ITEM OR SERVICE: HCPCS | Performed by: INTERNAL MEDICINE

## 2021-04-15 PROCEDURE — 99231 SBSQ HOSP IP/OBS SF/LOW 25: CPT | Performed by: INTERNAL MEDICINE

## 2021-04-15 RX ADMIN — VALPROIC ACID 500 MG: 250 SOLUTION ORAL at 04:41

## 2021-04-15 RX ADMIN — GABAPENTIN 300 MG: 300 CAPSULE ORAL at 04:38

## 2021-04-15 RX ADMIN — LACOSAMIDE 200 MG: 100 TABLET, FILM COATED ORAL at 16:39

## 2021-04-15 RX ADMIN — Medication 5 MG: at 22:05

## 2021-04-15 RX ADMIN — LACOSAMIDE 200 MG: 100 TABLET, FILM COATED ORAL at 04:39

## 2021-04-15 RX ADMIN — VALPROIC ACID 750 MG: 250 SOLUTION ORAL at 16:43

## 2021-04-15 RX ADMIN — AMLODIPINE BESYLATE 5 MG: 5 TABLET ORAL at 04:46

## 2021-04-15 RX ADMIN — SIMVASTATIN 40 MG: 20 TABLET, FILM COATED ORAL at 22:05

## 2021-04-15 RX ADMIN — GABAPENTIN 300 MG: 300 CAPSULE ORAL at 16:39

## 2021-04-15 RX ADMIN — DOCUSATE SODIUM 50 MG AND SENNOSIDES 8.6 MG 2 TABLET: 8.6; 5 TABLET, FILM COATED ORAL at 16:40

## 2021-04-15 RX ADMIN — LEVETIRACETAM 1000 MG: 500 TABLET ORAL at 16:39

## 2021-04-15 RX ADMIN — LEVETIRACETAM 1000 MG: 500 TABLET ORAL at 04:38

## 2021-04-15 NOTE — CARE PLAN
Problem: Safety  Goal: Will remain free from injury  Outcome: PROGRESSING AS EXPECTED  Note: PT educated to call for assistance, Non-skid socks, bed alarm, bed in lowest position, X3 side rails up, call light provided. Will continue to monitor.      Problem: Skin Integrity  Goal: Risk for impaired skin integrity will decrease  Outcome: PROGRESSING AS EXPECTED  Note: Q2 turns, perineal care provided, NPO core track nutrition with Q4 flushes provided. Will continue to monitor.        Problem: Safety:  Goal: Will remain free from injury  Outcome: PROGRESSING AS EXPECTED  Note: PT educated to call for assistance, Non-skid socks, bed alarm, bed in lowest position, X3 side rails up, call light provided. Will continue to monitor.      Problem: Safety:  Goal: Will remain free from injury  Outcome: PROGRESSING AS EXPECTED  Note: PT educated to call for assistance, Non-skid socks, bed alarm, bed in lowest position, X3 side rails up, call light provided. Will continue to monitor.

## 2021-04-15 NOTE — PROGRESS NOTES
[facility] Medicine Daily Progress Note    Date of Service  4/15/2021    Chief Complaint  75 y.o. male admitted 3/11/2021 with seizure    Hospital Course  75-year-old male with a history of cerebellar CVA, seizure disorder who was transferred from [facility] after he was found to have right parietal SDH without any midline shift's.  Witnessed seizure by family followed by changes in mental status.  Neurosurgery was consulted (Dr. Capps) no surgical intervention recommended.  Initially admitted to ICU.  EEG obtained which showed nonconvulsive status epilepticus, patient was loaded with Vimpat.  Neurology was consulted he was continued on Vimpat, Keppra, Depacon without further seizure-like activity.  Patient had persistent waxing and waning mental status.  Patient had poor p.o. intake after discussions with family NGT was placed and started on enteral tube feeds on 3/18.      Interval Problem Update  - No acute overnight events  - Still requiring 1L NC  - continues to have some more agitation and rigidity, on seroquel prn  - had long conversation with both daughters today (Cleo and Yas) regarding Rafael's prognosis and that I was worried about how long the cortrak has been in place, his agitation/anxiety and GOC.  Discussed that if moving towards hospice then it may be time to talk about taking the cortrak out and that if moving towards hospice then PEG would not be appropriate.  Discussed the damage to Rafael's brain and how end of life (including removing the feeding tube would look and feel). Discussed options including comfort care.  Daughters were agreeable to hospice referral and going to think about comfort care and removing tube in next few days. Total time 30 minutes    4/13  Pending hospice referral and likely discharge home with hospice care.    4/14 hospice has accepted the patient and likely will be able to discharge him on this coming Friday.    4/15: Plan to discharge him home with home hospice  tomorrow.  Consultants/Specialty  Palliative Care  Neurology  Neurosurgery  CC/pulm    Code Status  DNAR/DNI    Disposition  TBD, likely hospice    Review of Systems  Review of Systems   Unable to perform ROS: Patient unresponsive        Physical Exam  Temp:  [36.4 °C (97.6 °F)-37.3 °C (99.1 °F)] 36.9 °C (98.5 °F)  Pulse:  [63-83] 65  Resp:  [16-18] 16  BP: (106-156)/(42-98) 126/98  SpO2:  [93 %-96 %] 93 %    Physical Exam  Vitals and nursing note reviewed.   HENT:      Nose:      Comments: NGT in place     Mouth/Throat:      Mouth: Mucous membranes are moist.   Eyes:      Pupils: Pupils are equal, round, and reactive to light.   Cardiovascular:      Rate and Rhythm: Normal rate.   Pulmonary:      Effort: Pulmonary effort is normal.   Abdominal:      General: Abdomen is flat.   Skin:     General: Skin is warm.   Neurological:      Comments: Somnolent, not following commands today           Fluids    Intake/Output Summary (Last 24 hours) at 4/15/2021 0747  Last data filed at 4/15/2021 0600  Gross per 24 hour   Intake 1440 ml   Output 1600 ml   Net -160 ml       Laboratory                        Imaging  DX-ABDOMEN FOR TUBE PLACEMENT   Final Result         1.  Moderate stool in the colon suggests changes of constipation, otherwise nonspecific bowel gas pattern   2.  Dobbhoff tube tip terminates overlying the expected location of the gastric body.   3.  Hazy left lung base opacity could represent edema or infiltrate      DX-CHEST-PORTABLE (1 VIEW)   Final Result      1.  Cardiomegaly with increased interstitial opacity diffusely which may represent interstitial edema or pneumonia.      2.  No lobar consolidation.      BO-[id number]-5 VIEW   Final Result         Moderate amount of stool throughout the colon.      DX-ABDOMEN FOR TUBE PLACEMENT   Final Result      Enteric tube projects over the proximal stomach.      CT-HEAD W/O   Final Result      1.  Prior RIGHT frontal craniotomy with underlying dural thickening and  subdural hemorrhage, unchanged from prior exam.   2.  No new intracranial hemorrhage demonstrated.   3.  Diffuse atrophy with white matter microvascular ischemic changes.   4.  Small chronic RIGHT posterior frontal infarct.      [id number] VIEW   Final Result         No specific finding to suggest small bowel obstruction.      CT-HEAD W/O   Final Result         1.  Mixed right parietal subdural fluid collection likely acute on chronic subdural hematoma.   2.  Nonspecific white matter changes, commonly associated with small vessel ischemic disease.  Associated mild cerebral atrophy is noted.   3.  Atherosclerosis.      OUTSIDE IMAGES-CT HEAD   Final Result           Assessment/Plan  * Seizure disorder, nonconvulsive, with status epilepticus (HCC)- (present on admission)  Assessment & Plan  Noted on EEG on admission  He was loaded with IV Keppra, started on IV Vimpat, and IV depakote  Repeat EEG 3/15 without evidence of seizure and more consistent with encephalopathy  Neurology initially following  AED regimen: Keppra 1000 mg twice daily, Vimpat 200 mg twice daily, gabapentin 300 mg twice daily, Depakote 500 mg every morning and 750 mg every afternoon  Last Depakote level 3/25              Constipation  Assessment & Plan  As per abdominal x-ray patient with constipation.  As per nursing the patient had not had a bowel movement in a couple of days.  We will escalate bowel regimen until bowel movement.    4/4: Resolved    Vomiting  Assessment & Plan  Patient has had 2 episodes of vomiting since yesterday.  We have held the tube feedings for now.  X-ray of the abdomen done last night showed moderate stool in the colon suggesting constipation.  We will continue bowel regimen, hold tube feedings until bowel movement.  This could be the reason for the patient's vomiting.  We have added Compazine 5 mg every 8 hours if patient continues to have vomiting, however this could cause CNS depression, previous EKG on 3/11/2021, QT  was borderline high this is why we have stopped Zofran.  I talked to the patient's daughter and ask her about the Compazine and the risks with it, and she agreed if the patient continues to have vomiting we will give Compazine.    4/4: Resolved. Patient had a BM, and is now tolerating tube feeds. We will continue to monitor.    Encephalopathy acute- (present on admission)  Assessment & Plan  Acute encephalopathy likely multifactorial, persistent  In setting of dementia with behavioral disturbance, history of psychiatric disorder, CVA, and seizure disorder in status epilepticus on admission  Continue Seroquel 25mg TID prn for agitation  on gabapentin      Subdural hematoma, acute (HCC)- (present on admission)  Assessment & Plan  Noted on CT head  Non-traumatic  Neurosurgery consulted, non-operative management  S/p right-sided craniotomy for evacuation of subdural hematoma by neurosurgery almost one year ago  Avoid NSAIDs and anticoagulation  Hospital has accepted the patient and likely to discharge the patient home on Friday with hospice care      Dysphagia  Assessment & Plan  Dysphagia likely secondary to encephalopathy and stroke  Patient intermittently tolerates PO intake, however at times refuses both food and medication  NGT 3/18 placed for nutrition and medication administration, tube feeds currently at goal    4/4: We have resumed tube feedings, tolerating.  4/6: spoke with daughter regarding NG tube and PEG, she does not think her father would have wanted PEG, has AD that said he didn't want one    Dementia associated with other underlying disease with behavioral disturbance (HCC)- (present on admission)  Assessment & Plan  Reported history of cognitive decline consistent with dementia  Continue to monitor  On seroquel prn (previously on up to 300mg)    Hospice care    Legally blind- (present on admission)  Assessment & Plan  - Patient legally blind for more than 12 years per patient  - Lives at [facility]    Essential hypertension- (present on admission)  Assessment & Plan  Reported history of, though had not been on medications prior to admit as per chart review  BP has trending up. Will initiate amlodipine 5mg for BP control    4/5: Patient's blood pressure currently controlled. Continue same management. Monitor and make changes accordingly.     No change of plan compared to yesterday  VTE prophylaxis: SCDs

## 2021-04-15 NOTE — DIETARY
Nutrition support weekly update:  Day 35 of admit.  Rafael Mccoy is a 75 y.o. male with admitting DX of Intracranial hemorrhage.  Tube feeding initiated on 3/18. Current TF via gastric cortrak is Fibersource HN goal rate of 70 ml/hour providing 2016 kcal, 90 gm protein, 1360 ml free water.    Assessment:  Weight: 85.7 kg via bed scale on 4/11. Wt is stable with initial bed scale wt of 86 kg taken on 3/11.   Re-estimate of nutritional needs not indicated at this time.     Evaluation:   1. Pt has been tolerating tube feed at goal rate since 4/4. No notes of intolerance per review of chart/notes.   2. Labs 4/12: pre-albumin 28.1 (WNL), <0.30 (WNL) - appears acute inflammatory response has resolved.    3. Fluids: 150 ml free water flush q 4 hours = 900 ml free water per day. TF + flushes = 2260 ml free water/day.  4. Plan for DC with hospice tomorrow.   5. Current feeding remains appropriate at this time.    Malnutrition risk: no new risk identified.     Recommendations/Plan:  1. Continue current formula and goal rate.   2. Fluids per MD.  3. Monitor weight.    RD following

## 2021-04-15 NOTE — DISCHARGE PLANNING
Care Transition Team Discharge Planning    Anticipated Discharge Disposition: DC home    Action: Hospital bed will be delivered to the patients home at 1200 on 4/16/21.  Lsw informed doctor of discharge and that the NG tube needs to be removed. Family is requesting scripts to travel with patient.     Transportation has been set up [facility] will P/U patient at 1200 with ETA being 1330 home. Dtjoseph Robles with [facility] was informed of the updates.    Barriers to Discharge: none    Plan: DC home

## 2021-04-15 NOTE — THERAPY
Missed Therapy     Patient Name: Rafael Mccoy  Age:  75 y.o., Sex:  male  Medical Record #: [id number]  Today's Date: 4/15/2021       04/15/21 0840   Interdisciplinary Plan of Care Collaboration   Collaboration Comments Attempted to see pt for OT tx. Pt going home on hospice w/ family support. Will discuss POC w/ OT.

## 2021-04-15 NOTE — CARE PLAN
Problem: Safety  Goal: Will remain free from injury  Outcome: PROGRESSING AS EXPECTED  Goal: Will remain free from falls  Outcome: PROGRESSING AS EXPECTED     Problem: Discharge Barriers/Planning  Goal: Patient's continuum of care needs will be met  Outcome: PROGRESSING AS EXPECTED     Problem: Safety:  Goal: Will remain free from injury  Outcome: PROGRESSING AS EXPECTED     Problem: Safety:  Goal: Will remain free from injury  Outcome: PROGRESSING AS EXPECTED

## 2021-04-15 NOTE — DISCHARGE PLANNING
@142  Agency/Facility Name: [facility]  Spoke To: Intake  Outcome: Accepted and will deliver tomorrow.    @1345  Agency/Facility Name: [facility]  Spoke To: Audie  Outcome: Processing order and updated and sent the new semi-electric order.    Received Choice form at 1125  Agency/Facility Name: [facility]  Referral sent per Choice form @ 1125     @1120  Agency/Facility Name: [facility]  Spoke To: Rama  Outcome: Declined due to they do not provide full electric hospital beds and insurance does not cover.    Received Choice form at 1229  Agency/Facility Name: [facility]  Referral sent per Choice form @ 2030

## 2021-04-15 NOTE — DISCHARGE PLANNING
Received Transport Form @ 3910  Spoke to Amrita @ [facility]    Transport is scheduled for 4/16 @1238 going to Home.

## 2021-04-16 VITALS
WEIGHT: 188.93 LBS | HEART RATE: 99 BPM | DIASTOLIC BLOOD PRESSURE: 55 MMHG | TEMPERATURE: 98.5 F | OXYGEN SATURATION: 91 % | BODY MASS INDEX: 27.98 KG/M2 | SYSTOLIC BLOOD PRESSURE: 118 MMHG | HEIGHT: 69 IN | RESPIRATION RATE: 18 BRPM

## 2021-04-16 PROCEDURE — 700102 HCHG RX REV CODE 250 W/ 637 OVERRIDE(OP): Performed by: INTERNAL MEDICINE

## 2021-04-16 PROCEDURE — 99239 HOSP IP/OBS DSCHRG MGMT >30: CPT | Performed by: INTERNAL MEDICINE

## 2021-04-16 PROCEDURE — 82962 GLUCOSE BLOOD TEST: CPT

## 2021-04-16 PROCEDURE — A9270 NON-COVERED ITEM OR SERVICE: HCPCS | Performed by: INTERNAL MEDICINE

## 2021-04-16 RX ORDER — SIMVASTATIN 40 MG
40 TABLET ORAL
Qty: 30 TABLET | Refills: 0 | Status: SHIPPED | OUTPATIENT
Start: 2021-04-16

## 2021-04-16 RX ORDER — LACOSAMIDE 200 MG/1
200 TABLET ORAL 2 TIMES DAILY
Qty: 10 TABLET | Refills: 0 | Status: SHIPPED | OUTPATIENT
Start: 2021-04-16 | End: 2021-04-21

## 2021-04-16 RX ORDER — MORPHINE SULFATE 100 MG/5ML
20 SOLUTION ORAL EVERY 6 HOURS PRN
Qty: 30 ML | Refills: 0 | Status: SHIPPED | OUTPATIENT
Start: 2021-04-16 | End: 2021-04-16 | Stop reason: SDUPTHER

## 2021-04-16 RX ORDER — LACOSAMIDE 200 MG/1
200 TABLET ORAL 2 TIMES DAILY
Qty: 10 TABLET | Refills: 0 | Status: SHIPPED | OUTPATIENT
Start: 2021-04-16 | End: 2021-04-16 | Stop reason: SDUPTHER

## 2021-04-16 RX ORDER — GABAPENTIN 300 MG/1
300 CAPSULE ORAL 3 TIMES DAILY
Qty: 90 CAPSULE | Refills: 0 | Status: SHIPPED | OUTPATIENT
Start: 2021-04-16

## 2021-04-16 RX ORDER — AMLODIPINE BESYLATE 5 MG/1
5 TABLET ORAL DAILY
Qty: 30 TABLET | Refills: 0 | Status: SHIPPED | OUTPATIENT
Start: 2021-04-17 | End: 2021-04-16 | Stop reason: SDUPTHER

## 2021-04-16 RX ORDER — LEVETIRACETAM 500 MG/1
500 TABLET ORAL 2 TIMES DAILY
Qty: 30 TABLET | Refills: 0 | Status: SHIPPED | OUTPATIENT
Start: 2021-04-16

## 2021-04-16 RX ORDER — AMLODIPINE BESYLATE 5 MG/1
5 TABLET ORAL DAILY
Qty: 30 TABLET | Refills: 0 | Status: SHIPPED | OUTPATIENT
Start: 2021-04-17

## 2021-04-16 RX ADMIN — GABAPENTIN 300 MG: 300 CAPSULE ORAL at 04:04

## 2021-04-16 RX ADMIN — ACETAMINOPHEN 650 MG: 325 TABLET, FILM COATED ORAL at 13:00

## 2021-04-16 RX ADMIN — LEVETIRACETAM 1000 MG: 500 TABLET ORAL at 04:04

## 2021-04-16 RX ADMIN — LACOSAMIDE 200 MG: 100 TABLET, FILM COATED ORAL at 04:04

## 2021-04-16 RX ADMIN — VALPROIC ACID 500 MG: 250 SOLUTION ORAL at 04:11

## 2021-04-16 NOTE — PROGRESS NOTES
"1000: for D/C today. MD gave all scripts but they are not the appropriate format for his hospice d/c. Becca is asking about \"gell medications\" and pain meds. meds have not been ordered this way nor is there pain meds ordered at this time. Asked MD for direction.     Script for morphine provided, told there isn't another format for the medications he takes. Will call becca regarding medications.   "

## 2021-04-16 NOTE — DISCHARGE SUMMARY
Discharge Summary    CHIEF COMPLAINT ON ADMISSION  Chief Complaint   Patient presents with   • ALOC     Transfer from OSH - family noticed seizure like activity on a zoom call; slow speech. Concerns for ICH at OSH; A/Ox2       Reason for Admission  Epidural Bleed     Admission Date  3/11/2021    CODE STATUS  DNAR/DNI    HPI & HOSPITAL COURSE    75-year-old male with a history of cerebellar CVA, seizure disorder who was transferred from [facility] after he was found to have right parietal SDH without any midline shift's.  Witnessed seizure by family followed by changes in mental status.  Neurosurgery was consulted (Dr. Capps) no surgical intervention recommended.  Initially admitted to ICU.  EEG obtained which showed nonconvulsive status epilepticus, patient was loaded with Vimpat.  Neurology was consulted he was continued on Vimpat, Keppra, Depacon without further seizure-like activity.  Patient had persistent waxing and waning mental status.  Patient had poor p.o. intake after discussions with family NGT was placed and started on enteral tube feeds on 3/18.  Because of his complicated medical condition hospice team was consulted and after discussion with the family they want to take him home with home hospice which was arranged upon discharge.  Hospital bed was also arranged as well.  I saw and examined the patient upon discharge.          Therefore, he is discharged in fair and stable condition to home with close outpatient follow-up.    The patient met 2-midnight criteria for an inpatient stay at the time of discharge.    Discharge Date  04/16/21      FOLLOW UP ITEMS POST DISCHARGE      DISCHARGE DIAGNOSES  Principal Problem:    Seizure disorder, nonconvulsive, with status epilepticus (HCC) POA: Yes  Active Problems:    Subdural hematoma, acute (HCC) POA: Yes    Encephalopathy acute POA: Yes    Vomiting POA: Unknown    Constipation POA: Unknown    Dysphagia POA: Unknown    Essential hypertension POA: Yes     Legally blind POA: Yes    Dementia associated with other underlying disease with behavioral disturbance (HCC) POA: Yes  Resolved Problems:    Seizures POA: Yes    Hypokalemia POA: Unknown      FOLLOW UP  Future Appointments   Date Time Provider Department Center   4/16/2021 To Be Determined [facility] None     Doroteo Babin M.D.  [address] [facility] [address] [phone]    In 1 week        MEDICATIONS ON DISCHARGE     Medication List      START taking these medications      Instructions   amLODIPine 5 MG Tabs  Start taking on: April 17, 2021  Commonly known as: NORVASC   1 tablet by Enteral Tube route every day.  Dose: 5 mg     lacosamide 200 MG Tabs tablet  Commonly known as: VIMPAT   Take 200 mg by mouth 2 times a day for 5 days.  Dose: 200 mg     morphine 20 MG/ML Soln  Commonly known as: ROXANOL   Take 1 mL by mouth every 6 hours as needed for up to 5 days.  Dose: 20 mg     * Valproate Sodium 250 MG/5ML Soln  Commonly known as: DEPAKENE   15 mL by Enteral Tube route every evening.  Dose: 750 mg     * Valproate Sodium 250 MG/5ML Soln  Start taking on: April 17, 2021  Commonly known as: DEPAKENE   10 mL by Enteral Tube route every day.  Dose: 500 mg         * This list has 2 medication(s) that are the same as other medications prescribed for you. Read the directions carefully, and ask your doctor or other care provider to review them with you.            CHANGE how you take these medications      Instructions   gabapentin 300 MG Caps  What changed: when to take this  Commonly known as: NEURONTIN   Take 1 capsule by mouth 3 times a day.  Dose: 300 mg     QUEtiapine 25 MG Tabs  What changed: Another medication with the same name was removed. Continue taking this medication, and follow the directions you see here.  Commonly known as: SEROquel   Use 1-2 tabs in addition to 150mg tab as instructed by MD        CONTINUE taking these medications      Instructions   acetaminophen 325  MG Tabs  Commonly known as: Tylenol   Take 2 Tabs by mouth every 6 hours as needed.  Dose: 650 mg     levETIRAcetam 500 MG Tabs  Commonly known as: KEPPRA   Take 1 tablet by mouth 2 times a day.  Dose: 500 mg     melatonin 5 mg Tabs   Take 5 mg by mouth every bedtime. take 1 tablet by mouth daily at 2100  Dose: 5 mg     simvastatin 40 MG Tabs  Commonly known as: Zocor   Take 1 tablet by mouth at bedtime.  Dose: 40 mg        STOP taking these medications    ascorbic acid 500 MG tablet  Commonly known as: Vitamin C     cyanocobalamin 500 MCG tablet  Commonly known as: vitamin b12     DEPAKOTE ER PO     haloperidol 2 MG/ML Conc  Commonly known as: HALDOL     meclizine 12.5 MG Tabs  Commonly known as: ANTIVERT     sodium chloride 1 GM Tabs  Commonly known as: SALT     Super B-Complex Caps     vitamin D 1000 UNIT Tabs  Commonly known as: VITAMIND D3            Allergies  No Known Allergies    DIET  Orders Placed This Encounter   Procedures   • Diet NPO     Standing Status:   Standing     Number of Occurrences:   1     Order Specific Question:   Restrict to:     Answer:   With Tube Feed [4]       ACTIVITY  As tolerated.  Weight bearing as tolerated    CONSULTATIONS      PROCEDURES      LABORATORY  Lab Results   Component Value Date    SODIUM 140 04/11/2021    POTASSIUM 4.6 04/11/2021    CHLORIDE 104 04/11/2021    CO2 25 04/11/2021    GLUCOSE 82 04/11/2021    BUN 22 04/11/2021    CREATININE 0.98 04/11/2021        Lab Results   Component Value Date    WBC 6.1 04/11/2021    HEMOGLOBIN 16.2 04/11/2021    HEMATOCRIT 50.3 04/11/2021    PLATELETCT 177 04/11/2021        Total time of the discharge process exceeds 40 minutes.

## 2021-04-16 NOTE — PALLIATIVE CARE
Palliative Care follow-up  POLST reviewed and signed by MD. Original on chart for time of DC and RN updated on POLST needing to go with the pt/REMSA today when he discharges around noon. Copy scanned into EMR.      Updated: SHAI RN    Plan: home today with hospice    Thank you for allowing Palliative Care to support this patient and family. Contact [phone] for additional assistance, change in patient status, or with any questions/concerns.

## 2021-04-16 NOTE — DISCHARGE INSTRUCTIONS
Discharge Instructions    Discharged to home by ambulance with escort. Discharged via ambulance, hospital escort: Yes.  Special equipment needed: Not Applicable    Be sure to schedule a follow-up appointment with your primary care doctor or any specialists as instructed.     Discharge Plan:   Diet Plan: Discussed  Activity Level: Discussed  Confirmed Follow up Appointment: Patient to Call and Schedule Appointment  Confirmed Symptoms Management: Discussed  Medication Reconciliation Updated: Yes  Influenza Vaccine Indication: Patient Refuses    I understand that a diet low in cholesterol, fat, and sodium is recommended for good health. Unless I have been given specific instructions below for another diet, I accept this instruction as my diet prescription.   Other diet: comfort care.     Special Instructions: None    · Is patient discharged on Warfarin / Coumadin?   No     Depression / Suicide Risk    As you are discharged from this [facility] facility, it is important to learn how to keep safe from harming yourself.    Recognize the warning signs:  · Abrupt changes in personality, positive or negative- including increase in energy   · Giving away possessions  · Change in eating patterns- significant weight changes-  positive or negative  · Change in sleeping patterns- unable to sleep or sleeping all the time   · Unwillingness or inability to communicate  · Depression  · Unusual sadness, discouragement and loneliness  · Talk of wanting to die  · Neglect of personal appearance   · Rebelliousness- reckless behavior  · Withdrawal from people/activities they love  · Confusion- inability to concentrate     If you or a loved one observes any of these behaviors or has concerns about self-harm, here's what you can do:  · Talk about it- your feelings and reasons for harming yourself  · Remove any means that you might use to hurt yourself (examples: pills, rope, extension cords, firearm)  · Get professional help from the  community (Mental Health, Substance Abuse, psychological counseling)  · Do not be alone:Call your Safe Contact- someone whom you trust who will be there for you.  · Call your local CRISIS HOTLINE 230-3121 or 720-108-8770  · Call your local Children's Mobile Crisis Response Team [place] (662) 837-9723 or www.Bo[employer] My Ads  · Call the toll free National Suicide Prevention Hotlines   · National Suicide Prevention Lifeline 696-491-GSNG (4229)  · National Hope Line Network 800-SUICIDE (181-2196)       no

## 2021-04-16 NOTE — CARE PLAN
Problem: Safety  Goal: Will remain free from injury  Outcome: PROGRESSING AS EXPECTED  Note: PT educated to call for assistance, Non-skid socks, bed alarm, bed in lowest position, X3 side rails up, call light provided. Will continue to monitor     Problem: Skin Integrity  Goal: Risk for impaired skin integrity will decrease  Outcome: PROGRESSING AS EXPECTED  Note: Q2 turns and perineal care provided, Assessment of skin Q Shift, Adequate fluid and nutrition given.      Problem: Safety:  Goal: Will remain free from injury  Outcome: PROGRESSING AS EXPECTED  Note: PT educated to call for assistance, Non-skid socks, bed alarm, bed in lowest position, X3 side rails up, call light provided. Will continue to monitor     Problem: Safety:  Goal: Will remain free from injury  Outcome: PROGRESSING AS EXPECTED  Note: PT educated to call for assistance, Non-skid socks, bed alarm, bed in lowest position, X3 side rails up, call light provided. Will continue to monitor

## 2021-04-19 LAB — GLUCOSE BLD-MCNC: 115 MG/DL (ref 65–99)

## 2021-08-13 NOTE — PROGRESS NOTES
2 RN skin check with [place] RN    - Right scalp surgical incision with staples- clean, dry, no drainage, open to air   - Scattered bruising bilateral upper extremities   - Sacrum intact- mepilex for prevention     If you are a smoker, it is important for your health to stop smoking. Please be aware that second hand smoke is also harmful.

## 2024-02-16 NOTE — DOCUMENTATION QUERY
DOCUMENTATION QUERY    PROVIDERS: Please select “Cosign w/ note”to reply to query.    To better represent the severity of illness of your patient, please review the following information and exercise your independent professional judgment in responding to this query.     Concern for impending Sepsis is documented on the Consult note. Blood culture positive with E coli.  Start broad spectrum empiric antibibiotics.  Based upon the clinical findings, risk factors, and treatment, can this diagnosis be further clarified    · Sepsis ruled in  · Sepsis ruled out   · Bacteremia  · Unable to determine          The medical record reflects the following:   Clinical Findings  Leukocytosis   Hypotension   Treatment  Broad spectrum antibiotics   Risk Factors  Traumatic Subdural Hematoma    Location within medical record  Consultation, Progress Notes & Discharge Summary     Thank you,   Tamanna Rodriguez, CCS         Obtain Level of Care/Service Not Available at this Facility/Worsening of Condition, Death, or Disability if Patient Does Not Transfer

## (undated) DEVICE — PATTIES SURG X-RAYCOTTONOID - 1 X 3 IN (200/CA)

## (undated) DEVICE — SENSOR SPO2 NEO LNCS ADHESIVE (20/BX) SEE USER NOTES

## (undated) DEVICE — CORDS BIPOLAR COAGULATION - 12FT STERILE DISP. (10EA/BX)

## (undated) DEVICE — CANISTER SUCTION 3000ML MECHANICAL FILTER AUTO SHUTOFF MEDI-VAC NONSTERILE LF DISP  (40EA/CA)

## (undated) DEVICE — BATTERY VARISPEED

## (undated) DEVICE — ELECTRODE 850 FOAM ADHESIVE - HYDROGEL RADIOTRNSPRNT (50/PK)

## (undated) DEVICE — SET EXTENSION WITH 2 PORTS (48EA/CA) ***PART #2C8610 IS A SUBSTITUTE*****

## (undated) DEVICE — GOWN SURGICAL XX-LARGE - (28EA/CA) SIRUS NON REINFORCED

## (undated) DEVICE — LACTATED RINGERS INJ. 500 ML - (24EA/CA)

## (undated) DEVICE — LACTATED RINGERS INJ 1000 ML - (14EA/CA 60CA/PF)

## (undated) DEVICE — TRAY SURESTEP FOLEY TEMP SENSING 16FR (10EA/CA) ORDER  #18764 FOR TEMP FOLEY ONLY

## (undated) DEVICE — COVER FOOT UNIVERSAL DISP. - (25EA/CA)

## (undated) DEVICE — PATTIES SURG X-RAYCOTTONOID - 1/2 X 3 IN (200/CA)

## (undated) DEVICE — FORCEP BIPOLAR ISOCOOL 8.5 1.0MM TIP"

## (undated) DEVICE — DRESSING NON-ADHERING 8 X 3 - (50/BX)

## (undated) DEVICE — MIDAS LUBRICATOR DIFFUSER PACK (4EA/CA)

## (undated) DEVICE — CHLORAPREP 26 ML APPLICATOR - ORANGE TINT(25/CA)

## (undated) DEVICE — GLOVE SZ 6.5 BIOGEL PI MICRO - PF LF (50PR/BX)

## (undated) DEVICE — SUCTION INSTRUMENT YANKAUER BULBOUS TIP W/O VENT (50EA/CA)

## (undated) DEVICE — PERFORATER DISP TIP DGR-0

## (undated) DEVICE — SLEEVE, VASO, THIGH, MED

## (undated) DEVICE — PEN SKIN MARKER W/RULER - (50EA/BX)

## (undated) DEVICE — NEPTUNE 4 PORT MANIFOLD - (20/PK)

## (undated) DEVICE — GLOVE BIOGEL INDICATOR SZ 8.5 SURGICAL PF LTX - (50/BX 4BX/CA)

## (undated) DEVICE — DRAIN SUCTION 7MM FLAT - (10/CS)

## (undated) DEVICE — DRESSING XEROFORM 1X8 - (50/BX 4BX/CA)

## (undated) DEVICE — STAPLER SKIN DISP - (6/BX 10BX/CA) VISISTAT

## (undated) DEVICE — SUTURE 0 VICRYL PLUS CT-2 - 8 X 18 INCH (12/BX)

## (undated) DEVICE — MASK ANESTHESIA ADULT  - (100/CA)

## (undated) DEVICE — SUTURE 4-0 NUROLON CR/8 TF - (12/BX) ETHICON

## (undated) DEVICE — DRAPE STRLE REG TOWEL 18X24 - (10/BX 4BX/CA)"

## (undated) DEVICE — PACK CRANI - (1EA/CA)

## (undated) DEVICE — KIT ANESTHESIA W/CIRCUIT & 3/LT BAG W/FILTER (20EA/CA)

## (undated) DEVICE — DISSECT TOOL MIDAS F2/8TA23

## (undated) DEVICE — SODIUM CHL IRRIGATION 0.9% 1000ML (12EA/CA)

## (undated) DEVICE — SET LEADWIRE 5 LEAD BEDSIDE DISPOSABLE ECG (1SET OF 5/EA)

## (undated) DEVICE — SUTURE GENERAL

## (undated) DEVICE — KIT SURGIFLO W/OUT THROMBIN - (6EA/CA)

## (undated) DEVICE — ELECTRODE DUAL RETURN W/ CORD - (50/PK)

## (undated) DEVICE — TUBING CLEARLINK DUO-VENT - C-FLO (48EA/CA)

## (undated) DEVICE — BLADE CLIPPER FITS 2501 CLIPPER (BLUE)  (20EA/CA)

## (undated) DEVICE — BOVIE BLADE COATED &INSULATED - 25/PK

## (undated) DEVICE — GLOVE BIOGEL PI INDICATOR SZ 6.5 SURGICAL PF LF - (50/BX 4BX/CA)

## (undated) DEVICE — CLOSURE SKIN STRIP 1/2 X 4 IN - (STERI STRIP) (50/BX 4BX/CA)

## (undated) DEVICE — PROTECTOR ULNA NERVE - (36PR/CA)

## (undated) DEVICE — GLOVE BIOGEL SZ 8.5 SURGICAL PF LTX - (50PR/BX 4BX/CA)

## (undated) DEVICE — BALL COTTON NEURO 3/4 INCH - (5/PK50PK/CA)

## (undated) DEVICE — RESERVOIR SUCTION 100 CC - SILICONE (20EA/CA)